# Patient Record
Sex: MALE | Race: WHITE | NOT HISPANIC OR LATINO | URBAN - METROPOLITAN AREA
[De-identification: names, ages, dates, MRNs, and addresses within clinical notes are randomized per-mention and may not be internally consistent; named-entity substitution may affect disease eponyms.]

---

## 2016-04-05 RX ORDER — ASPIRIN/CALCIUM CARB/MAGNESIUM 324 MG
1 TABLET ORAL
Qty: 0 | Refills: 0 | DISCHARGE
Start: 2016-04-05

## 2017-01-09 ENCOUNTER — OUTPATIENT (OUTPATIENT)
Dept: OUTPATIENT SERVICES | Facility: HOSPITAL | Age: 77
LOS: 1 days | Discharge: HOME | End: 2017-01-09
Payer: MEDICARE

## 2017-01-09 VITALS
WEIGHT: 240.08 LBS | SYSTOLIC BLOOD PRESSURE: 169 MMHG | DIASTOLIC BLOOD PRESSURE: 79 MMHG | HEART RATE: 70 BPM | OXYGEN SATURATION: 100 % | TEMPERATURE: 98 F | RESPIRATION RATE: 16 BRPM

## 2017-01-09 DIAGNOSIS — I25.10 ATHEROSCLEROTIC HEART DISEASE OF NATIVE CORONARY ARTERY WITHOUT ANGINA PECTORIS: ICD-10-CM

## 2017-01-09 LAB
ALBUMIN SERPL ELPH-MCNC: 4.6 G/DL — SIGNIFICANT CHANGE UP (ref 3.3–5)
ALP SERPL-CCNC: 112 U/L — SIGNIFICANT CHANGE UP (ref 40–120)
ALT FLD-CCNC: 29 U/L RC — SIGNIFICANT CHANGE UP (ref 10–45)
ANION GAP SERPL CALC-SCNC: 15 MMOL/L — SIGNIFICANT CHANGE UP (ref 5–17)
AST SERPL-CCNC: 24 U/L — SIGNIFICANT CHANGE UP (ref 10–40)
BILIRUB SERPL-MCNC: 0.4 MG/DL — SIGNIFICANT CHANGE UP (ref 0.2–1.2)
BUN SERPL-MCNC: 33 MG/DL — HIGH (ref 7–23)
CALCIUM SERPL-MCNC: 10.7 MG/DL — HIGH (ref 8.4–10.5)
CHLORIDE SERPL-SCNC: 106 MMOL/L — SIGNIFICANT CHANGE UP (ref 96–108)
CO2 SERPL-SCNC: 21 MMOL/L — LOW (ref 22–31)
CREAT SERPL-MCNC: 1.33 MG/DL — HIGH (ref 0.5–1.3)
GLUCOSE SERPL-MCNC: 132 MG/DL — HIGH (ref 70–99)
HCT VFR BLD CALC: 38.3 % — LOW (ref 39–50)
HGB BLD-MCNC: 13.2 G/DL — SIGNIFICANT CHANGE UP (ref 13–17)
MCHC RBC-ENTMCNC: 31.8 PG — SIGNIFICANT CHANGE UP (ref 27–34)
MCHC RBC-ENTMCNC: 34.6 GM/DL — SIGNIFICANT CHANGE UP (ref 32–36)
MCV RBC AUTO: 91.9 FL — SIGNIFICANT CHANGE UP (ref 80–100)
PLATELET # BLD AUTO: 191 K/UL — SIGNIFICANT CHANGE UP (ref 150–400)
POTASSIUM SERPL-MCNC: 5 MMOL/L — SIGNIFICANT CHANGE UP (ref 3.5–5.3)
POTASSIUM SERPL-SCNC: 5 MMOL/L — SIGNIFICANT CHANGE UP (ref 3.5–5.3)
PROT SERPL-MCNC: 7.9 G/DL — SIGNIFICANT CHANGE UP (ref 6–8.3)
RBC # BLD: 4.16 M/UL — LOW (ref 4.2–5.8)
RBC # FLD: 12.6 % — SIGNIFICANT CHANGE UP (ref 10.3–14.5)
SODIUM SERPL-SCNC: 142 MMOL/L — SIGNIFICANT CHANGE UP (ref 135–145)
WBC # BLD: 7 K/UL — SIGNIFICANT CHANGE UP (ref 3.8–10.5)
WBC # FLD AUTO: 7 K/UL — SIGNIFICANT CHANGE UP (ref 3.8–10.5)

## 2017-01-09 PROCEDURE — 93005 ELECTROCARDIOGRAM TRACING: CPT

## 2017-01-09 PROCEDURE — C1894: CPT

## 2017-01-09 PROCEDURE — 93460 R&L HRT ART/VENTRICLE ANGIO: CPT

## 2017-01-09 PROCEDURE — C1769: CPT

## 2017-01-09 PROCEDURE — 93010 ELECTROCARDIOGRAM REPORT: CPT

## 2017-01-09 PROCEDURE — 85027 COMPLETE CBC AUTOMATED: CPT

## 2017-01-09 PROCEDURE — C1887: CPT

## 2017-01-09 PROCEDURE — 80053 COMPREHEN METABOLIC PANEL: CPT

## 2017-01-09 NOTE — H&P CARDIOLOGY - PSH
S/P knee replacement  11/2013  S/P lumbar spine operation  6 thoracic to lumbar surgeries over 6 months period starting 11/2006

## 2017-01-09 NOTE — H&P CARDIOLOGY - HISTORY OF PRESENT ILLNESS
This is a 76yr old male with PMH of Coronary artery disease sp PCI, HTN, HLD, GERD, Gout, Osteoarthritis and back pain, sp multiple spinal surgeries presents to [facility] office with c/o of fatigue for the past 2 months, denies chest pian, dyspnea, dizziness, palpitations, orthopnea, N&V, HA.   ECHO 11/1/2016  mild AS, mild AR, severe left atrial enlargement, mild MR, EF 65%, mild diastolic dysfunction stage I  Here today for cardiac cath with possible intervention.

## 2017-01-09 NOTE — H&P CARDIOLOGY - PMH
Back pain    CAD S/P percutaneous coronary angioplasty    GERD (gastroesophageal reflux disease)    Gout    HLD (hyperlipidemia)    HTN (hypertension)    OA (osteoarthritis)

## 2018-09-11 ENCOUNTER — INPATIENT (INPATIENT)
Facility: HOSPITAL | Age: 78
LOS: 1 days | Discharge: HOME | DRG: 287 | End: 2018-09-13
Attending: INTERNAL MEDICINE | Admitting: INTERNAL MEDICINE
Payer: MEDICARE

## 2018-09-11 VITALS
TEMPERATURE: 98 F | DIASTOLIC BLOOD PRESSURE: 63 MMHG | OXYGEN SATURATION: 99 % | SYSTOLIC BLOOD PRESSURE: 153 MMHG | RESPIRATION RATE: 25 BRPM | HEART RATE: 78 BPM

## 2018-09-11 DIAGNOSIS — R07.9 CHEST PAIN, UNSPECIFIED: ICD-10-CM

## 2018-09-11 LAB
ALBUMIN SERPL ELPH-MCNC: 4.2 G/DL — SIGNIFICANT CHANGE UP (ref 3.3–5)
ALP SERPL-CCNC: 123 U/L — HIGH (ref 40–120)
ALT FLD-CCNC: 19 U/L — SIGNIFICANT CHANGE UP (ref 10–45)
ANION GAP SERPL CALC-SCNC: 15 MMOL/L — SIGNIFICANT CHANGE UP (ref 5–17)
APTT BLD: 28.5 SEC — SIGNIFICANT CHANGE UP (ref 27.5–37.4)
AST SERPL-CCNC: 16 U/L — SIGNIFICANT CHANGE UP (ref 10–40)
BASOPHILS # BLD AUTO: 0 K/UL — SIGNIFICANT CHANGE UP (ref 0–0.2)
BASOPHILS NFR BLD AUTO: 0.6 % — SIGNIFICANT CHANGE UP (ref 0–2)
BILIRUB SERPL-MCNC: 0.2 MG/DL — SIGNIFICANT CHANGE UP (ref 0.2–1.2)
BUN SERPL-MCNC: 46 MG/DL — HIGH (ref 7–23)
CALCIUM SERPL-MCNC: 9.8 MG/DL — SIGNIFICANT CHANGE UP (ref 8.4–10.5)
CHLORIDE SERPL-SCNC: 106 MMOL/L — SIGNIFICANT CHANGE UP (ref 96–108)
CK MB BLD-MCNC: 2.5 % — SIGNIFICANT CHANGE UP (ref 0–3.5)
CK MB CFR SERPL CALC: 3 NG/ML — SIGNIFICANT CHANGE UP (ref 0–6.7)
CK SERPL-CCNC: 120 U/L — SIGNIFICANT CHANGE UP (ref 30–200)
CO2 SERPL-SCNC: 20 MMOL/L — LOW (ref 22–31)
CREAT SERPL-MCNC: 1.43 MG/DL — HIGH (ref 0.5–1.3)
EOSINOPHIL # BLD AUTO: 0.4 K/UL — SIGNIFICANT CHANGE UP (ref 0–0.5)
EOSINOPHIL NFR BLD AUTO: 5.2 % — SIGNIFICANT CHANGE UP (ref 0–6)
GAS PNL BLDV: SIGNIFICANT CHANGE UP
GLUCOSE SERPL-MCNC: 228 MG/DL — HIGH (ref 70–99)
HCT VFR BLD CALC: 41.2 % — SIGNIFICANT CHANGE UP (ref 39–50)
HGB BLD-MCNC: 13.5 G/DL — SIGNIFICANT CHANGE UP (ref 13–17)
INR BLD: 1.06 RATIO — SIGNIFICANT CHANGE UP (ref 0.88–1.16)
LIDOCAIN IGE QN: 32 U/L — SIGNIFICANT CHANGE UP (ref 7–60)
LYMPHOCYTES # BLD AUTO: 2.4 K/UL — SIGNIFICANT CHANGE UP (ref 1–3.3)
LYMPHOCYTES # BLD AUTO: 33.5 % — SIGNIFICANT CHANGE UP (ref 13–44)
MCHC RBC-ENTMCNC: 30 PG — SIGNIFICANT CHANGE UP (ref 27–34)
MCHC RBC-ENTMCNC: 32.8 GM/DL — SIGNIFICANT CHANGE UP (ref 32–36)
MCV RBC AUTO: 91.2 FL — SIGNIFICANT CHANGE UP (ref 80–100)
MONOCYTES # BLD AUTO: 0.7 K/UL — SIGNIFICANT CHANGE UP (ref 0–0.9)
MONOCYTES NFR BLD AUTO: 9.7 % — SIGNIFICANT CHANGE UP (ref 2–14)
NEUTROPHILS # BLD AUTO: 3.6 K/UL — SIGNIFICANT CHANGE UP (ref 1.8–7.4)
NEUTROPHILS NFR BLD AUTO: 51 % — SIGNIFICANT CHANGE UP (ref 43–77)
NT-PROBNP SERPL-SCNC: 178 PG/ML — SIGNIFICANT CHANGE UP (ref 0–300)
PLATELET # BLD AUTO: 224 K/UL — SIGNIFICANT CHANGE UP (ref 150–400)
POTASSIUM SERPL-MCNC: 3.9 MMOL/L — SIGNIFICANT CHANGE UP (ref 3.5–5.3)
POTASSIUM SERPL-SCNC: 3.9 MMOL/L — SIGNIFICANT CHANGE UP (ref 3.5–5.3)
PROT SERPL-MCNC: 7.4 G/DL — SIGNIFICANT CHANGE UP (ref 6–8.3)
PROTHROM AB SERPL-ACNC: 11.5 SEC — SIGNIFICANT CHANGE UP (ref 9.8–12.7)
RBC # BLD: 4.51 M/UL — SIGNIFICANT CHANGE UP (ref 4.2–5.8)
RBC # FLD: 13.8 % — SIGNIFICANT CHANGE UP (ref 10.3–14.5)
SODIUM SERPL-SCNC: 141 MMOL/L — SIGNIFICANT CHANGE UP (ref 135–145)
TROPONIN T, HIGH SENSITIVITY RESULT: 15 NG/L — SIGNIFICANT CHANGE UP (ref 0–51)
TROPONIN T, HIGH SENSITIVITY RESULT: 16 NG/L — SIGNIFICANT CHANGE UP (ref 0–51)
WBC # BLD: 7 K/UL — SIGNIFICANT CHANGE UP (ref 3.8–10.5)
WBC # FLD AUTO: 7 K/UL — SIGNIFICANT CHANGE UP (ref 3.8–10.5)

## 2018-09-11 PROCEDURE — 99285 EMERGENCY DEPT VISIT HI MDM: CPT | Mod: GC,25

## 2018-09-11 PROCEDURE — 93010 ELECTROCARDIOGRAM REPORT: CPT

## 2018-09-11 PROCEDURE — 71046 X-RAY EXAM CHEST 2 VIEWS: CPT | Mod: 26

## 2018-09-11 RX ORDER — CHOLECALCIFEROL (VITAMIN D3) 125 MCG
1000 CAPSULE ORAL DAILY
Qty: 0 | Refills: 0 | Status: DISCONTINUED | OUTPATIENT
Start: 2018-09-11 | End: 2018-09-13

## 2018-09-11 RX ORDER — ASPIRIN/CALCIUM CARB/MAGNESIUM 324 MG
324 TABLET ORAL ONCE
Qty: 0 | Refills: 0 | Status: COMPLETED | OUTPATIENT
Start: 2018-09-11 | End: 2018-09-11

## 2018-09-11 RX ORDER — LOSARTAN POTASSIUM 100 MG/1
100 TABLET, FILM COATED ORAL DAILY
Qty: 0 | Refills: 0 | Status: DISCONTINUED | OUTPATIENT
Start: 2018-09-11 | End: 2018-09-13

## 2018-09-11 RX ORDER — PANTOPRAZOLE SODIUM 20 MG/1
40 TABLET, DELAYED RELEASE ORAL
Qty: 0 | Refills: 0 | Status: DISCONTINUED | OUTPATIENT
Start: 2018-09-11 | End: 2018-09-13

## 2018-09-11 RX ORDER — ASPIRIN/CALCIUM CARB/MAGNESIUM 324 MG
81 TABLET ORAL DAILY
Qty: 0 | Refills: 0 | Status: DISCONTINUED | OUTPATIENT
Start: 2018-09-11 | End: 2018-09-13

## 2018-09-11 RX ORDER — ATORVASTATIN CALCIUM 80 MG/1
40 TABLET, FILM COATED ORAL AT BEDTIME
Qty: 0 | Refills: 0 | Status: DISCONTINUED | OUTPATIENT
Start: 2018-09-11 | End: 2018-09-13

## 2018-09-11 RX ORDER — INFLUENZA VIRUS VACCINE 15; 15; 15; 15 UG/.5ML; UG/.5ML; UG/.5ML; UG/.5ML
0.5 SUSPENSION INTRAMUSCULAR ONCE
Qty: 0 | Refills: 0 | Status: DISCONTINUED | OUTPATIENT
Start: 2018-09-11 | End: 2018-09-13

## 2018-09-11 RX ORDER — SODIUM CHLORIDE 9 MG/ML
3 INJECTION INTRAMUSCULAR; INTRAVENOUS; SUBCUTANEOUS ONCE
Qty: 0 | Refills: 0 | Status: COMPLETED | OUTPATIENT
Start: 2018-09-11 | End: 2018-09-11

## 2018-09-11 RX ORDER — ALLOPURINOL 300 MG
300 TABLET ORAL DAILY
Qty: 0 | Refills: 0 | Status: DISCONTINUED | OUTPATIENT
Start: 2018-09-11 | End: 2018-09-13

## 2018-09-11 RX ORDER — AMLODIPINE BESYLATE 2.5 MG/1
10 TABLET ORAL DAILY
Qty: 0 | Refills: 0 | Status: DISCONTINUED | OUTPATIENT
Start: 2018-09-11 | End: 2018-09-13

## 2018-09-11 RX ORDER — ASPIRIN/CALCIUM CARB/MAGNESIUM 324 MG
162 TABLET ORAL ONCE
Qty: 0 | Refills: 0 | Status: DISCONTINUED | OUTPATIENT
Start: 2018-09-11 | End: 2018-09-11

## 2018-09-11 RX ADMIN — ATORVASTATIN CALCIUM 40 MILLIGRAM(S): 80 TABLET, FILM COATED ORAL at 22:07

## 2018-09-11 RX ADMIN — AMLODIPINE BESYLATE 10 MILLIGRAM(S): 2.5 TABLET ORAL at 22:07

## 2018-09-11 RX ADMIN — Medication 324 MILLIGRAM(S): at 16:25

## 2018-09-11 RX ADMIN — SODIUM CHLORIDE 3 MILLILITER(S): 9 INJECTION INTRAMUSCULAR; INTRAVENOUS; SUBCUTANEOUS at 16:47

## 2018-09-11 RX ADMIN — LOSARTAN POTASSIUM 100 MILLIGRAM(S): 100 TABLET, FILM COATED ORAL at 22:07

## 2018-09-11 NOTE — ED PROVIDER NOTE - MEDICAL DECISION MAKING DETAILS
77M with past medical history coronary artery disease with recent pci showing 10% LAD stenosis, mild diastolic dysfunction, with chronic LE edema on occasional lasix presents with chest pain while walking today.  No other complaints, pain now resolved.  ACS r/o with labs, cxr, ekg, monitor, asa, will discuss with Dr. Mitchell (pt's cardiologist) Dr. Lux (Attending Physician)  77M with past medical history coronary artery disease with recent pci showing 10% LAD stenosis, mild diastolic dysfunction, with chronic LE edema on occasional lasix presents with chest pain while walking today.  No other complaints, pain now resolved.  ACS r/o with labs, cxr, ekg, monitor, asa, will discuss with Dr. Mitchell (pt's cardiologist)

## 2018-09-11 NOTE — ED PROVIDER NOTE - PHYSICAL EXAMINATION
***GEN - well appearing; NAD   ***HEAD - NC/AT  ***EYES/NOSE - PEERL, EOMI, mucous membranes moist, no discharge   ***THROAT: Oral cavity and pharynx normal. No inflammation, swelling, exudate, or lesions.    ***NECK: supple, non-tender no lymphadenopathy  ***PULMONARY - CTA b/l, symmetric breath sounds.   ***CARDIAC- s1s2, RRR, systolic murmur  ***ABDOMEN - +BS, ND, NT, soft, no guarding, no rebound, no organomegaly  ***BACK - no CVA tenderness, Normal  spine, no spinal TTP  ***EXTREMITIES - symmetric pulses, 2+ dp, capillary refill < 2 seconds, no clubbing, no cyanosis, 2+ edema BL LE  ***SKIN - warm, dry, intact, no rash or bruising   ***NEUROLOGIC - a&o x3, CN 3-12 intact, sensation nl, motor 5/5 RUE/LUE/RLE/LLE gait nl,   ***PSYCH - insight and judgment nl, memory nl, affect nl, thought normal

## 2018-09-11 NOTE — H&P ADULT - HISTORY OF PRESENT ILLNESS
77yr old male with PMH of Coronary artery disease sp PCI 1/2017 with 10% LAD stenosis OM1, HTN, HLD, GERD, Gout, Osteoarthritis and back pain, sp multiple spinal surgeries presents with BL squeezing chest pain that started while walking 2hrs ago, lasted 1hr then resolved.  Pain was improved on laying down, worse on leaning forward.  Denies jaw pain, arm pain, shortness of breath, cough, n/v, diaphoresis, recent exertional symptoms, fever, chills, URI symptoms, worsening LE edema.

## 2018-09-11 NOTE — H&P ADULT - ASSESSMENT
77yr old male with PMH of Coronary artery disease sp PCI 1/2017 with 10% LAD stenosis OM1, HTN, HLD, GERD, Gout, Osteoarthritis and back pain, sp multiple spinal surgeries presents with BL squeezing chest pain that started while walking 2hrs ago, lasted 1hr then resolved.  Pain was improved on laying down, worse on leaning forward.  Denies jaw pain, arm pain, shortness of breath, cough, n/v, diaphoresis, recent exertional symptoms, fever, chills, URI symptoms, worsening LE edema.    r/o acs  - trop neg x2  - admit to tele  - c/w asa  - stress test in am    htn  - cw cozaar  - c/w norvasc    - hld  - cw lipitor    gout  - cw allopurinol    dvt px

## 2018-09-11 NOTE — H&P ADULT - NSHPLABSRESULTS_GEN_ALL_CORE
LABS:                        13.5   7.0   )-----------( 224      ( 11 Sep 2018 16:50 )             41.2     09-11    141  |  106  |  46<H>  ----------------------------<  228<H>  3.9   |  20<L>  |  1.43<H>    Ca    9.8      11 Sep 2018 16:50    TPro  7.4  /  Alb  4.2  /  TBili  0.2  /  DBili  x   /  AST  16  /  ALT  19  /  AlkPhos  123<H>  09-11    PT/INR - ( 11 Sep 2018 16:50 )   PT: 11.5 sec;   INR: 1.06 ratio         PTT - ( 11 Sep 2018 16:50 )  PTT:28.5 sec      CPK Mass Assay %: 2.5 % (09-11-18 @ 18:37)      RADIOLOGY & ADDITIONAL TESTS:

## 2018-09-11 NOTE — ED ADULT NURSE NOTE - NSIMPLEMENTINTERV_GEN_ALL_ED
Implemented All Universal Safety Interventions:  [place] to call system. Call bell, personal items and telephone within reach. Instruct patient to call for assistance. Room bathroom lighting operational. Non-slip footwear when patient is off stretcher. Physically safe environment: no spills, clutter or unnecessary equipment. Stretcher in lowest position, wheels locked, appropriate side rails in place.

## 2018-09-11 NOTE — ED PROVIDER NOTE - NS ED ROS FT
Constitutional: no fever  Eyes: As noted in hpi   Ears: no ear pain   Nose: no nasal congestion, Mouth/Throat: no throat pain, Neck: no stiffness  Cardiovascular: As noted in hpi   Chest: As noted in hpi   Gastrointestinal: no abdominal pain, no vomiting and diarrhea  MSK: no joint pain  : no dysuria  Skin: no rash  Neuro: no LOC  All other review of systems negative except as noted in HPI

## 2018-09-11 NOTE — ED ADULT NURSE NOTE - OBJECTIVE STATEMENT
76 y/o male with PMH of CAD S/P stents, GERD, gout, HTN presenting to ED for an episode of midsternal squeezing chest pain at rest 2 hours ago. Pt states "It hurts more leaning forward than it does sitting back. I was walking for 1-2 blocks and felt fine but when I got home and sat down, I felt it. I had no SOB or sweats. It lasted about 10 minutes." Upon exam, pt A&Ox3 gross neuro intact, lungs cta bilaterally, no difficulty speaking in complete sentences, s1s2 heart sounds heard, abdomen soft round nontender nondistended, skin intact, 2+ bilateral pitting edema on lower extremities. EKG done & given to MD, placed on cardiac monitor NSR 70s, pt endorses that chest pain has subsided and is experiencing no pain at this point. Pt denies chest pain, sob, ha, n/v/d, abdominal pain, f/c, urinary symptoms, hematuria. Labs drawn & sent. 78 y/o male with PMH of CAD S/P stents, GERD, gout, HTN presenting to ED for an episode of midsternal squeezing chest pain at rest 2 hours ago. Pt states "It hurts more leaning forward than it does sitting back. I was walking for 1-2 blocks and felt fine but when I got home and sat down, I felt it. I had no SOB or sweats. It lasted about 10 minutes." Upon exam, pt A&Ox3 gross neuro intact, lungs cta bilaterally, no difficulty speaking in complete sentences, s1s2 heart sounds heard, abdomen soft round nontender nondistended, skin intact, 2+ bilateral pitting edema on lower extremities. EKG done & given to MD, placed on cardiac monitor NSR 70s, pt endorses that chest pain has subsided and is experiencing no pain at this point. 4 81mg chewable ASA given bedside as per MD Min.  Pt denies chest pain, sob, ha, n/v/d, abdominal pain, f/c, urinary symptoms, hematuria. Labs drawn & sent. 76 y/o male with PMH of CAD S/P stents, GERD, gout, HTN presenting to ED for an episode of midsternal squeezing chest pain at rest 2 hours. Pt states "It hurts more leaning forward than it does sitting back. I was walking for 1-2 blocks and felt fine but when I got home and sat down, I felt it. I had no SOB or sweats. It lasted about 10 minutes." Upon exam, pt A&Ox3 gross neuro intact, lungs cta bilaterally, no difficulty speaking in complete sentences, s1s2 heart sounds heard, abdomen soft round nontender nondistended, skin intact, 2+ bilateral pitting edema on lower extremities. EKG done & given to MD, placed on cardiac monitor NSR 70s, pt endorses that chest pain has subsided and is experiencing no pain at this point. 4 81mg chewable ASA given bedside as per MD Min.  Pt denies chest pain, sob, ha, n/v/d, abdominal pain, f/c, urinary symptoms, hematuria. Labs drawn & sent.

## 2018-09-11 NOTE — ED PROVIDER NOTE - OBJECTIVE STATEMENT
77yr old male with PMH of Coronary artery disease sp PCI 1/2017 with 10% LAD stenosis OM1, HTN, HLD, GERD, Gout, Osteoarthritis and back pain, sp multiple spinal surgeries presents presents with BL squeezing chest pain that started while walking 2hrs ago, lasted 1hr then resolved.  Pain was improved on laying down, worse on leaning forward.  Denies jaw pain, arm pain, shortness of breath, cough, n/v, diaphoresis, recent exertional symptoms, fever, chills, URI symptoms, worsening LE edema. 77yr old male with PMH of Coronary artery disease sp PCI 1/2017 with 10% LAD stenosis OM1, HTN, HLD, GERD, Gout, Osteoarthritis and back pain, sp multiple spinal surgeries presents with BL squeezing chest pain that started while walking 2hrs ago, lasted 1hr then resolved.  Pain was improved on laying down, worse on leaning forward.  Denies jaw pain, arm pain, shortness of breath, cough, n/v, diaphoresis, recent exertional symptoms, fever, chills, URI symptoms, worsening LE edema.

## 2018-09-11 NOTE — ED ADULT NURSE REASSESSMENT NOTE - NS ED NURSE REASSESS COMMENT FT1
Pt remains stable in ED S/P xray, results show no emergent findings. Pt VSS, NAD, no complaints of pain at this time. Admitted to tele for chest pain. RTM. Awaits bed.

## 2018-09-11 NOTE — ED PROVIDER NOTE - ATTENDING CONTRIBUTION TO CARE
Dr. Lux (Attending Physician)  I performed a history and physical exam of the patient and discussed their management with the resident. I reviewed the resident's note and agree with the documented findings and plan of care. My medical decision making and observations are found above.    I performed a history and physical exam of the patient and discussed their management with the advanced care provider. I reviewed the advanced care provider's note and agree with the documented findings and plan of care. My medical decision making and objective findings are found above.

## 2018-09-12 LAB
ANION GAP SERPL CALC-SCNC: 16 MMOL/L — SIGNIFICANT CHANGE UP (ref 5–17)
BUN SERPL-MCNC: 29 MG/DL — HIGH (ref 7–23)
CALCIUM SERPL-MCNC: 9.5 MG/DL — SIGNIFICANT CHANGE UP (ref 8.4–10.5)
CHLORIDE SERPL-SCNC: 108 MMOL/L — SIGNIFICANT CHANGE UP (ref 96–108)
CHOLEST SERPL-MCNC: 158 MG/DL — SIGNIFICANT CHANGE UP (ref 10–199)
CO2 SERPL-SCNC: 18 MMOL/L — LOW (ref 22–31)
CREAT SERPL-MCNC: 0.99 MG/DL — SIGNIFICANT CHANGE UP (ref 0.5–1.3)
FOLATE SERPL-MCNC: 9.3 NG/ML — SIGNIFICANT CHANGE UP
GLUCOSE SERPL-MCNC: 129 MG/DL — HIGH (ref 70–99)
HCT VFR BLD CALC: 38.2 % — LOW (ref 39–50)
HDLC SERPL-MCNC: 28 MG/DL — LOW
HGB BLD-MCNC: 12.2 G/DL — LOW (ref 13–17)
LIPID PNL WITH DIRECT LDL SERPL: 95 MG/DL — SIGNIFICANT CHANGE UP
MAGNESIUM SERPL-MCNC: 1.9 MG/DL — SIGNIFICANT CHANGE UP (ref 1.6–2.6)
MCHC RBC-ENTMCNC: 28.8 PG — SIGNIFICANT CHANGE UP (ref 27–34)
MCHC RBC-ENTMCNC: 31.9 GM/DL — LOW (ref 32–36)
MCV RBC AUTO: 90.3 FL — SIGNIFICANT CHANGE UP (ref 80–100)
NT-PROBNP SERPL-SCNC: 132 PG/ML — SIGNIFICANT CHANGE UP (ref 0–300)
PHOSPHATE SERPL-MCNC: 3.1 MG/DL — SIGNIFICANT CHANGE UP (ref 2.5–4.5)
PLATELET # BLD AUTO: 214 K/UL — SIGNIFICANT CHANGE UP (ref 150–400)
POTASSIUM SERPL-MCNC: 4.1 MMOL/L — SIGNIFICANT CHANGE UP (ref 3.5–5.3)
POTASSIUM SERPL-SCNC: 4.1 MMOL/L — SIGNIFICANT CHANGE UP (ref 3.5–5.3)
RBC # BLD: 4.23 M/UL — SIGNIFICANT CHANGE UP (ref 4.2–5.8)
RBC # FLD: 14.9 % — HIGH (ref 10.3–14.5)
SODIUM SERPL-SCNC: 141 MMOL/L — SIGNIFICANT CHANGE UP (ref 135–145)
TOTAL CHOLESTEROL/HDL RATIO MEASUREMENT: 5.6 RATIO — SIGNIFICANT CHANGE UP (ref 3.4–9.6)
TRIGL SERPL-MCNC: 174 MG/DL — HIGH (ref 10–149)
TSH SERPL-MCNC: 3.72 UIU/ML — SIGNIFICANT CHANGE UP (ref 0.27–4.2)
VIT B12 SERPL-MCNC: 542 PG/ML — SIGNIFICANT CHANGE UP (ref 232–1245)
WBC # BLD: 7.06 K/UL — SIGNIFICANT CHANGE UP (ref 3.8–10.5)
WBC # FLD AUTO: 7.06 K/UL — SIGNIFICANT CHANGE UP (ref 3.8–10.5)

## 2018-09-12 PROCEDURE — 78452 HT MUSCLE IMAGE SPECT MULT: CPT | Mod: 26

## 2018-09-12 PROCEDURE — 93018 CV STRESS TEST I&R ONLY: CPT

## 2018-09-12 PROCEDURE — 93016 CV STRESS TEST SUPVJ ONLY: CPT

## 2018-09-12 RX ORDER — CLOPIDOGREL BISULFATE 75 MG/1
600 TABLET, FILM COATED ORAL ONCE
Qty: 0 | Refills: 0 | Status: COMPLETED | OUTPATIENT
Start: 2018-09-12 | End: 2018-09-12

## 2018-09-12 RX ORDER — CLOPIDOGREL BISULFATE 75 MG/1
75 TABLET, FILM COATED ORAL DAILY
Qty: 0 | Refills: 0 | Status: DISCONTINUED | OUTPATIENT
Start: 2018-09-13 | End: 2018-09-13

## 2018-09-12 RX ADMIN — Medication 300 MILLIGRAM(S): at 11:46

## 2018-09-12 RX ADMIN — Medication 81 MILLIGRAM(S): at 11:46

## 2018-09-12 RX ADMIN — CLOPIDOGREL BISULFATE 600 MILLIGRAM(S): 75 TABLET, FILM COATED ORAL at 17:53

## 2018-09-12 RX ADMIN — Medication 1000 UNIT(S): at 11:46

## 2018-09-12 RX ADMIN — ATORVASTATIN CALCIUM 40 MILLIGRAM(S): 80 TABLET, FILM COATED ORAL at 21:29

## 2018-09-12 RX ADMIN — AMLODIPINE BESYLATE 10 MILLIGRAM(S): 2.5 TABLET ORAL at 21:29

## 2018-09-12 RX ADMIN — PANTOPRAZOLE SODIUM 40 MILLIGRAM(S): 20 TABLET, DELAYED RELEASE ORAL at 05:23

## 2018-09-12 RX ADMIN — LOSARTAN POTASSIUM 100 MILLIGRAM(S): 100 TABLET, FILM COATED ORAL at 21:30

## 2018-09-12 NOTE — CONSULT NOTE ADULT - CONSULT REASON
chest pain  hx CAD s/p PCI ( mid LAD and OM) , HTN., DM HLD, GERD, GOUT, s/p multiple spinal surgery chest pain  hx  HTN, DCHF, CAD, S/P PCI to OM1, Mid LAD (9/2014, [facility], [facility], [facility]) hx of ischemic CMP, AS, DM HLD, GERD, GOUT, s/p multiple spinal surgery

## 2018-09-12 NOTE — PROGRESS NOTE ADULT - SUBJECTIVE AND OBJECTIVE BOX
Patient is a 77y old  Male who presents with a chief complaint of chest pain (11 Sep 2018 21:08)      SUBJECTIVE / OVERNIGHT EVENTS:  No chest pain. No shortness of breath. No complaints. No events overnight.     MEDICATIONS  (STANDING):  allopurinol 300 milliGRAM(s) Oral daily  amLODIPine   Tablet 10 milliGRAM(s) Oral daily  aspirin enteric coated 81 milliGRAM(s) Oral daily  atorvastatin 40 milliGRAM(s) Oral at bedtime  cholecalciferol 1000 Unit(s) Oral daily  influenza   Vaccine 0.5 milliLiter(s) IntraMuscular once  losartan 100 milliGRAM(s) Oral daily  pantoprazole    Tablet 40 milliGRAM(s) Oral before breakfast    MEDICATIONS  (PRN):      Vital Signs Last 24 Hrs  T(C): 36.5 (12 Sep 2018 04:59), Max: 36.9 (11 Sep 2018 20:07)  T(F): 97.7 (12 Sep 2018 04:59), Max: 98.4 (11 Sep 2018 20:07)  HR: 64 (12 Sep 2018 04:59) (62 - 78)  BP: 145/84 (12 Sep 2018 04:59) (145/84 - 160/81)  BP(mean): --  RR: 18 (12 Sep 2018 04:59) (16 - 25)  SpO2: 97% (12 Sep 2018 04:59) (97% - 99%)  CAPILLARY BLOOD GLUCOSE        I&O's Summary    12 Sep 2018 07:01  -  12 Sep 2018 12:19  --------------------------------------------------------  IN: 0 mL / OUT: 0 mL / NET: 0 mL        PHYSICAL EXAM:  GENERAL: NAD, well-developed  HEAD:  Atraumatic, Normocephalic  EYES: EOMI, PERRLA, conjunctiva and sclera clear  NECK: Supple, No JVD  CHEST/LUNG: Clear to auscultation bilaterally; No wheeze  HEART: Regular rate and rhythm; No murmurs, rubs, or gallops  ABDOMEN: Soft, Nontender, Nondistended; Bowel sounds present  EXTREMITIES:  2+ Peripheral Pulses, No clubbing, cyanosis, or edema  PSYCH: AAOx3  NEUROLOGY: non-focal  SKIN: No rashes or lesions    LABS:                        12.2   7.06  )-----------( 214      ( 12 Sep 2018 07:38 )             38.2     09-12    141  |  108  |  29<H>  ----------------------------<  129<H>  4.1   |  18<L>  |  0.99    Ca    9.5      12 Sep 2018 06:09  Phos  3.1     09-12  Mg     1.9     09-12    TPro  7.4  /  Alb  4.2  /  TBili  0.2  /  DBili  x   /  AST  16  /  ALT  19  /  AlkPhos  123<H>  09-11    PT/INR - ( 11 Sep 2018 16:50 )   PT: 11.5 sec;   INR: 1.06 ratio         PTT - ( 11 Sep 2018 16:50 )  PTT:28.5 sec  CARDIAC MARKERS ( 11 Sep 2018 18:37 )  x     / x     / 120 U/L / x     / 3.0 ng/mL          RADIOLOGY & ADDITIONAL TESTS:    Imaging Personally Reviewed:    Consultant(s) Notes Reviewed:      Care Discussed with Consultants/Other Providers:

## 2018-09-12 NOTE — PROGRESS NOTE ADULT - ASSESSMENT
77yr old male with PMH of Coronary artery disease sp PCI 1/2017 with 10% LAD stenosis OM1, HTN, HLD, GERD, Gout, Osteoarthritis and back pain, sp multiple spinal surgeries presents with BL squeezing chest pain that started while walking 2hrs ago, lasted 1hr then resolved.  Pain was improved on laying down, worse on leaning forward.  Denies jaw pain, arm pain, shortness of breath, cough, n/v, diaphoresis, recent exertional symptoms, fever, chills, URI symptoms, worsening LE edema.    r/o acs  - trop neg x2  - monitor on tele  - c/w asa  - stress test today    htn  - cw cozaar  - c/w norvasc    - hld  - cw lipitor    gout  - cw allopurinol    dvt px

## 2018-09-12 NOTE — CONSULT NOTE ADULT - ASSESSMENT
77yr old male with HTN, DCHF, CAD, S/P PCI to OM1, Mid LAD (9/2014, [facility], [facility], [facility]) hx of ischemic CMP, AS, DM HLD, GERD, GOUT, s/p multiple spinal surgery admitted with chest pain, fatigue     1. Chest pain   with some typical symptoms   HS trop negative, EKG unchanged with known RBBB/bifasicular block, no evidence of ACS   pt with known cardiac disease including CAD with s/p PCI to mid LAD and OM 1 stents and mild diffuse RCA disease ( reviewed on last cath in jan 2017)  stress test revealing mod to severe defect in the inferior and baso-inferolateral walls consistent with ischemia. defect in the apex and apicolateral wall consistent with infarct/ danette-infarct ischemia. EF 47%  likely plan for cath tomorrow   continue with ASA , statin and arb     2. CAD s/p PCI (OM1/LAD)   initially presenting with chest pain   currently cp free, no sob   ST results as mentioned above  continue with ASA , statin and arb   remains off BB in light of fatigue     3. Diastolic CHF, hx Ischemic CMP   last echo on  2/6/2018 revealed normal LV fx, diastolic dysfx.  no evidence of decomp CHF on exam   resume 40 mg PO lasix 3-4x weekly , continue Arb     4. Aortic Stenosis  stable, Moderate on recent echo 2/6/2018   check repeat echo to eval progression of AS    5. HTN   BP stable .,continue with current antihtn meds 77yr old male with HTN, DCHF, CAD, S/P PCI to OM1, Mid LAD (9/2014, [facility], [facility], [facility]) hx of ischemic CMP, AS, DM HLD, GERD, GOUT, s/p multiple spinal surgery admitted with chest pain, fatigue     1. Chest pain   with some typical symptoms   HS trop negative, EKG unchanged with known RBBB/bifasicular block, no evidence of ACS   pt with known cardiac disease including CAD with s/p PCI to mid LAD and OM 1 stents and mild diffuse RCA disease ( reviewed on last cath in jan 2017)  stress test revealing mod to severe defect in the inferior and baso-inferolateral walls consistent with ischemia. defect in the apex and apicolateral wall consistent with infarct/ danette-infarct ischemia. EF 47%  plan for cath tomorrow   continue with ASA , statin and arb     2. CAD s/p PCI (OM1/LAD)   initially presenting with chest pain   currently cp free, no sob   ST results as mentioned above, plan for cath tomorrow   continue with ASA , statin and arb   remains off BB in light of fatigue     3. Diastolic CHF, hx Ischemic CMP   last echo on  2/6/2018 revealed normal LV fx, diastolic dysfx.  no evidence of decomp CHF on exam   resume 40 mg PO lasix 3-4x weekly , continue Arb     4. Aortic Stenosis  stable, Moderate on recent echo 2/6/2018   check repeat echo to eval progression of AS    5. HTN   BP stable .,continue with current antihtn meds 77yr old male with HTN, DCHF, CAD, S/P PCI to OM1, Mid LAD (9/2014, [facility], [facility], [facility]) hx of ischemic CMP, AS, DM HLD, GERD, GOUT, s/p multiple spinal surgery admitted with chest pain, fatigue     1. Chest pain   with some typical symptoms   HS trop negative, EKG unchanged with known RBBB/bifasicular block, no evidence of ACS   pt with known cardiac disease including CAD with s/p PCI to mid LAD and OM 1 stents and mild diffuse RCA disease ( reviewed on last cath in jan 2017)  stress test revealing mod to severe defect in the inferior and baso-inferolateral walls consistent with ischemia. defect in the apex and apicolateral wall consistent with infarct/ danette-infarct ischemia. EF 47%  plan for cath tomorrow , give plavix 600 mg po x1 now, start 75 mg tomorrow   continue with ASA , statin and arb     2. CAD s/p PCI (OM1/LAD)   initially presenting with chest pain   currently cp free, no sob   ST results as mentioned above, plan for cath tomorrow   continue with ASA , statin and arb   remains off BB in light of fatigue     3. Diastolic CHF, hx Ischemic CMP   last echo on  2/6/2018 revealed normal LV fx, diastolic dysfx.  no evidence of decomp CHF on exam   resume 40 mg PO lasix 3-4x weekly , continue Arb     4. Aortic Stenosis  stable, Moderate on recent echo 2/6/2018   check repeat echo to eval progression of AS    5. HTN   BP stable .,continue with current antihtn meds

## 2018-09-12 NOTE — CONSULT NOTE ADULT - SUBJECTIVE AND OBJECTIVE BOX
CARDIOLOGY CONSULT - Dr. Lee     CHIEF COMPLAINT:    HPI:  77yr old male with PMH as mentioned below presents with BL squeezing chest pain. Chest pain started while walking , lasted 1hr then resolved.  Pain was improved on laying down, worse on leaning forward.  Denies jaw pain, arm pain, shortness of breath, cough, n/v, diaphoresis, recent exertional symptoms, fever, chills, URI symptoms, worsening LE edema.  Cath jan 2017 revealed  patent mid LAD and OM 1 stents and mild diffuse RCA disease. EF is preserved at 55%.  Wedge pressure and pulmonary artery pressures are normal.      PAST MEDICAL & SURGICAL HISTORY:  CAD S/P percutaneous coronary angioplasty  HLD (hyperlipidemia)  Back pain  OA (osteoarthritis)  Gout  HTN (hypertension)  GERD (gastroesophageal reflux disease)  S/P lumbar spine operation: 6 thoracic to lumbar surgeries over 6 months period starting 11/2006  S/P knee replacement: 11/2013          PREVIOUS DIAGNOSTIC TESTING:    [x ] Echocardiogram:  < from: Limited Transthoracic Echo (04.05.16 @ 10:31) >  Conclusions:  Limited TTE to evaluate the valves for evidence of  endocarditis.  1. Mitral annular calcification, otherwise normal mitral  valve. Minimal mitral regurgitation.  2. Calcified trileaflet aortic valve with decreased  opening. No aortic valve regurgitation seen.  3. Endocardium not well visualized; grossly normal left  ventricular systolic function.  4. The right ventricle is not well visualized; grossly  normal right ventricular systolic function.  5. Tricuspid valve not well visualized, probably normal.  Minimal tricuspid regurgitation.  Consider repeat JAHAIRA to re-evaluate the valves for evidence  of endocarditis if clinically indicated.  ------------------------------------------------------------------------  Confirmed on  4/5/2016 - 09:25:36 by Denver Miller M.D.  ------------------------------------------------------------------------    < end of copied text >    [x ]  Catheterization:  < from: Cardiac Cath Lab - Adult (01.09.17 @ 13:05) >  VENTRICLES: Global left ventricular function was normal. EF estimated was  55 %.  VALVES: MITRAL VALVE: The mitral valve exhibited mild regurgitation.  CORONARY VESSELS: The coronary circulation is right dominant.  LM:   --  LM: Normal.  LAD:   --  LAD: Angiography showed minor luminal irregularities with no  flow limiting lesions.  --  Mid LAD: There was a 10 % stenosis at the site of a prior stent.  CX:   --  Circumflex: Normal.  --  OM1: There was a 10 % stenosis at the site of a prior stent.  RCA:   --  RCA: Angiography showed mild atherosclerosis with no flow  limiting lesions.  COMPLICATIONS: There were no complications.  DIAGNOSTIC IMPRESSIONS: Exertional dyspnea and decreased exercise tolerance  with known history of coronary disease and multivessel PCI. Cath with  patent mid LAD and OM 1 stents and mild diffuse RCA disease. EF is  preserved. Wedge pressure and pulmonary artery pressures are normal. No  obvious cardiac etiology for his symptoms.  DIAGNOSTIC RECOMMENDATIONS: Continue medical management of coronary disease  with ASA and statin. Optimize blood pressure control.  INTERVENTIONAL IMPRESSIONS: Exertional dyspnea and decreased exercise  tolerance with known history of coronary disease and multivessel PCI. Cath  with patent mid LAD and OM 1 stents and mild diffuse RCA disease. EF is  preserved. Wedge pressure and pulmonary artery pressures are normal. No  obvious cardiac etiology for his symptoms.  INTERVENTIONAL RECOMMENDATIONS: Continue medical management of coronary  disease with ASA and statin. Optimize blood pressure control.  Prepared and signed by  Anand Lee M.D.    < end of copied text >    [ ] Stress Test:  	    MEDICATIONS:  MEDICATIONS  (STANDING):  allopurinol 300 milliGRAM(s) Oral daily  amLODIPine   Tablet 10 milliGRAM(s) Oral daily  aspirin enteric coated 81 milliGRAM(s) Oral daily  atorvastatin 40 milliGRAM(s) Oral at bedtime  cholecalciferol 1000 Unit(s) Oral daily  influenza   Vaccine 0.5 milliLiter(s) IntraMuscular once  losartan 100 milliGRAM(s) Oral daily  pantoprazole    Tablet 40 milliGRAM(s) Oral before breakfast      FAMILY HISTORY:  No pertinent family history in first degree relatives      SOCIAL HISTORY:    [ ] Non-smoker  [ ] Smoker  [ ] Alcohol    Allergies    Indocin (Unknown)  oxacillin (Unknown)  vancomycin (Rash)  Zyvox (Unknown)    Intolerances    	    REVIEW OF SYSTEMS:  CONSTITUTIONAL: No fever, weight loss, or fatigue  EYES: No eye pain, visual disturbances, or discharge  ENMT:  No difficulty hearing, tinnitus, vertigo; No sinus or throat pain  NECK: No pain or stiffness  RESPIRATORY: No cough, wheezing, chills or hemoptysis; No Shortness of Breath  CARDIOVASCULAR: No chest pain, palpitations, passing out, dizziness, or leg swelling  GASTROINTESTINAL: No abdominal or epigastric pain. No nausea, vomiting, or hematemesis; No diarrhea or constipation. No melena or hematochezia.  GENITOURINARY: No dysuria, frequency, hematuria, or incontinence  NEUROLOGICAL: No headaches, memory loss, loss of strength, numbness, or tremors  SKIN: No itching, burning, rashes, or lesions   	    [ ] All others negative	  [ ] Unable to obtain    PHYSICAL EXAM:  T(C): 36.2 (09-12-18 @ 13:34), Max: 36.9 (09-11-18 @ 20:07)  HR: 58 (09-12-18 @ 13:34) (58 - 78)  BP: 143/54 (09-12-18 @ 13:34) (143/54 - 160/81)  RR: 18 (09-12-18 @ 13:34) (16 - 25)  SpO2: 97% (09-12-18 @ 13:34) (97% - 99%)  Wt(kg): --  I&O's Summary    12 Sep 2018 07:01  -  12 Sep 2018 13:42  --------------------------------------------------------  IN: 0 mL / OUT: 0 mL / NET: 0 mL        Appearance: Normal	  Psychiatry: A & O x 3, Mood & affect appropriate  HEENT:   Normal oral mucosa, PERRL, EOMI	  Lymphatic: No lymphadenopathy  Cardiovascular: Normal S1 S2,RRR, No JVD, No murmurs  Respiratory: Lungs clear to auscultation	  Gastrointestinal:  Soft, Non-tender, + BS	  Skin: No rashes, No ecchymoses, No cyanosis	  Neurologic: Non-focal  Extremities: Normal range of motion, No clubbing, cyanosis or edema  Vascular: Peripheral pulses palpable 2+ bilaterally    TELEMETRY: 	    ECG:  	  RADIOLOGY:  OTHER: 	  	  LABS:	 	    CARDIAC MARKERS:                                  12.2   7.06  )-----------( 214      ( 12 Sep 2018 07:38 )             38.2     09-12    141  |  108  |  29<H>  ----------------------------<  129<H>  4.1   |  18<L>  |  0.99    Ca    9.5      12 Sep 2018 06:09  Phos  3.1     09-12  Mg     1.9     09-12    TPro  7.4  /  Alb  4.2  /  TBili  0.2  /  DBili  x   /  AST  16  /  ALT  19  /  AlkPhos  123<H>  09-11    PT/INR - ( 11 Sep 2018 16:50 )   PT: 11.5 sec;   INR: 1.06 ratio         PTT - ( 11 Sep 2018 16:50 )  PTT:28.5 sec  proBNP: Serum Pro-Brain Natriuretic Peptide: 132 pg/mL (09-12 @ 06:09)  Serum Pro-Brain Natriuretic Peptide: 178 pg/mL (09-11 @ 16:50)    Lipid Profile:   HgA1c:   TSH: Thyroid Stimulating Hormone, Serum: 3.72 uIU/mL (09-12 @ 08:56) CARDIOLOGY CONSULT - Dr. Lee     CHIEF COMPLAINT: chest pain     HPI:  77yr old male with PMH as mentioned below presents with BL squeezing chest pain. First episode of chest pain started while walking , lasted 1hr then resolved. Pain was improved on laying down, worse on leaning forward.  Second episode occurred while eating. He denies jaw pain, arm pain, shortness of breath, cough, n/v, diaphoresis, recent exertional symptoms, fever, chills, URI symptoms, worsening LE edema. Reports chronic LE edema, on lasix 40 mg 3-4x weekly. Patient is known to the office. Cardiac HX includes HTN, DCHF, CAD, S/P PCI to OM1, Mid LAD (9/2014, [facility], [facility], [facility]) hx of ischemic CMP, and AS. Cath jan 2017 revealed  patent mid LAD and OM 1 stents and mild diffuse RCA disease. EF is preserved at 55%.  Wedge pressure and pulmonary artery pressures are normal. Echo from 2/6/2018 revealed YULY 1.1 mod AS, EF 60%, sev lae, diastolic dysfx. Patient also endorses fatigue. Currently chest pain free, no sob, palpitations, n/v, fever or chills. ROS otherwise negative       PAST MEDICAL & SURGICAL HISTORY:  CAD S/P percutaneous coronary angioplasty  HLD (hyperlipidemia)  Back pain  OA (osteoarthritis)  Gout  HTN (hypertension)  GERD (gastroesophageal reflux disease)  S/P lumbar spine operation: 6 thoracic to lumbar surgeries over 6 months period starting 11/2006  S/P knee replacement: 11/2013          PREVIOUS DIAGNOSTIC TESTING:    [x ] Echocardiogram:  < from: Limited Transthoracic Echo (04.05.16 @ 10:31) >  Conclusions:  Limited TTE to evaluate the valves for evidence of  endocarditis.  1. Mitral annular calcification, otherwise normal mitral  valve. Minimal mitral regurgitation.  2. Calcified trileaflet aortic valve with decreased  opening. No aortic valve regurgitation seen.  3. Endocardium not well visualized; grossly normal left  ventricular systolic function.  4. The right ventricle is not well visualized; grossly  normal right ventricular systolic function.  5. Tricuspid valve not well visualized, probably normal.  Minimal tricuspid regurgitation.  Consider repeat JAHAIRA to re-evaluate the valves for evidence  of endocarditis if clinically indicated.  ------------------------------------------------------------------------  Confirmed on  4/5/2016 - 09:25:36 by Denver Miller M.D.  ------------------------------------------------------------------------    < end of copied text >    [x ]  Catheterization:  < from: Cardiac Cath Lab - Adult (01.09.17 @ 13:05) >  VENTRICLES: Global left ventricular function was normal. EF estimated was  55 %.  VALVES: MITRAL VALVE: The mitral valve exhibited mild regurgitation.  CORONARY VESSELS: The coronary circulation is right dominant.  LM:   --  LM: Normal.  LAD:   --  LAD: Angiography showed minor luminal irregularities with no  flow limiting lesions.  --  Mid LAD: There was a 10 % stenosis at the site of a prior stent.  CX:   --  Circumflex: Normal.  --  OM1: There was a 10 % stenosis at the site of a prior stent.  RCA:   --  RCA: Angiography showed mild atherosclerosis with no flow  limiting lesions.  COMPLICATIONS: There were no complications.  DIAGNOSTIC IMPRESSIONS: Exertional dyspnea and decreased exercise tolerance  with known history of coronary disease and multivessel PCI. Cath with  patent mid LAD and OM 1 stents and mild diffuse RCA disease. EF is  preserved. Wedge pressure and pulmonary artery pressures are normal. No  obvious cardiac etiology for his symptoms.  DIAGNOSTIC RECOMMENDATIONS: Continue medical management of coronary disease  with ASA and statin. Optimize blood pressure control.  INTERVENTIONAL IMPRESSIONS: Exertional dyspnea and decreased exercise  tolerance with known history of coronary disease and multivessel PCI. Cath  with patent mid LAD and OM 1 stents and mild diffuse RCA disease. EF is  preserved. Wedge pressure and pulmonary artery pressures are normal. No  obvious cardiac etiology for his symptoms.  INTERVENTIONAL RECOMMENDATIONS: Continue medical management of coronary  disease with ASA and statin. Optimize blood pressure control.  Prepared and signed by  Anand Lee M.D.    < end of copied text >    [ ] Stress Test:  	    MEDICATIONS:  MEDICATIONS  (STANDING):  allopurinol 300 milliGRAM(s) Oral daily  amLODIPine   Tablet 10 milliGRAM(s) Oral daily  aspirin enteric coated 81 milliGRAM(s) Oral daily  atorvastatin 40 milliGRAM(s) Oral at bedtime  cholecalciferol 1000 Unit(s) Oral daily  influenza   Vaccine 0.5 milliLiter(s) IntraMuscular once  losartan 100 milliGRAM(s) Oral daily  pantoprazole    Tablet 40 milliGRAM(s) Oral before breakfast      FAMILY HISTORY:  No pertinent family history in first degree relatives      SOCIAL HISTORY:    [x ] Non-smoker  [ ] Smoker  [ ] Alcohol    Allergies    Indocin (Unknown)  oxacillin (Unknown)  vancomycin (Rash)  Zyvox (Unknown)    Intolerances    	    REVIEW OF SYSTEMS:  CONSTITUTIONAL: No fever, weight loss, or fatigue  EYES: No eye pain, visual disturbances, or discharge  ENMT:  No difficulty hearing, tinnitus, vertigo; No sinus or throat pain  NECK: No pain or stiffness  RESPIRATORY: No cough, wheezing, chills or hemoptysis; No Shortness of Breath  CARDIOVASCULAR: No palpitations, passing out, dizziness, or leg swelling + cp    GASTROINTESTINAL: No abdominal or epigastric pain. No nausea, vomiting, or hematemesis; No diarrhea or constipation. No melena or hematochezia.  GENITOURINARY: No dysuria, frequency, hematuria, or incontinence  NEUROLOGICAL: No headaches, memory loss, loss of strength, numbness, or tremors  SKIN: No itching, burning, rashes, or lesions   	    [x ] All others negative	  [ ] Unable to obtain    PHYSICAL EXAM:  T(C): 36.2 (09-12-18 @ 13:34), Max: 36.9 (09-11-18 @ 20:07)  HR: 58 (09-12-18 @ 13:34) (58 - 78)  BP: 143/54 (09-12-18 @ 13:34) (143/54 - 160/81)  RR: 18 (09-12-18 @ 13:34) (16 - 25)  SpO2: 97% (09-12-18 @ 13:34) (97% - 99%)  Wt(kg): --  I&O's Summary    12 Sep 2018 07:01  -  12 Sep 2018 13:42  --------------------------------------------------------  IN: 0 mL / OUT: 0 mL / NET: 0 mL        Appearance: Normal	  Psychiatry: A & O x 3, Mood & affect appropriate  HEENT:   Normal oral mucosa, PERRL, EOMI	  Lymphatic: No lymphadenopathy  Cardiovascular: Normal S1 S2,RRR, + sys murmur   Respiratory: Lungs clear to auscultation	  Gastrointestinal:  Soft, Non-tender, + BS	  Skin: No rashes, No ecchymoses, No cyanosis	  Neurologic: Non-focal  Extremities: Normal range of motion,+ 1-2 le edema  Vascular: Peripheral pulses palpable 2+ bilaterally    TELEMETRY: NSR/BBB/PACS	    ECG:  	NSR hr 81, RBBB, Bifasicular Block , PAC ( unchanged)   RADIOLOGY:  < from: Xray Chest 2 Views PA/Lat (09.11.18 @ 18:05) >  FINDINGS:  No focal pulmonary consolidation.  There are no pleural effusions or pneumothorax.  Mediastinal silhouette is within normal limits.  Spinal fusion hardware is noted.     IMPRESSION:   Clear lungs.              < end of copied text >    OTHER: 	  < from: Nuclear Stress Test-Pharmacologic (09.12.18 @ 11:25) >  NUCLEAR FINDINGS:  The left ventricle was mildly dilated at baseline. There  is a medium-sized, moderate to severe defect in the  inferior and basoinferolateral walls that is predominantly  reversible consistent with ischemia. There is a  small-sized, moderate defect in the apex and apicolateral  wall that is partially reversible consistent with infarct  with danette-infarct ischemia. Patient unable to prone.  ------------------------------------------------------------------------  GATED ANALYSIS:  Post-stress gated wall motion analysis was performed (LVEF  = 47 %;LVEDV = 136 ml.), revealing severe hypokinesis in  inferior wall(s).  ------------------------------------------------------------------------  IMPRESSIONS:Abnormal Study  * Chest Pain: No chest pain with administration of  Regadenoson.  * Symptom: Shortness of breath.  * HR Response: Appropriate.  * BP Response: Appropriate.  * Heart Rhythm: Normal Sinus Rhythm - 64 BPM.  * Conduction defects: RBBB + LAFB, 1 degree AV block.  * Baseline ECG: Nonspecific ST-T wave abnormality.  * ECG Abnormalities: None.  * Arrhythmia: None.  * The left ventricle was mildly dilated at baseline. There  is a medium-sized, moderate to severe defect in the  inferior and basoinferolateral walls that is predominantly  reversible consistent with ischemia. There is a  small-sized, moderate defect in the apex and apicolateral  wall that is partially reversible consistent with infarct  with danette-infarct ischemia. Patient unable to prone.  * Post-stress gated wall motion analysis was performed  (LVEF = 47 %;LVEDV = 136 ml.), revealing severe  hypokinesis in inferior wall(s).    Conclusion:  The left ventricle was mildly dilated at baseline. There  is a medium-sized, moderate to severe defect in the  inferior and basoinferolateral walls that is predominantly  reversible consistent with ischemia. There is a  small-sized, moderate defect in the apex and apicolateral  wall that is partially reversible consistent with infarct  with danette-infarct ischemia. Patient unable to prone.  ------------------------------------------------------------------------  Confirmed on  9/12/2018 - 14:00:06 by Deniz Campos M.D.  ------------------------------------------------------------------------    < end of copied text >    LABS:	 	    CARDIAC MARKERS:                                  12.2   7.06  )-----------( 214      ( 12 Sep 2018 07:38 )             38.2     09-12    141  |  108  |  29<H>  ----------------------------<  129<H>  4.1   |  18<L>  |  0.99    Ca    9.5      12 Sep 2018 06:09  Phos  3.1     09-12  Mg     1.9     09-12    TPro  7.4  /  Alb  4.2  /  TBili  0.2  /  DBili  x   /  AST  16  /  ALT  19  /  AlkPhos  123<H>  09-11    PT/INR - ( 11 Sep 2018 16:50 )   PT: 11.5 sec;   INR: 1.06 ratio         PTT - ( 11 Sep 2018 16:50 )  PTT:28.5 sec  proBNP: Serum Pro-Brain Natriuretic Peptide: 132 pg/mL (09-12 @ 06:09)  Serum Pro-Brain Natriuretic Peptide: 178 pg/mL (09-11 @ 16:50)    Lipid Profile:   HgA1c:   TSH: Thyroid Stimulating Hormone, Serum: 3.72 uIU/mL (09-12 @ 08:56)

## 2018-09-13 ENCOUNTER — TRANSCRIPTION ENCOUNTER (OUTPATIENT)
Age: 78
End: 2018-09-13

## 2018-09-13 VITALS
DIASTOLIC BLOOD PRESSURE: 80 MMHG | HEART RATE: 63 BPM | OXYGEN SATURATION: 98 % | RESPIRATION RATE: 18 BRPM | SYSTOLIC BLOOD PRESSURE: 162 MMHG | TEMPERATURE: 98 F

## 2018-09-13 LAB
ANION GAP SERPL CALC-SCNC: 16 MMOL/L — SIGNIFICANT CHANGE UP (ref 5–17)
BUN SERPL-MCNC: 21 MG/DL — SIGNIFICANT CHANGE UP (ref 7–23)
CALCIUM SERPL-MCNC: 9.7 MG/DL — SIGNIFICANT CHANGE UP (ref 8.4–10.5)
CHLORIDE SERPL-SCNC: 109 MMOL/L — HIGH (ref 96–108)
CO2 SERPL-SCNC: 18 MMOL/L — LOW (ref 22–31)
CREAT SERPL-MCNC: 0.94 MG/DL — SIGNIFICANT CHANGE UP (ref 0.5–1.3)
GLUCOSE SERPL-MCNC: 129 MG/DL — HIGH (ref 70–99)
MAGNESIUM SERPL-MCNC: 2 MG/DL — SIGNIFICANT CHANGE UP (ref 1.6–2.6)
PHOSPHATE SERPL-MCNC: 2.8 MG/DL — SIGNIFICANT CHANGE UP (ref 2.5–4.5)
POTASSIUM SERPL-MCNC: 4.1 MMOL/L — SIGNIFICANT CHANGE UP (ref 3.5–5.3)
POTASSIUM SERPL-SCNC: 4.1 MMOL/L — SIGNIFICANT CHANGE UP (ref 3.5–5.3)
SODIUM SERPL-SCNC: 143 MMOL/L — SIGNIFICANT CHANGE UP (ref 135–145)

## 2018-09-13 RX ORDER — METOPROLOL TARTRATE 50 MG
0.5 TABLET ORAL
Qty: 15 | Refills: 0
Start: 2018-09-13 | End: 2018-10-12

## 2018-09-13 RX ORDER — LIDOCAINE 4 G/100G
1 CREAM TOPICAL ONCE
Qty: 0 | Refills: 0 | Status: COMPLETED | OUTPATIENT
Start: 2018-09-13 | End: 2018-09-13

## 2018-09-13 RX ORDER — RANOLAZINE 500 MG/1
1 TABLET, FILM COATED, EXTENDED RELEASE ORAL
Qty: 60 | Refills: 0
Start: 2018-09-13 | End: 2018-10-12

## 2018-09-13 RX ORDER — ACETAMINOPHEN 500 MG
1000 TABLET ORAL ONCE
Qty: 0 | Refills: 0 | Status: COMPLETED | OUTPATIENT
Start: 2018-09-13 | End: 2018-09-13

## 2018-09-13 RX ADMIN — AMLODIPINE BESYLATE 10 MILLIGRAM(S): 2.5 TABLET ORAL at 21:45

## 2018-09-13 RX ADMIN — PANTOPRAZOLE SODIUM 40 MILLIGRAM(S): 20 TABLET, DELAYED RELEASE ORAL at 05:38

## 2018-09-13 RX ADMIN — Medication 400 MILLIGRAM(S): at 16:54

## 2018-09-13 RX ADMIN — ATORVASTATIN CALCIUM 40 MILLIGRAM(S): 80 TABLET, FILM COATED ORAL at 21:45

## 2018-09-13 RX ADMIN — Medication 81 MILLIGRAM(S): at 11:37

## 2018-09-13 RX ADMIN — CLOPIDOGREL BISULFATE 75 MILLIGRAM(S): 75 TABLET, FILM COATED ORAL at 11:37

## 2018-09-13 RX ADMIN — LOSARTAN POTASSIUM 100 MILLIGRAM(S): 100 TABLET, FILM COATED ORAL at 21:45

## 2018-09-13 RX ADMIN — Medication 300 MILLIGRAM(S): at 11:37

## 2018-09-13 RX ADMIN — LIDOCAINE 1 PATCH: 4 CREAM TOPICAL at 13:11

## 2018-09-13 RX ADMIN — Medication 1000 UNIT(S): at 11:37

## 2018-09-13 NOTE — DISCHARGE NOTE ADULT - PATIENT PORTAL LINK FT
You can access the [facility] Patient Portal, offered by [facility], by registering with the following website: [email or web address]

## 2018-09-13 NOTE — PROGRESS NOTE ADULT - SUBJECTIVE AND OBJECTIVE BOX
CARDIOLOGY FOLLOW UP - Dr. Lee    CC no cp/sob   c/o right knee pain and swelling       PHYSICAL EXAM:  T(C): 36.7 (09-13-18 @ 14:32), Max: 36.9 (09-13-18 @ 04:55)  HR: 62 (09-13-18 @ 14:32) (55 - 77)  BP: 156/72 (09-13-18 @ 14:32) (133/70 - 164/67)  RR: 18 (09-13-18 @ 14:32) (18 - 18)  SpO2: 95% (09-13-18 @ 14:32) (95% - 98%)  Wt(kg): --  I&O's Summary    12 Sep 2018 07:01  -  13 Sep 2018 07:00  --------------------------------------------------------  IN: 120 mL / OUT: 0 mL / NET: 120 mL    13 Sep 2018 07:01  -  13 Sep 2018 16:21  --------------------------------------------------------  IN: 480 mL / OUT: 0 mL / NET: 480 mL        Appearance: Normal	  Cardiovascular: Normal S1 S2,RRR, No JVD, +sys murmur   Respiratory: Lungs clear to auscultation	  Gastrointestinal:  Soft, Non-tender, + BS	  Extremities: Normal range of motion, No clubbing, +1-2 b/l le edema, Right knee swelling         MEDICATIONS  (STANDING):  acetaminophen  IVPB .. 1000 milliGRAM(s) IV Intermittent once  allopurinol 300 milliGRAM(s) Oral daily  amLODIPine   Tablet 10 milliGRAM(s) Oral daily  aspirin enteric coated 81 milliGRAM(s) Oral daily  atorvastatin 40 milliGRAM(s) Oral at bedtime  cholecalciferol 1000 Unit(s) Oral daily  clopidogrel Tablet 75 milliGRAM(s) Oral daily  influenza   Vaccine 0.5 milliLiter(s) IntraMuscular once  losartan 100 milliGRAM(s) Oral daily  pantoprazole    Tablet 40 milliGRAM(s) Oral before breakfast      TELEMETRY: SB-NSR, 	    ECG:  	  RADIOLOGY:   DIAGNOSTIC TESTING:  [ ] Echocardiogram:  [ ]  Catheterization:  [ ] Stress Test:    OTHER: 	    LABS:	 	                                12.2   7.06  )-----------( 214      ( 12 Sep 2018 07:38 )             38.2     09-13    143  |  109<H>  |  21  ----------------------------<  129<H>  4.1   |  18<L>  |  0.94    Ca    9.7      13 Sep 2018 06:02  Phos  2.8     09-13  Mg     2.0     09-13    TPro  7.4  /  Alb  4.2  /  TBili  0.2  /  DBili  x   /  AST  16  /  ALT  19  /  AlkPhos  123<H>  09-11    PT/INR - ( 11 Sep 2018 16:50 )   PT: 11.5 sec;   INR: 1.06 ratio         PTT - ( 11 Sep 2018 16:50 )  PTT:28.5 sec

## 2018-09-13 NOTE — PROGRESS NOTE ADULT - ASSESSMENT
77yr old male with PMH of Coronary artery disease sp PCI 1/2017 with 10% LAD stenosis OM1, HTN, HLD, GERD, Gout, Osteoarthritis and back pain, sp multiple spinal surgeries presents with BL squeezing chest pain that started while walking 2hrs ago, lasted 1hr then resolved.  Pain was improved on laying down, worse on leaning forward.  Denies jaw pain, arm pain, shortness of breath, cough, n/v, diaphoresis, recent exertional symptoms, fever, chills, URI symptoms, worsening LE edema.    r/o acs  - monitor on tele  - c/w asa  - stress test abnormal as above  - cath today    htn  - cw cozaar  - c/w norvasc    - hld  - cw lipitor    gout  - cw allopurinol    dvt px

## 2018-09-13 NOTE — DISCHARGE NOTE ADULT - ADDITIONAL INSTRUCTIONS
Per cardiology, Plavix discontinued. Toprol and ranexa sent to pharmacy. Continue with discharge medications, and please follow up with cardiology and primary care provider within 1 week of discharge. If any symptoms return or persist, please return to Emergency department for evaluation.

## 2018-09-13 NOTE — PROGRESS NOTE ADULT - SUBJECTIVE AND OBJECTIVE BOX
Patient is a 77y old  Male who presents with a chief complaint of chest pain (12 Sep 2018 13:42)      SUBJECTIVE / OVERNIGHT EVENTS:  No chest pain. No shortness of breath. No complaints. No events overnight.     MEDICATIONS  (STANDING):  allopurinol 300 milliGRAM(s) Oral daily  amLODIPine   Tablet 10 milliGRAM(s) Oral daily  aspirin enteric coated 81 milliGRAM(s) Oral daily  atorvastatin 40 milliGRAM(s) Oral at bedtime  cholecalciferol 1000 Unit(s) Oral daily  clopidogrel Tablet 75 milliGRAM(s) Oral daily  influenza   Vaccine 0.5 milliLiter(s) IntraMuscular once  losartan 100 milliGRAM(s) Oral daily  pantoprazole    Tablet 40 milliGRAM(s) Oral before breakfast    MEDICATIONS  (PRN):      Vital Signs Last 24 Hrs  T(C): 36.9 (13 Sep 2018 04:55), Max: 36.9 (13 Sep 2018 04:55)  T(F): 98.4 (13 Sep 2018 04:55), Max: 98.4 (13 Sep 2018 04:55)  HR: 55 (13 Sep 2018 06:18) (55 - 77)  BP: 133/70 (13 Sep 2018 06:18) (133/70 - 164/67)  BP(mean): --  RR: 18 (13 Sep 2018 06:18) (18 - 18)  SpO2: 98% (13 Sep 2018 06:18) (96% - 98%)  CAPILLARY BLOOD GLUCOSE        I&O's Summary    12 Sep 2018 07:01  -  13 Sep 2018 07:00  --------------------------------------------------------  IN: 120 mL / OUT: 0 mL / NET: 120 mL    13 Sep 2018 07:01  -  13 Sep 2018 11:40  --------------------------------------------------------  IN: 120 mL / OUT: 0 mL / NET: 120 mL        PHYSICAL EXAM:  GENERAL: NAD, well-developed  HEAD:  Atraumatic, Normocephalic  EYES: EOMI, PERRLA, conjunctiva and sclera clear  NECK: Supple, No JVD  CHEST/LUNG: Clear to auscultation bilaterally; No wheeze  HEART: Regular rate and rhythm; No murmurs, rubs, or gallops  ABDOMEN: Soft, Nontender, Nondistended; Bowel sounds present  EXTREMITIES:  2+ Peripheral Pulses, No clubbing, cyanosis, or edema  PSYCH: AAOx3  NEUROLOGY: non-focal  SKIN: No rashes or lesions    LABS:                        12.2   7.06  )-----------( 214      ( 12 Sep 2018 07:38 )             38.2     09-13    143  |  109<H>  |  21  ----------------------------<  129<H>  4.1   |  18<L>  |  0.94    Ca    9.7      13 Sep 2018 06:02  Phos  2.8     09-13  Mg     2.0     09-13    TPro  7.4  /  Alb  4.2  /  TBili  0.2  /  DBili  x   /  AST  16  /  ALT  19  /  AlkPhos  123<H>  09-11    PT/INR - ( 11 Sep 2018 16:50 )   PT: 11.5 sec;   INR: 1.06 ratio         PTT - ( 11 Sep 2018 16:50 )  PTT:28.5 sec  CARDIAC MARKERS ( 11 Sep 2018 18:37 )  x     / x     / 120 U/L / x     / 3.0 ng/mL          RADIOLOGY & ADDITIONAL TESTS:    Imaging Personally Reviewed:    < from: Nuclear Stress Test-Pharmacologic (09.12.18 @ 11:25) >  IMPRESSIONS:Abnormal Study  * Chest Pain: No chest pain with administration of  Regadenoson.  * Symptom: Shortness of breath.  * HR Response: Appropriate.  * BP Response: Appropriate.  * Heart Rhythm: Normal Sinus Rhythm - 64 BPM.  * Conduction defects: RBBB + LAFB, 1 degree AV block.  * Baseline ECG: Nonspecific ST-T wave abnormality.  * ECG Abnormalities: None.  * Arrhythmia: None.  * The left ventricle was mildly dilated at baseline. There  is a medium-sized, moderate to severe defect in the  inferior and basoinferolateral walls that is predominantly  reversible consistent with ischemia. There is a  small-sized, moderate defect in the apex and apicolateral  wall that is partially reversible consistent with infarct  with danette-infarct ischemia. Patient unable to prone.  * Post-stress gated wall motion analysis was performed  (LVEF = 47 %;LVEDV = 136 ml.), revealing severe  hypokinesis in inferior wall(s).  Conclusion:  The left ventricle was mildly dilated at baseline. There  is a medium-sized, moderate to severe defect in the  inferior and basoinferolateral walls that is predominantly  reversible consistent with ischemia. There is a  small-sized, moderate defect in the apex and apicolateral  wall that is partially reversible consistent with infarct  with danette-infarct ischemia. Patient unable to prone.    < end of copied text >    Consultant(s) Notes Reviewed:      Care Discussed with Consultants/Other Providers:

## 2018-09-13 NOTE — DISCHARGE NOTE ADULT - HOSPITAL COURSE
Patient admitted with chest pain with  typical symptoms HS trop negative, EKG unchanged with known RBBB/bifasicular block, no evidence of ACS   pt with known cardiac disease including CAD s/p PCI to mid LAD and OM 1 stents and mild diffuse RCA disease ( reviewed on last cath in jan 2017)  stress test revealing mod to severe defect in the inferior and baso-inferolateral walls consistent with ischemia. defect in the apex and apicolateral wall consistent with infarct/ danette-infarct ischemia. EF 47%plan for cath, LAD stent patent, OM1 stent patent. No intervention. Patient cleared for D/C, by Dr. Lee and Dr. Horowitz, patient to be discharged with toprol and ranexa, plavix to be discontinued.

## 2018-09-13 NOTE — DISCHARGE NOTE ADULT - MEDICATION SUMMARY - MEDICATIONS TO TAKE
I will START or STAY ON the medications listed below when I get home from the hospital:    aspirin 81 mg oral delayed release tablet  -- 2 tab(s) by mouth once a day  -- Indication: For CAD S/P percutaneous coronary angioplasty    diclofenac sodium 75 mg oral delayed release tablet  -- 1 tab(s) by mouth 2 times a day  -- Indication: For Chest pain    losartan 100 mg oral tablet  -- 1 tab(s) by mouth once a day  -- Indication: For Hypertension, unspecified type    Ranexa 500 mg oral tablet, extended release  -- 1 tab(s) by mouth 2 times a day   -- Avoid grapefruit and grapefruit juice while taking this medication.  May cause drowsiness or dizziness.  Obtain medical advice before taking any non-prescription drugs as some may affect the action of this medication.  Swallow whole.  Do not crush.  This drug may impair the ability to drive or operate machinery.  Use care until you become familiar with its effects.    -- Indication: For Chest pain    allopurinol 300 mg oral tablet  -- 1 tab(s) by mouth once a day  -- Indication: For gout    atorvastatin 40 mg oral tablet  -- 1 tab(s) by mouth once a day (at bedtime)  -- Indication: For statin    Toprol-XL 25 mg oral tablet, extended release  -- 0.5 tab(s) by mouth once a day   -- It is very important that you take or use this exactly as directed.  Do not skip doses or discontinue unless directed by your doctor.  May cause drowsiness.  Alcohol may intensify this effect.  Use care when operating dangerous machinery.  Some non-prescription drugs may aggravate your condition.  Read all labels carefully.  If a warning appears, check with your doctor before taking.  Swallow whole.  Do not crush.  Take with food or milk.  This drug may impair the ability to drive or operate machinery.  Use care until you become familiar with its effects.    -- Indication: For BP    amLODIPine 10 mg oral tablet  -- 1 tab(s) by mouth once a day  -- Indication: For BP    miSOPROStol 200 mcg oral tablet  -- 1 tab(s) by mouth 2 times a day  -- Indication: For GI protective    Co Q-10 100 mg oral capsule  -- 1 cap(s) by mouth once a day  -- Indication: For supplement     lansoprazole 15 mg oral delayed release capsule  -- 1 cap(s) by mouth once a day  -- Indication: For GERD    cholecalciferol oral tablet  -- 1000 unit(s) by mouth once a day  -- Indication: For supplement

## 2018-09-13 NOTE — DISCHARGE NOTE ADULT - PLAN OF CARE
Continue with medical management Patient admitted for stress/cath from positive stress test. Cath clear.   - Continue statin   - began toprol   - began ranexa   - plavix discontinued   - continue ASA  - began on toprol   - began on ranexa   f/u cardiology in 1 week medical management Continue with current BP medications   - addition of toprol   - F/U with cardiology in 1 weej

## 2018-09-13 NOTE — CHART NOTE - NSCHARTNOTEFT_GEN_A_CORE
Patient cleared for dischage, Per Dr. Horowitz and Dr. Lee. Per Dr. Lee, plavix stopped, toprol 12.5mg /d  and ranexa 500mg BID sent to pharmacy per his instruction Patient and family aware of plan and prescriptions sent. Patient stable VSS. Medication reconciliation completed and verified with Dr. Horowitz. Patient clear for discharge.     Hemal Chandler PA-C   Department of Medicine Patient cleared for dischage, Per Dr. Horowitz and Dr. Lee. Per Dr. Lee, plavix stopped, toprol 12.5mg /d  and ranexa 500mg BID sent to pharmacy per his instruction Patient and family aware of plan and prescriptions sent. Patient stable VSS. Medication reconciliation completed and verified with Dr. Horowitz. Patient clear for discharge. RRA dressing stable no hematoma. Patient with /80, Per Dr. Rodriguez BP appropriate for D/C.     CATRACHITO MarkhamC   Department of Medicine

## 2018-09-13 NOTE — DISCHARGE NOTE ADULT - CARE PROVIDER_API CALL
Anand Lee (MD), Cardiovascular Disease; Internal Medicine; Interventional Cardiology; Nuclear Cardiology  [address]  [place] [address]  Phone: [phone]  Fax: [phone]

## 2018-09-13 NOTE — PROGRESS NOTE ADULT - ASSESSMENT
77yr old male with HTN, DCHF, CAD, S/P PCI to OM1, Mid LAD (9/2014, [facility], [facility], [facility]) hx of ischemic CMP, AS, DM HLD, GERD, GOUT, s/p multiple spinal surgery admitted with chest pain, fatigue     1. Chest pain   with some typical symptoms   HS trop negative, EKG unchanged with known RBBB/bifasicular block, no evidence of ACS   pt with known cardiac disease including CAD s/p PCI to mid LAD and OM 1 stents and mild diffuse RCA disease ( reviewed on last cath in jan 2017)  stress test revealing mod to severe defect in the inferior and baso-inferolateral walls consistent with ischemia. defect in the apex and apicolateral wall consistent with infarct/ danette-infarct ischemia. EF 47%  plan for cath this afternoon   continue with ASA , statin, plavix, arb     2. CAD s/p PCI (OM1/LAD)   initially presenting with chest pain   resolved, no cp/sob  ST results as mentioned above, plan for cath today   continue with ASA , statin, arb, plavix    remains off BB in light of fatigue     3. Diastolic CHF, hx Ischemic CMP   last echo on  2/6/2018 revealed normal LV fx, diastolic dysfx.  no evidence of decomp CHF on exam   resume 40 mg PO lasix 3-4x weekly , continue Arb     4. Aortic Stenosis  stable, Moderate on recent echo 2/6/2018   pending echo to eval progression of AS    5. HTN   BP stable .,continue with current antihtn meds

## 2018-09-13 NOTE — PROVIDER CONTACT NOTE (CHANGE IN STATUS NOTIFICATION) - BACKGROUND
Pt. admitted w/ chest pain, r/o acc (cardiac enzymes x 2 negative). Had abnormal stress, plan is cardiac cath today.

## 2018-10-24 PROCEDURE — 85610 PROTHROMBIN TIME: CPT

## 2018-10-24 PROCEDURE — C1894: CPT

## 2018-10-24 PROCEDURE — 82607 VITAMIN B-12: CPT

## 2018-10-24 PROCEDURE — C1769: CPT

## 2018-10-24 PROCEDURE — 85027 COMPLETE CBC AUTOMATED: CPT

## 2018-10-24 PROCEDURE — 71046 X-RAY EXAM CHEST 2 VIEWS: CPT

## 2018-10-24 PROCEDURE — 99285 EMERGENCY DEPT VISIT HI MDM: CPT | Mod: 25

## 2018-10-24 PROCEDURE — 84295 ASSAY OF SERUM SODIUM: CPT

## 2018-10-24 PROCEDURE — 83880 ASSAY OF NATRIURETIC PEPTIDE: CPT

## 2018-10-24 PROCEDURE — 83735 ASSAY OF MAGNESIUM: CPT

## 2018-10-24 PROCEDURE — 80053 COMPREHEN METABOLIC PANEL: CPT

## 2018-10-24 PROCEDURE — 82550 ASSAY OF CK (CPK): CPT

## 2018-10-24 PROCEDURE — 82746 ASSAY OF FOLIC ACID SERUM: CPT

## 2018-10-24 PROCEDURE — 93005 ELECTROCARDIOGRAM TRACING: CPT

## 2018-10-24 PROCEDURE — 83690 ASSAY OF LIPASE: CPT

## 2018-10-24 PROCEDURE — 93454 CORONARY ARTERY ANGIO S&I: CPT

## 2018-10-24 PROCEDURE — 80061 LIPID PANEL: CPT

## 2018-10-24 PROCEDURE — 83605 ASSAY OF LACTIC ACID: CPT

## 2018-10-24 PROCEDURE — 84132 ASSAY OF SERUM POTASSIUM: CPT

## 2018-10-24 PROCEDURE — A9500: CPT

## 2018-10-24 PROCEDURE — 82553 CREATINE MB FRACTION: CPT

## 2018-10-24 PROCEDURE — 85014 HEMATOCRIT: CPT

## 2018-10-24 PROCEDURE — 80048 BASIC METABOLIC PNL TOTAL CA: CPT

## 2018-10-24 PROCEDURE — 84484 ASSAY OF TROPONIN QUANT: CPT

## 2018-10-24 PROCEDURE — 84100 ASSAY OF PHOSPHORUS: CPT

## 2018-10-24 PROCEDURE — C1887: CPT

## 2018-10-24 PROCEDURE — 85730 THROMBOPLASTIN TIME PARTIAL: CPT

## 2018-10-24 PROCEDURE — 78452 HT MUSCLE IMAGE SPECT MULT: CPT

## 2018-10-24 PROCEDURE — 93017 CV STRESS TEST TRACING ONLY: CPT

## 2018-10-24 PROCEDURE — 82435 ASSAY OF BLOOD CHLORIDE: CPT

## 2018-10-24 PROCEDURE — 82803 BLOOD GASES ANY COMBINATION: CPT

## 2018-10-24 PROCEDURE — 99152 MOD SED SAME PHYS/QHP 5/>YRS: CPT

## 2018-10-24 PROCEDURE — 82947 ASSAY GLUCOSE BLOOD QUANT: CPT

## 2018-10-24 PROCEDURE — 82330 ASSAY OF CALCIUM: CPT

## 2018-10-24 PROCEDURE — 84443 ASSAY THYROID STIM HORMONE: CPT

## 2019-10-19 NOTE — DISCHARGE NOTE ADULT - CARE PLAN
Patient/Caregiver provided printed discharge information.
Principal Discharge DX:	CAD S/P percutaneous coronary angioplasty  Goal:	Continue with medical management  Assessment and plan of treatment:	Patient admitted for stress/cath from positive stress test. Cath clear.   - Continue statin   - began toprol   - began ranexa   - plavix discontinued   - continue ASA  - began on toprol   - began on ranexa   f/u cardiology in 1 week  Secondary Diagnosis:	Hypertension, unspecified type  Goal:	medical management  Assessment and plan of treatment:	Continue with current BP medications   - addition of toprol   - F/U with cardiology in 1 wee

## 2019-12-07 ENCOUNTER — INPATIENT (INPATIENT)
Facility: HOSPITAL | Age: 79
LOS: 35 days | DRG: 870 | End: 2020-01-12
Attending: STUDENT IN AN ORGANIZED HEALTH CARE EDUCATION/TRAINING PROGRAM | Admitting: INTERNAL MEDICINE
Payer: MEDICARE

## 2019-12-07 VITALS
DIASTOLIC BLOOD PRESSURE: 98 MMHG | OXYGEN SATURATION: 98 % | SYSTOLIC BLOOD PRESSURE: 125 MMHG | RESPIRATION RATE: 18 BRPM | WEIGHT: 229.94 LBS | HEIGHT: 75 IN | TEMPERATURE: 98 F | HEART RATE: 69 BPM

## 2019-12-07 LAB
ALBUMIN SERPL ELPH-MCNC: 3.2 G/DL — LOW (ref 3.3–5)
ALP SERPL-CCNC: 156 U/L — HIGH (ref 40–120)
ALT FLD-CCNC: 35 U/L — SIGNIFICANT CHANGE UP (ref 10–45)
ANION GAP SERPL CALC-SCNC: 17 MMOL/L — SIGNIFICANT CHANGE UP (ref 5–17)
APTT BLD: 27.8 SEC — SIGNIFICANT CHANGE UP (ref 27.5–36.3)
AST SERPL-CCNC: 13 U/L — SIGNIFICANT CHANGE UP (ref 10–40)
BILIRUB SERPL-MCNC: 0.4 MG/DL — SIGNIFICANT CHANGE UP (ref 0.2–1.2)
BUN SERPL-MCNC: 35 MG/DL — HIGH (ref 7–23)
CALCIUM SERPL-MCNC: 10.6 MG/DL — HIGH (ref 8.4–10.5)
CHLORIDE SERPL-SCNC: 101 MMOL/L — SIGNIFICANT CHANGE UP (ref 96–108)
CO2 SERPL-SCNC: 22 MMOL/L — SIGNIFICANT CHANGE UP (ref 22–31)
CREAT SERPL-MCNC: 1.87 MG/DL — HIGH (ref 0.5–1.3)
GAS PNL BLDV: SIGNIFICANT CHANGE UP
GLUCOSE SERPL-MCNC: 236 MG/DL — HIGH (ref 70–99)
HCT VFR BLD CALC: 37.4 % — LOW (ref 39–50)
HGB BLD-MCNC: 12.1 G/DL — LOW (ref 13–17)
INR BLD: 1.41 RATIO — HIGH (ref 0.88–1.16)
MCHC RBC-ENTMCNC: 29.4 PG — SIGNIFICANT CHANGE UP (ref 27–34)
MCHC RBC-ENTMCNC: 32.4 GM/DL — SIGNIFICANT CHANGE UP (ref 32–36)
MCV RBC AUTO: 90.8 FL — SIGNIFICANT CHANGE UP (ref 80–100)
PLATELET # BLD AUTO: 245 K/UL — SIGNIFICANT CHANGE UP (ref 150–400)
POTASSIUM SERPL-MCNC: 4.4 MMOL/L — SIGNIFICANT CHANGE UP (ref 3.5–5.3)
POTASSIUM SERPL-SCNC: 4.4 MMOL/L — SIGNIFICANT CHANGE UP (ref 3.5–5.3)
PROT SERPL-MCNC: 7.3 G/DL — SIGNIFICANT CHANGE UP (ref 6–8.3)
PROTHROM AB SERPL-ACNC: 16.3 SEC — HIGH (ref 10–12.9)
RBC # BLD: 4.12 M/UL — LOW (ref 4.2–5.8)
RBC # FLD: 14.1 % — SIGNIFICANT CHANGE UP (ref 10.3–14.5)
SODIUM SERPL-SCNC: 140 MMOL/L — SIGNIFICANT CHANGE UP (ref 135–145)
WBC # BLD: 25.69 K/UL — HIGH (ref 3.8–10.5)
WBC # FLD AUTO: 25.69 K/UL — HIGH (ref 3.8–10.5)

## 2019-12-07 PROCEDURE — 99284 EMERGENCY DEPT VISIT MOD MDM: CPT

## 2019-12-07 PROCEDURE — 72158 MRI LUMBAR SPINE W/O & W/DYE: CPT | Mod: 26

## 2019-12-07 RX ORDER — KETOROLAC TROMETHAMINE 30 MG/ML
30 SYRINGE (ML) INJECTION ONCE
Refills: 0 | Status: DISCONTINUED | OUTPATIENT
Start: 2019-12-07 | End: 2019-12-07

## 2019-12-07 RX ORDER — CEFTRIAXONE 500 MG/1
1000 INJECTION, POWDER, FOR SOLUTION INTRAMUSCULAR; INTRAVENOUS ONCE
Refills: 0 | Status: COMPLETED | OUTPATIENT
Start: 2019-12-07 | End: 2019-12-07

## 2019-12-07 RX ORDER — MORPHINE SULFATE 50 MG/1
4 CAPSULE, EXTENDED RELEASE ORAL ONCE
Refills: 0 | Status: DISCONTINUED | OUTPATIENT
Start: 2019-12-07 | End: 2019-12-07

## 2019-12-07 RX ORDER — DEXAMETHASONE 0.5 MG/5ML
10 ELIXIR ORAL ONCE
Refills: 0 | Status: COMPLETED | OUTPATIENT
Start: 2019-12-07 | End: 2019-12-07

## 2019-12-07 RX ORDER — MORPHINE SULFATE 50 MG/1
6 CAPSULE, EXTENDED RELEASE ORAL ONCE
Refills: 0 | Status: DISCONTINUED | OUTPATIENT
Start: 2019-12-07 | End: 2019-12-07

## 2019-12-07 RX ADMIN — Medication 30 MILLIGRAM(S): at 23:11

## 2019-12-07 RX ADMIN — MORPHINE SULFATE 4 MILLIGRAM(S): 50 CAPSULE, EXTENDED RELEASE ORAL at 22:55

## 2019-12-07 RX ADMIN — MORPHINE SULFATE 6 MILLIGRAM(S): 50 CAPSULE, EXTENDED RELEASE ORAL at 23:11

## 2019-12-07 RX ADMIN — MORPHINE SULFATE 6 MILLIGRAM(S): 50 CAPSULE, EXTENDED RELEASE ORAL at 22:24

## 2019-12-07 RX ADMIN — Medication 30 MILLIGRAM(S): at 22:24

## 2019-12-07 RX ADMIN — Medication 10 MILLIGRAM(S): at 22:24

## 2019-12-07 NOTE — ED ADULT NURSE NOTE - NSIMPLEMENTINTERV_GEN_ALL_ED
Implemented All Fall Risk Interventions:  [place] to call system. Call bell, personal items and telephone within reach. Instruct patient to call for assistance. Room bathroom lighting operational. Non-slip footwear when patient is off stretcher. Physically safe environment: no spills, clutter or unnecessary equipment. Stretcher in lowest position, wheels locked, appropriate side rails in place. Provide visual cue, wrist band, yellow gown, etc. Monitor gait and stability. Monitor for mental status changes and reorient to person, place, and time. Review medications for side effects contributing to fall risk. Reinforce activity limits and safety measures with patient and family.

## 2019-12-07 NOTE — ED PROVIDER NOTE - CLINICAL SUMMARY MEDICAL DECISION MAKING FREE TEXT BOX
80 yo m with chronic low back pain sp multiple laminectomy and fusion with 1 week of l lower back pain , now unable to walk 2/2 to weakness bl lower ext, endorses urinary incontinence x 3 days - pt 1/5 le bl - senation intact no saddle anesthesia - ttp midline l1-3 no rash - family endorases recent febrile illnes however fever free x 2 days - un likey gbs as pt has pain, question transverse myelitis vs infectious etiology, v worsening spine dz and cord compression need spine and neuro consult 0- mergent mri and analgesia

## 2019-12-07 NOTE — ED PROVIDER NOTE - PROGRESS NOTE DETAILS
High suspicion for cord injury vs transverse myelitis - spoke with MRI tech will take pt for emergent thoracic/lumbosacral MRI.  Discussed case with Neurosurgery resident - team is aware and will follow pt.  Discussed case with neurology resident - team aware and will see pt. High suspicion for cord injury vs transverse myelitis vs epidural abscess - spoke with MRI tech will take pt for emergent thoracic/lumbosacral MRI.  Discussed case with Neurosurgery resident - team is aware and will follow pt.  Discussed case with neurology resident - team aware and will see pt. spoke with nsx and neuro resident - both agree w emergent mri w contrast - Attd: Received sign out on patient from overnight team.  Prelim reads of MRIs without emergent findings.  Discussed with neurosurgery, no acute intervention, recommending admission to medicine for workup of fever. Discussed with Dr. Kim who will admit to medicine for further care.  Deniz Colby M.D.

## 2019-12-07 NOTE — ED ADULT NURSE NOTE - OBJECTIVE STATEMENT
79 year old male presents to ED via EMS complaining of mid-lumbar pain. 79 year old male presents to ED via EMS complaining of multiple medical complaints. Main complaint of mid lumbar pain, unable to ambulate since last night due to lower extremity weakness. Endorses fever of 104F at home Monday 12/2 PMH of multiple back surgeries, CAD, and HTN. Upon assessment A&O x4 and non weight bearing. Mid-lumbar  to palpation. Unable to lift lower extremities. 79 year old male presents to ED via EMS complaining of weakness/back pain x1 week. Went to PMD 6 days ago for fever of 104F orally. Pt states diagnosed with viral illness and instructed to take Tylenol and Motrin. New complaint of worsening back pain with BLLE weakness, inability to walk, and two falls in the last three days. Complains of new loss of bowel and bladder function x3 days. Upon assessment A&O x4 and non weight bearing. Mid-lumbar  to palpation. Unable to lift lower extremities. Sensation positive to lower extremities. Able to wiggle toes freely. BLLE edema noted which is baseline as per family at bedside. PERRL. 5/5 upper extremities, 1/5 lower extremities. No facial droop or slurred speech. Family at bedside. Comfort and safety measures maintained.

## 2019-12-07 NOTE — ED PROVIDER NOTE - NEUROLOGICAL LEVEL OF CONSCIOUSNESS
alert/follows commands/Sensory decreased in left lower extremity (preexisting). Sensation grossly intact in left extremity. Poor motor srength 1/5 BL LE, 5/5 BL UE with inability to raise lower extremities. CN II-XII intact. alert/follows commands/Sensory decreased in left lower extremity (preexisting). Sensation grossly intact in left extremity. Poor motor strength 1/5 BL lower, 5/5 BL upper with inability to raise lower extremities. CN II-XII intact.

## 2019-12-07 NOTE — ED PROVIDER NOTE - OBJECTIVE STATEMENT
80yo M with chronic low back pain sp multiple laminectomy and fusion p/w 1 week of worsening back pain along with weakness in bilateral lower extremities and inability to walk since last night. pt has history of chronic LBP with L knee replacement and existing numbness in L foot, however as per pt and family the past week his pain has been severe, mildly relieved by NSAIDs and worse with lying flat. Since last night he has been unable to walk at all, cannot lift his legs at all. He also admits to inability to control his urine x3 days. Pt also admits to fever at home. Denies LOC, CP, SOB, saddle anesthesia, chills. 78yo M with chronic low back pain sp multiple laminectomy and fusion p/w 1 week of worsening back pain along with weakness in bilateral lower extremities and inability to walk since last night. pt has history of chronic LBP with L knee replacement and existing numbness in L foot, however as per pt and family the past week his pain has been severe, mildly relieved by NSAIDs and worse with lying flat. Since last night he has been unable to walk at all, cannot lift his legs at all. He also admits to inability to control his urine x3 days as well as fever at home. Denies LOC, CP, SOB, saddle anesthesia, radiating pain, chills, HA, dizziness, confusion.

## 2019-12-07 NOTE — ED PROVIDER NOTE - MUSCULOSKELETAL MINIMAL EXAM
Midline lumbar spinal tenderness worse with any movement. Can plantar flex both feet with pain, has inability move either lower extremity or raise against gravity./RANGE OF MOTION LIMITED RANGE OF MOTION LIMITED/Midline lumbar spinal tenderness worse with any movement. Can plantar flex both feet with pain, has inability move either lower extremity or raise against gravity. Inability to assess gait 2/2 pain.

## 2019-12-07 NOTE — ED PROVIDER NOTE - CARE PLAN
Principal Discharge DX:	Weakness of both lower extremities  Secondary Diagnosis:	Acute left-sided low back pain without sciatica Principal Discharge DX:	Weakness of both lower extremities  Secondary Diagnosis:	Acute left-sided low back pain without sciatica  Secondary Diagnosis:	Fever in adult

## 2019-12-08 DIAGNOSIS — A41.9 SEPSIS, UNSPECIFIED ORGANISM: ICD-10-CM

## 2019-12-08 DIAGNOSIS — R29.898 OTHER SYMPTOMS AND SIGNS INVOLVING THE MUSCULOSKELETAL SYSTEM: ICD-10-CM

## 2019-12-08 DIAGNOSIS — I25.10 ATHEROSCLEROTIC HEART DISEASE OF NATIVE CORONARY ARTERY WITHOUT ANGINA PECTORIS: ICD-10-CM

## 2019-12-08 DIAGNOSIS — Z29.9 ENCOUNTER FOR PROPHYLACTIC MEASURES, UNSPECIFIED: ICD-10-CM

## 2019-12-08 DIAGNOSIS — M10.9 GOUT, UNSPECIFIED: ICD-10-CM

## 2019-12-08 DIAGNOSIS — I10 ESSENTIAL (PRIMARY) HYPERTENSION: ICD-10-CM

## 2019-12-08 DIAGNOSIS — N17.9 ACUTE KIDNEY FAILURE, UNSPECIFIED: ICD-10-CM

## 2019-12-08 DIAGNOSIS — M54.5 LOW BACK PAIN: ICD-10-CM

## 2019-12-08 LAB
APPEARANCE UR: ABNORMAL
BACTERIA # UR AUTO: ABNORMAL
BASE EXCESS BLDV CALC-SCNC: -0.3 MMOL/L — SIGNIFICANT CHANGE UP (ref -2–2)
BASOPHILS # BLD AUTO: 0 K/UL — SIGNIFICANT CHANGE UP (ref 0–0.2)
BASOPHILS NFR BLD AUTO: 0 % — SIGNIFICANT CHANGE UP (ref 0–2)
BILIRUB UR-MCNC: NEGATIVE — SIGNIFICANT CHANGE UP
BLD GP AB SCN SERPL QL: NEGATIVE — SIGNIFICANT CHANGE UP
CA-I SERPL-SCNC: 1.23 MMOL/L — SIGNIFICANT CHANGE UP (ref 1.12–1.3)
CHLORIDE BLDV-SCNC: 111 MMOL/L — HIGH (ref 96–108)
CO2 BLDV-SCNC: 24 MMOL/L — SIGNIFICANT CHANGE UP (ref 22–30)
COLOR SPEC: YELLOW — SIGNIFICANT CHANGE UP
CRP SERPL-MCNC: 43.44 MG/DL — HIGH (ref 0–0.4)
CULTURE RESULTS: NO GROWTH — SIGNIFICANT CHANGE UP
DIFF PNL FLD: ABNORMAL
EOSINOPHIL # BLD AUTO: 0 K/UL — SIGNIFICANT CHANGE UP (ref 0–0.5)
EOSINOPHIL NFR BLD AUTO: 0 % — SIGNIFICANT CHANGE UP (ref 0–6)
EPI CELLS # UR: 3 /HPF — SIGNIFICANT CHANGE UP
FLU A RESULT: SIGNIFICANT CHANGE UP
FLU A RESULT: SIGNIFICANT CHANGE UP
FLUAV AG NPH QL: SIGNIFICANT CHANGE UP
FLUBV AG NPH QL: SIGNIFICANT CHANGE UP
GAS PNL BLDV: 133 MMOL/L — LOW (ref 135–145)
GAS PNL BLDV: SIGNIFICANT CHANGE UP
GAS PNL BLDV: SIGNIFICANT CHANGE UP
GLUCOSE BLDV-MCNC: 203 MG/DL — HIGH (ref 70–99)
GLUCOSE UR QL: NEGATIVE — SIGNIFICANT CHANGE UP
GRAM STN FLD: SIGNIFICANT CHANGE UP
GRAN CASTS # UR COMP ASSIST: ABNORMAL /LPF
HCO3 BLDV-SCNC: 23 MMOL/L — SIGNIFICANT CHANGE UP (ref 21–29)
HCT VFR BLDA CALC: 36 % — LOW (ref 39–50)
HGB BLD CALC-MCNC: 11.6 G/DL — LOW (ref 13–17)
HYALINE CASTS # UR AUTO: 17 /LPF — HIGH (ref 0–2)
KETONES UR-MCNC: NEGATIVE — SIGNIFICANT CHANGE UP
LACTATE BLDV-MCNC: 1.8 MMOL/L — SIGNIFICANT CHANGE UP (ref 0.7–2)
LEUKOCYTE ESTERASE UR-ACNC: NEGATIVE — SIGNIFICANT CHANGE UP
LYMPHOCYTES # BLD AUTO: 0.9 K/UL — LOW (ref 1–3.3)
LYMPHOCYTES # BLD AUTO: 3.5 % — LOW (ref 13–44)
MANUAL SMEAR VERIFICATION: SIGNIFICANT CHANGE UP
METHOD TYPE: SIGNIFICANT CHANGE UP
MONOCYTES # BLD AUTO: 0.23 K/UL — SIGNIFICANT CHANGE UP (ref 0–0.9)
MONOCYTES NFR BLD AUTO: 0.9 % — LOW (ref 2–14)
MSSA DNA SPEC QL NAA+PROBE: SIGNIFICANT CHANGE UP
NEUTROPHILS # BLD AUTO: 24.56 K/UL — HIGH (ref 1.8–7.4)
NEUTROPHILS NFR BLD AUTO: 90.3 % — HIGH (ref 43–77)
NEUTS BAND # BLD: 5.3 % — SIGNIFICANT CHANGE UP (ref 0–8)
NITRITE UR-MCNC: NEGATIVE — SIGNIFICANT CHANGE UP
OTHER CELLS CSF MANUAL: 14 ML/DL — LOW (ref 18–22)
PCO2 BLDV: 37 MMHG — SIGNIFICANT CHANGE UP (ref 35–50)
PH BLDV: 7.42 — SIGNIFICANT CHANGE UP (ref 7.35–7.45)
PH UR: 5.5 — SIGNIFICANT CHANGE UP (ref 5–8)
PLAT MORPH BLD: NORMAL — SIGNIFICANT CHANGE UP
PLATELET COUNT - ESTIMATE: NORMAL — SIGNIFICANT CHANGE UP
PO2 BLDV: 54 MMHG — HIGH (ref 25–45)
POTASSIUM BLDV-SCNC: 3.9 MMOL/L — SIGNIFICANT CHANGE UP (ref 3.5–5.3)
PROT UR-MCNC: ABNORMAL
RBC BLD AUTO: SIGNIFICANT CHANGE UP
RBC CASTS # UR COMP ASSIST: 3 /HPF — SIGNIFICANT CHANGE UP (ref 0–4)
RH IG SCN BLD-IMP: POSITIVE — SIGNIFICANT CHANGE UP
RSV RESULT: SIGNIFICANT CHANGE UP
RSV RNA RESP QL NAA+PROBE: SIGNIFICANT CHANGE UP
SAO2 % BLDV: 86 % — SIGNIFICANT CHANGE UP (ref 67–88)
SP GR SPEC: 1.03 — HIGH (ref 1.01–1.02)
SPECIMEN SOURCE: SIGNIFICANT CHANGE UP
SPECIMEN SOURCE: SIGNIFICANT CHANGE UP
UROBILINOGEN FLD QL: ABNORMAL
WBC UR QL: 7 /HPF — HIGH (ref 0–5)

## 2019-12-08 PROCEDURE — 71045 X-RAY EXAM CHEST 1 VIEW: CPT | Mod: 26

## 2019-12-08 PROCEDURE — 99222 1ST HOSP IP/OBS MODERATE 55: CPT

## 2019-12-08 PROCEDURE — 99223 1ST HOSP IP/OBS HIGH 75: CPT

## 2019-12-08 PROCEDURE — 72157 MRI CHEST SPINE W/O & W/DYE: CPT | Mod: 26

## 2019-12-08 RX ORDER — MISOPROSTOL 200 UG/1
1 TABLET ORAL
Qty: 0 | Refills: 0 | DISCHARGE

## 2019-12-08 RX ORDER — VANCOMYCIN HCL 1 G
1000 VIAL (EA) INTRAVENOUS EVERY 24 HOURS
Refills: 0 | Status: DISCONTINUED | OUTPATIENT
Start: 2019-12-08 | End: 2019-12-09

## 2019-12-08 RX ORDER — HEPARIN SODIUM 5000 [USP'U]/ML
5000 INJECTION INTRAVENOUS; SUBCUTANEOUS EVERY 8 HOURS
Refills: 0 | Status: DISCONTINUED | OUTPATIENT
Start: 2019-12-08 | End: 2019-12-24

## 2019-12-08 RX ORDER — ATORVASTATIN CALCIUM 80 MG/1
10 TABLET, FILM COATED ORAL AT BEDTIME
Refills: 0 | Status: DISCONTINUED | OUTPATIENT
Start: 2019-12-08 | End: 2020-01-12

## 2019-12-08 RX ORDER — LANSOPRAZOLE 15 MG/1
1 CAPSULE, DELAYED RELEASE ORAL
Qty: 0 | Refills: 0 | DISCHARGE

## 2019-12-08 RX ORDER — SODIUM CHLORIDE 9 MG/ML
1000 INJECTION INTRAMUSCULAR; INTRAVENOUS; SUBCUTANEOUS
Refills: 0 | Status: DISCONTINUED | OUTPATIENT
Start: 2019-12-08 | End: 2019-12-11

## 2019-12-08 RX ORDER — ACETAMINOPHEN 500 MG
1000 TABLET ORAL ONCE
Refills: 0 | Status: COMPLETED | OUTPATIENT
Start: 2019-12-08 | End: 2019-12-08

## 2019-12-08 RX ORDER — ALLOPURINOL 300 MG
300 TABLET ORAL DAILY
Refills: 0 | Status: DISCONTINUED | OUTPATIENT
Start: 2019-12-08 | End: 2019-12-24

## 2019-12-08 RX ORDER — ACETAMINOPHEN 500 MG
650 TABLET ORAL EVERY 6 HOURS
Refills: 0 | Status: DISCONTINUED | OUTPATIENT
Start: 2019-12-08 | End: 2019-12-15

## 2019-12-08 RX ORDER — CEFEPIME 1 G/1
1000 INJECTION, POWDER, FOR SOLUTION INTRAMUSCULAR; INTRAVENOUS EVERY 12 HOURS
Refills: 0 | Status: DISCONTINUED | OUTPATIENT
Start: 2019-12-08 | End: 2019-12-09

## 2019-12-08 RX ORDER — ACETAMINOPHEN 500 MG
650 TABLET ORAL ONCE
Refills: 0 | Status: COMPLETED | OUTPATIENT
Start: 2019-12-08 | End: 2019-12-08

## 2019-12-08 RX ORDER — LOVASTATIN 20 MG
1 TABLET ORAL
Qty: 0 | Refills: 0 | DISCHARGE

## 2019-12-08 RX ORDER — ASPIRIN/CALCIUM CARB/MAGNESIUM 324 MG
81 TABLET ORAL DAILY
Refills: 0 | Status: DISCONTINUED | OUTPATIENT
Start: 2019-12-08 | End: 2019-12-21

## 2019-12-08 RX ADMIN — Medication 81 MILLIGRAM(S): at 17:27

## 2019-12-08 RX ADMIN — CEFEPIME 100 MILLIGRAM(S): 1 INJECTION, POWDER, FOR SOLUTION INTRAMUSCULAR; INTRAVENOUS at 17:23

## 2019-12-08 RX ADMIN — ATORVASTATIN CALCIUM 10 MILLIGRAM(S): 80 TABLET, FILM COATED ORAL at 21:25

## 2019-12-08 RX ADMIN — Medication 650 MILLIGRAM(S): at 09:57

## 2019-12-08 RX ADMIN — Medication 650 MILLIGRAM(S): at 11:29

## 2019-12-08 RX ADMIN — HEPARIN SODIUM 5000 UNIT(S): 5000 INJECTION INTRAVENOUS; SUBCUTANEOUS at 21:25

## 2019-12-08 RX ADMIN — Medication 400 MILLIGRAM(S): at 18:50

## 2019-12-08 RX ADMIN — Medication 125 MILLIGRAM(S): at 11:29

## 2019-12-08 RX ADMIN — CEFTRIAXONE 100 MILLIGRAM(S): 500 INJECTION, POWDER, FOR SOLUTION INTRAMUSCULAR; INTRAVENOUS at 00:27

## 2019-12-08 RX ADMIN — HEPARIN SODIUM 5000 UNIT(S): 5000 INJECTION INTRAVENOUS; SUBCUTANEOUS at 17:23

## 2019-12-08 RX ADMIN — Medication 650 MILLIGRAM(S): at 21:25

## 2019-12-08 RX ADMIN — SODIUM CHLORIDE 100 MILLILITER(S): 9 INJECTION INTRAMUSCULAR; INTRAVENOUS; SUBCUTANEOUS at 11:29

## 2019-12-08 NOTE — CONSULT NOTE ADULT - ASSESSMENT
79M PMH previous fusion at OSH presents with difficulty walking and back pain. MRI does not show a compressive lesion that fits with his symptoms.   - No acute neurosurgical intervention anticipated  - F/u official MRI read   - Pain control  - Medicine eval for leukocytosis/infectious w/u 79M PMH previous fusion at OSH presents with difficulty walking and back pain. MRI does not show a compressive lesion that fits with his symptoms.   - No acute neurosurgical intervention   - F/u official MRI read   - Pain control  - Medicine eval for leukocytosis/infectious w/u

## 2019-12-08 NOTE — CONSULT NOTE ADULT - SUBJECTIVE AND OBJECTIVE BOX
Pager: [phone]  CHIEF COMPLAINT/ REASON FOR CONSULTATION:    back pain, difficulty walking   HPI:  79M PMH previous thoracic fusion, epidural abscess, presents to the ED c/o worsening back pain over the past week and 3 days of urinary incontinence. Per family over the past day he has had greater difficulty walking so that his family had to help him get to the bathroom. In the ED he was given pain medication and now his pain has resolved. Per the ED originally he was unable to move his legs antigravity, however now he can bend his knees off the bed. He had a fever a couple days ago per his son, no other known fevers or symptoms. Denies new paresthesias, no radiculopathy. He describes the pain as a single point in the middle of his back.       PAST MEDICAL HISTORY   CAD S/P percutaneous coronary angioplasty  HLD (hyperlipidemia)  Back pain  OA (osteoarthritis)  Gout  HTN (hypertension)  GERD (gastroesophageal reflux disease)  S/P knee replacement    PAST SURGICAL HISTORY   S/P lumbar spine operation  S/P knee replacement    Indocin (Unknown)  oxacillin (Unknown)  vancomycin (Rash)  Zyvox (Unknown)      MEDICATIONS:  Antibiotics:    Neuro:    Anticoagulation:    Other:      SOCIAL HISTORY:   Lives at home, son at bedside     FAMILY HISTORY:  No pertinent family history in first degree relatives      REVIEW OF SYSTEMS:  Check here if all are normal other than Neurological []  Negative except as above.     PHYSICAL EXAMINATION:   T(C): 36.7 (19 @ 01:37), Max: 36.7 (19 @ 21:41)  HR: 65 (19 @ 01:37) (65 - 69)  BP: 123/73 (19 @ 01:37) (123/73 - 154/71)  RR: 20 (19 @ 01:37) (18 - 20)  SpO2: 94% (19 @ 01:37) (89% - 98%)  Wt(kg): --Height (cm): 190.5 ( @ 21:41)  Weight (kg): 104.3 ( @ :41)    Exam:  Awake, Alert, AOX3  PERRL, EOMI, Face equal, Tongue m/l  5/5 throughout, except HF effort limited 3/5  SILT  No clonus       LABS:                        12.1   25.69 )-----------( 245      ( 07 Dec 2019 22:22 )             37.4         140  |  101  |  35<H>  ----------------------------<  236<H>  4.4   |  22  |  1.87<H>    Ca    10.6<H>      07 Dec 2019 22:22    TPro  7.3  /  Alb  3.2<L>  /  TBili  0.4  /  DBili  x   /  AST  13  /  ALT  35  /  AlkPhos  156<H>  12    PT/INR - ( 07 Dec 2019 22:22 )   PT: 16.3 sec;   INR: 1.41 ratio         PTT - ( 07 Dec 2019 22:22 )  PTT:27.8 sec  Urinalysis Basic - ( 08 Dec 2019 01:05 )    Color: Yellow / Appearance: Slightly Turbid / S.029 / pH: x  Gluc: x / Ketone: Negative  / Bili: Negative / Urobili: 3 mg/dL   Blood: x / Protein: 30 mg/dL / Nitrite: Negative   Leuk Esterase: Negative / RBC: 3 /hpf / WBC 7 /HPF   Sq Epi: x / Non Sq Epi: 3 /hpf / Bacteria: Moderate        RADIOLOGY & ADDITIONAL STUDIES:  MRI: 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 No obvious enhancing collection, epidural abscess, or new cord signal change

## 2019-12-08 NOTE — H&P ADULT - NSICDXPASTSURGICALHX_GEN_ALL_CORE_FT
PAST SURGICAL HISTORY:  S/P knee replacement 11/2013    S/P lumbar spine operation 6 thoracic to lumbar surgeries over 6 months period starting 11/2006

## 2019-12-08 NOTE — H&P ADULT - PROBLEM SELECTOR PLAN 6
Holding Losartan given KRIS. Will hold Norvasc 10mg for now given concern for potential infection  - Monitor BP

## 2019-12-08 NOTE — ED ADULT NURSE REASSESSMENT NOTE - NS ED NURSE REASSESS COMMENT FT1
Vancomycin infusing as per MD order. Infusing at slower rate of 125 mL/hr. Patient has listed allergy to vancomycin with rash reaction. Daughter states she believes patient had rash to Zyvox, not vancomycin. Patient and family aware of medication side effects and for symptoms to look for and to escalate if they occur. Patient resting comfortably in bed. Safety and comfort measures provided. Family at bedside. VSS/NAD.

## 2019-12-08 NOTE — ED ADULT NURSE REASSESSMENT NOTE - NS ED NURSE REASSESS COMMENT FT1
Attempted to give PO meds as ordered. Attempted to give aspirin 81 mg, patient coughed shortly after. Patient and family advised on current NPO status. Called NP Eliel to notify, states to hold further PO meds and will order IV tylenol.

## 2019-12-08 NOTE — H&P ADULT - PROBLEM SELECTOR PLAN 8
Progress Notes by Mercedes White CMA at 01/12/18 12:32 PM     Author:  Mercedes White CMA Service:  (none) Author Type:  Certified Medical Assistant     Filed:  01/12/18 12:35 PM Encounter Date:  1/12/2018 Status:  Signed     :  Mercedes White CMA (Certified Medical Assistant)            Lab[PR1.1M] ordered per[PR1.1T] lab[PR1.1M] result note dated[PR1.1T] 1/12/18[PR1.1M]        Revision History        User Key Date/Time User Provider Type Action    > PR1.1 01/12/18 12:35 PM Mercedes White CMA Certified Medical Assistant Sign    M - Manual, T - Template             HSQ for DVT ppx  Dash diet

## 2019-12-08 NOTE — H&P ADULT - NSHPPHYSICALEXAM_GEN_ALL_CORE
.  VITAL SIGNS:  T(C): 38.3 (12-08-19 @ 07:41), Max: 38.3 (12-08-19 @ 07:41)  T(F): 100.9 (12-08-19 @ 07:41), Max: 100.9 (12-08-19 @ 07:41)  HR: 101 (12-08-19 @ 07:41) (65 - 101)  BP: 145/84 (12-08-19 @ 07:41) (123/73 - 154/71)  BP(mean): --  RR: 20 (12-08-19 @ 07:41) (18 - 20)  SpO2: 95% (12-08-19 @ 07:41) (89% - 98%)  Wt(kg): --    PHYSICAL EXAM:    Constitutional: WDWN resting comfortably in bed; NAD  Head: NC/AT  Eyes: PERRL, EOMI, anicteric sclera  ENT: no nasal discharge; uvula midline, no oropharyngeal erythema or exudates; MMM  Neck: supple; no JVD  Respiratory: CTA B/L; no W/R/R  Cardiac: +S1/S2; RRR; no M/R/G  Gastrointestinal: soft, NT/ND; no rebound or guarding; +BSx4  Extremities: WWP, no clubbing or cyanosis; 1+ peripheral edema. Left knee without erythema or warmth  Musculoskeletal: NROM x4; no joint swelling, tenderness or erythema  Dermatologic: skin warm, dry and intact; no rashes, wounds, Left knee scar  Neurologic: AAOx3; CNII-XII grossly intact  Psychiatric: affect and characteristics of appearance, verbalizations, behaviors are appropriate

## 2019-12-08 NOTE — H&P ADULT - PROBLEM SELECTOR PLAN 4
Last Cr in 9/18 was 0.8. Currently 1.8. With KRIS. Possibly prerenal vs intrinsic renal in etiology. UA with proteinuria and hyaline casts.   - Will start IVF, monitor Cr. If does not improve with IV hydration would check urine studies and renal sono/bladder scan  - Renally dose medications  - Hold Losartan

## 2019-12-08 NOTE — ED ADULT NURSE REASSESSMENT NOTE - NS ED NURSE REASSESS COMMENT FT1
Received report.... Received report from Jennifer ARREDONDO. Patient is sleeping in bed. Patient is lethargic and difficult to arouse. Able to open eyes and say name with repeated stimuli. Patient is on cardiac monitor and end tidal CO2 monitor. Patient is on 3L O2 nasal cannula. Pupils 2mm bilaterally and sluggish. Able to answer some questions, then would begin to close eyes and need to be aroused again. Upper extremities weak, weak . Patient unable to lift lower extremities. Son at bedside Received report from Jennifer ARREDONDO. Patient is sleeping in bed. Patient is lethargic and difficult to arouse. Able to open eyes and say name with repeated stimuli. Patient is on cardiac monitor and end tidal CO2 monitor. Patient is on 3L O2 nasal cannula. Pupils 2mm bilaterally and sluggish. Able to answer some questions, then would begin to close eyes and need to be aroused again. Upper extremities weak, weak . Patient unable to lift lower extremities. Son at bedside states patient has been sleeping throughout night. Son states patient is normally A&Ox3 and ambulates independently. Unable to fully assess orientation r/t pt. lethargy. Patient febrile orally at 100.9. MD Colby aware, will medicate as per order. Son aware of plan of care for admission.

## 2019-12-08 NOTE — H&P ADULT - NSICDXPASTMEDICALHX_GEN_ALL_CORE_FT
PAST MEDICAL HISTORY:  Back pain     CAD S/P percutaneous coronary angioplasty     GERD (gastroesophageal reflux disease)     Gout     HLD (hyperlipidemia)     HTN (hypertension)     OA (osteoarthritis)

## 2019-12-08 NOTE — CONSULT NOTE ADULT - ASSESSMENT
Impression: His history and exam (fever, back pain, new LE weakness) is concerning for infection in the spinal cord.    Plan:  -f/u MRI thoracic and lumbar spine w/ and w/o contrast  -f/u B cx, U Cx  -ID consultation for antibiotic regimen (allergic to vancomycin). will need gram positive antibiotic coverage as well

## 2019-12-08 NOTE — CONSULT NOTE ADULT - ASSESSMENT
77yr old male with HTN, DCHF, CAD, S/P PCI to OM1, Mid LAD (9/2014, [facility], [facility], [facility]) hx of ischemic CMP, AS, DM HLD, GERD, GOUT, s/p multiple spinal surgery admitted with back pain and fever    1. Back Pain  followup MRI  ID eval  neuro/neuro sx followup  pain contol     2. CAD s/p PCI (OM1/LAD)   stable  cont  ASA , statin, arb, plavix    remains off BB in light of fatigue     3. Diastolic CHF, hx Ischemic CMP   no evidence of decomp CHF on exam   cont outpt meds     4. Aortic Stenosis  stable, Moderate on recent echo 2/6/2018   pending echo to eval progression of AS    5. HTN   BP stable .,continue with current antihtn meds

## 2019-12-08 NOTE — H&P ADULT - PROBLEM SELECTOR PLAN 2
No compressive lesions seen on initial prelim MRI read. Neuro and Neurosurg following  - f/u official MRI read

## 2019-12-08 NOTE — ED ADULT NURSE REASSESSMENT NOTE - NS ED NURSE REASSESS COMMENT FT1
Patient tolerating vancomycin well. No rash, no signs of allergic reaction. Patient lethargic, resting in bed. Family at bedside. VSS/NAD.

## 2019-12-08 NOTE — H&P ADULT - HISTORY OF PRESENT ILLNESS
This is a 78 y/o male with h/o of CAD s/p PCI 1/2017 with 10% LAD stenosis OM1, HTN, HLD, GERD, Gout, Osteoarthritis and chronic back pain, s/p multiple laminectomies and spinal fusion, left TKR who presents with worsening back pain for approx a week. Pt reports that for the past week he has had progressive worsening of his lower back pain and weakness in his bilateral lower extremities that progressed to inability to ambulate last night. Pain radiates down his left leg. This was associated with fevers at home. No loss of bowel or bladder contents. Of note, pt was treated for cellulitis a month ago with Augmentin.

## 2019-12-08 NOTE — PATIENT PROFILE ADULT - NSPROMEDSBROUGHTTOHOSP_GEN_A_NUR
no Patient seen and evaluated, ED attending note and orders reviewed, will continue with patient follow up and care -Vega Calvin PA-C No acute findings on workup.  Results reviewed with patient.  She will follow up with her PMD.  Reviewed with patient that her WBC are low and recommended follow up with a hematologist -Vega Calvin PA-C No acute findings on workup.  Results reviewed with patient.  She will follow up with her PMD.  Reviewed with patient that her WBC are low and recommended follow up with a hematologist, she is aware of this and reports it is being followed -Vega Calvin PA-C

## 2019-12-08 NOTE — CONSULT NOTE ADULT - ATTENDING COMMENTS
Patient with hx of thoracic laminectomy in 2007 with instrumentation and left knee replacement who has had progressive left lower back pain for 2 weeks radiating into his left leg. He has had several falls during this time, reportedly from his legs giving out on him. He was brought to ED with fevers, chills and inability to walk in the past 24hrs. he has a history in 2016 of an epidural abscess. He was given morphine in ED for pain management is currently lethargic and unable to provide any history. He was started on broad spectrum ABx by ID given his history of epidural abscess    On exam, lethargic. groans in pain with passive manipulation of his legs or feet. He does not follow commands reliably. He w/d to nox stim in all extremities. DTRs 2+ b/l with + left babinski. There was no clonus ?+ straight leg raise on the left.     MRI T/L spine - pending official read. Possible L4/5 left side herniated disc    WBC 25    Impression: severe LBP with radiation in his left leg. R/O compressive myelopathy vs. sciatica   - await official review of MRIs  - ID f/u  - will re-evaluate patient when he is more alert to assess for motor weakness. Patient with hx of thoracic laminectomy in 2007 with instrumentation and left knee replacement who has had progressive left lower back pain for 2 weeks radiating into his left leg. He has had several falls during this time, reportedly from his legs giving out on him. He was brought to ED with fevers, chills and inability to walk in the past 24hrs. he has a history in 2016 of an epidural abscess. He was given morphine in ED for pain management is currently lethargic and unable to provide any history. He was started on broad spectrum ABx by ID given his history of epidural abscess    On exam, lethargic. groans in pain with passive manipulation of his legs or feet. He does not follow commands reliably. He w/d to nox stim in all extremities. DTRs 2+ b/l with + left babinski. There was no clonus ?+ straight leg raise on the left.     MRI T/L spine - pending official read. Possible L4/5 left side herniated disc    WBC 25  BCx - [facility]      Impression: severe LBP with radiation in his left leg in setting of infection with bacteremia. R/O compressive myelopathy vs. sciatica   - await official review of MRIs  - ID f/u  - will re-evaluate patient when he is more alert to assess for motor weakness.

## 2019-12-08 NOTE — H&P ADULT - NSHPREVIEWOFSYSTEMS_GEN_ALL_CORE
CONSTITUTIONAL: + weakness, fevers  EYES/ENT: No visual changes;  No vertigo or throat pain   NECK: No pain or stiffness  RESPIRATORY: No cough, wheezing, hemoptysis; No shortness of breath  CARDIOVASCULAR: No chest pain or palpitations  GASTROINTESTINAL: No abdominal or epigastric pain. No nausea, vomiting, or hematemesis; No diarrhea or constipation. No melena or hematochezia.  GENITOURINARY: No dysuria, frequency or hematuria  NEUROLOGICAL: + weakness of lower extremities  SKIN: No itching, burning, rashes, or lesions   All other review of systems is negative unless indicated above.

## 2019-12-08 NOTE — H&P ADULT - ASSESSMENT
80 y/o male with h/o of CAD s/p PCI 1/2017 with 10% LAD stenosis OM1, HTN, HLD, GERD, Gout, Osteoarthritis and chronic back pain, s/p multiple laminectomies and spinal fusion, left TKR who presents with worsening back pain and fevers for approx a week

## 2019-12-08 NOTE — CONSULT NOTE ADULT - SUBJECTIVE AND OBJECTIVE BOX
"HPI: 80yo M with chronic low back pain sp multiple laminectomy and fusion p/w 1 week of worsening back pain along with weakness in bilateral lower extremities and inability to walk since last night. pt has history of chronic LBP with L knee replacement and existing numbness in L foot, however as per pt and family the past week his pain has been severe, mildly relieved by NSAIDs and worse with lying flat. Since last night he has been unable to walk at all, cannot lift his legs at all. He also admits to inability to control his urine x3 days as well as fever at home. Denies LOC, CP, SOB, saddle anesthesia, radiating pain, chills, HA, dizziness, confusion."    Above reviewed. Patient with history chronic low back pain, prior history of hardware to back, prior history of staph infections, presenting with episode of worsening low back pain, LE weakness. Patient states has had fever at home, but does not know temperature. No dysuria, no pyuria, no bladder pain. No cough/sob. No abd pain. No N/V/D. Aside from back pain, fever, LE weakness, no other new complaints. ID called for further eval.    PAST MEDICAL & SURGICAL HISTORY:  CAD S/P percutaneous coronary angioplasty  HLD (hyperlipidemia)  Back pain  OA (osteoarthritis)  Gout  HTN (hypertension)  GERD (gastroesophageal reflux disease)  S/P lumbar spine operation: 6 thoracic to lumbar surgeries over 6 months period starting 2006  S/P knee replacement: 2013    Allergies    Indocin (Unknown)  oxacillin (Unknown)  vancomycin (Rash)--Family denies vanco allergy  Zyvox (Unknown)    ANTIMICROBIALS:  Ceftriaxone    OTHER MEDS:  acetaminophen  Suppository .. 650 milliGRAM(s) Rectal Once    SOCIAL HISTORY: No tobacco, no alcohol, no illicit drugs    FAMILY HISTORY: No pertinent family history relating to chief complaint    Drug Dosing Weight  Height (cm): 190.5 (07 Dec 2019 21:41)  Weight (kg): 104.3 (07 Dec 2019 21:41)  BMI (kg/m2): 28.7 (07 Dec 2019 21:41)  BSA (m2): 2.33 (07 Dec 2019 21:41)    PE:    Vital Signs Last 24 Hrs  T(C): 38.3 (08 Dec 2019 07:41), Max: 38.3 (08 Dec 2019 07:41)  T(F): 100.9 (08 Dec 2019 07:41), Max: 100.9 (08 Dec 2019 07:41)  HR: 101 (08 Dec 2019 07:41) (65 - 101)  BP: 145/84 (08 Dec 2019 07:41) (123/73 - 154/71)  RR: 20 (08 Dec 2019 07:41) (18 - 20)  SpO2: 95% (08 Dec 2019 07:41) (89% - 98%)    Gen: AOx3, NAD, non-toxic, fatigued, sleepy but arousable  CV: S1+S2 normal, nontachycardic  Resp: Clear bilat, no resp distress, no crackles/wheezes  Abd: Soft, nontender, +BS  Ext: No LE edema, no wounds  : No suprapubic tenderness  IV/Skin: No thrombophlebitis, no rash  Msk: Significant low back pain  Neuro: LE weakness bilaterally, able to feel sensations    LABS:                        12.1   25.69 )-----------( 245      ( 07 Dec 2019 22:22 )             37.4     12    140  |  101  |  35<H>  ----------------------------<  236<H>  4.4   |  22  |  1.87<H>    Ca    10.6<H>      07 Dec 2019 22:22    TPro  7.3  /  Alb  3.2<L>  /  TBili  0.4  /  DBili  x   /  AST  13  /  ALT  35  /  AlkPhos  156<H>  12-07    Urinalysis Basic - ( 08 Dec 2019 01:05 )    Color: Yellow / Appearance: Slightly Turbid / S.029 / pH: x  Gluc: x / Ketone: Negative  / Bili: Negative / Urobili: 3 mg/dL   Blood: x / Protein: 30 mg/dL / Nitrite: Negative   Leuk Esterase: Negative / RBC: 3 /hpf / WBC 7 /HPF   Sq Epi: x / Non Sq Epi: 3 /hpf / Bacteria: Moderate    MICROBIOLOGY:    No new available    RADIOLOGY:     MR (Prelim L spine and thoracic):    INTERPRETATION:  DORON.   INTERPRETATION:  DORON.

## 2019-12-08 NOTE — H&P ADULT - PROBLEM SELECTOR PLAN 1
Pt presented with progressive worsening lower back pain associated with fevers. Seen by Neurology and Neurosurgery on the ED. MRI of thoracic and Lumbar spine performed overnight. Concern for potential infectious etiology given elevation in WBC and inflammatory markers  - Await final MRI read  - ID consulted for Abx management as multiple allergies listed

## 2019-12-08 NOTE — ED ADULT NURSE REASSESSMENT NOTE - NS ED NURSE REASSESS COMMENT FT1
Wife and daughter at bedside requesting update in results and plan of care. Eliel NP called and will come to see patient and family when possible. Patient resting comfortably with no acute distress noted, assisted with use of urinal.

## 2019-12-08 NOTE — H&P ADULT - PROBLEM SELECTOR PLAN 3
Met sepsis criteria on admission with elevation in WBC with fever and tachycardia.   - ID consulted for empiric abx regimen  - f/u blood and urine cultures  - Trend CBC  - Tylenol PRN fever

## 2019-12-08 NOTE — H&P ADULT - NSHPLABSRESULTS_GEN_ALL_CORE
.  LABS:                         12.1   25.69 )-----------( 245      ( 07 Dec 2019 22:22 )             37.4         140  |  101  |  35<H>  ----------------------------<  236<H>  4.4   |  22  |  1.87<H>    Ca    10.6<H>      07 Dec 2019 22:22    TPro  7.3  /  Alb  3.2<L>  /  TBili  0.4  /  DBili  x   /  AST  13  /  ALT  35  /  AlkPhos  156<H>  12    PT/INR - ( 07 Dec 2019 22:22 )   PT: 16.3 sec;   INR: 1.41 ratio         PTT - ( 07 Dec 2019 22:22 )  PTT:27.8 sec  Urinalysis Basic - ( 08 Dec 2019 01:05 )    Color: Yellow / Appearance: Slightly Turbid / S.029 / pH: x  Gluc: x / Ketone: Negative  / Bili: Negative / Urobili: 3 mg/dL   Blood: x / Protein: 30 mg/dL / Nitrite: Negative   Leuk Esterase: Negative / RBC: 3 /hpf / WBC 7 /HPF   Sq Epi: x / Non Sq Epi: 3 /hpf / Bacteria: Moderate      CARDIAC MARKERS ( 07 Dec 2019 22:22 )  x     / x     / 71 U/L / x     / x                RADIOLOGY, EKG & ADDITIONAL TESTS: CXR: Clear Lungs, MRI spine without urgent findings on prelim

## 2019-12-08 NOTE — CONSULT NOTE ADULT - SUBJECTIVE AND OBJECTIVE BOX
HPI: 80 yo M with hx of LBP s/p multiple laminectomy and fusion p/w 1 week of worsening back pain along with weakness in bilateral lower extremities and inability to walk since last night. He reports that at baseline, he is able to walk with a cane. 1 month prior, he had cellulitis in his feet, and had per family had received antibiotics for 6 weeks. 1 week ago, 0patient developed fevers and chills, along with severe back pain. He reports that back pain radiates down the right leg. He has had a prior L knee replacement, and no pain in the left knee. Family denies any changes in urinary habits (due to walking difficulties he has been unable to make it to the bathroom on time).     REVIEW OF SYSTEMS    A 10-system ROS was performed and is negative except for those items noted above and/or in the HPI.    PAST MEDICAL & SURGICAL HISTORY:  CAD S/P percutaneous coronary angioplasty  HLD (hyperlipidemia)  Back pain  OA (osteoarthritis)  Gout  HTN (hypertension)  GERD (gastroesophageal reflux disease)  S/P lumbar spine operation: 6 thoracic to lumbar surgeries over 6 months period starting 11/2006  S/P knee replacement: 11/2013    FAMILY HISTORY:    SOCIAL HISTORY:   T/E/D:   Occupation:   Lives with:     MEDICATIONS (HOME):  Home Medications:  allopurinol 300 mg oral tablet: 1 tab(s) orally once a day (12 Sep 2018 11:23)  aspirin 81 mg oral delayed release tablet: 2 tab(s) orally once a day (12 Sep 2018 11:23)  atorvastatin 40 mg oral tablet: 1 tab(s) orally once a day (at bedtime) (12 Sep 2018 11:23)  cholecalciferol oral tablet: 1000 unit(s) orally once a day (12 Sep 2018 11:23)  Co Q-10 100 mg oral capsule: 1 cap(s) orally once a day (12 Sep 2018 11:23)  diclofenac sodium 75 mg oral delayed release tablet: 1 tab(s) orally 2 times a day (12 Sep 2018 11:23)  lansoprazole 15 mg oral delayed release capsule: 1 cap(s) orally once a day (12 Sep 2018 11:23)  losartan 100 mg oral tablet: 1 tab(s) orally once a day (12 Sep 2018 11:23)  miSOPROStol 200 mcg oral tablet: 1 tab(s) orally 2 times a day (12 Sep 2018 11:23)    MEDICATIONS  (STANDING):    MEDICATIONS  (PRN):    ALLERGIES/INTOLERANCES:  Allergies  Indocin (Unknown)  oxacillin (Unknown)  vancomycin (Rash)  Zyvox (Unknown)    Intolerances    VITALS & EXAMINATION:  Vital Signs Last 24 Hrs  T(C): 36.3 (07 Dec 2019 22:20), Max: 36.7 (07 Dec 2019 21:41)  T(F): 97.4 (07 Dec 2019 22:20), Max: 98.1 (07 Dec 2019 21:41)  HR: 67 (07 Dec 2019 22:20) (67 - 69)  BP: 154/71 (07 Dec 2019 22:20) (125/98 - 154/71)  BP(mean): --  RR: 20 (07 Dec 2019 22:20) (18 - 20)  SpO2: 95% (07 Dec 2019 22:20) (95% - 98%)    Neurological (>12):  MS: Awake, alert, oriented to person, place, situation, time. Normal affect. Follows all commands.    Language: Speech is clear, fluent     CNs: EOMI no nystagmus. No facial asymmetry b/l.     Motor: No pronator drift.              Deltoid	Biceps	Triceps	   R	5	5	5	5 	  L	5	5	5	5    at least 2/5 in the proximal LE, at least 4/5 DF and PF b/l.    Sensation: Intact to LT    Reflexes:              Biceps(C5)       BR(C6)                    Patellar(L4)    Achilles(S1)    Plantar Resp  R	2	          2	             		1	    2		Up  L	2	          2	             		1	    2		Down       Gait: unable to assess    LABORATORY:  CBC                       12.1   25.69 )-----------( 245      ( 07 Dec 2019 22:22 )             37.4     Chem 12-07    140  |  101  |  35<H>  ----------------------------<  236<H>  4.4   |  22  |  1.87<H>    Ca    10.6<H>      07 Dec 2019 22:22    TPro  7.3  /  Alb  3.2<L>  /  TBili  0.4  /  DBili  x   /  AST  13  /  ALT  35  /  AlkPhos  156<H>  12-07    LFTs LIVER FUNCTIONS - ( 07 Dec 2019 22:22 )  Alb: 3.2 g/dL / Pro: 7.3 g/dL / ALK PHOS: 156 U/L / ALT: 35 U/L / AST: 13 U/L / GGT: x           Coagulopathy PT/INR - ( 07 Dec 2019 22:22 )   PT: 16.3 sec;   INR: 1.41 ratio         PTT - ( 07 Dec 2019 22:22 )  PTT:27.8 sec  Lipid Panel   A1c   Cardiac enzymes CARDIAC MARKERS ( 07 Dec 2019 22:22 )  x     / x     / 71 U/L / x     / x          U/A   CSF  Immunological  Other    STUDIES & IMAGING:  Studies (EKG, EEG, EMG, etc):     Radiology (XR, CT, MR, U/S, TTE/JAHAIRA):

## 2019-12-08 NOTE — CONSULT NOTE ADULT - SUBJECTIVE AND OBJECTIVE BOX
CHIEF COMPLAINT:    HPI:  This is a 78 y/o male, well known to us, [facility] with h/o of CAD s/p PCI 1/2017 with 10% LAD stenosis OM1, s/p cath in 2018 w no significant disease, HTN, HLD, GERD, Gout, Osteoarthritis and chronic back pain, s/p multiple laminectomies and spinal fusion, left TKR who presents with worsening back pain for approx a week. Pt reports that for the past week he has had progressive worsening of his lower back pain and weakness in his bilateral lower extremities that progressed to inability to ambulate last night. Pain radiates down his left leg. This was associated with fevers at home. No loss of bowel or bladder contents. Of note, pt was treated for cellulitis a month ago with Augmentin.       Pt denies any cardiac complaints,       PAST MEDICAL & SURGICAL HISTORY:  CAD S/P percutaneous coronary angioplasty  HLD (hyperlipidemia)  Back pain  OA (osteoarthritis)  Gout  HTN (hypertension)  GERD (gastroesophageal reflux disease)  S/P lumbar spine operation: 6 thoracic to lumbar surgeries over 6 months period starting 11/2006  S/P knee replacement: 11/2013          PREVIOUS DIAGNOSTIC TESTING:    [ ] Echocardiogram:  [ ]  Catheterization:  [ ] Stress Test:  	    MEDICATIONS:  MEDICATIONS  (STANDING):  allopurinol 300 milliGRAM(s) Oral daily  aspirin enteric coated 81 milliGRAM(s) Oral daily  atorvastatin 10 milliGRAM(s) Oral at bedtime  cefepime   IVPB 1000 milliGRAM(s) IV Intermittent every 12 hours  heparin  Injectable 5000 Unit(s) SubCutaneous every 8 hours  sodium chloride 0.9%. 1000 milliLiter(s) (100 mL/Hr) IV Continuous <Continuous>  vancomycin  IVPB 1000 milliGRAM(s) IV Intermittent every 24 hours      FAMILY HISTORY:  No pertinent family history in first degree relatives      SOCIAL HISTORY:    [ ] Non-smoker  [ ] Smoker  [ ] Alcohol    Allergies    Indocin (Unknown)  oxacillin (Unknown)  vancomycin (Rash)  Zyvox (Unknown)    Intolerances    	    REVIEW OF SYSTEMS:  CONSTITUTIONAL: No fever, weight loss, or fatigue  EYES: No eye pain, visual disturbances, or discharge  ENMT:  No difficulty hearing, tinnitus, vertigo; No sinus or throat pain  NECK: No pain or stiffness  RESPIRATORY: No cough, wheezing, chills or hemoptysis; No Shortness of Breath  CARDIOVASCULAR: No chest pain, palpitations, passing out, dizziness, or leg swelling  GASTROINTESTINAL: No abdominal or epigastric pain. No nausea, vomiting, or hematemesis; No diarrhea or constipation. No melena or hematochezia.  GENITOURINARY: No dysuria, frequency, hematuria, or incontinence  NEUROLOGICAL: No headaches, memory loss, loss of strength, numbness, or tremors  SKIN: No itching, burning, rashes, or lesions   	    [ ] All others negative	  [ ] Unable to obtain    PHYSICAL EXAM:  T(C): 37.3 (12-08-19 @ 10:08), Max: 38.3 (12-08-19 @ 07:41)  HR: 102 (12-08-19 @ 10:08) (65 - 102)  BP: 153/88 (12-08-19 @ 10:08) (123/73 - 154/71)  RR: 20 (12-08-19 @ 10:08) (18 - 20)  SpO2: 95% (12-08-19 @ 10:08) (89% - 98%)  Wt(kg): --  I&O's Summary      Appearance: Normal	  Psychiatry: A & O x 3, Mood & affect appropriate  HEENT:   Normal oral mucosa, PERRL, EOMI	  Lymphatic: No lymphadenopathy  Cardiovascular: Normal S1 S2,RRR, No JVD, No murmurs  Respiratory: Lungs clear to auscultation	  Gastrointestinal:  Soft, Non-tender, + BS	  Skin: No rashes, No ecchymoses, No cyanosis	  Neurologic: Non-focal  Extremities: Normal range of motion, No clubbing, cyanosis or edema  Vascular: Peripheral pulses palpable 2+ bilaterally    TELEMETRY: 	    ECG:  	  RADIOLOGY:  OTHER: 	  	  LABS:	 	    CARDIAC MARKERS:                                  12.1   25.69 )-----------( 245      ( 07 Dec 2019 22:22 )             37.4     12-07    140  |  101  |  35<H>  ----------------------------<  236<H>  4.4   |  22  |  1.87<H>    Ca    10.6<H>      07 Dec 2019 22:22    TPro  7.3  /  Alb  3.2<L>  /  TBili  0.4  /  DBili  x   /  AST  13  /  ALT  35  /  AlkPhos  156<H>  12-07    PT/INR - ( 07 Dec 2019 22:22 )   PT: 16.3 sec;   INR: 1.41 ratio         PTT - ( 07 Dec 2019 22:22 )  PTT:27.8 sec  proBNP:   Lipid Profile:   HgA1c:   TSH:     ASSESSMENT/PLAN:

## 2019-12-09 LAB
ALBUMIN SERPL ELPH-MCNC: 2.9 G/DL — LOW (ref 3.3–5)
ALP SERPL-CCNC: 154 U/L — HIGH (ref 40–120)
ALT FLD-CCNC: 29 U/L — SIGNIFICANT CHANGE UP (ref 10–45)
ANION GAP SERPL CALC-SCNC: 17 MMOL/L — SIGNIFICANT CHANGE UP (ref 5–17)
ANION GAP SERPL CALC-SCNC: 17 MMOL/L — SIGNIFICANT CHANGE UP (ref 5–17)
AST SERPL-CCNC: 20 U/L — SIGNIFICANT CHANGE UP (ref 10–40)
BASOPHILS # BLD AUTO: 0.06 K/UL — SIGNIFICANT CHANGE UP (ref 0–0.2)
BASOPHILS # BLD AUTO: 0.14 K/UL — SIGNIFICANT CHANGE UP (ref 0–0.2)
BASOPHILS NFR BLD AUTO: 0.2 % — SIGNIFICANT CHANGE UP (ref 0–2)
BASOPHILS NFR BLD AUTO: 0.4 % — SIGNIFICANT CHANGE UP (ref 0–2)
BILIRUB SERPL-MCNC: 0.4 MG/DL — SIGNIFICANT CHANGE UP (ref 0.2–1.2)
BUN SERPL-MCNC: 33 MG/DL — HIGH (ref 7–23)
BUN SERPL-MCNC: 36 MG/DL — HIGH (ref 7–23)
CALCIUM SERPL-MCNC: 10.4 MG/DL — SIGNIFICANT CHANGE UP (ref 8.4–10.5)
CALCIUM SERPL-MCNC: 10.4 MG/DL — SIGNIFICANT CHANGE UP (ref 8.4–10.5)
CHLORIDE SERPL-SCNC: 100 MMOL/L — SIGNIFICANT CHANGE UP (ref 96–108)
CHLORIDE SERPL-SCNC: 101 MMOL/L — SIGNIFICANT CHANGE UP (ref 96–108)
CO2 SERPL-SCNC: 21 MMOL/L — LOW (ref 22–31)
CO2 SERPL-SCNC: 22 MMOL/L — SIGNIFICANT CHANGE UP (ref 22–31)
CREAT SERPL-MCNC: 1.19 MG/DL — SIGNIFICANT CHANGE UP (ref 0.5–1.3)
CREAT SERPL-MCNC: 1.34 MG/DL — HIGH (ref 0.5–1.3)
EOSINOPHIL # BLD AUTO: 0 K/UL — SIGNIFICANT CHANGE UP (ref 0–0.5)
EOSINOPHIL # BLD AUTO: 0 K/UL — SIGNIFICANT CHANGE UP (ref 0–0.5)
EOSINOPHIL NFR BLD AUTO: 0 % — SIGNIFICANT CHANGE UP (ref 0–6)
EOSINOPHIL NFR BLD AUTO: 0 % — SIGNIFICANT CHANGE UP (ref 0–6)
GAS PNL BLDA: SIGNIFICANT CHANGE UP
GLUCOSE SERPL-MCNC: 227 MG/DL — HIGH (ref 70–99)
GLUCOSE SERPL-MCNC: 252 MG/DL — HIGH (ref 70–99)
HCT VFR BLD CALC: 38.1 % — LOW (ref 39–50)
HCT VFR BLD CALC: 39.8 % — SIGNIFICANT CHANGE UP (ref 39–50)
HGB BLD-MCNC: 12.2 G/DL — LOW (ref 13–17)
HGB BLD-MCNC: 13.1 G/DL — SIGNIFICANT CHANGE UP (ref 13–17)
IMM GRANULOCYTES NFR BLD AUTO: 3 % — HIGH (ref 0–1.5)
IMM GRANULOCYTES NFR BLD AUTO: 3.1 % — HIGH (ref 0–1.5)
LYMPHOCYTES # BLD AUTO: 0.78 K/UL — LOW (ref 1–3.3)
LYMPHOCYTES # BLD AUTO: 0.9 K/UL — LOW (ref 1–3.3)
LYMPHOCYTES # BLD AUTO: 2.3 % — LOW (ref 13–44)
LYMPHOCYTES # BLD AUTO: 2.6 % — LOW (ref 13–44)
MAGNESIUM SERPL-MCNC: 2.1 MG/DL — SIGNIFICANT CHANGE UP (ref 1.6–2.6)
MCHC RBC-ENTMCNC: 29.4 PG — SIGNIFICANT CHANGE UP (ref 27–34)
MCHC RBC-ENTMCNC: 29.6 PG — SIGNIFICANT CHANGE UP (ref 27–34)
MCHC RBC-ENTMCNC: 32 GM/DL — SIGNIFICANT CHANGE UP (ref 32–36)
MCHC RBC-ENTMCNC: 32.9 GM/DL — SIGNIFICANT CHANGE UP (ref 32–36)
MCV RBC AUTO: 89.2 FL — SIGNIFICANT CHANGE UP (ref 80–100)
MCV RBC AUTO: 92.5 FL — SIGNIFICANT CHANGE UP (ref 80–100)
MONOCYTES # BLD AUTO: 1.32 K/UL — HIGH (ref 0–0.9)
MONOCYTES # BLD AUTO: 1.57 K/UL — HIGH (ref 0–0.9)
MONOCYTES NFR BLD AUTO: 3.9 % — SIGNIFICANT CHANGE UP (ref 2–14)
MONOCYTES NFR BLD AUTO: 4.6 % — SIGNIFICANT CHANGE UP (ref 2–14)
NEUTROPHILS # BLD AUTO: 30.42 K/UL — HIGH (ref 1.8–7.4)
NEUTROPHILS # BLD AUTO: 30.75 K/UL — HIGH (ref 1.8–7.4)
NEUTROPHILS NFR BLD AUTO: 89.6 % — HIGH (ref 43–77)
NEUTROPHILS NFR BLD AUTO: 90.3 % — HIGH (ref 43–77)
NRBC # BLD: 0 /100 WBCS — SIGNIFICANT CHANGE UP (ref 0–0)
PLATELET # BLD AUTO: 245 K/UL — SIGNIFICANT CHANGE UP (ref 150–400)
PLATELET # BLD AUTO: 247 K/UL — SIGNIFICANT CHANGE UP (ref 150–400)
POTASSIUM SERPL-MCNC: 4.3 MMOL/L — SIGNIFICANT CHANGE UP (ref 3.5–5.3)
POTASSIUM SERPL-MCNC: 4.9 MMOL/L — SIGNIFICANT CHANGE UP (ref 3.5–5.3)
POTASSIUM SERPL-SCNC: 4.3 MMOL/L — SIGNIFICANT CHANGE UP (ref 3.5–5.3)
POTASSIUM SERPL-SCNC: 4.9 MMOL/L — SIGNIFICANT CHANGE UP (ref 3.5–5.3)
PROT SERPL-MCNC: 7.4 G/DL — SIGNIFICANT CHANGE UP (ref 6–8.3)
RBC # BLD: 4.12 M/UL — LOW (ref 4.2–5.8)
RBC # BLD: 4.46 M/UL — SIGNIFICANT CHANGE UP (ref 4.2–5.8)
RBC # FLD: 14.3 % — SIGNIFICANT CHANGE UP (ref 10.3–14.5)
RBC # FLD: 14.5 % — SIGNIFICANT CHANGE UP (ref 10.3–14.5)
SODIUM SERPL-SCNC: 139 MMOL/L — SIGNIFICANT CHANGE UP (ref 135–145)
SODIUM SERPL-SCNC: 139 MMOL/L — SIGNIFICANT CHANGE UP (ref 135–145)
VANCOMYCIN FLD-MCNC: 4.6 UG/ML — SIGNIFICANT CHANGE UP
WBC # BLD: 33.98 K/UL — HIGH (ref 3.8–10.5)
WBC # BLD: 34.05 K/UL — HIGH (ref 3.8–10.5)
WBC # FLD AUTO: 33.98 K/UL — HIGH (ref 3.8–10.5)
WBC # FLD AUTO: 34.05 K/UL — HIGH (ref 3.8–10.5)

## 2019-12-09 PROCEDURE — 99233 SBSQ HOSP IP/OBS HIGH 50: CPT

## 2019-12-09 PROCEDURE — 99223 1ST HOSP IP/OBS HIGH 75: CPT | Mod: GC

## 2019-12-09 RX ORDER — MISOPROSTOL 200 UG/1
1 TABLET ORAL
Qty: 0 | Refills: 0 | DISCHARGE

## 2019-12-09 RX ORDER — ACETAMINOPHEN 500 MG
1000 TABLET ORAL ONCE
Refills: 0 | Status: COMPLETED | OUTPATIENT
Start: 2019-12-09 | End: 2019-12-09

## 2019-12-09 RX ORDER — DICLOFENAC SODIUM 75 MG/1
1 TABLET, DELAYED RELEASE ORAL
Qty: 0 | Refills: 0 | DISCHARGE

## 2019-12-09 RX ORDER — CEFAZOLIN SODIUM 1 G
2000 VIAL (EA) INJECTION EVERY 8 HOURS
Refills: 0 | Status: DISCONTINUED | OUTPATIENT
Start: 2019-12-09 | End: 2019-12-16

## 2019-12-09 RX ORDER — UBIDECARENONE 100 MG
1 CAPSULE ORAL
Qty: 0 | Refills: 0 | DISCHARGE

## 2019-12-09 RX ORDER — LOVASTATIN 20 MG
1 TABLET ORAL
Qty: 0 | Refills: 0 | DISCHARGE

## 2019-12-09 RX ORDER — ATORVASTATIN CALCIUM 80 MG/1
1 TABLET, FILM COATED ORAL
Qty: 0 | Refills: 0 | DISCHARGE

## 2019-12-09 RX ORDER — METOPROLOL TARTRATE 50 MG
0.5 TABLET ORAL
Qty: 0 | Refills: 0 | DISCHARGE

## 2019-12-09 RX ORDER — CHOLECALCIFEROL (VITAMIN D3) 125 MCG
1 CAPSULE ORAL
Qty: 0 | Refills: 0 | DISCHARGE

## 2019-12-09 RX ORDER — LANSOPRAZOLE 15 MG/1
1 CAPSULE, DELAYED RELEASE ORAL
Qty: 0 | Refills: 0 | DISCHARGE

## 2019-12-09 RX ADMIN — Medication 100 MILLIGRAM(S): at 21:26

## 2019-12-09 RX ADMIN — Medication 650 MILLIGRAM(S): at 05:28

## 2019-12-09 RX ADMIN — CEFEPIME 100 MILLIGRAM(S): 1 INJECTION, POWDER, FOR SOLUTION INTRAMUSCULAR; INTRAVENOUS at 05:22

## 2019-12-09 RX ADMIN — Medication 100 MILLIGRAM(S): at 12:46

## 2019-12-09 RX ADMIN — HEPARIN SODIUM 5000 UNIT(S): 5000 INJECTION INTRAVENOUS; SUBCUTANEOUS at 13:33

## 2019-12-09 RX ADMIN — ATORVASTATIN CALCIUM 10 MILLIGRAM(S): 80 TABLET, FILM COATED ORAL at 21:28

## 2019-12-09 RX ADMIN — Medication 81 MILLIGRAM(S): at 12:42

## 2019-12-09 RX ADMIN — Medication 400 MILLIGRAM(S): at 12:28

## 2019-12-09 RX ADMIN — HEPARIN SODIUM 5000 UNIT(S): 5000 INJECTION INTRAVENOUS; SUBCUTANEOUS at 05:22

## 2019-12-09 RX ADMIN — HEPARIN SODIUM 5000 UNIT(S): 5000 INJECTION INTRAVENOUS; SUBCUTANEOUS at 21:28

## 2019-12-09 RX ADMIN — Medication 300 MILLIGRAM(S): at 12:42

## 2019-12-09 RX ADMIN — Medication 400 MILLIGRAM(S): at 21:50

## 2019-12-09 RX ADMIN — Medication 650 MILLIGRAM(S): at 05:58

## 2019-12-09 RX ADMIN — Medication 1000 MILLIGRAM(S): at 12:45

## 2019-12-09 NOTE — PROGRESS NOTE ADULT - SUBJECTIVE AND OBJECTIVE BOX
CARDIOLOGY FOLLOW UP - Dr. Lee    CC no cp or sob       PHYSICAL EXAM:  T(C): 36.7 (12-09-19 @ 08:49), Max: 37.7 (12-08-19 @ 18:10)  HR: 67 (12-09-19 @ 08:49) (67 - 114)  BP: 157/82 (12-09-19 @ 08:49) (145/78 - 163/94)  RR: 20 (12-09-19 @ 08:49) (19 - 22)  SpO2: 92% (12-09-19 @ 08:49) (92% - 96%)  Wt(kg): --  I&O's Summary    08 Dec 2019 07:01  -  09 Dec 2019 07:00  --------------------------------------------------------  IN: 0 mL / OUT: 300 mL / NET: -300 mL    09 Dec 2019 07:01  -  09 Dec 2019 10:50  --------------------------------------------------------  IN: 0 mL / OUT: 150 mL / NET: -150 mL        Appearance: Normal	  Cardiovascular: Normal S1 S2,RRR +SYS  murmurs  Respiratory: Lungs clear to auscultation	  Gastrointestinal:  Soft, Non-tender, + BS	  Extremities: Normal range of motion, No clubbing, cyanosis or edema        MEDICATIONS  (STANDING):  allopurinol 300 milliGRAM(s) Oral daily  aspirin enteric coated 81 milliGRAM(s) Oral daily  atorvastatin 10 milliGRAM(s) Oral at bedtime  ceFAZolin   IVPB 2000 milliGRAM(s) IV Intermittent every 8 hours  heparin  Injectable 5000 Unit(s) SubCutaneous every 8 hours  sodium chloride 0.9%. 1000 milliLiter(s) (100 mL/Hr) IV Continuous <Continuous>      TELEMETRY: 	    ECG:  	  RADIOLOGY:   DIAGNOSTIC TESTING:  [ ] Echocardiogram:  [ ]  Catheterization:  [ ] Stress Test:    OTHER: 	  < from: MR Thoracic Spine w/wo IV Cont (12.08.19 @ 00:06) >    IMPRESSION:    1. No acute findings.    2. Redemonstration of T9-T12 posterior fusion and laminectomy.    3. 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 No abnormal intrathecal   enhancement.    4. Similar-appearing multilevel thoracic and lumbar spondylosis.    < end of copied text >    LABS:	 	    Culture - Blood (12.08.19 @ 04:15)    Gram Stain:   Growth in aerobic bottle: Gram Positive Cocci in Clusters  Growth in anaerobic bottle: Gram Positive Cocci in Clusters    Specimen Source: .Blood Blood    Culture Results:   Growth in aerobic bottle: Gram Positive Cocci in Clusters  Growth in anaerobic bottle: Gram Positive Cocci in Clusters      Creatine Kinase, Serum: 71 U/L [30 - 200] (12-07 @ 22:22)                          12.2   33.98 )-----------( 245      ( 09 Dec 2019 08:33 )             38.1     12-09    139  |  101  |  36<H>  ----------------------------<  252<H>  4.9   |  21<L>  |  1.34<H>    Ca    10.4      09 Dec 2019 07:17  Mg     2.1     12-09    TPro  7.3  /  Alb  3.2<L>  /  TBili  0.4  /  DBili  x   /  AST  13  /  ALT  35  /  AlkPhos  156<H>  12-07    PT/INR - ( 07 Dec 2019 22:22 )   PT: 16.3 sec;   INR: 1.41 ratio         PTT - ( 07 Dec 2019 22:22 )  PTT:27.8 sec

## 2019-12-09 NOTE — PHYSICAL THERAPY INITIAL EVALUATION ADULT - PERTINENT HX OF CURRENT PROBLEM, REHAB EVAL
lumbar spondylosis. PMHx: CAD s/p PCI, GERD, GOUT, HLD, HTN, OA, PSHx: L TKR 2013, lumbar spine/thoracic surgeries over 6 months starting 11/2006 (+) Acute L low back pain sciatica concern for infection. (+) B/L LE weakness, (+) Sepsis, (+) KRIS, (+) Diastolic CHF, (+) AS. (+) Gram positive growth, antibiotics given. NM: Nonspecific heterogeneous uptake in the thoracolumbar spine which may be  secondary to post surgical change and/or degenerative change.  Mild diffuse activity in bilateral lungs. This may reflect inflammation. TTE (12/11): 1. Calcified trileaflet aortic valve with decreased opening.  Moderate aortic stenosis. 2. Severe concentric left ventricular hypertrophy. 3. Severe  left ventricular systolic dysfunction. Unable to rule out endocarditis. NM: Nonspecific heterogeneous uptake in the thoracolumbar spine which may be  secondary to post surgical change and/or degenerative change.  Mild diffuse activity in bilateral lungs. This may reflect inflammation. TTE (12/11): 1. Moderate aortic stenosis. 2. Severe concentric left ventricular hypertrophy. 3. Severe  left ventricular systolic dysfunction. Unable to rule out endocarditis. NM 12/11: Nonspecific heterogeneous uptake in the thoracolumbar spine which may be  secondary to post surgical change and/or degenerative change.  Mild diffuse activity in bilateral lungs. This may reflect inflammation. TTE (12/11): 1. Moderate aortic stenosis. 2. Severe concentric left ventricular hypertrophy. 3. Severe  left ventricular systolic dysfunction. Unable to rule out endocarditis.

## 2019-12-09 NOTE — CONSULT NOTE ADULT - ATTENDING COMMENTS
I have examined pt and agree with above exam and plan. 80 y/o male with multiple medical problems including spinal fusion of thoracic lumbar spine ,knee replacement. now found to have MSSA bacteremia. Pt reportedly thought to have change of mentals status. On exam with resident patient alert and oriented x 3 . Non focal exam. Pt c/o back pain. MRI without evidence of epidural abscess or discitis. Pt needs TTE and or JAHAIRA to evaluate cardiac valves. Pt also needs imaging of L knee replacement to r/o infection.    Pt is currently not a MICU candidate.

## 2019-12-09 NOTE — PROGRESS NOTE ADULT - SUBJECTIVE AND OBJECTIVE BOX
CC: F/U MSSA bacteremia    Saw/spoke to patient. Confused, ill appearing. Appears short of breath. Daughter was at bedside, and I discussed case with her.    Allergies  Indocin (Unknown)  oxacillin (Unknown)  Zyvox (Unknown)    ANTIMICROBIALS:  ceFAZolin   IVPB 2000 every 8 hours    PE:    Vital Signs Last 24 Hrs  T(C): 36.7 (09 Dec 2019 08:49), Max: 37.7 (08 Dec 2019 18:10)  T(F): 98.1 (09 Dec 2019 08:49), Max: 99.9 (08 Dec 2019 18:10)  HR: 67 (09 Dec 2019 08:49) (67 - 114)  BP: 157/82 (09 Dec 2019 08:49) (145/78 - 163/94)  RR: 20 (09 Dec 2019 08:49) (19 - 20)  SpO2: 92% (09 Dec 2019 08:49) (92% - 96%)    Gen: AOx2-3, ill appearing, anxious  CV: S1+S2 normal, nontachycardic  Resp: Clear bilat, no resp distress, no crackles/wheezes  Abd: Soft, nontender, +BS, obese  Ext: No LE edema, no wounds    LABS:                        12.2   33.98 )-----------( 245      ( 09 Dec 2019 08:33 )             38.1         139  |  101  |  36<H>  ----------------------------<  252<H>  4.9   |  21<L>  |  1.34<H>    Ca    10.4      09 Dec 2019 07:17  Mg     2.1         TPro  7.3  /  Alb  3.2<L>  /  TBili  0.4  /  DBili  x   /  AST  13  /  ALT  35  /  AlkPhos  156<H>  12-07    Urinalysis Basic - ( 08 Dec 2019 01:05 )    Color: Yellow / Appearance: Slightly Turbid / S.029 / pH: x  Gluc: x / Ketone: Negative  / Bili: Negative / Urobili: 3 mg/dL   Blood: x / Protein: 30 mg/dL / Nitrite: Negative   Leuk Esterase: Negative / RBC: 3 /hpf / WBC 7 /HPF   Sq Epi: x / Non Sq Epi: 3 /hpf / Bacteria: Moderate    MICROBIOLOGY:  Vancomycin Level, Random: 4.6 ug/mL (19 @ 07:19)    .Blood Blood  19   Growth in aerobic bottle: Staphylococcus aureus    .Urine Clean Catch (Midstream)  19   No growth    RADIOLOGY:     CXR:    FINDINGS:     Lungs are clear. No pleural effusion or pneumothorax.   Cardiac size cannot be accurately assessed in this projection. No acute   osseous abnormality. Partially visualized posterior spinal fusion   hardware.    IMPRESSION:   No focal consolidation     MR:    IMPRESSION:    1. No acute findings.    2. Redemonstration of T9-T12 posterior fusion and laminectomy.    3. 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 No abnormal intrathecal   enhancement.    4. Similar-appearing multilevel thoracic and lumbar spondylosis.

## 2019-12-09 NOTE — PROGRESS NOTE ADULT - ATTENDING COMMENTS
Patient seen and examined.  Agree with above NP note.  sepsis, bacteremia   source unclear  iv abx, f/u repeat bcx  chest imaging   further w/u per id  hd, cv stable  cont asa for cad/pci   check TTE, r/o obvious endocarditis   will need eventual JAHAIRA

## 2019-12-09 NOTE — PROGRESS NOTE ADULT - PROBLEM SELECTOR PLAN 2
No compressive lesions seen on initial prelim MRI read. Neuro and Neurosurg following  - f/u official MRI read reviewed as above

## 2019-12-09 NOTE — PROGRESS NOTE ADULT - PROBLEM SELECTOR PLAN 3
Met sepsis criteria on admission with elevation in WBC with fever and tachycardia.   mssa bacteremia  abx changed to ancef  - dw ID consult appreciated  - f/u blood and urine cultures  - Trend CBC  - Tylenol PRN fever

## 2019-12-09 NOTE — PHYSICAL THERAPY INITIAL EVALUATION ADULT - PATIENT/FAMILY/SIGNIFICANT OTHER GOALS STATEMENT, PT EVAL
to return home to return home. to return home. (+) leukocytosis. CT head 12/10: Volume loss with microvascular disease, no acute hemorrhage or midline  shift. CT P/A12/10: Parenchymal opacities in the lower lobes , Cardiomegaly, Degenerative changes and findings in the spine which were more fully  evaluated on MRI dated 12/7/2019

## 2019-12-09 NOTE — PROGRESS NOTE ADULT - ASSESSMENT
78 y/o male with h/o of CAD s/p PCI 1/2017 with 10% LAD stenosis OM1, HTN, HLD, GERD, Gout, Osteoarthritis and chronic back pain, s/p multiple laminectomies and spinal fusion, left TKR who presents with worsening back pain and fevers for approx a week  bl;d cx MSSA bacteremia  < from: MR Thoracic Spine w/wo IV Cont (12.08.19 @ 00:06) >  No acute findings.    2. Redemonstration of T9-T12 posterior fusion and laminectomy.    3. 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 No abnormal intrathecal   enhancement.    4. Similar-appearing multilevel thoracic and lumbar spondylosi    < end of copied text >

## 2019-12-09 NOTE — PROGRESS NOTE ADULT - SUBJECTIVE AND OBJECTIVE BOX
Patient is a 79y old  Male who presents with a chief complaint of Back pain (09 Dec 2019 15:44)      INTERVAL HPI/OVERNIGHT EVENTS:pt seen and examined  wife and dgtr at bedside, pt very sleepy, woke up to name , remains confised      Vital Signs Last 24 Hrs  T(C): 37.4 (09 Dec 2019 18:13), Max: 37.4 (09 Dec 2019 18:13)  T(F): 99.4 (09 Dec 2019 18:13), Max: 99.4 (09 Dec 2019 18:13)  HR: 70 (09 Dec 2019 18:13) (67 - 72)  BP: 149/79 (09 Dec 2019 18:13) (145/78 - 168/93)  BP(mean): --  RR: 20 (09 Dec 2019 18:13) (19 - 20)  SpO2: 98% (09 Dec 2019 18:13) (92% - 98%)    acetaminophen   Tablet .. 650 milliGRAM(s) Oral every 6 hours PRN  allopurinol 300 milliGRAM(s) Oral daily  aspirin enteric coated 81 milliGRAM(s) Oral daily  atorvastatin 10 milliGRAM(s) Oral at bedtime  ceFAZolin   IVPB 2000 milliGRAM(s) IV Intermittent every 8 hours  heparin  Injectable 5000 Unit(s) SubCutaneous every 8 hours  sodium chloride 0.9%. 1000 milliLiter(s) IV Continuous <Continuous>      PHYSICAL EXAM:  GENERAL: NAD,   EYES: conjunctiva and sclera clear  ENMT: Moist mucous membranes  NECK: Supple, No JVD, Normal thyroid  NERVOUS SYSTEM:  Alert & Oriented X,   CHEST/LUNG: Clear to auscultation bilaterally; No rales, rhonchi, wheezing, or rubs  HEART: Regular rate and rhythm; No murmurs, rubs, or gallops  ABDOMEN: Soft, Nontender, Nondistended; Bowel sounds present  EXTREMITIES:  2+ Peripheral Pulses, No clubbing, cyanosis, or edema  LYMPH: No lymphadenopathy noted  SKIN: No rashes or lesions    Consultant(s) Notes Reviewed:  [x ] YES  [ ] NO  Care Discussed with Consultants/Other Providers [ x] YES  [ ] NO    LABS:                        13.1   34.05 )-----------( 247      ( 09 Dec 2019 18:10 )             39.8     -    139  |  100  |  33<H>  ----------------------------<  227<H>  4.3   |  22  |  1.19    Ca    10.4      09 Dec 2019 18:10  Mg     2.1         TPro  7.4  /  Alb  2.9<L>  /  TBili  0.4  /  DBili  x   /  AST  20  /  ALT  29  /  AlkPhos  154<H>  12    PT/INR - ( 07 Dec 2019 22:22 )   PT: 16.3 sec;   INR: 1.41 ratio         PTT - ( 07 Dec 2019 22:22 )  PTT:27.8 sec  Urinalysis Basic - ( 08 Dec 2019 01:05 )    Color: Yellow / Appearance: Slightly Turbid / S.029 / pH: x  Gluc: x / Ketone: Negative  / Bili: Negative / Urobili: 3 mg/dL   Blood: x / Protein: 30 mg/dL / Nitrite: Negative   Leuk Esterase: Negative / RBC: 3 /hpf / WBC 7 /HPF   Sq Epi: x / Non Sq Epi: 3 /hpf / Bacteria: Moderate      CAPILLARY BLOOD GLUCOSE          ABG - ( 09 Dec 2019 18:34 )  pH, Arterial: 7.50  pH, Blood: x     /  pCO2: 29    /  pO2: 77    / HCO3: 23    / Base Excess: .9    /  SaO2: 96                Urinalysis Basic - ( 08 Dec 2019 01:05 )    Color: Yellow / Appearance: Slightly Turbid / S.029 / pH: x  Gluc: x / Ketone: Negative  / Bili: Negative / Urobili: 3 mg/dL   Blood: x / Protein: 30 mg/dL / Nitrite: Negative   Leuk Esterase: Negative / RBC: 3 /hpf / WBC 7 /HPF   Sq Epi: x / Non Sq Epi: 3 /hpf / Bacteria: Moderate        Culture - Blood (collected 08 Dec 2019 04:15)  Source: .Blood Blood  Gram Stain (08 Dec 2019 22:02):    Growth in aerobic bottle: Gram Positive Cocci in Clusters    Growth in anaerobic bottle: Gram Positive Cocci in Clusters  Preliminary Report (09 Dec 2019 16:37):    Growth in aerobic and anaerobic bottles: Staphylococcus aureus    See previous culture [id number]    Culture - Blood (collected 08 Dec 2019 04:15)  Source: .Blood Blood  Gram Stain (08 Dec 2019 17:00):    Growth in aerobic bottle: Gram Positive Cocci in Clusters    Growth in anaerobic bottle: Gram Positive Cocci in Clusters  Preliminary Report (09 Dec 2019 15:38):    Growth in aerobic and anaerobic bottles: Staphylococcus aureus    "Due to technical problems, Proteus sp. will Not be reported as part of    the BCID panel until further notice"    ***Blood Panel PCR results on this specimen are available    approximately 3 hours after the Gram stain result.***    Gram stain, PCR, and/or culture results may not always    correspond due to difference in methodologies.    ************************************************************    This PCR assay was performed using [employer].    The following targets are tested for: Enterococcus,    vancomycin resistant enterococci, Listeria monocytogenes,    coagulase negative staphylococci, S. aureus,    methicillin resistant S. aureus, Streptococcus agalactiae    (Group B), S. pneumoniae, S. pyogenes (Group A),    Acinetobacter baumannii, Enterobacter cloacae, E. coli,    Klebsiella oxytoca, K. pneumoniae, Proteus sp.,    Serratia marcescens, Haemophilus influenzae,    Neisseria meningitidis, Pseudomonas aeruginosa, Candida    albicans, C. glabrata, C krusei, C parapsilosis,    C. tropicalis and the KPC resistance gene.  Organism: Blood Culture PCR (08 Dec 2019 14:48)  Organism: Blood Culture PCR (08 Dec 2019 14:48)    Culture - Urine (collected 08 Dec 2019 04:06)  Source: .Urine Clean Catch (Midstream)  Final Report (08 Dec 2019 23:25):    No growth        RADIOLOGY & ADDITIONAL TESTS:    Imaging Personally Reviewed:  [x ] YES  [ ] NO

## 2019-12-09 NOTE — CONSULT NOTE ADULT - ASSESSMENT
79M pmhx CAD s/p stents, HTN, HLD, GERD, Gout, Osteoarthritis and chronic back pain, s/p multiple laminectomies and spinal fusion of the lumbar spine, left TKR, admitted for sepsis, found to have MSSA bacteremia.  MICU consulted for transient AMS, now resolved.     #AMS, now resolved  AAOx3 on exam  Recommend CT head  Pain control    #Sepsis  Recommend TTE and possible JAHAIRA to eval for endocarditis  Agree with CT chest to complete infectious work up   Check tagged WBC scan to eval hardware  Consider imaging left knee as possible source of infection though no obvious signs of septic joint on exam  Continue Abx per ID    Patient hemodynamically stable, with normal mentation on assessment, will not require MICU admission at this time.   Please re-consult if clinical status changes.     Patient seen and case discussed with attending. Recommendations discussed with covering Medicine NP Catalina. 79M pmhx CAD s/p PCI to OM1, Mid LAD, mod AS, HTN, HLD, GERD, Gout, pre-DM, Osteoarthritis and chronic back pain, s/p multiple laminectomies and spinal fusion of the low thoracic spine, left TKR, admitted for sepsis, found to have MSSA bacteremia.  MICU consulted for transient AMS, now resolved.     #AMS, now resolved  AAOx3 on exam  Recommend CT head  Pain control    #Sepsis  Recommend TTE and possible JAHAIRA to eval for endocarditis  Agree with CT chest to complete infectious work up   Check tagged WBC scan to eval hardware  Consider imaging left knee as possible source of infection though no obvious signs of septic joint on exam  Continue Abx per ID    Patient hemodynamically stable, with normal mentation on assessment, will not require MICU admission at this time.   Please re-consult if clinical status changes.     Patient seen and case discussed with attending. Recommendations discussed with covering Medicine NP Catalina.

## 2019-12-09 NOTE — PHYSICAL THERAPY INITIAL EVALUATION ADULT - DIAGNOSIS, PT EVAL
pt has lethargy, pain, AMS, decrease activity tolerance, decrease functional mobility capacity, decrease strength, impaired balance

## 2019-12-09 NOTE — PROGRESS NOTE ADULT - PROBLEM SELECTOR PLAN 1
Pt presented with progressive worsening lower back pain associated with fevers. Seen by Neurology and Neurosurgery on the ED. MRI of thoracic and Lumbar spine -Concern for potential infectious etiology given elevation in WBC and inflammatory markers  - Await final MRI read no acute findings  - ID consulted for Abx management as multiple allergies listed

## 2019-12-09 NOTE — PROGRESS NOTE ADULT - ASSESSMENT
79 yr old male with HTN, DCHF, CAD, S/P PCI to OM1, Mid LAD (9/2014, [facility], [facility], [facility]) hx of ischemic CMP, AS, DM HLD, GERD, GOUT, s/p multiple spinal surgery admitted with back pain and fever    1. Back Pain  MRI noted, neuro/neuro sx followup  ID eval  pain contol     2. CAD s/p PCI (OM1/LAD)   stable  cont  ASA , statin  remains off BB in light of fatigue     3. Diastolic CHF, hx Ischemic CMP   no evidence of decomp CHF on exam   cv stable, resume arb    4. Aortic Stenosis  stable, Moderate on echo 2/6/2018   repeat echo to eval progression of AS    5. HTN   BP stable .,continue with current antihtn meds     6. Fever/ Sepsis   + blood cultures with gram positive Cocci in clusters   iv abx, ID f/u   check echo     dvt ppx 79 yr old male with HTN, DCHF, CAD, S/P PCI to OM1, Mid LAD (9/2014, [facility], [facility], [facility]) hx of ischemic CMP, AS, DM HLD, GERD, GOUT, s/p multiple spinal surgery admitted with back pain and fever    1. Back Pain  MRI noted, neuro/neuro sx followup  ID eval  pain contol     2. CAD s/p PCI (OM1/LAD)   stable  cont  ASA , statin  remains off BB in light of history of bb induced fatigue     3. Diastolic CHF, hx Ischemic CMP   no evidence of decomp CHF on exam   cv stable, resume arb    4. Aortic Stenosis  stable, Moderate on echo 2/6/2018   repeat echo to eval progression of AS    5. HTN   BP stable .,continue with current antihtn meds     6. Fever/ Sepsis   + blood cultures with gram positive Cocci in clusters   iv abx, ID f/u   check echo     dvt ppx

## 2019-12-09 NOTE — CHART NOTE - NSCHARTNOTEFT_GEN_A_CORE
MICU consulted for sepsis. See MICU consult note. Not a candidate for higher level of care at this time. Attending, Dr. Jacobo notified and ordered CT chest without contrast, ABG, CTH, BC x2, WBC scan. ID onboard (refer to note). Will continue to monitor pt closely.    Higinio Quiñonez  Spectra-link [id number]

## 2019-12-09 NOTE — PHYSICAL THERAPY INITIAL EVALUATION ADULT - CRITERIA FOR SKILLED THERAPEUTIC INTERVENTIONS
acute rehab/functional limitations in following categories/impairments found/risk reduction/prevention/anticipated discharge recommendation/rehab potential

## 2019-12-09 NOTE — PROGRESS NOTE ADULT - ASSESSMENT
80 yo M with chronic low back pain sp multiple laminectomy and fusion p/w 1 week of worsening back pain along with weakness in bilateral lower extremities and inability to walk since last night.  Leukocytosis, fever  Low back pain, increasing LE weakness  MR L spine and T spine prelim but no emergent findings  Prior history of MSSA bacteremia (2016)  History oxacillin allergy (? fever), Zyvox (AMS); tolerated vanco on this visit  BCX now with MSSA, high grade, rising WBC, patient ill appearing, guarded  Needs workup to determine sources  Overall, fever, leukocytosis, LBP  - Cefazolin 2g q 8  - DC Vanco/cefepime  - F/U neurosurgery/neuro  - Repeat BCXs q 48 hours until clearance  - Check CT A/P/C  - Check TTE, likely would need JAHAIRA (if feasible)  - Check Tagged WBC scan--evaluating spinal hardware    Fredy Huynh MD  Pager [phone]  After 5pm and on weekends call [phone]

## 2019-12-09 NOTE — PHYSICAL THERAPY INITIAL EVALUATION ADULT - REFERRING PHYSICIAN, REHAB EVAL
Pt semi-supine in bed in NAD, + IV, +O2 2L NC. Family present Pt is 78 y/o M presenting to ED w/worsening back pain x 1 week w/weakening of B/L LE and inability to walk. Pain radiating down L LE, w/fever at home. S/P Augmention for cellulitis one month ago. CT  chest (-), MRI spine: No acute findings. Redemonstration of T9-T12 posterior fusion and laminectomy. 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 No abnormal intrathecal  enhancement. Similar-appearing multilevel thoracic and

## 2019-12-09 NOTE — PHYSICAL THERAPY INITIAL EVALUATION ADULT - ADDITIONAL COMMENTS
Pt lives w/wife in pvt home with 5 steps to enter w/rail. 17 steps inside home w/rails. Wife states pt was completely independent in all functional mobility, ADLs, IADLs, walking independently w/cane, 5 blocks to [employer] just a month ago.

## 2019-12-09 NOTE — PHYSICAL THERAPY INITIAL EVALUATION ADULT - NS ASR WT BEARING DETAIL LUE
full weight-bearing/(+) leukocytosis. CT head 12/10: Volume loss with microvascular disease, no acute hemorrhage or midline  shift. CT P/A12/10: Parenchymal opacities in the lower lobes , Cardiomegaly, Degenerative changes and findings in the spine which were more fully  evaluated on MRI dated 12/7/2019. full weight-bearing/(+) leukocytosis. CT head 12/10: Volume loss with microvascular disease, no acute hemorrhage or midline  shift. CT P/A12/10: Parenchymal opacities in the lower lobes , Cardiomegaly, Degenerative changes and findings in the spine which were more fully  evaluated on MRI dated 12/7/2019. NM: Nonspecific heterogeneous uptake in the thoracolumbar spine which may be

## 2019-12-09 NOTE — CONSULT NOTE ADULT - SUBJECTIVE AND OBJECTIVE BOX
CHIEF COMPLAINT: sepsis, back pain     HPI: 79M pmhx CAD s/p stents, HTN, HLD, GERD, Gout, Osteoarthritis and chronic back pain, s/p multiple laminectomies and spinal fusion of the lumbar spine, left TKR, who presented to the ED with 1 week of fevers and night sweats, worsening low back pain and worsening lower extremity weakness, admitted for sepsis. MICU consulted for altered mental status, reported as mumbling incoherent words. On assessment, patient able to provide history along with collateral information from wife at bedside. Patient notes that he has chronic lower extremity weakness but taught himself to drive and continue walking after his spinal surgeries. He notes chronic left lower extremity weakness but he is able to drive with the right foot. States that up until one week ago, he was still driving and still highly functional. States he is an  and has never had any issues with day to day tasks. He states he spiked a fever to 104F one week ago. He did not have nausea, vomiting, abdominal pain, cough, chest pain, shortness of breath or dysuria. He d    PAST MEDICAL & SURGICAL HISTORY:  CAD S/P percutaneous coronary angioplasty  HLD (hyperlipidemia)  Back pain  OA (osteoarthritis)  Gout  HTN (hypertension)  GERD (gastroesophageal reflux disease)  S/P lumbar spine operation: 6 thoracic to lumbar surgeries over 6 months period starting 2006  S/P knee replacement: 2013      FAMILY HISTORY:  No pertinent family history in first degree relatives      SOCIAL HISTORY:  Smoking: __ packs x ___ years  EtOH Use:  Marital Status:  Occupation:  Recent Travel:  Country of Birth:  Advance Directives:    Allergies    Indocin (Unknown)  oxacillin (Unknown)  Zyvox (Unknown)    Intolerances        HOME MEDICATIONS:    REVIEW OF SYSTEMS:  Constitutional:   Eyes:  ENT:  CV:  Resp:  GI:  :  MSK:  Integumentary:  Neurological:  Psychiatric:  Endocrine:  Hematologic/Lymphatic:  Allergic/Immunologic:  [ ] All other systems negative  [ ] Unable to assess ROS because ________    OBJECTIVE:  ICU Vital Signs Last 24 Hrs  T(C): 36.6 (09 Dec 2019 14:19), Max: 37.7 (08 Dec 2019 18:10)  T(F): 97.9 (09 Dec 2019 14:19), Max: 99.9 (08 Dec 2019 18:10)  HR: 68 (09 Dec 2019 14:19) (67 - 114)  BP: 168/93 (09 Dec 2019 14:19) (145/78 - 168/93)  BP(mean): --  ABP: --  ABP(mean): --  RR: 20 (09 Dec 2019 14:19) (19 - 20)  SpO2: 98% (09 Dec 2019 14:19) (92% - 98%)         @ 07:01  -   @ 07:00  --------------------------------------------------------  IN: 0 mL / OUT: 300 mL / NET: -300 mL     @ 07:01  -   @ 15:47  --------------------------------------------------------  IN: 560 mL / OUT: 350 mL / NET: 210 mL      CAPILLARY BLOOD GLUCOSE      POCT Blood Glucose.: 219 mg/dL (08 Dec 2019 08:05)      PHYSICAL EXAM:  General:   HEENT:   Lymph Nodes:  Neck:   Respiratory:   Cardiovascular:   Abdomen:   Extremities:   Skin:   Neurological:  Psychiatry:    HOSPITAL MEDICATIONS:  MEDICATIONS  (STANDING):  allopurinol 300 milliGRAM(s) Oral daily  aspirin enteric coated 81 milliGRAM(s) Oral daily  atorvastatin 10 milliGRAM(s) Oral at bedtime  ceFAZolin   IVPB 2000 milliGRAM(s) IV Intermittent every 8 hours  heparin  Injectable 5000 Unit(s) SubCutaneous every 8 hours  sodium chloride 0.9%. 1000 milliLiter(s) (100 mL/Hr) IV Continuous <Continuous>    MEDICATIONS  (PRN):  acetaminophen   Tablet .. 650 milliGRAM(s) Oral every 6 hours PRN Temp greater or equal to 38C (100.4F)      LABS:                        12.2   33.98 )-----------( 245      ( 09 Dec 2019 08:33 )             38.1         139  |  101  |  36<H>  ----------------------------<  252<H>  4.9   |  21<L>  |  1.34<H>    Ca    10.4      09 Dec 2019 07:17  Mg     2.1         TPro  7.3  /  Alb  3.2<L>  /  TBili  0.4  /  DBili  x   /  AST  13  /  ALT  35  /  AlkPhos  156<H>  12    PT/INR - ( 07 Dec 2019 22:22 )   PT: 16.3 sec;   INR: 1.41 ratio         PTT - ( 07 Dec 2019 22:22 )  PTT:27.8 sec  Urinalysis Basic - ( 08 Dec 2019 01:05 )    Color: Yellow / Appearance: Slightly Turbid / S.029 / pH: x  Gluc: x / Ketone: Negative  / Bili: Negative / Urobili: 3 mg/dL   Blood: x / Protein: 30 mg/dL / Nitrite: Negative   Leuk Esterase: Negative / RBC: 3 /hpf / WBC 7 /HPF   Sq Epi: x / Non Sq Epi: 3 /hpf / Bacteria: Moderate        Venous Blood Gas:   @ 00:37  7.42/37/54/23/86  VBG Lactate: 1.8      MICROBIOLOGY:     RADIOLOGY:  [ ] Reviewed and interpreted by me    EKG: CHIEF COMPLAINT: sepsis, back pain     HPI: 79M pmhx CAD s/p stents, HTN, HLD, GERD, Gout, Osteoarthritis and chronic back pain, s/p multiple laminectomies and spinal fusion of the low thoracic spine, left TKR, who presented to the ED with 1 week of fevers and night sweats, worsening low back pain, knee pain and worsening lower extremity weakness, admitted for sepsis. MICU consulted for altered mental status, reported as mumbling incoherent words at approx 1300 today. On assessment, patient able to provide history along with collateral information from wife at bedside. Patient notes that he has chronic lower extremity weakness but taught himself to drive and continue walking after his spinal surgeries. He notes chronic left lower extremity weakness but states he is able to drive with the right foot. States that up until one week ago, he was still driving and still highly functional. States he is an  and has never had any issues with day to day tasks. States he spiked a fever to 104F one week ago. He did not have nausea, vomiting, abdominal pain, cough, chest pain, shortness of breath or dysuria. He denies urinary or fecal incontinence. Notes he had a right foot cellulitis that was treated a few weeks ago.     Labs significant for initial leukocytosis to 26 with left shift. ESR 92. CRP 43. Lactate 1.8. Chart review without any episodes of hypotension. Patient has been afebrile since admission but has been receiving tylenol for pain control. s/p van/cefepime x 1 but transitioned to ancef. MRI thoracic/lumbar spine without acute findings.     PAST MEDICAL & SURGICAL HISTORY:  CAD S/P percutaneous coronary angioplasty  HLD (hyperlipidemia)  Back pain  OA (osteoarthritis)  Gout  HTN (hypertension)  GERD (gastroesophageal reflux disease)  S/P lumbar spine operation: 6 thoracic to lumbar surgeries over 6 months period starting 2006  S/P knee replacement: 2013      FAMILY HISTORY:  No pertinent family history in first degree relatives      SOCIAL HISTORY:  Smoking: Denies  EtOH Use: Denies  Marital Status:   Occupation:   Recent Travel: Denies    Allergies    Indocin (Unknown)  oxacillin (Unknown)  Zyvox (Unknown)    Intolerances        HOME MEDICATIONS:    REVIEW OF SYSTEMS:  Constitutional: +fevers/chills/ night sweats  Eyes: No vision changes  ENT: No neck pain, no throat pain  CV: No chest pain  Resp: No shortness of breath  GI: No abdominal pain or nausea/vomiting  : No dysuria  MSK: + back pain and knee pain  Integumentary: No skin changes  Neurological: no headache  [X] All other systems negative  [ ] Unable to assess ROS because ________    OBJECTIVE:  ICU Vital Signs Last 24 Hrs  T(C): 36.6 (09 Dec 2019 14:19), Max: 37.7 (08 Dec 2019 18:10)  T(F): 97.9 (09 Dec 2019 14:19), Max: 99.9 (08 Dec 2019 18:10)  HR: 68 (09 Dec 2019 14:19) (67 - 114)  BP: 168/93 (09 Dec 2019 14:19) (145/78 - 168/93)  BP(mean): --  ABP: --  ABP(mean): --  RR: 20 (09 Dec 2019 14:19) (19 - 20)  SpO2: 98% (09 Dec 2019 14:19) (92% - 98%)         @ 07:  -   @ 07:00  --------------------------------------------------------  IN: 0 mL / OUT: 300 mL / NET: -300 mL     @ 07:01  -   @ 15:47  --------------------------------------------------------  IN: 560 mL / OUT: 350 mL / NET: 210 mL      CAPILLARY BLOOD GLUCOSE      POCT Blood Glucose.: 219 mg/dL (08 Dec 2019 08:05)      PHYSICAL EXAM:  General: AAOx3, endorsing back pain, NAD, on 4L NC sats > 92  HEENT: neck supple, no midline cervical ttp, no jvd, no nuchal rigidity   Respiratory: lungs CTA  Cardiovascular: RRR, no murmurs  Abdomen: abd soft, ntnd  Extremities: warm and well perfused   Skin: no rashes/lesions, unable to assess back  Neurological: AAOx3, 5/5 UE strength, unable to assess hip flexors, able to dorsiflex/plantarflex, could not tolerate exam of LE 2/2 to pain  Psychiatry: normal affect    HOSPITAL MEDICATIONS:  MEDICATIONS  (STANDING):  allopurinol 300 milliGRAM(s) Oral daily  aspirin enteric coated 81 milliGRAM(s) Oral daily  atorvastatin 10 milliGRAM(s) Oral at bedtime  ceFAZolin   IVPB 2000 milliGRAM(s) IV Intermittent every 8 hours  heparin  Injectable 5000 Unit(s) SubCutaneous every 8 hours  sodium chloride 0.9%. 1000 milliLiter(s) (100 mL/Hr) IV Continuous <Continuous>    MEDICATIONS  (PRN):  acetaminophen   Tablet .. 650 milliGRAM(s) Oral every 6 hours PRN Temp greater or equal to 38C (100.4F)      LABS:                        12.2   33.98 )-----------( 245      ( 09 Dec 2019 08:33 )             38.1     12    139  |  101  |  36<H>  ----------------------------<  252<H>  4.9   |  21<L>  |  1.34<H>    Ca    10.4      09 Dec 2019 07:17  Mg     2.1         TPro  7.3  /  Alb  3.2<L>  /  TBili  0.4  /  DBili  x   /  AST  13  /  ALT  35  /  AlkPhos  156<H>  12-    PT/INR - ( 07 Dec 2019 22:22 )   PT: 16.3 sec;   INR: 1.41 ratio         PTT - ( 07 Dec 2019 22:22 )  PTT:27.8 sec  Urinalysis Basic - ( 08 Dec 2019 01:05 )    Color: Yellow / Appearance: Slightly Turbid / S.029 / pH: x  Gluc: x / Ketone: Negative  / Bili: Negative / Urobili: 3 mg/dL   Blood: x / Protein: 30 mg/dL / Nitrite: Negative   Leuk Esterase: Negative / RBC: 3 /hpf / WBC 7 /HPF   Sq Epi: x / Non Sq Epi: 3 /hpf / Bacteria: Moderate        Venous Blood Gas:   @ 00:37  7.42/37/54/23/86  VBG Lactate: 1.8      MICROBIOLOGY:     RADIOLOGY:  [ ] Reviewed and interpreted by me    EKG: CHIEF COMPLAINT: sepsis, back pain     HPI: 79M pmhx CAD s/p stents, HTN, HLD, GERD, Gout, Osteoarthritis and chronic back pain, s/p multiple laminectomies and spinal fusion of the low thoracic spine, left TKR, who presented to the ED with 1 week of fevers and night sweats, worsening low back pain, knee pain and worsening lower extremity weakness, admitted for sepsis. MICU consulted for altered mental status, reported as mumbling incoherent words at approx 1300 today. On assessment, patient able to provide history along with collateral information from wife at bedside. Patient notes that he has chronic lower extremity weakness but taught himself to drive and continue walking after his spinal surgeries. He notes chronic left lower extremity weakness but states he is able to drive with the right foot. States that up until one week ago, he was still driving and still highly functional. States he is an  and has never had any issues with day to day tasks. States he spiked a fever to 104F one week ago. He did not have nausea, vomiting, abdominal pain, cough, chest pain, shortness of breath or dysuria. He denies urinary or fecal incontinence. Notes he had a right foot cellulitis that was treated a few weeks ago.     Labs significant for initial leukocytosis to 26 with left shift. ESR 92. CRP 43. Lactate 1.8. Chart review without any episodes of hypotension. Patient has been afebrile since admission but has been receiving tylenol for pain control. s/p van/cefepime x 1 but transitioned to ancef. MRI thoracic/lumbar spine without acute findings.     PAST MEDICAL & SURGICAL HISTORY:  CAD S/P percutaneous coronary angioplasty  HLD (hyperlipidemia)  Back pain  OA (osteoarthritis)  Gout  HTN (hypertension)  GERD (gastroesophageal reflux disease)  S/P lumbar spine operation: 6 thoracic to lumbar surgeries over 6 months period starting 2006  S/P knee replacement: 2013      FAMILY HISTORY:  No pertinent family history in first degree relatives      SOCIAL HISTORY:  Smoking: Denies  EtOH Use: Denies  Marital Status:   Occupation:   Recent Travel: Denies    Allergies    Indocin (Unknown)  oxacillin (Unknown)  Zyvox (Unknown)    Intolerances        HOME MEDICATIONS:    REVIEW OF SYSTEMS:  Constitutional: +fevers/chills/ night sweats  Eyes: No vision changes  ENT: No neck pain, no throat pain  CV: No chest pain  Resp: No shortness of breath  GI: No abdominal pain or nausea/vomiting  : No dysuria  MSK: + back pain and knee pain  Integumentary: No skin changes  Neurological: no headache  [X] All other systems negative  [ ] Unable to assess ROS because ________    OBJECTIVE:  ICU Vital Signs Last 24 Hrs  T(C): 36.6 (09 Dec 2019 14:19), Max: 37.7 (08 Dec 2019 18:10)  T(F): 97.9 (09 Dec 2019 14:19), Max: 99.9 (08 Dec 2019 18:10)  HR: 68 (09 Dec 2019 14:19) (67 - 114)  BP: 168/93 (09 Dec 2019 14:19) (145/78 - 168/93)  BP(mean): --  ABP: --  ABP(mean): --  RR: 20 (09 Dec 2019 14:19) (19 - 20)  SpO2: 98% (09 Dec 2019 14:19) (92% - 98%)         @ 07:  -   @ 07:00  --------------------------------------------------------  IN: 0 mL / OUT: 300 mL / NET: -300 mL     @ 07:01  -   @ 15:47  --------------------------------------------------------  IN: 560 mL / OUT: 350 mL / NET: 210 mL      CAPILLARY BLOOD GLUCOSE      POCT Blood Glucose.: 219 mg/dL (08 Dec 2019 08:05)      PHYSICAL EXAM:  General: AAOx3, endorsing back pain, NAD, on 4L NC sats > 92  HEENT: neck supple, no midline cervical ttp, no jvd, no nuchal rigidity   Respiratory: lungs CTA  Cardiovascular: RRR, no murmurs  Abdomen: abd soft, ntnd  Extremities: warm and well perfused, 1+ symmetrical pitting edema b/l LE, + left knee scar, no effusion, no crepitus or erythema around left knee but + nonlocalizing ttp.   Skin: no rashes/lesions, unable to assess back  Neurological: AAOx3, 5/5 UE strength, unable to assess hip flexors, able to dorsiflex/plantarflex, could not tolerate exam of LE 2/2 to pain  Psychiatry: normal affect    HOSPITAL MEDICATIONS:  MEDICATIONS  (STANDING):  allopurinol 300 milliGRAM(s) Oral daily  aspirin enteric coated 81 milliGRAM(s) Oral daily  atorvastatin 10 milliGRAM(s) Oral at bedtime  ceFAZolin   IVPB 2000 milliGRAM(s) IV Intermittent every 8 hours  heparin  Injectable 5000 Unit(s) SubCutaneous every 8 hours  sodium chloride 0.9%. 1000 milliLiter(s) (100 mL/Hr) IV Continuous <Continuous>    MEDICATIONS  (PRN):  acetaminophen   Tablet .. 650 milliGRAM(s) Oral every 6 hours PRN Temp greater or equal to 38C (100.4F)      LABS:                        12.2   33.98 )-----------( 245      ( 09 Dec 2019 08:33 )             38.1         139  |  101  |  36<H>  ----------------------------<  252<H>  4.9   |  21<L>  |  1.34<H>    Ca    10.4      09 Dec 2019 07:17  Mg     2.1         TPro  7.3  /  Alb  3.2<L>  /  TBili  0.4  /  DBili  x   /  AST  13  /  ALT  35  /  AlkPhos  156<H>      PT/INR - ( 07 Dec 2019 22:22 )   PT: 16.3 sec;   INR: 1.41 ratio         PTT - ( 07 Dec 2019 22:22 )  PTT:27.8 sec  Urinalysis Basic - ( 08 Dec 2019 01:05 )    Color: Yellow / Appearance: Slightly Turbid / S.029 / pH: x  Gluc: x / Ketone: Negative  / Bili: Negative / Urobili: 3 mg/dL   Blood: x / Protein: 30 mg/dL / Nitrite: Negative   Leuk Esterase: Negative / RBC: 3 /hpf / WBC 7 /HPF   Sq Epi: x / Non Sq Epi: 3 /hpf / Bacteria: Moderate        Venous Blood Gas:   @ 00:37  7.42/37/54/23/86  VBG Lactate: 1.8      MICROBIOLOGY:     RADIOLOGY:  [ ] Reviewed and interpreted by me    EKG: CHIEF COMPLAINT: sepsis, back pain     HPI: 79M pmhx CAD s/p PCI to OM1, Mid LAD, mod AS, HTN, HLD, GERD, Gout, pre-DM, Osteoarthritis and chronic back pain, s/p multiple laminectomies and spinal fusion of the low thoracic spine, left TKR, who presented to the ED with 1 week of fevers and night sweats, worsening low back pain, knee pain and worsening lower extremity weakness, admitted for sepsis. MICU consulted for altered mental status, reported as mumbling incoherent words at approx 1300 today. On assessment, patient able to provide history along with collateral information from wife at bedside. Patient notes that he has chronic lower extremity weakness but taught himself to drive and continue walking after his spinal surgeries. He notes chronic left lower extremity weakness but states he is able to drive with the right foot. States that up until one week ago, he was still driving and still highly functional. States he is an  and has never had any issues with day to day tasks. States he spiked a fever to 104F one week ago. He did not have nausea, vomiting, abdominal pain, cough, chest pain, shortness of breath or dysuria. He denies urinary or fecal incontinence. Notes he had a right foot cellulitis that was treated a few weeks ago.     Patient found to have MSSA bacteremia. Labs significant for initial leukocytosis to 26 with left shift increased to 34. ESR 92. CRP 43. Lactate 1.8. Chart review without any episodes of hypotension. Patient has been afebrile since admission but has been receiving tylenol for pain control. s/p van/cefepime x 1 but transitioned to ancef. MRI thoracic/lumbar spine without acute findings.     PAST MEDICAL & SURGICAL HISTORY:  CAD S/P percutaneous coronary angioplasty  HLD (hyperlipidemia)  Back pain  OA (osteoarthritis)  Gout  HTN (hypertension)  GERD (gastroesophageal reflux disease)  S/P lumbar spine operation: 6 thoracic to lumbar surgeries over 6 months period starting 2006  S/P knee replacement: 2013      FAMILY HISTORY:  No pertinent family history in first degree relatives      SOCIAL HISTORY:  Smoking: Denies  EtOH Use: Denies  Marital Status:   Occupation:   Recent Travel: Denies    Allergies    Indocin (Unknown)  oxacillin (Unknown)  Zyvox (Unknown)    Intolerances        HOME MEDICATIONS:    REVIEW OF SYSTEMS:  Constitutional: +fevers/chills/ night sweats  Eyes: No vision changes  ENT: No neck pain, no throat pain  CV: No chest pain  Resp: No shortness of breath  GI: No abdominal pain or nausea/vomiting  : No dysuria  MSK: + back pain and knee pain  Integumentary: No skin changes  Neurological: no headache  [X] All other systems negative  [ ] Unable to assess ROS because ________    OBJECTIVE:  ICU Vital Signs Last 24 Hrs  T(C): 36.6 (09 Dec 2019 14:19), Max: 37.7 (08 Dec 2019 18:10)  T(F): 97.9 (09 Dec 2019 14:19), Max: 99.9 (08 Dec 2019 18:10)  HR: 68 (09 Dec 2019 14:19) (67 - 114)  BP: 168/93 (09 Dec 2019 14:19) (145/78 - 168/93)  BP(mean): --  ABP: --  ABP(mean): --  RR: 20 (09 Dec 2019 14:19) (19 - 20)  SpO2: 98% (09 Dec 2019 14:19) (92% - 98%)         @ 07:  -   @ 07:00  --------------------------------------------------------  IN: 0 mL / OUT: 300 mL / NET: -300 mL     @ 07:  -   @ 15:47  --------------------------------------------------------  IN: 560 mL / OUT: 350 mL / NET: 210 mL      CAPILLARY BLOOD GLUCOSE      POCT Blood Glucose.: 219 mg/dL (08 Dec 2019 08:05)      PHYSICAL EXAM:  General: AAOx3, endorsing back pain, NAD, on 4L NC sats > 92  HEENT: neck supple, no midline cervical ttp, no jvd, no nuchal rigidity   Respiratory: lungs CTA  Cardiovascular: RRR, no murmurs  Abdomen: abd soft, ntnd  Extremities: warm and well perfused, 1+ symmetrical pitting edema b/l LE, + left knee scar, no effusion, no crepitus or erythema around left knee but + nonlocalizing ttp.   Skin: no rashes/lesions, unable to assess back  Neurological: AAOx3, 5/5 UE strength, unable to assess hip flexors, able to dorsiflex/plantarflex, could not tolerate exam of LE 2/2 to pain  Psychiatry: normal affect    HOSPITAL MEDICATIONS:  MEDICATIONS  (STANDING):  allopurinol 300 milliGRAM(s) Oral daily  aspirin enteric coated 81 milliGRAM(s) Oral daily  atorvastatin 10 milliGRAM(s) Oral at bedtime  ceFAZolin   IVPB 2000 milliGRAM(s) IV Intermittent every 8 hours  heparin  Injectable 5000 Unit(s) SubCutaneous every 8 hours  sodium chloride 0.9%. 1000 milliLiter(s) (100 mL/Hr) IV Continuous <Continuous>    MEDICATIONS  (PRN):  acetaminophen   Tablet .. 650 milliGRAM(s) Oral every 6 hours PRN Temp greater or equal to 38C (100.4F)      LABS:                        12.2   33.98 )-----------( 245      ( 09 Dec 2019 08:33 )             38.1     12    139  |  101  |  36<H>  ----------------------------<  252<H>  4.9   |  21<L>  |  1.34<H>    Ca    10.4      09 Dec 2019 07:17  Mg     2.1         TPro  7.3  /  Alb  3.2<L>  /  TBili  0.4  /  DBili  x   /  AST  13  /  ALT  35  /  AlkPhos  156<H>  12    PT/INR - ( 07 Dec 2019 22:22 )   PT: 16.3 sec;   INR: 1.41 ratio         PTT - ( 07 Dec 2019 22:22 )  PTT:27.8 sec  Urinalysis Basic - ( 08 Dec 2019 01:05 )    Color: Yellow / Appearance: Slightly Turbid / S.029 / pH: x  Gluc: x / Ketone: Negative  / Bili: Negative / Urobili: 3 mg/dL   Blood: x / Protein: 30 mg/dL / Nitrite: Negative   Leuk Esterase: Negative / RBC: 3 /hpf / WBC 7 /HPF   Sq Epi: x / Non Sq Epi: 3 /hpf / Bacteria: Moderate        Venous Blood Gas:   @ 00:37  7.42/37/54/23/86  VBG Lactate: 1.8      MICROBIOLOGY:     RADIOLOGY:  [ ] Reviewed and interpreted by me    EKG: CHIEF COMPLAINT: sepsis, back pain     HPI: 79M pmhx CAD s/p PCI to OM1, Mid LAD, mod AS, HTN, HLD, GERD, Gout, pre-DM, Osteoarthritis and chronic back pain, s/p multiple laminectomies and spinal fusion of the low thoracic spine, left TKR, who presented to the ED with 1 week of fevers and night sweats, worsening low back pain, knee pain and worsening lower extremity weakness, admitted for sepsis. MICU consulted for altered mental status, reported as mumbling incoherent words at approx 1300 today. On assessment, patient able to provide history along with collateral information from wife at bedside. Patient notes that he has chronic lower extremity weakness but taught himself to drive and continue walking after his spinal surgeries. He notes chronic left lower extremity weakness but states he is able to drive with the right foot. States that up until one week ago, he was still driving and still highly functional. States he is an  and has never had any issues with day to day tasks. States he spiked a fever to 104F one week ago. He did not have nausea, vomiting, abdominal pain, cough, chest pain, shortness of breath or dysuria. He denies urinary or fecal incontinence. Notes he had a right foot cellulitis that was treated a few weeks ago.     Patient found to have MSSA bacteremia. Labs significant for initial leukocytosis to 26 with left shift increased to 34. ESR 92. CRP 43. Lactate 1.8. Chart review without any episodes of hypotension. Patient has been afebrile since admission but has been receiving tylenol for pain control. s/p van/cefepime x 1 but transitioned to ancef. MRI thoracic/lumbar spine without acute findings.     PAST MEDICAL & SURGICAL HISTORY:  CAD S/P percutaneous coronary angioplasty  HLD (hyperlipidemia)  Back pain  OA (osteoarthritis)  Gout  HTN (hypertension)  GERD (gastroesophageal reflux disease)  S/P lumbar spine operation: 6 thoracic to lumbar surgeries over 6 months period starting 2006  S/P knee replacement: 2013      FAMILY HISTORY:  No pertinent family history in first degree relatives      SOCIAL HISTORY:  Smoking: Denies  EtOH Use: Denies  Marital Status:   Occupation:   Recent Travel: Denies    Allergies    Indocin (Unknown)  oxacillin (Unknown)  Zyvox (Unknown)    Intolerances        HOME MEDICATIONS:    REVIEW OF SYSTEMS:  Constitutional: +fevers/chills/ night sweats  Eyes: No vision changes  ENT: No neck pain, no throat pain  CV: No chest pain  Resp: No shortness of breath  GI: No abdominal pain or nausea/vomiting  : No dysuria  MSK: + back pain and knee pain  Integumentary: No skin changes  Neurological: no headache  [X] All other systems negative  [ ] Unable to assess ROS because ________    OBJECTIVE:  ICU Vital Signs Last 24 Hrs  T(C): 36.6 (09 Dec 2019 14:19), Max: 37.7 (08 Dec 2019 18:10)  T(F): 97.9 (09 Dec 2019 14:19), Max: 99.9 (08 Dec 2019 18:10)  HR: 68 (09 Dec 2019 14:19) (67 - 114)  BP: 168/93 (09 Dec 2019 14:19) (145/78 - 168/93)  BP(mean): --  ABP: --  ABP(mean): --  RR: 20 (09 Dec 2019 14:19) (19 - 20)  SpO2: 98% (09 Dec 2019 14:19) (92% - 98%)         @ 07:  -   @ 07:00  --------------------------------------------------------  IN: 0 mL / OUT: 300 mL / NET: -300 mL     @ 07:  -   @ 15:47  --------------------------------------------------------  IN: 560 mL / OUT: 350 mL / NET: 210 mL      CAPILLARY BLOOD GLUCOSE      POCT Blood Glucose.: 219 mg/dL (08 Dec 2019 08:05)      PHYSICAL EXAM:  General: AAOx3, endorsing back pain, NAD, on 3L NC sats > 92  HEENT: neck supple, no midline cervical ttp, no jvd, no nuchal rigidity   Respiratory: lungs CTA  Cardiovascular: RRR, no murmurs  Abdomen: abd soft, ntnd  Extremities: warm and well perfused, 1+ symmetrical pitting edema b/l LE, + left knee scar, no effusion, no crepitus or erythema around left knee but + nonlocalizing ttp.   Skin: no rashes/lesions, unable to assess back  Neurological: AAOx3, 5/5 UE strength, unable to assess hip flexors, able to dorsiflex/plantarflex, could not tolerate exam of LE 2/2 to pain  Psychiatry: normal affect    HOSPITAL MEDICATIONS:  MEDICATIONS  (STANDING):  allopurinol 300 milliGRAM(s) Oral daily  aspirin enteric coated 81 milliGRAM(s) Oral daily  atorvastatin 10 milliGRAM(s) Oral at bedtime  ceFAZolin   IVPB 2000 milliGRAM(s) IV Intermittent every 8 hours  heparin  Injectable 5000 Unit(s) SubCutaneous every 8 hours  sodium chloride 0.9%. 1000 milliLiter(s) (100 mL/Hr) IV Continuous <Continuous>    MEDICATIONS  (PRN):  acetaminophen   Tablet .. 650 milliGRAM(s) Oral every 6 hours PRN Temp greater or equal to 38C (100.4F)      LABS:                        12.2   33.98 )-----------( 245      ( 09 Dec 2019 08:33 )             38.1     12    139  |  101  |  36<H>  ----------------------------<  252<H>  4.9   |  21<L>  |  1.34<H>    Ca    10.4      09 Dec 2019 07:17  Mg     2.1         TPro  7.3  /  Alb  3.2<L>  /  TBili  0.4  /  DBili  x   /  AST  13  /  ALT  35  /  AlkPhos  156<H>  12    PT/INR - ( 07 Dec 2019 22:22 )   PT: 16.3 sec;   INR: 1.41 ratio         PTT - ( 07 Dec 2019 22:22 )  PTT:27.8 sec  Urinalysis Basic - ( 08 Dec 2019 01:05 )    Color: Yellow / Appearance: Slightly Turbid / S.029 / pH: x  Gluc: x / Ketone: Negative  / Bili: Negative / Urobili: 3 mg/dL   Blood: x / Protein: 30 mg/dL / Nitrite: Negative   Leuk Esterase: Negative / RBC: 3 /hpf / WBC 7 /HPF   Sq Epi: x / Non Sq Epi: 3 /hpf / Bacteria: Moderate        Venous Blood Gas:   @ 00:37  7.42/37/54/23/86  VBG Lactate: 1.8      MICROBIOLOGY:     RADIOLOGY:  [ ] Reviewed and interpreted by me    EKG:

## 2019-12-09 NOTE — PHYSICAL THERAPY INITIAL EVALUATION ADULT - GENERAL OBSERVATIONS, REHAB EVAL
Pt is 80 y/o M presenting to ED w/worsening back pain x 1 week w/weakening of B/L LE and inability to walk. Pain radiating down L LE, w/fever at home. S/P Augmention for cellulitis one month ago. CT  chest (-), MRI spine: No acute findings. Redemonstration of T9-T12 posterior fusion and laminectomy. 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 No abnormal intrathecal  enhancement. Similar-appearing multilevel thoracic and lumbar spondylosis. PMHx: CAD s/p PCI, GERD, GOUT, HLD, HTN, OA, PSHx: L TKR 2013, lumbar spine/thoracic surgeries over 6 months starting 11/2006 (+) Acute L low back pain sciatica concern for infection. (+) B/L LE weakness, (+) Sepsis, (+) KRIS, (+) Diastolic CHF, (+) AS. (+) Gram positive growth, antibiotics given.

## 2019-12-09 NOTE — PHYSICAL THERAPY INITIAL EVALUATION ADULT - IMPAIRMENTS FOUND, PT EVAL
arousal, attention, and cognition/gait, locomotion, and balance/aerobic capacity/endurance/cognitive impairment/muscle strength

## 2019-12-10 LAB
-  AMPICILLIN/SULBACTAM: SIGNIFICANT CHANGE UP
-  CEFAZOLIN: SIGNIFICANT CHANGE UP
-  CLINDAMYCIN: SIGNIFICANT CHANGE UP
-  ERYTHROMYCIN: SIGNIFICANT CHANGE UP
-  GENTAMICIN: SIGNIFICANT CHANGE UP
-  OXACILLIN: SIGNIFICANT CHANGE UP
-  PENICILLIN: SIGNIFICANT CHANGE UP
-  RIFAMPIN: SIGNIFICANT CHANGE UP
-  TETRACYCLINE: SIGNIFICANT CHANGE UP
-  TRIMETHOPRIM/SULFAMETHOXAZOLE: SIGNIFICANT CHANGE UP
-  VANCOMYCIN: SIGNIFICANT CHANGE UP
ALBUMIN SERPL ELPH-MCNC: 2.5 G/DL — LOW (ref 3.3–5)
ALP SERPL-CCNC: 157 U/L — HIGH (ref 40–120)
ALT FLD-CCNC: 25 U/L — SIGNIFICANT CHANGE UP (ref 10–45)
ANION GAP SERPL CALC-SCNC: 16 MMOL/L — SIGNIFICANT CHANGE UP (ref 5–17)
AST SERPL-CCNC: 24 U/L — SIGNIFICANT CHANGE UP (ref 10–40)
BASOPHILS # BLD AUTO: 0.04 K/UL — SIGNIFICANT CHANGE UP (ref 0–0.2)
BASOPHILS NFR BLD AUTO: 0.1 % — SIGNIFICANT CHANGE UP (ref 0–2)
BILIRUB SERPL-MCNC: 0.4 MG/DL — SIGNIFICANT CHANGE UP (ref 0.2–1.2)
BUN SERPL-MCNC: 33 MG/DL — HIGH (ref 7–23)
CALCIUM SERPL-MCNC: 9.6 MG/DL — SIGNIFICANT CHANGE UP (ref 8.4–10.5)
CHLORIDE SERPL-SCNC: 99 MMOL/L — SIGNIFICANT CHANGE UP (ref 96–108)
CO2 SERPL-SCNC: 22 MMOL/L — SIGNIFICANT CHANGE UP (ref 22–31)
CREAT SERPL-MCNC: 0.96 MG/DL — SIGNIFICANT CHANGE UP (ref 0.5–1.3)
CULTURE RESULTS: SIGNIFICANT CHANGE UP
CULTURE RESULTS: SIGNIFICANT CHANGE UP
EOSINOPHIL # BLD AUTO: 0 K/UL — SIGNIFICANT CHANGE UP (ref 0–0.5)
EOSINOPHIL NFR BLD AUTO: 0 % — SIGNIFICANT CHANGE UP (ref 0–6)
GLUCOSE SERPL-MCNC: 204 MG/DL — HIGH (ref 70–99)
GRAM STN FLD: SIGNIFICANT CHANGE UP
HCT VFR BLD CALC: 38.2 % — LOW (ref 39–50)
HGB BLD-MCNC: 12.4 G/DL — LOW (ref 13–17)
IMM GRANULOCYTES NFR BLD AUTO: 2.2 % — HIGH (ref 0–1.5)
LYMPHOCYTES # BLD AUTO: 0.72 K/UL — LOW (ref 1–3.3)
LYMPHOCYTES # BLD AUTO: 2.4 % — LOW (ref 13–44)
MCHC RBC-ENTMCNC: 29.2 PG — SIGNIFICANT CHANGE UP (ref 27–34)
MCHC RBC-ENTMCNC: 32.5 GM/DL — SIGNIFICANT CHANGE UP (ref 32–36)
MCV RBC AUTO: 90.1 FL — SIGNIFICANT CHANGE UP (ref 80–100)
METHOD TYPE: SIGNIFICANT CHANGE UP
MONOCYTES # BLD AUTO: 1.38 K/UL — HIGH (ref 0–0.9)
MONOCYTES NFR BLD AUTO: 4.7 % — SIGNIFICANT CHANGE UP (ref 2–14)
NEUTROPHILS # BLD AUTO: 26.73 K/UL — HIGH (ref 1.8–7.4)
NEUTROPHILS NFR BLD AUTO: 90.6 % — HIGH (ref 43–77)
ORGANISM # SPEC MICROSCOPIC CNT: SIGNIFICANT CHANGE UP
PLATELET # BLD AUTO: 242 K/UL — SIGNIFICANT CHANGE UP (ref 150–400)
POTASSIUM SERPL-MCNC: 4.6 MMOL/L — SIGNIFICANT CHANGE UP (ref 3.5–5.3)
POTASSIUM SERPL-SCNC: 4.6 MMOL/L — SIGNIFICANT CHANGE UP (ref 3.5–5.3)
PROT SERPL-MCNC: 6.6 G/DL — SIGNIFICANT CHANGE UP (ref 6–8.3)
RBC # BLD: 4.24 M/UL — SIGNIFICANT CHANGE UP (ref 4.2–5.8)
RBC # FLD: 14.3 % — SIGNIFICANT CHANGE UP (ref 10.3–14.5)
SODIUM SERPL-SCNC: 137 MMOL/L — SIGNIFICANT CHANGE UP (ref 135–145)
SPECIMEN SOURCE: SIGNIFICANT CHANGE UP
WBC # BLD: 29.51 K/UL — HIGH (ref 3.8–10.5)
WBC # FLD AUTO: 29.51 K/UL — HIGH (ref 3.8–10.5)

## 2019-12-10 PROCEDURE — 71260 CT THORAX DX C+: CPT | Mod: 26

## 2019-12-10 PROCEDURE — 74177 CT ABD & PELVIS W/CONTRAST: CPT | Mod: 26

## 2019-12-10 PROCEDURE — 99233 SBSQ HOSP IP/OBS HIGH 50: CPT

## 2019-12-10 PROCEDURE — 70450 CT HEAD/BRAIN W/O DYE: CPT | Mod: 26

## 2019-12-10 RX ORDER — MORPHINE SULFATE 50 MG/1
2 CAPSULE, EXTENDED RELEASE ORAL ONCE
Refills: 0 | Status: DISCONTINUED | OUTPATIENT
Start: 2019-12-10 | End: 2019-12-10

## 2019-12-10 RX ORDER — AMLODIPINE BESYLATE 2.5 MG/1
10 TABLET ORAL DAILY
Refills: 0 | Status: DISCONTINUED | OUTPATIENT
Start: 2019-12-10 | End: 2019-12-17

## 2019-12-10 RX ORDER — SENNA PLUS 8.6 MG/1
2 TABLET ORAL AT BEDTIME
Refills: 0 | Status: DISCONTINUED | OUTPATIENT
Start: 2019-12-10 | End: 2020-01-12

## 2019-12-10 RX ORDER — ACETAMINOPHEN 500 MG
500 TABLET ORAL ONCE
Refills: 0 | Status: COMPLETED | OUTPATIENT
Start: 2019-12-10 | End: 2019-12-10

## 2019-12-10 RX ORDER — MORPHINE SULFATE 50 MG/1
2 CAPSULE, EXTENDED RELEASE ORAL EVERY 4 HOURS
Refills: 0 | Status: DISCONTINUED | OUTPATIENT
Start: 2019-12-10 | End: 2019-12-15

## 2019-12-10 RX ADMIN — ATORVASTATIN CALCIUM 10 MILLIGRAM(S): 80 TABLET, FILM COATED ORAL at 22:12

## 2019-12-10 RX ADMIN — MORPHINE SULFATE 2 MILLIGRAM(S): 50 CAPSULE, EXTENDED RELEASE ORAL at 21:00

## 2019-12-10 RX ADMIN — MORPHINE SULFATE 2 MILLIGRAM(S): 50 CAPSULE, EXTENDED RELEASE ORAL at 16:34

## 2019-12-10 RX ADMIN — Medication 300 MILLIGRAM(S): at 12:39

## 2019-12-10 RX ADMIN — Medication 100 MILLIGRAM(S): at 03:52

## 2019-12-10 RX ADMIN — Medication 81 MILLIGRAM(S): at 12:39

## 2019-12-10 RX ADMIN — MORPHINE SULFATE 2 MILLIGRAM(S): 50 CAPSULE, EXTENDED RELEASE ORAL at 10:15

## 2019-12-10 RX ADMIN — HEPARIN SODIUM 5000 UNIT(S): 5000 INJECTION INTRAVENOUS; SUBCUTANEOUS at 22:11

## 2019-12-10 RX ADMIN — MORPHINE SULFATE 2 MILLIGRAM(S): 50 CAPSULE, EXTENDED RELEASE ORAL at 17:04

## 2019-12-10 RX ADMIN — MORPHINE SULFATE 2 MILLIGRAM(S): 50 CAPSULE, EXTENDED RELEASE ORAL at 20:30

## 2019-12-10 RX ADMIN — HEPARIN SODIUM 5000 UNIT(S): 5000 INJECTION INTRAVENOUS; SUBCUTANEOUS at 13:00

## 2019-12-10 RX ADMIN — AMLODIPINE BESYLATE 10 MILLIGRAM(S): 2.5 TABLET ORAL at 22:15

## 2019-12-10 RX ADMIN — Medication 100 MILLIGRAM(S): at 22:11

## 2019-12-10 RX ADMIN — Medication 500 MILLIGRAM(S): at 05:12

## 2019-12-10 RX ADMIN — Medication 1000 MILLIGRAM(S): at 02:35

## 2019-12-10 RX ADMIN — HEPARIN SODIUM 5000 UNIT(S): 5000 INJECTION INTRAVENOUS; SUBCUTANEOUS at 05:07

## 2019-12-10 RX ADMIN — Medication 200 MILLIGRAM(S): at 05:01

## 2019-12-10 RX ADMIN — MORPHINE SULFATE 2 MILLIGRAM(S): 50 CAPSULE, EXTENDED RELEASE ORAL at 10:45

## 2019-12-10 RX ADMIN — Medication 100 MILLIGRAM(S): at 12:39

## 2019-12-10 RX ADMIN — SENNA PLUS 2 TABLET(S): 8.6 TABLET ORAL at 22:13

## 2019-12-10 NOTE — PROGRESS NOTE ADULT - ATTENDING COMMENTS
Agree with above NP note.  sepsis, bacteremia   source unclear  iv abx, f/u repeat bcx  chest imaging   further w/u per id  hd, cv stable  cont asa for cad/pci   check TTE, r/o obvious endocarditis   will need eventual JAHAIRA

## 2019-12-10 NOTE — PROGRESS NOTE ADULT - PROBLEM SELECTOR PLAN 3
Met sepsis criteria on admission with elevation in WBC with fever and tachycardia.   mssa bacteremia persistant  abx changed to ancef  - dw ID consult appreciated  - f/u blood cx till neg  - Trend CBC  - Tylenol PRN fever  ct c/a/p w contrast pending, TTE

## 2019-12-10 NOTE — PROGRESS NOTE ADULT - SUBJECTIVE AND OBJECTIVE BOX
CC: F/U Bacteremia    Saw/spoke to patient. Patient refused CTs yesterday/agitated. Slightly improved today, but weak appearing.    Allergies  Indocin (Unknown)  oxacillin (Unknown)  Zyvox (Unknown)    ANTIMICROBIALS:  ceFAZolin   IVPB 2000 every 8 hours    PE:    Vital Signs Last 24 Hrs  T(C): 36.6 (10 Dec 2019 13:35), Max: 38.4 (09 Dec 2019 21:50)  T(F): 97.8 (10 Dec 2019 13:35), Max: 101.2 (09 Dec 2019 21:50)  HR: 86 (10 Dec 2019 13:35) (70 - 86)  BP: 158/89 (10 Dec 2019 13:35) (137/79 - 168/82)  RR: 20 (10 Dec 2019 13:35) (20 - 20)  SpO2: 95% (10 Dec 2019 13:35) (93% - 98%)    Gen: AOx3, NAD, non-toxic, pleasant  CV: S1+S2 normal, nontachycardic  Resp: Clear bilat, no resp distress, no crackles/wheezes  Abd: Soft, nontender, +BS  Ext: No LE edema, no wounds    LABS:                        12.4   29.51 )-----------( 242      ( 10 Dec 2019 09:11 )             38.2     12-10    137  |  99  |  33<H>  ----------------------------<  204<H>  4.6   |  22  |  0.96    Ca    9.6      10 Dec 2019 07:44  Mg     2.1     12-09    TPro  6.6  /  Alb  2.5<L>  /  TBili  0.4  /  DBili  x   /  AST  24  /  ALT  25  /  AlkPhos  157<H>  12-10    MICROBIOLOGY:    .Blood Blood  12-08-19   Growth in aerobic and anaerobic bottles: Staphylococcus aureus    .Urine Clean Catch (Midstream)  12-08-19   No growth    RADIOLOGY:    12/7 MR:    IMPRESSION:    1. No acute findings.    2. Redemonstration of T9-T12 posterior fusion and laminectomy.    3. 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 No abnormal intrathecal   enhancement.    4. Similar-appearing multilevel thoracic and lumbar spondylosis.

## 2019-12-10 NOTE — PROGRESS NOTE ADULT - ASSESSMENT
80 y/o male with h/o of CAD s/p PCI 1/2017 with 10% LAD stenosis OM1, HTN, HLD, GERD, Gout, Osteoarthritis and chronic back pain, s/p multiple laminectomies and spinal fusion, left TKR who presents with worsening back pain and fevers for approx a week  bl;d cx MSSA bacteremia  < from: MR Thoracic Spine w/wo IV Cont (12.08.19 @ 00:06) >  No acute findings.    2. Redemonstration of T9-T12 posterior fusion and laminectomy.    3. 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 No abnormal intrathecal   enhancement.    4. Similar-appearing multilevel thoracic and lumbar spondylosi    < end of copied text >

## 2019-12-10 NOTE — PROVIDER CONTACT NOTE (CRITICAL VALUE NOTIFICATION) - TEST AND RESULT REPORTED:
Preliminary results for blood culture taken on 12/9. Demonstrated growth in anaerobic bottle- gram positive cocci in cluster

## 2019-12-10 NOTE — PROGRESS NOTE ADULT - ASSESSMENT
78 yo M with chronic low back pain sp multiple laminectomy and fusion p/w 1 week of worsening back pain along with weakness in bilateral lower extremities and inability to walk since last night.  Leukocytosis, fever  Low back pain, increasing LE weakness  MR L spine and T spine prelim but no emergent findings  Prior history of MSSA bacteremia (2016)  History oxacillin allergy (? fever), Zyvox ([facility]); tolerated vanco on this visit  BCX now with MSSA, high grade, rising WBC, patient ill appearing, guarded  Needs workup to determine sources  Still leukocytosis, fevers, ongoing MSSA bacteremia  Overall, fever, leukocytosis, LBP  - Cefazolin 2g q 8  - F/U neurosurgery/neuro  - Repeat BCXs q 48 hours until clearance  - Check CT A/P/C  - Check TTE, likely would need JAHAIRA (if feasible)  - Check Tagged WBC scan--evaluating spinal hardware    Fredy Huynh MD  Pager [phone]  After 5pm and on weekends call [phone]

## 2019-12-10 NOTE — PROGRESS NOTE ADULT - SUBJECTIVE AND OBJECTIVE BOX
Patient is a 79y old  Male who presents with a chief complaint of Back pain (10 Dec 2019 12:23)      INTERVAL HPI/OVERNIGHT EVENTS: noted, feels well improved back pain after morphine iv today      Vital Signs Last 24 Hrs  T(C): 36.4 (10 Dec 2019 17:22), Max: 38.4 (09 Dec 2019 21:50)  T(F): 97.5 (10 Dec 2019 17:22), Max: 101.2 (09 Dec 2019 21:50)  HR: 79 (10 Dec 2019 17:22) (75 - 86)  BP: 160/74 (10 Dec 2019 17:22) (137/79 - 168/82)  BP(mean): --  RR: 20 (10 Dec 2019 17:22) (20 - 20)  SpO2: 94% (10 Dec 2019 17:22) (93% - 98%)    acetaminophen   Tablet .. 650 milliGRAM(s) Oral every 6 hours PRN  allopurinol 300 milliGRAM(s) Oral daily  aspirin enteric coated 81 milliGRAM(s) Oral daily  atorvastatin 10 milliGRAM(s) Oral at bedtime  ceFAZolin   IVPB 2000 milliGRAM(s) IV Intermittent every 8 hours  heparin  Injectable 5000 Unit(s) SubCutaneous every 8 hours  morphine  - Injectable 2 milliGRAM(s) IV Push every 4 hours PRN  senna 2 Tablet(s) Oral at bedtime  sodium chloride 0.9%. 1000 milliLiter(s) IV Continuous <Continuous>      PHYSICAL EXAM:  GENERAL: NAD,   EYES: conjunctiva and sclera clear  ENMT: Moist mucous membranes  NECK: Supple, No JVD, Normal thyroid  NERVOUS SYSTEM:  Alert & Oriented X,   CHEST/LUNG: Clear to auscultation bilaterally; No rales, rhonchi, wheezing, or rubs  HEART: Regular rate and rhythm; No murmurs, rubs, or gallops  ABDOMEN: Soft, Nontender, Nondistended; Bowel sounds present  EXTREMITIES:  2+ Peripheral Pulses, No clubbing, cyanosis, or edema  LYMPH: No lymphadenopathy noted  SKIN: No rashes or lesions    Consultant(s) Notes Reviewed:  [x ] YES  [ ] NO  Care Discussed with Consultants/Other Providers [ x] YES  [ ] NO    LABS:                        12.4   29.51 )-----------( 242      ( 10 Dec 2019 09:11 )             38.2     12-10    137  |  99  |  33<H>  ----------------------------<  204<H>  4.6   |  22  |  0.96    Ca    9.6      10 Dec 2019 07:44  Mg     2.1     12-09    TPro  6.6  /  Alb  2.5<L>  /  TBili  0.4  /  DBili  x   /  AST  24  /  ALT  25  /  AlkPhos  157<H>  12-10        CAPILLARY BLOOD GLUCOSE          ABG - ( 09 Dec 2019 18:34 )  pH, Arterial: 7.50  pH, Blood: x     /  pCO2: 29    /  pO2: 77    / HCO3: 23    / Base Excess: .9    /  SaO2: 96                    Culture - Blood (collected 09 Dec 2019 22:05)  Source: .Blood Blood  Gram Stain (10 Dec 2019 18:22):    Growth in anaerobic bottle: Gram Positive Cocci in Clusters  Preliminary Report (10 Dec 2019 18:23):    Growth in anaerobic bottle: Gram Positive Cocci in Clusters    Culture - Blood (collected 08 Dec 2019 04:15)  Source: .Blood Blood  Gram Stain (08 Dec 2019 22:02):    Growth in aerobic bottle: Gram Positive Cocci in Clusters    Growth in anaerobic bottle: Gram Positive Cocci in Clusters  Final Report (10 Dec 2019 16:15):    Growth in aerobic and anaerobic bottles: Staphylococcus aureus    See previous culture [id number]    Culture - Blood (collected 08 Dec 2019 04:15)  Source: .Blood Blood  Gram Stain (08 Dec 2019 17:00):    Growth in aerobic bottle: Gram Positive Cocci in Clusters    Growth in anaerobic bottle: Gram Positive Cocci in Clusters  Final Report (10 Dec 2019 08:44):    Growth in aerobic and anaerobic bottles: Staphylococcus aureus    "Due to technical problems, Proteus sp. will Not be reported as part of    the BCID panel until further notice"    ***Blood Panel PCR results on this specimen are available    approximately 3 hours after the Gram stain result.***    Gram stain, PCR, and/or culture results may not always    correspond due to difference in methodologies.    ************************************************************    This PCR assay was performed using [employer].    The following targets are tested for: Enterococcus,    vancomycin resistant enterococci, Listeria monocytogenes,    coagulase negative staphylococci, S. aureus,    methicillin resistant S. aureus, Streptococcus agalactiae    (Group B), S. pneumoniae, S. pyogenes (Group A),    Acinetobacter baumannii, Enterobacter cloacae, E. coli,    Klebsiella oxytoca, K. pneumoniae, Proteus sp.,    Serratia marcescens, Haemophilus influenzae,    Neisseria meningitidis, Pseudomonas aeruginosa, Candida    albicans, C. glabrata, C krusei, C parapsilosis,    C. tropicalis and the KPC resistance gene.  Organism: Blood Culture PCR  Staphylococcus aureus (10 Dec 2019 08:44)  Organism: Staphylococcus aureus (10 Dec 2019 08:44)  Organism: Blood Culture PCR (10 Dec 2019 08:44)    Culture - Urine (collected 08 Dec 2019 04:06)  Source: .Urine Clean Catch (Midstream)  Final Report (08 Dec 2019 23:25):    No growth        RADIOLOGY & ADDITIONAL TESTS:    Imaging Personally Reviewed:  [x ] YES  [ ] NO

## 2019-12-10 NOTE — PROGRESS NOTE ADULT - ASSESSMENT
79 yr old male with HTN, DCHF, CAD, S/P PCI to OM1, Mid LAD (9/2014, [facility], [facility], [facility]) hx of ischemic CMP, AS, DM HLD, GERD, GOUT, s/p multiple spinal surgery admitted with back pain and fever    1. Back Pain  MRI noted, neuro/neuro sx followup  ID eval  pain contol     2. CAD s/p PCI (OM1/LAD)   stable  cont  ASA , statin  remains off BB in light of history of bb induced fatigue     3. Diastolic CHF, hx Ischemic CMP   no evidence of decomp CHF on exam   cv stable, resume arb    4. Aortic Stenosis  stable, Moderate on echo 2/6/2018   repeat echo to eval progression of AS    5. HTN   BP stable .,continue with current antihtn meds     6. Fever/ Sepsis   + blood cultures with gram positive Cocci in clusters   micu consult noted   iv abx, ID f/u   check echo   CT chest/ abd   pending tagged WBC scan to eval hardware    dvt ppx

## 2019-12-10 NOTE — PROGRESS NOTE ADULT - SUBJECTIVE AND OBJECTIVE BOX
CARDIOLOGY FOLLOW UP - Dr. Lee    CC lethargic, family at bedside        PHYSICAL EXAM:  T(C): 37 (12-10-19 @ 09:52), Max: 38.4 (12-09-19 @ 21:50)  HR: 78 (12-10-19 @ 09:52) (68 - 85)  BP: 168/82 (12-10-19 @ 09:52) (137/79 - 168/93)  RR: 20 (12-10-19 @ 09:52) (20 - 20)  SpO2: 95% (12-10-19 @ 09:52) (93% - 98%)  Wt(kg): --  I&O's Summary    09 Dec 2019 07:01  -  10 Dec 2019 07:00  --------------------------------------------------------  IN: 1340 mL / OUT: 700 mL / NET: 640 mL        Appearance: NAD 	  Cardiovascular: Normal S1 S2,RRR,+  murmurs  Respiratory: Lungs clear to auscultation	  Gastrointestinal:  Soft, Non-tender, + BS	  Extremities: Normal range of motion, No clubbing, cyanosis or edema        MEDICATIONS  (STANDING):  allopurinol 300 milliGRAM(s) Oral daily  aspirin enteric coated 81 milliGRAM(s) Oral daily  atorvastatin 10 milliGRAM(s) Oral at bedtime  ceFAZolin   IVPB 2000 milliGRAM(s) IV Intermittent every 8 hours  heparin  Injectable 5000 Unit(s) SubCutaneous every 8 hours  senna 2 Tablet(s) Oral at bedtime  sodium chloride 0.9%. 1000 milliLiter(s) (100 mL/Hr) IV Continuous <Continuous>      TELEMETRY: 	    ECG:  	  RADIOLOGY:   DIAGNOSTIC TESTING:  [ ] Echocardiogram:  [ ]  Catheterization:  [ ] Stress Test:    OTHER: 	    LABS:	 	    Creatine Kinase, Serum: 71 U/L [30 - 200] (12-07 @ 22:22)                          12.4   29.51 )-----------( 242      ( 10 Dec 2019 09:11 )             38.2     12-10    137  |  99  |  33<H>  ----------------------------<  204<H>  4.6   |  22  |  0.96    Ca    9.6      10 Dec 2019 07:44  Mg     2.1     12-09    TPro  6.6  /  Alb  2.5<L>  /  TBili  0.4  /  DBili  x   /  AST  24  /  ALT  25  /  AlkPhos  157<H>  12-10

## 2019-12-10 NOTE — CHART NOTE - NSCHARTNOTEFT_GEN_A_CORE
Patient is a 79y old  Male who presents with a chief complaint of Back pain (09 Dec 2019 15:44)  Informed by RN of pt having had a fever last night of 101.2, for which he was given 1 Gram of IV Tylenol  Pt afebrile this AM; he continues to have lower back pain  Pt declined have back examined  Pt seen & examined, he reported no cp, sob, abdominal pain, or n/v  Pt was scheduled for CT head last night, but did not tolerate it due to lower back pain      Vital Signs Last 24 Hrs  T(C): 37.1 (10 Dec 2019 05:43), Max: 38.4 (09 Dec 2019 21:50)  T(F): 98.8 (10 Dec 2019 05:43), Max: 101.2 (09 Dec 2019 21:50)  HR: 82 (10 Dec 2019 05:43) (67 - 85)  BP: 137/79 (10 Dec 2019 05:43) (137/79 - 168/93)  BP(mean): --  RR: 20 (10 Dec 2019 05:43) (20 - 20)  SpO2: 93% (10 Dec 2019 05:43) (92% - 98%)      Labs:                          13.1   34.05 )-----------( 247      ( 09 Dec 2019 18:10 )             39.8     12-09    139  |  100  |  33<H>  ----------------------------<  227<H>  4.3   |  22  |  1.19    Ca    10.4      09 Dec 2019 18:10  Mg     2.1     12-09    TPro  7.4  /  Alb  2.9<L>  /  TBili  0.4  /  DBili  x   /  AST  20  /  ALT  29  /  AlkPhos  154<H>  12-09        ABG - ( 09 Dec 2019 18:34 )  pH, Arterial: 7.50  pH, Blood: x     /  pCO2: 29    /  pO2: 77    / HCO3: 23    / Base Excess: .9    /  SaO2: 96            Radiology:    Physical Exam:  General: WN/WD NAD  Neurology: A&Ox3, nonfocal, MORALES x 4  Head:  Normocephalic, atraumatic  Respiratory: CTA B/L (anteriorly)  CV: RRR, S1S2, no murmur  Abdominal: Soft, Non tender, non distended, + BS  MSK: + deidre. LE edema, + peripheral pulses, FROM all 4 extremity    Assessment & Plan:  HPI:  This is a 80 y/o male with h/o of CAD s/p PCI 1/2017 with 10% LAD stenosis OM1, HTN, HLD, GERD, Gout, Osteoarthritis and chronic back pain, s/p multiple laminectomies and spinal fusion, left TKR who presents with worsening back pain for approx a week. Pt reports that for the past week he has had progressive worsening of his lower back pain and weakness in his bilateral lower extremities that progressed to inability to ambulate last night. Pain radiates down his left leg. This was associated with fevers at home. No loss of bowel or bladder contents. Of note, pt was treated for cellulitis a month ago with Augmentin. (08 Dec 2019 09:30)    Pt seen for fever resolved post IV Tylenol  Fever likely in setting of MSSA bacteremia  Pt remains on IV Keflex    Pt does not appear toxic; he is alert and conversive    PLAN:  >Continue to monitor  >Continue present medication regimen  >IV Tylenol PRN pain/fever  >ID to follow  >Endorsed to day team; follow up per Attending

## 2019-12-11 LAB
ALBUMIN SERPL ELPH-MCNC: 2.4 G/DL — LOW (ref 3.3–5)
ALP SERPL-CCNC: 120 U/L — SIGNIFICANT CHANGE UP (ref 40–120)
ALT FLD-CCNC: 16 U/L — SIGNIFICANT CHANGE UP (ref 10–45)
ANION GAP SERPL CALC-SCNC: 13 MMOL/L — SIGNIFICANT CHANGE UP (ref 5–17)
AST SERPL-CCNC: 13 U/L — SIGNIFICANT CHANGE UP (ref 10–40)
BASOPHILS # BLD AUTO: 0.09 K/UL — SIGNIFICANT CHANGE UP (ref 0–0.2)
BASOPHILS NFR BLD AUTO: 0.3 % — SIGNIFICANT CHANGE UP (ref 0–2)
BILIRUB SERPL-MCNC: 0.6 MG/DL — SIGNIFICANT CHANGE UP (ref 0.2–1.2)
BUN SERPL-MCNC: 32 MG/DL — HIGH (ref 7–23)
CALCIUM SERPL-MCNC: 9.5 MG/DL — SIGNIFICANT CHANGE UP (ref 8.4–10.5)
CHLORIDE SERPL-SCNC: 97 MMOL/L — SIGNIFICANT CHANGE UP (ref 96–108)
CO2 SERPL-SCNC: 22 MMOL/L — SIGNIFICANT CHANGE UP (ref 22–31)
CREAT SERPL-MCNC: 0.96 MG/DL — SIGNIFICANT CHANGE UP (ref 0.5–1.3)
CULTURE RESULTS: SIGNIFICANT CHANGE UP
EOSINOPHIL # BLD AUTO: 0.01 K/UL — SIGNIFICANT CHANGE UP (ref 0–0.5)
EOSINOPHIL NFR BLD AUTO: 0 % — SIGNIFICANT CHANGE UP (ref 0–6)
GLUCOSE SERPL-MCNC: 205 MG/DL — HIGH (ref 70–99)
HCT VFR BLD CALC: 36.8 % — LOW (ref 39–50)
HGB BLD-MCNC: 12.1 G/DL — LOW (ref 13–17)
IMM GRANULOCYTES NFR BLD AUTO: 2 % — HIGH (ref 0–1.5)
LYMPHOCYTES # BLD AUTO: 1.04 K/UL — SIGNIFICANT CHANGE UP (ref 1–3.3)
LYMPHOCYTES # BLD AUTO: 4 % — LOW (ref 13–44)
MANUAL SMEAR VERIFICATION: SIGNIFICANT CHANGE UP
MCHC RBC-ENTMCNC: 29.2 PG — SIGNIFICANT CHANGE UP (ref 27–34)
MCHC RBC-ENTMCNC: 32.9 GM/DL — SIGNIFICANT CHANGE UP (ref 32–36)
MCV RBC AUTO: 88.9 FL — SIGNIFICANT CHANGE UP (ref 80–100)
MONOCYTES # BLD AUTO: 1.62 K/UL — HIGH (ref 0–0.9)
MONOCYTES NFR BLD AUTO: 6.3 % — SIGNIFICANT CHANGE UP (ref 2–14)
NEUTROPHILS # BLD AUTO: 22.59 K/UL — HIGH (ref 1.8–7.4)
NEUTROPHILS NFR BLD AUTO: 87.4 % — HIGH (ref 43–77)
PLAT MORPH BLD: NORMAL — SIGNIFICANT CHANGE UP
PLATELET # BLD AUTO: 257 K/UL — SIGNIFICANT CHANGE UP (ref 150–400)
POTASSIUM SERPL-MCNC: 4.3 MMOL/L — SIGNIFICANT CHANGE UP (ref 3.5–5.3)
POTASSIUM SERPL-SCNC: 4.3 MMOL/L — SIGNIFICANT CHANGE UP (ref 3.5–5.3)
PROT SERPL-MCNC: 6.3 G/DL — SIGNIFICANT CHANGE UP (ref 6–8.3)
RBC # BLD: 4.14 M/UL — LOW (ref 4.2–5.8)
RBC # FLD: 14.5 % — SIGNIFICANT CHANGE UP (ref 10.3–14.5)
RBC BLD AUTO: NORMAL — SIGNIFICANT CHANGE UP
SODIUM SERPL-SCNC: 132 MMOL/L — LOW (ref 135–145)
SPECIMEN SOURCE: SIGNIFICANT CHANGE UP
WBC # BLD: 25.86 K/UL — HIGH (ref 3.8–10.5)
WBC # FLD AUTO: 25.86 K/UL — HIGH (ref 3.8–10.5)

## 2019-12-11 PROCEDURE — 93306 TTE W/DOPPLER COMPLETE: CPT | Mod: 26

## 2019-12-11 PROCEDURE — 78807: CPT | Mod: 26

## 2019-12-11 PROCEDURE — 78806: CPT | Mod: 26

## 2019-12-11 PROCEDURE — 78999A: CUSTOM | Mod: 26

## 2019-12-11 PROCEDURE — 99232 SBSQ HOSP IP/OBS MODERATE 35: CPT

## 2019-12-11 RX ORDER — ACETAMINOPHEN 500 MG
1000 TABLET ORAL ONCE
Refills: 0 | Status: COMPLETED | OUTPATIENT
Start: 2019-12-11 | End: 2019-12-14

## 2019-12-11 RX ORDER — SODIUM CHLORIDE 9 MG/ML
1000 INJECTION INTRAMUSCULAR; INTRAVENOUS; SUBCUTANEOUS
Refills: 0 | Status: DISCONTINUED | OUTPATIENT
Start: 2019-12-11 | End: 2019-12-18

## 2019-12-11 RX ADMIN — MORPHINE SULFATE 2 MILLIGRAM(S): 50 CAPSULE, EXTENDED RELEASE ORAL at 07:41

## 2019-12-11 RX ADMIN — HEPARIN SODIUM 5000 UNIT(S): 5000 INJECTION INTRAVENOUS; SUBCUTANEOUS at 13:14

## 2019-12-11 RX ADMIN — Medication 100 MILLIGRAM(S): at 04:12

## 2019-12-11 RX ADMIN — Medication 100 MILLIGRAM(S): at 21:00

## 2019-12-11 RX ADMIN — Medication 81 MILLIGRAM(S): at 12:37

## 2019-12-11 RX ADMIN — HEPARIN SODIUM 5000 UNIT(S): 5000 INJECTION INTRAVENOUS; SUBCUTANEOUS at 21:00

## 2019-12-11 RX ADMIN — HEPARIN SODIUM 5000 UNIT(S): 5000 INJECTION INTRAVENOUS; SUBCUTANEOUS at 06:12

## 2019-12-11 RX ADMIN — SODIUM CHLORIDE 60 MILLILITER(S): 9 INJECTION INTRAMUSCULAR; INTRAVENOUS; SUBCUTANEOUS at 14:29

## 2019-12-11 RX ADMIN — MORPHINE SULFATE 2 MILLIGRAM(S): 50 CAPSULE, EXTENDED RELEASE ORAL at 21:53

## 2019-12-11 RX ADMIN — AMLODIPINE BESYLATE 10 MILLIGRAM(S): 2.5 TABLET ORAL at 06:12

## 2019-12-11 RX ADMIN — MORPHINE SULFATE 2 MILLIGRAM(S): 50 CAPSULE, EXTENDED RELEASE ORAL at 08:00

## 2019-12-11 RX ADMIN — ATORVASTATIN CALCIUM 10 MILLIGRAM(S): 80 TABLET, FILM COATED ORAL at 21:00

## 2019-12-11 RX ADMIN — Medication 300 MILLIGRAM(S): at 12:37

## 2019-12-11 RX ADMIN — Medication 100 MILLIGRAM(S): at 12:37

## 2019-12-11 RX ADMIN — MORPHINE SULFATE 2 MILLIGRAM(S): 50 CAPSULE, EXTENDED RELEASE ORAL at 21:12

## 2019-12-11 NOTE — PROGRESS NOTE ADULT - PROBLEM SELECTOR PLAN 3
Met sepsis criteria on admission with elevation in WBC with fever and tachycardia.   mssa bacteremia persistant  abx changed to ancef  - dw ID consult appreciated  - f/u blood cx till neg  - Trend CBC  - Tylenol PRN fever  ct c/a/p w contrast pending, TTE Met sepsis criteria on admission with elevation in WBC with fever and tachycardia.   mssa bacteremia persistent  cont ancef per ID  f/u blood cx till neg  Trend CBC  Tylenol PRN fever  nuclear scan nonspecific heterogeneous uptake in the thoracolumbar spine which may be secondary to post surgical change and/or degenerative change. Mild diffuse activity in bilateral lungs. This may reflect inflammation.  CT chest also concern for atelectasis vs pneumonia  will consult pulmonary  TTE moderate aortic stenosis, Severe concentric left ventricular hypertrophy, Severe left ventricular systolic dysfunction. Unable to rule out endocarditis.   will need JAHAIRA, dw family at bedside

## 2019-12-11 NOTE — PROGRESS NOTE ADULT - ASSESSMENT
79 yr old male with HTN, DCHF, CAD, S/P PCI to OM1, Mid LAD (9/2014, [facility], [facility], [facility]) hx of ischemic CMP, AS, DM HLD, GERD, GOUT, s/p multiple spinal surgery admitted with back pain and fever    1. Back Pain  MRI noted, neuro/neuro sx followup  ID eval  pain contol     2. CAD s/p PCI (OM1/LAD)   stable  cont  ASA , statin  remains off BB in light of history of bb induced fatigue     3. Diastolic CHF, hx Ischemic CMP   no evidence of decomp CHF on exam   cv stable, resume arb    4. Aortic Stenosis  stable, Moderate on echo 2/6/2018   repeat echo to eval progression of AS    5. HTN   BP stable .,continue with current antihtn meds     6. Fever/ Sepsis   + blood cultures with gram positive Cocci in clusters   micu consult noted   iv abx, ID f/u   check echo   CT chest w possible PNA  pending tagged WBC scan to eval hardware    dvt ppx

## 2019-12-11 NOTE — PROGRESS NOTE ADULT - ASSESSMENT
78 y/o male with h/o of CAD s/p PCI 1/2017 with 10% LAD stenosis OM1, HTN, HLD, GERD, Gout, Osteoarthritis and chronic back pain, s/p multiple laminectomies and spinal fusion, left TKR who presents with worsening back pain and fevers for approx a week  bl;d cx MSSA bacteremia  < from: MR Thoracic Spine w/wo IV Cont (12.08.19 @ 00:06) >  No acute findings.    2. Redemonstration of T9-T12 posterior fusion and laminectomy.    3. 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 No abnormal intrathecal   enhancement.    4. Similar-appearing multilevel thoracic and lumbar spondylosi    < end of copied text > 78 y/o male with h/o of CAD s/p PCI 1/2017 with 10% LAD stenosis OM1, HTN, HLD, GERD, Gout, Osteoarthritis and chronic back pain, s/p multiple laminectomies and spinal fusion, left TKR who presents with worsening back pain and fevers for approx a week. bld cx MSSA bacteremia

## 2019-12-11 NOTE — PROGRESS NOTE ADULT - PROBLEM SELECTOR PLAN 4
likeky sepsis related encephalopathy  monitor  ct head pending likely sepsis related encephalopathy  monitor  ct head no acute changes likely sepsis related encephalopathy  monitor  ct head no acute changes  IVF while decreased PO intake  speech and swallow eval

## 2019-12-11 NOTE — PROGRESS NOTE ADULT - SUBJECTIVE AND OBJECTIVE BOX
Patient is a 79y old  Male who presents with a chief complaint of Back pain (11 Dec 2019 13:24)      SUBJECTIVE / OVERNIGHT EVENTS:    Patient seen and examined. lying in bed. dtr and wife at bedside state that lying flat prevents pain in his back.      Vital Signs Last 24 Hrs  T(C): 36.9 (11 Dec 2019 12:52), Max: 37.1 (11 Dec 2019 01:43)  T(F): 98.5 (11 Dec 2019 12:52), Max: 98.7 (11 Dec 2019 01:43)  HR: 78 (11 Dec 2019 12:52) (73 - 85)  BP: 137/79 (11 Dec 2019 12:52) (131/71 - 160/74)  BP(mean): --  RR: 18 (11 Dec 2019 12:52) (18 - 20)  SpO2: 92% (11 Dec 2019 12:52) (92% - 95%)  I&O's Summary    10 Dec 2019 07:01  -  11 Dec 2019 07:00  --------------------------------------------------------  IN: 530 mL / OUT: 1350 mL / NET: -820 mL    11 Dec 2019 07:01  -  11 Dec 2019 14:59  --------------------------------------------------------  IN: 65 mL / OUT: 125 mL / NET: -60 mL        PE:  GENERAL: NAD, AAOx3  HEAD:  Atraumatic, Normocephalic  EYES: EOMI, PERRLA, conjunctiva and sclera clear  NECK: Supple, No JVD  CHEST/LUNG: CTABL, No wheeze  HEART: Regular rate and rhythm; + murmur  ABDOMEN: Soft, Nontender, Nondistended; Bowel sounds present  EXTREMITIES:  2+ Peripheral Pulses, No clubbing, cyanosis, or edema  SKIN: No rashes or lesions  NEURO: No focal deficits    LABS:                        12.1   25.86 )-----------( 257      ( 11 Dec 2019 08:18 )             36.8     12-11    132<L>  |  97  |  32<H>  ----------------------------<  205<H>  4.3   |  22  |  0.96    Ca    9.5      11 Dec 2019 06:50    TPro  6.3  /  Alb  2.4<L>  /  TBili  0.6  /  DBili  x   /  AST  13  /  ALT  16  /  AlkPhos  120  12-11      CAPILLARY BLOOD GLUCOSE                RADIOLOGY & ADDITIONAL TESTS:    Imaging Personally Reviewed:  [x] YES  [ ] NO    Consultant(s) Notes Reviewed:  [x] YES  [ ] NO    MEDICATIONS  (STANDING):  allopurinol 300 milliGRAM(s) Oral daily  amLODIPine   Tablet 10 milliGRAM(s) Oral daily  aspirin enteric coated 81 milliGRAM(s) Oral daily  atorvastatin 10 milliGRAM(s) Oral at bedtime  ceFAZolin   IVPB 2000 milliGRAM(s) IV Intermittent every 8 hours  heparin  Injectable 5000 Unit(s) SubCutaneous every 8 hours  senna 2 Tablet(s) Oral at bedtime  sodium chloride 0.9%. 1000 milliLiter(s) (60 mL/Hr) IV Continuous <Continuous>    MEDICATIONS  (PRN):  acetaminophen   Tablet .. 650 milliGRAM(s) Oral every 6 hours PRN Temp greater or equal to 38C (100.4F)  morphine  - Injectable 2 milliGRAM(s) IV Push every 4 hours PRN Moderate Pain (4 - 6)      Care Discussed with Consultants/Other Providers [x] YES  [ ] NO    HEALTH ISSUES - PROBLEM Dx:  Need for prophylactic measure: Need for prophylactic measure  Gout: Gout  Essential hypertension: Essential hypertension  CAD (coronary artery disease): CAD (coronary artery disease)  KRIS (acute kidney injury): KRIS (acute kidney injury)  Sepsis: Sepsis  Weakness of both lower extremities: Weakness of both lower extremities  Acute left-sided low back pain without sciatica: Acute left-sided low back pain without sciatica Patient is a 79y old  Male who presents with a chief complaint of Back pain (11 Dec 2019 13:24)      SUBJECTIVE / OVERNIGHT EVENTS:    Patient seen and examined. lying in bed. dtr and wife at bedside state that lying flat prevents pain in his back. no other complaints.      Vital Signs Last 24 Hrs  T(C): 36.9 (11 Dec 2019 12:52), Max: 37.1 (11 Dec 2019 01:43)  T(F): 98.5 (11 Dec 2019 12:52), Max: 98.7 (11 Dec 2019 01:43)  HR: 78 (11 Dec 2019 12:52) (73 - 85)  BP: 137/79 (11 Dec 2019 12:52) (131/71 - 160/74)  BP(mean): --  RR: 18 (11 Dec 2019 12:52) (18 - 20)  SpO2: 92% (11 Dec 2019 12:52) (92% - 95%)  I&O's Summary    10 Dec 2019 07:01  -  11 Dec 2019 07:00  --------------------------------------------------------  IN: 530 mL / OUT: 1350 mL / NET: -820 mL    11 Dec 2019 07:01  -  11 Dec 2019 14:59  --------------------------------------------------------  IN: 65 mL / OUT: 125 mL / NET: -60 mL        PE:  GENERAL: NAD, AAOx3  HEAD:  Atraumatic, Normocephalic  EYES: EOMI, PERRLA, conjunctiva and sclera clear  NECK: Supple, No JVD  CHEST/LUNG: CTABL, No wheeze  HEART: Regular rate and rhythm; + murmur  ABDOMEN: Soft, Nontender, Nondistended; Bowel sounds present  EXTREMITIES:  2+ Peripheral Pulses, No clubbing, cyanosis, or edema  SKIN: No rashes or lesions  NEURO: No focal deficits    LABS:                        12.1   25.86 )-----------( 257      ( 11 Dec 2019 08:18 )             36.8     12-11    132<L>  |  97  |  32<H>  ----------------------------<  205<H>  4.3   |  22  |  0.96    Ca    9.5      11 Dec 2019 06:50    TPro  6.3  /  Alb  2.4<L>  /  TBili  0.6  /  DBili  x   /  AST  13  /  ALT  16  /  AlkPhos  120  12-11      CAPILLARY BLOOD GLUCOSE                RADIOLOGY & ADDITIONAL TESTS:    Imaging Personally Reviewed:  [x] YES  [ ] NO    Consultant(s) Notes Reviewed:  [x] YES  [ ] NO    MEDICATIONS  (STANDING):  allopurinol 300 milliGRAM(s) Oral daily  amLODIPine   Tablet 10 milliGRAM(s) Oral daily  aspirin enteric coated 81 milliGRAM(s) Oral daily  atorvastatin 10 milliGRAM(s) Oral at bedtime  ceFAZolin   IVPB 2000 milliGRAM(s) IV Intermittent every 8 hours  heparin  Injectable 5000 Unit(s) SubCutaneous every 8 hours  senna 2 Tablet(s) Oral at bedtime  sodium chloride 0.9%. 1000 milliLiter(s) (60 mL/Hr) IV Continuous <Continuous>    MEDICATIONS  (PRN):  acetaminophen   Tablet .. 650 milliGRAM(s) Oral every 6 hours PRN Temp greater or equal to 38C (100.4F)  morphine  - Injectable 2 milliGRAM(s) IV Push every 4 hours PRN Moderate Pain (4 - 6)      Care Discussed with Consultants/Other Providers [x] YES  [ ] NO    HEALTH ISSUES - PROBLEM Dx:  Need for prophylactic measure: Need for prophylactic measure  Gout: Gout  Essential hypertension: Essential hypertension  CAD (coronary artery disease): CAD (coronary artery disease)  KRIS (acute kidney injury): KRIS (acute kidney injury)  Sepsis: Sepsis  Weakness of both lower extremities: Weakness of both lower extremities  Acute left-sided low back pain without sciatica: Acute left-sided low back pain without sciatica Patient is a 79y old  Male who presents with a chief complaint of Back pain (11 Dec 2019 13:24)      SUBJECTIVE / OVERNIGHT EVENTS:    Patient seen and examined. lying in bed. dtr and wife at bedside state that lying flat prevents pain in his back. no other complaints.  family co decreased PO intake      Vital Signs Last 24 Hrs  T(C): 36.9 (11 Dec 2019 12:52), Max: 37.1 (11 Dec 2019 01:43)  T(F): 98.5 (11 Dec 2019 12:52), Max: 98.7 (11 Dec 2019 01:43)  HR: 78 (11 Dec 2019 12:52) (73 - 85)  BP: 137/79 (11 Dec 2019 12:52) (131/71 - 160/74)  BP(mean): --  RR: 18 (11 Dec 2019 12:52) (18 - 20)  SpO2: 92% (11 Dec 2019 12:52) (92% - 95%)  I&O's Summary    10 Dec 2019 07:01  -  11 Dec 2019 07:00  --------------------------------------------------------  IN: 530 mL / OUT: 1350 mL / NET: -820 mL    11 Dec 2019 07:01  -  11 Dec 2019 14:59  --------------------------------------------------------  IN: 65 mL / OUT: 125 mL / NET: -60 mL        PE:  GENERAL: NAD, AAOx3  HEAD:  Atraumatic, Normocephalic  EYES: EOMI, PERRLA, conjunctiva and sclera clear  NECK: Supple, No JVD  CHEST/LUNG: CTABL, No wheeze  HEART: Regular rate and rhythm; + murmur  ABDOMEN: Soft, Nontender, Nondistended; Bowel sounds present  EXTREMITIES:  2+ Peripheral Pulses, No clubbing, cyanosis, or edema  SKIN: No rashes or lesions  NEURO: No focal deficits    LABS:                        12.1   25.86 )-----------( 257      ( 11 Dec 2019 08:18 )             36.8     12-11    132<L>  |  97  |  32<H>  ----------------------------<  205<H>  4.3   |  22  |  0.96    Ca    9.5      11 Dec 2019 06:50    TPro  6.3  /  Alb  2.4<L>  /  TBili  0.6  /  DBili  x   /  AST  13  /  ALT  16  /  AlkPhos  120  12-11      CAPILLARY BLOOD GLUCOSE                RADIOLOGY & ADDITIONAL TESTS:    Imaging Personally Reviewed:  [x] YES  [ ] NO    Consultant(s) Notes Reviewed:  [x] YES  [ ] NO    MEDICATIONS  (STANDING):  allopurinol 300 milliGRAM(s) Oral daily  amLODIPine   Tablet 10 milliGRAM(s) Oral daily  aspirin enteric coated 81 milliGRAM(s) Oral daily  atorvastatin 10 milliGRAM(s) Oral at bedtime  ceFAZolin   IVPB 2000 milliGRAM(s) IV Intermittent every 8 hours  heparin  Injectable 5000 Unit(s) SubCutaneous every 8 hours  senna 2 Tablet(s) Oral at bedtime  sodium chloride 0.9%. 1000 milliLiter(s) (60 mL/Hr) IV Continuous <Continuous>    MEDICATIONS  (PRN):  acetaminophen   Tablet .. 650 milliGRAM(s) Oral every 6 hours PRN Temp greater or equal to 38C (100.4F)  morphine  - Injectable 2 milliGRAM(s) IV Push every 4 hours PRN Moderate Pain (4 - 6)      Care Discussed with Consultants/Other Providers [x] YES  [ ] NO    HEALTH ISSUES - PROBLEM Dx:  Need for prophylactic measure: Need for prophylactic measure  Gout: Gout  Essential hypertension: Essential hypertension  CAD (coronary artery disease): CAD (coronary artery disease)  KRIS (acute kidney injury): KRIS (acute kidney injury)  Sepsis: Sepsis  Weakness of both lower extremities: Weakness of both lower extremities  Acute left-sided low back pain without sciatica: Acute left-sided low back pain without sciatica

## 2019-12-11 NOTE — PROGRESS NOTE ADULT - PROBLEM SELECTOR PLAN 5
Possibly prerenal- Improving slowly  - Renally dose medications  - Hold Losartan Possibly prerenal, improved  Hold Losartan

## 2019-12-11 NOTE — PROGRESS NOTE ADULT - PROBLEM SELECTOR PLAN 1
Pt presented with progressive worsening lower back pain associated with fevers. Seen by Neurology and Neurosurgery on the ED. MRI of thoracic and Lumbar spine -Concern for potential infectious etiology given elevation in WBC and inflammatory markers  - Await final MRI read no acute findings  - ID consulted for Abx management as multiple allergies listed Pt presented with progressive worsening lower back pain associated with fevers.   Concern for potential infectious etiology given elevation in WBC and inflammatory markers  MR Thoracic Spine no acute findings  nuclear scan nonspecific heterogeneous uptake in the thoracolumbar spine which may be secondary to post surgical change and/or degenerative change.     Mild diffuse activity in bilateral lungs. This may reflect inflammation. Pt presented with progressive worsening lower back pain associated with fevers.   Concern for potential infectious etiology given elevation in WBC and inflammatory markers  MR Thoracic Spine no acute findings  nuclear scan nonspecific heterogeneous uptake in the thoracolumbar spine which may be secondary to post surgical change and/or degenerative change.

## 2019-12-11 NOTE — PROGRESS NOTE ADULT - ASSESSMENT
79M with chronic low back pain s/p multiple laminectomy and fusion (last surgery was 13 years ago per wife) and L TKR (2013) admitted with 1 week of worsening back pain along with weakness in bilateral lower extremities and inability to walk since night PTA.  Marked leukocytosis and fever.  Multiple blood cultures with MSSA.  Reports oxacillin allergy but tolerating ancef.  Nuclear imaging suspicious for infection of the back/spine.      Suggest:  - repeat BC  - continue ancef  - will need to pursue JAHAIRA  - please have neurosurgery evaluate  - trend wbc, fever and ESR/CRP

## 2019-12-11 NOTE — PROGRESS NOTE ADULT - SUBJECTIVE AND OBJECTIVE BOX
CC: lethargic, arousable    TELEMETRY:     PHYSICAL EXAM:    T(C): 36.9 (12-11-19 @ 12:52), Max: 37.1 (12-11-19 @ 01:43)  HR: 78 (12-11-19 @ 12:52) (73 - 86)  BP: 137/79 (12-11-19 @ 12:52) (131/71 - 160/74)  RR: 18 (12-11-19 @ 12:52) (18 - 20)  SpO2: 92% (12-11-19 @ 12:52) (92% - 95%)  Wt(kg): --  I&O's Summary    10 Dec 2019 07:01  -  11 Dec 2019 07:00  --------------------------------------------------------  IN: 530 mL / OUT: 1350 mL / NET: -820 mL    11 Dec 2019 07:01  -  11 Dec 2019 13:24  --------------------------------------------------------  IN: 65 mL / OUT: 125 mL / NET: -60 mL        Appearance: Normal	  Cardiovascular: Normal S1 S2,RRR, No JVD, No murmurs  Respiratory: Lungs clear to auscultation	  Gastrointestinal:  Soft, Non-tender, + BS	  Extremities: Normal range of motion, No clubbing, cyanosis or edema  Vascular: Peripheral pulses palpable 2+ bilaterally     LABS:	 	                          12.1   25.86 )-----------( 257      ( 11 Dec 2019 08:18 )             36.8     12-11    132<L>  |  97  |  32<H>  ----------------------------<  205<H>  4.3   |  22  |  0.96    Ca    9.5      11 Dec 2019 06:50    TPro  6.3  /  Alb  2.4<L>  /  TBili  0.6  /  DBili  x   /  AST  13  /  ALT  16  /  AlkPhos  120  12-11          CARDIAC MARKERS:

## 2019-12-11 NOTE — PROGRESS NOTE ADULT - PROBLEM SELECTOR PLAN 7
Holding Losartan given KRIS. Will hold Norvasc 10mg for now given sepsis  - Monitor BP Holding Losartan given KRIS.   can resume Norvasc 10mg if BP remains elevated  Monitor BP

## 2019-12-11 NOTE — PROGRESS NOTE ADULT - SUBJECTIVE AND OBJECTIVE BOX
CC: F/U mssa bacteremia    Interval History/ROS:  remains lethargic, indium abnl.  patient unable to offer complaints today as he is lethargic.  daughter and wife are at the bedside.      PAST MEDICAL & SURGICAL HISTORY:  CAD S/P percutaneous coronary angioplasty  HLD (hyperlipidemia)  Back pain  OA (osteoarthritis)  Gout  HTN (hypertension)  GERD (gastroesophageal reflux disease)  S/P lumbar spine operation: 6 thoracic to lumbar surgeries over 6 months period starting 11/2006  S/P knee replacement: 11/2013    Allergies  Indocin (Unknown)  oxacillin (Unknown) - tolerating ancef  vancomycin (Rash)--Family denies vanco allergy  Zyvox (Unknown)    ANTIMICROBIALS:  cefepime   IVPB (12/8-12/9)  cefTRIAXone   IVPB (12/8 x1)  vancomycin  IVPB (12/8 x1)  ceFAZolin   IVPB 2000 every 8 hours (12/9-)    MEDICATIONS  (STANDING):  allopurinol 300 daily  amLODIPine   Tablet 10 daily  aspirin enteric coated 81 daily  atorvastatin 10 at bedtime  heparin  Injectable 5000 every 8 hours  senna 2 at bedtime    Vital Signs Last 24 Hrs  T(F): 98.5 (12-11-19 @ 12:52), Max: 98.7 (12-11-19 @ 01:43)  HR: 78 (12-11-19 @ 12:52)  BP: 137/79 (12-11-19 @ 12:52)  RR: 18 (12-11-19 @ 12:52)  SpO2: 92% (12-11-19 @ 12:52) (92% - 95%)    PHYSICAL EXAM:  General: lethargic  HEAD/EYES: eyes closed  ENT:  no masses  Cardiovascular:   S1, S2  Respiratory:  clear without wheezing or rales  GI:  soft, non-tender, normal bowel sounds  :  no morley  Musculoskeletal:  L TKA without swelling or erythema  Neurologic:  lethargic and difficult to assess  Skin:  no rash, no erythema  Psychiatric:  lethargic  Vascular:  no phlebitis, PIV okay, no central lines    WBC Count: 25.86 (12-11-19 @ 08:18)  WBC Count: 29.51 (12-10-19 @ 09:11)  WBC Count: 34.05 (12-09-19 @ 18:10)  WBC Count: 33.98 (12-09-19 @ 08:33)  WBC Count: 25.69 (12-07-19 @ 22:22)    C-Reactive Protein, Serum: 43.44 (12-08-19)  Sedimentation Rate, Erythrocyte: 92 (12-07-19)    MICROBIOLOGY:  Culture - Blood (12.09.19 @ 22:05)    Specimen Source: .Blood Blood-Venous    Culture Results:   No growth to date.    Culture - Blood (12.09.19 @ 22:05)    Gram Stain:   Growth in anaerobic bottle: Gram Positive Cocci in Clusters    Specimen Source: .Blood Blood    Culture Results:   Growth in anaerobic bottle: Staphylococcus aureus  See previous culture [id number]    Culture - Blood (12.08.19 @ 04:15)    Gram Stain:   Growth in aerobic bottle: Gram Positive Cocci in Clusters  Growth in anaerobic bottle: Gram Positive Cocci in Clusters    Specimen Source: .Blood Blood    Culture Results:   Growth in aerobic and anaerobic bottles: Staphylococcus aureus  See previous culture [id number]    Culture - Blood (12.08.19 @ 04:15)    Gram Stain:   Growth in aerobic bottle: Gram Positive Cocci in Clusters  Growth in anaerobic bottle: Gram Positive Cocci in Clusters    -  Cefazolin: S <=4    -  Gentamicin: S <=1 Should not be used as monotherapy    -  Clindamycin: S 0.5    -  Erythromycin: S <=0.25    -  Tetra/Doxy: S <=1    -  Trimethoprim/Sulfamethoxazole: S <=0.5/9.5    -  Vancomycin: S 1    -  RIF- Rifampin: S <=1 Should not be used as monotherapy    -  Oxacillin: S <=0.25    -  Penicillin: R 4    -  Staphylococcus aureus: Detec Any isolate of Staphylococcus aureus from a blood culture is NOT considered a contaminant.    -  Ampicillin/Sulbactam: S <=8/4    Specimen Source: .Blood Blood    Organism: Blood Culture PCR  Staphylococcus aureus    RADIOLOGY:  TTE with Doppler (w/Cont) (12.11.19 @ 10:29) >  Conclusions:  1. Calcified trileaflet aortic valve with decreased opening. Peak transaortic valve gradient equals 36 mm Hg, mean transaortic valve gradient equals 25 mm Hg, estimated  aortic valve area equals 1.2 sqcm (by continuity equation), aortic valve velocity time integral equals 69 cm, consistent with moderate aortic stenosis.  2. Severe concentric left ventricular hypertrophy.  3. Severe  left ventricular systolic dysfunction.  Endocardial visualization enhanced with intravenous injection of Ultrasonic Enhancing Agent (Definity). Unable to rule out endocarditis. Consider JAHAIRA if clinically indicated.    NM Multiple Day Procedure (12.10.19 @ 00:11) >  IMPRESSION: Gallium scan demonstrates:  Nonspecific heterogeneous uptake in the thoracolumbar spine which may be secondary to post surgical mark and/or degenerative change.    Mild diffuse activity in bilateral lungs. This may reflect inflammation. Please correlate clinically.    CT Chest w/ IV Cont (12.10.19 @ 21:13) >  IMPRESSION:  Parenchymal opacities in the lower lobes bilaterally, which may be secondary to atelectasis versus pneumonia.  Cardiomegaly.  Degenerative changes and findings in the spine which were more fully evaluated on MRI dated 12/7/2019.    MR Thoracic Spine w/wo IV Cont (12.08.19 @ 00:06) >  IMPRESSION:  1. No acute findings.  2. Redemonstration of T9-T12 posterior fusion and laminectomy.  3. 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 No abnormal intrathecal enhancement.  4. Similar-appearing multilevel thoracic and lumbar spondylosis.

## 2019-12-11 NOTE — PROGRESS NOTE ADULT - PROBLEM SELECTOR PLAN 2
No compressive lesions seen on initial prelim MRI read. Neuro and Neurosurg following  - f/u official MRI read reviewed as above No compressive lesions seen on initial prelim MRI read. Neuro and Neurosurg evaluated  PT

## 2019-12-12 LAB
ANION GAP SERPL CALC-SCNC: 14 MMOL/L — SIGNIFICANT CHANGE UP (ref 5–17)
BUN SERPL-MCNC: 34 MG/DL — HIGH (ref 7–23)
CALCIUM SERPL-MCNC: 9.5 MG/DL — SIGNIFICANT CHANGE UP (ref 8.4–10.5)
CHLORIDE SERPL-SCNC: 96 MMOL/L — SIGNIFICANT CHANGE UP (ref 96–108)
CO2 SERPL-SCNC: 24 MMOL/L — SIGNIFICANT CHANGE UP (ref 22–31)
CREAT SERPL-MCNC: 1.2 MG/DL — SIGNIFICANT CHANGE UP (ref 0.5–1.3)
CULTURE RESULTS: SIGNIFICANT CHANGE UP
GLUCOSE SERPL-MCNC: 215 MG/DL — HIGH (ref 70–99)
GRAM STN FLD: SIGNIFICANT CHANGE UP
HCT VFR BLD CALC: 39 % — SIGNIFICANT CHANGE UP (ref 39–50)
HGB BLD-MCNC: 12.3 G/DL — LOW (ref 13–17)
MCHC RBC-ENTMCNC: 29.1 PG — SIGNIFICANT CHANGE UP (ref 27–34)
MCHC RBC-ENTMCNC: 31.5 GM/DL — LOW (ref 32–36)
MCV RBC AUTO: 92.4 FL — SIGNIFICANT CHANGE UP (ref 80–100)
PLATELET # BLD AUTO: 327 K/UL — SIGNIFICANT CHANGE UP (ref 150–400)
POTASSIUM SERPL-MCNC: 4.3 MMOL/L — SIGNIFICANT CHANGE UP (ref 3.5–5.3)
POTASSIUM SERPL-SCNC: 4.3 MMOL/L — SIGNIFICANT CHANGE UP (ref 3.5–5.3)
RAPID RVP RESULT: SIGNIFICANT CHANGE UP
RBC # BLD: 4.22 M/UL — SIGNIFICANT CHANGE UP (ref 4.2–5.8)
RBC # FLD: 14.6 % — HIGH (ref 10.3–14.5)
SODIUM SERPL-SCNC: 134 MMOL/L — LOW (ref 135–145)
SPECIMEN SOURCE: SIGNIFICANT CHANGE UP
WBC # BLD: 28.41 K/UL — HIGH (ref 3.8–10.5)
WBC # FLD AUTO: 28.41 K/UL — HIGH (ref 3.8–10.5)

## 2019-12-12 PROCEDURE — 99233 SBSQ HOSP IP/OBS HIGH 50: CPT

## 2019-12-12 RX ADMIN — Medication 300 MILLIGRAM(S): at 11:35

## 2019-12-12 RX ADMIN — Medication 100 MILLIGRAM(S): at 22:27

## 2019-12-12 RX ADMIN — SODIUM CHLORIDE 60 MILLILITER(S): 9 INJECTION INTRAMUSCULAR; INTRAVENOUS; SUBCUTANEOUS at 22:30

## 2019-12-12 RX ADMIN — HEPARIN SODIUM 5000 UNIT(S): 5000 INJECTION INTRAVENOUS; SUBCUTANEOUS at 13:15

## 2019-12-12 RX ADMIN — Medication 100 MILLIGRAM(S): at 06:07

## 2019-12-12 RX ADMIN — Medication 650 MILLIGRAM(S): at 13:51

## 2019-12-12 RX ADMIN — Medication 100 MILLIGRAM(S): at 11:35

## 2019-12-12 RX ADMIN — AMLODIPINE BESYLATE 10 MILLIGRAM(S): 2.5 TABLET ORAL at 06:07

## 2019-12-12 RX ADMIN — HEPARIN SODIUM 5000 UNIT(S): 5000 INJECTION INTRAVENOUS; SUBCUTANEOUS at 06:08

## 2019-12-12 RX ADMIN — Medication 81 MILLIGRAM(S): at 11:35

## 2019-12-12 RX ADMIN — ATORVASTATIN CALCIUM 10 MILLIGRAM(S): 80 TABLET, FILM COATED ORAL at 22:28

## 2019-12-12 RX ADMIN — Medication 650 MILLIGRAM(S): at 13:14

## 2019-12-12 RX ADMIN — Medication 650 MILLIGRAM(S): at 06:08

## 2019-12-12 RX ADMIN — Medication 650 MILLIGRAM(S): at 07:05

## 2019-12-12 RX ADMIN — SENNA PLUS 2 TABLET(S): 8.6 TABLET ORAL at 22:28

## 2019-12-12 RX ADMIN — HEPARIN SODIUM 5000 UNIT(S): 5000 INJECTION INTRAVENOUS; SUBCUTANEOUS at 22:28

## 2019-12-12 NOTE — CHART NOTE - NSCHARTNOTEFT_GEN_A_CORE
Contacted regarding equivocal finding on gallium scan, t-spine post-op changes vs infection.    No neurosurgical intervention, rec continued medical management.

## 2019-12-12 NOTE — PROGRESS NOTE ADULT - PROBLEM SELECTOR PLAN 1
MR Thoracic Spine no acute findings  nuclear scan nonspecific heterogeneous uptake in the thoracolumbar spine which may be secondary to post surgical change and/or degenerative change.  no acute intervention per neurosurgery  cont pain control

## 2019-12-12 NOTE — PROGRESS NOTE ADULT - SUBJECTIVE AND OBJECTIVE BOX
CARDIOLOGY FOLLOW UP - Dr. Jesus WILKERSON lethargic but arousable/ following commands   son at bedside       PHYSICAL EXAM:  T(C): 37.2 (12-12-19 @ 10:26), Max: 38 (12-12-19 @ 06:15)  HR: 70 (12-12-19 @ 10:26) (70 - 82)  BP: 126/67 (12-12-19 @ 10:26) (126/67 - 155/80)  RR: 18 (12-12-19 @ 10:26) (18 - 18)  SpO2: 99% (12-12-19 @ 10:26) (94% - 99%)  Wt(kg): --  I&O's Summary    11 Dec 2019 07:01  -  12 Dec 2019 07:00  --------------------------------------------------------  IN: 1125 mL / OUT: 1125 mL / NET: 0 mL    12 Dec 2019 07:01  -  12 Dec 2019 13:06  --------------------------------------------------------  IN: 50 mL / OUT: 0 mL / NET: 50 mL        Appearance: NAD 	  Cardiovascular: Normal S1 S2,RRR, +sys murmur  Respiratory: diminished   Gastrointestinal:  Soft, Non-tender, + BS	  Extremities: Normal range of motion, trace bl LE edema        MEDICATIONS  (STANDING):  acetaminophen  IVPB .. 1000 milliGRAM(s) IV Intermittent once  allopurinol 300 milliGRAM(s) Oral daily  amLODIPine   Tablet 10 milliGRAM(s) Oral daily  aspirin enteric coated 81 milliGRAM(s) Oral daily  atorvastatin 10 milliGRAM(s) Oral at bedtime  ceFAZolin   IVPB 2000 milliGRAM(s) IV Intermittent every 8 hours  heparin  Injectable 5000 Unit(s) SubCutaneous every 8 hours  senna 2 Tablet(s) Oral at bedtime  sodium chloride 0.9%. 1000 milliLiter(s) (60 mL/Hr) IV Continuous <Continuous>      TELEMETRY: 	    ECG:  	  RADIOLOGY:   DIAGNOSTIC TESTING:  [ ] Echocardiogram:  < from: TTE with Doppler (w/Cont) (12.11.19 @ 10:29) >  EF (Visual Estimate): 20-25 %  Doppler Peak Velocity (m/sec): AoV=3.0  ------------------------------------------------------------------------  Observations:  Mitral Valve: Mitral annular calcification, otherwise  normal mitral valve. Minimal mitral regurgitation.  Aortic Valve/Aorta: Calcified trileaflet aortic valve with  decreased opening. Peak transaortic valve gradient equals  36 mm Hg, mean transaortic valve gradient equals 25 mm Hg,  estimated aortic valve area equals 1.2 sqcm (by continuity  equation), aortic valve velocity time integral equals 69  cm, consistent with moderate aortic stenosis. Minimal  aortic regurgitation.  Peak left ventricular outflow tract  gradient equals 3 mm Hg, mean gradient is equal to 2 mm Hg,  LVOT velocity time integral equals 19 cm.  Aortic Root: 3.7 cm.  LVOT diameter: 2.3 cm.  Left Atrium: Mildly dilated left atrium.  LA volume index=  39 cc/m2.  Left Ventricle: Severe  left ventricular systolic  dysfunction.   Endocardial visualization enhanced with  intravenous injection of Ultrasonic Enhancing Agent  (Definity). Severe concentric left ventricular hypertrophy.  Mild diastolic dysfunction (Stage I).  Right Heart: Severe right atrial enlargement. Normal right  ventricular size and function. Normal tricuspid valve.  Minimal tricuspid regurgitation. Normal pulmonic valve.  Pericardium/Pleura: Normal pericardium with trace  pericardial effusion.  Hemodynamic: Estimated right atrial pressure is 8 mm Hg.  Estimated right ventricular systolic pressure equals 27 mm  Hg, assuming right atrial pressure equals 8 mm Hg,  consistent with normal pulmonary pressures.  ------------------------------------------------------------------------  Conclusions:  1. Calcified trileaflet aortic valve with decreased  opening. Peak transaortic valve gradient equals 36 mm Hg,  mean transaortic valve gradient equals 25 mm Hg, estimated  aortic valve area equals 1.2 sqcm (by continuity equation),  aortic valve velocity time integral equals 69 cm,  consistent with moderate aortic stenosis.  2. Severe concentric left ventricular hypertrophy.  3. Severe  left ventricular systolic dysfunction.  Endocardial visualization enhanced with intravenous  injection of Ultrasonic Enhancing Agent (Definity).  Unable to rule out endocarditis. Consider JAHAIRA if clinically  indicated.  ------------------------------------------------------------------------  Confirmed on  12/11/2019 - 12:32:15 by Stewart Esquivel M.D.  ------------------------------------------------------------------------    < end of copied text >      [ ]  Catheterization:  [ ] Stress Test:    OTHER: 	  < from: NM Inflammatory Loc Wholebody, Gallium (12.11.19 @ 09:42) >    EXAM:  NM INFLAMMATORY LOC GALLIUM WB                          EXAM:  NM MULTI DAY PROCEDURE                          EXAM:  NM INFLAMMATORY LOC SPECT CT#                            IMPRESSION: Gallium scan demonstrates:    Nonspecific heterogeneous uptake in the thoracolumbar spine which may be   secondary to post surgical change and/or degenerative change.     Mild diffuse activity in bilateral lungs. This may reflect inflammation.   Please correlate clinically.          LABS:	 	    Creatine Kinase, Serum: 71 U/L [30 - 200] (12-07 @ 22:22)                          12.1   25.86 )-----------( 257      ( 11 Dec 2019 08:18 )             36.8     12-12    134<L>  |  96  |  34<H>  ----------------------------<  215<H>  4.3   |  24  |  1.20    Ca    9.5      12 Dec 2019 09:51    TPro  6.3  /  Alb  2.4<L>  /  TBili  0.6  /  DBili  x   /  AST  13  /  ALT  16  /  AlkPhos  120  12-11

## 2019-12-12 NOTE — CONSULT NOTE ADULT - ASSESSMENT
Ct Chest reviewed: Bibasilar opcities: partially atelecatasis since contrast endhanced, yet still worrsiome for posible pneumonia, and lights up with gallium scan.  Rapid flu negative, but no full panel done  Wife and son deny obious dysphagia to liquids/solids  Patient normally ambulates with cane  Sytolic and Diastolic CHF: appear compensated at this time  MSSA bacteremia - possible pulonary source    REC    Rapid PCR ordered]  Continue cefazol for MSSA bacteremia/possible  pneumonia  No history of dysphagia, but consider swallow eval when more cooperative

## 2019-12-12 NOTE — CONSULT NOTE ADULT - SUBJECTIVE AND OBJECTIVE BOX
Subjective:  Patient is well known to me, most recently, approx 2 months ago, developed and infected ulceration with cellulitis over the 2nd PIPJ Right Foot.  Local treatment was rendered including Debridement and PO Augmentin 875 mg BID    Objective findings:  2nd toe right foot, rigid contracted hammer toe, mild erythema over the PIPJ, There is no break in the skin, no evidence of cellulitis.  There is + 2 pitting edema in the LE.    IMP: Resolved  ulceration and cellulitis 2nd toe right foot, rigid contracted hammer toe    Plan: Monitor for breakdown

## 2019-12-12 NOTE — PROGRESS NOTE ADULT - SUBJECTIVE AND OBJECTIVE BOX
CC: F/U for Bacteremia    Saw/spoke to patient. Has fevers. No new complaints. Unchanged. Appears fatigued.    Allergies  Indocin (Unknown)  oxacillin (Unknown)  Zyvox (Unknown)    ANTIMICROBIALS:  ceFAZolin   IVPB 2000 every 8 hours    PE:    Vital Signs Last 24 Hrs  T(C): 37.9 (12 Dec 2019 13:52), Max: 38.9 (12 Dec 2019 13:18)  T(F): 100.2 (12 Dec 2019 13:52), Max: 102 (12 Dec 2019 13:18)  HR: 76 (12 Dec 2019 13:52) (70 - 82)  BP: 132/73 (12 Dec 2019 13:52) (126/67 - 155/80)  RR: 18 (12 Dec 2019 13:52) (18 - 18)  SpO2: 98% (12 Dec 2019 13:52) (94% - 99%)    Gen: AOx1-2, fatigued, arousable  CV: S1+S2 normal, nontachycardic  Resp: Clear bilat, no resp distress, no crackles/wheezes  Abd: Soft, nontender, +BS  Ext: No LE edema, no wounds    LABS:                        12.3   28.41 )-----------( 327      ( 12 Dec 2019 14:22 )             39.0     12-12    134<L>  |  96  |  34<H>  ----------------------------<  215<H>  4.3   |  24  |  1.20    Ca    9.5      12 Dec 2019 09:51    TPro  6.3  /  Alb  2.4<L>  /  TBili  0.6  /  DBili  x   /  AST  13  /  ALT  16  /  AlkPhos  120  12-11    MICROBIOLOGY:    .Blood Blood  12-09-19   Growth in anaerobic bottle: Staphylococcus aureus  See previous culture [id number]  --    Growth in anaerobic bottle: Gram Positive Cocci in Clusters    .Blood Blood  12-08-19   Growth in aerobic and anaerobic bottles: Staphylococcus aureus    .Urine Clean Catch (Midstream)  12-08-19   No growth    (otherwise reviewed)    RADIOLOGY:    12/11 NM:    IMPRESSION: Gallium scan demonstrates:    Nonspecific heterogeneous uptake in the thoracolumbar spine which may be   secondary to post surgical change and/or degenerative change.     Mild diffuse activity in bilateral lungs. This may reflect inflammation.   Please correlate clinically.

## 2019-12-12 NOTE — CHART NOTE - NSCHARTNOTEFT_GEN_A_CORE
Medicine PA Episodic Note    Called by RN to evaluate pt. w/ fever of 38 C. Pt. seen and examined at bedside. Pt. does not appear to be in acute distress. Pt. is lethargic but easily arousable to verbal and tactile stimulus. ROS limited due to impaired cognition.       Vital Signs Last 24 Hrs  T(C): 38 (12 Dec 2019 06:15), Max: 38 (12 Dec 2019 06:15)  T(F): 100.4 (12 Dec 2019 06:15), Max: 100.4 (12 Dec 2019 06:15)  HR: 82 (12 Dec 2019 06:09) (70 - 82)  BP: 151/79 (12 Dec 2019 06:09) (131/71 - 155/80)  BP(mean): --  RR: 18 (12 Dec 2019 06:09) (18 - 18)  SpO2: 94% (12 Dec 2019 06:09) (92% - 94%)    12-11    132<L>  |  97  |  32<H>  ----------------------------<  205<H>  4.3   |  22  |  0.96    Ca    9.5      11 Dec 2019 06:50    TPro  6.3  /  Alb  2.4<L>  /  TBili  0.6  /  DBili  x   /  AST  13  /  ALT  16  /  AlkPhos  120  12-11                          12.1   25.86 )-----------( 257      ( 11 Dec 2019 08:18 )             36.8         PHYSICAL EXAM:  Gen/Neuro: NAD, lethargic  Respiratory: CTA b/l  Cardiovascular:  S1 S2 RR  Gastrointestinal:  normactive BS  Extremities:  + dorsalis pedis pulses, b/l LE edema    A/P  HPI:  79M w/ h/o of CAD s/p PCI 1/2017 with 10% LAD stenosis OM1, HTN, HLD, GERD, Gout, Osteoarthritis and chronic back pain, s/p multiple laminectomies and spinal fusion, left TKR who p/w worsening back pain and fevers for approx a week. bld cx MSSA bacteremia. Now presenting w/ fever.    Plan:  #Fever likely 2/2 MSSA bacteremia  - Continue IV ABx  - Continue tylenol and cooling measures PRN  - Will repeat BC  - Monitor CBC  - Monitor VS  - ID on board  - Will endorse above to primary team in AM  - Attending to follow        Jimmy Schofield PA-C (Medicine PA)  [facility] [id number]                Jimmy Schofield PA-C (Medicine PA)

## 2019-12-12 NOTE — PROGRESS NOTE ADULT - ASSESSMENT
79 yr old male with HTN, DCHF, CAD, S/P PCI to OM1, Mid LAD (9/2014, [facility], [facility], [facility]) hx of ischemic CMP, AS, DM HLD, GERD, GOUT, s/p multiple spinal surgery admitted with back pain and fever    1. Back Pain  MRI noted, neuro/neuro sx followup  ID eval  pain contol     2. CAD s/p PCI (OM1/LAD)   stable  cont  ASA , statin  remains off BB in light of history of bb induced fatigue     3. Diastolic CHF, hx Ischemic CMP   no evidence of decomp CHF on exam   echo now revealed severe LV sys fx EF 20-25% severe LVH , mild diastolic dysfx (stage 1)  cv stable, resume outpt dose Cozaar at 50 mg daily   remains off BB in light of history of bb induced fatigue     4. Aortic Stenosis  stable, Moderate on repeat echo    5. HTN   BP stable .,continue with current antihtn meds     6. Fever/ Sepsis   + blood cultures with gram positive Cocci in clusters   micu consult noted   iv abx, ID f/u   echo unable to r/o endocarditis   CT chest w possible PNA  tagged WBC scan noted   plan for JAHAIRA    dvt ppx 79 yr old male with HTN, DCHF, CAD, S/P PCI to OM1, Mid LAD (9/2014, [facility], [facility], [facility]) hx of ischemic CMP, AS, DM HLD, GERD, GOUT, s/p multiple spinal surgery admitted with back pain and fever    1. Back Pain  MRI noted, neuro/neuro sx followup  ID eval  pain contol     2. CAD s/p PCI (OM1/LAD)   stable  cont  ASA , statin  remains off BB in light of history of bb induced fatigue     3. Diastolic CHF, hx Ischemic CMP   no evidence of decomp CHF on exam   echo now revealed severe LV sys fx EF 20-25% severe LVH , mild diastolic dysfx (stage 1)  cv stable, will resume outpt dose Cozaar at 25 mg daily if sbp stable   remains off BB in light of history of bb induced fatigue     4. Aortic Stenosis  stable, Moderate on repeat echo    5. HTN   BP stable .,continue with current antihtn meds     6. Fever/ Sepsis   + blood cultures with gram positive Cocci in clusters   micu consult noted   iv abx, ID f/u   echo unable to r/o endocarditis   CT chest w possible PNA  tagged WBC scan noted   plan for JAHAIRA    dvt ppx

## 2019-12-12 NOTE — PROGRESS NOTE ADULT - ATTENDING COMMENTS
Patient seen and examined.  Agree with above NP note.  remains lethargic with elevated leukocytosis, bacteremia   echo with severe lv dysfxn which represents clinical decline from recent echo in past few months   not a candidate for any ischemic eval at present in light of infection, sepsis, encephalopathy  cont med tx  was off bb as outpatient due to bb induced fatigue   can resume low dose losartan @ 25mg daily for cmp if sbp stable  iv abx per id   tte no obviou evidence of endocarditis   id concerned for spine as source of infection  will need LAZARO  keep npo for poss lazaro bhavna pending overall clinical condition

## 2019-12-12 NOTE — PROGRESS NOTE ADULT - ASSESSMENT
78 y/o male with h/o of CAD s/p PCI 1/2017 with 10% LAD stenosis OM1, HTN, HLD, GERD, Gout, Osteoarthritis and chronic back pain, s/p multiple laminectomies and spinal fusion, left TKR who presents with worsening back pain and fevers for approx a week. bld cx MSSA bacteremia

## 2019-12-12 NOTE — PROGRESS NOTE ADULT - PROBLEM SELECTOR PLAN 4
likely sepsis related encephalopathy  monitor  ct head no acute changes  IVF while decreased PO intake  speech and swallow eval once more alert

## 2019-12-12 NOTE — PROGRESS NOTE ADULT - PROBLEM SELECTOR PLAN 3
Met sepsis criteria on admission with elevation in WBC with fever and tachycardia.   MSSA bacteremia persistent, likely 2/2 pneumonia  cont ancef per ID, leukocytosis still elevated  f/u blood cx till neg  Tylenol PRN fever  CT chest also concern for atelectasis vs pneumonia, lungs light up in nuclear scan  appreciate pulm recs  TTE moderate aortic stenosis, Severe concentric left ventricular hypertrophy, Severe left ventricular systolic dysfunction. Unable to rule out endocarditis.   will need JAHAIRA, family agreeable

## 2019-12-12 NOTE — PROGRESS NOTE ADULT - SUBJECTIVE AND OBJECTIVE BOX
Patient is a 79y old  Male who presents with a chief complaint of Back pain (12 Dec 2019 13:06)      SUBJECTIVE / OVERNIGHT EVENTS:    Patient seen and examined. poor historian, confused lying in bed. dtr and wife at bedside. pt co back pain. tmax 102.      Vital Signs Last 24 Hrs  T(C): 37.9 (12 Dec 2019 13:52), Max: 38.9 (12 Dec 2019 13:18)  T(F): 100.2 (12 Dec 2019 13:52), Max: 102 (12 Dec 2019 13:18)  HR: 76 (12 Dec 2019 13:52) (70 - 82)  BP: 132/73 (12 Dec 2019 13:52) (126/67 - 155/80)  BP(mean): --  RR: 18 (12 Dec 2019 13:52) (18 - 18)  SpO2: 98% (12 Dec 2019 13:52) (94% - 99%)  I&O's Summary    11 Dec 2019 07:01  -  12 Dec 2019 07:00  --------------------------------------------------------  IN: 1125 mL / OUT: 1125 mL / NET: 0 mL    12 Dec 2019 07:01  -  12 Dec 2019 15:59  --------------------------------------------------------  IN: 165 mL / OUT: 600 mL / NET: -435 mL        PE:  GENERAL: NAD, AAOx1  HEAD:  Atraumatic, Normocephalic  CHEST/LUNG: CTABL, No wheeze  HEART: Regular rate and rhythm; no murmur  ABDOMEN: Soft, Nontender, Nondistended; Bowel sounds present  EXTREMITIES:  2+ Peripheral Pulses, trace edema  SKIN: No rashes or lesions  NEURO: No focal deficits    LABS:                        12.3   28.41 )-----------( 327      ( 12 Dec 2019 14:22 )             39.0     12-12    134<L>  |  96  |  34<H>  ----------------------------<  215<H>  4.3   |  24  |  1.20    Ca    9.5      12 Dec 2019 09:51    TPro  6.3  /  Alb  2.4<L>  /  TBili  0.6  /  DBili  x   /  AST  13  /  ALT  16  /  AlkPhos  120  12-11      CAPILLARY BLOOD GLUCOSE                RADIOLOGY & ADDITIONAL TESTS:    Imaging Personally Reviewed:  [x] YES  [ ] NO    Consultant(s) Notes Reviewed:  [x] YES  [ ] NO    MEDICATIONS  (STANDING):  acetaminophen  IVPB .. 1000 milliGRAM(s) IV Intermittent once  allopurinol 300 milliGRAM(s) Oral daily  amLODIPine   Tablet 10 milliGRAM(s) Oral daily  aspirin enteric coated 81 milliGRAM(s) Oral daily  atorvastatin 10 milliGRAM(s) Oral at bedtime  ceFAZolin   IVPB 2000 milliGRAM(s) IV Intermittent every 8 hours  heparin  Injectable 5000 Unit(s) SubCutaneous every 8 hours  senna 2 Tablet(s) Oral at bedtime  sodium chloride 0.9%. 1000 milliLiter(s) (60 mL/Hr) IV Continuous <Continuous>    MEDICATIONS  (PRN):  acetaminophen   Tablet .. 650 milliGRAM(s) Oral every 6 hours PRN Temp greater or equal to 38C (100.4F)  morphine  - Injectable 2 milliGRAM(s) IV Push every 4 hours PRN Moderate Pain (4 - 6)      Care Discussed with Consultants/Other Providers [x] YES  [ ] NO    HEALTH ISSUES - PROBLEM Dx:  Need for prophylactic measure: Need for prophylactic measure  Gout: Gout  Essential hypertension: Essential hypertension  CAD (coronary artery disease): CAD (coronary artery disease)  KRIS (acute kidney injury): KRIS (acute kidney injury)  Sepsis: Sepsis  Weakness of both lower extremities: Weakness of both lower extremities  Acute left-sided low back pain without sciatica: Acute left-sided low back pain without sciatica

## 2019-12-12 NOTE — CONSULT NOTE ADULT - SUBJECTIVE AND OBJECTIVE BOX
PULMONARY CONSULT  Aaron Dowd MD  [phone]    Initial HPI on admission:  HPI:  This is a 78 y/o male with h/o of CAD s/p PCI 1/2017 with 10% LAD stenosis OM1, HTN, HLD, GERD, Gout, Osteoarthritis and chronic back pain, s/p multiple laminectomies and spinal fusion, left TKR who presents with worsening back pain for approx a week. Pt reports that for the past week he has had progressive worsening of his lower back pain and weakness in his bilateral lower extremities that progressed to inability to ambulate last night. Pain radiates down his left leg. This was associated with fevers at home. No loss of bowel or bladder contents. Of note, pt was treated for cellulitis a month ago with Augmentin.       PAST MEDICAL & SURGICAL HISTORY:  CAD S/P percutaneous coronary angioplasty  HLD (hyperlipidemia)  Back pain  OA (osteoarthritis)  Gout  HTN (hypertension)  GERD (gastroesophageal reflux disease)  S/P lumbar spine operation: 6 thoracic to lumbar surgeries over 6 months period starting 11/2006  S/P knee replacement: 11/2013    Allergies    Indocin (Unknown)  oxacillin (Unknown)  Zyvox (Unknown)    Intolerances      FAMILY HISTORY:  No pertinent family history in first degree relatives    Social History (marital status, living situation, occupation, tobacco use, alcohol and drug use, and sexual history): Nonsmoker, no ETOH usage	     Tobacco Screening:  · Core Measure Site	No	        Review of Systems: CONSTITUTIONAL: + weakness, fevers  EYES/ENT: No visual changes;  No vertigo or throat pain   NECK: No pain or stiffness  RESPIRATORY: No cough, wheezing, hemoptysis; No shortness of breath  CARDIOVASCULAR: No chest pain or palpitations  GASTROINTESTINAL: No abdominal or epigastric pain. No nausea, vomiting, or hematemesis; No diarrhea or constipation. No melena or hematochezia.  GENITOURINARY: No dysuria, frequency or hematuria  NEUROLOGICAL: + weakness of lower extremities  SKIN: No itching, burning, rashes, or lesions  All other review of systems is negative unless indicated above.	      Allergies and Intolerances:        Allergies:  	vancomycin: Drug, Rash  	Zyvox: Drug, Unknown  	oxacillin: Drug, Unknown  	Indocin: Drug, Unknown        Medications:  MEDICATIONS  (STANDING):  acetaminophen  IVPB .. 1000 milliGRAM(s) IV Intermittent once  allopurinol 300 milliGRAM(s) Oral daily  amLODIPine   Tablet 10 milliGRAM(s) Oral daily  aspirin enteric coated 81 milliGRAM(s) Oral daily  atorvastatin 10 milliGRAM(s) Oral at bedtime  ceFAZolin   IVPB 2000 milliGRAM(s) IV Intermittent every 8 hours  heparin  Injectable 5000 Unit(s) SubCutaneous every 8 hours  senna 2 Tablet(s) Oral at bedtime  sodium chloride 0.9%. 1000 milliLiter(s) (60 mL/Hr) IV Continuous <Continuous>    MEDICATIONS  (PRN):  acetaminophen   Tablet .. 650 milliGRAM(s) Oral every 6 hours PRN Temp greater or equal to 38C (100.4F)  morphine  - Injectable 2 milliGRAM(s) IV Push every 4 hours PRN Moderate Pain (4 - 6)    Vital Signs Last 24 Hrs  T(C): 37.2 (12 Dec 2019 10:26), Max: 38 (12 Dec 2019 06:15)  T(F): 99 (12 Dec 2019 10:26), Max: 100.4 (12 Dec 2019 06:15)  HR: 70 (12 Dec 2019 10:26) (70 - 82)  BP: 126/67 (12 Dec 2019 10:26) (126/67 - 155/80)  BP(mean): --  RR: 18 (12 Dec 2019 10:26) (18 - 18)  SpO2: 99% (12 Dec 2019 10:26) (92% - 99%)    12-11 @ 07:01  -  12-12 @ 07:00  --------------------------------------------------------  IN: 1125 mL / OUT: 1125 mL / NET: 0 mL      LABS:                        12.1   25.86 )-----------( 257      ( 11 Dec 2019 08:18 )             36.8     12-12    134<L>  |  96  |  34<H>  ----------------------------<  215<H>  4.3   |  24  |  1.20    Ca    9.5      12 Dec 2019 09:51    TPro  6.3  /  Alb  2.4<L>  /  TBili  0.6  /  DBili  x   /  AST  13  /  ALT  16  /  AlkPhos  120  12-11    CULTURES:  Culture Results:   Growth in anaerobic bottle: Staphylococcus aureus  See previous culture [id number] (12-09 @ 22:05)  Culture Results:   Growth in anaerobic bottle: Gram Positive Cocci in Clusters (12-09 @ 22:05)  Culture Results:   Growth in aerobic and anaerobic bottles: Staphylococcus aureus  "Due to technical problems, Proteus sp. will Not be reported as part of  the BCID panel until further notice"  ***Blood Panel PCR results on this specimen are available  approximately 3 hours after the Gram stain result.***  Gram stain, PCR, and/or culture results may not always  correspond due to difference in methodologies.  ************************************************************  This PCR assay was performed using [employer].  The following targets are tested for: Enterococcus,  vancomycin resistant enterococci, Listeria monocytogenes,  coagulase negative staphylococci, S. aureus,  methicillin resistant S. aureus, Streptococcus agalactiae  (Group B), S. pneumoniae, S. pyogenes (Group A),  Acinetobacter baumannii, Enterobacter cloacae, E. coli,  Klebsiella oxytoca, K. pneumoniae, Proteus sp.,  Serratia marcescens, Haemophilus influenzae,  Neisseria meningitidis, Pseudomonas aeruginosa, Candida  albicans, C. glabrata, C krusei, C parapsilosis,  C. tropicalis and the KPC resistance gene. (12-08 @ 04:15)  Culture Results:   Growth in aerobic and anaerobic bottles: Staphylococcus aureus  See previous culture [id number] (12-08 @ 04:15)  Culture Results:   No growth (12-08 @ 04:06)    Physical Examination:    General: No acute distress.      HEENT: Pupils equal, reactive to light.  Symmetric.    PULM: Clear to auscultation bilaterally, no significant sputum production    CVS: Regular rate and rhythm, no murmurs, rubs, or gallops    ABD: Soft, nondistended, nontender, normoactive bowel sounds, no masses    EXT: No edema, nontender    SKIN: Warm and well perfused, no rashes noted.    NEURO: Alert, oriented, interactive, nonfocal    RADIOLOGY REVIEWED PERSONALLY  CXR:    CT chest:    PROCEDURE DATE:  12/10/2019        INTERPRETATION:  CLINICAL INFORMATION: Leukocytosis. Blood cultures with   MSSA. Rising white count.    COMPARISON: Prior chest CT dated 3/27/2016.    PROCEDURE:   CT of the Chest, Abdomen and Pelvis was performed with intravenous   contrast.   Intravenous contrast: 90 ml Omnipaque 350. 10 ml discarded.  Oral contrast: Positive contrast was administered.  Sagittal and coronal reformats were performed.    FINDINGS:    CHEST:     LUNGS AND LARGE AIRWAYS: Patent central airways. Patchy bilateral   groundglass opacities. Parenchymal opacities in the lower lobes   bilaterally.  PLEURA: Trace left pleural effusion.  VESSELS: Atherosclerotic calcification of the aorta and coronary arteries.  HEART: Heart size is enlarged. No pericardial effusion. Calcification of   the aortic valve and mitral annulus.  MEDIASTINUM AND MARTHA: No lymphadenopathy.  CHEST WALL AND LOWER NECK: Within normal limits.    ABDOMEN AND PELVIS:    LIVER: A 0.9 cm hypodense lesion in the right lobe is too small to   characterize, but unchanged since 3/27/2016.  BILE DUCTS: Normal caliber.  GALLBLADDER: Dependent high attenuation, which may be secondary to sludge   and/or small stones.  SPLEEN: Within normal limits.  PANCREAS: Within normal limits.  ADRENALS: Nodular thickening of the left adrenal gland.  KIDNEYS/URETERS: No hydronephrosis. Right renal cyst. Subcentimeter   hypodense focus in the lower pole of the left kidney, too small to   characterize.    BLADDER: Trabeculated bladder wall.  REPRODUCTIVE ORGANS: Prostate gland is enlarged.    BOWEL: No bowel obstruction. Appendix is within normal limits. Scattered   colonic diverticulosis.  Small hiatal hernia.  PERITONEUM: No ascites.  VESSELS: Atherosclerotic calcification.  RETROPERITONEUM/LYMPH NODES: No lymphadenopathy.    ABDOMINAL WALL: Subcutaneous edema. Small fat-containing umbilical hernia.  BONES: Severe degenerative changes in the spine. Status post posterior   fusion and laminectomy from T9 to T12.Status post L4-L5 laminectomy.    IMPRESSION:     Parenchymal opacities in the lower lobes bilaterally, which may be   secondary to atelectasis versus pneumonia.     Cardiomegaly.    Degenerative changes and findings in the spine which were more fully   evaluated on MRI dated 12/7/2019.        TTE: PULMONARY CONSULT  Aaron Dowd MD  [phone]    Initial HPI on admission:  HPI:  This is a 78 y/o male with h/o of CAD s/p PCI 1/2017 with 10% LAD stenosis OM1, HTN, HLD, GERD, Gout, Osteoarthritis and chronic back pain, s/p multiple laminectomies and spinal fusion, left TKR who presents with worsening back pain for approx a week. Pt reports that for the past week he has had progressive worsening of his lower back pain and weakness in his bilateral lower extremities that progressed to inability to ambulate last night. Pain radiates down his left leg. This was associated with fevers at home. No loss of bowel or bladder contents. Of note, pt was treated for cellulitis a month ago with Augmentin.     Patient with 10 days acute onset fever, chills, myalgias, weakness  No history of COPD or asthma and non smoker  History of CHF  Remote back surgery with fusion  Recent cellulitis treated with augmentin  Rapid Flu in ER negative; no full PCR done    PAST MEDICAL & SURGICAL HISTORY:  CAD S/P percutaneous coronary angioplasty  HLD (hyperlipidemia)  Back pain  OA (osteoarthritis)  Gout  HTN (hypertension)  GERD (gastroesophageal reflux disease)  S/P lumbar spine operation: 6 thoracic to lumbar surgeries over 6 months period starting 11/2006  S/P knee replacement: 11/2013    Allergies    Indocin (Unknown)  oxacillin (Unknown)  Zyvox (Unknown)    Intolerances      FAMILY HISTORY:  No pertinent family history in first degree relatives    Social History (marital status, living situation, occupation, tobacco use, alcohol and drug use, and sexual history): Nonsmoker, no ETOH usage	     Tobacco Screening:  · Core Measure Site	No	        Review of Systems: CONSTITUTIONAL: + weakness, fevers  EYES/ENT: No visual changes;  No vertigo or throat pain   NECK: No pain or stiffness  RESPIRATORY: No cough, wheezing, hemoptysis; No shortness of breath  CARDIOVASCULAR: No chest pain or palpitations  GASTROINTESTINAL: No abdominal or epigastric pain. No nausea, vomiting, or hematemesis; No diarrhea or constipation. No melena or hematochezia.  GENITOURINARY: No dysuria, frequency or hematuria  NEUROLOGICAL: + weakness of lower extremities  SKIN: No itching, burning, rashes, or lesions  All other review of systems is negative unless indicated above.	      Allergies and Intolerances:        Allergies:  	vancomycin: Drug, Rash  	Zyvox: Drug, Unknown  	oxacillin: Drug, Unknown  	Indocin: Drug, Unknown        Medications:  MEDICATIONS  (STANDING):  acetaminophen  IVPB .. 1000 milliGRAM(s) IV Intermittent once  allopurinol 300 milliGRAM(s) Oral daily  amLODIPine   Tablet 10 milliGRAM(s) Oral daily  aspirin enteric coated 81 milliGRAM(s) Oral daily  atorvastatin 10 milliGRAM(s) Oral at bedtime  ceFAZolin   IVPB 2000 milliGRAM(s) IV Intermittent every 8 hours  heparin  Injectable 5000 Unit(s) SubCutaneous every 8 hours  senna 2 Tablet(s) Oral at bedtime  sodium chloride 0.9%. 1000 milliLiter(s) (60 mL/Hr) IV Continuous <Continuous>    MEDICATIONS  (PRN):  acetaminophen   Tablet .. 650 milliGRAM(s) Oral every 6 hours PRN Temp greater or equal to 38C (100.4F)  morphine  - Injectable 2 milliGRAM(s) IV Push every 4 hours PRN Moderate Pain (4 - 6)    Vital Signs Last 24 Hrs  T(C): 37.2 (12 Dec 2019 10:26), Max: 38 (12 Dec 2019 06:15)  T(F): 99 (12 Dec 2019 10:26), Max: 100.4 (12 Dec 2019 06:15)  HR: 70 (12 Dec 2019 10:26) (70 - 82)  BP: 126/67 (12 Dec 2019 10:26) (126/67 - 155/80)  BP(mean): --  RR: 18 (12 Dec 2019 10:26) (18 - 18)  SpO2: 99% (12 Dec 2019 10:26) (92% - 99%)    12-11 @ 07:01  -  12-12 @ 07:00  --------------------------------------------------------  IN: 1125 mL / OUT: 1125 mL / NET: 0 mL      LABS:                        12.1   25.86 )-----------( 257      ( 11 Dec 2019 08:18 )             36.8     12-12    134<L>  |  96  |  34<H>  ----------------------------<  215<H>  4.3   |  24  |  1.20    Ca    9.5      12 Dec 2019 09:51    TPro  6.3  /  Alb  2.4<L>  /  TBili  0.6  /  DBili  x   /  AST  13  /  ALT  16  /  AlkPhos  120  12-11    CULTURES:  Culture Results:   Growth in anaerobic bottle: Staphylococcus aureus  See previous culture [id number] (12-09 @ 22:05)  Culture Results:   Growth in anaerobic bottle: Gram Positive Cocci in Clusters (12-09 @ 22:05)  Culture Results:   Growth in aerobic and anaerobic bottles: Staphylococcus aureus  "Due to technical problems, Proteus sp. will Not be reported as part of  the BCID panel until further notice"  ***Blood Panel PCR results on this specimen are available  approximately 3 hours after the Gram stain result.***  Gram stain, PCR, and/or culture results may not always  correspond due to difference in methodologies.  ************************************************************  This PCR assay was performed using [employer].  The following targets are tested for: Enterococcus,  vancomycin resistant enterococci, Listeria monocytogenes,  coagulase negative staphylococci, S. aureus,  methicillin resistant S. aureus, Streptococcus agalactiae  (Group B), S. pneumoniae, S. pyogenes (Group A),  Acinetobacter baumannii, Enterobacter cloacae, E. coli,  Klebsiella oxytoca, K. pneumoniae, Proteus sp.,  Serratia marcescens, Haemophilus influenzae,  Neisseria meningitidis, Pseudomonas aeruginosa, Candida  albicans, C. glabrata, C krusei, C parapsilosis,  C. tropicalis and the KPC resistance gene. (12-08 @ 04:15)  Culture Results:   Growth in aerobic and anaerobic bottles: Staphylococcus aureus  See previous culture [id number] (12-08 @ 04:15)  Culture Results:   No growth (12-08 @ 04:06)    Physical Examination:    General: Non toxic, c/o back pain after moving; no tachypnea.      HEENT: Pupils equal, reactive to light.  Symmetric.    PULM: No wheeze or rhonchi; poor insp effort    CVS: Regular rate and rhythm, no murmurs, rubs, or gallops    ABD: Soft, nondistended, nontender, normoactive bowel sounds, no masses    EXT: 1-2+ bilateral R>L LE edmea    SKIN: Warm and well perfused, no rashes noted.    NEURO: Alert, oriented, interactive, nonfocal    RADIOLOGY REVIEWED PERSONALLY  CXR:    CT chest:    PROCEDURE DATE:  12/10/2019        INTERPRETATION:  CLINICAL INFORMATION: Leukocytosis. Blood cultures with   MSSA. Rising white count.    COMPARISON: Prior chest CT dated 3/27/2016.    PROCEDURE:   CT of the Chest, Abdomen and Pelvis was performed with intravenous   contrast.   Intravenous contrast: 90 ml Omnipaque 350. 10 ml discarded.  Oral contrast: Positive contrast was administered.  Sagittal and coronal reformats were performed.    FINDINGS:    CHEST:     LUNGS AND LARGE AIRWAYS: Patent central airways. Patchy bilateral   groundglass opacities. Parenchymal opacities in the lower lobes   bilaterally.  PLEURA: Trace left pleural effusion.  VESSELS: Atherosclerotic calcification of the aorta and coronary arteries.  HEART: Heart size is enlarged. No pericardial effusion. Calcification of   the aortic valve and mitral annulus.  MEDIASTINUM AND MARTHA: No lymphadenopathy.  CHEST WALL AND LOWER NECK: Within normal limits.    ABDOMEN AND PELVIS:    LIVER: A 0.9 cm hypodense lesion in the right lobe is too small to   characterize, but unchanged since 3/27/2016.  BILE DUCTS: Normal caliber.  GALLBLADDER: Dependent high attenuation, which may be secondary to sludge   and/or small stones.  SPLEEN: Within normal limits.  PANCREAS: Within normal limits.  ADRENALS: Nodular thickening of the left adrenal gland.  KIDNEYS/URETERS: No hydronephrosis. Right renal cyst. Subcentimeter   hypodense focus in the lower pole of the left kidney, too small to   characterize.    BLADDER: Trabeculated bladder wall.  REPRODUCTIVE ORGANS: Prostate gland is enlarged.    BOWEL: No bowel obstruction. Appendix is within normal limits. Scattered   colonic diverticulosis.  Small hiatal hernia.  PERITONEUM: No ascites.  VESSELS: Atherosclerotic calcification.  RETROPERITONEUM/LYMPH NODES: No lymphadenopathy.    ABDOMINAL WALL: Subcutaneous edema. Small fat-containing umbilical hernia.  BONES: Severe degenerative changes in the spine. Status post posterior   fusion and laminectomy from T9 to T12.Status post L4-L5 laminectomy.    IMPRESSION:     Parenchymal opacities in the lower lobes bilaterally, which may be   secondary to atelectasis versus pneumonia.     Cardiomegaly.    Degenerative changes and findings in the spine which were more fully   evaluated on MRI dated 12/7/2019.  TTE:    < from: TTE with Doppler (w/Cont) (12.11.19 @ 10:29) >  PROCEDURE: Transthoracic echocardiogram with 2-D, M-Mode  and complete spectral and color flow Doppler. Verbal  consent was obtained for injection of  Ultrasonic Enhancing  Agent following a discussion of risks and benefits.  Following intravenous injection of Ultrasonic Enhancing  Agent , harmonic imaging was performed.  INDICATION: Acute and subacute endocarditis, unspecified  (I33.9)  ------------------------------------------------------------------------  Dimensions:    Normal Values:  LA:     5.5    2.0 - 4.0 cm  Ao:     3.7    2.0 - 3.8 cm  SEPTUM: 1.6    0.6 - 1.2 cm  PWT:    1.4    0.6 - 1.1 cm  LVIDd:  5.2    3.0 - 5.6 cm  LVIDs:         1.8 - 4.0 cm  Derived variables:  LVMI: 148 g/m2  RWT: 0.53  EF (Visual Estimate): 20-25 %  Doppler Peak Velocity (m/sec): AoV=3.0  ------------------------------------------------------------------------  Observations:  Mitral Valve: Mitral annular calcification, otherwise  normal mitral valve. Minimal mitral regurgitation.  Aortic Valve/Aorta: Calcified trileaflet aortic valve with  decreased opening. Peak transaortic valve gradient equals  36 mm Hg, mean transaortic valve gradient equals 25 mm Hg,  estimated aortic valve area equals 1.2 sqcm (by continuity  equation), aortic valve velocity time integral equals 69  cm, consistent with moderate aortic stenosis. Minimal  aortic regurgitation.  Peak left ventricular outflow tract  gradient equals 3 mm Hg, mean gradient is equal to 2 mm Hg,  LVOT velocity time integral equals 19 cm.  Aortic Root: 3.7 cm.  LVOT diameter: 2.3 cm.  Left Atrium: Mildly dilated left atrium.  LA volume index=  39 cc/m2.  Left Ventricle: Severe  left ventricular systolic  dysfunction.   Endocardial visualization enhanced with  intravenous injection of Ultrasonic Enhancing Agent  (Definity). Severe concentric left ventricular hypertrophy.  Mild diastolic dysfunction (Stage I).  Right Heart: Severe right atrial enlargement. Normal right  ventricular size and function. Normal tricuspid valve.  Minimal tricuspid regurgitation. Normal pulmonic valve.  Pericardium/Pleura: Normal pericardium with trace  pericardial effusion.  Hemodynamic: Estimated right atrial pressure is 8 mm Hg.  Estimated right ventricular systolic pressure equals 27 mm  Hg, assuming right atrial pressure equals 8 mm Hg,  consistent with normal pulmonary pressures.  ------------------------------------------------------------------------  Conclusions:  1. Calcified trileaflet aortic valve with decreased  opening. Peak transaortic valve gradient equals 36 mm Hg,  mean transaortic valve gradient equals 25 mm Hg, estimated  aortic valve area equals 1.2 sqcm (by continuity equation),  aortic valve velocity time integral equals 69 cm,  consistent with moderate aortic stenosis.  2. Severe concentric left ventricular hypertrophy.  3. Severe  left ventricular systolic dysfunction.  Endocardial visualization enhanced with intravenous  injection of Ultrasonic Enhancing Agent (Definity).  Unable to rule out endocarditis. Consider JAHARIA if clinically  indicated.    < end of copied text >

## 2019-12-12 NOTE — PROGRESS NOTE ADULT - ASSESSMENT
78 yo M with chronic low back pain sp multiple laminectomy and fusion p/w 1 week of worsening back pain along with weakness in bilateral lower extremities and inability to walk since last night.  Leukocytosis, fever  Low back pain, increasing LE weakness  MR L spine and T spine prelim but no emergent findings  Prior history of MSSA bacteremia (2016)  History oxacillin allergy (? fever), Zyvox ([facility]); tolerated vanco on this visit  BCX now with MSSA, high grade, rising WBC, patient ill appearing, guarded  CT A/P/C/H neg  NM tagged WBC uptake in thoracolumbar spine--degen disease?  TTE equivocal  Still leukocytosis, fevers, ongoing MSSA bacteremia  High suspicion for spinal involvement despite negative MRI--monitor closely  Overall, fever, leukocytosis, LBP  - Cefazolin 2g q 8  - F/U neurosurgery/neuro  - Repeat BCXs q 48 hours until clearance  - Would check JAHAIRA, rule out endocarditis  - May need to repeat MRI spine next week if BCX non-clearing    Fredy Huynh MD  Pager [phone]  After 5pm and on weekends call [phone]

## 2019-12-13 LAB
ANION GAP SERPL CALC-SCNC: 10 MMOL/L — SIGNIFICANT CHANGE UP (ref 5–17)
BUN SERPL-MCNC: 34 MG/DL — HIGH (ref 7–23)
CALCIUM SERPL-MCNC: 9 MG/DL — SIGNIFICANT CHANGE UP (ref 8.4–10.5)
CHLORIDE SERPL-SCNC: 100 MMOL/L — SIGNIFICANT CHANGE UP (ref 96–108)
CO2 SERPL-SCNC: 25 MMOL/L — SIGNIFICANT CHANGE UP (ref 22–31)
CREAT SERPL-MCNC: 1.15 MG/DL — SIGNIFICANT CHANGE UP (ref 0.5–1.3)
CULTURE RESULTS: SIGNIFICANT CHANGE UP
GLUCOSE SERPL-MCNC: 173 MG/DL — HIGH (ref 70–99)
GRAM STN FLD: SIGNIFICANT CHANGE UP
HCT VFR BLD CALC: 37.9 % — LOW (ref 39–50)
HGB BLD-MCNC: 12 G/DL — LOW (ref 13–17)
MCHC RBC-ENTMCNC: 29 PG — SIGNIFICANT CHANGE UP (ref 27–34)
MCHC RBC-ENTMCNC: 31.7 GM/DL — LOW (ref 32–36)
MCV RBC AUTO: 91.5 FL — SIGNIFICANT CHANGE UP (ref 80–100)
NRBC # BLD: 0 /100 WBCS — SIGNIFICANT CHANGE UP (ref 0–0)
PLATELET # BLD AUTO: 369 K/UL — SIGNIFICANT CHANGE UP (ref 150–400)
POTASSIUM SERPL-MCNC: 4.3 MMOL/L — SIGNIFICANT CHANGE UP (ref 3.5–5.3)
POTASSIUM SERPL-SCNC: 4.3 MMOL/L — SIGNIFICANT CHANGE UP (ref 3.5–5.3)
RBC # BLD: 4.14 M/UL — LOW (ref 4.2–5.8)
RBC # FLD: 14.4 % — SIGNIFICANT CHANGE UP (ref 10.3–14.5)
SODIUM SERPL-SCNC: 135 MMOL/L — SIGNIFICANT CHANGE UP (ref 135–145)
WBC # BLD: 23.47 K/UL — HIGH (ref 3.8–10.5)
WBC # FLD AUTO: 23.47 K/UL — HIGH (ref 3.8–10.5)

## 2019-12-13 PROCEDURE — 93312 ECHO TRANSESOPHAGEAL: CPT | Mod: 26

## 2019-12-13 PROCEDURE — 93325 DOPPLER ECHO COLOR FLOW MAPG: CPT | Mod: 26

## 2019-12-13 PROCEDURE — 99232 SBSQ HOSP IP/OBS MODERATE 35: CPT

## 2019-12-13 PROCEDURE — 76376 3D RENDER W/INTRP POSTPROCES: CPT | Mod: 26

## 2019-12-13 PROCEDURE — 93320 DOPPLER ECHO COMPLETE: CPT | Mod: 26

## 2019-12-13 RX ADMIN — Medication 100 MILLIGRAM(S): at 05:28

## 2019-12-13 RX ADMIN — Medication 100 MILLIGRAM(S): at 21:16

## 2019-12-13 RX ADMIN — ATORVASTATIN CALCIUM 10 MILLIGRAM(S): 80 TABLET, FILM COATED ORAL at 21:14

## 2019-12-13 RX ADMIN — HEPARIN SODIUM 5000 UNIT(S): 5000 INJECTION INTRAVENOUS; SUBCUTANEOUS at 05:28

## 2019-12-13 RX ADMIN — HEPARIN SODIUM 5000 UNIT(S): 5000 INJECTION INTRAVENOUS; SUBCUTANEOUS at 21:15

## 2019-12-13 RX ADMIN — AMLODIPINE BESYLATE 10 MILLIGRAM(S): 2.5 TABLET ORAL at 05:28

## 2019-12-13 NOTE — PROGRESS NOTE ADULT - ASSESSMENT
MSSA bacteremia  Bibasilar atelectasis with probable pneumonia: possibly bacteremic  CAD with severe AS and Systolic LV dysfunction  r/o OP dysphagia    REC    Continue MSSA coverage to include pulmonary source  WIll need additional blood cultures X2 to cement clearance  Suggest swallow eval  Cardiology f/u MSSA bacteremia  Bibasilar atelectasis with probable pneumonia: possibly bacteremic  CAD with severe AS and Systolic LV dysfunction  r/o OP dysphagia    REC    Continue MSSA coverage to include pulmonary source  WIll need additional blood cultures X2 to cement clearance  Suggest swallow eval  Alternative MSSA source imaging w/u per ID  Cardiology f/u  Dr Maxwell covering this weekend

## 2019-12-13 NOTE — CHART NOTE - NSCHARTNOTEFT_GEN_A_CORE
JAHAIRA result 12/13/19  Calcified trileaflet aortic valve with decreased  opening. Peak transaortic valve gradient equals 44 mm Hg,  mean transaortic valve gradient equals 26 mm Hg, estimated  aortic valve area equals 0.9 sqcm (by continuity equation),  aortic valve velocity time integral equals 75 cm,  consistent with severe aortic stenosis.    Discussed with Dr. Deshpande and deferred to cardiology  Result discussed with Dr. Lee  Medical management at this time as mean Gradient 26,  Will continue current meds.

## 2019-12-13 NOTE — PROGRESS NOTE ADULT - SUBJECTIVE AND OBJECTIVE BOX
CARDIOLOGY FOLLOW UP - Dr. Lee    CC confused, lethargic but arousable  family at bedside       PHYSICAL EXAM:  T(C): 36.6 (12-13-19 @ 09:15), Max: 38.9 (12-12-19 @ 13:18)  HR: 75 (12-13-19 @ 09:15) (68 - 78)  BP: 124/74 (12-13-19 @ 09:15) (115/63 - 147/84)  RR: 18 (12-13-19 @ 09:15) (18 - 18)  SpO2: 95% (12-13-19 @ 09:15) (93% - 99%)  Wt(kg): --  I&O's Summary    12 Dec 2019 07:01  -  13 Dec 2019 07:00  --------------------------------------------------------  IN: 2165 mL / OUT: 1350 mL / NET: 815 mL        Appearance: Normal	  Cardiovascular: Normal S1 S2,RRR, + murmur   Respiratory: diminished   Gastrointestinal:  Soft, Non-tender, + BS	  Extremities: Normal range of motion, + 1 le edema        MEDICATIONS  (STANDING):  acetaminophen  IVPB .. 1000 milliGRAM(s) IV Intermittent once  allopurinol 300 milliGRAM(s) Oral daily  amLODIPine   Tablet 10 milliGRAM(s) Oral daily  aspirin enteric coated 81 milliGRAM(s) Oral daily  atorvastatin 10 milliGRAM(s) Oral at bedtime  ceFAZolin   IVPB 2000 milliGRAM(s) IV Intermittent every 8 hours  heparin  Injectable 5000 Unit(s) SubCutaneous every 8 hours  senna 2 Tablet(s) Oral at bedtime  sodium chloride 0.9%. 1000 milliLiter(s) (60 mL/Hr) IV Continuous <Continuous>      TELEMETRY: 	    ECG:  	  RADIOLOGY:   DIAGNOSTIC TESTING:  [ ] Echocardiogram:  [ ]  Catheterization:  [ ] Stress Test:    OTHER: 	    LABS:	 	    Creatine Kinase, Serum: 71 U/L [30 - 200] (12-07 @ 22:22)                          12.3   28.41 )-----------( 327      ( 12 Dec 2019 14:22 )             39.0     12-13    135  |  100  |  34<H>  ----------------------------<  173<H>  4.3   |  25  |  1.15    Ca    9.0      13 Dec 2019 09:33

## 2019-12-13 NOTE — PROGRESS NOTE ADULT - ASSESSMENT
79 yr old male with HTN, DCHF, CAD, S/P PCI to OM1, Mid LAD (9/2014, [facility], [facility], [facility]) hx of ischemic CMP, AS, DM HLD, GERD, GOUT, s/p multiple spinal surgery admitted with back pain and fever    1. Back Pain  MRI noted, neuro/neuro sx followup  ID eval  pain contol     2. CAD s/p PCI (OM1/LAD)   stable  cont  ASA , statin  remains off BB in light of history of bb induced fatigue     3. Diastolic CHF, New Ischemic CMP   no evidence of decomp CHF on exam   echo now revealed severe LV sys fx EF 20-25% severe LVH , mild diastolic dysfx (stage 1)  decline in LV fx from prior echo; pt is not a candidate for any ischemic eval at present in light of infection, sepsis, encephalopathy  cont med tx  will resume outpt dose Cozaar at 25 mg daily if sbp stable   remains off BB in light of history of bb induced fatigue     4. Aortic Stenosis  stable, Moderate on repeat echo    5. HTN   BP stable .,continue with current antihtn meds     6. Fever/ Sepsis   + blood cultures with gram positive Cocci in clusters   micu consult noted   iv abx, ID f/u   echo unable to r/o endocarditis   CT chest w possible PNA  tagged WBC scan noted   plan for JAHAIRA    dvt ppx

## 2019-12-13 NOTE — PROGRESS NOTE ADULT - SUBJECTIVE AND OBJECTIVE BOX
Follow-up Pulm Progress Note  Aaron Dowd MD  [phone]    Tmax 102 12/12: afebrile today on Cefazolin  F/U blood cultures pending    Medications:  Vital Signs Last 24 Hrs  T(C): 36.6 (13 Dec 2019 09:15), Max: 38.9 (12 Dec 2019 13:18)  T(F): 97.9 (13 Dec 2019 09:15), Max: 102 (12 Dec 2019 13:18)  HR: 75 (13 Dec 2019 09:15) (68 - 78)  BP: 124/74 (13 Dec 2019 09:15) (115/63 - 147/84)  BP(mean): --  RR: 18 (13 Dec 2019 09:15) (18 - 18)  SpO2: 95% (13 Dec 2019 09:15) (93% - 98%)      12-12 @ 07:01  -  12-13 @ 07:00  --------------------------------------------------------  IN: 2165 mL / OUT: 1350 mL / NET: 815 mL    LABS:                        12.0   23.47 )-----------( 369      ( 13 Dec 2019 10:02 )             37.9     12-13    135  |  100  |  34<H>  ----------------------------<  173<H>  4.3   |  25  |  1.15    Ca    9.0      13 Dec 2019 09:33  CULTURES:  Culture Results:   Growth in anaerobic bottle: Staphylococcus aureus  See previous culture [id number] (12-09 @ 22:05)  Culture Results:   Growth in anaerobic bottle: Staphylococcus aureus  See previous culture [id number] (12-09 @ 22:05)  Culture Results:   Growth in aerobic and anaerobic bottles: Staphylococcus aureus  "Due to technical problems, Proteus sp. will Not be reported as part of  the BCID panel until further notice"  ***Blood Panel PCR results on this specimen are available  approximately 3 hours after the Gram stain result.***  Gram stain, PCR, and/or culture results may not always  correspond due to difference in methodologies.  ************************************************************  This PCR assay was performed using [employer].  The following targets are tested for: Enterococcus,  vancomycin resistant enterococci, Listeria monocytogenes,  coagulase negative staphylococci, S. aureus,  methicillin resistant S. aureus, Streptococcus agalactiae  (Group B), S. pneumoniae, S. pyogenes (Group A),  Acinetobacter baumannii, Enterobacter cloacae, E. coli,  Klebsiella oxytoca, K. pneumoniae, Proteus sp.,  Serratia marcescens, Haemophilus influenzae,  Neisseria meningitidis, Pseudomonas aeruginosa, Candida  albicans, C. glabrata, C krusei, C parapsilosis,  C. tropicalis and the KPC resistance gene. (12-08 @ 04:15)  Culture Results:   Growth in aerobic and anaerobic bottles: Staphylococcus aureus  See previous culture [id number] (12-08 @ 04:15)  Culture Results:   No growth (12-08 @ 04:06)    Most recent blood culture -- 12-09 @ 22:05   -- -- .Blood Blood 12-09 @ 22:05      Physical Examination:  PULM:   CVS: Regular rate and rhythm, no murmurs, rubs, or gallops  ABD: Soft, non-tender  EXT:  No clubbing, cyanosis, or edema    RADIOLOGY REVIEWED  CXR:    CT chest:    TTE:    PROCEDURE: Transthoracic echocardiogram with 2-D, M-Mode  and complete spectral and color flow Doppler. Verbal  consent was obtained for injection of  Ultrasonic Enhancing  Agent following a discussion of risks and benefits.  Following intravenous injection of Ultrasonic Enhancing  Agent , harmonic imaging was performed.  INDICATION: Acute and subacute endocarditis, unspecified  (I33.9)  ------------------------------------------------------------------------  Dimensions:    Normal Values:  LA:     5.5    2.0 - 4.0 cm  Ao:     3.7    2.0 - 3.8 cm  SEPTUM: 1.6    0.6 - 1.2 cm  PWT:    1.4    0.6 - 1.1 cm  LVIDd:  5.2    3.0 - 5.6 cm  LVIDs:         1.8 - 4.0 cm  Derived variables:  LVMI: 148 g/m2  RWT: 0.53  EF (Visual Estimate): 20-25 %  Doppler Peak Velocity (m/sec): AoV=3.0  ------------------------------------------------------------------------  Observations:  Mitral Valve: Mitral annular calcification, otherwise  normal mitral valve. Minimal mitral regurgitation.  Aortic Valve/Aorta: Calcified trileaflet aortic valve with  decreased opening. Peak transaortic valve gradient equals  36 mm Hg, mean transaortic valve gradient equals 25 mm Hg,  estimated aortic valve area equals 1.2 sqcm (by continuity  equation), aortic valve velocity time integral equals 69  cm, consistent with moderate aortic stenosis. Minimal  aortic regurgitation.  Peak left ventricular outflow tract  gradient equals 3 mm Hg, mean gradient is equal to 2 mm Hg,  LVOT velocity time integral equals 19 cm.  Aortic Root: 3.7 cm.  LVOT diameter: 2.3 cm.  Left Atrium: Mildly dilated left atrium.  LA volume index=  39 cc/m2.  Left Ventricle: Severe  left ventricular systolic  dysfunction.   Endocardial visualization enhanced with  intravenous injection of Ultrasonic Enhancing Agent  (Definity). Severe concentric left ventricular hypertrophy.  Mild diastolic dysfunction (Stage I).  Right Heart: Severe right atrial enlargement. Normal right  ventricular size and function. Normal tricuspid valve.  Minimal tricuspid regurgitation. Normal pulmonic valve.  Pericardium/Pleura: Normal pericardium with trace  pericardial effusion.  Hemodynamic: Estimated right atrial pressure is 8 mm Hg.  Estimated right ventricular systolic pressure equals 27 mm  Hg, assuming right atrial pressure equals 8 mm Hg,  consistent with normal pulmonary pressures.  ------------------------------------------------------------------------  Conclusions:  1. Calcified trileaflet aortic valve with decreased  opening. Peak transaortic valve gradient equals 36 mm Hg,  mean transaortic valve gradient equals 25 mm Hg, estimated  aortic valve area equals 1.2 sqcm (by continuity equation),  aortic valve velocity time integral equals 69 cm,  consistent with moderate aortic stenosis.  2. Severe concentric left ventricular hypertrophy.  3. Severe  left ventricular systolic dysfunction.  Endocardial visualization enhanced with intravenous  injection of Ultrasonic Enhancing Agent (Definity).  Unable to rule out endocarditis. Consider JAHAIRA if clinically  indicated. Follow-up Pulm Progress Note  Aaron Dowd MD  [phone]    Tmax 102 12/12: afebrile today on Cefazolin  F/U blood culture 12/12 negative X 1  JAHAIRA: No vegetations; severe AS and focal LV dysfunction  Stable resp status    Medications:  Vital Signs Last 24 Hrs  T(C): 36.6 (13 Dec 2019 09:15), Max: 38.9 (12 Dec 2019 13:18)  T(F): 97.9 (13 Dec 2019 09:15), Max: 102 (12 Dec 2019 13:18)  HR: 75 (13 Dec 2019 09:15) (68 - 78)  BP: 124/74 (13 Dec 2019 09:15) (115/63 - 147/84)  BP(mean): --  RR: 18 (13 Dec 2019 09:15) (18 - 18)  SpO2: 95% (13 Dec 2019 09:15) (93% - 98%)      12-12 @ 07:01  -  12-13 @ 07:00  --------------------------------------------------------  IN: 2165 mL / OUT: 1350 mL / NET: 815 mL    LABS:                        12.0   23.47 )-----------( 369      ( 13 Dec 2019 10:02 )             37.9     12-13    135  |  100  |  34<H>  ----------------------------<  173<H>  4.3   |  25  |  1.15    Ca    9.0      13 Dec 2019 09:33  CULTURES:  Culture Results:   Growth in anaerobic bottle: Staphylococcus aureus  See previous culture [id number] (12-09 @ 22:05)  Culture Results:   Growth in anaerobic bottle: Staphylococcus aureus  See previous culture [id number] (12-09 @ 22:05)  Culture Results:   Growth in aerobic and anaerobic bottles: Staphylococcus aureus  "Due to technical problems, Proteus sp. will Not be reported as part of  the BCID panel until further notice"  ***Blood Panel PCR results on this specimen are available  approximately 3 hours after the Gram stain result.***  Gram stain, PCR, and/or culture results may not always  correspond due to difference in methodologies.  ************************************************************  This PCR assay was performed using [employer].  The following targets are tested for: Enterococcus,  vancomycin resistant enterococci, Listeria monocytogenes,  coagulase negative staphylococci, S. aureus,  methicillin resistant S. aureus, Streptococcus agalactiae  (Group B), S. pneumoniae, S. pyogenes (Group A),  Acinetobacter baumannii, Enterobacter cloacae, E. coli,  Klebsiella oxytoca, K. pneumoniae, Proteus sp.,  Serratia marcescens, Haemophilus influenzae,  Neisseria meningitidis, Pseudomonas aeruginosa, Candida  albicans, C. glabrata, C krusei, C parapsilosis,  C. tropicalis and the KPC resistance gene. (12-08 @ 04:15)  Culture Results:   Growth in aerobic and anaerobic bottles: Staphylococcus aureus  See previous culture [id number] (12-08 @ 04:15)  Culture Results:   No growth (12-08 @ 04:06)    Most recent blood culture -- 12-09 @ 22:05   -- -- .Blood Blood 12-09 @ 22:05      Physical Examination:  PULM:   CVS: Regular rate and rhythm, no murmurs, rubs, or gallops  ABD: Soft, non-tender  EXT:  No clubbing, cyanosis, or edema    RADIOLOGY REVIEWED  CXR:    CT chest:    TTE:    PROCEDURE: Transthoracic echocardiogram with 2-D, M-Mode  and complete spectral and color flow Doppler. Verbal  consent was obtained for injection of  Ultrasonic Enhancing  Agent following a discussion of risks and benefits.  Following intravenous injection of Ultrasonic Enhancing  Agent , harmonic imaging was performed.  INDICATION: Acute and subacute endocarditis, unspecified  (I33.9)  ------------------------------------------------------------------------  Dimensions:    Normal Values:  LA:     5.5    2.0 - 4.0 cm  Ao:     3.7    2.0 - 3.8 cm  SEPTUM: 1.6    0.6 - 1.2 cm  PWT:    1.4    0.6 - 1.1 cm  LVIDd:  5.2    3.0 - 5.6 cm  LVIDs:         1.8 - 4.0 cm  Derived variables:  LVMI: 148 g/m2  RWT: 0.53  EF (Visual Estimate): 20-25 %  Doppler Peak Velocity (m/sec): AoV=3.0  ------------------------------------------------------------------------  Observations:  Mitral Valve: Mitral annular calcification, otherwise  normal mitral valve. Minimal mitral regurgitation.  Aortic Valve/Aorta: Calcified trileaflet aortic valve with  decreased opening. Peak transaortic valve gradient equals  36 mm Hg, mean transaortic valve gradient equals 25 mm Hg,  estimated aortic valve area equals 1.2 sqcm (by continuity  equation), aortic valve velocity time integral equals 69  cm, consistent with moderate aortic stenosis. Minimal  aortic regurgitation.  Peak left ventricular outflow tract  gradient equals 3 mm Hg, mean gradient is equal to 2 mm Hg,  LVOT velocity time integral equals 19 cm.  Aortic Root: 3.7 cm.  LVOT diameter: 2.3 cm.  Left Atrium: Mildly dilated left atrium.  LA volume index=  39 cc/m2.  Left Ventricle: Severe  left ventricular systolic  dysfunction.   Endocardial visualization enhanced with  intravenous injection of Ultrasonic Enhancing Agent  (Definity). Severe concentric left ventricular hypertrophy.  Mild diastolic dysfunction (Stage I).  Right Heart: Severe right atrial enlargement. Normal right  ventricular size and function. Normal tricuspid valve.  Minimal tricuspid regurgitation. Normal pulmonic valve.  Pericardium/Pleura: Normal pericardium with trace  pericardial effusion.  Hemodynamic: Estimated right atrial pressure is 8 mm Hg.  Estimated right ventricular systolic pressure equals 27 mm  Hg, assuming right atrial pressure equals 8 mm Hg,  consistent with normal pulmonary pressures.  ------------------------------------------------------------------------  Conclusions:  1. Calcified trileaflet aortic valve with decreased  opening. Peak transaortic valve gradient equals 36 mm Hg,  mean transaortic valve gradient equals 25 mm Hg, estimated  aortic valve area equals 1.2 sqcm (by continuity equation),  aortic valve velocity time integral equals 69 cm,  consistent with moderate aortic stenosis.  2. Severe concentric left ventricular hypertrophy.  3. Severe  left ventricular systolic dysfunction.  Endocardial visualization enhanced with intravenous  injection of Ultrasonic Enhancing Agent (Definity).  Unable to rule out endocarditis. Consider JAHAIRA if clinically  indicated. Follow-up Pulm Progress Note  Aaron Dowd MD  [phone]    Tmax 102 12/12: afebrile today on Cefazolin  F/U blood culture 12/12 negative X 1  JAHAIRA: No vegetations; severe AS and focal LV dysfunction  Stable resp status    Medications:  Vital Signs Last 24 Hrs  T(C): 36.6 (13 Dec 2019 09:15), Max: 38.9 (12 Dec 2019 13:18)  T(F): 97.9 (13 Dec 2019 09:15), Max: 102 (12 Dec 2019 13:18)  HR: 75 (13 Dec 2019 09:15) (68 - 78)  BP: 124/74 (13 Dec 2019 09:15) (115/63 - 147/84)  BP(mean): --  RR: 18 (13 Dec 2019 09:15) (18 - 18)  SpO2: 95% (13 Dec 2019 09:15) (93% - 98%)      12-12 @ 07:01  -  12-13 @ 07:00  --------------------------------------------------------  IN: 2165 mL / OUT: 1350 mL / NET: 815 mL    LABS:                        12.0   23.47 )-----------( 369      ( 13 Dec 2019 10:02 )             37.9     12-13    135  |  100  |  34<H>  ----------------------------<  173<H>  4.3   |  25  |  1.15    Ca    9.0      13 Dec 2019 09:33  CULTURES:  Culture Results:   Growth in anaerobic bottle: Staphylococcus aureus  See previous culture [id number] (12-09 @ 22:05)  Culture Results:   Growth in anaerobic bottle: Staphylococcus aureus  See previous culture [id number] (12-09 @ 22:05)  Culture Results:   Growth in aerobic and anaerobic bottles: Staphylococcus aureus  "Due to technical problems, Proteus sp. will Not be reported as part of  the BCID panel until further notice"  ***Blood Panel PCR results on this specimen are available  approximately 3 hours after the Gram stain result.***  Gram stain, PCR, and/or culture results may not always  correspond due to difference in methodologies.  ************************************************************  This PCR assay was performed using [employer].  The following targets are tested for: Enterococcus,  vancomycin resistant enterococci, Listeria monocytogenes,  coagulase negative staphylococci, S. aureus,  methicillin resistant S. aureus, Streptococcus agalactiae  (Group B), S. pneumoniae, S. pyogenes (Group A),  Acinetobacter baumannii, Enterobacter cloacae, E. coli,  Klebsiella oxytoca, K. pneumoniae, Proteus sp.,  Serratia marcescens, Haemophilus influenzae,  Neisseria meningitidis, Pseudomonas aeruginosa, Candida  albicans, C. glabrata, C krusei, C parapsilosis,  C. tropicalis and the KPC resistance gene. (12-08 @ 04:15)  Culture Results:   Growth in aerobic and anaerobic bottles: Staphylococcus aureus  See previous culture [id number] (12-08 @ 04:15)  Culture Results:   No growth (12-08 @ 04:06)    Most recent blood culture -- 12-09 @ 22:05   -- -- .Blood Blood 12-09 @ 22:05      Physical Examination:  PULM: No sign wheeze or rhonchi  CVS: Regular rate and rhythm, no murmurs, rubs, or gallops  ABD: Soft, non-tender  EXT:  No clubbing, cyanosis, or edema    RADIOLOGY REVIEWED  CXR:    CT chest:    TTE:    PROCEDURE: Transthoracic echocardiogram with 2-D, M-Mode  and complete spectral and color flow Doppler. Verbal  consent was obtained for injection of  Ultrasonic Enhancing  Agent following a discussion of risks and benefits.  Following intravenous injection of Ultrasonic Enhancing  Agent , harmonic imaging was performed.  INDICATION: Acute and subacute endocarditis, unspecified  (I33.9)  ------------------------------------------------------------------------  Dimensions:    Normal Values:  LA:     5.5    2.0 - 4.0 cm  Ao:     3.7    2.0 - 3.8 cm  SEPTUM: 1.6    0.6 - 1.2 cm  PWT:    1.4    0.6 - 1.1 cm  LVIDd:  5.2    3.0 - 5.6 cm  LVIDs:         1.8 - 4.0 cm  Derived variables:  LVMI: 148 g/m2  RWT: 0.53  EF (Visual Estimate): 20-25 %  Doppler Peak Velocity (m/sec): AoV=3.0  ------------------------------------------------------------------------  Observations:  Mitral Valve: Mitral annular calcification, otherwise  normal mitral valve. Minimal mitral regurgitation.  Aortic Valve/Aorta: Calcified trileaflet aortic valve with  decreased opening. Peak transaortic valve gradient equals  36 mm Hg, mean transaortic valve gradient equals 25 mm Hg,  estimated aortic valve area equals 1.2 sqcm (by continuity  equation), aortic valve velocity time integral equals 69  cm, consistent with moderate aortic stenosis. Minimal  aortic regurgitation.  Peak left ventricular outflow tract  gradient equals 3 mm Hg, mean gradient is equal to 2 mm Hg,  LVOT velocity time integral equals 19 cm.  Aortic Root: 3.7 cm.  LVOT diameter: 2.3 cm.  Left Atrium: Mildly dilated left atrium.  LA volume index=  39 cc/m2.  Left Ventricle: Severe  left ventricular systolic  dysfunction.   Endocardial visualization enhanced with  intravenous injection of Ultrasonic Enhancing Agent  (Definity). Severe concentric left ventricular hypertrophy.  Mild diastolic dysfunction (Stage I).  Right Heart: Severe right atrial enlargement. Normal right  ventricular size and function. Normal tricuspid valve.  Minimal tricuspid regurgitation. Normal pulmonic valve.  Pericardium/Pleura: Normal pericardium with trace  pericardial effusion.  Hemodynamic: Estimated right atrial pressure is 8 mm Hg.  Estimated right ventricular systolic pressure equals 27 mm  Hg, assuming right atrial pressure equals 8 mm Hg,  consistent with normal pulmonary pressures.  ------------------------------------------------------------------------  Conclusions:  1. Calcified trileaflet aortic valve with decreased  opening. Peak transaortic valve gradient equals 36 mm Hg,  mean transaortic valve gradient equals 25 mm Hg, estimated  aortic valve area equals 1.2 sqcm (by continuity equation),  aortic valve velocity time integral equals 69 cm,  consistent with moderate aortic stenosis.  2. Severe concentric left ventricular hypertrophy.  3. Severe  left ventricular systolic dysfunction.  Endocardial visualization enhanced with intravenous  injection of Ultrasonic Enhancing Agent (Definity).  Unable to rule out endocarditis. Consider JAHAIRA if clinically  indicated.

## 2019-12-13 NOTE — PROGRESS NOTE ADULT - PROBLEM SELECTOR PLAN 3
MSSA bacteremia persistent, source 2/2 pneumonia?  cont ancef per ID, leukocytosis still elevated  f/u blood cx till neg  Tylenol PRN fever  CT chest also concern for atelectasis vs pneumonia, lungs light up in nuclear scan  appreciate pulm recs  TTE moderate aortic stenosis, Severe concentric left ventricular hypertrophy, Severe left ventricular systolic dysfunction. Unable to rule out endocarditis.   JAHAIRA today  if JAHAIRA neg and persistent bacteremia will need to repeat imaging of spine

## 2019-12-13 NOTE — PROGRESS NOTE ADULT - SUBJECTIVE AND OBJECTIVE BOX
Patient is a 79y old  Male who presents with a chief complaint of Back pain (13 Dec 2019 12:57)      SUBJECTIVE / OVERNIGHT EVENTS:    off floor for test.      Vital Signs Last 24 Hrs  T(C): 36.6 (13 Dec 2019 09:15), Max: 37.6 (13 Dec 2019 05:35)  T(F): 97.9 (13 Dec 2019 09:15), Max: 99.7 (13 Dec 2019 05:35)  HR: 75 (13 Dec 2019 09:15) (68 - 78)  BP: 124/74 (13 Dec 2019 09:15) (115/63 - 147/84)  BP(mean): --  RR: 18 (13 Dec 2019 09:15) (18 - 18)  SpO2: 95% (13 Dec 2019 09:15) (93% - 96%)  I&O's Summary    12 Dec 2019 07:01  -  13 Dec 2019 07:00  --------------------------------------------------------  IN: 2165 mL / OUT: 1350 mL / NET: 815 mL    LABS:                        12.0   23.47 )-----------( 369      ( 13 Dec 2019 10:02 )             37.9     12-13    135  |  100  |  34<H>  ----------------------------<  173<H>  4.3   |  25  |  1.15    Ca    9.0      13 Dec 2019 09:33        CAPILLARY BLOOD GLUCOSE                RADIOLOGY & ADDITIONAL TESTS:    Imaging Personally Reviewed:  [x] YES  [ ] NO    Consultant(s) Notes Reviewed:  [x] YES  [ ] NO    MEDICATIONS  (STANDING):  acetaminophen  IVPB .. 1000 milliGRAM(s) IV Intermittent once  allopurinol 300 milliGRAM(s) Oral daily  amLODIPine   Tablet 10 milliGRAM(s) Oral daily  aspirin enteric coated 81 milliGRAM(s) Oral daily  atorvastatin 10 milliGRAM(s) Oral at bedtime  ceFAZolin   IVPB 2000 milliGRAM(s) IV Intermittent every 8 hours  heparin  Injectable 5000 Unit(s) SubCutaneous every 8 hours  senna 2 Tablet(s) Oral at bedtime  sodium chloride 0.9%. 1000 milliLiter(s) (60 mL/Hr) IV Continuous <Continuous>    MEDICATIONS  (PRN):  acetaminophen   Tablet .. 650 milliGRAM(s) Oral every 6 hours PRN Temp greater or equal to 38C (100.4F)  morphine  - Injectable 2 milliGRAM(s) IV Push every 4 hours PRN Moderate Pain (4 - 6)      Care Discussed with Consultants/Other Providers [x] YES  [ ] NO    HEALTH ISSUES - PROBLEM Dx:  Need for prophylactic measure: Need for prophylactic measure  Gout: Gout  Essential hypertension: Essential hypertension  CAD (coronary artery disease): CAD (coronary artery disease)  KRIS (acute kidney injury): KRIS (acute kidney injury)  Sepsis: Sepsis  Weakness of both lower extremities: Weakness of both lower extremities  Acute left-sided low back pain without sciatica: Acute left-sided low back pain without sciatica

## 2019-12-13 NOTE — PROGRESS NOTE ADULT - ASSESSMENT
78 yo M with chronic low back pain sp multiple laminectomy and fusion p/w 1 week of worsening back pain along with weakness in bilateral lower extremities and inability to walk since last night.  Leukocytosis, fever  Low back pain, increasing LE weakness  MR L spine and T spine prelim but no emergent findings  Prior history of MSSA bacteremia (2016)  History oxacillin allergy (? fever), Zyvox ([facility]); tolerated vanco on this visit  BCX now with MSSA, high grade, rising WBC, patient ill appearing, guarded  CT A/P/C/H neg  NM tagged WBC uptake in thoracolumbar spine--degen disease?  TTE equivocal  Still leukocytosis, fevers, BCX 12/12 NGTD  High suspicion for spinal involvement despite negative MRI--monitor closely  Overall, fever, leukocytosis, LBP  - Cefazolin 2g q 8  - F/U neurosurgery/neuro  - F/U pending BCXs  - F/U JAHAIRA results  - May need to repeat MRI spine next week if BCX non-clearing    Fredy Huynh MD  Pager [phone]  After 5pm and on weekends call [phone]

## 2019-12-13 NOTE — PROGRESS NOTE ADULT - SUBJECTIVE AND OBJECTIVE BOX
CC: F/U for Bacteremia    Saw/spoke to patient. No fevers, no chills. No new complaints. Unchanged.    Allergies  Indocin (Unknown)  oxacillin (Unknown)  Zyvox (Unknown)    ANTIMICROBIALS:  ceFAZolin   IVPB 2000 every 8 hours    PE:    Vital Signs Last 24 Hrs  T(C): 36.6 (13 Dec 2019 09:15), Max: 37.6 (13 Dec 2019 05:35)  T(F): 97.9 (13 Dec 2019 09:15), Max: 99.7 (13 Dec 2019 05:35)  HR: 75 (13 Dec 2019 09:15) (68 - 78)  BP: 124/74 (13 Dec 2019 09:15) (115/63 - 147/84)  RR: 18 (13 Dec 2019 09:15) (18 - 18)  SpO2: 95% (13 Dec 2019 09:15) (93% - 96%)    Gen: AOx1, sleepy, post JAAHIRA  CV: S1+S2 normal, nontachycardic  Resp: Clear bilat, no resp distress, no crackles/wheezes  Abd: Soft, nontender, +BS  Ext: No LE edema, no wounds    LABS:                        12.0   23.47 )-----------( 369      ( 13 Dec 2019 10:02 )             37.9     12-13    135  |  100  |  34<H>  ----------------------------<  173<H>  4.3   |  25  |  1.15    Ca    9.0      13 Dec 2019 09:33    MICROBIOLOGY:    .Blood Blood-Peripheral  12-12-19   No growth to date    .Blood Blood  12-09-19   Growth in anaerobic bottle: Staphylococcus aureus  See previous culture [id number]  --    Growth in anaerobic bottle: Gram Positive Cocci in Clusters    .Blood Blood  12-08-19   Growth in aerobic and anaerobic bottles: Staphylococcus aureus    .Urine Clean Catch (Midstream)  12-08-19   No growth     Rapid RVP Result: NotDetec (12-12 @ 13:27)    (otherwise reviewed)    RADIOLOGY:    12/11 NM:    IMPRESSION: Gallium scan demonstrates:    Nonspecific heterogeneous uptake in the thoracolumbar spine which may be   secondary to post surgical change and/or degenerative change.     Mild diffuse activity in bilateral lungs. This may reflect inflammation.   Please correlate clinically.

## 2019-12-14 LAB
ANION GAP SERPL CALC-SCNC: 11 MMOL/L — SIGNIFICANT CHANGE UP (ref 5–17)
BASOPHILS # BLD AUTO: 0.03 K/UL — SIGNIFICANT CHANGE UP (ref 0–0.2)
BASOPHILS NFR BLD AUTO: 0.1 % — SIGNIFICANT CHANGE UP (ref 0–2)
BUN SERPL-MCNC: 43 MG/DL — HIGH (ref 7–23)
CALCIUM SERPL-MCNC: 9.4 MG/DL — SIGNIFICANT CHANGE UP (ref 8.4–10.5)
CHLORIDE SERPL-SCNC: 100 MMOL/L — SIGNIFICANT CHANGE UP (ref 96–108)
CO2 SERPL-SCNC: 22 MMOL/L — SIGNIFICANT CHANGE UP (ref 22–31)
CREAT SERPL-MCNC: 1.2 MG/DL — SIGNIFICANT CHANGE UP (ref 0.5–1.3)
CULTURE RESULTS: SIGNIFICANT CHANGE UP
CULTURE RESULTS: SIGNIFICANT CHANGE UP
EOSINOPHIL # BLD AUTO: 0 K/UL — SIGNIFICANT CHANGE UP (ref 0–0.5)
EOSINOPHIL NFR BLD AUTO: 0 % — SIGNIFICANT CHANGE UP (ref 0–6)
GLUCOSE SERPL-MCNC: 166 MG/DL — HIGH (ref 70–99)
GRAM STN FLD: SIGNIFICANT CHANGE UP
HCT VFR BLD CALC: 32.6 % — LOW (ref 39–50)
HGB BLD-MCNC: 10.6 G/DL — LOW (ref 13–17)
IMM GRANULOCYTES NFR BLD AUTO: 1.2 % — SIGNIFICANT CHANGE UP (ref 0–1.5)
LYMPHOCYTES # BLD AUTO: 0.92 K/UL — LOW (ref 1–3.3)
LYMPHOCYTES # BLD AUTO: 4 % — LOW (ref 13–44)
MANUAL SMEAR VERIFICATION: SIGNIFICANT CHANGE UP
MCHC RBC-ENTMCNC: 29.5 PG — SIGNIFICANT CHANGE UP (ref 27–34)
MCHC RBC-ENTMCNC: 32.5 GM/DL — SIGNIFICANT CHANGE UP (ref 32–36)
MCV RBC AUTO: 90.8 FL — SIGNIFICANT CHANGE UP (ref 80–100)
MONOCYTES # BLD AUTO: 1.56 K/UL — HIGH (ref 0–0.9)
MONOCYTES NFR BLD AUTO: 6.9 % — SIGNIFICANT CHANGE UP (ref 2–14)
NEUTROPHILS # BLD AUTO: 19.96 K/UL — HIGH (ref 1.8–7.4)
NEUTROPHILS NFR BLD AUTO: 87.8 % — HIGH (ref 43–77)
PLAT MORPH BLD: NORMAL — SIGNIFICANT CHANGE UP
PLATELET # BLD AUTO: 354 K/UL — SIGNIFICANT CHANGE UP (ref 150–400)
POTASSIUM SERPL-MCNC: 4.4 MMOL/L — SIGNIFICANT CHANGE UP (ref 3.5–5.3)
POTASSIUM SERPL-SCNC: 4.4 MMOL/L — SIGNIFICANT CHANGE UP (ref 3.5–5.3)
RBC # BLD: 3.59 M/UL — LOW (ref 4.2–5.8)
RBC # FLD: 14.3 % — SIGNIFICANT CHANGE UP (ref 10.3–14.5)
RBC BLD AUTO: NORMAL — SIGNIFICANT CHANGE UP
SODIUM SERPL-SCNC: 133 MMOL/L — LOW (ref 135–145)
WBC # BLD: 22.75 K/UL — HIGH (ref 3.8–10.5)
WBC # FLD AUTO: 22.75 K/UL — HIGH (ref 3.8–10.5)

## 2019-12-14 PROCEDURE — 99231 SBSQ HOSP IP/OBS SF/LOW 25: CPT

## 2019-12-14 RX ORDER — FUROSEMIDE 40 MG
20 TABLET ORAL ONCE
Refills: 0 | Status: COMPLETED | OUTPATIENT
Start: 2019-12-14 | End: 2019-12-14

## 2019-12-14 RX ADMIN — Medication 100 MILLIGRAM(S): at 23:01

## 2019-12-14 RX ADMIN — AMLODIPINE BESYLATE 10 MILLIGRAM(S): 2.5 TABLET ORAL at 05:51

## 2019-12-14 RX ADMIN — SENNA PLUS 2 TABLET(S): 8.6 TABLET ORAL at 23:01

## 2019-12-14 RX ADMIN — Medication 300 MILLIGRAM(S): at 14:07

## 2019-12-14 RX ADMIN — ATORVASTATIN CALCIUM 10 MILLIGRAM(S): 80 TABLET, FILM COATED ORAL at 23:01

## 2019-12-14 RX ADMIN — HEPARIN SODIUM 5000 UNIT(S): 5000 INJECTION INTRAVENOUS; SUBCUTANEOUS at 13:58

## 2019-12-14 RX ADMIN — Medication 1000 MILLIGRAM(S): at 07:12

## 2019-12-14 RX ADMIN — SODIUM CHLORIDE 60 MILLILITER(S): 9 INJECTION INTRAMUSCULAR; INTRAVENOUS; SUBCUTANEOUS at 05:52

## 2019-12-14 RX ADMIN — HEPARIN SODIUM 5000 UNIT(S): 5000 INJECTION INTRAVENOUS; SUBCUTANEOUS at 23:01

## 2019-12-14 RX ADMIN — Medication 100 MILLIGRAM(S): at 13:57

## 2019-12-14 RX ADMIN — Medication 650 MILLIGRAM(S): at 23:13

## 2019-12-14 RX ADMIN — Medication 100 MILLIGRAM(S): at 05:52

## 2019-12-14 RX ADMIN — Medication 650 MILLIGRAM(S): at 23:43

## 2019-12-14 RX ADMIN — Medication 81 MILLIGRAM(S): at 13:56

## 2019-12-14 RX ADMIN — Medication 400 MILLIGRAM(S): at 06:42

## 2019-12-14 RX ADMIN — HEPARIN SODIUM 5000 UNIT(S): 5000 INJECTION INTRAVENOUS; SUBCUTANEOUS at 05:51

## 2019-12-14 RX ADMIN — Medication 20 MILLIGRAM(S): at 13:57

## 2019-12-14 NOTE — PROGRESS NOTE ADULT - ASSESSMENT
80 yo M with chronic low back pain sp multiple laminectomy and fusion p/w 1 week of worsening back pain along with weakness in bilateral lower extremities and inability to walk   Leukocytosis, fever  Low back pain, increasing LE weakness  MR L spine and T spine prelim but no emergent findings  Prior history of MSSA bacteremia (2016)  History oxacillin allergy (? fever), Zyvox ([facility]); tolerated vanco on this visit  BCX now with MSSA, high grade, rising WBC, patient ill appearing, guarded  CT A/P/C/H neg  NM tagged WBC uptake in thoracolumbar spine--degen disease?  TTE equivocal  Still leukocytosis, fevers, BCX 12/12 NGTD  High suspicion for spinal involvement despite negative MRI--monitor closely  Overall, fever, leukocytosis, LBP  - Cefazolin 2g q 8  - F/U neurosurgery/neuro  - F/U pending BCXs  - F/U JAHAIRA results  - May need to repeat MRI spine next week if BCX non-clearing    Consider x ray of foot to r/o osteomyelitis      ID service will be covering over the weekend. Please call for acute issues or questions. [phone]

## 2019-12-14 NOTE — PROGRESS NOTE ADULT - PROBLEM SELECTOR PLAN 4
likely sepsis related encephalopathy  monitor  ct head no acute changes  IVF while decreased PO intake  speech and swallow eval once more alert  PT OOB

## 2019-12-14 NOTE — PROGRESS NOTE ADULT - ATTENDING COMMENTS
Advanced care planning discussed with patient. Advanced care planning forms discussed with patient and/or family.  Risks, benefits, and alternatives of medical/cardiac procedures were discussed in detail with all questions answered.  30 minutes were spent addressing advance care planning.

## 2019-12-14 NOTE — PROGRESS NOTE ADULT - SUBJECTIVE AND OBJECTIVE BOX
Patient is a 79y old  Male who presents with a chief complaint of Back pain (14 Dec 2019 09:56)    Being followed by ID for        Interval history:  No other acute events      ROS:  No cough,SOB,CP  No N/V/D  No abd pain  No urinary complaints  No HA  No joint or limb pain  No other complaints    PAST MEDICAL & SURGICAL HISTORY:  CAD S/P percutaneous coronary angioplasty  HLD (hyperlipidemia)  Back pain  OA (osteoarthritis)  Gout  HTN (hypertension)  GERD (gastroesophageal reflux disease)  S/P lumbar spine operation: 6 thoracic to lumbar surgeries over 6 months period starting 11/2006  S/P knee replacement: 11/2013    Allergies    Indocin (Unknown)  oxacillin (Unknown)  Zyvox (Unknown)    Intolerances      Antimicrobials:    ceFAZolin   IVPB 2000 milliGRAM(s) IV Intermittent every 8 hours    MEDICATIONS  (STANDING):  allopurinol 300 milliGRAM(s) Oral daily  amLODIPine   Tablet 10 milliGRAM(s) Oral daily  aspirin enteric coated 81 milliGRAM(s) Oral daily  atorvastatin 10 milliGRAM(s) Oral at bedtime  ceFAZolin   IVPB 2000 milliGRAM(s) IV Intermittent every 8 hours  heparin  Injectable 5000 Unit(s) SubCutaneous every 8 hours  senna 2 Tablet(s) Oral at bedtime  sodium chloride 0.9%. 1000 milliLiter(s) (60 mL/Hr) IV Continuous <Continuous>      Vital Signs Last 24 Hrs  T(C): 37.3 (12-14-19 @ 10:18), Max: 37.5 (12-14-19 @ 05:49)  T(F): 99.1 (12-14-19 @ 10:18), Max: 99.5 (12-14-19 @ 05:49)  HR: 62 (12-14-19 @ 10:18) (62 - 86)  BP: 130/64 (12-14-19 @ 10:18) (119/67 - 134/84)  BP(mean): --  RR: 18 (12-14-19 @ 10:18) (18 - 18)  SpO2: 92% (12-14-19 @ 10:18) (92% - 97%)    Physical Exam:    Constitutional well preserved,comfortable,pleasant    HEENT PERRLA EOMI,No pallor or icterus    No oral exudate or erythema    Neck supple no JVD or LN    Chest Good AE,CTA    CVS RRR S1 S2 WNl No murmur or rub or gallop    Abd soft BS normal No tenderness no masses    Ext No cyanosis clubbing or edema    IV site no erythema tenderness or discharge    Joints no swelling or LOM    CNS AAO X 3 no focal    Lab Data:                          10.6   22.75 )-----------( 354      ( 14 Dec 2019 10:55 )             32.6       12-14    133<L>  |  100  |  43<H>  ----------------------------<  166<H>  4.4   |  22  |  1.20    Ca    9.4      14 Dec 2019 07:19            .Blood Blood-Peripheral  12-12-19   No growth to date.  --  --      .Blood Blood-Peripheral  12-12-19   No growth to date.  --  --      .Blood Blood  12-09-19   Growth in anaerobic bottle: Staphylococcus aureus  See previous culture [id number]  --    Growth in anaerobic bottle: Gram Positive Cocci in Clusters      .Blood Blood  12-08-19   Growth in aerobic and anaerobic bottles: Staphylococcus aureus  "Due to technical problems, Proteus sp. will Not be reported as part of  the BCID panel until further notice"  ***Blood Panel PCR results on this specimen are available  approximately 3 hours after the Gram stain result.***  Gram stain, PCR, and/or culture results may not always  correspond due to difference in methodologies.  ************************************************************  This PCR assay was performed using [employer].  The following targets are tested for: Enterococcus,  vancomycin resistant enterococci, Listeria monocytogenes,  coagulase negative staphylococci, S. aureus,  methicillin resistant S. aureus, Streptococcus agalactiae  (Group B), S. pneumoniae, S. pyogenes (Group A),  Acinetobacter baumannii, Enterobacter cloacae, E. coli,  Klebsiella oxytoca, K. pneumoniae, Proteus sp.,  Serratia marcescens, Haemophilus influenzae,  Neisseria meningitidis, Pseudomonas aeruginosa, Candida  albicans, C. glabrata, C krusei, C parapsilosis,  C. tropicalis and the KPC resistance gene.  --  Blood Culture PCR  Staphylococcus aureus      .Urine Clean Catch (Midstream)  12-08-19   No growth  --  --        Culture - Blood (12.12.19 @ 21:31)    Specimen Source: .Blood Blood-Peripheral    Culture Results:   No growth to date.                WBC Count: 22.75 (12-14-19 @ 10:55)  WBC Count: 23.47 (12-13-19 @ 10:02)  WBC Count: 28.41 (12-12-19 @ 14:22)  WBC Count: 25.86 (12-11-19 @ 08:18)  WBC Count: 29.51 (12-10-19 @ 09:11)  WBC Count: 34.05 (12-09-19 @ 18:10)  WBC Count: 33.98 (12-09-19 @ 08:33)  WBC Count: 25.69 (12-07-19 @ 22:22) Patient is a 79y old  Male who presents with a chief complaint of Back pain (14 Dec 2019 09:56)    Being followed by ID for        Interval history:  pt sleeping currently but according to family is awake  Wife describes recent toe infection  no diarrhea  No other acute events      PAST MEDICAL & SURGICAL HISTORY:  CAD S/P percutaneous coronary angioplasty  HLD (hyperlipidemia)  Back pain  OA (osteoarthritis)  Gout  HTN (hypertension)  GERD (gastroesophageal reflux disease)  S/P lumbar spine operation: 6 thoracic to lumbar surgeries over 6 months period starting 11/2006  S/P knee replacement: 11/2013    Allergies    Indocin (Unknown)  oxacillin (Unknown)  Zyvox (Unknown)    Intolerances      Antimicrobials:    ceFAZolin   IVPB 2000 milliGRAM(s) IV Intermittent every 8 hours    MEDICATIONS  (STANDING):  allopurinol 300 milliGRAM(s) Oral daily  amLODIPine   Tablet 10 milliGRAM(s) Oral daily  aspirin enteric coated 81 milliGRAM(s) Oral daily  atorvastatin 10 milliGRAM(s) Oral at bedtime  ceFAZolin   IVPB 2000 milliGRAM(s) IV Intermittent every 8 hours  heparin  Injectable 5000 Unit(s) SubCutaneous every 8 hours  senna 2 Tablet(s) Oral at bedtime  sodium chloride 0.9%. 1000 milliLiter(s) (60 mL/Hr) IV Continuous <Continuous>      Vital Signs Last 24 Hrs  T(C): 37.3 (12-14-19 @ 10:18), Max: 37.5 (12-14-19 @ 05:49)  T(F): 99.1 (12-14-19 @ 10:18), Max: 99.5 (12-14-19 @ 05:49)  HR: 62 (12-14-19 @ 10:18) (62 - 86)  BP: 130/64 (12-14-19 @ 10:18) (119/67 - 134/84)  BP(mean): --  RR: 18 (12-14-19 @ 10:18) (18 - 18)  SpO2: 92% (12-14-19 @ 10:18) (92% - 97%)    Physical Exam:    Constitutional well preserved,comfortable,pleasant    HEENT PERRLA EOMI,No pallor or icterus    No oral exudate or erythema    Neck supple no JVD or LN    Chest Good AE,CTA    CVS RRR S1 S2     Abd soft BS normal No tenderness     Ext No cyanosis clubbing or edema    IV site no erythema tenderness or discharge    Joints no swelling or LOM        Lab Data:                          10.6   22.75 )-----------( 354      ( 14 Dec 2019 10:55 )             32.6       12-14    133<L>  |  100  |  43<H>  ----------------------------<  166<H>  4.4   |  22  |  1.20    Ca    9.4      14 Dec 2019 07:19            .Blood Blood-Peripheral  12-12-19   No growth to date.  --  --      .Blood Blood-Peripheral  12-12-19   No growth to date.  --  --      .Blood Blood  12-09-19   Growth in anaerobic bottle: Staphylococcus aureus  See previous culture [id number]  --    Growth in anaerobic bottle: Gram Positive Cocci in Clusters      .Blood Blood  12-08-19   Growth in aerobic and anaerobic bottles: Staphylococcus aureus  "Due to technical problems, Proteus sp. will Not be reported as part of  the BCID panel until further notice"  ***Blood Panel PCR results on this specimen are available  approximately 3 hours after the Gram stain result.***  Gram stain, PCR, and/or culture results may not always  correspond due to difference in methodologies.  ************************************************************  This PCR assay was performed using [employer].  The following targets are tested for: Enterococcus,  vancomycin resistant enterococci, Listeria monocytogenes,  coagulase negative staphylococci, S. aureus,  methicillin resistant S. aureus, Streptococcus agalactiae  (Group B), S. pneumoniae, S. pyogenes (Group A),  Acinetobacter baumannii, Enterobacter cloacae, E. coli,  Klebsiella oxytoca, K. pneumoniae, Proteus sp.,  Serratia marcescens, Haemophilus influenzae,  Neisseria meningitidis, Pseudomonas aeruginosa, Candida  albicans, C. glabrata, C krusei, C parapsilosis,  C. tropicalis and the KPC resistance gene.  --  Blood Culture PCR  Staphylococcus aureus  Culture - Blood (12.08.19 @ 04:15)    -  Cefazolin: S <=4    -  Gentamicin: S <=1 Should not be used as monotherapy    -  Clindamycin: S 0.5    -  Erythromycin: S <=0.25    -  Tetra/Doxy: S <=1    -  Trimethoprim/Sulfamethoxazole: S <=0.5/9.5    -  Vancomycin: S 1    -  RIF- Rifampin: S <=1 Should not be used as monotherapy    -  Oxacillin: S <=0.25    -  Penicillin: R 4    -  Staphylococcus aureus: Detec Any isolate of Staphylococcus aureus from a blood culture is NOT considered a contaminant.    Gram Stain:   Growth in aerobic bottle: Gram Positive Cocci in Clusters  Growth in anaerobic bottle: Gram Positive Cocci in Clusters    -  Ampicillin/Sulbactam: S <=8/4    Specimen Source: .Blood Blood    Organism: Blood Culture PCR    Organism: Staphylococcus aureus    Culture Results:   Growth in aerobic and anaerobic bottles: Staphylococcus aureus  "Due to technical problems, Proteus sp. will Not be reported as part of  the BCID panel until further notice"  ***Blood Panel PCR results on this specimen are available  approximately 3 hours after the Gram stain result.***  Gram stain, PCR, and/or culture results may not always  correspond due to difference in methodologies.  ************************************************************  This PCR assay was performed using [employer].  The following targets are tested for: Enterococcus,  vancomycin resistant enterococci, Listeria monocytogenes,  coagulase negative staphylococci, S. aureus,  methicillin resistant S. aureus, Streptococcus agalactiae  (Group B), S. pneumoniae, S. pyogenes (Group A),  Acinetobacter baumannii, Enterobacter cloacae, E. coli,  Klebsiella oxytoca, K. pneumoniae, Proteus sp.,  Serratia marcescens, Haemophilus influenzae,  Neisseria meningitidis, Pseudomonas aeruginosa, Candida  albicans, C. glabrata, C krusei, C parapsilosis,  C. tropicalis and the KPC resistance gene.    Organism Identification: Blood Culture PCR  Staphylococcus aureus    Method Type: PCR    Method Type: JEANA        .Urine Clean Catch (Midstream)  12-08-19   No growth  --  --        Culture - Blood (12.12.19 @ 21:31)    Specimen Source: .Blood Blood-Peripheral    Culture Results:   No growth to date.        WBC Count: 22.75 (12-14-19 @ 10:55)  WBC Count: 23.47 (12-13-19 @ 10:02)  WBC Count: 28.41 (12-12-19 @ 14:22)  WBC Count: 25.86 (12-11-19 @ 08:18)  WBC Count: 29.51 (12-10-19 @ 09:11)  WBC Count: 34.05 (12-09-19 @ 18:10)  WBC Count: 33.98 (12-09-19 @ 08:33)  WBC Count: 25.69 (12-07-19 @ 22:22)

## 2019-12-14 NOTE — PROGRESS NOTE ADULT - SUBJECTIVE AND OBJECTIVE BOX
Patient is a 79y old  Male who presents with a chief complaint of Back pain (13 Dec 2019 14:19)      Any change in ROS: Doing ok : no SOB : no cough     MEDICATIONS  (STANDING):  allopurinol 300 milliGRAM(s) Oral daily  amLODIPine   Tablet 10 milliGRAM(s) Oral daily  aspirin enteric coated 81 milliGRAM(s) Oral daily  atorvastatin 10 milliGRAM(s) Oral at bedtime  ceFAZolin   IVPB 2000 milliGRAM(s) IV Intermittent every 8 hours  heparin  Injectable 5000 Unit(s) SubCutaneous every 8 hours  senna 2 Tablet(s) Oral at bedtime  sodium chloride 0.9%. 1000 milliLiter(s) (60 mL/Hr) IV Continuous <Continuous>    MEDICATIONS  (PRN):  acetaminophen   Tablet .. 650 milliGRAM(s) Oral every 6 hours PRN Temp greater or equal to 38C (100.4F)  morphine  - Injectable 2 milliGRAM(s) IV Push every 4 hours PRN Moderate Pain (4 - 6)    Vital Signs Last 24 Hrs  T(C): 37.5 (14 Dec 2019 05:49), Max: 37.5 (14 Dec 2019 05:49)  T(F): 99.5 (14 Dec 2019 05:49), Max: 99.5 (14 Dec 2019 05:49)  HR: 62 (14 Dec 2019 05:49) (62 - 86)  BP: 119/67 (14 Dec 2019 05:49) (119/67 - 134/84)  BP(mean): --  RR: 18 (14 Dec 2019 05:49) (18 - 18)  SpO2: 94% (14 Dec 2019 05:49) (94% - 97%)    I&O's Summary    13 Dec 2019 07:01  -  14 Dec 2019 07:00  --------------------------------------------------------  IN: 1060 mL / OUT: 500 mL / NET: 560 mL          Physical Exam:   GENERAL: NAD, well-groomed, well-developed  HEENT: CARLEEN/   Atraumatic, Normocephalic  ENMT: No tonsillar erythema, exudates, or enlargement; Moist mucous membranes, Good dentition, No lesions  NECK: Supple, No JVD, Normal thyroid  CHEST/LUNG: no wheezing anteriorly  CVS: Regular rate and rhythm; No murmurs, rubs, or gallops  GI: : Soft, Nontender, Nondistended; Bowel sounds present  NERVOUS SYSTEM:  Alert & Oriented X3  EXTREMITIES:  2+ Peripheral Pulses, No clubbing, cyanosis, or edema  LYMPH: No lymphadenopathy noted  SKIN: No rashes or lesions  ENDOCRINOLOGY: No Thyromegaly  PSYCH: Appropriate    Labs:  23                            12.0   23.47 )-----------( 369      ( 13 Dec 2019 10:02 )             37.9                         12.3   28.41 )-----------( 327      ( 12 Dec 2019 14:22 )             39.0                         12.1   25.86 )-----------( 257      ( 11 Dec 2019 08:18 )             36.8     12-14    133<L>  |  100  |  43<H>  ----------------------------<  166<H>  4.4   |  22  |  1.20  12-13    135  |  100  |  34<H>  ----------------------------<  173<H>  4.3   |  25  |  1.15  12-12    134<L>  |  96  |  34<H>  ----------------------------<  215<H>  4.3   |  24  |  1.20  12-11    132<L>  |  97  |  32<H>  ----------------------------<  205<H>  4.3   |  22  |  0.96    Ca    9.4      14 Dec 2019 07:19  Ca    9.0      13 Dec 2019 09:33    TPro  6.3  /  Alb  2.4<L>  /  TBili  0.6  /  DBili  x   /  AST  13  /  ALT  16  /  AlkPhos  120  12-11    CAPILLARY BLOOD GLUCOSE                      RECENT CULTURES:  12-12 @ 21:31 .Blood Blood-Peripheral       < from: NM SPECT/CT Inflamm Process (12.11.19 @ 10:27) >  SPECT/CT images of the thoracolumbar region were obtained 48 hours   following administration of radiopharmaceutical.     COMPARISON: No prior gallium scan. CT of chest, abdomen and pelvis, and   CT of the head from 12/10/2019, MRI of thoracic spine from 12/8/2019 and   MRI of the lumbar spine from 12/7/2019 were reviewed.    FINDINGS: There is there is heterogeneous radiogallium uptake in the   thoracolumbar spine with no definite abnormality on CT. There is mild   diffuse uptake in the lungs, which is isointense with liver uptake   period. There is physiologic radiotracer activity in the remainder of the   visualized structures.    IMPRESSION: Gallium scan demonstrates:    Nonspecific heterogeneous uptake in the thoracolumbar spine which may be   secondary to post surgical change and/or degenerative change.     Mild diffuse activity in bilateral lungs. This may reflect inflammation.   Please correlate clinically.                        GENO FAJARDO M.D., NUCLEAR MEDICINE ATTENDING  This document has been electronically signed. Dec 11 2019  1:32PM              < end of copied text >           No growth to date.    12-12 @ 14:32 .Blood Blood-Peripheral                No growth to date.    12-09 @ 22:05 .Blood Blood       Growth in anaerobic bottle: Gram Positive Cocci in Clusters           Growth in anaerobic bottle: Staphylococcus aureus  See previous culture [id number]    12-08 @ 04:15 .Blood Blood   PCR    Growth in aerobic bottle: Gram Positive Cocci in Clusters  Growth in anaerobic bottle: Gram Positive Cocci in Clusters    Blood Culture PCR  Staphylococcus aureus  Blood Culture PCR     Growth in aerobic and anaerobic bottles: Staphylococcus aureus  "Due to technical problems, Proteus sp. will Not be reported as part of  the BCID panel until further notice"  ***Blood Panel PCR results on this specimen are available  approximately 3 hours after the Gram stain result.***  Gram stain, PCR, and/or culture results may not always  correspond due to difference in methodologies.  ************************************************************  This PCR assay was performed using [employer].  The following targets are tested for: Enterococcus,  vancomycin resistant enterococci, Listeria monocytogenes,  coagulase negative staphylococci, S. aureus,  methicillin resistant S. aureus, Streptococcus agalactiae  (Group B), S. pneumoniae, S. pyogenes (Group A),  Acinetobacter baumannii, Enterobacter cloacae, E. coli,  Klebsiella oxytoca, K. pneumoniae, Proteus sp.,  Serratia marcescens, Haemophilus influenzae,  Neisseria meningitidis, Pseudomonas aeruginosa, Candida  albicans, C. glabrata, C krusei, C parapsilosis,  C. tropicalis and the KPC resistance gene.    12-08 @ 04:06 .Urine Clean Catch (Midstream)                No growth          RESPIRATORY CULTURES:          Studies  Chest X-RAY  CT SCAN Chest   Venous Dopplers: LE:   CT Abdomen  Others      < from: CT Chest w/ IV Cont (12.10.19 @ 21:13) >  BONES: Severe degenerative changes in the spine. Status post posterior   fusion and laminectomy from T9 to T12.Status post L4-L5 laminectomy.    IMPRESSION:     Parenchymal opacities in the lower lobes bilaterally, which may be   secondary to atelectasis versus pneumonia.     Cardiomegaly.    Degenerative changes and findings in the spine which were more fully   evaluated on MRI dated 12/7/2019.                          LIONEL LAUREN M.D., ATTENDING RADIOLOGIST  This document has been electronically signed. Dec 11 2019  9:10AM              < end of copied text >

## 2019-12-14 NOTE — PROGRESS NOTE ADULT - ASSESSMENT
79 yr old male with HTN, DCHF, CAD, S/P PCI to OM1, Mid LAD (9/2014, [facility], [facility], [facility]) hx of ischemic CMP, AS, DM HLD, GERD, GOUT, s/p multiple spinal surgery admitted with back pain and fever    1. Back Pain  -stable, MRI noted, neuro/neuro sx followup  -ID f/u, pain control     2. CAD s/p PCI (OM1/LAD)   -stable, cont cont ASA ,statin    3. Acute on Chronic systolic & Diastolic CHF, New LV dysfunction   -echo with severe LV sys dysfxn, EF 20-25%, mild diastolic dysfxn  -LAZARO 12/13 with moderate lv dysfunction  -recent echo few months ago with normal LV function  -possible new cardiomyopathy in setting of infection, sepsis  -will continue medical therapy for now as he is not a candidate for ischemic eval   -as outpt was off beta blocker due to BB induced fatigue   -will eventually restart Cozaar at 25 mg daily if sbp stable   -appears volume overloaded with diffuse edema of upper and lower ext in setting of abx and ivf ressuc  -iv lasix 20mg x 1 today     4. Aortic Stenosis  -stable, severe on LAZARO yesterday with valve area of 0.9, tte this admit and as outpt with moderate AS  -gradients low on lazaro yesterday, perhaps due to new lv dysfxn  -YULY likely underestimated  -patient was asymptomatic as outpt from valve disease  -no further workup indicated, will follow as outpatient     5. HTN   -BP stable, continue with current antihtn meds     6. Fever/ Sepsis   + blood cultures with gram positive Cocci in clusters   -iv abx, ID f/u   -LAZARO without endocarditis     dvt ppx     d/w patient, wife, and sone at bedside

## 2019-12-14 NOTE — PROGRESS NOTE ADULT - ASSESSMENT
MSSA bacteremia  Bibasilar atelectasis with probable pneumonia: possibly bacteremic  CAD with severe AS and Systolic LV dysfunction  r/o OP dysphagia    REC    Continue MSSA coverage to include pulmonary source  WIll need additional blood cultures X2 to cement clearance  Suggest swallow eval  Alternative MSSA source imaging w/u per ID  Cardiology f/u  Dr Maxwell covering this weekend    12/14: gallium can noted: cont antibiotics breathing wise he seems to be doing well: cont oxygen to maintain o2 sao2 above 90%    dw family

## 2019-12-14 NOTE — PROGRESS NOTE ADULT - SUBJECTIVE AND OBJECTIVE BOX
CARDIOLOGY FOLLOW UP NOTE - DR. RHODES    Subjective:    less lethargic today   answering questions  no chest pain, sob, palpitations    PHYSICAL EXAM:  T(C): 37.3 (12-14-19 @ 10:18), Max: 37.5 (12-14-19 @ 05:49)  HR: 62 (12-14-19 @ 10:18) (62 - 86)  BP: 130/64 (12-14-19 @ 10:18) (119/67 - 134/84)  RR: 18 (12-14-19 @ 10:18) (18 - 18)  SpO2: 92% (12-14-19 @ 10:18) (92% - 97%)  Wt(kg): --  I&O's Summary    13 Dec 2019 07:01  -  14 Dec 2019 07:00  --------------------------------------------------------  IN: 1060 mL / OUT: 500 mL / NET: 560 mL    14 Dec 2019 07:01  -  14 Dec 2019 12:10  --------------------------------------------------------  IN: 240 mL / OUT: 0 mL / NET: 240 mL      Daily     Daily     Appearance: Normal	  Cardiovascular: Normal S1 S2,RRR, sm  Respiratory: Lungs clear to auscultation	  Gastrointestinal:  Soft, Non-tender, + BS	  Extremities: Normal range of motion, + edema      Home Medications:  allopurinol 300 mg oral tablet: 1 tab(s) orally once a day (09 Dec 2019 11:15)  aspirin 81 mg oral delayed release tablet: 1 tab(s) orally once a day (09 Dec 2019 11:15)  Co Q-10 100 mg oral capsule: 1 cap(s) orally once a day (09 Dec 2019 11:15)  diclofenac sodium 75 mg oral delayed release tablet: 1 tab(s) orally 2 times a day (09 Dec 2019 11:15)  lansoprazole 15 mg oral delayed release capsule: 1 cap(s) orally once a day (09 Dec 2019 11:15)  losartan 100 mg oral tablet: 1 tab(s) orally once a day (09 Dec 2019 11:15)  lovastatin 20 mg oral tablet: 1 tab(s) orally once a day (09 Dec 2019 11:15)  miSOPROStol 200 mcg oral tablet: 1 tab(s) orally 2 times a day (09 Dec 2019 11:15)  Toprol-XL 25 mg oral tablet, extended release: 0.5 tab(s) orally once a day (09 Dec 2019 11:15)  Vitamin D3 1000 intl units oral tablet: 1 tab(s) orally once a day (09 Dec 2019 11:15)      MEDICATIONS  (STANDING):  allopurinol 300 milliGRAM(s) Oral daily  amLODIPine   Tablet 10 milliGRAM(s) Oral daily  aspirin enteric coated 81 milliGRAM(s) Oral daily  atorvastatin 10 milliGRAM(s) Oral at bedtime  ceFAZolin   IVPB 2000 milliGRAM(s) IV Intermittent every 8 hours  heparin  Injectable 5000 Unit(s) SubCutaneous every 8 hours  senna 2 Tablet(s) Oral at bedtime  sodium chloride 0.9%. 1000 milliLiter(s) (60 mL/Hr) IV Continuous <Continuous>      TELEMETRY: 	    ECG:  	  RADIOLOGY:   DIAGNOSTIC TESTING:  [ ] Echocardiogram:  [ ] Catheterization:  [ ] Stress Test:    OTHER: 	    LABS:	 	    CARDIAC MARKERS:                                10.6   22.75 )-----------( 354      ( 14 Dec 2019 10:55 )             32.6     12-14    133<L>  |  100  |  43<H>  ----------------------------<  166<H>  4.4   |  22  |  1.20    Ca    9.4      14 Dec 2019 07:19      proBNP:     Lipid Profile:   HgA1c:     Creatinine, Serum: 1.20 mg/dL (12-14-19 @ 07:19)  Creatinine, Serum: 1.15 mg/dL (12-13-19 @ 09:33)  Creatinine, Serum: 1.20 mg/dL (12-12-19 @ 09:51)

## 2019-12-14 NOTE — PROGRESS NOTE ADULT - SUBJECTIVE AND OBJECTIVE BOX
Patient is a 79y old  Male who presents with a chief complaint of Back pain (14 Dec 2019 12:10)      SUBJECTIVE / OVERNIGHT EVENTS:    Patient seen and examined. wife and son at beside states pt mental status improved but still sleepy. no acute events. afebrile.      Vital Signs Last 24 Hrs  T(C): 37.3 (14 Dec 2019 10:18), Max: 37.5 (14 Dec 2019 05:49)  T(F): 99.1 (14 Dec 2019 10:18), Max: 99.5 (14 Dec 2019 05:49)  HR: 62 (14 Dec 2019 10:18) (62 - 86)  BP: 130/64 (14 Dec 2019 10:18) (119/67 - 134/84)  BP(mean): --  RR: 18 (14 Dec 2019 10:18) (18 - 18)  SpO2: 92% (14 Dec 2019 10:18) (92% - 97%)  I&O's Summary    13 Dec 2019 07:01  -  14 Dec 2019 07:00  --------------------------------------------------------  IN: 1060 mL / OUT: 500 mL / NET: 560 mL    14 Dec 2019 07:01  -  14 Dec 2019 12:25  --------------------------------------------------------  IN: 240 mL / OUT: 0 mL / NET: 240 mL        PE:  GENERAL: NAD, AAOx1  HEAD:  Atraumatic, Normocephalic  EYES: EOMI, PERRLA, conjunctiva and sclera clear  NECK: Supple, No JVD  CHEST/LUNG: Coarse bs  HEART: Regular rate and rhythm; + murmur  ABDOMEN: Soft, Nontender, Nondistended; Bowel sounds present  EXTREMITIES:  2+ Peripheral Pulses, +1 le edema  SKIN: No rashes or lesions  NEURO: No focal deficits    LABS:                        10.6   22.75 )-----------( 354      ( 14 Dec 2019 10:55 )             32.6     12-14    133<L>  |  100  |  43<H>  ----------------------------<  166<H>  4.4   |  22  |  1.20    Ca    9.4      14 Dec 2019 07:19        CAPILLARY BLOOD GLUCOSE                RADIOLOGY & ADDITIONAL TESTS:    Imaging Personally Reviewed:  [x] YES  [ ] NO    Consultant(s) Notes Reviewed:  [x] YES  [ ] NO    MEDICATIONS  (STANDING):  allopurinol 300 milliGRAM(s) Oral daily  amLODIPine   Tablet 10 milliGRAM(s) Oral daily  aspirin enteric coated 81 milliGRAM(s) Oral daily  atorvastatin 10 milliGRAM(s) Oral at bedtime  ceFAZolin   IVPB 2000 milliGRAM(s) IV Intermittent every 8 hours  furosemide   Injectable 20 milliGRAM(s) IV Push once  heparin  Injectable 5000 Unit(s) SubCutaneous every 8 hours  senna 2 Tablet(s) Oral at bedtime  sodium chloride 0.9%. 1000 milliLiter(s) (60 mL/Hr) IV Continuous <Continuous>    MEDICATIONS  (PRN):  acetaminophen   Tablet .. 650 milliGRAM(s) Oral every 6 hours PRN Temp greater or equal to 38C (100.4F)  morphine  - Injectable 2 milliGRAM(s) IV Push every 4 hours PRN Moderate Pain (4 - 6)      Care Discussed with Consultants/Other Providers [x] YES  [ ] NO    HEALTH ISSUES - PROBLEM Dx:  Need for prophylactic measure: Need for prophylactic measure  Gout: Gout  Essential hypertension: Essential hypertension  CAD (coronary artery disease): CAD (coronary artery disease)  KRIS (acute kidney injury): KRIS (acute kidney injury)  Sepsis: Sepsis  Weakness of both lower extremities: Weakness of both lower extremities  Acute left-sided low back pain without sciatica: Acute left-sided low back pain without sciatica

## 2019-12-14 NOTE — PROGRESS NOTE ADULT - PROBLEM SELECTOR PLAN 3
MSSA bacteremia persistent, source 2/2 pneumonia vs cellulitis  cont ancef per ID, leukocytosis still elevated  12/12 blood cx NTD  Tylenol PRN fever  CT chest also concern for atelectasis vs pneumonia, lungs light up in nuclear scan  appreciate pulm recs  TTE moderate aortic stenosis, Severe concentric left ventricular hypertrophy, Severe left ventricular systolic dysfunction. JAHAIRA no vegetations

## 2019-12-15 LAB
ANION GAP SERPL CALC-SCNC: 12 MMOL/L — SIGNIFICANT CHANGE UP (ref 5–17)
BUN SERPL-MCNC: 65 MG/DL — HIGH (ref 7–23)
CALCIUM SERPL-MCNC: 9 MG/DL — SIGNIFICANT CHANGE UP (ref 8.4–10.5)
CHLORIDE SERPL-SCNC: 100 MMOL/L — SIGNIFICANT CHANGE UP (ref 96–108)
CO2 SERPL-SCNC: 19 MMOL/L — LOW (ref 22–31)
CREAT SERPL-MCNC: 1.61 MG/DL — HIGH (ref 0.5–1.3)
CULTURE RESULTS: SIGNIFICANT CHANGE UP
GLUCOSE SERPL-MCNC: 170 MG/DL — HIGH (ref 70–99)
HCT VFR BLD CALC: 32.7 % — LOW (ref 39–50)
HGB BLD-MCNC: 10.7 G/DL — LOW (ref 13–17)
MCHC RBC-ENTMCNC: 29.3 PG — SIGNIFICANT CHANGE UP (ref 27–34)
MCHC RBC-ENTMCNC: 32.7 GM/DL — SIGNIFICANT CHANGE UP (ref 32–36)
MCV RBC AUTO: 89.6 FL — SIGNIFICANT CHANGE UP (ref 80–100)
NRBC # BLD: 0 /100 WBCS — SIGNIFICANT CHANGE UP (ref 0–0)
PLATELET # BLD AUTO: 400 K/UL — SIGNIFICANT CHANGE UP (ref 150–400)
POTASSIUM SERPL-MCNC: 4.2 MMOL/L — SIGNIFICANT CHANGE UP (ref 3.5–5.3)
POTASSIUM SERPL-SCNC: 4.2 MMOL/L — SIGNIFICANT CHANGE UP (ref 3.5–5.3)
RBC # BLD: 3.65 M/UL — LOW (ref 4.2–5.8)
RBC # FLD: 14.5 % — SIGNIFICANT CHANGE UP (ref 10.3–14.5)
SODIUM SERPL-SCNC: 131 MMOL/L — LOW (ref 135–145)
WBC # BLD: 21.86 K/UL — HIGH (ref 3.8–10.5)
WBC # FLD AUTO: 21.86 K/UL — HIGH (ref 3.8–10.5)

## 2019-12-15 RX ORDER — FUROSEMIDE 40 MG
20 TABLET ORAL DAILY
Refills: 0 | Status: DISCONTINUED | OUTPATIENT
Start: 2019-12-16 | End: 2019-12-16

## 2019-12-15 RX ORDER — ACETAMINOPHEN 500 MG
1000 TABLET ORAL ONCE
Refills: 0 | Status: DISCONTINUED | OUTPATIENT
Start: 2019-12-15 | End: 2020-01-06

## 2019-12-15 RX ORDER — TRAMADOL HYDROCHLORIDE 50 MG/1
25 TABLET ORAL
Refills: 0 | Status: DISCONTINUED | OUTPATIENT
Start: 2019-12-15 | End: 2019-12-17

## 2019-12-15 RX ORDER — ACETAMINOPHEN 500 MG
650 TABLET ORAL EVERY 6 HOURS
Refills: 0 | Status: DISCONTINUED | OUTPATIENT
Start: 2019-12-15 | End: 2020-01-06

## 2019-12-15 RX ADMIN — HEPARIN SODIUM 5000 UNIT(S): 5000 INJECTION INTRAVENOUS; SUBCUTANEOUS at 15:03

## 2019-12-15 RX ADMIN — Medication 300 MILLIGRAM(S): at 12:45

## 2019-12-15 RX ADMIN — Medication 100 MILLIGRAM(S): at 05:47

## 2019-12-15 RX ADMIN — MORPHINE SULFATE 2 MILLIGRAM(S): 50 CAPSULE, EXTENDED RELEASE ORAL at 08:20

## 2019-12-15 RX ADMIN — MORPHINE SULFATE 2 MILLIGRAM(S): 50 CAPSULE, EXTENDED RELEASE ORAL at 08:50

## 2019-12-15 RX ADMIN — Medication 81 MILLIGRAM(S): at 12:45

## 2019-12-15 RX ADMIN — HEPARIN SODIUM 5000 UNIT(S): 5000 INJECTION INTRAVENOUS; SUBCUTANEOUS at 05:47

## 2019-12-15 RX ADMIN — Medication 100 MILLIGRAM(S): at 15:04

## 2019-12-15 RX ADMIN — ATORVASTATIN CALCIUM 10 MILLIGRAM(S): 80 TABLET, FILM COATED ORAL at 22:00

## 2019-12-15 RX ADMIN — TRAMADOL HYDROCHLORIDE 25 MILLIGRAM(S): 50 TABLET ORAL at 15:03

## 2019-12-15 RX ADMIN — Medication 100 MILLIGRAM(S): at 22:00

## 2019-12-15 RX ADMIN — AMLODIPINE BESYLATE 10 MILLIGRAM(S): 2.5 TABLET ORAL at 05:47

## 2019-12-15 RX ADMIN — TRAMADOL HYDROCHLORIDE 25 MILLIGRAM(S): 50 TABLET ORAL at 15:33

## 2019-12-15 RX ADMIN — HEPARIN SODIUM 5000 UNIT(S): 5000 INJECTION INTRAVENOUS; SUBCUTANEOUS at 22:00

## 2019-12-15 NOTE — PROGRESS NOTE ADULT - ASSESSMENT
80 y/o male with h/o of CAD s/p PCI 1/2017 with 10% LAD stenosis OM1, HTN, HLD, GERD, Gout, Osteoarthritis and chronic back pain, s/p multiple laminectomies and spinal fusion, left TKR who presents with worsening back pain and fevers for approx a week. bld cx MSSA bacteremia

## 2019-12-15 NOTE — SWALLOW BEDSIDE ASSESSMENT ADULT - ASR SWALLOW ASPIRATION MONITOR
change of breathing pattern/pneumonia/fever/upper respiratory infection/throat clearing/cough/gurgly voice

## 2019-12-15 NOTE — PROGRESS NOTE ADULT - PROBLEM SELECTOR PLAN 1
MR Thoracic Spine no acute findings  nuclear scan nonspecific heterogeneous uptake in the thoracolumbar spine which may be secondary to post surgical change and/or degenerative change.  no acute intervention per neurosurgery  tylenol for mild pain, add tramadol for moderate pain

## 2019-12-15 NOTE — SWALLOW BEDSIDE ASSESSMENT ADULT - PHARYNGEAL PHASE
Within functional limits Cough post oral intake/Multiple swallows/Suspected delayed pharyngeal swallow trigger

## 2019-12-15 NOTE — SWALLOW BEDSIDE ASSESSMENT ADULT - SLP PERTINENT HISTORY OF CURRENT PROBLEM
78 y/o male with h/o of CAD s/p PCI 1/2017 with 10% LAD stenosis OM1, HTN, HLD, GERD, Gout, Osteoarthritis and chronic back pain, s/p multiple laminectomies and spinal fusion, left TKR who presents with worsening back pain for approx a week. Pt reports that for the past week he has had progressive worsening of his lower back pain and weakness in his bilateral lower extremities that progressed to inability to ambulate last night. Pain radiates down his left leg. This was associated with fevers at home. No loss of bowel or bladder contents. Of note, pt was treated for cellulitis a month ago with Augmentin. 80 y/o male with h/o of CAD s/p PCI 1/2017 with 10% LAD stenosis OM1, HTN, HLD, GERD, Gout, Osteoarthritis and chronic back pain, s/p multiple laminectomies and spinal fusion, left TKR who presents with worsening back pain for approx a week. Pt reports that for the past week he has had progressive worsening of his lower back pain and weakness in his bilateral lower extremities that progressed to inability to ambulate. Pain radiates down his left leg. This was associated with fevers at home. No loss of bowel or bladder contents. Of note, pt was treated for cellulitis a month ago with Augmentin.

## 2019-12-15 NOTE — PROGRESS NOTE ADULT - ASSESSMENT
79 yr old male with HTN, DCHF, CAD, S/P PCI to OM1, Mid LAD (9/2014, [facility], [facility], [facility]) hx of ischemic CMP, AS, DM HLD, GERD, GOUT, s/p multiple spinal surgery admitted with back pain and fever    1. Back Pain  -stable, MRI noted, neuro/neuro sx followup  -ID f/u, pain control     2. CAD s/p PCI (OM1/LAD)   -stable, cont cont ASA ,statin    3. Acute on Chronic systolic & Diastolic CHF, New LV dysfunction   -echo with severe LV sys dysfxn, EF 20-25%, mild diastolic dysfxn  -LAZARO 12/13 with moderate lv dysfunction  -recent echo few months ago with normal LV function  -possible new cardiomyopathy in setting of infection, sepsis  -will continue medical therapy for now as he is not a candidate for ischemic eval   -as outpt was off beta blocker due to BB induced fatigue   -will eventually restart Cozaar at 25 mg daily if sbp stable   -s/p lasix iv yesterday, edema improved  -lasix 20 po daily      4. Aortic Stenosis  -stable, severe on LAZARO with valve area of 0.9, tte this admit and as outpt with moderate AS  -gradients low on lazaro yesterday, perhaps due to new lv dysfxn  -YLUY likely underestimated  -patient was asymptomatic as outpt from valve disease  -no further workup indicated, will follow as outpatient     5. HTN   -BP stable, continue with current antihtn meds     6. Fever/ Sepsis   + blood cultures with gram positive Cocci in clusters   -iv abx, ID f/u   -LAZARO without endocarditis   -encephalopathy ?? delirium from infection ??  med f/u  dvt ppx       d/w patient, wife, and sone at bedside

## 2019-12-15 NOTE — PROGRESS NOTE ADULT - PROBLEM SELECTOR PLAN 4
likely sepsis related encephalopathy  monitor  ct head no acute changes  IVF while decreased PO intake  speech and swallow for MBS  PT OOB

## 2019-12-15 NOTE — SWALLOW BEDSIDE ASSESSMENT ADULT - SWALLOW EVAL: RECOMMENDED FEEDING/EATING TECHNIQUES
alternate food with liquid/maintain upright posture during/after eating for 30 mins/small sips/bites/check mouth frequently for oral residue/pocketing/crush medication (when feasible)/no straws/position upright (90 degrees)

## 2019-12-15 NOTE — PROGRESS NOTE ADULT - PROBLEM SELECTOR PLAN 3
MSSA bacteremia persistent, source 2/2 pneumonia vs cellulitis  cont ancef per ID, fu CBC  12/12 blood cx NTD  Tylenol PRN fever  CT chest also concern for atelectasis vs pneumonia, lungs light up in nuclear scan  appreciate pulm recs  TTE moderate aortic stenosis, Severe concentric left ventricular hypertrophy, Severe left ventricular systolic dysfunction. JAHAIRA no vegetations

## 2019-12-15 NOTE — SWALLOW BEDSIDE ASSESSMENT ADULT - ORAL PHASE
Within functional limits Decreased anterior-posterior movement of the bolus/Lingual stasis Decreased anterior-posterior movement of the bolus

## 2019-12-15 NOTE — SWALLOW BEDSIDE ASSESSMENT ADULT - SWALLOW EVAL: DIAGNOSIS
Pt seen for clinical bedside swallow evaluation Pt seen for clinical bedside swallow evaluation s/p family report dec’d intake, CT findings suggesting of bilateral lower lobe pna, and multiple provider contact notes for spiked fever. Pt endorsed poor appetite due to pain/discomfort in the bed and not wanting to use the bedpan. Endorsed coughing with liquids, which daughter at bedside confirmed. Today, patient presented with 1) oropharyngeal dysphagia characterized by slow, yet functional mastication of solids, reduced AP transfer with lingual stasis of solids, s/s suggestive of laryngeal penetration/aspiration with thin liquids. 2) Dysphonia

## 2019-12-15 NOTE — SWALLOW BEDSIDE ASSESSMENT ADULT - COMMENTS
History Cont.: Per H&P: 1) Acute left-sided low back pain without sciatica. MRI of thoracic and Lumbar spine performed overnight; no acute findings. Concern for potential infectious etiology given elevation in WBC and inflammatory markers. 2) Weakness of both lower extremities. 3) Sepsis. Elevation in WBC with fever and tachycardia. 4) KRIS; Plan: Last Cr in 9/18 was 0.8. 1.8 in ED. With KRIS. Possibly prerenal vs intrinsic renal in etiology. UA with proteinuria and hyaline casts.  Per cardio, no evidence of decomp CHF on exam. Being followed by ID. History oxacillin allergy (? fever), Zyvox (AMS); tolerated vanco on this visit. MICU consulted for Sepsis; however, not a candidate.  12/10: event note for fever. Pt given Tylenol, fever resolved. Fever likely in setting of MSSA bacteremia. Chart note for growth in anaerobic bottle- gram positive cocci in cluster.   10/11: Per IM, family c/o decreased PO intake. Pulm consulted for CT chest findings: Patchy bilateral groundglass opacities. Parenchymal opacities in the lower lobes bilaterally. Nuclear scan nonspecific heterogeneous uptake in the thoracolumbar spine which may be secondary to post surgical change and/or degenerative change. TTE moderate aortic stenosis, Severe concentric left ventricular hypertrophy, Severe left ventricular systolic dysfunction. Unable to rule out endocarditis. +JAHAIRA. Pt then with AMS; likely sepsis related encephalopathy.  12/12: Fever likely 2/2 MSSA bacteremia. Seen by pulm: Rapid flu negative, but no full panel done. \  Per cardio, echo with severe lv dysfxn which represents clinical decline from recent echo in past few months. Neuro surg consulted for regarding equivocal finding on gallium scan, t-spine post-op changes vs infection. No neurosurgical intervention, rec continued medical management.  SEE ADDENDUM FOR IMAGING: History Cont.: Per H&P: 1) Acute left-sided low back pain without sciatica. MRI of thoracic and Lumbar spine performed overnight; no acute findings. Concern for potential infectious etiology given elevation in WBC and inflammatory markers. 2) Weakness of both lower extremities. 3) Sepsis. Elevation in WBC with fever and tachycardia. 4) KRIS; Plan: Last Cr in 9/18 was 0.8. 1.8 in ED. With KRIS. Possibly prerenal vs intrinsic renal in etiology. UA with proteinuria and hyaline casts.  Per cardio, no evidence of decomp CHF on exam. Per ID: History oxacillin allergy (? fever), Zyvox (AMS); tolerated vanco on this visit. MICU consulted for Sepsis; however, not a candidate.  12/10: event note for fever. Pt given Tylenol, fever resolved. Fever likely in setting of MSSA bacteremia. Chart note for growth in anaerobic bottle- gram positive cocci in cluster.   10/11: Per IM, family c/o decreased PO intake. Pulm consulted for CT chest findings: Patchy bilateral groundglass opacities. Parenchymal opacities in the lower lobes bilaterally.   Nuclear scan nonspecific heterogeneous uptake in the thoracolumbar spine which may be secondary to post surgical change and/or degenerative change.   TTE moderate aortic stenosis, Severe concentric left ventricular hypertrophy, Severe left ventricular systolic dysfunction. Unable to rule out endocarditis. +JAHAIRA (results in addendum). Pt then with AMS; likely sepsis related encephalopathy.  12/12: Pt spiked another fever likely 2/2 MSSA bacteremia. Per pulm: Rapid flu negative, but no full panel done.   Per cardio, echo with severe lv dysfxn which represents clinical decline from recent echo in past few months.   Neuro surg consulted for regarding equivocal finding on gallium scan, t-spine post-op changes vs infection. No neurosurgical intervention, rec continued medical management.  SEE ADDENDUM FOR IMAGING: No overt, clinical s/s of aspiration/penetration

## 2019-12-15 NOTE — SWALLOW BEDSIDE ASSESSMENT ADULT - SLP GENERAL OBSERVATIONS
Pt encountered awake and alert, upright OOB to chair on room air. Daughter at bedside. Pt A&Ox4. Able to make wants and needs known; however, minimally latent responses. Pt presented with some confusion to his hospital course. +Hoarse vocal quality, per daughter this is new. Daughter reported pt mouth very dry due to decreased intake. Pt able to follow commands for evaluation purposes and occasionally benefited from repetition of directives. Denied odynophagia, GERD, pulmonary complications, or history of dysphagia. Patient endorsed +coughing with thin liquids which started during this hospitalization. SLP provided oral care prior to PO trials.

## 2019-12-15 NOTE — PROGRESS NOTE ADULT - SUBJECTIVE AND OBJECTIVE BOX
CARDIOLOGY FOLLOW UP NOTE - DR. RHODES    Subjective:    no chest pain, sob  sitting in chair     PHYSICAL EXAM:  T(C): 36.8 (12-15-19 @ 09:34), Max: 36.8 (12-15-19 @ 09:34)  HR: 62 (12-15-19 @ 09:34) (61 - 77)  BP: 118/60 (12-15-19 @ 09:34) (118/60 - 134/75)  RR: 18 (12-15-19 @ 09:34) (18 - 18)  SpO2: 93% (12-15-19 @ 09:34) (93% - 96%)  Wt(kg): --  I&O's Summary    14 Dec 2019 07:01  -  15 Dec 2019 07:00  --------------------------------------------------------  IN: 1850 mL / OUT: 450 mL / NET: 1400 mL      Daily     Daily     Appearance: Normal	  Cardiovascular: Normal S1 S2,RRR, sm  Respiratory: Lungs clear to auscultation	  Gastrointestinal:  Soft, Non-tender, + BS	  Extremities: Normal range of motion, b/l edema      Home Medications:  allopurinol 300 mg oral tablet: 1 tab(s) orally once a day (09 Dec 2019 11:15)  aspirin 81 mg oral delayed release tablet: 1 tab(s) orally once a day (09 Dec 2019 11:15)  Co Q-10 100 mg oral capsule: 1 cap(s) orally once a day (09 Dec 2019 11:15)  diclofenac sodium 75 mg oral delayed release tablet: 1 tab(s) orally 2 times a day (09 Dec 2019 11:15)  lansoprazole 15 mg oral delayed release capsule: 1 cap(s) orally once a day (09 Dec 2019 11:15)  losartan 100 mg oral tablet: 1 tab(s) orally once a day (09 Dec 2019 11:15)  lovastatin 20 mg oral tablet: 1 tab(s) orally once a day (09 Dec 2019 11:15)  miSOPROStol 200 mcg oral tablet: 1 tab(s) orally 2 times a day (09 Dec 2019 11:15)  Toprol-XL 25 mg oral tablet, extended release: 0.5 tab(s) orally once a day (09 Dec 2019 11:15)  Vitamin D3 1000 intl units oral tablet: 1 tab(s) orally once a day (09 Dec 2019 11:15)      MEDICATIONS  (STANDING):  acetaminophen  IVPB .. 1000 milliGRAM(s) IV Intermittent once  allopurinol 300 milliGRAM(s) Oral daily  amLODIPine   Tablet 10 milliGRAM(s) Oral daily  aspirin enteric coated 81 milliGRAM(s) Oral daily  atorvastatin 10 milliGRAM(s) Oral at bedtime  ceFAZolin   IVPB 2000 milliGRAM(s) IV Intermittent every 8 hours  heparin  Injectable 5000 Unit(s) SubCutaneous every 8 hours  senna 2 Tablet(s) Oral at bedtime  sodium chloride 0.9%. 1000 milliLiter(s) (60 mL/Hr) IV Continuous <Continuous>      TELEMETRY: 	    ECG:  	  RADIOLOGY:   DIAGNOSTIC TESTING:  [ ] Echocardiogram:  [ ] Catheterization:  [ ] Stress Test:    OTHER: 	    LABS:	 	    CARDIAC MARKERS:                                10.6   22.75 )-----------( 354      ( 14 Dec 2019 10:55 )             32.6     12-14    133<L>  |  100  |  43<H>  ----------------------------<  166<H>  4.4   |  22  |  1.20    Ca    9.4      14 Dec 2019 07:19      proBNP:     Lipid Profile:   HgA1c:     Creatinine, Serum: 1.20 mg/dL (12-14-19 @ 07:19)  Creatinine, Serum: 1.15 mg/dL (12-13-19 @ 09:33)

## 2019-12-15 NOTE — PROGRESS NOTE ADULT - SUBJECTIVE AND OBJECTIVE BOX
Patient is a 79y old  Male who presents with a chief complaint of Back pain (14 Dec 2019 12:25)      SUBJECTIVE / OVERNIGHT EVENTS:    Patient seen and examined. sitting in chair. more alert today. dtr at bedside. denies cp sob. co back pain.      Vital Signs Last 24 Hrs  T(C): 36.8 (15 Dec 2019 09:34), Max: 36.8 (15 Dec 2019 09:34)  T(F): 98.2 (15 Dec 2019 09:34), Max: 98.2 (15 Dec 2019 09:34)  HR: 62 (15 Dec 2019 09:34) (60 - 77)  BP: 118/60 (15 Dec 2019 09:34) (118/60 - 134/75)  BP(mean): --  RR: 18 (15 Dec 2019 09:34) (18 - 18)  SpO2: 93% (15 Dec 2019 09:34) (93% - 96%)  I&O's Summary    14 Dec 2019 07:01  -  15 Dec 2019 07:00  --------------------------------------------------------  IN: 1850 mL / OUT: 450 mL / NET: 1400 mL        PE:  GENERAL: NAD, AAOx2, dry mucous membranes  HEAD:  Atraumatic, Normocephalic  EYES: EOMI, PERRLA, conjunctiva and sclera clear  NECK: Supple, No JVD  CHEST/LUNG: CTABL, No wheeze  HEART: Regular rate and rhythm; no murmur  ABDOMEN: Soft, Nontender, Nondistended; Bowel sounds present  EXTREMITIES:  2+ Peripheral Pulses, trace edema  SKIN: No rashes or lesions  NEURO: No focal deficits    LABS:                        10.6   22.75 )-----------( 354      ( 14 Dec 2019 10:55 )             32.6     12-14    133<L>  |  100  |  43<H>  ----------------------------<  166<H>  4.4   |  22  |  1.20    Ca    9.4      14 Dec 2019 07:19        CAPILLARY BLOOD GLUCOSE                RADIOLOGY & ADDITIONAL TESTS:    Imaging Personally Reviewed:  [x] YES  [ ] NO    Consultant(s) Notes Reviewed:  [x] YES  [ ] NO    MEDICATIONS  (STANDING):  acetaminophen  IVPB .. 1000 milliGRAM(s) IV Intermittent once  allopurinol 300 milliGRAM(s) Oral daily  amLODIPine   Tablet 10 milliGRAM(s) Oral daily  aspirin enteric coated 81 milliGRAM(s) Oral daily  atorvastatin 10 milliGRAM(s) Oral at bedtime  ceFAZolin   IVPB 2000 milliGRAM(s) IV Intermittent every 8 hours  heparin  Injectable 5000 Unit(s) SubCutaneous every 8 hours  senna 2 Tablet(s) Oral at bedtime  sodium chloride 0.9%. 1000 milliLiter(s) (60 mL/Hr) IV Continuous <Continuous>    MEDICATIONS  (PRN):  acetaminophen   Tablet .. 650 milliGRAM(s) Oral every 6 hours PRN Temp greater or equal to 38C (100.4F), Mild Pain (1 - 3)  traMADol 25 milliGRAM(s) Oral two times a day PRN Moderate Pain (4 - 6)      Care Discussed with Consultants/Other Providers [x] YES  [ ] NO    HEALTH ISSUES - PROBLEM Dx:  Need for prophylactic measure: Need for prophylactic measure  Gout: Gout  Essential hypertension: Essential hypertension  CAD (coronary artery disease): CAD (coronary artery disease)  KRIS (acute kidney injury): KRIS (acute kidney injury)  Sepsis: Sepsis  Weakness of both lower extremities: Weakness of both lower extremities  Acute left-sided low back pain without sciatica: Acute left-sided low back pain without sciatica

## 2019-12-16 LAB
ANION GAP SERPL CALC-SCNC: 14 MMOL/L — SIGNIFICANT CHANGE UP (ref 5–17)
ANION GAP SERPL CALC-SCNC: 15 MMOL/L — SIGNIFICANT CHANGE UP (ref 5–17)
BASE EXCESS BLDV CALC-SCNC: -3.3 MMOL/L — LOW (ref -2–2)
BUN SERPL-MCNC: 83 MG/DL — HIGH (ref 7–23)
BUN SERPL-MCNC: 83 MG/DL — HIGH (ref 7–23)
CA-I SERPL-SCNC: 1.26 MMOL/L — SIGNIFICANT CHANGE UP (ref 1.12–1.3)
CALCIUM SERPL-MCNC: 8.9 MG/DL — SIGNIFICANT CHANGE UP (ref 8.4–10.5)
CALCIUM SERPL-MCNC: 9.2 MG/DL — SIGNIFICANT CHANGE UP (ref 8.4–10.5)
CHLORIDE BLDV-SCNC: 103 MMOL/L — SIGNIFICANT CHANGE UP (ref 96–108)
CHLORIDE SERPL-SCNC: 98 MMOL/L — SIGNIFICANT CHANGE UP (ref 96–108)
CHLORIDE SERPL-SCNC: 99 MMOL/L — SIGNIFICANT CHANGE UP (ref 96–108)
CHLORIDE UR-SCNC: <35 MMOL/L — SIGNIFICANT CHANGE UP
CO2 BLDV-SCNC: 22 MMOL/L — SIGNIFICANT CHANGE UP (ref 22–30)
CO2 SERPL-SCNC: 17 MMOL/L — LOW (ref 22–31)
CO2 SERPL-SCNC: 18 MMOL/L — LOW (ref 22–31)
CREAT ?TM UR-MCNC: 155 MG/DL — SIGNIFICANT CHANGE UP
CREAT ?TM UR-MCNC: 155 MG/DL — SIGNIFICANT CHANGE UP
CREAT SERPL-MCNC: 2.22 MG/DL — HIGH (ref 0.5–1.3)
CREAT SERPL-MCNC: 2.44 MG/DL — HIGH (ref 0.5–1.3)
GAS PNL BLDV: 131 MMOL/L — LOW (ref 135–145)
GAS PNL BLDV: SIGNIFICANT CHANGE UP
GAS PNL BLDV: SIGNIFICANT CHANGE UP
GLUCOSE BLDV-MCNC: 197 MG/DL — HIGH (ref 70–99)
GLUCOSE SERPL-MCNC: 161 MG/DL — HIGH (ref 70–99)
GLUCOSE SERPL-MCNC: 203 MG/DL — HIGH (ref 70–99)
HCO3 BLDV-SCNC: 21 MMOL/L — SIGNIFICANT CHANGE UP (ref 21–29)
HCT VFR BLD CALC: 32.8 % — LOW (ref 39–50)
HCT VFR BLDA CALC: 36 % — LOW (ref 39–50)
HGB BLD CALC-MCNC: 11.6 G/DL — LOW (ref 13–17)
HGB BLD-MCNC: 10.6 G/DL — LOW (ref 13–17)
LACTATE BLDV-MCNC: 2.2 MMOL/L — HIGH (ref 0.7–2)
MCHC RBC-ENTMCNC: 29.5 PG — SIGNIFICANT CHANGE UP (ref 27–34)
MCHC RBC-ENTMCNC: 32.3 GM/DL — SIGNIFICANT CHANGE UP (ref 32–36)
MCV RBC AUTO: 91.4 FL — SIGNIFICANT CHANGE UP (ref 80–100)
NRBC # BLD: 0 /100 WBCS — SIGNIFICANT CHANGE UP (ref 0–0)
OSMOLALITY SERPL: 316 MOSMOL/KG — HIGH (ref 280–301)
OTHER CELLS CSF MANUAL: 8 ML/DL — LOW (ref 18–22)
PCO2 BLDV: 38 MMHG — SIGNIFICANT CHANGE UP (ref 35–50)
PH BLDV: 7.37 — SIGNIFICANT CHANGE UP (ref 7.35–7.45)
PLATELET # BLD AUTO: 352 K/UL — SIGNIFICANT CHANGE UP (ref 150–400)
PO2 BLDV: 31 MMHG — SIGNIFICANT CHANGE UP (ref 25–45)
POTASSIUM BLDV-SCNC: 4.1 MMOL/L — SIGNIFICANT CHANGE UP (ref 3.5–5.3)
POTASSIUM SERPL-MCNC: 4.3 MMOL/L — SIGNIFICANT CHANGE UP (ref 3.5–5.3)
POTASSIUM SERPL-MCNC: 4.4 MMOL/L — SIGNIFICANT CHANGE UP (ref 3.5–5.3)
POTASSIUM SERPL-SCNC: 4.3 MMOL/L — SIGNIFICANT CHANGE UP (ref 3.5–5.3)
POTASSIUM SERPL-SCNC: 4.4 MMOL/L — SIGNIFICANT CHANGE UP (ref 3.5–5.3)
PROT ?TM UR-MCNC: 42 MG/DL — HIGH (ref 0–12)
PROT/CREAT UR-RTO: 0.3 RATIO — HIGH (ref 0–0.2)
RBC # BLD: 3.59 M/UL — LOW (ref 4.2–5.8)
RBC # FLD: 14.6 % — HIGH (ref 10.3–14.5)
SAO2 % BLDV: 51 % — LOW (ref 67–88)
SODIUM SERPL-SCNC: 130 MMOL/L — LOW (ref 135–145)
SODIUM SERPL-SCNC: 131 MMOL/L — LOW (ref 135–145)
SODIUM UR-SCNC: <35 MMOL/L — SIGNIFICANT CHANGE UP
URATE SERPL-MCNC: 5.2 MG/DL — SIGNIFICANT CHANGE UP (ref 3.4–8.8)
WBC # BLD: 17.89 K/UL — HIGH (ref 3.8–10.5)
WBC # FLD AUTO: 17.89 K/UL — HIGH (ref 3.8–10.5)

## 2019-12-16 PROCEDURE — 74230 X-RAY XM SWLNG FUNCJ C+: CPT | Mod: 26

## 2019-12-16 PROCEDURE — 99232 SBSQ HOSP IP/OBS MODERATE 35: CPT

## 2019-12-16 RX ORDER — CEFAZOLIN SODIUM 1 G
1000 VIAL (EA) INJECTION EVERY 12 HOURS
Refills: 0 | Status: DISCONTINUED | OUTPATIENT
Start: 2019-12-16 | End: 2019-12-18

## 2019-12-16 RX ADMIN — HEPARIN SODIUM 5000 UNIT(S): 5000 INJECTION INTRAVENOUS; SUBCUTANEOUS at 06:23

## 2019-12-16 RX ADMIN — TRAMADOL HYDROCHLORIDE 25 MILLIGRAM(S): 50 TABLET ORAL at 14:40

## 2019-12-16 RX ADMIN — Medication 300 MILLIGRAM(S): at 14:31

## 2019-12-16 RX ADMIN — Medication 100 MILLIGRAM(S): at 18:01

## 2019-12-16 RX ADMIN — ATORVASTATIN CALCIUM 10 MILLIGRAM(S): 80 TABLET, FILM COATED ORAL at 21:12

## 2019-12-16 RX ADMIN — AMLODIPINE BESYLATE 10 MILLIGRAM(S): 2.5 TABLET ORAL at 06:23

## 2019-12-16 RX ADMIN — Medication 20 MILLIGRAM(S): at 06:23

## 2019-12-16 RX ADMIN — HEPARIN SODIUM 5000 UNIT(S): 5000 INJECTION INTRAVENOUS; SUBCUTANEOUS at 21:12

## 2019-12-16 RX ADMIN — HEPARIN SODIUM 5000 UNIT(S): 5000 INJECTION INTRAVENOUS; SUBCUTANEOUS at 14:31

## 2019-12-16 RX ADMIN — Medication 100 MILLIGRAM(S): at 06:23

## 2019-12-16 RX ADMIN — Medication 650 MILLIGRAM(S): at 06:39

## 2019-12-16 RX ADMIN — SODIUM CHLORIDE 60 MILLILITER(S): 9 INJECTION INTRAMUSCULAR; INTRAVENOUS; SUBCUTANEOUS at 19:54

## 2019-12-16 RX ADMIN — SENNA PLUS 2 TABLET(S): 8.6 TABLET ORAL at 21:12

## 2019-12-16 RX ADMIN — Medication 650 MILLIGRAM(S): at 07:09

## 2019-12-16 RX ADMIN — Medication 81 MILLIGRAM(S): at 14:31

## 2019-12-16 NOTE — PROGRESS NOTE ADULT - SUBJECTIVE AND OBJECTIVE BOX
Patient is a 79y old  Male who presents with a chief complaint of Back pain (16 Dec 2019 11:23)      SUBJECTIVE / OVERNIGHT EVENTS:    Patient seen and examined. denies cp sob. more alert today. having conversation in bed with family.      Vital Signs Last 24 Hrs  T(C): 36.8 (16 Dec 2019 09:12), Max: 37.2 (15 Dec 2019 17:07)  T(F): 98.3 (16 Dec 2019 09:12), Max: 99 (15 Dec 2019 17:07)  HR: 59 (16 Dec 2019 09:12) (59 - 75)  BP: 124/73 (16 Dec 2019 09:12) (108/62 - 132/60)  BP(mean): --  RR: 18 (16 Dec 2019 09:12) (18 - 18)  SpO2: 95% (16 Dec 2019 09:12) (92% - 95%)  I&O's Summary    15 Dec 2019 07:01  -  16 Dec 2019 07:00  --------------------------------------------------------  IN: 1550 mL / OUT: 600 mL / NET: 950 mL        PE:  GENERAL: NAD, AAOx2, dry mucous membranes  HEAD:  Atraumatic, Normocephalic  EYES: EOMI, PERRLA, conjunctiva and sclera clear  NECK: Supple, No JVD  CHEST/LUNG: CTABL, No wheeze  HEART: Regular rate and rhythm; no murmur  ABDOMEN: Soft, Nontender, Nondistended; Bowel sounds present  EXTREMITIES:  2+ Peripheral Pulses, trace edema  SKIN: No rashes or lesions  NEURO: No focal deficits    LABS:                        10.6   17.89 )-----------( 352      ( 16 Dec 2019 09:45 )             32.8     12-16    130<L>  |  98  |  83<H>  ----------------------------<  161<H>  4.3   |  18<L>  |  2.22<H>    Ca    8.9      16 Dec 2019 09:27        CAPILLARY BLOOD GLUCOSE                RADIOLOGY & ADDITIONAL TESTS:    Imaging Personally Reviewed:  [x] YES  [ ] NO    Consultant(s) Notes Reviewed:  [x] YES  [ ] NO    MEDICATIONS  (STANDING):  acetaminophen  IVPB .. 1000 milliGRAM(s) IV Intermittent once  allopurinol 300 milliGRAM(s) Oral daily  amLODIPine   Tablet 10 milliGRAM(s) Oral daily  aspirin enteric coated 81 milliGRAM(s) Oral daily  atorvastatin 10 milliGRAM(s) Oral at bedtime  ceFAZolin   IVPB 1000 milliGRAM(s) IV Intermittent every 12 hours  furosemide    Tablet 20 milliGRAM(s) Oral daily  heparin  Injectable 5000 Unit(s) SubCutaneous every 8 hours  senna 2 Tablet(s) Oral at bedtime  sodium chloride 0.9%. 1000 milliLiter(s) (60 mL/Hr) IV Continuous <Continuous>    MEDICATIONS  (PRN):  acetaminophen   Tablet .. 650 milliGRAM(s) Oral every 6 hours PRN Temp greater or equal to 38C (100.4F), Mild Pain (1 - 3)  traMADol 25 milliGRAM(s) Oral two times a day PRN Moderate Pain (4 - 6)      Care Discussed with Consultants/Other Providers [x] YES  [ ] NO    HEALTH ISSUES - PROBLEM Dx:  Need for prophylactic measure: Need for prophylactic measure  Gout: Gout  Essential hypertension: Essential hypertension  CAD (coronary artery disease): CAD (coronary artery disease)  KRIS (acute kidney injury): KRIS (acute kidney injury)  Sepsis: Sepsis  Weakness of both lower extremities: Weakness of both lower extremities  Acute left-sided low back pain without sciatica: Acute left-sided low back pain without sciatica

## 2019-12-16 NOTE — PROGRESS NOTE ADULT - PROBLEM SELECTOR PLAN 2
see above  PT MR Thoracic Spine no acute findings  nuclear scan nonspecific heterogeneous uptake in the thoracolumbar spine which may be secondary to post surgical change and/or degenerative change.  no acute intervention per neurosurgery  tylenol for mild pain, tramadol for moderate pain

## 2019-12-16 NOTE — PROGRESS NOTE ADULT - PROBLEM SELECTOR PLAN 4
likely sepsis related encephalopathy, doing better now  ct head no acute changes  sp MBS ordered  PT OOB

## 2019-12-16 NOTE — SWALLOW VFSS/MBS ASSESSMENT ADULT - NS SWALLOW VFSS REC ASPIR MON
cough/gurgly voice/fever/pneumonia/change of breathing pattern/throat clearing/upper respiratory infection

## 2019-12-16 NOTE — CHART NOTE - NSCHARTNOTEFT_GEN_A_CORE
KRIS -creatinine 2.2, renal consulted. IVF's. Bladder scan >200 cc, straight cath x 1. Repeat bladder scan, renal sono ordered.  Lactate 2.2, continue IVF's and repeat lactate level.

## 2019-12-16 NOTE — SWALLOW VFSS/MBS ASSESSMENT ADULT - DIAGNOSTIC IMPRESSIONS
Patient presents with oropharyngeal dysphagia characterized by prolonged but functional mastication of soft solids, delayed oral transit time, decreased AP transport with solids resulting in oral residue which clears with independent repeat swallow, discoordinated timing of pharyngeal swallow trigger and laryngeal closure, intermittent trace laryngeal penetration with honey thick liquids via teaspoon, deep laryngeal penetration with honey and nectar thick liquids via cup without complete clearance.     Disorders: min reduced lingual strength/ROM/Rate of motion, min reduced BOT to posterior pharyngeal wall contact, delay in trigger of pharyngeal swallow, reduced hyo-laryngeal excursion, reduced laryngeal closure, reduced thyroid elevation, and reduced supraglottic sensation. Patient presents with oropharyngeal dysphagia characterized by prolonged but functional mastication of soft solids, delayed oral transit time, decreased AP transport with solids resulting in oral residue which clears with independent repeat swallow, discoordinated timing of pharyngeal swallow trigger and laryngeal closure, intermittent trace laryngeal penetration with honey thick liquids via teaspoon, deep laryngeal penetration with honey and nectar thick liquids via cup and straw, respectively without complete clearance. Teaspoon amount of honey thick liquids was beneficial in reducing amount of penetration.     Disorders: min reduced lingual strength/ROM/Rate of motion, min reduced BOT to posterior pharyngeal wall contact, delay in trigger of pharyngeal swallow, reduced hyo-laryngeal excursion, reduced laryngeal closure, reduced thyroid elevation, and reduced supraglottic sensation. Patient presents with oropharyngeal dysphagia characterized by prolonged but functional mastication of soft solids, delayed oral transit time, decreased AP transport with solids resulting in oral residue which clears with independent repeat swallow, discoordinated timing of pharyngeal swallow trigger and laryngeal closure, intermittent trace laryngeal penetration with honey thick liquids via teaspoon, deep laryngeal penetration with honey and nectar thick liquids via cup and straw, respectively without complete clearance. Teaspoon amount of honey thick liquids was beneficial in reducing amount of penetration. Cued cough was inconsistently effective in clearing deep laryngeal penetration.     Disorders: min reduced lingual strength/ROM/Rate of motion, min reduced BOT to posterior pharyngeal wall contact, delay in trigger of pharyngeal swallow, reduced hyo-laryngeal excursion, reduced laryngeal closure, reduced thyroid elevation, and reduced supraglottic sensation. Patient presents with 1) oropharyngeal dysphagia characterized by prolonged but functional mastication of soft solids, delayed oral transit time, decreased AP transport with solids resulting in oral residue which clears with independent repeat swallow, discoordinated timing of pharyngeal swallow trigger and laryngeal closure, intermittent trace laryngeal penetration with honey thick liquids via teaspoon, deep laryngeal penetration with honey and nectar thick liquids via cup and straw, respectively without complete clearance. Teaspoon amount of honey thick liquids was beneficial in reducing amount of penetration. Cued cough was inconsistently effective in clearing deep laryngeal penetration. 2) Esophageal dysphagia with CP bar    Disorders: min reduced lingual strength/ROM/Rate of motion, min reduced BOT to posterior pharyngeal wall contact, delay in trigger of pharyngeal swallow, reduced hyo-laryngeal excursion, reduced laryngeal closure, reduced thyroid elevation, and reduced supraglottic sensation.

## 2019-12-16 NOTE — SWALLOW VFSS/MBS ASSESSMENT ADULT - INADEQUATE HYOLARYNGEAL ELEVATION
Minimally reduced hyolaryngeal excursion minimally reduced hyolaryngeal elevation and excursion Min reduced hyolaryngeal excursion

## 2019-12-16 NOTE — SWALLOW VFSS/MBS ASSESSMENT ADULT - ADDITIONAL INFORMATION
At baseline, stranding of what appears to be secretions for PPW to epiglottis. At baseline, stranding of what appears to be secretions for PPW to epiglottis. Evidence of CP bar at level of C4. At baseline, stranding of what appears to be secretions for PPW to epiglottis. Evidence of CP bar at level of C4. ?able esophageal web. At baseline, stranding of what appears to be secretions for PPW to epiglottis. As testing progressed, no longer visualized. Evidence of CP bar at level of C4. ?able esophageal web.

## 2019-12-16 NOTE — SWALLOW VFSS/MBS ASSESSMENT ADULT - ORAL PREP COMMENTS
Unable to adequately assess oral prep due to reduced visualization of oral cavity. Unable to assess bolus prep due to reduced visualization of oral cavity on fluoro. Unable to accurately assess oral prep due to reduced visualization of oral cavity on fluoro. At onset of fluoro, patient noted with moderate amount of contrast in valleculae. Difficult to determine if residue from previous trial or premature spillage of this trial as patient also with trace-min residue along BOT. Prolonged mastication Unable to assess oral prep due to reduced visualization of oral cavity on fluoro. +some tongue pumping, difficult to adequately assess oral prep due to reduced visualization of oral cavity

## 2019-12-16 NOTE — PROGRESS NOTE ADULT - PROBLEM SELECTOR PLAN 1
MR Thoracic Spine no acute findings  nuclear scan nonspecific heterogeneous uptake in the thoracolumbar spine which may be secondary to post surgical change and/or degenerative change.  no acute intervention per neurosurgery  tylenol for mild pain, tramadol for moderate pain now worsening, check bladder scan ro urinary retention  renal consult  holding losartan  will need renal clx for PICC with CKD  will cont NS

## 2019-12-16 NOTE — PROGRESS NOTE ADULT - PROBLEM SELECTOR PLAN 3
MSSA bacteremia persistent, source 2/2 pneumonia vs cellulitis, cannot ro hardware despite negative imaging  cont ancef per ID for 6 weeks, will need PICC  12/12 12/14 blood cx NTD  Tylenol PRN fever  CT chest also concern for atelectasis vs pneumonia, lungs light up in nuclear scan  appreciate pulm recs  TTE moderate aortic stenosis, Severe concentric left ventricular hypertrophy, Severe left ventricular systolic dysfunction. JAHAIRA no vegetations

## 2019-12-16 NOTE — PROGRESS NOTE ADULT - ASSESSMENT
78 yo M with chronic low back pain sp multiple laminectomy and fusion p/w 1 week of worsening back pain along with weakness in bilateral lower extremities and inability to walk since last night.  Leukocytosis, fever  Low back pain, increasing LE weakness  MR L spine and T spine prelim but no emergent findings  Prior history of MSSA bacteremia (2016)  History oxacillin allergy (? fever), Zyvox ([facility]); tolerated vanco on this visit  BCX now with MSSA, high grade, rising WBC, patient ill appearing, guarded  CT A/P/C/H neg  NM tagged WBC uptake in thoracolumbar spine--degen disease?  TTE equivocal  Still leukocytosis, afeb, mental status improving, BCX 12/12 NGTD  JAHAIRA reassuring  High suspicion for spinal involvement despite negative MRI--monitor closely  Overall, fever, leukocytosis, LBP  - Cefazolin 2g q 8  - F/U neurosurgery/neuro  - F/U pending BCXs  - Anticipate will need 6 week course IV abx    Fredy Huynh MD  Pager [phone]  After 5pm and on weekends call [phone] 80 yo M with chronic low back pain sp multiple laminectomy and fusion p/w 1 week of worsening back pain along with weakness in bilateral lower extremities and inability to walk since last night.  Leukocytosis, fever  Low back pain, increasing LE weakness  MR L spine and T spine prelim but no emergent findings  Prior history of MSSA bacteremia (2016)  History oxacillin allergy (? fever), Zyvox ([facility]); tolerated vanco on this visit  BCX now with MSSA, high grade, rising WBC, patient ill appearing, guarded  CT A/P/C/H neg  NM tagged WBC uptake in thoracolumbar spine--degen disease?  TTE equivocal  Still leukocytosis, afeb, mental status improving, BCX 12/12 NGTD  JAHAIRA reassuring  High suspicion for spinal involvement despite negative MRI--monitor closely  Overall, fever, leukocytosis, LBP  - Cefazolin 1g q 12 (decreased dose, monitor Cr, may need to decrease further)  - Check CBC w/ eosinophils on next check, consider nephro input  - F/U neurosurgery/neuro  - F/U pending BCXs  - Anticipate will need 6 week course IV abx    Fredy Huynh MD  Pager [phone]  After 5pm and on weekends call [phone]

## 2019-12-16 NOTE — SWALLOW VFSS/MBS ASSESSMENT ADULT - ORAL PHASE
Delayed oral transit time/Residue in oral cavity/Uncontrolled bolus / spillover in anant-pharynx/Uncontrolled bolus / spillover in hypopharynx/Reduced anterior - posterior transport/Incomplete tongue to palate contact Delayed oral transit time/Reduced anterior - posterior transport/Incomplete tongue to palate contact/Uncontrolled bolus / spillover in anant-pharynx/Residue in oral cavity Uncontrolled bolus / spillover in anant-pharynx/Delayed oral transit time/Incomplete tongue to palate contact Reduced anterior - posterior transport/Residue in oral cavity Delayed oral transit time

## 2019-12-16 NOTE — SWALLOW VFSS/MBS ASSESSMENT ADULT - SLP GENERAL OBSERVATIONS
Pt received awake and alert, reclined in ALEXANDRA chair on room air. Pt required repositioning with SLP and safe patient handling team. Pt able to follow commands for purposes of evaluation.

## 2019-12-16 NOTE — PROGRESS NOTE ADULT - SUBJECTIVE AND OBJECTIVE BOX
CARDIOLOGY FOLLOW UP - Dr. Lee    CC no acute events  more alert today       PHYSICAL EXAM:  T(C): 36.8 (12-16-19 @ 09:12), Max: 37.2 (12-15-19 @ 17:07)  HR: 59 (12-16-19 @ 09:12) (59 - 75)  BP: 124/73 (12-16-19 @ 09:12) (108/62 - 132/60)  RR: 18 (12-16-19 @ 09:12) (18 - 18)  SpO2: 95% (12-16-19 @ 09:12) (92% - 95%)  Wt(kg): --  I&O's Summary    15 Dec 2019 07:01  -  16 Dec 2019 07:00  --------------------------------------------------------  IN: 1550 mL / OUT: 600 mL / NET: 950 mL        Appearance: Normal	  Cardiovascular: Normal S1 S2,RRR, No JVD, No murmurs  Respiratory: Lungs clear to auscultation	  Gastrointestinal:  Soft, Non-tender, + BS	  Extremities: Normal range of motion, No clubbing, trace edema         MEDICATIONS  (STANDING):  acetaminophen  IVPB .. 1000 milliGRAM(s) IV Intermittent once  allopurinol 300 milliGRAM(s) Oral daily  amLODIPine   Tablet 10 milliGRAM(s) Oral daily  aspirin enteric coated 81 milliGRAM(s) Oral daily  atorvastatin 10 milliGRAM(s) Oral at bedtime  ceFAZolin   IVPB 1000 milliGRAM(s) IV Intermittent every 12 hours  furosemide    Tablet 20 milliGRAM(s) Oral daily  heparin  Injectable 5000 Unit(s) SubCutaneous every 8 hours  senna 2 Tablet(s) Oral at bedtime  sodium chloride 0.9%. 1000 milliLiter(s) (60 mL/Hr) IV Continuous <Continuous>      TELEMETRY: 	    ECG:  	  RADIOLOGY:   DIAGNOSTIC TESTING:  [ ] Echocardiogram:  [ ]  Catheterization:  [ ] Stress Test:    OTHER: 	    LABS:	 	                                10.6   17.89 )-----------( 352      ( 16 Dec 2019 09:45 )             32.8     12-16    130<L>  |  98  |  83<H>  ----------------------------<  161<H>  4.3   |  18<L>  |  2.22<H>    Ca    8.9      16 Dec 2019 09:27 CARDIOLOGY FOLLOW UP - Dr. Lee    CC no acute events  more alert today       PHYSICAL EXAM:  T(C): 36.8 (12-16-19 @ 09:12), Max: 37.2 (12-15-19 @ 17:07)  HR: 59 (12-16-19 @ 09:12) (59 - 75)  BP: 124/73 (12-16-19 @ 09:12) (108/62 - 132/60)  RR: 18 (12-16-19 @ 09:12) (18 - 18)  SpO2: 95% (12-16-19 @ 09:12) (92% - 95%)  Wt(kg): --  I&O's Summary    15 Dec 2019 07:01  -  16 Dec 2019 07:00  --------------------------------------------------------  IN: 1550 mL / OUT: 600 mL / NET: 950 mL        Appearance: Normal	  Cardiovascular: Normal S1 S2,RRR,sm  Respiratory: Lungs clear to auscultation	  Gastrointestinal:  Soft, Non-tender, + BS	  Extremities: Normal range of motion, No clubbing, trace edema         MEDICATIONS  (STANDING):  acetaminophen  IVPB .. 1000 milliGRAM(s) IV Intermittent once  allopurinol 300 milliGRAM(s) Oral daily  amLODIPine   Tablet 10 milliGRAM(s) Oral daily  aspirin enteric coated 81 milliGRAM(s) Oral daily  atorvastatin 10 milliGRAM(s) Oral at bedtime  ceFAZolin   IVPB 1000 milliGRAM(s) IV Intermittent every 12 hours  furosemide    Tablet 20 milliGRAM(s) Oral daily  heparin  Injectable 5000 Unit(s) SubCutaneous every 8 hours  senna 2 Tablet(s) Oral at bedtime  sodium chloride 0.9%. 1000 milliLiter(s) (60 mL/Hr) IV Continuous <Continuous>      TELEMETRY: 	    ECG:  	  RADIOLOGY:   DIAGNOSTIC TESTING:  [ ] Echocardiogram:  [ ]  Catheterization:  [ ] Stress Test:    OTHER: 	    LABS:	 	                                10.6   17.89 )-----------( 352      ( 16 Dec 2019 09:45 )             32.8     12-16    130<L>  |  98  |  83<H>  ----------------------------<  161<H>  4.3   |  18<L>  |  2.22<H>    Ca    8.9      16 Dec 2019 09:27

## 2019-12-16 NOTE — SWALLOW VFSS/MBS ASSESSMENT ADULT - PHARYNGEAL PHASE COMMENTS
Reduced thyroid elevation, reduced BOT retraction Reduced BOT retraction, reduced thyroid elevation. Reduced BOT retraction, reduced thyroid elevation

## 2019-12-16 NOTE — SWALLOW VFSS/MBS ASSESSMENT ADULT - RECOMMENDED CONSISTENCY
Dysphagia III with honey thick fluids.   MD/team Please enter the following as provider to RN orders : 1) Crush meds or provide via alternate source, 2) ALL p.o. via teaspoon. NO CUP. NO STRAW.  3) Provide small single bites and sips at slow rate 4) Encourage successive swallows for oral and pharyngeal clearance 5) Alternate food and liquids to aid in oral and pharyngeal clearance 6) Aspiration precautions. Monitor for s/s aspiration/laryngeal penetration. If noted:  D/C p.o. intake, provide non-oral nutrition/hydration/meds

## 2019-12-16 NOTE — CONSULT NOTE ADULT - ASSESSMENT
78 y/o male with h/o of CAD s/p PCI 1/2017 with 10% LAD stenosis OM1, HTN, HLD, GERD, Gout, Osteoarthritis and chronic back pain, s/p multiple laminectomies and spinal fusion, lower back pain and weakness in his bilateral lower extremities that progressed to inability to ambulate. dx with MSSA bacteremia. on ancef. also received lasix as well. has had hesitancy urinating. on 12/16/ KRIS       1- KRIS   2- MSSA bacteremia   3- dysphagia   4- cad  5- htn     suspect kris in setting of urinary retention possible   to have urinalysis  to have urine eos  renal sono/bladder sono  urine lytes may need ivf hydration despite peripheral edema   cont norvasc  d/w pt son at bedside

## 2019-12-16 NOTE — PROGRESS NOTE ADULT - SUBJECTIVE AND OBJECTIVE BOX
CC: F/U for bacteremia    Saw/spoke to patient. No fevers, no chills. Mental status improving. No new events.    Allergies  Indocin (Unknown)  oxacillin (Unknown)  Zyvox (Unknown)    ANTIMICROBIALS:  ceFAZolin   IVPB 2000 every 8 hours    PE:    Vital Signs Last 24 Hrs  T(C): 36.8 (16 Dec 2019 09:12), Max: 37.2 (15 Dec 2019 17:07)  T(F): 98.3 (16 Dec 2019 09:12), Max: 99 (15 Dec 2019 17:07)  HR: 59 (16 Dec 2019 09:12) (59 - 75)  BP: 124/73 (16 Dec 2019 09:12) (108/62 - 132/60)  RR: 18 (16 Dec 2019 09:12) (18 - 18)  SpO2: 95% (16 Dec 2019 09:12) (92% - 95%)    Gen: AOx3, NAD, non-toxic  CV: S1+S2 normal, nontachycardic  Resp: Clear bilat, no resp distress, no crackles/wheezes  Abd: Soft, nontender, +BS  Ext: No LE edema, no wounds    LABS:                        10.6   17.89 )-----------( 352      ( 16 Dec 2019 09:45 )             32.8     12-16    130<L>  |  98  |  83<H>  ----------------------------<  161<H>  4.3   |  18<L>  |  2.22<H>    Ca    8.9      16 Dec 2019 09:27    MICROBIOLOGY:    .Blood Blood  12-14-19   No growth to date.     .Blood Blood  12-09-19   Growth in aerobic and anaerobic bottles: Staphylococcus aureus  See previous culture [id number]  --    Growth in aerobic and anaerobic bottles: Gram Positive Cocci in Clusters    .Blood Blood  12-08-19   Growth in aerobic and anaerobic bottles: Staphylococcus aureus    .Urine Clean Catch (Midstream)  12-08-19   No growth    Rapid RVP Result: NotDetec (12-12 @ 13:27)    (otherwise reviewed)    RADIOLOGY:    12/11 NM:    IMPRESSION: Gallium scan demonstrates:    Nonspecific heterogeneous uptake in the thoracolumbar spine which may be   secondary to post surgical change and/or degenerative change.     Mild diffuse activity in bilateral lungs. This may reflect inflammation.   Please correlate clinically.

## 2019-12-16 NOTE — PROGRESS NOTE ADULT - ASSESSMENT
MSSA bacteremia  Bibasilar atelectasis with probable pneumonia: possibly bacteremic  CAD with severe AS and Systolic LV dysfunction  r/o OP dysphagia    REC    Continue MSSA coverage to include pulmonary source  WIll need additional blood cultures X2 to cement clearance  Suggest swallow eval  Alternative MSSA source imaging w/u per ID  Cardiology f/u  Dr Maxwell covering this weekend

## 2019-12-16 NOTE — PROGRESS NOTE ADULT - SUBJECTIVE AND OBJECTIVE BOX
Follow-up Pulm Progress Note  Aaron Dowd MD  [phone]    Afebrile on Cefazol  Blood cultures X 4 on 12/12 and 12/14 negative  ID f/u noted: concern for extrapulmonary source despite equivocal imaging  MBS: OP dysphagia noted    Vital Signs Last 24 Hrs  T(C): 36.8 (16 Dec 2019 09:12), Max: 37.2 (15 Dec 2019 17:07)  T(F): 98.3 (16 Dec 2019 09:12), Max: 99 (15 Dec 2019 17:07)  HR: 59 (16 Dec 2019 09:12) (59 - 75)  BP: 124/73 (16 Dec 2019 09:12) (108/62 - 132/60)  BP(mean): --  RR: 18 (16 Dec 2019 09:12) (18 - 18)  SpO2: 95% (16 Dec 2019 09:12) (92% - 95%)                          10.6   17.89 )-----------( 352      ( 16 Dec 2019 09:45 )             32.8       12-16    130<L>  |  98  |  83<H>  ----------------------------<  161<H>  4.3   |  18<L>  |  2.22<H>    Ca    8.9      16 Dec 2019 09:27      CULTURES:  Culture Results:   Growth in anaerobic bottle: Staphylococcus aureus  See previous culture [id number] (12-09 @ 22:05)  Culture Results:   Growth in anaerobic bottle: Staphylococcus aureus  See previous culture [id number] (12-09 @ 22:05)  Culture Results:   Growth in aerobic and anaerobic bottles: Staphylococcus aureus  "Due to technical problems, Proteus sp. will Not be reported as part of  the BCID panel until further notice"  ***Blood Panel PCR results on this specimen are available  approximately 3 hours after the Gram stain result.***  Gram stain, PCR, and/or culture results may not always  correspond due to difference in methodologies.  ************************************************************  This PCR assay was performed using [employer].  The following targets are tested for: Enterococcus,  vancomycin resistant enterococci, Listeria monocytogenes,  coagulase negative staphylococci, S. aureus,  methicillin resistant S. aureus, Streptococcus agalactiae  (Group B), S. pneumoniae, S. pyogenes (Group A),  Acinetobacter baumannii, Enterobacter cloacae, E. coli,  Klebsiella oxytoca, K. pneumoniae, Proteus sp.,  Serratia marcescens, Haemophilus influenzae,  Neisseria meningitidis, Pseudomonas aeruginosa, Candida  albicans, C. glabrata, C krusei, C parapsilosis,  C. tropicalis and the KPC resistance gene. (12-08 @ 04:15)  Culture Results:   Growth in aerobic and anaerobic bottles: Staphylococcus aureus  See previous culture [id number] (12-08 @ 04:15)  Culture Results:   No growth (12-08 @ 04:06)    Most recent blood culture -- 12-09 @ 22:05   -- -- .Blood Blood 12-09 @ 22:05      Physical Examination:  PULM: No sign wheeze or rhonchi  CVS: Regular rate and rhythm, no murmurs, rubs, or gallops  ABD: Soft, non-tender  EXT:  No clubbing, cyanosis, or edema    RADIOLOGY REVIEWED  CXR:    CT chest:    TTE:    PROCEDURE: Transthoracic echocardiogram with 2-D, M-Mode  and complete spectral and color flow Doppler. Verbal  consent was obtained for injection of  Ultrasonic Enhancing  Agent following a discussion of risks and benefits.  Following intravenous injection of Ultrasonic Enhancing  Agent , harmonic imaging was performed.  INDICATION: Acute and subacute endocarditis, unspecified  (I33.9)  ------------------------------------------------------------------------  Dimensions:    Normal Values:  LA:     5.5    2.0 - 4.0 cm  Ao:     3.7    2.0 - 3.8 cm  SEPTUM: 1.6    0.6 - 1.2 cm  PWT:    1.4    0.6 - 1.1 cm  LVIDd:  5.2    3.0 - 5.6 cm  LVIDs:         1.8 - 4.0 cm  Derived variables:  LVMI: 148 g/m2  RWT: 0.53  EF (Visual Estimate): 20-25 %  Doppler Peak Velocity (m/sec): AoV=3.0  ------------------------------------------------------------------------  Observations:  Mitral Valve: Mitral annular calcification, otherwise  normal mitral valve. Minimal mitral regurgitation.  Aortic Valve/Aorta: Calcified trileaflet aortic valve with  decreased opening. Peak transaortic valve gradient equals  36 mm Hg, mean transaortic valve gradient equals 25 mm Hg,  estimated aortic valve area equals 1.2 sqcm (by continuity  equation), aortic valve velocity time integral equals 69  cm, consistent with moderate aortic stenosis. Minimal  aortic regurgitation.  Peak left ventricular outflow tract  gradient equals 3 mm Hg, mean gradient is equal to 2 mm Hg,  LVOT velocity time integral equals 19 cm.  Aortic Root: 3.7 cm.  LVOT diameter: 2.3 cm.  Left Atrium: Mildly dilated left atrium.  LA volume index=  39 cc/m2.  Left Ventricle: Severe  left ventricular systolic  dysfunction.   Endocardial visualization enhanced with  intravenous injection of Ultrasonic Enhancing Agent  (Definity). Severe concentric left ventricular hypertrophy.  Mild diastolic dysfunction (Stage I).  Right Heart: Severe right atrial enlargement. Normal right  ventricular size and function. Normal tricuspid valve.  Minimal tricuspid regurgitation. Normal pulmonic valve.  Pericardium/Pleura: Normal pericardium with trace  pericardial effusion.  Hemodynamic: Estimated right atrial pressure is 8 mm Hg.  Estimated right ventricular systolic pressure equals 27 mm  Hg, assuming right atrial pressure equals 8 mm Hg,  consistent with normal pulmonary pressures.  ------------------------------------------------------------------------  Conclusions:  1. Calcified trileaflet aortic valve with decreased  opening. Peak transaortic valve gradient equals 36 mm Hg,  mean transaortic valve gradient equals 25 mm Hg, estimated  aortic valve area equals 1.2 sqcm (by continuity equation),  aortic valve velocity time integral equals 69 cm,  consistent with moderate aortic stenosis.  2. Severe concentric left ventricular hypertrophy.  3. Severe  left ventricular systolic dysfunction.  Endocardial visualization enhanced with intravenous  injection of Ultrasonic Enhancing Agent (Definity).  Unable to rule out endocarditis. Consider JAHAIRA if clinically  indicated.

## 2019-12-16 NOTE — PROVIDER CONTACT NOTE (OTHER) - ASSESSMENT
Pt presently denies having pelvic discomfort, denies having urgency. Bladder scan performed 258, urinal provided to encourage pt to void. Pt with no episodes of voids or incontinence. Pt refusing to drink thicken liquids, only wants "Regular ice water", regular water not permitted d/t dysphagia diet.

## 2019-12-16 NOTE — PROGRESS NOTE ADULT - ASSESSMENT
79 yr old male with HTN, DCHF, CAD, S/P PCI to OM1, Mid LAD (9/2014, [facility], [facility], [facility]) hx of ischemic CMP, AS, DM HLD, GERD, GOUT, s/p multiple spinal surgery admitted with back pain and fever    1. Back Pain  -stable, MRI noted, neuro/neuro sx followup  -ID f/u, pain control   -plan for PICC prior to dc     2. CAD s/p PCI (OM1/LAD)   -stable, cont cont ASA ,statin    3. Acute on Chronic systolic & Diastolic CHF, New LV dysfunction   -echo with severe LV sys dysfxn, EF 20-25%, mild diastolic dysfxn  -LAZARO 12/13 with moderate lv dysfunction  -recent echo few months ago with normal LV function  -possible new cardiomyopathy in setting of infection, sepsis  -will continue medical therapy for now as he is not a candidate for ischemic eval   -as outpt was off beta blocker due to BB induced fatigue   -will eventually restart Cozaar at 25 mg daily if sbp stable   -euvolemic, cont lasix 20 po daily      4. Aortic Stenosis  -stable, severe on LAZARO with valve area of 0.9, tte this admit and as outpt with moderate AS  -gradients low on lazaro, perhaps due to new lv dysfxn  -YULY likely underestimated  -patient was asymptomatic as outpt from valve disease  -no further workup indicated, will follow as outpatient     5. HTN   -BP stable, continue with current antihtn meds     6. Fever/ Sepsis   + blood cultures with gram positive Cocci in clusters   -iv abx, ID f/u   -LAZARO without endocarditis   -encephalopathy ?? delirium from infection ??  med f/u    dvt ppx 79 yr old male with HTN, DCHF, CAD, S/P PCI to OM1, Mid LAD (9/2014, [facility], [facility], [facility]) hx of ischemic CMP, AS, DM HLD, GERD, GOUT, s/p multiple spinal surgery admitted with back pain and fever    1. Back Pain  -stable, MRI noted, neuro/neuro sx followup  -ID f/u, pain control   -plan for PICC prior to dc     2. CAD s/p PCI (OM1/LAD)   -stable, cont cont ASA ,statin    3. Acute on Chronic systolic & Diastolic CHF, New LV dysfunction   -echo with severe LV sys dysfxn, EF 20-25%, mild diastolic dysfxn  -LAZARO 12/13 with moderate lv dysfunction  -recent echo few months ago with normal LV function  -possible new cardiomyopathy in setting of infection, sepsis  -will continue medical therapy for now as he is not a candidate for ischemic eval   -as outpt was off beta blocker due to BB induced fatigue   -angelo from possible diuresis with dec po intake, hold further lasix  -gentle hydration  -family concerned about inc peripheral edema, no inc dyspnea      4. Aortic Stenosis  -stable, severe on LAZARO with valve area of 0.9, tte this admit and as outpt with moderate AS  -gradients low on lazaro, perhaps due to new lv dysfxn  -YULY likely underestimated  -patient was asymptomatic as outpt from valve disease  -no further workup indicated, will follow as outpatient     5. HTN   -BP stable, continue with current antihtn meds     6. Fever/ Sepsis   + blood cultures with gram positive Cocci in clusters   -iv abx, ID f/u   -LAZARO without endocarditis   -encephalopathy ?? delirium from infection ??  med f/u    dvt ppx

## 2019-12-16 NOTE — SWALLOW VFSS/MBS ASSESSMENT ADULT - ORAL PHASE COMMENTS
Oral transit time for initial swallow about 5s. Pt initiated secondary swallow to aid in clearance. Total oral transit time ~7.5s. Min-moderate premature spillage to valleculae and trace-min spillage over the posterior surface of epiglottis. LORI noted to be 1.7s. Min premature spillage to valleculae prior to swallow trigger. LORI noted to be 1.9s. Min premature spillage to valleculae prior to swallow trigger. Unable to assess oral cavity residue post swallow due to reduced visualization of oral cavity. Reduced AP transport with min-moderate oral residue observed on visualized portion of oral cavity in which patient initiated secondary swallow to clear. LORI noted to be 2.2s for initial swallow. LORI noted to be 1.9s. Unable to assess oral cavity residue post swallow due to reduced visualization of oral cavity.

## 2019-12-16 NOTE — PROGRESS NOTE ADULT - ASSESSMENT
MSSA bacteremia  Bibasilar atelectasis with probable pneumonia: possibly bacteremic  CAD with severe AS and Systolic LV dysfunction  r/o OP dysphagia    REC    Continue abx per ID  Aspiration precautions  Swallow f/u: patient may not comply with thickened liquids

## 2019-12-16 NOTE — SWALLOW VFSS/MBS ASSESSMENT ADULT - LARYNGEAL PENETRATION DURING THE SWALLOW - SILENT
Trace-min intermittent penetration which is cleared with double swallow and cued cough Mild/without complete clearance Mild/Deep laryngeal penetration without complete clearance

## 2019-12-16 NOTE — PROGRESS NOTE ADULT - SUBJECTIVE AND OBJECTIVE BOX
Follow-up Pulm Progress Note  Aaron Dowd MD  [phone]    Afebrile on Cefazol  Blood cultures X 4 on 12/12 and 12/14 negative    Vital Signs Last 24 Hrs  T(C): 36.8 (16 Dec 2019 09:12), Max: 37.2 (15 Dec 2019 17:07)  T(F): 98.3 (16 Dec 2019 09:12), Max: 99 (15 Dec 2019 17:07)  HR: 59 (16 Dec 2019 09:12) (59 - 75)  BP: 124/73 (16 Dec 2019 09:12) (108/62 - 132/60)  BP(mean): --  RR: 18 (16 Dec 2019 09:12) (18 - 18)  SpO2: 95% (16 Dec 2019 09:12) (92% - 95%)                          10.6   17.89 )-----------( 352      ( 16 Dec 2019 09:45 )             32.8       12-16    130<L>  |  98  |  83<H>  ----------------------------<  161<H>  4.3   |  18<L>  |  2.22<H>    Ca    8.9      16 Dec 2019 09:27      CULTURES:  Culture Results:   Growth in anaerobic bottle: Staphylococcus aureus  See previous culture [id number] (12-09 @ 22:05)  Culture Results:   Growth in anaerobic bottle: Staphylococcus aureus  See previous culture [id number] (12-09 @ 22:05)  Culture Results:   Growth in aerobic and anaerobic bottles: Staphylococcus aureus  "Due to technical problems, Proteus sp. will Not be reported as part of  the BCID panel until further notice"  ***Blood Panel PCR results on this specimen are available  approximately 3 hours after the Gram stain result.***  Gram stain, PCR, and/or culture results may not always  correspond due to difference in methodologies.  ************************************************************  This PCR assay was performed using [employer].  The following targets are tested for: Enterococcus,  vancomycin resistant enterococci, Listeria monocytogenes,  coagulase negative staphylococci, S. aureus,  methicillin resistant S. aureus, Streptococcus agalactiae  (Group B), S. pneumoniae, S. pyogenes (Group A),  Acinetobacter baumannii, Enterobacter cloacae, E. coli,  Klebsiella oxytoca, K. pneumoniae, Proteus sp.,  Serratia marcescens, Haemophilus influenzae,  Neisseria meningitidis, Pseudomonas aeruginosa, Candida  albicans, C. glabrata, C krusei, C parapsilosis,  C. tropicalis and the KPC resistance gene. (12-08 @ 04:15)  Culture Results:   Growth in aerobic and anaerobic bottles: Staphylococcus aureus  See previous culture [id number] (12-08 @ 04:15)  Culture Results:   No growth (12-08 @ 04:06)    Most recent blood culture -- 12-09 @ 22:05   -- -- .Blood Blood 12-09 @ 22:05      Physical Examination:  PULM: No sign wheeze or rhonchi  CVS: Regular rate and rhythm, no murmurs, rubs, or gallops  ABD: Soft, non-tender  EXT:  No clubbing, cyanosis, or edema    RADIOLOGY REVIEWED  CXR:    CT chest:    TTE:    PROCEDURE: Transthoracic echocardiogram with 2-D, M-Mode  and complete spectral and color flow Doppler. Verbal  consent was obtained for injection of  Ultrasonic Enhancing  Agent following a discussion of risks and benefits.  Following intravenous injection of Ultrasonic Enhancing  Agent , harmonic imaging was performed.  INDICATION: Acute and subacute endocarditis, unspecified  (I33.9)  ------------------------------------------------------------------------  Dimensions:    Normal Values:  LA:     5.5    2.0 - 4.0 cm  Ao:     3.7    2.0 - 3.8 cm  SEPTUM: 1.6    0.6 - 1.2 cm  PWT:    1.4    0.6 - 1.1 cm  LVIDd:  5.2    3.0 - 5.6 cm  LVIDs:         1.8 - 4.0 cm  Derived variables:  LVMI: 148 g/m2  RWT: 0.53  EF (Visual Estimate): 20-25 %  Doppler Peak Velocity (m/sec): AoV=3.0  ------------------------------------------------------------------------  Observations:  Mitral Valve: Mitral annular calcification, otherwise  normal mitral valve. Minimal mitral regurgitation.  Aortic Valve/Aorta: Calcified trileaflet aortic valve with  decreased opening. Peak transaortic valve gradient equals  36 mm Hg, mean transaortic valve gradient equals 25 mm Hg,  estimated aortic valve area equals 1.2 sqcm (by continuity  equation), aortic valve velocity time integral equals 69  cm, consistent with moderate aortic stenosis. Minimal  aortic regurgitation.  Peak left ventricular outflow tract  gradient equals 3 mm Hg, mean gradient is equal to 2 mm Hg,  LVOT velocity time integral equals 19 cm.  Aortic Root: 3.7 cm.  LVOT diameter: 2.3 cm.  Left Atrium: Mildly dilated left atrium.  LA volume index=  39 cc/m2.  Left Ventricle: Severe  left ventricular systolic  dysfunction.   Endocardial visualization enhanced with  intravenous injection of Ultrasonic Enhancing Agent  (Definity). Severe concentric left ventricular hypertrophy.  Mild diastolic dysfunction (Stage I).  Right Heart: Severe right atrial enlargement. Normal right  ventricular size and function. Normal tricuspid valve.  Minimal tricuspid regurgitation. Normal pulmonic valve.  Pericardium/Pleura: Normal pericardium with trace  pericardial effusion.  Hemodynamic: Estimated right atrial pressure is 8 mm Hg.  Estimated right ventricular systolic pressure equals 27 mm  Hg, assuming right atrial pressure equals 8 mm Hg,  consistent with normal pulmonary pressures.  ------------------------------------------------------------------------  Conclusions:  1. Calcified trileaflet aortic valve with decreased  opening. Peak transaortic valve gradient equals 36 mm Hg,  mean transaortic valve gradient equals 25 mm Hg, estimated  aortic valve area equals 1.2 sqcm (by continuity equation),  aortic valve velocity time integral equals 69 cm,  consistent with moderate aortic stenosis.  2. Severe concentric left ventricular hypertrophy.  3. Severe  left ventricular systolic dysfunction.  Endocardial visualization enhanced with intravenous  injection of Ultrasonic Enhancing Agent (Definity).  Unable to rule out endocarditis. Consider JAHAIRA if clinically  indicated.

## 2019-12-17 LAB
ALBUMIN SERPL ELPH-MCNC: 1.6 G/DL — LOW (ref 3.3–5)
ALP SERPL-CCNC: 84 U/L — SIGNIFICANT CHANGE UP (ref 40–120)
ALT FLD-CCNC: <5 U/L — LOW (ref 10–45)
ANION GAP SERPL CALC-SCNC: 14 MMOL/L — SIGNIFICANT CHANGE UP (ref 5–17)
APPEARANCE UR: ABNORMAL
AST SERPL-CCNC: 21 U/L — SIGNIFICANT CHANGE UP (ref 10–40)
BILIRUB SERPL-MCNC: 0.3 MG/DL — SIGNIFICANT CHANGE UP (ref 0.2–1.2)
BILIRUB UR-MCNC: NEGATIVE — SIGNIFICANT CHANGE UP
BUN SERPL-MCNC: 93 MG/DL — HIGH (ref 7–23)
CALCIUM SERPL-MCNC: 7.8 MG/DL — LOW (ref 8.4–10.5)
CHLORIDE SERPL-SCNC: 104 MMOL/L — SIGNIFICANT CHANGE UP (ref 96–108)
CO2 SERPL-SCNC: 17 MMOL/L — LOW (ref 22–31)
COLOR SPEC: YELLOW — SIGNIFICANT CHANGE UP
CREAT SERPL-MCNC: 3.04 MG/DL — HIGH (ref 0.5–1.3)
CULTURE RESULTS: SIGNIFICANT CHANGE UP
CULTURE RESULTS: SIGNIFICANT CHANGE UP
DIFF PNL FLD: ABNORMAL
EOSINOPHIL NFR URNS MANUAL: NEGATIVE — SIGNIFICANT CHANGE UP
GLUCOSE SERPL-MCNC: 138 MG/DL — HIGH (ref 70–99)
GLUCOSE UR QL: NEGATIVE — SIGNIFICANT CHANGE UP
HCT VFR BLD CALC: 28.3 % — LOW (ref 39–50)
HGB BLD-MCNC: 9.1 G/DL — LOW (ref 13–17)
KETONES UR-MCNC: ABNORMAL
LDH SERPL L TO P-CCNC: 178 U/L — SIGNIFICANT CHANGE UP (ref 50–242)
LEUKOCYTE ESTERASE UR-ACNC: NEGATIVE — SIGNIFICANT CHANGE UP
MCHC RBC-ENTMCNC: 29.2 PG — SIGNIFICANT CHANGE UP (ref 27–34)
MCHC RBC-ENTMCNC: 32.2 GM/DL — SIGNIFICANT CHANGE UP (ref 32–36)
MCV RBC AUTO: 90.7 FL — SIGNIFICANT CHANGE UP (ref 80–100)
NITRITE UR-MCNC: NEGATIVE — SIGNIFICANT CHANGE UP
OSMOLALITY UR: 366 MOSM/KG — SIGNIFICANT CHANGE UP (ref 50–1200)
PH UR: 5.5 — SIGNIFICANT CHANGE UP (ref 5–8)
PLATELET # BLD AUTO: 331 K/UL — SIGNIFICANT CHANGE UP (ref 150–400)
POTASSIUM SERPL-MCNC: 4 MMOL/L — SIGNIFICANT CHANGE UP (ref 3.5–5.3)
POTASSIUM SERPL-SCNC: 4 MMOL/L — SIGNIFICANT CHANGE UP (ref 3.5–5.3)
PROT SERPL-MCNC: 4.9 G/DL — LOW (ref 6–8.3)
PROT UR-MCNC: ABNORMAL
RBC # BLD: 3.12 M/UL — LOW (ref 4.2–5.8)
RBC # FLD: 14.7 % — HIGH (ref 10.3–14.5)
SODIUM SERPL-SCNC: 135 MMOL/L — SIGNIFICANT CHANGE UP (ref 135–145)
SP GR SPEC: 1.02 — SIGNIFICANT CHANGE UP (ref 1.01–1.02)
SPECIMEN SOURCE: SIGNIFICANT CHANGE UP
SPECIMEN SOURCE: SIGNIFICANT CHANGE UP
UROBILINOGEN FLD QL: SIGNIFICANT CHANGE UP
WBC # BLD: 15.4 K/UL — HIGH (ref 3.8–10.5)
WBC # FLD AUTO: 15.4 K/UL — HIGH (ref 3.8–10.5)

## 2019-12-17 PROCEDURE — 99233 SBSQ HOSP IP/OBS HIGH 50: CPT

## 2019-12-17 RX ADMIN — SENNA PLUS 2 TABLET(S): 8.6 TABLET ORAL at 22:21

## 2019-12-17 RX ADMIN — Medication 300 MILLIGRAM(S): at 13:12

## 2019-12-17 RX ADMIN — Medication 100 MILLIGRAM(S): at 05:58

## 2019-12-17 RX ADMIN — HEPARIN SODIUM 5000 UNIT(S): 5000 INJECTION INTRAVENOUS; SUBCUTANEOUS at 22:21

## 2019-12-17 RX ADMIN — ATORVASTATIN CALCIUM 10 MILLIGRAM(S): 80 TABLET, FILM COATED ORAL at 22:21

## 2019-12-17 RX ADMIN — AMLODIPINE BESYLATE 10 MILLIGRAM(S): 2.5 TABLET ORAL at 05:57

## 2019-12-17 RX ADMIN — HEPARIN SODIUM 5000 UNIT(S): 5000 INJECTION INTRAVENOUS; SUBCUTANEOUS at 13:12

## 2019-12-17 RX ADMIN — Medication 650 MILLIGRAM(S): at 13:43

## 2019-12-17 RX ADMIN — Medication 100 MILLIGRAM(S): at 18:02

## 2019-12-17 RX ADMIN — Medication 650 MILLIGRAM(S): at 13:13

## 2019-12-17 RX ADMIN — HEPARIN SODIUM 5000 UNIT(S): 5000 INJECTION INTRAVENOUS; SUBCUTANEOUS at 05:57

## 2019-12-17 RX ADMIN — Medication 81 MILLIGRAM(S): at 13:16

## 2019-12-17 NOTE — PROGRESS NOTE ADULT - SUBJECTIVE AND OBJECTIVE BOX
Follow-up Pulm Progress Note  Aaron Dowd MD  [phone]    Afebrile on Cefazol  JAHAIRA noted: No vegetations; LVEF 44%; severe AS, diasolic dysfunction  ID f/u noted    Vital Signs Last 24 Hrs  T(C): 37.2 (17 Dec 2019 12:48), Max: 37.2 (16 Dec 2019 22:32)  T(F): 99 (17 Dec 2019 12:48), Max: 99 (17 Dec 2019 12:48)  HR: 71 (17 Dec 2019 12:48) (66 - 78)  BP: 135/76 (17 Dec 2019 12:48) (102/64 - 146/66)  BP(mean): --  RR: 18 (17 Dec 2019 12:48) (18 - 18)  SpO2: 93% (17 Dec 2019 12:48) (93% - 94%)                          9.1    15.40 )-----------( 331      ( 17 Dec 2019 10:45 )             28.3     12-17    135  |  104  |  93<H>  ----------------------------<  138<H>  4.0   |  17<L>  |  3.04<H>    Ca    7.8<L>      17 Dec 2019 09:43    TPro  4.9<L>  /  Alb  1.6<L>  /  TBili  0.3  /  DBili  x   /  AST  21  /  ALT  <5<L>  /  AlkPhos  84  12-17        CULTURES:  Culture Results:   Growth in anaerobic bottle: Staphylococcus aureus  See previous culture [id number] (12-09 @ 22:05)  Culture Results:   Growth in anaerobic bottle: Staphylococcus aureus  See previous culture [id number] (12-09 @ 22:05)  Culture Results:   Growth in aerobic and anaerobic bottles: Staphylococcus aureus  "Due to technical problems, Proteus sp. will Not be reported as part of  the BCID panel until further notice"  ***Blood Panel PCR results on this specimen are available  approximately 3 hours after the Gram stain result.***  Gram stain, PCR, and/or culture results may not always  correspond due to difference in methodologies.  ************************************************************  This PCR assay was performed using [employer].  The following targets are tested for: Enterococcus,  vancomycin resistant enterococci, Listeria monocytogenes,  coagulase negative staphylococci, S. aureus,  methicillin resistant S. aureus, Streptococcus agalactiae  (Group B), S. pneumoniae, S. pyogenes (Group A),  Acinetobacter baumannii, Enterobacter cloacae, E. coli,  Klebsiella oxytoca, K. pneumoniae, Proteus sp.,  Serratia marcescens, Haemophilus influenzae,  Neisseria meningitidis, Pseudomonas aeruginosa, Candida  albicans, C. glabrata, C krusei, C parapsilosis,  C. tropicalis and the KPC resistance gene. (12-08 @ 04:15)  Culture Results:   Growth in aerobic and anaerobic bottles: Staphylococcus aureus  See previous culture [id number] (12-08 @ 04:15)  Culture Results:   No growth (12-08 @ 04:06)    Most recent blood culture -- 12-09 @ 22:05   -- -- .Blood Blood 12-09 @ 22:05      Physical Examination:  PULM: No sign wheeze or rhonchi  CVS: Regular rate and rhythm, no murmurs, rubs, or gallops  ABD: Soft, non-tender  EXT:  No clubbing, cyanosis, or edema    RADIOLOGY REVIEWED  CXR:    CT chest:    TTE:    PROCEDURE: Transthoracic echocardiogram with 2-D, M-Mode  and complete spectral and color flow Doppler. Verbal  consent was obtained for injection of  Ultrasonic Enhancing  Agent following a discussion of risks and benefits.  Following intravenous injection of Ultrasonic Enhancing  Agent , harmonic imaging was performed.  INDICATION: Acute and subacute endocarditis, unspecified  (I33.9)  ------------------------------------------------------------------------  Dimensions:    Normal Values:  LA:     5.5    2.0 - 4.0 cm  Ao:     3.7    2.0 - 3.8 cm  SEPTUM: 1.6    0.6 - 1.2 cm  PWT:    1.4    0.6 - 1.1 cm  LVIDd:  5.2    3.0 - 5.6 cm  LVIDs:         1.8 - 4.0 cm  Derived variables:  LVMI: 148 g/m2  RWT: 0.53  EF (Visual Estimate): 20-25 %  Doppler Peak Velocity (m/sec): AoV=3.0  ------------------------------------------------------------------------  Observations:  Mitral Valve: Mitral annular calcification, otherwise  normal mitral valve. Minimal mitral regurgitation.  Aortic Valve/Aorta: Calcified trileaflet aortic valve with  decreased opening. Peak transaortic valve gradient equals  36 mm Hg, mean transaortic valve gradient equals 25 mm Hg,  estimated aortic valve area equals 1.2 sqcm (by continuity  equation), aortic valve velocity time integral equals 69  cm, consistent with moderate aortic stenosis. Minimal  aortic regurgitation.  Peak left ventricular outflow tract  gradient equals 3 mm Hg, mean gradient is equal to 2 mm Hg,  LVOT velocity time integral equals 19 cm.  Aortic Root: 3.7 cm.  LVOT diameter: 2.3 cm.  Left Atrium: Mildly dilated left atrium.  LA volume index=  39 cc/m2.  Left Ventricle: Severe  left ventricular systolic  dysfunction.   Endocardial visualization enhanced with  intravenous injection of Ultrasonic Enhancing Agent  (Definity). Severe concentric left ventricular hypertrophy.  Mild diastolic dysfunction (Stage I).  Right Heart: Severe right atrial enlargement. Normal right  ventricular size and function. Normal tricuspid valve.  Minimal tricuspid regurgitation. Normal pulmonic valve.  Pericardium/Pleura: Normal pericardium with trace  pericardial effusion.  Hemodynamic: Estimated right atrial pressure is 8 mm Hg.  Estimated right ventricular systolic pressure equals 27 mm  Hg, assuming right atrial pressure equals 8 mm Hg,  consistent with normal pulmonary pressures.  ------------------------------------------------------------------------  Conclusions:  1. Calcified trileaflet aortic valve with decreased  opening. Peak transaortic valve gradient equals 36 mm Hg,  mean transaortic valve gradient equals 25 mm Hg, estimated  aortic valve area equals 1.2 sqcm (by continuity equation),  aortic valve velocity time integral equals 69 cm,  consistent with moderate aortic stenosis.  2. Severe concentric left ventricular hypertrophy.  3. Severe  left ventricular systolic dysfunction.  Endocardial visualization enhanced with intravenous  injection of Ultrasonic Enhancing Agent (Definity).  Unable to rule out endocarditis. Consider JAHAIRA if clinically  indicated.

## 2019-12-17 NOTE — PROGRESS NOTE ADULT - ASSESSMENT
78 y/o male with h/o of CAD s/p PCI 1/2017 with 10% LAD stenosis OM1, HTN, HLD, GERD, Gout, Osteoarthritis and chronic back pain, s/p multiple laminectomies and spinal fusion, lower back pain and weakness in his bilateral lower extremities that progressed to inability to ambulate. dx with MSSA bacteremia. on ancef. also received lasix as well. has had hesitancy urinating. on 12/16/ KRIS       1- KRIS   2- MSSA bacteremia   3- dysphagia   4- cad  5- htn     suspect kris in setting of urinary retention possible vs +/-  AIN + /- due to infection  vs intravascular volume depletion despite LE edema [ given serum alb is 1.6]   U eos neg check serum eos  renal sono/bladder sono  urine lytes with low U na therefore cont  ivf hydration despite peripheral edema   bp is lower. hold norvasc and resume if sbp > 150 mmhg   d/w pt son at bedside

## 2019-12-17 NOTE — PROGRESS NOTE ADULT - SUBJECTIVE AND OBJECTIVE BOX
[facility]   [phone]  RENAL FOLLOW UP NOTE  --------------------------------------------------------------------------------  Chief Complaint:    24 hour events/subjective:    seen has hesitancy urinating  also decrease po intake   required milli chaudhry     PAST HISTORY  --------------------------------------------------------------------------------  No significant changes to PMH, PSH, FHx, SHx, unless otherwise noted    ALLERGIES & MEDICATIONS  --------------------------------------------------------------------------------  Allergies    Indocin (Unknown)  oxacillin (Unknown)  Zyvox (Unknown)    Intolerances      Standing Inpatient Medications  acetaminophen  IVPB .. 1000 milliGRAM(s) IV Intermittent once  allopurinol 300 milliGRAM(s) Oral daily  aspirin enteric coated 81 milliGRAM(s) Oral daily  atorvastatin 10 milliGRAM(s) Oral at bedtime  ceFAZolin   IVPB 1000 milliGRAM(s) IV Intermittent every 12 hours  heparin  Injectable 5000 Unit(s) SubCutaneous every 8 hours  senna 2 Tablet(s) Oral at bedtime  sodium chloride 0.9%. 1000 milliLiter(s) IV Continuous <Continuous>    PRN Inpatient Medications  acetaminophen   Tablet .. 650 milliGRAM(s) Oral every 6 hours PRN      REVIEW OF SYSTEMS  --------------------------------------------------------------------------------    Gen: denies fevers/chills,  CVS: denies chest pain/palpitations  Resp: denies SOB/Cough  GI: Denies N/V/Abd pain  : Denies dysuria    All other systems were reviewed and are negative, except as noted.    VITALS/PHYSICAL EXAM  --------------------------------------------------------------------------------  T(C): 36.7 (12-17-19 @ 17:06), Max: 37.2 (12-16-19 @ 22:32)  HR: 68 (12-17-19 @ 17:06) (66 - 75)  BP: 118/67 (12-17-19 @ 17:06) (102/64 - 146/66)  RR: 18 (12-17-19 @ 17:06) (18 - 18)  SpO2: 96% (12-17-19 @ 17:06) (93% - 96%)  Wt(kg): --        12-16-19 @ 07:01  -  12-17-19 @ 07:00  --------------------------------------------------------  IN: 1290 mL / OUT: 450 mL / NET: 840 mL    12-17-19 @ 07:01  -  12-17-19 @ 21:22  --------------------------------------------------------  IN: 1470 mL / OUT: 350 mL / NET: 1120 mL      Physical Exam:  		Gen: elderly M oriented x 2 today   	no jvd  	Pulm: decrease bs  no rales or ronchi or wheezing  	CV: RRR, S1S2; no rub  	Back: No CVA tenderness  	Abd: +BS, soft, nontender/nondistended  	: No suprapubic tenderness  	UE: Warm, no cyanosis  no clubbing,  wrist edema   	LE: Warm, no cyanosis  no clubbing, 2- edema     	        LABS/STUDIES  --------------------------------------------------------------------------------              9.1    15.40 >-----------<  331      [12-17-19 @ 10:45]              28.3     135  |  104  |  93  ----------------------------<  138      [12-17-19 @ 09:43]  4.0   |  17  |  3.04        Ca     7.8     [12-17-19 @ 09:43]    TPro  4.9  /  Alb  1.6  /  TBili  0.3  /  DBili  x   /  AST  21  /  ALT  <5  /  AlkPhos  84  [12-17-19 @ 09:43]        Uric acid 5.2      [12-16-19 @ 17:28]        [12-17-19 @ 09:43]  Serum Osmolality 316      [12-16-19 @ 17:28]    Creatinine Trend:  SCr 3.04 [12-17 @ 09:43]  SCr 2.44 [12-16 @ 17:28]  SCr 2.22 [12-16 @ 09:27]  SCr 1.61 [12-15 @ 16:41]  SCr 1.20 [12-14 @ 07:19]              Urinalysis - [12-16-19 @ 23:28]      Color Yellow / Appearance Slightly Turbid / SG 1.023 / pH 5.5      Gluc Negative / Ketone Small  / Bili Negative / Urobili <2 mg/dL       Blood Small / Protein 30 mg/dL / Leuk Est Negative / Nitrite Negative      RBC 5 / WBC 7 / Hyaline 0 / Gran  / Sq Epi  / Non Sq Epi 2 / Bacteria Negative    Urine Creatinine 155      [12-16-19 @ 18:48]  Urine Protein 42      [12-16-19 @ 18:48]  Urine Sodium <35      [12-16-19 @ 18:48]  Urine Chloride <35      [12-16-19 @ 18:48]  Urine Osmolality 366      [12-16-19 @ 23:28]

## 2019-12-17 NOTE — PROVIDER CONTACT NOTE (OTHER) - ASSESSMENT
Pt denies having pelvic discomfort or urgency to void. Pt with 130cc on bladder scan. Pt has not voided since 2300. Pt will minimal PO intake. Pt on IVF as prescribed.

## 2019-12-17 NOTE — PROGRESS NOTE ADULT - PROBLEM SELECTOR PLAN 1
possible 2/2 urinary retention  bladder , sp straight cath 450 output  renal following  holding losartan  will need renal clx for PICC with CKD  will cont NS  renal US

## 2019-12-17 NOTE — PROGRESS NOTE ADULT - SUBJECTIVE AND OBJECTIVE BOX
CARDIOLOGY FOLLOW UP - Dr. Lee    CC no cp/sob   oob to chair       PHYSICAL EXAM:  T(C): 36.8 (12-17-19 @ 09:07), Max: 37.2 (12-16-19 @ 22:32)  HR: 66 (12-17-19 @ 09:07) (66 - 78)  BP: 146/66 (12-17-19 @ 09:07) (102/64 - 146/66)  RR: 18 (12-17-19 @ 09:07) (18 - 18)  SpO2: 94% (12-17-19 @ 09:07) (94% - 94%)  Wt(kg): --  I&O's Summary    16 Dec 2019 07:01  -  17 Dec 2019 07:00  --------------------------------------------------------  IN: 1290 mL / OUT: 450 mL / NET: 840 mL        Appearance: Normal	  Cardiovascular: Normal S1 S2,RRR, No JVD, No murmurs  Respiratory: Lungs clear to auscultation	  Gastrointestinal:  Soft, Non-tender, + BS	  Extremities: Normal range of motion, No clubbing, +b/l upper/ lower extremity edema         MEDICATIONS  (STANDING):  acetaminophen  IVPB .. 1000 milliGRAM(s) IV Intermittent once  allopurinol 300 milliGRAM(s) Oral daily  amLODIPine   Tablet 10 milliGRAM(s) Oral daily  aspirin enteric coated 81 milliGRAM(s) Oral daily  atorvastatin 10 milliGRAM(s) Oral at bedtime  ceFAZolin   IVPB 1000 milliGRAM(s) IV Intermittent every 12 hours  heparin  Injectable 5000 Unit(s) SubCutaneous every 8 hours  senna 2 Tablet(s) Oral at bedtime  sodium chloride 0.9%. 1000 milliLiter(s) (60 mL/Hr) IV Continuous <Continuous>      TELEMETRY: 	    ECG:  	  RADIOLOGY:   DIAGNOSTIC TESTING:  [ ] Echocardiogram:  [ ]  Catheterization:  [ ] Stress Test:    OTHER: 	    LABS:	 	                                9.1    15.40 )-----------( 331      ( 17 Dec 2019 10:45 )             28.3     12-17    135  |  104  |  93<H>  ----------------------------<  138<H>  4.0   |  17<L>  |  3.04<H>    Ca    7.8<L>      17 Dec 2019 09:43    TPro  4.9<L>  /  Alb  1.6<L>  /  TBili  0.3  /  DBili  x   /  AST  21  /  ALT  <5<L>  /  AlkPhos  84  12-17

## 2019-12-17 NOTE — PROGRESS NOTE ADULT - SUBJECTIVE AND OBJECTIVE BOX
Patient is a 79y old  Male who presents with a chief complaint of Back pain (16 Dec 2019 22:44)      SUBJECTIVE / OVERNIGHT EVENTS:    Patient seen and examined. sitting in chair. no cp sob.      Vital Signs Last 24 Hrs  T(C): 36.8 (17 Dec 2019 09:07), Max: 37.2 (16 Dec 2019 22:32)  T(F): 98.3 (17 Dec 2019 09:07), Max: 98.9 (16 Dec 2019 22:32)  HR: 66 (17 Dec 2019 09:07) (66 - 79)  BP: 146/66 (17 Dec 2019 09:07) (102/64 - 146/66)  BP(mean): --  RR: 18 (17 Dec 2019 09:07) (18 - 18)  SpO2: 94% (17 Dec 2019 09:07) (94% - 94%)  I&O's Summary    16 Dec 2019 07:01  -  17 Dec 2019 07:00  --------------------------------------------------------  IN: 1290 mL / OUT: 450 mL / NET: 840 mL        PE:  GENERAL: NAD, AAOx2  HEAD:  Atraumatic, Normocephalic  EYES: EOMI, PERRLA, conjunctiva and sclera clear  NECK: Supple, No JVD  CHEST/LUNG: CTABL, No wheeze  HEART: Regular rate and rhythm; no murmur  ABDOMEN: Soft, Nontender, Nondistended; Bowel sounds present  EXTREMITIES:  2+ Peripheral Pulses, +1 edema  SKIN: No rashes or lesions  NEURO: No focal deficits    LABS:                        9.1    15.40 )-----------( 331      ( 17 Dec 2019 10:45 )             28.3     12-17    135  |  104  |  93<H>  ----------------------------<  138<H>  4.0   |  17<L>  |  3.04<H>    Ca    7.8<L>      17 Dec 2019 09:43    TPro  4.9<L>  /  Alb  1.6<L>  /  TBili  0.3  /  DBili  x   /  AST  21  /  ALT  <5<L>  /  AlkPhos  84  12-17      CAPILLARY BLOOD GLUCOSE            Urinalysis Basic - ( 16 Dec 2019 23:28 )    Color: Yellow / Appearance: Slightly Turbid / S.023 / pH: x  Gluc: x / Ketone: Small  / Bili: Negative / Urobili: <2 mg/dL   Blood: x / Protein: 30 mg/dL / Nitrite: Negative   Leuk Esterase: Negative / RBC: 5 /HPF / WBC 7 /HPF   Sq Epi: x / Non Sq Epi: 2 /HPF / Bacteria: Negative        RADIOLOGY & ADDITIONAL TESTS:    Imaging Personally Reviewed:  [x] YES  [ ] NO    Consultant(s) Notes Reviewed:  [x] YES  [ ] NO    MEDICATIONS  (STANDING):  acetaminophen  IVPB .. 1000 milliGRAM(s) IV Intermittent once  allopurinol 300 milliGRAM(s) Oral daily  amLODIPine   Tablet 10 milliGRAM(s) Oral daily  aspirin enteric coated 81 milliGRAM(s) Oral daily  atorvastatin 10 milliGRAM(s) Oral at bedtime  ceFAZolin   IVPB 1000 milliGRAM(s) IV Intermittent every 12 hours  heparin  Injectable 5000 Unit(s) SubCutaneous every 8 hours  senna 2 Tablet(s) Oral at bedtime  sodium chloride 0.9%. 1000 milliLiter(s) (60 mL/Hr) IV Continuous <Continuous>    MEDICATIONS  (PRN):  acetaminophen   Tablet .. 650 milliGRAM(s) Oral every 6 hours PRN Temp greater or equal to 38C (100.4F), Mild Pain (1 - 3)      Care Discussed with Consultants/Other Providers [x] YES  [ ] NO    HEALTH ISSUES - PROBLEM Dx:  Need for prophylactic measure: Need for prophylactic measure  Gout: Gout  Essential hypertension: Essential hypertension  CAD (coronary artery disease): CAD (coronary artery disease)  KRIS (acute kidney injury): KRIS (acute kidney injury)  Sepsis: Sepsis  Weakness of both lower extremities: Weakness of both lower extremities  Acute left-sided low back pain without sciatica: Acute left-sided low back pain without sciatica

## 2019-12-17 NOTE — DIETITIAN INITIAL EVALUATION ADULT. - FACTORS AFF FOOD INTAKE
pt seen by SLP, recommended Dysphagia III diet with honey thickened liquids/difficulty swallowing/difficulty chewing other (specify)/difficulty swallowing/Pt with decreased appetite, family reporting pt coughing with liquids. Per SLP note recommended Dysphagia III diet with honey thickened liquids

## 2019-12-17 NOTE — DIETITIAN INITIAL EVALUATION ADULT. - ENERGY NEEDS
79M with PMH of CAD, HTN, HLD, GERD, Gout, Osteoarthritis and chronic back pain who presents with worsening back pain and fevers for approx a week.

## 2019-12-17 NOTE — PROGRESS NOTE ADULT - ATTENDING COMMENTS
plan of care dw son at bedside    Dr. Stevenson will take over care tomorrow 12/18/19.  Please contact with any questions or concerns [phone].

## 2019-12-17 NOTE — PROGRESS NOTE ADULT - ASSESSMENT
79 yr old male with HTN, DCHF, CAD, S/P PCI to OM1, Mid LAD (9/2014, [facility], [facility], [facility]) hx of ischemic CMP, AS, DM HLD, GERD, GOUT, s/p multiple spinal surgery admitted with back pain and fever    1. Back Pain  -stable, MRI noted, neuro/neuro sx followup  -ID f/u, pain control   -plan for PICC prior to dc     2. CAD s/p PCI (OM1/LAD)   -stable, cont cont ASA ,statin    3. Acute on Chronic systolic & Diastolic CHF, New LV dysfunction   -echo with severe LV sys dysfxn, EF 20-25%, mild diastolic dysfxn  -LAZARO 12/13 with moderate lv dysfunction  -recent echo few months ago with normal LV function  -possible new cardiomyopathy in setting of infection, sepsis  -will continue medical therapy for now as he is not a candidate for ischemic eval   -as outpt was off beta blocker due to BB induced fatigue   -angelo from possible diuresis with dec po intake, hold further lasix  -gentle hydration  -family concerned about inc peripheral edema, no inc dyspnea      4. Aortic Stenosis  -stable, severe on LAZARO with valve area of 0.9, tte this admit and as outpt with moderate AS  -gradients low on lazaro, perhaps due to new lv dysfxn  -YULY likely underestimated  -patient was asymptomatic as outpt from valve disease  -no further workup indicated, will follow as outpatient     5. HTN   -BP stable, continue with current antihtn meds     6. Fever/ Sepsis   + blood cultures with gram positive Cocci in clusters   -iv abx, ID f/u   -LAZARO without endocarditis   -encephalopathy ?? delirium from infection ??  med f/u    dvt ppx

## 2019-12-17 NOTE — PROGRESS NOTE ADULT - ASSESSMENT
80 yo M with chronic low back pain sp multiple laminectomy and fusion p/w 1 week of worsening back pain along with weakness in bilateral lower extremities and inability to walk since last night.  Leukocytosis, fever  Low back pain, increasing LE weakness  MR L spine and T spine prelim but no emergent findings  Prior history of MSSA bacteremia (2016)  History oxacillin allergy (? fever), Zyvox ([facility]); tolerated vanco on this visit  BCX now with MSSA, high grade, rising WBC, patient ill appearing, guarded  CT A/P/C/H neg  NM tagged WBC uptake in thoracolumbar spine--degen disease?  TTE equivocal  Still leukocytosis, afeb, mental status improving, BCX 12/12 NGTD  JAHAIRA reassuring  High suspicion for spinal involvement despite negative MRI--monitor closely  Worsening KRIS--Nephro suspecting urinary retention  Overall, fever, leukocytosis, LBP, KRIS  - Cefazolin 1g q 12 (monitor Cr, may need to decrease further in setting KRIS)  - F/U nephro regarding KRIS  - F/U neurosurgery/neuro  - F/U pending BCXs  - Anticipate will need 6 week course IV abx    Fredy Huynh MD  Pager [phone]  After 5pm and on weekends call [phone]

## 2019-12-17 NOTE — DIETITIAN INITIAL EVALUATION ADULT. - OTHER INFO
Pt presented with worsening back pain associated with fevers. Spoke to son at bedside upon visit as pt was asleep and not able to answer questions at this time. Son reports pt had no weight loss PTA and that pt eats a regular, balanced diet at home prepared by his wife. Pt had no difficulty chewing/swallowing PTA and NKFA. Pt drinks ~1 Boost/day at home. Pt has tried Ensure during this admission and previous admissions and son reports it does not agree with him.     Weights: pt current admission weight is 229 pounds, son states usual weight of pt is 240 pounds, previous admission weight from September 2018 was 244 pounds. Noted 15 pound weight loss in >1 year .     Per MBS report, pt seen by SLP due to family reporting pt with decreased intake. SLP recommended pt diet of Dysphagia III with honey thickened liquids. Pt tolerating diet with no chewing/swallowing difficulties. No N/V. Son reports pt having difficulty with BM, pt on Senna. Son reports pt complaining about thickened water and does not enjoy the consistency. Pt has not been eating well since admission, ~< 25% x10 days, discussed option of trying Mighty Shakes to increase protein/calorie intake which son agreed to. Son with no further nutrition related questions at this time, made aware RD will remain available. Pt presented with worsening back pain associated with fevers. Spoke to son at bedside upon visit as pt was asleep and not able to answer questions at this time. Son reports pt had no weight loss PTA and that pt eats a regular, balanced diet at home prepared by his wife. Pt had no difficulty chewing/swallowing PTA, however per SLP note family reported pt coughing with liquids. NKFA. Pt drinks ~1 Boost/day at home. No micronutrient supplementation reported. Pt has tried Ensure during this admission and previous admissions and son reports it does not agree with him.     Weights: pt current admission weight is 229 pounds, son states usual weight of pt is 240 pounds, previous admission weight from September 2018 was 244 pounds. Noted 15 pound weight loss in >1 year.     Pt s/p MBS. SLP recommended pt diet of Dysphagia III with honey thickened liquids. Pt currently on dysphagia III diet with nectar thickened liquids, will discuss with provider. Pt tolerating diet with no chewing/swallowing difficulties. No N/V. Son reports pt having difficulty with BM, pt on Senna. Son reports pt complaining about thickened water and does not enjoy the consistency. Pt has not been eating well since admission, per flowsheet pt consuming < 25% of meals, discussed option of trying Mighty Shakes to increase protein/calorie intake which son agreed to. Son with no further nutrition related questions at this time, made aware RD will remain available. Pt presented with worsening back pain associated with fevers. Spoke to son at bedside upon visit as pt was asleep and not able to answer questions at this time. Son reports pt had no weight loss PTA and that pt eats a regular, balanced diet at home prepared by his wife. Pt had no difficulty chewing/swallowing PTA, however per SLP note family reported pt coughing with liquids. NKFA. Pt drinks ~1 Boost/day at home. No micronutrient supplementation reported. Pt has tried Ensure during this admission and previous admissions and son reports it does not agree with him.     Weights: pt current admission weight is 229 pounds, son states usual weight of pt is 240 pounds, previous admission weight from September 2018 was 244 pounds. Noted 15 pound weight loss in >1 year.     Pt s/p MBS. SLP recommended pt diet of Dysphagia III with honey thickened liquids. Pt currently on dysphagia III diet with nectar thickened liquids, discussed with NP. Pt tolerating diet with no chewing/swallowing difficulties. No N/V. Son reports pt having difficulty with BM, pt on Senna. Son reports pt complaining about thickened water and does not enjoy the consistency. Pt has not been eating well since admission, per flowsheet pt consuming < 25% of meals, discussed option of trying Mighty Shakes to increase protein/calorie intake which son agreed to. Son with no further nutrition related questions at this time, made aware RD will remain available.

## 2019-12-17 NOTE — DIETITIAN INITIAL EVALUATION ADULT. - PHYSICAL APPEARANCE
BMI: 28.7, Ht: 75 inches, Wt: 229 pounds, IBW: 196 pounds, 117% of IBW, Skin: no pressure ulcers noted, Edema: 2+ edema right and left arms/hands per nurses flowsheet BMI: 28.7, Ht: 75 inches, Wt: 229 pounds, IBW: 196 pounds, 117% of IBW, Skin: no pressure ulcers noted per nurses flowsheet, Edema: 2+ edema right and left arms/hands

## 2019-12-17 NOTE — DIETITIAN INITIAL EVALUATION ADULT. - ADD RECOMMEND
1) Monitor po intake. 2) Provide Mighty Shake 3x day to optimize po intake. 3) Monitor need for diet consistency reevaluation. 1) Monitor po intake and diet tolerance. 2) Provide Mighty Shake 3x day to optimize po intake. 1) Monitor po intake and diet tolerance. 2) Provide Mighty Shakes HC 3x day to optimize po intake. 1) Defer diet consistency to SLP/provider. 2) Monitor po intake and diet tolerance. 3) Provide Mighty Shakes HC 3x day to optimize po intake.

## 2019-12-17 NOTE — PROGRESS NOTE ADULT - PROBLEM SELECTOR PLAN 2
MR Thoracic Spine no acute findings  nuclear scan nonspecific heterogeneous uptake in the thoracolumbar spine which may be secondary to post surgical change and/or degenerative change.  no acute intervention per neurosurgery  tylenol for mild pain, hold tramadol for KRIS  IV tylenol PRN

## 2019-12-17 NOTE — PROVIDER CONTACT NOTE (OTHER) - ACTION/TREATMENT ORDERED:
NP Rocio aware. Will place order to straight cath. Will straight cath pt as per order. No further interventions required, will continue to monitor.

## 2019-12-17 NOTE — PROGRESS NOTE ADULT - SUBJECTIVE AND OBJECTIVE BOX
CC: F/U for Bacteremia    Saw/spoke to patient. No fevers, no chills. Still sleepy, episode urinary retention overnight.    Allergies  Indocin (Unknown)  oxacillin (Unknown)  Zyvox (Unknown)    ANTIMICROBIALS:  ceFAZolin   IVPB 1000 every 12 hours    PE:    Vital Signs Last 24 Hrs  T(C): 36.8 (17 Dec 2019 09:07), Max: 37.2 (16 Dec 2019 22:32)  T(F): 98.3 (17 Dec 2019 09:07), Max: 98.9 (16 Dec 2019 22:32)  HR: 66 (17 Dec 2019 09:07) (66 - 79)  BP: 146/66 (17 Dec 2019 09:07) (102/64 - 146/66)  RR: 18 (17 Dec 2019 09:07) (18 - 18)  SpO2: 94% (17 Dec 2019 09:07) (94% - 94%)    Gen: AOx1-2, fatigued  CV: S1+S2 normal, nontachycardic  Resp: Clear bilat, no resp distress, no crackles/wheezes  Abd: Soft, nontender, +BS  Ext: No LE edema, no wounds    LABS:                        9.1    15.40 )-----------( 331      ( 17 Dec 2019 10:45 )             28.3     12    135  |  104  |  93<H>  ----------------------------<  138<H>  4.0   |  17<L>  |  3.04<H>    Ca    7.8<L>      17 Dec 2019 09:43    TPro  4.9<L>  /  Alb  1.6<L>  /  TBili  0.3  /  DBili  x   /  AST  21  /  ALT  <5<L>  /  AlkPhos  84      Urinalysis Basic - ( 16 Dec 2019 23:28 )    Color: Yellow / Appearance: Slightly Turbid / S.023 / pH: x  Gluc: x / Ketone: Small  / Bili: Negative / Urobili: <2 mg/dL   Blood: x / Protein: 30 mg/dL / Nitrite: Negative   Leuk Esterase: Negative / RBC: 5 /HPF / WBC 7 /HPF   Sq Epi: x / Non Sq Epi: 2 /HPF / Bacteria: Negative    MICROBIOLOGY:    .Blood Blood  19   No growth to date.      .Blood Blood-Peripheral  19   No growth to date.      .Blood Blood  19   Growth in aerobic and anaerobic bottles: Staphylococcus aureus  See previous culture [id number]  --    Growth in aerobic and anaerobic bottles: Gram Positive Cocci in Clusters    .Blood Blood  19   Growth in aerobic and anaerobic bottles: Staphylococcus aureus    .Urine Clean Catch (Midstream)  19   No growth      Rapid RVP Result: NotDetec ( @ 13:27)    (otherwise reviewed)    RADIOLOGY:    XR :    FINDINGS/  IMPRESSION:    Laryngeal penetration without aspiration

## 2019-12-17 NOTE — DIETITIAN INITIAL EVALUATION ADULT. - SIGNS/SYMPTOMS
as evidenced by pt on dysphagia III diet and reports of pt eating < 25% EEN x 10 days. as evidenced by pt on dysphagia III diet with nectar thick liquids, poor po intake.

## 2019-12-17 NOTE — PROGRESS NOTE ADULT - PROBLEM SELECTOR PLAN 3
MSSA bacteremia persistent, source 2/2 pneumonia vs cellulitis, cannot ro hardware despite negative imaging  cont ancef per ID for 6 weeks, will need PICC once KRIS resolves  12/12 12/14 blood cx NTD  Tylenol PRN fever  CT chest also concern for atelectasis vs pneumonia, lungs light up in nuclear scan  appreciate pulm recs  TTE moderate aortic stenosis, Severe concentric left ventricular hypertrophy, Severe left ventricular systolic dysfunction. JAHAIRA no vegetations

## 2019-12-18 LAB
-  DAPTOMYCIN: SIGNIFICANT CHANGE UP
ALBUMIN SERPL ELPH-MCNC: 2.1 G/DL — LOW (ref 3.3–5)
ALP SERPL-CCNC: 97 U/L — SIGNIFICANT CHANGE UP (ref 40–120)
ALT FLD-CCNC: <5 U/L — LOW (ref 10–45)
ANION GAP SERPL CALC-SCNC: 14 MMOL/L — SIGNIFICANT CHANGE UP (ref 5–17)
ANION GAP SERPL CALC-SCNC: 17 MMOL/L — SIGNIFICANT CHANGE UP (ref 5–17)
AST SERPL-CCNC: 30 U/L — SIGNIFICANT CHANGE UP (ref 10–40)
BASOPHILS # BLD AUTO: 0.02 K/UL — SIGNIFICANT CHANGE UP (ref 0–0.2)
BASOPHILS NFR BLD AUTO: 0.1 % — SIGNIFICANT CHANGE UP (ref 0–2)
BILIRUB SERPL-MCNC: 0.3 MG/DL — SIGNIFICANT CHANGE UP (ref 0.2–1.2)
BUN SERPL-MCNC: 110 MG/DL — HIGH (ref 7–23)
BUN SERPL-MCNC: 110 MG/DL — HIGH (ref 7–23)
CALCIUM SERPL-MCNC: 8.6 MG/DL — SIGNIFICANT CHANGE UP (ref 8.4–10.5)
CALCIUM SERPL-MCNC: 8.7 MG/DL — SIGNIFICANT CHANGE UP (ref 8.4–10.5)
CHLORIDE SERPL-SCNC: 98 MMOL/L — SIGNIFICANT CHANGE UP (ref 96–108)
CHLORIDE SERPL-SCNC: 98 MMOL/L — SIGNIFICANT CHANGE UP (ref 96–108)
CO2 SERPL-SCNC: 17 MMOL/L — LOW (ref 22–31)
CO2 SERPL-SCNC: 18 MMOL/L — LOW (ref 22–31)
CREAT SERPL-MCNC: 3.97 MG/DL — HIGH (ref 0.5–1.3)
CREAT SERPL-MCNC: 4.12 MG/DL — HIGH (ref 0.5–1.3)
EOSINOPHIL # BLD AUTO: 0.02 K/UL — SIGNIFICANT CHANGE UP (ref 0–0.5)
EOSINOPHIL NFR BLD AUTO: 0.1 % — SIGNIFICANT CHANGE UP (ref 0–6)
GLUCOSE SERPL-MCNC: 129 MG/DL — HIGH (ref 70–99)
GLUCOSE SERPL-MCNC: 130 MG/DL — HIGH (ref 70–99)
HCT VFR BLD CALC: 31.6 % — LOW (ref 39–50)
HGB BLD-MCNC: 10.3 G/DL — LOW (ref 13–17)
IMM GRANULOCYTES NFR BLD AUTO: 0.9 % — SIGNIFICANT CHANGE UP (ref 0–1.5)
LYMPHOCYTES # BLD AUTO: 0.75 K/UL — LOW (ref 1–3.3)
LYMPHOCYTES # BLD AUTO: 4.6 % — LOW (ref 13–44)
MCHC RBC-ENTMCNC: 29.3 PG — SIGNIFICANT CHANGE UP (ref 27–34)
MCHC RBC-ENTMCNC: 32.6 GM/DL — SIGNIFICANT CHANGE UP (ref 32–36)
MCV RBC AUTO: 90 FL — SIGNIFICANT CHANGE UP (ref 80–100)
MONOCYTES # BLD AUTO: 0.76 K/UL — SIGNIFICANT CHANGE UP (ref 0–0.9)
MONOCYTES NFR BLD AUTO: 4.7 % — SIGNIFICANT CHANGE UP (ref 2–14)
NEUTROPHILS # BLD AUTO: 14.61 K/UL — HIGH (ref 1.8–7.4)
NEUTROPHILS NFR BLD AUTO: 89.6 % — HIGH (ref 43–77)
ORGANISM # SPEC MICROSCOPIC CNT: SIGNIFICANT CHANGE UP
ORGANISM # SPEC MICROSCOPIC CNT: SIGNIFICANT CHANGE UP
PLATELET # BLD AUTO: 387 K/UL — SIGNIFICANT CHANGE UP (ref 150–400)
POTASSIUM SERPL-MCNC: 4.8 MMOL/L — SIGNIFICANT CHANGE UP (ref 3.5–5.3)
POTASSIUM SERPL-MCNC: 5.3 MMOL/L — SIGNIFICANT CHANGE UP (ref 3.5–5.3)
POTASSIUM SERPL-SCNC: 4.8 MMOL/L — SIGNIFICANT CHANGE UP (ref 3.5–5.3)
POTASSIUM SERPL-SCNC: 5.3 MMOL/L — SIGNIFICANT CHANGE UP (ref 3.5–5.3)
PROT SERPL-MCNC: 5.8 G/DL — LOW (ref 6–8.3)
RBC # BLD: 3.51 M/UL — LOW (ref 4.2–5.8)
RBC # FLD: 14.8 % — HIGH (ref 10.3–14.5)
SODIUM SERPL-SCNC: 129 MMOL/L — LOW (ref 135–145)
SODIUM SERPL-SCNC: 133 MMOL/L — LOW (ref 135–145)
WBC # BLD: 16.3 K/UL — HIGH (ref 3.8–10.5)
WBC # FLD AUTO: 16.3 K/UL — HIGH (ref 3.8–10.5)

## 2019-12-18 PROCEDURE — 76770 US EXAM ABDO BACK WALL COMP: CPT | Mod: 26

## 2019-12-18 PROCEDURE — 99232 SBSQ HOSP IP/OBS MODERATE 35: CPT

## 2019-12-18 RX ORDER — SODIUM CHLORIDE 9 MG/ML
1000 INJECTION INTRAMUSCULAR; INTRAVENOUS; SUBCUTANEOUS
Refills: 0 | Status: DISCONTINUED | OUTPATIENT
Start: 2019-12-18 | End: 2019-12-19

## 2019-12-18 RX ORDER — DAPTOMYCIN 500 MG/10ML
800 INJECTION, POWDER, LYOPHILIZED, FOR SOLUTION INTRAVENOUS
Refills: 0 | Status: DISCONTINUED | OUTPATIENT
Start: 2019-12-18 | End: 2019-12-27

## 2019-12-18 RX ADMIN — HEPARIN SODIUM 5000 UNIT(S): 5000 INJECTION INTRAVENOUS; SUBCUTANEOUS at 05:43

## 2019-12-18 RX ADMIN — HEPARIN SODIUM 5000 UNIT(S): 5000 INJECTION INTRAVENOUS; SUBCUTANEOUS at 23:00

## 2019-12-18 RX ADMIN — HEPARIN SODIUM 5000 UNIT(S): 5000 INJECTION INTRAVENOUS; SUBCUTANEOUS at 13:37

## 2019-12-18 RX ADMIN — SENNA PLUS 2 TABLET(S): 8.6 TABLET ORAL at 22:59

## 2019-12-18 RX ADMIN — Medication 81 MILLIGRAM(S): at 13:36

## 2019-12-18 RX ADMIN — Medication 100 MILLIGRAM(S): at 05:42

## 2019-12-18 RX ADMIN — Medication 300 MILLIGRAM(S): at 13:37

## 2019-12-18 RX ADMIN — ATORVASTATIN CALCIUM 10 MILLIGRAM(S): 80 TABLET, FILM COATED ORAL at 22:59

## 2019-12-18 RX ADMIN — DAPTOMYCIN 132 MILLIGRAM(S): 500 INJECTION, POWDER, LYOPHILIZED, FOR SOLUTION INTRAVENOUS at 18:34

## 2019-12-18 NOTE — PROGRESS NOTE ADULT - SUBJECTIVE AND OBJECTIVE BOX
CC: F/U for Bacteremia    Saw/spoke to patient. No fevers, no chills. No new complaints. Mental status improving.    Allergies  Indocin (Unknown)  oxacillin (Unknown)  Zyvox (Unknown)    ANTIMICROBIALS:  ceFAZolin   IVPB 1000 every 12 hours    PE:    Vital Signs Last 24 Hrs  T(C): 37.1 (18 Dec 2019 12:45), Max: 37.4 (18 Dec 2019 01:47)  T(F): 98.8 (18 Dec 2019 12:45), Max: 99.4 (18 Dec 2019 01:47)  HR: 80 (18 Dec 2019 12:45) (60 - 80)  BP: 146/79 (18 Dec 2019 12:45) (118/67 - 146/79)  RR: 18 (18 Dec 2019 12:45) (18 - 18)  SpO2: 95% (18 Dec 2019 12:45) (94% - 96%)    Gen: AOx2-3, NAD, non-toxic  CV: S1+S2 normal, nontachycardic  Resp: Clear bilat, no resp distress, no crackles/wheezes  Abd: Soft, nontender, +BS  Ext: No LE edema, no wounds    LABS:                        9.1    15.40 )-----------( 331      ( 17 Dec 2019 10:45 )             28.3     1218    129<L>  |  98  |  110<H>  ----------------------------<  130<H>  5.3   |  17<L>  |  3.97<H>    Ca    8.6      18 Dec 2019 15:05    TPro  5.8<L>  /  Alb  2.1<L>  /  TBili  0.3  /  DBili  x   /  AST  30  /  ALT  <5<L>  /  AlkPhos  97  12-18    Urinalysis Basic - ( 16 Dec 2019 23:28 )    Color: Yellow / Appearance: Slightly Turbid / S.023 / pH: x  Gluc: x / Ketone: Small  / Bili: Negative / Urobili: <2 mg/dL   Blood: x / Protein: 30 mg/dL / Nitrite: Negative   Leuk Esterase: Negative / RBC: 5 /HPF / WBC 7 /HPF   Sq Epi: x / Non Sq Epi: 2 /HPF / Bacteria: Negative    MICROBIOLOGY:    .Blood Blood  12-14-19   No growth to date.    .Blood Blood-Peripheral  19   No growth at 5 days.    .Blood Blood  19   Growth in aerobic and anaerobic bottles: Staphylococcus aureus  See previous culture [id number]  --    Growth in aerobic and anaerobic bottles: Gram Positive Cocci in Clusters    .Blood Blood  19   Growth in aerobic and anaerobic bottles: Staphylococcus aureus    .Urine Clean Catch (Midstream)  19   No growth     Rapid RVP Result: NotDetec ( @ 13:27)    (otherwise reviewed)    RADIOLOGY:     XR:    FINDINGS/  IMPRESSION:    Laryngeal penetration without aspiration.    For further information and recommendations, please refer to the speech   pathologist final report which is available for review in the electronic   medical record.

## 2019-12-18 NOTE — PROGRESS NOTE ADULT - SUBJECTIVE AND OBJECTIVE BOX
Patient is a 79y old  Male who presents with a chief complaint of Back pain (18 Dec 2019 12:43)      INTERVAL HPI/OVERNIGHT EVENTS: c/abt thickened water, feels weak  min urine output, bladder sono-150cc  dgtr at bedside concerned  awaiting renal sono      Vital Signs Last 24 Hrs  T(C): 36.6 (18 Dec 2019 18:29), Max: 37.4 (18 Dec 2019 01:47)  T(F): 97.8 (18 Dec 2019 18:29), Max: 99.4 (18 Dec 2019 01:47)  HR: 83 (18 Dec 2019 18:29) (60 - 83)  BP: 152/67 (18 Dec 2019 18:29) (122/72 - 152/67)  BP(mean): --  RR: 18 (18 Dec 2019 18:29) (18 - 18)  SpO2: 95% (18 Dec 2019 18:29) (94% - 95%)    acetaminophen   Tablet .. 650 milliGRAM(s) Oral every 6 hours PRN  acetaminophen  IVPB .. 1000 milliGRAM(s) IV Intermittent once  allopurinol 300 milliGRAM(s) Oral daily  aspirin enteric coated 81 milliGRAM(s) Oral daily  atorvastatin 10 milliGRAM(s) Oral at bedtime  DAPTOmycin IVPB 800 milliGRAM(s) IV Intermittent every 48 hours  heparin  Injectable 5000 Unit(s) SubCutaneous every 8 hours  senna 2 Tablet(s) Oral at bedtime  sodium chloride 0.9%. 1000 milliLiter(s) IV Continuous <Continuous>      PHYSICAL EXAM:  GENERAL: NAD,   EYES: conjunctiva and sclera clear  ENMT: Moist mucous membranes  NECK: Supple, No JVD, Normal thyroid  NERVOUS SYSTEM:  Alert & Oriented X,   CHEST/LUNG: Clear to auscultation bilaterally; No rales, rhonchi, wheezing, or rubs  HEART: Regular rate and rhythm; No murmurs, rubs, or gallops  ABDOMEN: Soft, Nontender, Nondistended; Bowel sounds present  EXTREMITIES:  2+ edema le and ue  LYMPH: No lymphadenopathy noted  SKIN: No rashes or lesions    Consultant(s) Notes Reviewed:  [x ] YES  [ ] NO  Care Discussed with Consultants/Other Providers [ x] YES  [ ] NO    LABS:                        10.3   16.30 )-----------( 387      ( 18 Dec 2019 18:14 )             31.6     12-18    129<L>  |  98  |  110<H>  ----------------------------<  130<H>  5.3   |  17<L>  |  3.97<H>    Ca    8.6      18 Dec 2019 15:05    TPro  5.8<L>  /  Alb  2.1<L>  /  TBili  0.3  /  DBili  x   /  AST  30  /  ALT  <5<L>  /  AlkPhos  97        Urinalysis Basic - ( 16 Dec 2019 23:28 )    Color: Yellow / Appearance: Slightly Turbid / S.023 / pH: x  Gluc: x / Ketone: Small  / Bili: Negative / Urobili: <2 mg/dL   Blood: x / Protein: 30 mg/dL / Nitrite: Negative   Leuk Esterase: Negative / RBC: 5 /HPF / WBC 7 /HPF   Sq Epi: x / Non Sq Epi: 2 /HPF / Bacteria: Negative      CAPILLARY BLOOD GLUCOSE            Urinalysis Basic - ( 16 Dec 2019 23:28 )    Color: Yellow / Appearance: Slightly Turbid / S.023 / pH: x  Gluc: x / Ketone: Small  / Bili: Negative / Urobili: <2 mg/dL   Blood: x / Protein: 30 mg/dL / Nitrite: Negative   Leuk Esterase: Negative / RBC: 5 /HPF / WBC 7 /HPF   Sq Epi: x / Non Sq Epi: 2 /HPF / Bacteria: Negative          RADIOLOGY & ADDITIONAL TESTS:    Imaging Personally Reviewed:  [x ] YES  [ ] NO

## 2019-12-18 NOTE — PROGRESS NOTE ADULT - ASSESSMENT
MSSA bacteremia  Bibasilar atelectasis with probable pneumonia: possibly bacteremic  CAD with severe AS and Systolic LV dysfunction  OP dysphagia    REC    Continue abx per ID  Aspiration precautions  Swallow f/u: patient may not comply with thickened liquids

## 2019-12-18 NOTE — PROGRESS NOTE ADULT - SUBJECTIVE AND OBJECTIVE BOX
CC: remains lethargic, but responsive   w minimal urine outpt  thirsty     TELEMETRY:     PHYSICAL EXAM:    T(C): 36.9 (12-18-19 @ 10:17), Max: 37.4 (12-18-19 @ 01:47)  HR: 71 (12-18-19 @ 10:17) (60 - 71)  BP: 122/72 (12-18-19 @ 10:17) (118/67 - 135/76)  RR: 18 (12-18-19 @ 10:17) (18 - 18)  SpO2: 94% (12-18-19 @ 10:17) (93% - 96%)  Wt(kg): --  I&O's Summary    17 Dec 2019 07:01  -  18 Dec 2019 07:00  --------------------------------------------------------  IN: 2460 mL / OUT: 350 mL / NET: 2110 mL        Appearance: Normal	  Cardiovascular: Normal S1 S2,RRR, No JVD, No murmurs  Respiratory: Lungs clear to auscultation	  Gastrointestinal:  Soft, Non-tender, + BS	  Extremities: Normal range of motion, No clubbing, cyanosis or edema  Vascular: Peripheral pulses palpable 2+ bilaterally     LABS:	 	                          9.1    15.40 )-----------( 331      ( 17 Dec 2019 10:45 )             28.3     12-17    135  |  104  |  93<H>  ----------------------------<  138<H>  4.0   |  17<L>  |  3.04<H>    Ca    7.8<L>      17 Dec 2019 09:43    TPro  4.9<L>  /  Alb  1.6<L>  /  TBili  0.3  /  DBili  x   /  AST  21  /  ALT  <5<L>  /  AlkPhos  84  12-17          CARDIAC MARKERS:      MEDICATIONS  (STANDING):  acetaminophen  IVPB .. 1000 milliGRAM(s) IV Intermittent once  allopurinol 300 milliGRAM(s) Oral daily  aspirin enteric coated 81 milliGRAM(s) Oral daily  atorvastatin 10 milliGRAM(s) Oral at bedtime  ceFAZolin   IVPB 1000 milliGRAM(s) IV Intermittent every 12 hours  heparin  Injectable 5000 Unit(s) SubCutaneous every 8 hours  senna 2 Tablet(s) Oral at bedtime  sodium chloride 0.9%. 1000 milliLiter(s) (60 mL/Hr) IV Continuous <Continuous>

## 2019-12-18 NOTE — CHART NOTE - NSCHARTNOTEFT_GEN_A_CORE
d/w Pt and daughter at bedside  Pt is alert and awake, w sound mind  Pt and dgtr insisting on changing the diet from thickened to regular water  despite understanding the fact that patient is at high risk of aspiration  reviewed swallow study eval with both of them

## 2019-12-18 NOTE — CHART NOTE - NSCHARTNOTEFT_GEN_A_CORE
Contacted by Nephro attending to discuss case. No alternate cause for KRIS noted, so concern for possible AIN. Given concern, hold cefazolin, change to Daptomycin (avoid Vanco given acute nephrotoxicity). Check baseline CK tomorrow AM. Appreciate discussion.

## 2019-12-18 NOTE — PROGRESS NOTE ADULT - ASSESSMENT
80 y/o male with h/o of CAD s/p PCI 1/2017 with 10% LAD stenosis OM1, HTN, HLD, GERD, Gout, Osteoarthritis and chronic back pain, s/p multiple laminectomies and spinal fusion, lower back pain and weakness in his bilateral lower extremities that progressed to inability to ambulate. dx with MSSA bacteremia. on ancef. also received lasix as well. has had hesitancy urinating. on 12/16/ KRIS       1- KRIS   2- MSSA bacteremia   3- dysphagia   4- cad  5- htn     suspect kris in setting of  +/-  AIN + /- due to infection  vs intravascular volume depletion despite LE edema   U eos neg   renal sono/bladder sono non revealing for obstruction    NS @ 100 hr o/n  holding norvasc  given time frame of kris less likely for delayed contrast nephropathy. it is possible for him to have AIN given the temporal correlation with ancef and his allergies to oxacillin in past  in addition, ? intravascular depletion despite third spacing at present   d/w ID and ID will change abx to dapto   d/w pt family and explained in detail. if cr does not improve will likely need renal replacement therapy   cont straight cath as needed   of note, uo is low documented however, pt did have urinary inc during his renal sono as documented  d/w cards team and primary team as well   bp is lower. hold norvasc and resume if sbp > 150 mmhg   d/w pt son at bedside

## 2019-12-18 NOTE — PROGRESS NOTE ADULT - ASSESSMENT
79 yr old male with HTN, DCHF, CAD, S/P PCI to OM1, Mid LAD (9/2014, [facility], [facility], [facility]) hx of ischemic CMP, AS, DM HLD, GERD, GOUT, s/p multiple spinal surgery admitted with back pain and fever    1. Back Pain  -stable, MRI noted, neuro/neuro sx followup  -ID f/u, pain control   -plan for PICC prior to dc     2. CAD s/p PCI (OM1/LAD)   -stable, cont cont ASA ,statin    3. Acute on Chronic systolic & Diastolic CHF, New LV dysfunction   -echo with severe LV sys dysfxn, EF 20-25%, mild diastolic dysfxn  -LAZARO 12/13 with moderate lv dysfunction  -recent echo few months ago with normal LV function  -possible new cardiomyopathy in setting of infection, sepsis  -will continue medical therapy for now as he is not a candidate for ischemic eval   -as outpt was off beta blocker due to BB induced fatigue   -angelo from possible diuresis with dec po intake, hold further lasix  -recommend increasing hydration  -family concerned about inc peripheral edema, no inc dyspnea- from third spacing    4. Aortic Stenosis  -stable, severe on LAZARO with valve area of 0.9, tte this admit and as outpt with moderate AS  -gradients low on lazrao, perhaps due to new lv dysfxn  -YULY likely underestimated  -patient was asymptomatic as outpt from valve disease  -no further workup indicated, will follow as outpatient     5. HTN   -BP stable, continue with current antihtn meds     6. Fever/ Sepsis   + blood cultures with gram positive Cocci in clusters   -iv abx, ID f/u   -LAZARO without endocarditis   -encephalopathy ?? delirium from infection/angelo/dehydration ??  med f/u    dvt ppx

## 2019-12-18 NOTE — PROGRESS NOTE ADULT - SUBJECTIVE AND OBJECTIVE BOX
[facility]   [phone]  RENAL FOLLOW UP NOTE  --------------------------------------------------------------------------------  Chief Complaint:    24 hour events/subjective:    seen earlier. wife at bedside.   pt remains overall lethargic when seen     PAST HISTORY  --------------------------------------------------------------------------------  No significant changes to PMH, PSH, FHx, SHx, unless otherwise noted    ALLERGIES & MEDICATIONS  --------------------------------------------------------------------------------  Allergies    Indocin (Unknown)  oxacillin (Unknown)  Zyvox (Unknown)    Intolerances      Standing Inpatient Medications  acetaminophen  IVPB .. 1000 milliGRAM(s) IV Intermittent once  allopurinol 300 milliGRAM(s) Oral daily  aspirin enteric coated 81 milliGRAM(s) Oral daily  atorvastatin 10 milliGRAM(s) Oral at bedtime  DAPTOmycin IVPB 800 milliGRAM(s) IV Intermittent every 48 hours  heparin  Injectable 5000 Unit(s) SubCutaneous every 8 hours  senna 2 Tablet(s) Oral at bedtime  sodium chloride 0.9%. 1000 milliLiter(s) IV Continuous <Continuous>    PRN Inpatient Medications  acetaminophen   Tablet .. 650 milliGRAM(s) Oral every 6 hours PRN      REVIEW OF SYSTEMS  --------------------------------------------------------------------------------    remains lethargic     VITALS/PHYSICAL EXAM  --------------------------------------------------------------------------------  T(C): 36.6 (12-18-19 @ 18:29), Max: 37.4 (12-18-19 @ 01:47)  HR: 83 (12-18-19 @ 18:29) (60 - 83)  BP: 152/67 (12-18-19 @ 18:29) (122/72 - 152/67)  RR: 18 (12-18-19 @ 18:29) (18 - 18)  SpO2: 95% (12-18-19 @ 18:29) (94% - 95%)  Wt(kg): --        12-17-19 @ 07:01  -  12-18-19 @ 07:00  --------------------------------------------------------  IN: 2460 mL / OUT: 350 mL / NET: 2110 mL    12-18-19 @ 07:01  -  12-18-19 @ 21:36  --------------------------------------------------------  IN: 1640 mL / OUT: 50 mL / NET: 1590 mL      Physical Exam:  	Gen: elderly M remains lethargic when seen   	no jvd  	Pulm: decrease bs  no rales or ronchi or wheezing  	CV: RRR, S1S2; no rub  	Back: No CVA tenderness  	Abd: +BS, soft, nontender/nondistended  	: No suprapubic tenderness  	UE: Warm, no cyanosis  no clubbing,  2+ edema   	LE: Warm, no cyanosis  no clubbing, 2- edema       LABS/STUDIES  --------------------------------------------------------------------------------              10.3   16.30 >-----------<  387      [12-18-19 @ 18:14]              31.6     129  |  98  |  110  ----------------------------<  130      [12-18-19 @ 15:05]  5.3   |  17  |  3.97        Ca     8.6     [12-18-19 @ 15:05]    TPro  5.8  /  Alb  2.1  /  TBili  0.3  /  DBili  x   /  AST  30  /  ALT  <5  /  AlkPhos  97  [12-18-19 @ 15:05]              [12-17-19 @ 09:43]    Creatinine Trend:  SCr 3.97 [12-18 @ 15:05]  SCr 3.04 [12-17 @ 09:43]  SCr 2.44 [12-16 @ 17:28]  SCr 2.22 [12-16 @ 09:27]  SCr 1.61 [12-15 @ 16:41]              Urinalysis - [12-16-19 @ 23:28]      Color Yellow / Appearance Slightly Turbid / SG 1.023 / pH 5.5      Gluc Negative / Ketone Small  / Bili Negative / Urobili <2 mg/dL       Blood Small / Protein 30 mg/dL / Leuk Est Negative / Nitrite Negative      RBC 5 / WBC 7 / Hyaline 0 / Gran  / Sq Epi  / Non Sq Epi 2 / Bacteria Negative    Urine Creatinine 155      [12-16-19 @ 18:48]  Urine Protein 42      [12-16-19 @ 18:48]  Urine Sodium <35      [12-16-19 @ 18:48]  Urine Chloride <35      [12-16-19 @ 18:48]  Urine Osmolality 366      [12-16-19 @ 23:28]      < from: US Kidney and Bladder (12.18.19 @ 15:59) >    EXAM:  US KIDNEYS AND BLADDER                            PROCEDURE DATE:  12/18/2019            INTERPRETATION:  CLINICAL INFORMATION: Renal failure. Creatinine is 2.22. Evaluate urinary bladder pre and post void volumes.    COMPARISON: None available.    TECHNIQUE: Sonography of the kidneys and bladder.     FINDINGS:    Right kidney:  11.3 cm. No renal mass, hydronephrosis or calculi.    Left kidney:  13.3 cm. No renal mass, hydronephrosis or calculi.    Urinary bladder: The patient urinated while the urinary bladder was being scanned. Marked thickening and nodularity/trabeculation of the urinary bladder wall is appreciated posteriorly before patient urinated and therefore cannot be fully evaluated.     IMPRESSION: Renal parenchymal disease. No hydronephrosis.    Marked thickening and nodularity/trabeculation of the urinary bladder wall posteriorly cannot be adequately evaluated on this examination reason given above.     Recommend dedicated bladder sonography.                        DEVON ODEN M.D., ATTENDING RADIOLOGIST  This document has been electronically signed. Dec 18 2019  4:12PM        < end of copied text >

## 2019-12-18 NOTE — PROGRESS NOTE ADULT - SUBJECTIVE AND OBJECTIVE BOX
Follow-up Pulm Progress Note  Aaron Dowd MD  [phone]    Afebrile on Cefazol  Alert and interactive in bed: no dyspnea    Vital Signs Last 24 Hrs  T(C): 36.9 (18 Dec 2019 10:17), Max: 37.4 (18 Dec 2019 01:47)  T(F): 98.4 (18 Dec 2019 10:17), Max: 99.4 (18 Dec 2019 01:47)  HR: 71 (18 Dec 2019 10:17) (60 - 71)  BP: 122/72 (18 Dec 2019 10:17) (118/67 - 135/76)  BP(mean): --  RR: 18 (18 Dec 2019 10:17) (18 - 18)  SpO2: 94% (18 Dec 2019 10:17) (93% - 96%)                        9.1    15.40 )-----------( 331      ( 17 Dec 2019 10:45 )             28.3     12-17    135  |  104  |  93<H>  ----------------------------<  138<H>  4.0   |  17<L>  |  3.04<H>    Ca    7.8<L>      17 Dec 2019 09:43    TPro  4.9<L>  /  Alb  1.6<L>  /  TBili  0.3  /  DBili  x   /  AST  21  /  ALT  <5<L>  /  AlkPhos  84  12-17    CULTURES:  Culture Results:   Growth in anaerobic bottle: Staphylococcus aureus  See previous culture [id number] (12-09 @ 22:05)  Culture Results:   Growth in anaerobic bottle: Staphylococcus aureus  See previous culture [id number] (12-09 @ 22:05)  Culture Results:   Growth in aerobic and anaerobic bottles: Staphylococcus aureus  "Due to technical problems, Proteus sp. will Not be reported as part of  the BCID panel until further notice"  ***Blood Panel PCR results on this specimen are available  approximately 3 hours after the Gram stain result.***  Gram stain, PCR, and/or culture results may not always  correspond due to difference in methodologies.  ************************************************************  This PCR assay was performed using [employer].  The following targets are tested for: Enterococcus,  vancomycin resistant enterococci, Listeria monocytogenes,  coagulase negative staphylococci, S. aureus,  methicillin resistant S. aureus, Streptococcus agalactiae  (Group B), S. pneumoniae, S. pyogenes (Group A),  Acinetobacter baumannii, Enterobacter cloacae, E. coli,  Klebsiella oxytoca, K. pneumoniae, Proteus sp.,  Serratia marcescens, Haemophilus influenzae,  Neisseria meningitidis, Pseudomonas aeruginosa, Candida  albicans, C. glabrata, C krusei, C parapsilosis,  C. tropicalis and the KPC resistance gene. (12-08 @ 04:15)  Culture Results:   Growth in aerobic and anaerobic bottles: Staphylococcus aureus  See previous culture [id number] (12-08 @ 04:15)  Culture Results:   No growth (12-08 @ 04:06)    Most recent blood culture -- 12-09 @ 22:05   -- -- .Blood Blood 12-09 @ 22:05      Physical Examination:  PULM: No sign wheeze or rhonchi  CVS: Regular rate and rhythm, no murmurs, rubs, or gallops  ABD: Soft, non-tender  EXT:  No clubbing, cyanosis, or edema    RADIOLOGY REVIEWED  CXR:    CT chest:    TTE:    PROCEDURE: Transthoracic echocardiogram with 2-D, M-Mode  and complete spectral and color flow Doppler. Verbal  consent was obtained for injection of  Ultrasonic Enhancing  Agent following a discussion of risks and benefits.  Following intravenous injection of Ultrasonic Enhancing  Agent , harmonic imaging was performed.  INDICATION: Acute and subacute endocarditis, unspecified  (I33.9)  ------------------------------------------------------------------------  Dimensions:    Normal Values:  LA:     5.5    2.0 - 4.0 cm  Ao:     3.7    2.0 - 3.8 cm  SEPTUM: 1.6    0.6 - 1.2 cm  PWT:    1.4    0.6 - 1.1 cm  LVIDd:  5.2    3.0 - 5.6 cm  LVIDs:         1.8 - 4.0 cm  Derived variables:  LVMI: 148 g/m2  RWT: 0.53  EF (Visual Estimate): 20-25 %  Doppler Peak Velocity (m/sec): AoV=3.0  ------------------------------------------------------------------------  Observations:  Mitral Valve: Mitral annular calcification, otherwise  normal mitral valve. Minimal mitral regurgitation.  Aortic Valve/Aorta: Calcified trileaflet aortic valve with  decreased opening. Peak transaortic valve gradient equals  36 mm Hg, mean transaortic valve gradient equals 25 mm Hg,  estimated aortic valve area equals 1.2 sqcm (by continuity  equation), aortic valve velocity time integral equals 69  cm, consistent with moderate aortic stenosis. Minimal  aortic regurgitation.  Peak left ventricular outflow tract  gradient equals 3 mm Hg, mean gradient is equal to 2 mm Hg,  LVOT velocity time integral equals 19 cm.  Aortic Root: 3.7 cm.  LVOT diameter: 2.3 cm.  Left Atrium: Mildly dilated left atrium.  LA volume index=  39 cc/m2.  Left Ventricle: Severe  left ventricular systolic  dysfunction.   Endocardial visualization enhanced with  intravenous injection of Ultrasonic Enhancing Agent  (Definity). Severe concentric left ventricular hypertrophy.  Mild diastolic dysfunction (Stage I).  Right Heart: Severe right atrial enlargement. Normal right  ventricular size and function. Normal tricuspid valve.  Minimal tricuspid regurgitation. Normal pulmonic valve.  Pericardium/Pleura: Normal pericardium with trace  pericardial effusion.  Hemodynamic: Estimated right atrial pressure is 8 mm Hg.  Estimated right ventricular systolic pressure equals 27 mm  Hg, assuming right atrial pressure equals 8 mm Hg,  consistent with normal pulmonary pressures.  ------------------------------------------------------------------------  Conclusions:  1. Calcified trileaflet aortic valve with decreased  opening. Peak transaortic valve gradient equals 36 mm Hg,  mean transaortic valve gradient equals 25 mm Hg, estimated  aortic valve area equals 1.2 sqcm (by continuity equation),  aortic valve velocity time integral equals 69 cm,  consistent with moderate aortic stenosis.  2. Severe concentric left ventricular hypertrophy.  3. Severe  left ventricular systolic dysfunction.  Endocardial visualization enhanced with intravenous  injection of Ultrasonic Enhancing Agent (Definity).  Unable to rule out endocarditis. Consider JAHAIRA if clinically  indicated.

## 2019-12-18 NOTE — PROGRESS NOTE ADULT - ASSESSMENT
80 yo M with chronic low back pain sp multiple laminectomy and fusion p/w 1 week of worsening back pain along with weakness in bilateral lower extremities and inability to walk since last night.  Leukocytosis, fever  Low back pain, increasing LE weakness  MR L spine and T spine prelim but no emergent findings  Prior history of MSSA bacteremia (2016)  History oxacillin allergy (? fever), Zyvox ([facility]); tolerated vanco on this visit  BCX now with MSSA, high grade, rising WBC, patient ill appearing, guarded  CT A/P/C/H neg  NM tagged WBC uptake in thoracolumbar spine--degen disease?  TTE equivocal  Still leukocytosis, afeb, mental status improving, BCX 12/12 NGTD  JAHAIRA reassuring  High suspicion for spinal involvement despite negative MRI--monitor closely  Worsening KRIS--Nephro suspecting urinary retention  Overall, fever, leukocytosis, LBP, KRIS  - Cefazolin 1g q 12 (monitor Cr, may need to decrease further in setting KRIS)  - F/U nephro regarding KRIS  - F/U neurosurgery/neuro  - Anticipate will need 6 week course IV abx    Fredy Huynh MD  Pager [phone]  After 5pm and on weekends call [phone]

## 2019-12-18 NOTE — PROGRESS NOTE ADULT - ATTENDING COMMENTS
plan of care dw dgtr at bedside  will allow regular water intake  as pt and dgtr insists who understands the risk of aspiration  aspiration precautions    [facility]

## 2019-12-18 NOTE — PROGRESS NOTE ADULT - PROBLEM SELECTOR PLAN 1
multifactorial ATN/AIN in the setting of sepsis and antibiotics  ?urinary retention  renal following cs fu  holding losartan  will need renal clx for PICC with CKD  will cont NS , inc 75cc /hr, strict i/os  renal US fu

## 2019-12-19 LAB
ALBUMIN SERPL ELPH-MCNC: 1.8 G/DL — LOW (ref 3.3–5)
ALP SERPL-CCNC: 89 U/L — SIGNIFICANT CHANGE UP (ref 40–120)
ALT FLD-CCNC: <5 U/L — LOW (ref 10–45)
ANION GAP SERPL CALC-SCNC: 19 MMOL/L — HIGH (ref 5–17)
AST SERPL-CCNC: 27 U/L — SIGNIFICANT CHANGE UP (ref 10–40)
BILIRUB SERPL-MCNC: 0.4 MG/DL — SIGNIFICANT CHANGE UP (ref 0.2–1.2)
BUN SERPL-MCNC: 120 MG/DL — HIGH (ref 7–23)
CALCIUM SERPL-MCNC: 9 MG/DL — SIGNIFICANT CHANGE UP (ref 8.4–10.5)
CHLORIDE SERPL-SCNC: 102 MMOL/L — SIGNIFICANT CHANGE UP (ref 96–108)
CO2 SERPL-SCNC: 14 MMOL/L — LOW (ref 22–31)
CREAT SERPL-MCNC: 4.16 MG/DL — HIGH (ref 0.5–1.3)
CULTURE RESULTS: SIGNIFICANT CHANGE UP
CULTURE RESULTS: SIGNIFICANT CHANGE UP
GLUCOSE SERPL-MCNC: 115 MG/DL — HIGH (ref 70–99)
HCT VFR BLD CALC: 32 % — LOW (ref 39–50)
HGB BLD-MCNC: 10.4 G/DL — LOW (ref 13–17)
LACTATE SERPL-SCNC: 0.8 MMOL/L — SIGNIFICANT CHANGE UP (ref 0.7–2)
MCHC RBC-ENTMCNC: 29.1 PG — SIGNIFICANT CHANGE UP (ref 27–34)
MCHC RBC-ENTMCNC: 32.5 GM/DL — SIGNIFICANT CHANGE UP (ref 32–36)
MCV RBC AUTO: 89.4 FL — SIGNIFICANT CHANGE UP (ref 80–100)
NRBC # BLD: 0 /100 WBCS — SIGNIFICANT CHANGE UP (ref 0–0)
PLATELET # BLD AUTO: 388 K/UL — SIGNIFICANT CHANGE UP (ref 150–400)
POTASSIUM SERPL-MCNC: 4.6 MMOL/L — SIGNIFICANT CHANGE UP (ref 3.5–5.3)
POTASSIUM SERPL-SCNC: 4.6 MMOL/L — SIGNIFICANT CHANGE UP (ref 3.5–5.3)
PROT SERPL-MCNC: 5.7 G/DL — LOW (ref 6–8.3)
RBC # BLD: 3.58 M/UL — LOW (ref 4.2–5.8)
RBC # FLD: 14.7 % — HIGH (ref 10.3–14.5)
SODIUM SERPL-SCNC: 135 MMOL/L — SIGNIFICANT CHANGE UP (ref 135–145)
SPECIMEN SOURCE: SIGNIFICANT CHANGE UP
SPECIMEN SOURCE: SIGNIFICANT CHANGE UP
WBC # BLD: 15.1 K/UL — HIGH (ref 3.8–10.5)
WBC # FLD AUTO: 15.1 K/UL — HIGH (ref 3.8–10.5)

## 2019-12-19 PROCEDURE — 76857 US EXAM PELVIC LIMITED: CPT | Mod: 26

## 2019-12-19 PROCEDURE — 99232 SBSQ HOSP IP/OBS MODERATE 35: CPT

## 2019-12-19 RX ORDER — SODIUM BICARBONATE 1 MEQ/ML
0.05 SYRINGE (ML) INTRAVENOUS
Qty: 75 | Refills: 0 | Status: DISCONTINUED | OUTPATIENT
Start: 2019-12-19 | End: 2019-12-21

## 2019-12-19 RX ADMIN — HEPARIN SODIUM 5000 UNIT(S): 5000 INJECTION INTRAVENOUS; SUBCUTANEOUS at 21:06

## 2019-12-19 RX ADMIN — SODIUM CHLORIDE 100 MILLILITER(S): 9 INJECTION INTRAMUSCULAR; INTRAVENOUS; SUBCUTANEOUS at 13:46

## 2019-12-19 RX ADMIN — HEPARIN SODIUM 5000 UNIT(S): 5000 INJECTION INTRAVENOUS; SUBCUTANEOUS at 05:22

## 2019-12-19 RX ADMIN — SENNA PLUS 2 TABLET(S): 8.6 TABLET ORAL at 21:06

## 2019-12-19 RX ADMIN — Medication 70 MEQ/KG/HR: at 18:14

## 2019-12-19 RX ADMIN — Medication 300 MILLIGRAM(S): at 13:45

## 2019-12-19 RX ADMIN — HEPARIN SODIUM 5000 UNIT(S): 5000 INJECTION INTRAVENOUS; SUBCUTANEOUS at 13:46

## 2019-12-19 RX ADMIN — ATORVASTATIN CALCIUM 10 MILLIGRAM(S): 80 TABLET, FILM COATED ORAL at 21:06

## 2019-12-19 RX ADMIN — Medication 81 MILLIGRAM(S): at 13:45

## 2019-12-19 NOTE — PROGRESS NOTE ADULT - SUBJECTIVE AND OBJECTIVE BOX
CC: F/U for Bacteremia    Saw/spoke to patient. No fevers, no chills. No new complaints. Sleepy, otherwise unchanged.    Allergies  Indocin (Unknown)  oxacillin (Unknown)  Zyvox (Unknown)    ANTIMICROBIALS:  DAPTOmycin IVPB 800 every 48 hours    PE:    Vital Signs Last 24 Hrs  T(C): 36.9 (19 Dec 2019 13:59), Max: 36.9 (19 Dec 2019 09:29)  T(F): 98.5 (19 Dec 2019 13:59), Max: 98.5 (19 Dec 2019 13:59)  HR: 77 (19 Dec 2019 13:59) (60 - 84)  BP: 145/74 (19 Dec 2019 13:59) (119/83 - 152/67)  RR: 18 (19 Dec 2019 13:59) (18 - 18)  SpO2: 94% (19 Dec 2019 13:59) (93% - 95%)    Gen: AOx3, NAD, non-toxic, pleasant  CV: S1+S2 normal, nontachycardic  Resp: Clear bilat, no resp distress, no crackles/wheezes  Abd: Soft, nontender, +BS  Ext: No LE edema, no wounds    LABS                        10.4   15.10 )-----------( 388      ( 19 Dec 2019 09:21 )             32.0     12-19    135  |  102  |  120<H>  ----------------------------<  115<H>  4.6   |  14<L>  |  4.16<H>    Ca    9.0      19 Dec 2019 09:21    TPro  5.7<L>  /  Alb  1.8<L>  /  TBili  0.4  /  DBili  x   /  AST  27  /  ALT  <5<L>  /  AlkPhos  89  12-19    MICROBIOLOGY:    .Blood Blood  12-14-19   No growth at 5 days.    .Blood Blood  12-09-19   Growth in aerobic and anaerobic bottles: Staphylococcus aureus  See previous culture [id number]  --    Growth in aerobic and anaerobic bottles: Gram Positive Cocci in Clusters    .Blood Blood  12-08-19   Growth in aerobic and anaerobic bottles: Staphylococcus aureus    .Urine Clean Catch (Midstream)  12-08-19   No growth    RADIOLOGY:    12/19 USG:    IMPRESSION:     Redemonstrated thickened bladder wall with intraluminal irregularity and internal trabeculations. Small internal debris. Please correlate with urinalysis.     Enlarged prostate.

## 2019-12-19 NOTE — PROGRESS NOTE ADULT - SUBJECTIVE AND OBJECTIVE BOX
Patient is a 79y old  Male who presents with a chief complaint of Back pain (19 Dec 2019 17:38)      INTERVAL HPI/OVERNIGHT EVENTS: lethargic and weak  per family improved urine output      Vital Signs Last 24 Hrs  T(C): 36.6 (19 Dec 2019 21:33), Max: 37 (19 Dec 2019 17:39)  T(F): 97.9 (19 Dec 2019 21:33), Max: 98.6 (19 Dec 2019 17:39)  HR: 83 (19 Dec 2019 21:33) (76 - 84)  BP: 131/71 (19 Dec 2019 21:33) (119/83 - 150/76)  BP(mean): --  RR: 18 (19 Dec 2019 21:33) (18 - 18)  SpO2: 93% (19 Dec 2019 21:33) (93% - 94%)    acetaminophen   Tablet .. 650 milliGRAM(s) Oral every 6 hours PRN  acetaminophen  IVPB .. 1000 milliGRAM(s) IV Intermittent once  allopurinol 300 milliGRAM(s) Oral daily  aspirin enteric coated 81 milliGRAM(s) Oral daily  atorvastatin 10 milliGRAM(s) Oral at bedtime  DAPTOmycin IVPB 800 milliGRAM(s) IV Intermittent every 48 hours  heparin  Injectable 5000 Unit(s) SubCutaneous every 8 hours  senna 2 Tablet(s) Oral at bedtime  sodium bicarbonate  Infusion 0.05 mEq/kG/Hr IV Continuous <Continuous>      PHYSICAL EXAM:  GENERAL: NAD,   EYES: conjunctiva and sclera clear  ENMT: Moist mucous membranes  NECK: Supple, No JVD, Normal thyroid  NERVOUS SYSTEM:  Alert & Oriented X,   CHEST/LUNG: Clear to auscultation bilaterally; No rales, rhonchi, wheezing, or rubs  HEART: Regular rate and rhythm; No murmurs, rubs, or gallops  ABDOMEN: Soft, Nontender, Nondistended; Bowel sounds present  EXTREMITIES:  edema  LYMPH: No lymphadenopathy noted  SKIN: No rashes or lesions    Consultant(s) Notes Reviewed:  [x ] YES  [ ] NO  Care Discussed with Consultants/Other Providers [ x] YES  [ ] NO    LABS:                        10.4   15.10 )-----------( 388      ( 19 Dec 2019 09:21 )             32.0     12-19    135  |  102  |  120<H>  ----------------------------<  115<H>  4.6   |  14<L>  |  4.16<H>    Ca    9.0      19 Dec 2019 09:21    TPro  5.7<L>  /  Alb  1.8<L>  /  TBili  0.4  /  DBili  x   /  AST  27  /  ALT  <5<L>  /  AlkPhos  89  12-19        CAPILLARY BLOOD GLUCOSE                  RADIOLOGY & ADDITIONAL TESTS:    Imaging Personally Reviewed:  [x ] YES  [ ] NO

## 2019-12-19 NOTE — PROGRESS NOTE ADULT - SUBJECTIVE AND OBJECTIVE BOX
[facility]   [phone]  RENAL FOLLOW UP NOTE  --------------------------------------------------------------------------------  Chief Complaint:    24 hour events/subjective:    seen earlier.   and now. family at bedside     PAST HISTORY  --------------------------------------------------------------------------------  No significant changes to PMH, PSH, FHx, SHx, unless otherwise noted    ALLERGIES & MEDICATIONS  --------------------------------------------------------------------------------  Allergies    Indocin (Unknown)  oxacillin (Unknown)  Zyvox (Unknown)    Intolerances      Standing Inpatient Medications  acetaminophen  IVPB .. 1000 milliGRAM(s) IV Intermittent once  allopurinol 300 milliGRAM(s) Oral daily  aspirin enteric coated 81 milliGRAM(s) Oral daily  atorvastatin 10 milliGRAM(s) Oral at bedtime  DAPTOmycin IVPB 800 milliGRAM(s) IV Intermittent every 48 hours  heparin  Injectable 5000 Unit(s) SubCutaneous every 8 hours  senna 2 Tablet(s) Oral at bedtime  sodium bicarbonate  Infusion 0.05 mEq/kG/Hr IV Continuous <Continuous>    PRN Inpatient Medications  acetaminophen   Tablet .. 650 milliGRAM(s) Oral every 6 hours PRN      REVIEW OF SYSTEMS  --------------------------------------------------------------------------------    Gen: denies  fevers/chills,  CVS: denies chest pain/palpitations  Resp: denies SOB/Cough  GI: Denies N/V/Abd pain  : Denies dysuria    All other systems were reviewed and are negative, except as noted.    VITALS/PHYSICAL EXAM  --------------------------------------------------------------------------------  T(C): 36.9 (12-19-19 @ 13:59), Max: 36.9 (12-19-19 @ 09:29)  HR: 77 (12-19-19 @ 13:59) (60 - 84)  BP: 145/74 (12-19-19 @ 13:59) (119/83 - 152/67)  RR: 18 (12-19-19 @ 13:59) (18 - 18)  SpO2: 94% (12-19-19 @ 13:59) (93% - 95%)  Wt(kg): --        12-18-19 @ 07:01  -  12-19-19 @ 07:00  --------------------------------------------------------  IN: 2940 mL / OUT: 250 mL / NET: 2690 mL    12-19-19 @ 07:01  -  12-19-19 @ 17:39  --------------------------------------------------------  IN: 850 mL / OUT: 0 mL / NET: 850 mL      Physical Exam:  	    Gen: elderly M remains alert when seen earlier.   	no jvd  	Pulm: decrease bs  no rales or ronchi or wheezing  	CV: RRR, S1S2; no rub  	Abd: +BS, soft, nontender/nondistended  	: No suprapubic tenderness  	UE: Warm, no cyanosis  no clubbing,  2+ edema   	LE: Warm, no cyanosis  no clubbing, 2- edema     LABS/STUDIES  --------------------------------------------------------------------------------              10.4   15.10 >-----------<  388      [12-19-19 @ 09:21]              32.0     135  |  102  |  120  ----------------------------<  115      [12-19-19 @ 09:21]  4.6   |  14  |  4.16        Ca     9.0     [12-19-19 @ 09:21]    TPro  5.7  /  Alb  1.8  /  TBili  0.4  /  DBili  x   /  AST  27  /  ALT  <5  /  AlkPhos  89  [12-19-19 @ 09:21]          Creatinine Trend:  SCr 4.16 [12-19 @ 09:21]  SCr 3.97 [12-18 @ 15:05]  SCr 3.04 [12-17 @ 09:43]  SCr 2.44 [12-16 @ 17:28]  SCr 2.22 [12-16 @ 09:27]              Urinalysis - [12-16-19 @ 23:28]      Color Yellow / Appearance Slightly Turbid / SG 1.023 / pH 5.5      Gluc Negative / Ketone Small  / Bili Negative / Urobili <2 mg/dL       Blood Small / Protein 30 mg/dL / Leuk Est Negative / Nitrite Negative      RBC 5 / WBC 7 / Hyaline 0 / Gran  / Sq Epi  / Non Sq Epi 2 / Bacteria Negative    Urine Creatinine 155      [12-16-19 @ 18:48]  Urine Protein 42      [12-16-19 @ 18:48]  Urine Sodium <35      [12-16-19 @ 18:48]  Urine Chloride <35      [12-16-19 @ 18:48]  Urine Osmolality 366      [12-16-19 @ 23:28]

## 2019-12-19 NOTE — PROGRESS NOTE ADULT - ASSESSMENT
78 yo M with chronic low back pain sp multiple laminectomy and fusion p/w 1 week of worsening back pain along with weakness in bilateral lower extremities and inability to walk since last night.  Leukocytosis, fever  Low back pain, increasing LE weakness  MR L spine and T spine prelim but no emergent findings  Prior history of MSSA bacteremia (2016)  History oxacillin allergy (? fever), Zyvox ([facility]); tolerated vanco on this visit  BCX now with MSSA, high grade, rising WBC, patient ill appearing, guarded  CT A/P/C/H neg  NM tagged WBC uptake in thoracolumbar spine--degen disease?  TTE equivocal  Still leukocytosis, afeb, mental status improving, BCX 12/12 NGTD  JAHAIRA reassuring  High suspicion for spinal involvement despite negative MRI--monitor closely  Worsening KRIS--Nephro suspecting AIN vs intravasc depletion (Cefazolin stopped in the interim)  Overall, fever, leukocytosis, LBP, KRIS, MSSA bacteremia  - Dapto 800mg q 48, monitor weekly CK  - F/U nephro  - F/U neurosurgery/neuro  - Anticipate will need 6 week course IV abx  - Monitor CrCl    Fredy Huynh MD  Pager [phone]  After 5pm and on weekends call [phone]

## 2019-12-19 NOTE — PROGRESS NOTE ADULT - PROBLEM SELECTOR PLAN 2
MR Thoracic Spine no acute findings  nuclear scan nonspecific heterogeneous uptake in the thoracolumbar spine which may be secondary to post surgical change and/or degenerative change.  no acute intervention per neurosurgery  tylenol for mild pain, hold tramadol for RKIS  IV tylenol PRN

## 2019-12-19 NOTE — ADVANCED PRACTICE NURSE CONSULT - REASON FOR CONSULT
Requested by staff to assess skin status: b/l buttocks. PMH is noted;  This is a 78 y/o male with h/o of CAD s/p PCI 1/2017 with 10% LAD stenosis OM1, HTN, HLD, GERD, Gout, Osteoarthritis and chronic back pain, s/p multiple laminectomies and spinal fusion, left TKR who presents with worsening back pain for approx a week. Pt reports that for the past week he has had progressive worsening of his lower back pain and weakness in his bilateral lower extremities that progressed to inability to ambulate last night. Pain radiates down his left leg. This was associated with fevers at home. No loss of bowel or bladder contents. Of note, pt was treated for cellulitis a month ago with Augmentin.   PAST MEDICAL HISTORY:  Back pain     CAD S/P percutaneous coronary angioplasty     GERD (gastroesophageal reflux disease)     Gout     HLD (hyperlipidemia)     HTN (hypertension)     OA (osteoarthritis).     PAST SURGICAL HISTORY:  S/P knee replacement 11/2013    S/P lumbar spine operation 6 thoracic to lumbar surgeries over 6 months period starting 11/2006.

## 2019-12-19 NOTE — PROGRESS NOTE ADULT - ATTENDING COMMENTS
Agree with above NP note.  remains cv stable  creat cont to rise, not explained by volume status/diuretics alone  r/o AIN  poss HD bhavna per renal   sig peripheral edema without pulmonary edema/dyspnea  volume removal with hd  new severe cmp, cont tx medically  will add toprol xl 25 daily bhavna after hd initiated, was off bb as outpt due to fatigue  possible low dose ace/arb in future post hd and If ok by renal   cont asa  lazaro without endocarditis

## 2019-12-19 NOTE — PROGRESS NOTE ADULT - ASSESSMENT
79 yr old male with HTN, DCHF, CAD, S/P PCI to OM1, Mid LAD (9/2014, [facility], [facility], [facility]) hx of ischemic CMP, AS, DM HLD, GERD, GOUT, s/p multiple spinal surgery admitted with back pain and fever    1. Back Pain  -stable, MRI noted, neuro/neuro sx followup  -ID f/u, pain control   -plan for PICC prior to dc     2. CAD s/p PCI (OM1/LAD)   -stable, cont cont ASA ,statin    3. Acute on Chronic systolic & Diastolic CHF, New LV dysfunction   -echo with severe LV sys dysfxn, EF 20-25%, mild diastolic dysfxn  -LAZARO 12/13 with moderate lv dysfunction  -recent echo few months ago with normal LV function  -possible new cardiomyopathy in setting of infection, sepsis  -will continue medical therapy for now as he is not a candidate for ischemic eval   -as outpt was off beta blocker due to BB induced fatigue   -kris from possible diuresis with dec po intake, hold further lasix  -recommend increasing hydration  -family concerned about inc peripheral edema, no inc dyspnea- from third spacing    4. Aortic Stenosis  -stable, severe on LAZARO with valve area of 0.9, tte this admit and as outpt with moderate AS  -gradients low on lazaro, perhaps due to new lv dysfxn  -YULY likely underestimated  -patient was asymptomatic as outpt from valve disease  -no further workup indicated, will follow as outpatient     5. HTN   -BP stable, continue with current antihtn meds     6. Fever/ Sepsis   + blood cultures with gram positive Cocci in clusters   -iv abx, ID f/u   -LAZARO without endocarditis   -encephalopathy ?? delirium from infection/kris/dehydration ??  med f/u    7. KRIS  renal f/u  f/u bladder u/s     dvt ppx 79 yr old male with HTN, DCHF, CAD, S/P PCI to OM1, Mid LAD (9/2014, [facility], [facility], [facility]) hx of ischemic CMP, AS, DM HLD, GERD, GOUT, s/p multiple spinal surgery admitted with back pain and fever    1. Back Pain  -stable, MRI noted, neuro/neuro sx followup  -ID f/u, pain control   -plan for PICC prior to dc     2. CAD s/p PCI (OM1/LAD)   -stable, cont cont ASA ,statin    3. Acute on Chronic systolic & Diastolic CHF, New LV dysfunction   -echo with severe LV sys dysfxn, EF 20-25%, mild diastolic dysfxn  -LAZARO 12/13 with moderate lv dysfunction  -recent echo few months ago with normal LV function  -possible new cardiomyopathy in setting of infection, sepsis  -will continue medical therapy for now as he is not a candidate for ischemic eval   -as outpt was off beta blocker due to BB induced fatigue   -kris from possible AIN and diuresis with dec po intake, hold further lasix  -family concerned about inc peripheral edema, no inc dyspnea- from third spacing    4. Aortic Stenosis  -stable, severe on LAZARO with valve area of 0.9, tte this admit and as outpt with moderate AS  -gradients low on lazaro, perhaps due to new lv dysfxn  -YULY likely underestimated  -patient was asymptomatic as outpt from valve disease  -no further workup indicated, will follow as outpatient     5. HTN   -BP stable, continue with current antihtn meds     6. Fever/ Sepsis   + blood cultures with gram positive Cocci in clusters   -iv abx, ID f/u   -LAZARO without endocarditis   -encephalopathy ?? delirium from infection/kris/dehydration ??  med f/u    7. KRIS  renal f/u  f/u bladder u/s     dvt ppx

## 2019-12-19 NOTE — PROGRESS NOTE ADULT - SUBJECTIVE AND OBJECTIVE BOX
CARDIOLOGY FOLLOW UP - Dr. Lee    CC no cp/sob       PHYSICAL EXAM:  T(C): 36.9 (12-19-19 @ 13:59), Max: 36.9 (12-19-19 @ 09:29)  HR: 77 (12-19-19 @ 13:59) (60 - 84)  BP: 145/74 (12-19-19 @ 13:59) (119/83 - 152/67)  RR: 18 (12-19-19 @ 13:59) (18 - 18)  SpO2: 94% (12-19-19 @ 13:59) (93% - 95%)  Wt(kg): --  I&O's Summary    18 Dec 2019 07:01  -  19 Dec 2019 07:00  --------------------------------------------------------  IN: 2940 mL / OUT: 250 mL / NET: 2690 mL    19 Dec 2019 07:01  -  19 Dec 2019 14:59  --------------------------------------------------------  IN: 850 mL / OUT: 0 mL / NET: 850 mL        Appearance: Normal	  Cardiovascular: Normal S1 S2,RRR, No JVD, +murmur   Respiratory: Lungs clear to auscultation	  Gastrointestinal:  Soft, Non-tender, + BS	  Extremities: Normal range of motion, No clubbing++ edema b/l upper, lower extremity swelling         MEDICATIONS  (STANDING):  acetaminophen  IVPB .. 1000 milliGRAM(s) IV Intermittent once  allopurinol 300 milliGRAM(s) Oral daily  aspirin enteric coated 81 milliGRAM(s) Oral daily  atorvastatin 10 milliGRAM(s) Oral at bedtime  DAPTOmycin IVPB 800 milliGRAM(s) IV Intermittent every 48 hours  heparin  Injectable 5000 Unit(s) SubCutaneous every 8 hours  senna 2 Tablet(s) Oral at bedtime  sodium bicarbonate  Infusion 0.05 mEq/kG/Hr (70 mL/Hr) IV Continuous <Continuous>      TELEMETRY: 	    ECG:  	  RADIOLOGY:   DIAGNOSTIC TESTING:  [ ] Echocardiogram:  [ ]  Catheterization:  [ ] Stress Test:    OTHER: 	    LABS:	 	                                10.4   15.10 )-----------( 388      ( 19 Dec 2019 09:21 )             32.0     12-19    135  |  102  |  120<H>  ----------------------------<  115<H>  4.6   |  14<L>  |  4.16<H>    Ca    9.0      19 Dec 2019 09:21    TPro  5.7<L>  /  Alb  1.8<L>  /  TBili  0.4  /  DBili  x   /  AST  27  /  ALT  <5<L>  /  AlkPhos  89  12-19

## 2019-12-19 NOTE — PROGRESS NOTE ADULT - PROBLEM SELECTOR PLAN 3
MSSA bacteremia persistent, source 2/2 pneumonia vs cellulitis, cannot ro hardware despite negative imaging  cont dapto per ID for 6 weeks, will need PICC once KIRS resolves  12/12 12/14 blood cx NTD  Tylenol PRN fever  CT chest also concern for atelectasis vs pneumonia, lungs light up in nuclear scan  appreciate pulm recs  TTE moderate aortic stenosis, Severe concentric left ventricular hypertrophy, Severe left ventricular systolic dysfunction. JAHAIRA no vegetations

## 2019-12-19 NOTE — PROGRESS NOTE ADULT - ASSESSMENT
78 y/o male with h/o of CAD s/p PCI 1/2017 with 10% LAD stenosis OM1, HTN, HLD, GERD, Gout, Osteoarthritis and chronic back pain, s/p multiple laminectomies and spinal fusion, lower back pain and weakness in his bilateral lower extremities that progressed to inability to ambulate. dx with MSSA bacteremia. on ancef. also received lasix as well. has had hesitancy urinating. on 12/16/ KRIS       1- KRIS   2- MSSA bacteremia   3- dysphagia   4- cad  5- htn   6- acidosis       suspect kris in setting of  +/-  AIN + /- due to infection  vs intravascular volume depletion despite LE edema   U eos neg   renal sono/bladder sono non revealing for obstruction    change NS to bicarb drip   renal function cont to deteriorate overall which is concerning. hd d/w pt family and explained in detail as well as his current working dx for his renal function deterioration. given his renal function if no improvement by am then will need renal replacement therapy.   hd consent obtained from wife witnessed and placed in chart   also check c3/c4  although urinary RBC are only 5 DDX of post infectious GN also possible   d/w primary team

## 2019-12-19 NOTE — PROGRESS NOTE ADULT - ATTENDING COMMENTS
plan of care dw son and wife at bedside  addressed all concerns    [facility]

## 2019-12-19 NOTE — ADVANCED PRACTICE NURSE CONSULT - ASSESSMENT
The pts son was at the bedside and declined to visualize the wound at this time. upon assessment, the pt presents with a butterfly-shaped wound that encompasses the b/l buttocks and gluteal cleft. the area in question measures 6cm x 14 cm x 0cm in total. The epidermis is sloughing from the skin to reveal moist , red tissue. The damage appears to be superficial at this time. Approximately, 40% of the wound is open red, moist, 30% is intact nonblanchable skin with a purple tone and the remaining 30% is non-blanchable erythema which presents as a halo at the edges. There is scant serosanguinous drainage, no odor. the periwound skin is intact with no erythema, induration or fluctuance.  the pt is on a [employer] P 500 support surface and needs assistance with turning and positioning. The pt gets OOB to  the chair with a mercy lift.  He has swallowing difficulty and is on a dysphagia diet- he was seen by nutrition. As per discussion with the family and staff, the pt has a poor appetite  He is incontinent of urine and stool and staff have been applying a condom catheter to divert urine from the skin.  Question the etiology of the this skin alteration that has suddenly appeared within the last 24 hours.. As it is located on the soft tissue of the b/l buttocks and not a bony prominence, would hesitate to classify as pressure injury. Pt has incontinence of bowel and bladder- this is a possible etiology.  Explained this to the son and his wife who were at the bedside- explained that the etiology is in question at this time and I compared this skin alteration to a pressure injury. Pt denies pain in the wound- this is also not consistent with pressure injuries which can be painful.   Education was provided as to the pressure injury interventions in place: the specialty bed, the focus on mobility, nutrition input. also explained the purpose and use of Cavilon which is the tx recommended at this time. will also recommend to continue to monitor for further changes,

## 2019-12-19 NOTE — PROGRESS NOTE ADULT - PROBLEM SELECTOR PLAN 1
multifactorial ATN/AIN in the setting of sepsis and antibiotics  bladder sono reveiwed  dw renal following cs fu  holding losartan  will cont NS , inc 100cc /hr, strict i/os  pt consented for possible HD given worsening Cr and acidosis  bicarb gtt started

## 2019-12-20 LAB
ALBUMIN SERPL ELPH-MCNC: 1.6 G/DL — LOW (ref 3.3–5)
ALP SERPL-CCNC: 69 U/L — SIGNIFICANT CHANGE UP (ref 40–120)
ALT FLD-CCNC: <5 U/L — LOW (ref 10–45)
ANION GAP SERPL CALC-SCNC: 13 MMOL/L — SIGNIFICANT CHANGE UP (ref 5–17)
ANION GAP SERPL CALC-SCNC: 16 MMOL/L — SIGNIFICANT CHANGE UP (ref 5–17)
APTT BLD: 33.6 SEC — SIGNIFICANT CHANGE UP (ref 27.5–36.3)
AST SERPL-CCNC: 22 U/L — SIGNIFICANT CHANGE UP (ref 10–40)
BILIRUB SERPL-MCNC: 0.3 MG/DL — SIGNIFICANT CHANGE UP (ref 0.2–1.2)
BUN SERPL-MCNC: 134 MG/DL — HIGH (ref 7–23)
BUN SERPL-MCNC: 138 MG/DL — HIGH (ref 7–23)
C3 SERPL-MCNC: 25 MG/DL — LOW (ref 81–157)
C4 SERPL-MCNC: 21 MG/DL — SIGNIFICANT CHANGE UP (ref 13–39)
CALCIUM SERPL-MCNC: 7.5 MG/DL — LOW (ref 8.4–10.5)
CALCIUM SERPL-MCNC: 8.7 MG/DL — SIGNIFICANT CHANGE UP (ref 8.4–10.5)
CHLORIDE SERPL-SCNC: 103 MMOL/L — SIGNIFICANT CHANGE UP (ref 96–108)
CHLORIDE SERPL-SCNC: 106 MMOL/L — SIGNIFICANT CHANGE UP (ref 96–108)
CK SERPL-CCNC: 36 U/L — SIGNIFICANT CHANGE UP (ref 30–200)
CO2 SERPL-SCNC: 17 MMOL/L — LOW (ref 22–31)
CO2 SERPL-SCNC: 17 MMOL/L — LOW (ref 22–31)
CREAT SERPL-MCNC: 4.24 MG/DL — HIGH (ref 0.5–1.3)
CREAT SERPL-MCNC: 4.3 MG/DL — HIGH (ref 0.5–1.3)
GLUCOSE SERPL-MCNC: 124 MG/DL — HIGH (ref 70–99)
GLUCOSE SERPL-MCNC: 129 MG/DL — HIGH (ref 70–99)
HCT VFR BLD CALC: 30.7 % — LOW (ref 39–50)
HGB BLD-MCNC: 9.8 G/DL — LOW (ref 13–17)
INR BLD: 1.7 RATIO — HIGH (ref 0.88–1.16)
MAGNESIUM SERPL-MCNC: 2.4 MG/DL — SIGNIFICANT CHANGE UP (ref 1.6–2.6)
MCHC RBC-ENTMCNC: 28.9 PG — SIGNIFICANT CHANGE UP (ref 27–34)
MCHC RBC-ENTMCNC: 31.9 GM/DL — LOW (ref 32–36)
MCV RBC AUTO: 90.6 FL — SIGNIFICANT CHANGE UP (ref 80–100)
PHOSPHATE SERPL-MCNC: 8.9 MG/DL — HIGH (ref 2.5–4.5)
PLATELET # BLD AUTO: 358 K/UL — SIGNIFICANT CHANGE UP (ref 150–400)
POTASSIUM SERPL-MCNC: 4.7 MMOL/L — SIGNIFICANT CHANGE UP (ref 3.5–5.3)
POTASSIUM SERPL-MCNC: 4.7 MMOL/L — SIGNIFICANT CHANGE UP (ref 3.5–5.3)
POTASSIUM SERPL-SCNC: 4.7 MMOL/L — SIGNIFICANT CHANGE UP (ref 3.5–5.3)
POTASSIUM SERPL-SCNC: 4.7 MMOL/L — SIGNIFICANT CHANGE UP (ref 3.5–5.3)
PROT SERPL-MCNC: 5.1 G/DL — LOW (ref 6–8.3)
PROTHROM AB SERPL-ACNC: 19.7 SEC — HIGH (ref 10–12.9)
RBC # BLD: 3.39 M/UL — LOW (ref 4.2–5.8)
RBC # FLD: 14.8 % — HIGH (ref 10.3–14.5)
SODIUM SERPL-SCNC: 136 MMOL/L — SIGNIFICANT CHANGE UP (ref 135–145)
SODIUM SERPL-SCNC: 136 MMOL/L — SIGNIFICANT CHANGE UP (ref 135–145)
WBC # BLD: 10.8 K/UL — HIGH (ref 3.8–10.5)
WBC # FLD AUTO: 10.8 K/UL — HIGH (ref 3.8–10.5)

## 2019-12-20 PROCEDURE — 76937 US GUIDE VASCULAR ACCESS: CPT | Mod: 26

## 2019-12-20 PROCEDURE — 36556 INSERT NON-TUNNEL CV CATH: CPT

## 2019-12-20 PROCEDURE — 77001 FLUOROGUIDE FOR VEIN DEVICE: CPT | Mod: 26

## 2019-12-20 PROCEDURE — 99232 SBSQ HOSP IP/OBS MODERATE 35: CPT

## 2019-12-20 RX ORDER — DESMOPRESSIN ACETATE 0.1 MG/1
28 TABLET ORAL ONCE
Refills: 0 | Status: COMPLETED | OUTPATIENT
Start: 2019-12-20 | End: 2019-12-20

## 2019-12-20 RX ORDER — CHLORHEXIDINE GLUCONATE 213 G/1000ML
1 SOLUTION TOPICAL
Refills: 0 | Status: DISCONTINUED | OUTPATIENT
Start: 2019-12-20 | End: 2020-01-12

## 2019-12-20 RX ORDER — SODIUM CHLORIDE 9 MG/ML
10 INJECTION INTRAMUSCULAR; INTRAVENOUS; SUBCUTANEOUS
Refills: 0 | Status: DISCONTINUED | OUTPATIENT
Start: 2019-12-20 | End: 2020-01-12

## 2019-12-20 RX ADMIN — DESMOPRESSIN ACETATE 228 MICROGRAM(S): 0.1 TABLET ORAL at 15:44

## 2019-12-20 RX ADMIN — Medication 81 MILLIGRAM(S): at 13:10

## 2019-12-20 RX ADMIN — Medication 300 MILLIGRAM(S): at 13:10

## 2019-12-20 RX ADMIN — HEPARIN SODIUM 5000 UNIT(S): 5000 INJECTION INTRAVENOUS; SUBCUTANEOUS at 13:15

## 2019-12-20 RX ADMIN — HEPARIN SODIUM 5000 UNIT(S): 5000 INJECTION INTRAVENOUS; SUBCUTANEOUS at 05:42

## 2019-12-20 RX ADMIN — Medication 70 MEQ/KG/HR: at 15:46

## 2019-12-20 NOTE — PROGRESS NOTE ADULT - SUBJECTIVE AND OBJECTIVE BOX
Interventional Radiology Brief Post-Procedure Note    Procedure: Non tunneled dialysis catheter placement    Operators: William Palafox MD    Anesthesia (type): Local lidocaine    Contrast: None    EBL: Minimal.     Findings/Follow up Plan of Care: Successful nontunneled dialysis catheter placement with tip at the cavoatrial junction.    Specimens Removed: None.     Implants: 15 cm nontunneled dialysis catheter.     Complications: No immediate complications.     Condition/Disposition: To recovery room.     Please call Interventional Radiology x [phone] with any questions, concerns, or issues.

## 2019-12-20 NOTE — PROGRESS NOTE ADULT - ASSESSMENT
78 y/o male with h/o of CAD s/p PCI 1/2017 with 10% LAD stenosis OM1, HTN, HLD, GERD, Gout, Osteoarthritis and chronic back pain, s/p multiple laminectomies and spinal fusion, lower back pain and weakness in his bilateral lower extremities that progressed to inability to ambulate. dx with MSSA bacteremia. on ancef. also received lasix as well. has had hesitancy urinating. on 12/16/ KRIS       1- KRIS   2- MSSA bacteremia   3- dysphagia   4- cad  5- htn   6- acidosis       suspect kris in setting of  +/-  AIN + /- due to infection  vs intravascular volume depletion despite LE edema   U eos neg   renal sono/bladder sono non revealing for obstruction    bicarb drip   renal function deteriorating bun also worsening significantly and fluid status worsening as well.   d/ w pt and family in detail re: renal replacement therapy in detail R/B/A explained as well.   they agree to proceed risks of infection hypotension complications etc d/w them as well   IR called hd cath arrangements made  DDAVP 28 mcg iv on call to IR

## 2019-12-20 NOTE — PROGRESS NOTE ADULT - SUBJECTIVE AND OBJECTIVE BOX
CARDIOLOGY FOLLOW UP - Dr. Lee    CC no cp/sob       PHYSICAL EXAM:  T(C): 36.9 (12-20-19 @ 08:00), Max: 37 (12-19-19 @ 17:39)  HR: 60 (12-20-19 @ 08:00) (60 - 84)  BP: 154/71 (12-20-19 @ 08:00) (131/71 - 154/71)  RR: 18 (12-20-19 @ 08:00) (18 - 18)  SpO2: 94% (12-20-19 @ 08:00) (93% - 94%)  Wt(kg): --  I&O's Summary    19 Dec 2019 07:01  -  20 Dec 2019 07:00  --------------------------------------------------------  IN: 2030 mL / OUT: 200 mL / NET: 1830 mL        Appearance: Normal	  Cardiovascular: Normal S1 S2,RRR, No JVD, +murmur   Respiratory: Lungs clear to auscultation	  Gastrointestinal:  Soft, Non-tender, + BS	  Extremities: Normal range of motion, No clubbing, ++ b/l le,  upper extremity edema         MEDICATIONS  (STANDING):  acetaminophen  IVPB .. 1000 milliGRAM(s) IV Intermittent once  allopurinol 300 milliGRAM(s) Oral daily  aspirin enteric coated 81 milliGRAM(s) Oral daily  atorvastatin 10 milliGRAM(s) Oral at bedtime  DAPTOmycin IVPB 800 milliGRAM(s) IV Intermittent every 48 hours  heparin  Injectable 5000 Unit(s) SubCutaneous every 8 hours  senna 2 Tablet(s) Oral at bedtime  sodium bicarbonate  Infusion 0.05 mEq/kG/Hr (70 mL/Hr) IV Continuous <Continuous>      TELEMETRY: 	    ECG:  	  RADIOLOGY:   DIAGNOSTIC TESTING:  [ ] Echocardiogram:  [ ]  Catheterization:  [ ] Stress Test:    OTHER: 	    LABS:	 	                                9.8    10.80 )-----------( 358      ( 20 Dec 2019 12:17 )             30.7     12-20    136  |  106  |  138<H>  ----------------------------<  129<H>  4.7   |  17<L>  |  4.30<H>    Ca    7.5<L>      20 Dec 2019 08:47  Phos  8.9     12-20  Mg     2.4     12-20    TPro  5.1<L>  /  Alb  1.6<L>  /  TBili  0.3  /  DBili  x   /  AST  22  /  ALT  <5<L>  /  AlkPhos  69  12-20    PT/INR - ( 20 Dec 2019 08:47 )   PT: 19.7 sec;   INR: 1.70 ratio         PTT - ( 20 Dec 2019 08:47 )  PTT:33.6 sec

## 2019-12-20 NOTE — PROGRESS NOTE ADULT - SUBJECTIVE AND OBJECTIVE BOX
Follow-up Pulm Progress Note  Aaron Dowd MD  [phone]    Afebrile on Daptomycin  c/o dry mouth: wants ice chips  Alert and interactive in bed: no dyspnea    Vital Signs Last 24 Hrs  T(C): 36.9 (20 Dec 2019 08:00), Max: 37 (19 Dec 2019 17:39)  T(F): 98.5 (20 Dec 2019 08:00), Max: 98.6 (19 Dec 2019 17:39)  HR: 60 (20 Dec 2019 08:00) (60 - 84)  BP: 154/71 (20 Dec 2019 08:00) (131/71 - 154/71)  BP(mean): --  RR: 18 (20 Dec 2019 08:00) (18 - 18)  SpO2: 94% (20 Dec 2019 08:00) (93% - 94%)                          9.8    10.80 )-----------( 358      ( 20 Dec 2019 12:17 )             30.7       12-20    136  |  106  |  138<H>  ----------------------------<  129<H>  4.7   |  17<L>  |  4.30<H>    Ca    7.5<L>      20 Dec 2019 08:47  Phos  8.9     12-20  Mg     2.4     12-20    TPro  5.1<L>  /  Alb  1.6<L>  /  TBili  0.3  /  DBili  x   /  AST  22  /  ALT  <5<L>  /  AlkPhos  69  12-20      TPro  4.9<L>  /  Alb  1.6<L>  /  TBili  0.3  /  DBili  x   /  AST  21  /  ALT  <5<L>  /  AlkPhos  84  12-17    CULTURES:  Culture Results:   Growth in anaerobic bottle: Staphylococcus aureus  See previous culture [id number] (12-09 @ 22:05)  Culture Results:   Growth in anaerobic bottle: Staphylococcus aureus  See previous culture [id number] (12-09 @ 22:05)  Culture Results:   Growth in aerobic and anaerobic bottles: Staphylococcus aureus  "Due to technical problems, Proteus sp. will Not be reported as part of  the BCID panel until further notice"  ***Blood Panel PCR results on this specimen are available  approximately 3 hours after the Gram stain result.***  Gram stain, PCR, and/or culture results may not always  correspond due to difference in methodologies.  ************************************************************  This PCR assay was performed using [employer].  The following targets are tested for: Enterococcus,  vancomycin resistant enterococci, Listeria monocytogenes,  coagulase negative staphylococci, S. aureus,  methicillin resistant S. aureus, Streptococcus agalactiae  (Group B), S. pneumoniae, S. pyogenes (Group A),  Acinetobacter baumannii, Enterobacter cloacae, E. coli,  Klebsiella oxytoca, K. pneumoniae, Proteus sp.,  Serratia marcescens, Haemophilus influenzae,  Neisseria meningitidis, Pseudomonas aeruginosa, Candida  albicans, C. glabrata, C krusei, C parapsilosis,  C. tropicalis and the KPC resistance gene. (12-08 @ 04:15)  Culture Results:   Growth in aerobic and anaerobic bottles: Staphylococcus aureus  See previous culture [id number] (12-08 @ 04:15)  Culture Results:   No growth (12-08 @ 04:06)    Most recent blood culture -- 12-09 @ 22:05   -- -- .Blood Blood 12-09 @ 22:05      Physical Examination:  PULM: No sign wheeze or rhonchi  CVS: Regular rate and rhythm, no murmurs, rubs, or gallops  ABD: Soft, non-tender  EXT:  No clubbing, cyanosis, or edema    RADIOLOGY REVIEWED  CXR:    CT chest:    TTE:    PROCEDURE: Transthoracic echocardiogram with 2-D, M-Mode  and complete spectral and color flow Doppler. Verbal  consent was obtained for injection of  Ultrasonic Enhancing  Agent following a discussion of risks and benefits.  Following intravenous injection of Ultrasonic Enhancing  Agent , harmonic imaging was performed.  INDICATION: Acute and subacute endocarditis, unspecified  (I33.9)  ------------------------------------------------------------------------  Dimensions:    Normal Values:  LA:     5.5    2.0 - 4.0 cm  Ao:     3.7    2.0 - 3.8 cm  SEPTUM: 1.6    0.6 - 1.2 cm  PWT:    1.4    0.6 - 1.1 cm  LVIDd:  5.2    3.0 - 5.6 cm  LVIDs:         1.8 - 4.0 cm  Derived variables:  LVMI: 148 g/m2  RWT: 0.53  EF (Visual Estimate): 20-25 %  Doppler Peak Velocity (m/sec): AoV=3.0  ------------------------------------------------------------------------  Observations:  Mitral Valve: Mitral annular calcification, otherwise  normal mitral valve. Minimal mitral regurgitation.  Aortic Valve/Aorta: Calcified trileaflet aortic valve with  decreased opening. Peak transaortic valve gradient equals  36 mm Hg, mean transaortic valve gradient equals 25 mm Hg,  estimated aortic valve area equals 1.2 sqcm (by continuity  equation), aortic valve velocity time integral equals 69  cm, consistent with moderate aortic stenosis. Minimal  aortic regurgitation.  Peak left ventricular outflow tract  gradient equals 3 mm Hg, mean gradient is equal to 2 mm Hg,  LVOT velocity time integral equals 19 cm.  Aortic Root: 3.7 cm.  LVOT diameter: 2.3 cm.  Left Atrium: Mildly dilated left atrium.  LA volume index=  39 cc/m2.  Left Ventricle: Severe  left ventricular systolic  dysfunction.   Endocardial visualization enhanced with  intravenous injection of Ultrasonic Enhancing Agent  (Definity). Severe concentric left ventricular hypertrophy.  Mild diastolic dysfunction (Stage I).  Right Heart: Severe right atrial enlargement. Normal right  ventricular size and function. Normal tricuspid valve.  Minimal tricuspid regurgitation. Normal pulmonic valve.  Pericardium/Pleura: Normal pericardium with trace  pericardial effusion.  Hemodynamic: Estimated right atrial pressure is 8 mm Hg.  Estimated right ventricular systolic pressure equals 27 mm  Hg, assuming right atrial pressure equals 8 mm Hg,  consistent with normal pulmonary pressures.  ------------------------------------------------------------------------  Conclusions:  1. Calcified trileaflet aortic valve with decreased  opening. Peak transaortic valve gradient equals 36 mm Hg,  mean transaortic valve gradient equals 25 mm Hg, estimated  aortic valve area equals 1.2 sqcm (by continuity equation),  aortic valve velocity time integral equals 69 cm,  consistent with moderate aortic stenosis.  2. Severe concentric left ventricular hypertrophy.  3. Severe  left ventricular systolic dysfunction.  Endocardial visualization enhanced with intravenous  injection of Ultrasonic Enhancing Agent (Definity).  Unable to rule out endocarditis. Consider JAHAIRA if clinically  indicated.

## 2019-12-20 NOTE — PROGRESS NOTE ADULT - ATTENDING COMMENTS
Agree with above NP note.  cv stable  await new HD  volume removal with hd   will start toprol post hd if sbp stable

## 2019-12-20 NOTE — PROGRESS NOTE ADULT - SUBJECTIVE AND OBJECTIVE BOX
Patient is a 79y old  Male who presents with a chief complaint of Back pain (20 Dec 2019 17:03)      INTERVAL HPI/OVERNIGHT EVENTS: feels weak and sleepy      Vital Signs Last 24 Hrs  T(C): 36.4 (20 Dec 2019 22:18), Max: 36.9 (20 Dec 2019 08:00)  T(F): 97.6 (20 Dec 2019 22:18), Max: 98.5 (20 Dec 2019 08:00)  HR: 82 (20 Dec 2019 22:18) (60 - 86)  BP: 131/75 (20 Dec 2019 22:18) (124/67 - 154/71)  BP(mean): --  RR: 18 (20 Dec 2019 22:18) (18 - 18)  SpO2: 93% (20 Dec 2019 22:18) (93% - 94%)    acetaminophen   Tablet .. 650 milliGRAM(s) Oral every 6 hours PRN  acetaminophen  IVPB .. 1000 milliGRAM(s) IV Intermittent once  allopurinol 300 milliGRAM(s) Oral daily  aspirin enteric coated 81 milliGRAM(s) Oral daily  atorvastatin 10 milliGRAM(s) Oral at bedtime  chlorhexidine 4% Liquid 1 Application(s) Topical <User Schedule>  DAPTOmycin IVPB 800 milliGRAM(s) IV Intermittent every 48 hours  heparin  Injectable 5000 Unit(s) SubCutaneous every 8 hours  senna 2 Tablet(s) Oral at bedtime  sodium bicarbonate  Infusion 0.05 mEq/kG/Hr IV Continuous <Continuous>  sodium chloride 0.9% lock flush 10 milliLiter(s) IV Push every 1 hour PRN      PHYSICAL EXAM:  GENERAL: NAD,   EYES: conjunctiva and sclera clear  ENMT: Moist mucous membranes  NECK: Supple, No JVD, Normal thyroid  NERVOUS SYSTEM:  Alert & Oriented X,   CHEST/LUNG: Clear to auscultation bilaterally; No rales, rhonchi, wheezing, or rubs  HEART: Regular rate and rhythm; No murmurs, rubs, or gallops  ABDOMEN: Soft, Nontender, Nondistended; Bowel sounds present  EXTREMITIES:  2+ Peripheral Pulses, No clubbing, cyanosis, or edema  LYMPH: No lymphadenopathy noted  SKIN: No rashes or lesions    Consultant(s) Notes Reviewed:  [x ] YES  [ ] NO  Care Discussed with Consultants/Other Providers [ x] YES  [ ] NO    LABS:                        9.8    10.80 )-----------( 358      ( 20 Dec 2019 12:17 )             30.7     12-20    136  |  106  |  138<H>  ----------------------------<  129<H>  4.7   |  17<L>  |  4.30<H>    Ca    7.5<L>      20 Dec 2019 08:47  Phos  8.9     12-20  Mg     2.4     12-20    TPro  5.1<L>  /  Alb  1.6<L>  /  TBili  0.3  /  DBili  x   /  AST  22  /  ALT  <5<L>  /  AlkPhos  69  12-20    PT/INR - ( 20 Dec 2019 08:47 )   PT: 19.7 sec;   INR: 1.70 ratio         PTT - ( 20 Dec 2019 08:47 )  PTT:33.6 sec    CAPILLARY BLOOD GLUCOSE                  RADIOLOGY & ADDITIONAL TESTS:    Imaging Personally Reviewed:  [x ] YES  [ ] NO

## 2019-12-20 NOTE — PROGRESS NOTE ADULT - SUBJECTIVE AND OBJECTIVE BOX
CC: F/U Bacteremia    Saw/spoke to patient. No fevers, no chills. No new complaints.    Allergies  Indocin (Unknown)  oxacillin (Unknown)  Zyvox (Unknown)    ANTIMICROBIALS:  DAPTOmycin IVPB 800 every 48 hours    PE:    Vital Signs Last 24 Hrs  T(C): 36.9 (20 Dec 2019 08:00), Max: 37 (19 Dec 2019 17:39)  T(F): 98.5 (20 Dec 2019 08:00), Max: 98.6 (19 Dec 2019 17:39)  HR: 60 (20 Dec 2019 08:00) (60 - 84)  BP: 154/71 (20 Dec 2019 08:00) (131/71 - 154/71)  RR: 18 (20 Dec 2019 08:00) (18 - 18)  SpO2: 94% (20 Dec 2019 08:00) (93% - 94%)    Gen: AOx3, NAD, non-toxic, pleasant  CV: S1+S2 normal, nontachycardic  Resp: Clear bilat, no resp distress, no crackles/wheezes  Abd: Soft, nontender, +BS  Ext: No LE edema, no wounds    LABS:                        9.8    10.80 )-----------( 358      ( 20 Dec 2019 12:17 )             30.7     12-20    136  |  106  |  138<H>  ----------------------------<  129<H>  4.7   |  17<L>  |  4.30<H>    Ca    7.5<L>      20 Dec 2019 08:47  Phos  8.9     12-20  Mg     2.4     12-20    TPro  5.1<L>  /  Alb  1.6<L>  /  TBili  0.3  /  DBili  x   /  AST  22  /  ALT  <5<L>  /  AlkPhos  69  12-20    MICROBIOLOGY:    .Blood Blood  12-14-19   No growth at 5 days.      .Blood Blood-Peripheral  12-12-19   No growth at 5 days.     .Blood Blood  12-09-19   Growth in aerobic and anaerobic bottles: Staphylococcus aureus  See previous culture [id number]  --    Growth in aerobic and anaerobic bottles: Gram Positive Cocci in Clusters    .Blood Blood  12-08-19   Growth in aerobic and anaerobic bottles: Staphylococcus aureus    .Urine Clean Catch (Midstream)  12-08-19   No growth     (otherwise reviewed)    RADIOLOGY:    12/19 US:    IMPRESSION:     Redemonstrated thickened bladder wall with intraluminal irregularity and internal trabeculations. Small internal debris. Please correlate with urinalysis.     Enlarged prostate.

## 2019-12-20 NOTE — PROGRESS NOTE ADULT - ASSESSMENT
79 yr old male with HTN, DCHF, CAD, S/P PCI to OM1, Mid LAD (9/2014, [facility], [facility], [facility]) hx of ischemic CMP, AS, DM HLD, GERD, GOUT, s/p multiple spinal surgery admitted with back pain and fever    1. Back Pain  -stable, MRI noted, neuro/neuro sx followup  -ID f/u, pain control   -plan for PICC prior to dc     2. CAD s/p PCI (OM1/LAD)   -stable, cont cont ASA ,statin    3. Acute on Chronic systolic & Diastolic CHF, New LV dysfunction   -echo with severe LV sys dysfxn, EF 20-25%, mild diastolic dysfxn  -LAZARO 12/13 with moderate lv dysfunction  -recent echo few months ago with normal LV function  -possible new cardiomyopathy in setting of infection, sepsis  -will continue medical therapy for now as he is not a candidate for ischemic eval   -+KRIS, +peripheral edema, no inc dyspnea - from third spacing, hold further diuresis   -will add toprol xl 25 daily after hd initiated, was off bb as outpt due to fatigue  -possible low dose ace/arb in future post hd and If ok by renal     4. Aortic Stenosis  -stable, severe on LAZARO with valve area of 0.9, tte this admit and as outpt with moderate AS  -gradients low on lazaro, perhaps due to new lv dysfxn  -YULY likely underestimated  -patient was asymptomatic as outpt from valve disease  -no further workup indicated, will follow as outpatient     5. HTN   -BP stable, continue with current antihtn meds     6. Fever/ Sepsis   + blood cultures with gram positive Cocci in clusters   -iv abx, ID f/u   -LAZARO without endocarditis   -encephalopathy ?? delirium from infection/kris/dehydration ??  med f/u    7. KRIS  renal f/u  IVF, likely will need HD     dvt ppx

## 2019-12-20 NOTE — PROGRESS NOTE ADULT - ASSESSMENT
MSSA bacteremia  Bibasilar atelectasis with probable pneumonia: possibly bacteremic  CAD with severe AS and Systolic LV dysfunction  OP dysphagia    REC    Continue abx per ID  Aspiration precautions  Ice chips provided to patient!!!

## 2019-12-20 NOTE — PROGRESS NOTE ADULT - PROBLEM SELECTOR PLAN 3
MSSA bacteremia persistent, source 2/2 pneumonia vs cellulitis, cannot ro hardware despite negative imaging  cont dapto per ID for 6 weeks, will need PICC once KRIS resolves  12/12 12/14 blood cx NTD  Tylenol PRN fever  CT chest also concern for atelectasis vs pneumonia, lungs light up in nuclear scan  appreciate pulm recs  TTE moderate aortic stenosis, Severe concentric left ventricular hypertrophy, Severe left ventricular systolic dysfunction. JAHAIRA no vegetations

## 2019-12-20 NOTE — PROGRESS NOTE ADULT - SUBJECTIVE AND OBJECTIVE BOX
[facility]   [phone]  RENAL FOLLOW UP NOTE  --------------------------------------------------------------------------------  Chief Complaint:    24 hour events/subjective:    seen earlier and now as well.   not worsening sob no N/V no abd pain no dysuria.     PAST HISTORY  --------------------------------------------------------------------------------  No significant changes to PMH, PSH, FHx, SHx, unless otherwise noted    ALLERGIES & MEDICATIONS  --------------------------------------------------------------------------------  Allergies    Indocin (Unknown)  oxacillin (Unknown)  Zyvox (Unknown)    Intolerances      Standing Inpatient Medications  acetaminophen  IVPB .. 1000 milliGRAM(s) IV Intermittent once  allopurinol 300 milliGRAM(s) Oral daily  aspirin enteric coated 81 milliGRAM(s) Oral daily  atorvastatin 10 milliGRAM(s) Oral at bedtime  DAPTOmycin IVPB 800 milliGRAM(s) IV Intermittent every 48 hours  desmopressin IVPB 28 MICROGram(s) IV Intermittent once  heparin  Injectable 5000 Unit(s) SubCutaneous every 8 hours  senna 2 Tablet(s) Oral at bedtime  sodium bicarbonate  Infusion 0.05 mEq/kG/Hr IV Continuous <Continuous>    PRN Inpatient Medications  acetaminophen   Tablet .. 650 milliGRAM(s) Oral every 6 hours PRN      REVIEW OF SYSTEMS  --------------------------------------------------------------------------------    Gen: denies fevers/chills,  CVS: denies chest pain/palpitations  Resp: denies worsening SOB/Cough  GI: Denies N/V/Abd pain  : Denies dysuria    All other systems were reviewed and are negative, except as noted.    VITALS/PHYSICAL EXAM  --------------------------------------------------------------------------------  T(C): 36.7 (12-20-19 @ 14:05), Max: 37 (12-19-19 @ 17:39)  HR: 63 (12-20-19 @ 14:05) (60 - 84)  BP: 148/76 (12-20-19 @ 14:05) (131/71 - 154/71)  RR: 18 (12-20-19 @ 14:05) (18 - 18)  SpO2: 94% (12-20-19 @ 14:05) (93% - 94%)  Wt(kg): --        12-19-19 @ 07:01  -  12-20-19 @ 07:00  --------------------------------------------------------  IN: 2030 mL / OUT: 200 mL / NET: 1830 mL    12-20-19 @ 07:01  -  12-20-19 @ 14:40  --------------------------------------------------------  IN: 460 mL / OUT: 450 mL / NET: 10 mL      Physical Exam:  	    Gen: elderly M remains alert when seen earlier.   	no jvd  	Pulm: decrease bs  no rales or ronchi or wheezing  	CV: RRR, S1S2; no rub  	Abd: +BS, soft, nontender/nondistended  	: No suprapubic tenderness  	UE: Warm, no cyanosis  no clubbing,  2+ edema   	LE: Warm, no cyanosis  no clubbing, 2- edema   	  LABS/STUDIES  --------------------------------------------------------------------------------              9.8    10.80 >-----------<  358      [12-20-19 @ 12:17]              30.7     136  |  106  |  138  ----------------------------<  129      [12-20-19 @ 08:47]  4.7   |  17  |  4.30        Ca     7.5     [12-20-19 @ 08:47]      Mg     2.4     [12-20-19 @ 08:47]      Phos  8.9     [12-20-19 @ 08:47]    TPro  5.1  /  Alb  1.6  /  TBili  0.3  /  DBili  x   /  AST  22  /  ALT  <5  /  AlkPhos  69  [12-20-19 @ 08:47]    PT/INR: PT 19.7 , INR 1.70       [12-20-19 @ 08:47]  PTT: 33.6       [12-20-19 @ 08:47]    CK 36      [12-20-19 @ 08:47]    Creatinine Trend:  SCr 4.30 [12-20 @ 08:47]  SCr 4.16 [12-19 @ 09:21]  SCr 3.97 [12-18 @ 15:05]  SCr 3.04 [12-17 @ 09:43]  SCr 2.44 [12-16 @ 17:28]              Urinalysis - [12-16-19 @ 23:28]      Color Yellow / Appearance Slightly Turbid / SG 1.023 / pH 5.5      Gluc Negative / Ketone Small  / Bili Negative / Urobili <2 mg/dL       Blood Small / Protein 30 mg/dL / Leuk Est Negative / Nitrite Negative      RBC 5 / WBC 7 / Hyaline 0 / Gran  / Sq Epi  / Non Sq Epi 2 / Bacteria Negative    Urine Creatinine 155      [12-16-19 @ 18:48]  Urine Protein 42      [12-16-19 @ 18:48]  Urine Sodium <35      [12-16-19 @ 18:48]  Urine Chloride <35      [12-16-19 @ 18:48]  Urine Osmolality 366      [12-16-19 @ 23:28]      C3 Complement 25      [12-20-19 @ 12:18]  C4 Complement 21      [12-20-19 @ 12:18]

## 2019-12-21 DIAGNOSIS — R13.10 DYSPHAGIA, UNSPECIFIED: ICD-10-CM

## 2019-12-21 LAB
ANION GAP SERPL CALC-SCNC: 14 MMOL/L — SIGNIFICANT CHANGE UP (ref 5–17)
APPEARANCE UR: ABNORMAL
BILIRUB UR-MCNC: NEGATIVE — SIGNIFICANT CHANGE UP
BUN SERPL-MCNC: 104 MG/DL — HIGH (ref 7–23)
CALCIUM SERPL-MCNC: 8.2 MG/DL — LOW (ref 8.4–10.5)
CHLORIDE SERPL-SCNC: 102 MMOL/L — SIGNIFICANT CHANGE UP (ref 96–108)
CO2 SERPL-SCNC: 19 MMOL/L — LOW (ref 22–31)
COLOR SPEC: YELLOW — SIGNIFICANT CHANGE UP
CREAT SERPL-MCNC: 3.37 MG/DL — HIGH (ref 0.5–1.3)
DIFF PNL FLD: ABNORMAL
GLUCOSE SERPL-MCNC: 135 MG/DL — HIGH (ref 70–99)
GLUCOSE UR QL: NEGATIVE — SIGNIFICANT CHANGE UP
HBV CORE AB SER-ACNC: SIGNIFICANT CHANGE UP
HBV SURFACE AB SER-ACNC: <3 MIU/ML — LOW
HBV SURFACE AB SER-ACNC: SIGNIFICANT CHANGE UP
HBV SURFACE AG SER-ACNC: SIGNIFICANT CHANGE UP
HCT VFR BLD CALC: 28.1 % — LOW (ref 39–50)
HCV AB S/CO SERPL IA: 0.09 S/CO — SIGNIFICANT CHANGE UP (ref 0–0.99)
HCV AB SERPL-IMP: SIGNIFICANT CHANGE UP
HGB BLD-MCNC: 9.1 G/DL — LOW (ref 13–17)
KETONES UR-MCNC: NEGATIVE — SIGNIFICANT CHANGE UP
LEUKOCYTE ESTERASE UR-ACNC: ABNORMAL
MAGNESIUM SERPL-MCNC: 2.4 MG/DL — SIGNIFICANT CHANGE UP (ref 1.6–2.6)
MCHC RBC-ENTMCNC: 29.5 PG — SIGNIFICANT CHANGE UP (ref 27–34)
MCHC RBC-ENTMCNC: 32.4 GM/DL — SIGNIFICANT CHANGE UP (ref 32–36)
MCV RBC AUTO: 91.2 FL — SIGNIFICANT CHANGE UP (ref 80–100)
NITRITE UR-MCNC: NEGATIVE — SIGNIFICANT CHANGE UP
PH UR: 5.5 — SIGNIFICANT CHANGE UP (ref 5–8)
PHOSPHATE SERPL-MCNC: 7.6 MG/DL — HIGH (ref 2.5–4.5)
PLATELET # BLD AUTO: 306 K/UL — SIGNIFICANT CHANGE UP (ref 150–400)
POTASSIUM SERPL-MCNC: 4.6 MMOL/L — SIGNIFICANT CHANGE UP (ref 3.5–5.3)
POTASSIUM SERPL-SCNC: 4.6 MMOL/L — SIGNIFICANT CHANGE UP (ref 3.5–5.3)
PROT UR-MCNC: ABNORMAL
RBC # BLD: 3.08 M/UL — LOW (ref 4.2–5.8)
RBC # FLD: 14.7 % — HIGH (ref 10.3–14.5)
SODIUM SERPL-SCNC: 135 MMOL/L — SIGNIFICANT CHANGE UP (ref 135–145)
SP GR SPEC: 1.02 — SIGNIFICANT CHANGE UP (ref 1.01–1.02)
UROBILINOGEN FLD QL: SIGNIFICANT CHANGE UP
WBC # BLD: 9.42 K/UL — SIGNIFICANT CHANGE UP (ref 3.8–10.5)
WBC # FLD AUTO: 9.42 K/UL — SIGNIFICANT CHANGE UP (ref 3.8–10.5)

## 2019-12-21 PROCEDURE — ZZZZZ: CPT

## 2019-12-21 RX ORDER — METOPROLOL TARTRATE 50 MG
12.5 TABLET ORAL DAILY
Refills: 0 | Status: DISCONTINUED | OUTPATIENT
Start: 2019-12-21 | End: 2020-01-02

## 2019-12-21 RX ADMIN — ATORVASTATIN CALCIUM 10 MILLIGRAM(S): 80 TABLET, FILM COATED ORAL at 22:48

## 2019-12-21 RX ADMIN — HEPARIN SODIUM 5000 UNIT(S): 5000 INJECTION INTRAVENOUS; SUBCUTANEOUS at 06:10

## 2019-12-21 RX ADMIN — HEPARIN SODIUM 5000 UNIT(S): 5000 INJECTION INTRAVENOUS; SUBCUTANEOUS at 01:20

## 2019-12-21 RX ADMIN — HEPARIN SODIUM 5000 UNIT(S): 5000 INJECTION INTRAVENOUS; SUBCUTANEOUS at 22:48

## 2019-12-21 RX ADMIN — Medication 300 MILLIGRAM(S): at 12:08

## 2019-12-21 RX ADMIN — CHLORHEXIDINE GLUCONATE 1 APPLICATION(S): 213 SOLUTION TOPICAL at 12:04

## 2019-12-21 RX ADMIN — DAPTOMYCIN 132 MILLIGRAM(S): 500 INJECTION, POWDER, LYOPHILIZED, FOR SOLUTION INTRAVENOUS at 01:17

## 2019-12-21 NOTE — PROGRESS NOTE ADULT - PROBLEM SELECTOR PLAN 3
MSSA bacteremia persistent, source 2/2 pneumonia vs cellulitis, cannot ro hardware despite negative imaging  cont dapto per ID for 6 weeks, will need PICC   12/12 12/14 blood cx NTD  Tylenol PRN fever  CT chest also concern for atelectasis vs pneumonia, lungs light up in nuclear scan  appreciate pulm recs  TTE moderate aortic stenosis, Severe concentric left ventricular hypertrophy, Severe left ventricular systolic dysfunction. JAHAIRA no vegetations

## 2019-12-21 NOTE — CONSULT NOTE ADULT - ASSESSMENT
80 YO M with PMH of HTN, pre-DM, HLD, CAD s/p PCI to OM1, Mid LAD, mod AS, GERD, Gout, Osteoarthritis and chronic back pain, s/p multiple laminectomies and spinal fusion of the low thoracic spine, left TKR, who presented to the ED with 1 week of fevers and night sweats, worsening low back pain, knee pain and worsening lower extremity weakness, admitted for sepsis. ENT was consulted for evaluation of Dysphagia. Pt failed MBS on 12/16. MBS showed Laryngeal penetration without aspiration. Pt currently on Dysphagia 3 diet. Speech and Swallow saw pt on 12/16 for oropharyngeal dysphagia. As per S&S, pt presents with prolonged but functional mastication of soft solids, delayed oral transit time. Also found to have decreased  AP transport with solids resulting in oral residue which clears with independent repeat swallow, discoordinated timing of pharyngeal swallow trigger and laryngeal closure, intermittent trace laryngeal penetration with honey thick liquids via teaspoon, deep laryngeal penetration with honey and nectar thick liquids via cup and straw, respectively without complete clearance. 78 YO M with PMH of HTN, pre-DM, HLD, CAD s/p PCI to OM1, Mid LAD, mod AS, GERD, Gout, Osteoarthritis and chronic back pain, s/p multiple laminectomies and spinal fusion of the low thoracic spine, left TKR, who presented to the ED with 1 week of fevers and night sweats, worsening low back pain, knee pain and worsening lower extremity weakness, admitted for sepsis 2/2 MSSA Bacteremia/ PNA/ cellulitis. ENT was consulted for evaluation of Dysphagia. Pt failed MBS on 12/16. MBS showed Laryngeal penetration without aspiration. Pt currently on Dysphagia 3 diet. FOE Pending. 78 YO M with PMH of HTN, pre-DM, HLD, CAD s/p PCI to OM1, Mid LAD, mod AS, GERD, Gout, Osteoarthritis and chronic back pain, s/p multiple laminectomies and spinal fusion of the low thoracic spine, left TKR, who presented to the ED with 1 week of fevers and night sweats, worsening low back pain, knee pain and worsening lower extremity weakness, admitted for sepsis 2/2 MSSA Bacteremia/ PNA/ cellulitis. ENT was consulted for evaluation of Dysphagia. Pt failed MBS on 12/16. MBS showed Laryngeal penetration without aspiration. Pt currently on Dysphagia 3 diet. FOE showed, Crusted mucous in B/L Nasopharynx, post nasal drip, Post cricoid edema, pachydermia consistent with GERD.  Vocal cords mobile with good contact b/l. No pooling of secretions noted.

## 2019-12-21 NOTE — PROGRESS NOTE ADULT - PROBLEM SELECTOR PLAN 1
multifactorial ATN/AIN in the setting of sepsis and antibiotics  bladder sono reveiwed  dw renal following cs fu  holding losartan  s/p HD,  HD per renal

## 2019-12-21 NOTE — CONSULT NOTE ADULT - SUBJECTIVE AND OBJECTIVE BOX
CC: Dysphagia.     HPI: 80 YO M with PMH of HTN, pre-DM, HLD, CAD s/p PCI to OM1, Mid LAD, mod AS, GERD, Gout, Osteoarthritis and chronic back pain, s/p multiple laminectomies and spinal fusion of the low thoracic spine, left TKR, who presented to the ED with 1 week of fevers and night sweats, worsening low back pain, knee pain and worsening lower extremity weakness, admitted for sepsis. ENT was consulted for evaluation of Dysphagia. Pt failed MBS on 12/16. MBS showed Laryngeal penetration without aspiration. Pt currently on Dysphagia 3 diet. Speech and Swallow saw pt on 12/16 for oropharyngeal dysphagia. As per S&S, pt presents with prolonged but functional mastication of soft solids, delayed oral transit time. Also found to have decreased  AP transport with solids resulting in oral residue which clears with independent repeat swallow, discoordinated timing of pharyngeal swallow trigger and laryngeal closure, intermittent trace laryngeal penetration with honey thick liquids via teaspoon, deep laryngeal penetration with honey and nectar thick liquids via cup and straw, respectively without complete clearance.     PAST MEDICAL & SURGICAL HISTORY:  CAD S/P percutaneous coronary angioplasty  HLD (hyperlipidemia)  Back pain  OA (osteoarthritis)  Gout  HTN (hypertension)  GERD (gastroesophageal reflux disease)  S/P lumbar spine operation: 6 thoracic to lumbar surgeries over 6 months period starting 11/2006  S/P knee replacement: 11/2013    Allergies.  Indocin (Unknown)  oxacillin (Unknown)  Zyvox (Unknown)    Intolerances.  MEDICATIONS  (STANDING):  acetaminophen  IVPB .. 1000 milliGRAM(s) IV Intermittent once  allopurinol 300 milliGRAM(s) Oral daily  atorvastatin 10 milliGRAM(s) Oral at bedtime  chlorhexidine 4% Liquid 1 Application(s) Topical <User Schedule>  DAPTOmycin IVPB 800 milliGRAM(s) IV Intermittent every 48 hours  heparin  Injectable 5000 Unit(s) SubCutaneous every 8 hours  metoprolol succinate ER 12.5 milliGRAM(s) Oral daily  senna 2 Tablet(s) Oral at bedtime    MEDICATIONS  (PRN):  acetaminophen   Tablet .. 650 milliGRAM(s) Oral every 6 hours PRN Temp greater or equal to 38C (100.4F), Mild Pain (1 - 3)  sodium chloride 0.9% lock flush 10 milliLiter(s) IV Push every 1 hour PRN Pre/post blood products, medications, blood draw, and to maintain line patency    Social History:  Family history:   ROS:   ENT: all negative except as noted in HPI   CV: denies palpitations.  Pulm: denies SOB, cough, hemoptysis  GI: denies change in apetite, indigestion, n/v  : denies pertinent urinary symptoms, urgency  Neuro: denies numbness/tingling, loss of sensation  Psych: denies anxiety  MS: denies muscle weakness, instability  Heme: denies easy bruising or bleeding  Endo: denies heat/cold intolerance, excessive sweating  Vascular: denies LE edema    Vital Signs Last 24 Hrs  T(C): 36.9 (21 Dec 2019 21:52), Max: 37.1 (21 Dec 2019 04:50)  T(F): 98.4 (21 Dec 2019 21:52), Max: 98.8 (21 Dec 2019 17:33)  HR: 78 (21 Dec 2019 21:52) (59 - 82)  BP: 128/74 (21 Dec 2019 21:52) (120/67 - 136/74)  BP(mean): --  RR: 18 (21 Dec 2019 21:52) (17 - 18)  SpO2: 92% (21 Dec 2019 21:52) (91% - 95%)                          9.1    9.42  )-----------( 306      ( 21 Dec 2019 16:01 )             28.1    12-21    135  |  102  |  104<H>  ----------------------------<  135<H>  4.6   |  19<L>  |  3.37<H>    Ca    8.2<L>      21 Dec 2019 11:33  Phos  7.6     12-21  Mg     2.4     12-21    TPro  5.1<L>  /  Alb  1.6<L>  /  TBili  0.3  /  DBili  x   /  AST  22  /  ALT  <5<L>  /  AlkPhos  69  12-20   PT/INR - ( 20 Dec 2019 08:47 )   PT: 19.7 sec;   INR: 1.70 ratio     PTT - ( 20 Dec 2019 08:47 )  PTT:33.6 sec    PHYSICAL EXAM:  Gen: NAD.  Skin: No rashes, bruises, or lesions.  Head: Normocephalic, Atraumatic.  Face: no edema, erythema, or fluctuance. Parotid glands soft without mass.  Eyes: no scleral injection.  Nose: Nares bilaterally patent, no discharge.  Mouth: No Stridor / Drooling / Trismus.  Mucosa moist, tongue/uvula midline, oropharynx clear.  Neck: Flat, supple, no lymphadenopathy, trachea midline, no masses.  Lymphatic: No lymphadenopathy.  Resp: breathing easily, no stridor.  CV: no peripheral edema/cyanosis.  GI: nondistended   Peripheral vascular: Edema of Upper Extremities.   Neuro: facial nerve intact, no facial droop.    Fiberoptic Indirect laryngoscopy:  (Scope #2 used)        IMAGING/ADDITIONAL STUDIES:   MBS [12/16]: Laryngeal penetration without aspiration.    CT Head [12/10]: Volume loss with microvascular disease, no acute hemorrhage or midline   shift. If symptoms persist consider follow-up head CT or MR if no   contraindications.     CT Chest [12/10]: Parenchymal opacities in the lower lobes bilaterally, which may be   secondary to atelectasis versus pneumonia.   Cardiomegaly.  Degenerative changes and findings in the spine which were more fully   evaluated on MRI dated 12/7/2019.    CT Abd/P [12/10]: CC: Dysphagia.     HPI: 80 YO M with PMH of HTN, pre-DM, HLD, CAD s/p PCI to OM1, Mid LAD, mod AS, GERD, Gout, Osteoarthritis and chronic back pain, s/p multiple laminectomies and spinal fusion of the low thoracic spine, left TKR, who presented to the ED with 1 week of fevers and night sweats, worsening low back pain, knee pain and worsening lower extremity weakness, admitted with Back pain and  sepsis 2/2 MSSA Bacteremia/ PNA/ cellulitis. ENT was consulted for evaluation of Dysphagia. Pt failed MBS on 12/16. MBS showed Laryngeal penetration without aspiration. Pt currently on Dysphagia 3 diet. Speech and Swallow saw pt on 12/16 for oropharyngeal dysphagia. As per S&S, pt presents with prolonged but functional mastication of soft solids, delayed oral transit time. Also found to have decreased  AP transport with solids resulting in oral residue which clears with independent repeat swallow, discoordinated timing of pharyngeal swallow trigger and laryngeal closure, intermittent trace laryngeal penetration with honey thick liquids via teaspoon, deep laryngeal penetration with honey and nectar thick liquids via cup and straw, respectively without complete clearance.     PAST MEDICAL & SURGICAL HISTORY:  CAD S/P percutaneous coronary angioplasty  HLD (hyperlipidemia)  Back pain  OA (osteoarthritis)  Gout  HTN (hypertension)  GERD (gastroesophageal reflux disease)  S/P lumbar spine operation: 6 thoracic to lumbar surgeries over 6 months period starting 11/2006  S/P knee replacement: 11/2013    Allergies.  Indocin (Unknown)  oxacillin (Unknown)  Zyvox (Unknown)    Intolerances.  MEDICATIONS  (STANDING):  acetaminophen  IVPB .. 1000 milliGRAM(s) IV Intermittent once  allopurinol 300 milliGRAM(s) Oral daily  atorvastatin 10 milliGRAM(s) Oral at bedtime  chlorhexidine 4% Liquid 1 Application(s) Topical <User Schedule>  DAPTOmycin IVPB 800 milliGRAM(s) IV Intermittent every 48 hours  heparin  Injectable 5000 Unit(s) SubCutaneous every 8 hours  metoprolol succinate ER 12.5 milliGRAM(s) Oral daily  senna 2 Tablet(s) Oral at bedtime    MEDICATIONS  (PRN):  acetaminophen   Tablet .. 650 milliGRAM(s) Oral every 6 hours PRN Temp greater or equal to 38C (100.4F), Mild Pain (1 - 3)  sodium chloride 0.9% lock flush 10 milliLiter(s) IV Push every 1 hour PRN Pre/post blood products, medications, blood draw, and to maintain line patency    Social History:  Family history:   ROS:   ENT: all negative except as noted in HPI   CV: denies palpitations.  Pulm: denies SOB, cough, hemoptysis  GI: denies change in apetite, indigestion, n/v  : denies pertinent urinary symptoms, urgency  Neuro: denies numbness/tingling, loss of sensation  Psych: denies anxiety  MS: denies muscle weakness, instability  Heme: denies easy bruising or bleeding  Endo: denies heat/cold intolerance, excessive sweating  Vascular: denies LE edema    Vital Signs Last 24 Hrs  T(C): 36.9 (21 Dec 2019 21:52), Max: 37.1 (21 Dec 2019 04:50)  T(F): 98.4 (21 Dec 2019 21:52), Max: 98.8 (21 Dec 2019 17:33)  HR: 78 (21 Dec 2019 21:52) (59 - 82)  BP: 128/74 (21 Dec 2019 21:52) (120/67 - 136/74)  BP(mean): --  RR: 18 (21 Dec 2019 21:52) (17 - 18)  SpO2: 92% (21 Dec 2019 21:52) (91% - 95%)                          9.1    9.42  )-----------( 306      ( 21 Dec 2019 16:01 )             28.1    12-21    135  |  102  |  104<H>  ----------------------------<  135<H>  4.6   |  19<L>  |  3.37<H>    Ca    8.2<L>      21 Dec 2019 11:33  Phos  7.6     12-21  Mg     2.4     12-21    TPro  5.1<L>  /  Alb  1.6<L>  /  TBili  0.3  /  DBili  x   /  AST  22  /  ALT  <5<L>  /  AlkPhos  69  12-20   PT/INR - ( 20 Dec 2019 08:47 )   PT: 19.7 sec;   INR: 1.70 ratio     PTT - ( 20 Dec 2019 08:47 )  PTT:33.6 sec    PHYSICAL EXAM:  Gen: NAD.  Skin: No rashes, bruises, or lesions.  Head: Normocephalic, Atraumatic.  Face: no edema, erythema, or fluctuance. Parotid glands soft without mass.  Eyes: no scleral injection.  Nose: Nares bilaterally patent, no discharge.  Mouth: No Stridor / Drooling / Trismus.  Mucosa moist, tongue/uvula midline, oropharynx clear.  Neck: Flat, supple, no lymphadenopathy, trachea midline, no masses.  Lymphatic: No lymphadenopathy.  Resp: breathing easily, no stridor.  CV: no peripheral edema/cyanosis.  GI: nondistended   Peripheral vascular: Edema of Upper Extremities.   Neuro: facial nerve intact, no facial droop.    Fiberoptic Indirect laryngoscopy:  (Scope #2 used)        IMAGING/ADDITIONAL STUDIES:   MBS [12/16]: Laryngeal penetration without aspiration.    CT Head [12/10]: Volume loss with microvascular disease, no acute hemorrhage or midline   shift. If symptoms persist consider follow-up head CT or MR if no   contraindications.     CT Chest [12/10]: Parenchymal opacities in the lower lobes bilaterally, which may be   secondary to atelectasis versus pneumonia.   Cardiomegaly.  Degenerative changes and findings in the spine which were more fully   evaluated on MRI dated 12/7/2019.    CT Abd/P [12/10]: CC: Dysphagia.     HPI: 78 YO M with PMH of HTN, pre-DM, HLD, CAD s/p PCI to OM1, Mid LAD, mod AS, GERD, Gout, Osteoarthritis and chronic back pain, s/p multiple laminectomies and spinal fusion of the low thoracic spine, left TKR, who presented to the ED with 1 week of fevers and night sweats, worsening low back pain, knee pain and worsening lower extremity weakness, admitted with Back pain and  sepsis 2/2 MSSA Bacteremia/ PNA/ cellulitis. ENT was consulted for evaluation of Dysphagia. Pt failed MBS on 12/16. MBS showed Laryngeal penetration without aspiration. Pt currently on Dysphagia 3 diet. As per pt's daughter, was on regular diet at home prior to hospitalization.  Speech and Swallow saw pt on 12/16 for oropharyngeal dysphagia. As per S&S, pt presents with prolonged but functional mastication of soft solids, delayed oral transit time. Also found to have decreased  AP transport with solids resulting in oral residue which clears with independent repeat swallow, discoordinated timing of pharyngeal swallow trigger and laryngeal closure, intermittent trace laryngeal penetration with honey thick liquids via teaspoon, deep laryngeal penetration with honey and nectar thick liquids via cup and straw, respectively without complete clearance.     PAST MEDICAL & SURGICAL HISTORY:  CAD S/P percutaneous coronary angioplasty  HLD (hyperlipidemia)  Back pain  OA (osteoarthritis)  Gout  HTN (hypertension)  GERD (gastroesophageal reflux disease)  S/P lumbar spine operation: 6 thoracic to lumbar surgeries over 6 months period starting 11/2006  S/P knee replacement: 11/2013    Allergies.  Indocin (Unknown)  oxacillin (Unknown)  Zyvox (Unknown)    Intolerances.  MEDICATIONS  (STANDING):  acetaminophen  IVPB .. 1000 milliGRAM(s) IV Intermittent once  allopurinol 300 milliGRAM(s) Oral daily  atorvastatin 10 milliGRAM(s) Oral at bedtime  chlorhexidine 4% Liquid 1 Application(s) Topical <User Schedule>  DAPTOmycin IVPB 800 milliGRAM(s) IV Intermittent every 48 hours  heparin  Injectable 5000 Unit(s) SubCutaneous every 8 hours  metoprolol succinate ER 12.5 milliGRAM(s) Oral daily  senna 2 Tablet(s) Oral at bedtime    MEDICATIONS  (PRN):  acetaminophen   Tablet .. 650 milliGRAM(s) Oral every 6 hours PRN Temp greater or equal to 38C (100.4F), Mild Pain (1 - 3)  sodium chloride 0.9% lock flush 10 milliLiter(s) IV Push every 1 hour PRN Pre/post blood products, medications, blood draw, and to maintain line patency    Social History:  Family history:   ROS:   ENT: all negative except as noted in HPI   CV: denies palpitations.  Pulm: denies SOB, cough, hemoptysis  GI: denies change in apetite, indigestion, n/v  : denies pertinent urinary symptoms, urgency  Neuro: denies numbness/tingling, loss of sensation  Psych: denies anxiety  MS: denies muscle weakness, instability  Heme: denies easy bruising or bleeding  Endo: denies heat/cold intolerance, excessive sweating  Vascular: denies LE edema    Vital Signs Last 24 Hrs  T(C): 36.9 (21 Dec 2019 21:52), Max: 37.1 (21 Dec 2019 04:50)  T(F): 98.4 (21 Dec 2019 21:52), Max: 98.8 (21 Dec 2019 17:33)  HR: 78 (21 Dec 2019 21:52) (59 - 82)  BP: 128/74 (21 Dec 2019 21:52) (120/67 - 136/74)  BP(mean): --  RR: 18 (21 Dec 2019 21:52) (17 - 18)  SpO2: 92% (21 Dec 2019 21:52) (91% - 95%)                          9.1    9.42  )-----------( 306      ( 21 Dec 2019 16:01 )             28.1    12-21    135  |  102  |  104<H>  ----------------------------<  135<H>  4.6   |  19<L>  |  3.37<H>    Ca    8.2<L>      21 Dec 2019 11:33  Phos  7.6     12-21  Mg     2.4     12-21    TPro  5.1<L>  /  Alb  1.6<L>  /  TBili  0.3  /  DBili  x   /  AST  22  /  ALT  <5<L>  /  AlkPhos  69  12-20   PT/INR - ( 20 Dec 2019 08:47 )   PT: 19.7 sec;   INR: 1.70 ratio     PTT - ( 20 Dec 2019 08:47 )  PTT:33.6 sec    PHYSICAL EXAM:  Gen: NAD.  Skin: No rashes, bruises, or lesions.  Head: Normocephalic, Atraumatic.  Face: no edema, erythema, or fluctuance. Parotid glands soft without mass.  Eyes: no scleral injection.  Nose: Nares bilaterally patent, no discharge.  Mouth: No Stridor / Drooling / Trismus.  Mucosa moist, tongue/uvula midline, oropharynx clear.  Neck: Flat, supple, no lymphadenopathy, trachea midline, no masses.  Lymphatic: No lymphadenopathy.  Resp: breathing easily, no stridor.  CV: no peripheral edema/cyanosis.  GI: nondistended   Peripheral vascular: Edema of Upper Extremities.   Neuro: facial nerve intact, no facial droop.    Fiberoptic Indirect laryngoscopy:  (Scope #2 used)        IMAGING/ADDITIONAL STUDIES:   MBS [12/16]: Laryngeal penetration without aspiration.    CT Head [12/10]: Volume loss with microvascular disease, no acute hemorrhage or midline   shift. If symptoms persist consider follow-up head CT or MR if no   contraindications.     CT Chest [12/10]: Parenchymal opacities in the lower lobes bilaterally, which may be   secondary to atelectasis versus pneumonia.   Cardiomegaly.  Degenerative changes and findings in the spine which were more fully   evaluated on MRI dated 12/7/2019.    CT Abd/P [12/10]: CC: Dysphagia.     HPI: 78 YO M with PMH of HTN, pre-DM, HLD, CAD s/p PCI to OM1, Mid LAD, mod AS, GERD, Gout, Osteoarthritis and chronic back pain, s/p multiple laminectomies and spinal fusion of the low thoracic spine, left TKR, who presented to the ED with 1 week of fevers and night sweats, worsening low back pain, knee pain and worsening lower extremity weakness, admitted with Back pain and  sepsis 2/2 MSSA Bacteremia/ PNA/ cellulitis. ENT was consulted for evaluation of Dysphagia. Pt failed MBS on 12/16. MBS showed Laryngeal penetration without aspiration. Pt currently on Dysphagia 3 diet. As per pt's daughter, was on regular diet at home prior to hospitalization.  Speech and Swallow saw pt on 12/16 for oropharyngeal dysphagia. As per S&S, pt presents with prolonged but functional mastication of soft solids, delayed oral transit time. Also found to have decreased  AP transport with solids resulting in oral residue which clears with independent repeat swallow, discoordinated timing of pharyngeal swallow trigger and laryngeal closure, intermittent trace laryngeal penetration with honey thick liquids via teaspoon, deep laryngeal penetration with honey and nectar thick liquids via cup and straw, respectively without complete clearance.     PAST MEDICAL & SURGICAL HISTORY:  CAD S/P percutaneous coronary angioplasty  HLD (hyperlipidemia)  Back pain  OA (osteoarthritis)  Gout  HTN (hypertension)  GERD (gastroesophageal reflux disease)  S/P lumbar spine operation: 6 thoracic to lumbar surgeries over 6 months period starting 11/2006  S/P knee replacement: 11/2013    Allergies.  Indocin (Unknown)  oxacillin (Unknown)  Zyvox (Unknown)    Intolerances.  MEDICATIONS  (STANDING):  acetaminophen  IVPB .. 1000 milliGRAM(s) IV Intermittent once  allopurinol 300 milliGRAM(s) Oral daily  atorvastatin 10 milliGRAM(s) Oral at bedtime  chlorhexidine 4% Liquid 1 Application(s) Topical <User Schedule>  DAPTOmycin IVPB 800 milliGRAM(s) IV Intermittent every 48 hours  heparin  Injectable 5000 Unit(s) SubCutaneous every 8 hours  metoprolol succinate ER 12.5 milliGRAM(s) Oral daily  senna 2 Tablet(s) Oral at bedtime    MEDICATIONS  (PRN):  acetaminophen   Tablet .. 650 milliGRAM(s) Oral every 6 hours PRN Temp greater or equal to 38C (100.4F), Mild Pain (1 - 3)  sodium chloride 0.9% lock flush 10 milliLiter(s) IV Push every 1 hour PRN Pre/post blood products, medications, blood draw, and to maintain line patency    Social History:  Family history:   ROS:   ENT: all negative except as noted in HPI   CV: denies palpitations.  Pulm: denies SOB, cough, hemoptysis  GI: denies change in apetite, indigestion, n/v  : denies pertinent urinary symptoms, urgency  Neuro: denies numbness/tingling, loss of sensation  Psych: denies anxiety  MS: denies muscle weakness, instability  Heme: denies easy bruising or bleeding  Endo: denies heat/cold intolerance, excessive sweating  Vascular: denies LE edema    Vital Signs Last 24 Hrs  T(C): 36.9 (21 Dec 2019 21:52), Max: 37.1 (21 Dec 2019 04:50)  T(F): 98.4 (21 Dec 2019 21:52), Max: 98.8 (21 Dec 2019 17:33)  HR: 78 (21 Dec 2019 21:52) (59 - 82)  BP: 128/74 (21 Dec 2019 21:52) (120/67 - 136/74)  BP(mean): --  RR: 18 (21 Dec 2019 21:52) (17 - 18)  SpO2: 92% (21 Dec 2019 21:52) (91% - 95%)                          9.1    9.42  )-----------( 306      ( 21 Dec 2019 16:01 )             28.1    12-21    135  |  102  |  104<H>  ----------------------------<  135<H>  4.6   |  19<L>  |  3.37<H>    Ca    8.2<L>      21 Dec 2019 11:33  Phos  7.6     12-21  Mg     2.4     12-21    TPro  5.1<L>  /  Alb  1.6<L>  /  TBili  0.3  /  DBili  x   /  AST  22  /  ALT  <5<L>  /  AlkPhos  69  12-20   PT/INR - ( 20 Dec 2019 08:47 )   PT: 19.7 sec;   INR: 1.70 ratio     PTT - ( 20 Dec 2019 08:47 )  PTT:33.6 sec    PHYSICAL EXAM:  Gen: NAD.  Skin: No rashes, bruises, or lesions.  Head: Normocephalic, Atraumatic.  Face: no edema, erythema, or fluctuance. Parotid glands soft without mass.  Eyes: no scleral injection.  Nose: Nares bilaterally patent, no discharge.  Mouth: No Stridor / Drooling / Trismus.  Mucosa moist, tongue/uvula midline, oropharynx clear.  Neck: Flat, supple, no lymphadenopathy, trachea midline, no masses.  Lymphatic: No lymphadenopathy.  Resp: breathing easily, no stridor.  CV: no peripheral edema/cyanosis.  GI: nondistended   Peripheral vascular: Edema of Upper Extremities.   Neuro: facial nerve intact, no facial droop.    Fiberoptic Indirect laryngoscopy:  (Scope #2 used)  Reason for Laryngoscopy: Dysphagia, Upper Airway Evaluation.   Crusted mucous in B/L Nasopharynx, post nasal drip, Post cricoid edema, pachydermia consistent with GERD.  Vocal cords mobile with good contact b/l. No pooling of secretions noted,.  Oropharynx, and hypopharynx clear, no bleeding. Tongue base, posterior pharyngeal wall, vallecula, epiglottis, and subglottis appear normal. No erythema, edema,, masses or lesions. Airway patent, no foreign body visualized. No glottic/supraglottic edema. True vocal cords, arytenoids, vestibular folds, ventricles, pyriform sinuses, and aryepiglottic folds appear normal bilaterally. Vocal cords mobile with good contact b/l.     IMAGING/ADDITIONAL STUDIES:   MBS [12/16]: Laryngeal penetration without aspiration.    CT Head [12/10]: Volume loss with microvascular disease, no acute hemorrhage or midline   shift. If symptoms persist consider follow-up head CT or MR if no   contraindications.     CT Chest [12/10]: Parenchymal opacities in the lower lobes bilaterally, which may be   secondary to atelectasis versus pneumonia.   Cardiomegaly.  Degenerative changes and findings in the spine which were more fully   evaluated on MRI dated 12/7/2019.    CT Abd/P [12/10]:

## 2019-12-21 NOTE — PROGRESS NOTE ADULT - SUBJECTIVE AND OBJECTIVE BOX
CARDIOLOGY FOLLOW UP - Dr. Lee    CC no cp or sob       PHYSICAL EXAM:  T(C): 37.1 (12-21-19 @ 04:50), Max: 37.1 (12-21-19 @ 04:50)  HR: 75 (12-21-19 @ 04:50) (59 - 86)  BP: 127/65 (12-21-19 @ 04:50) (124/67 - 148/76)  RR: 18 (12-21-19 @ 04:50) (17 - 18)  SpO2: 94% (12-21-19 @ 04:50) (91% - 95%)  Wt(kg): --  I&O's Summary    20 Dec 2019 07:01  -  21 Dec 2019 07:00  --------------------------------------------------------  IN: 2920 mL / OUT: 1850 mL / NET: 1070 mL        Appearance: Normal	  Cardiovascular: Normal S1 S2,RRR, + sys murmur   Respiratory: Lungs clear to auscultation	  Gastrointestinal:  Soft, Non-tender, + BS	  Extremities: gen edema         MEDICATIONS  (STANDING):  acetaminophen  IVPB .. 1000 milliGRAM(s) IV Intermittent once  allopurinol 300 milliGRAM(s) Oral daily  aspirin enteric coated 81 milliGRAM(s) Oral daily  atorvastatin 10 milliGRAM(s) Oral at bedtime  chlorhexidine 4% Liquid 1 Application(s) Topical <User Schedule>  DAPTOmycin IVPB 800 milliGRAM(s) IV Intermittent every 48 hours  heparin  Injectable 5000 Unit(s) SubCutaneous every 8 hours  senna 2 Tablet(s) Oral at bedtime  sodium bicarbonate  Infusion 0.05 mEq/kG/Hr (70 mL/Hr) IV Continuous <Continuous>      TELEMETRY: 	    ECG:  	  RADIOLOGY:   DIAGNOSTIC TESTING:  [ ] Echocardiogram:  [ ]  Catheterization:  [ ] Stress Test:    OTHER: 	    LABS:	 	    Creatine Kinase, Serum: 36 U/L [30 - 200] (12-20 @ 08:47)                          9.8    10.80 )-----------( 358      ( 20 Dec 2019 12:17 )             30.7     12-20    136  |  106  |  138<H>  ----------------------------<  129<H>  4.7   |  17<L>  |  4.30<H>    Ca    7.5<L>      20 Dec 2019 08:47  Phos  8.9     12-20  Mg     2.4     12-20    TPro  5.1<L>  /  Alb  1.6<L>  /  TBili  0.3  /  DBili  x   /  AST  22  /  ALT  <5<L>  /  AlkPhos  69  12-20    PT/INR - ( 20 Dec 2019 08:47 )   PT: 19.7 sec;   INR: 1.70 ratio         PTT - ( 20 Dec 2019 08:47 )  PTT:33.6 sec

## 2019-12-21 NOTE — PROGRESS NOTE ADULT - ASSESSMENT
MSSA bacteremia  Bibasilar atelectasis with probable pneumonia: possibly bacteremic  CAD with severe AS and Systolic LV dysfunction  OP dysphagia    REC    Continue abx per ID  Aspiration precautions

## 2019-12-21 NOTE — PROGRESS NOTE ADULT - ASSESSMENT
79 yr old male with HTN, DCHF, CAD, S/P PCI to OM1, Mid LAD (9/2014, [facility], [facility], [facility]) hx of ischemic CMP, AS, DM HLD, GERD, GOUT, s/p multiple spinal surgery admitted with back pain and fever    1. Back Pain  -stable, MRI noted, neuro/neuro sx followup  -ID f/u, pain control   -plan for PICC prior to dc     2. CAD s/p PCI (OM1/LAD)   -stable, cont cont ASA ,statin    3. Acute on Chronic systolic & Diastolic CHF, New LV dysfunction   -echo with severe LV sys dysfxn, EF 20-25%, mild diastolic dysfxn  -LAZARO 12/13 with moderate lv dysfunction  -recent echo few months ago with normal LV function  -possible new cardiomyopathy in setting of infection, sepsis  -will continue medical therapy for now as he is not a candidate for ischemic eval   -+KRIS, +peripheral edema, no inc dyspnea - from third spacing, hold further diuresis   -will add toprol xl 25 daily after hd initiated, was off bb as outpt due to fatigue  -possible low dose ace/arb in future post hd and If ok by renal     4. Aortic Stenosis  -stable, severe on LAZARO with valve area of 0.9, tte this admit and as outpt with moderate AS  -gradients low on lazaro, perhaps due to new lv dysfxn  -YULY likely underestimated  -patient was asymptomatic as outpt from valve disease  -no further workup indicated, will follow as outpatient     5. HTN   -BP stable, continue with current antihtn meds     6. Fever/ Sepsis   + blood cultures with gram positive Cocci in clusters , Repeat NGTD  -iv abx, ID f/u   -LAZARO without endocarditis   -encephalopathy ?? delirium from infection/kris/dehydration ??  med f/u    7. KRIS  renal f/u  s/p HD cath placement     dvt ppx 79 yr old male with HTN, DCHF, CAD, S/P PCI to OM1, Mid LAD (9/2014, [facility], [facility], [facility]) hx of ischemic CMP, AS, DM HLD, GERD, GOUT, s/p multiple spinal surgery admitted with back pain and fever    1. Back Pain  -stable, MRI noted, neuro/neuro sx followup  -ID f/u, pain control   -plan for PICC prior to dc     2. CAD s/p PCI (OM1/LAD)   -stable, cont cont ASA ,statin    3. Acute on Chronic systolic & Diastolic CHF, New LV dysfunction   -echo with severe LV sys dysfxn, EF 20-25%, mild diastolic dysfxn  -LAZARO 12/13 with moderate lv dysfunction  -recent echo few months ago with normal LV function  -possible new cardiomyopathy in setting of infection, sepsis  -will continue medical therapy for now as he is not a candidate for ischemic eval   -+KRIS, +peripheral edema, no inc dyspnea - from third spacing, hold further diuresis   -start low dose toprol xl 12.5, was off bb as outpt due to fatigue  -possible low dose ace/arb in future post hd and If ok by renal     4. Aortic Stenosis  -stable, severe on LAZARO with valve area of 0.9, tte this admit and as outpt with moderate AS  -gradients low on lazaro, perhaps due to new lv dysfxn  -YULY likely underestimated  -patient was asymptomatic as outpt from valve disease  -no further workup indicated, will follow as outpatient     5. HTN   -BP stable, continue with current antihtn meds     6. Fever/ Sepsis   + blood cultures with gram positive Cocci in clusters , Repeat NGTD  -iv abx, ID f/u   -LAZARO without endocarditis   -encephalopathy ?? delirium from infection/kris/dehydration ??  med f/u    7. KRIS? AIN  renal f/u  s/p HD cath placement   HD per renal   await poss renal bx, asa on hold for now   dvt ppx

## 2019-12-21 NOTE — PROGRESS NOTE ADULT - SUBJECTIVE AND OBJECTIVE BOX
Follow-up Pulm Progress Note  Aaron Dowd MD  [phone]    Remains afebrile on Daptomycin  No respiratory complaints    Vital Signs Last 24 Hrs  T(C): 36.8 (21 Dec 2019 10:21), Max: 37.1 (21 Dec 2019 04:50)  T(F): 98.3 (21 Dec 2019 10:21), Max: 98.7 (21 Dec 2019 04:50)  HR: 76 (21 Dec 2019 10:21) (59 - 86)  BP: 121/68 (21 Dec 2019 10:21) (121/68 - 148/76)  BP(mean): --  RR: 18 (21 Dec 2019 10:21) (17 - 18)  SpO2: 95% (21 Dec 2019 10:21) (91% - 95%)                        9.8    10.80 )-----------( 358      ( 20 Dec 2019 12:17 )             30.7       12-20    136  |  106  |  138<H>  ----------------------------<  129<H>  4.7   |  17<L>  |  4.30<H>    Ca    7.5<L>      20 Dec 2019 08:47  Phos  8.9     12-20  Mg     2.4     12-20    TPro  5.1<L>  /  Alb  1.6<L>  /  TBili  0.3  /  DBili  x   /  AST  22  /  ALT  <5<L>  /  AlkPhos  69  12-20      CULTURES:  Culture Results:   Growth in anaerobic bottle: Staphylococcus aureus  See previous culture [id number] (12-09 @ 22:05)  Culture Results:   Growth in anaerobic bottle: Staphylococcus aureus  See previous culture [id number] (12-09 @ 22:05)  Culture Results:   Growth in aerobic and anaerobic bottles: Staphylococcus aureus  "Due to technical problems, Proteus sp. will Not be reported as part of  the BCID panel until further notice"  ***Blood Panel PCR results on this specimen are available  approximately 3 hours after the Gram stain result.***  Gram stain, PCR, and/or culture results may not always  correspond due to difference in methodologies.  ************************************************************  This PCR assay was performed using [employer].  The following targets are tested for: Enterococcus,  vancomycin resistant enterococci, Listeria monocytogenes,  coagulase negative staphylococci, S. aureus,  methicillin resistant S. aureus, Streptococcus agalactiae  (Group B), S. pneumoniae, S. pyogenes (Group A),  Acinetobacter baumannii, Enterobacter cloacae, E. coli,  Klebsiella oxytoca, K. pneumoniae, Proteus sp.,  Serratia marcescens, Haemophilus influenzae,  Neisseria meningitidis, Pseudomonas aeruginosa, Candida  albicans, C. glabrata, C krusei, C parapsilosis,  C. tropicalis and the KPC resistance gene. (12-08 @ 04:15)  Culture Results:   Growth in aerobic and anaerobic bottles: Staphylococcus aureus  See previous culture [id number] (12-08 @ 04:15)  Culture Results:   No growth (12-08 @ 04:06)    Most recent blood culture -- 12-09 @ 22:05   -- -- .Blood Blood 12-09 @ 22:05      Physical Examination:  PULM: No sign wheeze or rhonchi  CVS: Regular rate and rhythm, no murmurs, rubs, or gallops  ABD: Soft, non-tender  EXT:  No clubbing, cyanosis, or edema    RADIOLOGY REVIEWED  CXR:    CT chest:    TTE:    PROCEDURE: Transthoracic echocardiogram with 2-D, M-Mode  and complete spectral and color flow Doppler. Verbal  consent was obtained for injection of  Ultrasonic Enhancing  Agent following a discussion of risks and benefits.  Following intravenous injection of Ultrasonic Enhancing  Agent , harmonic imaging was performed.  INDICATION: Acute and subacute endocarditis, unspecified  (I33.9)  ------------------------------------------------------------------------  Dimensions:    Normal Values:  LA:     5.5    2.0 - 4.0 cm  Ao:     3.7    2.0 - 3.8 cm  SEPTUM: 1.6    0.6 - 1.2 cm  PWT:    1.4    0.6 - 1.1 cm  LVIDd:  5.2    3.0 - 5.6 cm  LVIDs:         1.8 - 4.0 cm  Derived variables:  LVMI: 148 g/m2  RWT: 0.53  EF (Visual Estimate): 20-25 %  Doppler Peak Velocity (m/sec): AoV=3.0  ------------------------------------------------------------------------  Observations:  Mitral Valve: Mitral annular calcification, otherwise  normal mitral valve. Minimal mitral regurgitation.  Aortic Valve/Aorta: Calcified trileaflet aortic valve with  decreased opening. Peak transaortic valve gradient equals  36 mm Hg, mean transaortic valve gradient equals 25 mm Hg,  estimated aortic valve area equals 1.2 sqcm (by continuity  equation), aortic valve velocity time integral equals 69  cm, consistent with moderate aortic stenosis. Minimal  aortic regurgitation.  Peak left ventricular outflow tract  gradient equals 3 mm Hg, mean gradient is equal to 2 mm Hg,  LVOT velocity time integral equals 19 cm.  Aortic Root: 3.7 cm.  LVOT diameter: 2.3 cm.  Left Atrium: Mildly dilated left atrium.  LA volume index=  39 cc/m2.  Left Ventricle: Severe  left ventricular systolic  dysfunction.   Endocardial visualization enhanced with  intravenous injection of Ultrasonic Enhancing Agent  (Definity). Severe concentric left ventricular hypertrophy.  Mild diastolic dysfunction (Stage I).  Right Heart: Severe right atrial enlargement. Normal right  ventricular size and function. Normal tricuspid valve.  Minimal tricuspid regurgitation. Normal pulmonic valve.  Pericardium/Pleura: Normal pericardium with trace  pericardial effusion.  Hemodynamic: Estimated right atrial pressure is 8 mm Hg.  Estimated right ventricular systolic pressure equals 27 mm  Hg, assuming right atrial pressure equals 8 mm Hg,  consistent with normal pulmonary pressures.  ------------------------------------------------------------------------  Conclusions:  1. Calcified trileaflet aortic valve with decreased  opening. Peak transaortic valve gradient equals 36 mm Hg,  mean transaortic valve gradient equals 25 mm Hg, estimated  aortic valve area equals 1.2 sqcm (by continuity equation),  aortic valve velocity time integral equals 69 cm,  consistent with moderate aortic stenosis.  2. Severe concentric left ventricular hypertrophy.  3. Severe  left ventricular systolic dysfunction.  Endocardial visualization enhanced with intravenous  injection of Ultrasonic Enhancing Agent (Definity).  Unable to rule out endocarditis. Consider JAHAIRA if clinically  indicated.

## 2019-12-21 NOTE — PROGRESS NOTE ADULT - ATTENDING COMMENTS
Agree with above NP note.  cv stable  cont current tx  hd per renal   add low dose bb  hold asa for poss renal bx  pt with hx of cad/pci

## 2019-12-21 NOTE — PROGRESS NOTE ADULT - SUBJECTIVE AND OBJECTIVE BOX
Patient is a 79y old  Male who presents with a chief complaint of Back pain (21 Dec 2019 15:23)      INTERVAL HPI/OVERNIGHT EVENTS: oob to chair, sleepy, aaox3  sp HD yesterday    Vital Signs Last 24 Hrs  T(C): 37.1 (21 Dec 2019 17:33), Max: 37.1 (21 Dec 2019 04:50)  T(F): 98.8 (21 Dec 2019 17:33), Max: 98.8 (21 Dec 2019 17:33)  HR: 80 (21 Dec 2019 17:33) (59 - 82)  BP: 131/73 (21 Dec 2019 17:33) (120/67 - 136/74)  BP(mean): --  RR: 18 (21 Dec 2019 17:33) (17 - 18)  SpO2: 94% (21 Dec 2019 17:33) (91% - 95%)    acetaminophen   Tablet .. 650 milliGRAM(s) Oral every 6 hours PRN  acetaminophen  IVPB .. 1000 milliGRAM(s) IV Intermittent once  allopurinol 300 milliGRAM(s) Oral daily  atorvastatin 10 milliGRAM(s) Oral at bedtime  chlorhexidine 4% Liquid 1 Application(s) Topical <User Schedule>  DAPTOmycin IVPB 800 milliGRAM(s) IV Intermittent every 48 hours  heparin  Injectable 5000 Unit(s) SubCutaneous every 8 hours  metoprolol succinate ER 12.5 milliGRAM(s) Oral daily  senna 2 Tablet(s) Oral at bedtime  sodium chloride 0.9% lock flush 10 milliLiter(s) IV Push every 1 hour PRN      PHYSICAL EXAM:  GENERAL: NAD,   EYES: conjunctiva and sclera clear  ENMT: Moist mucous membranes  NECK: Supple, No JVD, Normal thyroid  NERVOUS SYSTEM:  Alert & Oriented X,   CHEST/LUNG: Clear to auscultation bilaterally; No rales, rhonchi, wheezing, or rubs  HEART: Regular rate and rhythm; No murmurs, rubs, or gallops  ABDOMEN: Soft, Nontender, Nondistended; Bowel sounds present  EXTREMITIES:  2+ Peripheral Pulses, No clubbing, cyanosis, or edema  LYMPH: No lymphadenopathy noted  SKIN: No rashes or lesions    Consultant(s) Notes Reviewed:  [x ] YES  [ ] NO  Care Discussed with Consultants/Other Providers [ x] YES  [ ] NO    LABS:                        9.1    9.42  )-----------( 306      ( 21 Dec 2019 16:01 )             28.1     12-21    135  |  102  |  104<H>  ----------------------------<  135<H>  4.6   |  19<L>  |  3.37<H>    Ca    8.2<L>      21 Dec 2019 11:33  Phos  7.6     12-21  Mg     2.4     12-21    TPro  5.1<L>  /  Alb  1.6<L>  /  TBili  0.3  /  DBili  x   /  AST  22  /  ALT  <5<L>  /  AlkPhos  69  12-20    PT/INR - ( 20 Dec 2019 08:47 )   PT: 19.7 sec;   INR: 1.70 ratio         PTT - ( 20 Dec 2019 08:47 )  PTT:33.6 sec    CAPILLARY BLOOD GLUCOSE                  RADIOLOGY & ADDITIONAL TESTS:    Imaging Personally Reviewed:  [x ] YES  [ ] NO

## 2019-12-21 NOTE — CONSULT NOTE ADULT - PROBLEM SELECTOR RECOMMENDATION 9
- FOE pending.   - c/w Dysphagia 3 Soft Diet.   - Aspiration precautions.   - Elevate HOB.   - GI evaluation.   - Call ENT with questions.     ESTEPHANIE LAWSON   # [id number]  ENT. - FOE pending.   - c/w Dysphagia 3 Soft Diet.   - Aspiration precautions.   - Elevate HOB.   - PPI.   - GI evaluation.   - Call ENT with questions.     ESTEPHANIE LAWSON   # [id number]  ENT. - FOE: Crusted mucous in B/L Nasopharynx, post nasal drip, Post cricoid edema, pachydermia consistent with GERD.  Vocal cords mobile with good contact b/l. No pooling of secretions noted,.  - c/w Dysphagia 3 Soft Diet.   - Ocean Spray to b/l Nares TID.   - Afrin BID.   - Humidified O2.   - Aspiration precautions.   - Elevate HOB.   - PPI.   - GI evaluation.   - Call ENT with questions.     ESTEPHANIE LAWSON   # [id number]  ENT.

## 2019-12-21 NOTE — PROGRESS NOTE ADULT - SUBJECTIVE AND OBJECTIVE BOX
[facility]   [phone]  RENAL FOLLOW UP NOTE  --------------------------------------------------------------------------------  Chief Complaint:    24 hour events/subjective:    seen   states feels a bit better     PAST HISTORY  --------------------------------------------------------------------------------  No significant changes to PMH, PSH, FHx, SHx, unless otherwise noted    ALLERGIES & MEDICATIONS  --------------------------------------------------------------------------------  Allergies    Indocin (Unknown)  oxacillin (Unknown)  Zyvox (Unknown)    Intolerances      Standing Inpatient Medications  acetaminophen  IVPB .. 1000 milliGRAM(s) IV Intermittent once  allopurinol 300 milliGRAM(s) Oral daily  atorvastatin 10 milliGRAM(s) Oral at bedtime  chlorhexidine 4% Liquid 1 Application(s) Topical <User Schedule>  DAPTOmycin IVPB 800 milliGRAM(s) IV Intermittent every 48 hours  heparin  Injectable 5000 Unit(s) SubCutaneous every 8 hours  metoprolol succinate ER 12.5 milliGRAM(s) Oral daily  senna 2 Tablet(s) Oral at bedtime    PRN Inpatient Medications  acetaminophen   Tablet .. 650 milliGRAM(s) Oral every 6 hours PRN  sodium chloride 0.9% lock flush 10 milliLiter(s) IV Push every 1 hour PRN      REVIEW OF SYSTEMS  --------------------------------------------------------------------------------    Gen: denies fevers/chills,  CVS: denies chest pain/palpitations  Resp: denies SOB/Cough  GI: Denies N/V/Abd pain  : Denies dysuria    All other systems were reviewed and are negative, except as noted.    VITALS/PHYSICAL EXAM  --------------------------------------------------------------------------------  T(C): 36.9 (12-21-19 @ 13:20), Max: 37.1 (12-21-19 @ 04:50)  HR: 81 (12-21-19 @ 13:20) (59 - 86)  BP: 136/74 (12-21-19 @ 13:20) (121/68 - 136/74)  RR: 18 (12-21-19 @ 13:20) (17 - 18)  SpO2: 94% (12-21-19 @ 13:20) (91% - 95%)  Wt(kg): --        12-20-19 @ 07:01  -  12-21-19 @ 07:00  --------------------------------------------------------  IN: 2920 mL / OUT: 1850 mL / NET: 1070 mL      Physical Exam:  	    Physical Exam:  Gen: elderly M remains alert when seen earlier.   	no jvd  	Pulm: decrease bs  no rales or ronchi or wheezing  	CV: RRR, S1S2; no rub  	Abd: +BS, soft, nontender/nondistended  	: No suprapubic tenderness  	UE: Warm, no cyanosis  no clubbing,  2+ edema   	LE: Warm, no cyanosis  no clubbing, 2- edema 	  	  LABS/STUDIES  --------------------------------------------------------------------------------              9.8    10.80 >-----------<  358      [12-20-19 @ 12:17]              30.7     135  |  102  |  104  ----------------------------<  135      [12-21-19 @ 11:33]  4.6   |  19  |  3.37        Ca     8.2     [12-21-19 @ 11:33]      Mg     2.4     [12-21-19 @ 11:33]      Phos  7.6     [12-21-19 @ 11:33]    TPro  5.1  /  Alb  1.6  /  TBili  0.3  /  DBili  x   /  AST  22  /  ALT  <5  /  AlkPhos  69  [12-20-19 @ 08:47]    PT/INR: PT 19.7 , INR 1.70       [12-20-19 @ 08:47]  PTT: 33.6       [12-20-19 @ 08:47]    CK 36      [12-20-19 @ 08:47]    Creatinine Trend:  SCr 3.37 [12-21 @ 11:33]  SCr 4.30 [12-20 @ 08:47]  SCr 4.16 [12-19 @ 09:21]  SCr 3.97 [12-18 @ 15:05]  SCr 3.04 [12-17 @ 09:43]              Urinalysis - [12-16-19 @ 23:28]      Color Yellow / Appearance Slightly Turbid / SG 1.023 / pH 5.5      Gluc Negative / Ketone Small  / Bili Negative / Urobili <2 mg/dL       Blood Small / Protein 30 mg/dL / Leuk Est Negative / Nitrite Negative      RBC 5 / WBC 7 / Hyaline 0 / Gran  / Sq Epi  / Non Sq Epi 2 / Bacteria Negative    Urine Creatinine 155      [12-16-19 @ 18:48]  Urine Protein 42      [12-16-19 @ 18:48]  Urine Sodium <35      [12-16-19 @ 18:48]  Urine Chloride <35      [12-16-19 @ 18:48]  Urine Osmolality 366      [12-16-19 @ 23:28]      C3 Complement 25      [12-20-19 @ 12:18]  C4 Complement 21      [12-20-19 @ 12:18]

## 2019-12-21 NOTE — PROGRESS NOTE ADULT - ATTENDING COMMENTS
plan of care dw son, wife at bedside  addressed all concerns  ent and GI cs for swallow concerns    [facility]

## 2019-12-21 NOTE — PROGRESS NOTE ADULT - ASSESSMENT
80 y/o male with h/o of CAD s/p PCI 1/2017 with 10% LAD stenosis OM1, HTN, HLD, GERD, Gout, Osteoarthritis and chronic back pain, s/p multiple laminectomies and spinal fusion, lower back pain and weakness in his bilateral lower extremities that progressed to inability to ambulate. dx with MSSA bacteremia. on ancef. also received lasix as well. has had hesitancy urinating. on 12/16/ KRIS       1- KRIS   2- MSSA bacteremia   3- dysphagia   4- cad  5- htn   6- acidosis       suspect kris in setting of  +/-  AIN + /- due to infection  vs intravascular volume depletion despite LE edema   U eos neg   renal sono/bladder sono non revealing for obstruction    dc bicarb drip   renal function deteriorating bun also worsening significantly and fluid status worsening as well.   s/p hd yesterday another hd 2.5 hr 250 cc bfr 500 dfr 0.5 liter fluid removal   pt with low c3 and nl c4 suggestive of post infectious glomerulonephritis however, no significant proteinuria or hematuria.   to repeat urinalysis   d/w family findings and d/w pt  and family for renal bx. he is also no asa and it is held in anticipation of renal bx

## 2019-12-22 LAB
ANION GAP SERPL CALC-SCNC: 13 MMOL/L — SIGNIFICANT CHANGE UP (ref 5–17)
BUN SERPL-MCNC: 85 MG/DL — HIGH (ref 7–23)
CALCIUM SERPL-MCNC: 8.7 MG/DL — SIGNIFICANT CHANGE UP (ref 8.4–10.5)
CHLORIDE SERPL-SCNC: 99 MMOL/L — SIGNIFICANT CHANGE UP (ref 96–108)
CO2 SERPL-SCNC: 23 MMOL/L — SIGNIFICANT CHANGE UP (ref 22–31)
CREAT ?TM UR-MCNC: 284 MG/DL — SIGNIFICANT CHANGE UP
CREAT SERPL-MCNC: 3.14 MG/DL — HIGH (ref 0.5–1.3)
GLUCOSE SERPL-MCNC: 118 MG/DL — HIGH (ref 70–99)
HCT VFR BLD CALC: 28.2 % — LOW (ref 39–50)
HGB BLD-MCNC: 9.1 G/DL — LOW (ref 13–17)
MCHC RBC-ENTMCNC: 29.3 PG — SIGNIFICANT CHANGE UP (ref 27–34)
MCHC RBC-ENTMCNC: 32.3 GM/DL — SIGNIFICANT CHANGE UP (ref 32–36)
MCV RBC AUTO: 90.7 FL — SIGNIFICANT CHANGE UP (ref 80–100)
PHOSPHATE SERPL-MCNC: 6.9 MG/DL — HIGH (ref 2.5–4.5)
PLATELET # BLD AUTO: 271 K/UL — SIGNIFICANT CHANGE UP (ref 150–400)
POTASSIUM SERPL-MCNC: 4.5 MMOL/L — SIGNIFICANT CHANGE UP (ref 3.5–5.3)
POTASSIUM SERPL-SCNC: 4.5 MMOL/L — SIGNIFICANT CHANGE UP (ref 3.5–5.3)
PROT ?TM UR-MCNC: 70 MG/DL — HIGH (ref 0–12)
PROT/CREAT UR-RTO: 0.2 RATIO — SIGNIFICANT CHANGE UP (ref 0–0.2)
RBC # BLD: 3.11 M/UL — LOW (ref 4.2–5.8)
RBC # FLD: 14.7 % — HIGH (ref 10.3–14.5)
SODIUM SERPL-SCNC: 135 MMOL/L — SIGNIFICANT CHANGE UP (ref 135–145)
WBC # BLD: 9.19 K/UL — SIGNIFICANT CHANGE UP (ref 3.8–10.5)
WBC # FLD AUTO: 9.19 K/UL — SIGNIFICANT CHANGE UP (ref 3.8–10.5)

## 2019-12-22 RX ORDER — SODIUM CHLORIDE 0.65 %
1 AEROSOL, SPRAY (ML) NASAL THREE TIMES A DAY
Refills: 0 | Status: DISCONTINUED | OUTPATIENT
Start: 2019-12-22 | End: 2020-01-08

## 2019-12-22 RX ORDER — OXYMETAZOLINE HYDROCHLORIDE 0.5 MG/ML
2 SPRAY NASAL
Refills: 0 | Status: DISCONTINUED | OUTPATIENT
Start: 2019-12-22 | End: 2020-01-08

## 2019-12-22 RX ORDER — PANTOPRAZOLE SODIUM 20 MG/1
40 TABLET, DELAYED RELEASE ORAL
Refills: 0 | Status: DISCONTINUED | OUTPATIENT
Start: 2019-12-22 | End: 2020-01-06

## 2019-12-22 RX ADMIN — Medication 1 SPRAY(S): at 22:29

## 2019-12-22 RX ADMIN — Medication 1 SPRAY(S): at 08:17

## 2019-12-22 RX ADMIN — HEPARIN SODIUM 5000 UNIT(S): 5000 INJECTION INTRAVENOUS; SUBCUTANEOUS at 06:39

## 2019-12-22 RX ADMIN — Medication 12.5 MILLIGRAM(S): at 06:39

## 2019-12-22 RX ADMIN — PANTOPRAZOLE SODIUM 40 MILLIGRAM(S): 20 TABLET, DELAYED RELEASE ORAL at 13:27

## 2019-12-22 RX ADMIN — DAPTOMYCIN 132 MILLIGRAM(S): 500 INJECTION, POWDER, LYOPHILIZED, FOR SOLUTION INTRAVENOUS at 18:39

## 2019-12-22 RX ADMIN — HEPARIN SODIUM 5000 UNIT(S): 5000 INJECTION INTRAVENOUS; SUBCUTANEOUS at 13:27

## 2019-12-22 RX ADMIN — HEPARIN SODIUM 5000 UNIT(S): 5000 INJECTION INTRAVENOUS; SUBCUTANEOUS at 22:28

## 2019-12-22 RX ADMIN — Medication 300 MILLIGRAM(S): at 11:29

## 2019-12-22 RX ADMIN — Medication 1 SPRAY(S): at 13:27

## 2019-12-22 RX ADMIN — CHLORHEXIDINE GLUCONATE 1 APPLICATION(S): 213 SOLUTION TOPICAL at 08:19

## 2019-12-22 RX ADMIN — OXYMETAZOLINE HYDROCHLORIDE 2 SPRAY(S): 0.5 SPRAY NASAL at 18:40

## 2019-12-22 RX ADMIN — SENNA PLUS 2 TABLET(S): 8.6 TABLET ORAL at 22:29

## 2019-12-22 RX ADMIN — ATORVASTATIN CALCIUM 10 MILLIGRAM(S): 80 TABLET, FILM COATED ORAL at 22:28

## 2019-12-22 NOTE — PROGRESS NOTE ADULT - SUBJECTIVE AND OBJECTIVE BOX
[facility]   [phone]  RENAL FOLLOW UP NOTE  --------------------------------------------------------------------------------  Chief Complaint:    24 hour events/subjective:    seen earlier. states feels better today has an appetite as well     PAST HISTORY  --------------------------------------------------------------------------------  No significant changes to PMH, PSH, FHx, SHx, unless otherwise noted    ALLERGIES & MEDICATIONS  --------------------------------------------------------------------------------  Allergies    Indocin (Unknown)  oxacillin (Unknown)  Zyvox (Unknown)    Intolerances      Standing Inpatient Medications  acetaminophen  IVPB .. 1000 milliGRAM(s) IV Intermittent once  allopurinol 300 milliGRAM(s) Oral daily  atorvastatin 10 milliGRAM(s) Oral at bedtime  chlorhexidine 4% Liquid 1 Application(s) Topical <User Schedule>  DAPTOmycin IVPB 800 milliGRAM(s) IV Intermittent every 48 hours  heparin  Injectable 5000 Unit(s) SubCutaneous every 8 hours  metoprolol succinate ER 12.5 milliGRAM(s) Oral daily  oxymetazoline 0.05% Nasal Spray 2 Spray(s) Both Nostrils two times a day  pantoprazole    Tablet 40 milliGRAM(s) Oral before breakfast  senna 2 Tablet(s) Oral at bedtime  sodium chloride 0.65% Nasal 1 Spray(s) Both Nostrils three times a day    PRN Inpatient Medications  acetaminophen   Tablet .. 650 milliGRAM(s) Oral every 6 hours PRN  sodium chloride 0.9% lock flush 10 milliLiter(s) IV Push every 1 hour PRN      REVIEW OF SYSTEMS  --------------------------------------------------------------------------------    Gen: denies  fevers/chills,  CVS: denies chest pain/palpitations  Resp: denies worsening SOB/Cough  GI: Denies N/V/Abd pain  : Denies dysuria still with decrease urination     All other systems were reviewed and are negative, except as noted.    VITALS/PHYSICAL EXAM  --------------------------------------------------------------------------------  T(C): 36.7 (12-22-19 @ 17:53), Max: 37.2 (12-22-19 @ 05:53)  HR: 79 (12-22-19 @ 17:53) (55 - 79)  BP: 116/65 (12-22-19 @ 17:53) (116/65 - 157/65)  RR: 18 (12-22-19 @ 17:53) (18 - 18)  SpO2: 94% (12-22-19 @ 17:53) (92% - 95%)  Wt(kg): --        12-21-19 @ 07:01  -  12-22-19 @ 07:00  --------------------------------------------------------  IN: 0 mL / OUT: 250 mL / NET: -250 mL    12-22-19 @ 07:01  -  12-22-19 @ 19:24  --------------------------------------------------------  IN: 566 mL / OUT: 175 mL / NET: 391 mL      Physical Exam:  	  Gen: elderly M remains alert when seen earlier.   	no jvd  	Pulm: decrease bs  no rales or ronchi or wheezing  	CV: RRR, S1S2; no rub  	Abd: +BS, soft, nontender/nondistended  	: No suprapubic tenderness  	UE: Warm, no cyanosis  no clubbing,  2+ edema   	LE: Warm, no cyanosis  no clubbing, 2- edema 	  	  	    LABS/STUDIES  --------------------------------------------------------------------------------              9.1    9.19  >-----------<  271      [12-22-19 @ 13:21]              28.2     135  |  99  |  85  ----------------------------<  118      [12-22-19 @ 09:40]  4.5   |  23  |  3.14        Ca     8.7     [12-22-19 @ 09:40]      Mg     2.4     [12-21-19 @ 11:33]      Phos  6.9     [12-22-19 @ 09:40]            Creatinine Trend:  SCr 3.14 [12-22 @ 09:40]  SCr 3.37 [12-21 @ 11:33]  SCr 4.30 [12-20 @ 08:47]  SCr 4.16 [12-19 @ 09:21]  SCr 3.97 [12-18 @ 15:05]              Urinalysis - [12-21-19 @ 19:48]      Color Yellow / Appearance Turbid / SG 1.020 / pH 5.5      Gluc Negative / Ketone Negative  / Bili Negative / Urobili <2 mg/dL       Blood Moderate / Protein 100 mg/dL / Leuk Est Large / Nitrite Negative       /  / Hyaline 0 / Gran  / Sq Epi  / Non Sq Epi 6 / Bacteria Negative    Urine Creatinine 284      [12-22-19 @ 01:28]  Urine Protein 70      [12-22-19 @ 01:28]  Urine Sodium <35      [12-16-19 @ 18:48]  Urine Chloride <35      [12-16-19 @ 18:48]  Urine Osmolality 366      [12-16-19 @ 23:28]      C3 Complement 25      [12-20-19 @ 12:18]  C4 Complement 21      [12-20-19 @ 12:18]

## 2019-12-22 NOTE — PROGRESS NOTE ADULT - SUBJECTIVE AND OBJECTIVE BOX
CARDIOLOGY FOLLOW UP - Dr. Lee    CC no cp or sob   family at bedside         PHYSICAL EXAM:  T(C): 37.2 (12-22-19 @ 09:23), Max: 37.2 (12-22-19 @ 05:53)  HR: 55 (12-22-19 @ 09:23) (55 - 81)  BP: 157/65 (12-22-19 @ 09:23) (120/67 - 157/65)  RR: 18 (12-22-19 @ 09:23) (17 - 18)  SpO2: 92% (12-22-19 @ 09:23) (92% - 95%)  Wt(kg): --  I&O's Summary    21 Dec 2019 07:01  -  22 Dec 2019 07:00  --------------------------------------------------------  IN: 0 mL / OUT: 250 mL / NET: -250 mL    22 Dec 2019 07:01  -  22 Dec 2019 10:10  --------------------------------------------------------  IN: 0 mL / OUT: 0 mL / NET: 0 mL        Appearance: Normal	  Cardiovascular: Normal S1 S2,RRR + murmur  Respiratory: Lungs clear to auscultation	  Gastrointestinal:  Soft, Non-tender, + BS	  Extremities: generalized edema         MEDICATIONS  (STANDING):  acetaminophen  IVPB .. 1000 milliGRAM(s) IV Intermittent once  allopurinol 300 milliGRAM(s) Oral daily  atorvastatin 10 milliGRAM(s) Oral at bedtime  chlorhexidine 4% Liquid 1 Application(s) Topical <User Schedule>  DAPTOmycin IVPB 800 milliGRAM(s) IV Intermittent every 48 hours  heparin  Injectable 5000 Unit(s) SubCutaneous every 8 hours  metoprolol succinate ER 12.5 milliGRAM(s) Oral daily  oxymetazoline 0.05% Nasal Spray 2 Spray(s) Both Nostrils two times a day  senna 2 Tablet(s) Oral at bedtime  sodium chloride 0.65% Nasal 1 Spray(s) Both Nostrils three times a day      TELEMETRY: 	    ECG:  	  RADIOLOGY:   DIAGNOSTIC TESTING:  [ ] Echocardiogram:  [ ]  Catheterization:  [ ] Stress Test:    OTHER: 	    LABS:	 	    Creatine Kinase, Serum: 36 U/L [30 - 200] (12-20 @ 08:47)                          9.1    9.42  )-----------( 306      ( 21 Dec 2019 16:01 )             28.1     12-21    135  |  102  |  104<H>  ----------------------------<  135<H>  4.6   |  19<L>  |  3.37<H>    Ca    8.2<L>      21 Dec 2019 11:33  Phos  7.6     12-21  Mg     2.4     12-21

## 2019-12-22 NOTE — PROGRESS NOTE ADULT - ASSESSMENT
79 yr old male with HTN, DCHF, CAD, S/P PCI to OM1, Mid LAD (9/2014, [facility], [facility], [facility]) hx of ischemic CMP, AS, DM HLD, GERD, GOUT, s/p multiple spinal surgery admitted with back pain and fever    1. Back Pain  -stable, MRI noted, neuro/neuro sx followup  -ID f/u, pain control   -plan for PICC prior to dc     2. CAD s/p PCI (OM1/LAD)   -stable, cont cont ASA ,statin    3. Acute on Chronic systolic & Diastolic CHF, New LV dysfunction   -echo with severe LV sys dysfxn, EF 20-25%, mild diastolic dysfxn  -LAZARO 12/13 with moderate lv dysfunction  -recent echo few months ago with normal LV function  -possible new cardiomyopathy in setting of infection, sepsis  -will continue medical therapy for now as he is not a candidate for ischemic eval   -+KRIS, +peripheral edema, no inc dyspnea - from third spacing, hold further diuresis   -start low dose toprol xl 12.5, was off bb as outpt due to fatigue  -possible low dose ace/arb in future post hd and If ok by renal     4. Aortic Stenosis  -stable, severe on LAZARO with valve area of 0.9, tte this admit and as outpt with moderate AS  -gradients low on laazro, perhaps due to new lv dysfxn  -YULY likely underestimated  -patient was asymptomatic as outpt from valve disease  -no further workup indicated, will follow as outpatient     5. HTN   -BP stable, continue with current antihtn meds     6. Fever/ Sepsis   + blood cultures with gram positive Cocci in clusters , Repeat NGTD  -iv abx, ID f/u   -LAZARO without endocarditis   -encephalopathy ?? delirium from infection/kris/dehydration ??  med f/u    7. 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 Back Pain  -stable, MRI noted, neuro/neuro sx followup  -ID f/u, pain control   -plan for PICC prior to dc     2. CAD s/p PCI (OM1/LAD)   -stable, cont cont ASA ,statin    3. Acute on Chronic systolic & Diastolic CHF, New LV dysfunction   -echo with severe LV sys dysfxn, EF 20-25%, mild diastolic dysfxn  -LAZARO 12/13 with moderate lv dysfunction  -recent echo few months ago with normal LV function  -possible new cardiomyopathy in setting of infection, sepsis  -will continue medical therapy for now as he is not a candidate for ischemic eval   -+KRIS, +peripheral edema, no inc dyspnea - from third spacing, hold further diuresis   -c/w low dose toprol xl 12.5, was off bb as outpt due to fatigue  -possible low dose ace/arb in future post hd and If ok by renal     4. Aortic Stenosis  -stable, severe on LAZARO with valve area of 0.9, tte this admit and as outpt with moderate AS  -gradients low on lazaro, perhaps due to new lv dysfxn  -YULY likely underestimated  -patient was asymptomatic as outpt from valve disease  -no further workup indicated, will follow as outpatient     5. HTN   -BP stable, continue with current antihtn meds     6. Fever/ Sepsis   + blood cultures with gram positive Cocci in clusters , Repeat NGTD  -iv abx, ID f/u   -LAZARO without endocarditis   -encephalopathy ?? delirium from infection/kris/dehydration ??  med f/u    7. KRIS? AIN  renal f/u  s/p HD cath placement   HD per renal   await poss renal bx, asa on hold for now   dvt ppx

## 2019-12-22 NOTE — PROGRESS NOTE ADULT - ASSESSMENT
80 y/o male with h/o of CAD s/p PCI 1/2017 with 10% LAD stenosis OM1, HTN, HLD, GERD, Gout, Osteoarthritis and chronic back pain, s/p multiple laminectomies and spinal fusion, lower back pain and weakness in his bilateral lower extremities that progressed to inability to ambulate. dx with MSSA bacteremia. on ancef. also received lasix as well. has had hesitancy urinating. on 12/16/ KRIS       1- KRIS   2- MSSA bacteremia   3- dysphagia   4- cad  5- htn   6- acidosis       suspect kris in setting of  +/-  AIN + /- due to infection  vs intravascular volume depletion despite LE edema   U eos neg   renal sono/bladder sono non revealing for obstruction     pt with low c3 and nl c4 suggestive of post infectious glomerulonephritis however, no significant proteinuria or hematuria.   trend bicarb  check cr and trend uo if no improvement then hd am   d/w family findings and d/w pt  and family for renal bx. he is also no asa and it is held in anticipation of renal bx

## 2019-12-22 NOTE — PROGRESS NOTE ADULT - ATTENDING COMMENTS
Patient seen and examined.  Agree with above NP note.  remains cv stable  volume status improved with hd  cont bb  off asa for poss renal bx  oob chair/pt  abx per id   d/w family at bedside

## 2019-12-23 LAB
ANION GAP SERPL CALC-SCNC: 14 MMOL/L — SIGNIFICANT CHANGE UP (ref 5–17)
BUN SERPL-MCNC: 93 MG/DL — HIGH (ref 7–23)
CALCIUM SERPL-MCNC: 8.8 MG/DL — SIGNIFICANT CHANGE UP (ref 8.4–10.5)
CHLORIDE SERPL-SCNC: 98 MMOL/L — SIGNIFICANT CHANGE UP (ref 96–108)
CO2 SERPL-SCNC: 23 MMOL/L — SIGNIFICANT CHANGE UP (ref 22–31)
CREAT SERPL-MCNC: 3.52 MG/DL — HIGH (ref 0.5–1.3)
GLUCOSE SERPL-MCNC: 106 MG/DL — HIGH (ref 70–99)
HCT VFR BLD CALC: 28.7 % — LOW (ref 39–50)
HGB BLD-MCNC: 9.3 G/DL — LOW (ref 13–17)
MCHC RBC-ENTMCNC: 29.4 PG — SIGNIFICANT CHANGE UP (ref 27–34)
MCHC RBC-ENTMCNC: 32.4 GM/DL — SIGNIFICANT CHANGE UP (ref 32–36)
MCV RBC AUTO: 90.8 FL — SIGNIFICANT CHANGE UP (ref 80–100)
PLATELET # BLD AUTO: 269 K/UL — SIGNIFICANT CHANGE UP (ref 150–400)
POTASSIUM SERPL-MCNC: 4.6 MMOL/L — SIGNIFICANT CHANGE UP (ref 3.5–5.3)
POTASSIUM SERPL-SCNC: 4.6 MMOL/L — SIGNIFICANT CHANGE UP (ref 3.5–5.3)
RBC # BLD: 3.16 M/UL — LOW (ref 4.2–5.8)
RBC # FLD: 14.6 % — HIGH (ref 10.3–14.5)
SODIUM SERPL-SCNC: 135 MMOL/L — SIGNIFICANT CHANGE UP (ref 135–145)
WBC # BLD: 8.49 K/UL — SIGNIFICANT CHANGE UP (ref 3.8–10.5)
WBC # FLD AUTO: 8.49 K/UL — SIGNIFICANT CHANGE UP (ref 3.8–10.5)

## 2019-12-23 PROCEDURE — 99223 1ST HOSP IP/OBS HIGH 75: CPT

## 2019-12-23 PROCEDURE — 99232 SBSQ HOSP IP/OBS MODERATE 35: CPT

## 2019-12-23 RX ORDER — ACETAMINOPHEN 500 MG
650 TABLET ORAL ONCE
Refills: 0 | Status: DISCONTINUED | OUTPATIENT
Start: 2019-12-23 | End: 2020-01-06

## 2019-12-23 RX ADMIN — Medication 1 SPRAY(S): at 22:29

## 2019-12-23 RX ADMIN — HEPARIN SODIUM 5000 UNIT(S): 5000 INJECTION INTRAVENOUS; SUBCUTANEOUS at 22:29

## 2019-12-23 RX ADMIN — Medication 1 SPRAY(S): at 05:24

## 2019-12-23 RX ADMIN — Medication 1 SPRAY(S): at 12:59

## 2019-12-23 RX ADMIN — OXYMETAZOLINE HYDROCHLORIDE 2 SPRAY(S): 0.5 SPRAY NASAL at 05:25

## 2019-12-23 RX ADMIN — SENNA PLUS 2 TABLET(S): 8.6 TABLET ORAL at 22:29

## 2019-12-23 RX ADMIN — ATORVASTATIN CALCIUM 10 MILLIGRAM(S): 80 TABLET, FILM COATED ORAL at 22:29

## 2019-12-23 RX ADMIN — Medication 300 MILLIGRAM(S): at 12:59

## 2019-12-23 RX ADMIN — PANTOPRAZOLE SODIUM 40 MILLIGRAM(S): 20 TABLET, DELAYED RELEASE ORAL at 05:25

## 2019-12-23 RX ADMIN — HEPARIN SODIUM 5000 UNIT(S): 5000 INJECTION INTRAVENOUS; SUBCUTANEOUS at 13:00

## 2019-12-23 RX ADMIN — OXYMETAZOLINE HYDROCHLORIDE 2 SPRAY(S): 0.5 SPRAY NASAL at 18:20

## 2019-12-23 RX ADMIN — Medication 12.5 MILLIGRAM(S): at 05:25

## 2019-12-23 RX ADMIN — HEPARIN SODIUM 5000 UNIT(S): 5000 INJECTION INTRAVENOUS; SUBCUTANEOUS at 05:25

## 2019-12-23 NOTE — PROGRESS NOTE ADULT - SUBJECTIVE AND OBJECTIVE BOX
CARDIOLOGY FOLLOW UP - Dr. Lee    CC no cp or sob        PHYSICAL EXAM:  T(C): 36.8 (12-23-19 @ 09:45), Max: 37 (12-22-19 @ 21:06)  HR: 77 (12-23-19 @ 09:45) (66 - 79)  BP: 139/68 (12-23-19 @ 09:45) (116/65 - 148/64)  RR: 18 (12-23-19 @ 09:45) (18 - 18)  SpO2: 93% (12-23-19 @ 09:45) (93% - 95%)  Wt(kg): --  I&O's Summary    22 Dec 2019 07:01  -  23 Dec 2019 07:00  --------------------------------------------------------  IN: 806 mL / OUT: 505 mL / NET: 301 mL        Appearance: Normal	  Cardiovascular: Normal S1 S2,RRR, + murmur  Respiratory: Lungs clear to auscultation	  Gastrointestinal:  Soft, Non-tender, + BS	  Extremities: le edema        MEDICATIONS  (STANDING):  acetaminophen  IVPB .. 1000 milliGRAM(s) IV Intermittent once  allopurinol 300 milliGRAM(s) Oral daily  atorvastatin 10 milliGRAM(s) Oral at bedtime  chlorhexidine 4% Liquid 1 Application(s) Topical <User Schedule>  DAPTOmycin IVPB 800 milliGRAM(s) IV Intermittent every 48 hours  heparin  Injectable 5000 Unit(s) SubCutaneous every 8 hours  metoprolol succinate ER 12.5 milliGRAM(s) Oral daily  oxymetazoline 0.05% Nasal Spray 2 Spray(s) Both Nostrils two times a day  pantoprazole    Tablet 40 milliGRAM(s) Oral before breakfast  senna 2 Tablet(s) Oral at bedtime  sodium chloride 0.65% Nasal 1 Spray(s) Both Nostrils three times a day      TELEMETRY: 	    ECG:  	  RADIOLOGY:   DIAGNOSTIC TESTING:  [ ] Echocardiogram:  [ ]  Catheterization:  [ ] Stress Test:    OTHER: 	    LABS:	 	    Creatine Kinase, Serum: 36 U/L [30 - 200] (12-20 @ 08:47)                          9.1    9.19  )-----------( 271      ( 22 Dec 2019 13:21 )             28.2     12-23    135  |  98  |  93<H>  ----------------------------<  106<H>  4.6   |  23  |  3.52<H>    Ca    8.8      23 Dec 2019 08:48  Phos  6.9     12-22  Mg     2.4     12-21

## 2019-12-23 NOTE — PROGRESS NOTE ADULT - ASSESSMENT
78 yo M with chronic low back pain sp multiple laminectomy and fusion p/w 1 week of worsening back pain along with weakness in bilateral lower extremities and inability to walk since last night  Leukocytosis, fever  MR L spine and T spine prelim but no emergent findings  Prior history of MSSA bacteremia (2016)  History oxacillin allergy (? fever), Zyvox ([facility]); tolerated vanco on this visit  BCX with MSSA, high grade, BCX 12/12 NGTD  CT A/P/C/H neg  NM tagged WBC uptake in thoracolumbar spine--degen disease?  TTE equivocal, JAHAIRA reassuring  High suspicion for spinal involvement despite negative MRI--monitor closely  Worsening KRIS--Nephro suspecting AIN vs intravasc depletion; on on HD via temporary HD catheter  Overall,  1) MSSA Bacteremia--Dapto 800mg q 48, monitor CK weekly  2) KRIS--? due to AIN from Cefazolin, continue off cefazolin, trend Cr, follow up Nephro/role biopsy  3) Leukocytosis--Trend WBC  4) Fever, monitor for further signs fever    My colleagues will be covering this patient on 12/24-12/25, I will return 12/26/19, please call [phone] with questions or change in status.     Fredy Huynh MD  Pager [phone]  After 5pm and on weekends call [phone]

## 2019-12-23 NOTE — PROGRESS NOTE ADULT - ASSESSMENT
78 y/o male with h/o of CAD s/p PCI 1/2017 with 10% LAD stenosis OM1, HTN, HLD, GERD, Gout, Osteoarthritis and chronic back pain, s/p multiple laminectomies and spinal fusion, lower back pain and weakness in his bilateral lower extremities that progressed to inability to ambulate. dx with MSSA bacteremia. on ancef. also received lasix as well. has had hesitancy urinating. 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   check cr and trend uo if no improvement then hd am   trend bicarb  d/w family findings and d/w pt  and family for renal bx again today . he is also no asa and it is held in anticipation of renal bx

## 2019-12-23 NOTE — PROGRESS NOTE ADULT - ATTENDING COMMENTS
plan of care valdemar pt , son, wife at bedside  addressed all concerns      [facility]

## 2019-12-23 NOTE — PROGRESS NOTE ADULT - SUBJECTIVE AND OBJECTIVE BOX
[facility]   [phone]  RENAL FOLLOW UP NOTE  --------------------------------------------------------------------------------  Chief Complaint:    24 hour events/subjective:    seen earlier. wife at bedside states appetite improving   no worsening sob     PAST HISTORY  --------------------------------------------------------------------------------  No significant changes to PMH, PSH, FHx, SHx, unless otherwise noted    ALLERGIES & MEDICATIONS  --------------------------------------------------------------------------------  Allergies    Indocin (Unknown)  oxacillin (Unknown)  Zyvox (Unknown)    Intolerances      Standing Inpatient Medications  acetaminophen  IVPB .. 1000 milliGRAM(s) IV Intermittent once  acetaminophen  IVPB .. 650 milliGRAM(s) IV Intermittent once  allopurinol 300 milliGRAM(s) Oral daily  atorvastatin 10 milliGRAM(s) Oral at bedtime  chlorhexidine 4% Liquid 1 Application(s) Topical <User Schedule>  DAPTOmycin IVPB 800 milliGRAM(s) IV Intermittent every 48 hours  heparin  Injectable 5000 Unit(s) SubCutaneous every 8 hours  metoprolol succinate ER 12.5 milliGRAM(s) Oral daily  oxymetazoline 0.05% Nasal Spray 2 Spray(s) Both Nostrils two times a day  pantoprazole    Tablet 40 milliGRAM(s) Oral before breakfast  senna 2 Tablet(s) Oral at bedtime  sodium chloride 0.65% Nasal 1 Spray(s) Both Nostrils three times a day    PRN Inpatient Medications  acetaminophen   Tablet .. 650 milliGRAM(s) Oral every 6 hours PRN  sodium chloride 0.9% lock flush 10 milliLiter(s) IV Push every 1 hour PRN      REVIEW OF SYSTEMS  --------------------------------------------------------------------------------    Gen: denies  fevers/chills,  CVS: denies chest pain/palpitations  Resp: denies worsening  SOB/Cough  GI: Denies N/V/Abd pain  : Denies dysuria    All other systems were reviewed and are negative, except as noted.    VITALS/PHYSICAL EXAM  --------------------------------------------------------------------------------  T(C): 36.6 (12-23-19 @ 17:26), Max: 37 (12-22-19 @ 21:06)  HR: 78 (12-23-19 @ 17:26) (66 - 79)  BP: 130/70 (12-23-19 @ 17:26) (130/70 - 150/80)  RR: 18 (12-23-19 @ 17:26) (18 - 18)  SpO2: 94% (12-23-19 @ 17:26) (92% - 94%)  Wt(kg): --        12-22-19 @ 07:01  -  12-23-19 @ 07:00  --------------------------------------------------------  IN: 806 mL / OUT: 505 mL / NET: 301 mL    12-23-19 @ 07:01  -  12-23-19 @ 20:42  --------------------------------------------------------  IN: 735 mL / OUT: 0 mL / NET: 735 mL      Physical Exam:  	  Gen: elderly M remains alert when seen earlier.   	no jvd  	Pulm: decrease bs  no rales or ronchi or wheezing  	CV: RRR, S1S2; no rub  	Abd: +BS, soft, nontender/nondistended  	: No suprapubic tenderness  	UE: Warm, no cyanosis  no clubbing,  2+ edema   	LE: Warm, no cyanosis  no clubbing, 2- edema 	  		      LABS/STUDIES  --------------------------------------------------------------------------------              9.3    8.49  >-----------<  269      [12-23-19 @ 10:46]              28.7     135  |  98  |  93  ----------------------------<  106      [12-23-19 @ 08:48]  4.6   |  23  |  3.52        Ca     8.8     [12-23-19 @ 08:48]      Phos  6.9     [12-22-19 @ 09:40]            Creatinine Trend:  SCr 3.52 [12-23 @ 08:48]  SCr 3.14 [12-22 @ 09:40]  SCr 3.37 [12-21 @ 11:33]  SCr 4.30 [12-20 @ 08:47]  SCr 4.16 [12-19 @ 09:21]              Urinalysis - [12-21-19 @ 19:48]      Color Yellow / Appearance Turbid / SG 1.020 / pH 5.5      Gluc Negative / Ketone Negative  / Bili Negative / Urobili <2 mg/dL       Blood Moderate / Protein 100 mg/dL / Leuk Est Large / Nitrite Negative       /  / Hyaline 0 / Gran  / Sq Epi  / Non Sq Epi 6 / Bacteria Negative    Urine Creatinine 284      [12-22-19 @ 01:28]  Urine Protein 70      [12-22-19 @ 01:28]  Urine Osmolality 366      [12-16-19 @ 23:28]      C3 Complement 25      [12-20-19 @ 12:18]  C4 Complement 21      [12-20-19 @ 12:18]

## 2019-12-23 NOTE — PROGRESS NOTE ADULT - SUBJECTIVE AND OBJECTIVE BOX
Patient is a 79y old  Male who presents with a chief complaint of Back pain (23 Dec 2019 09:57)      INTERVAL HPI/OVERNIGHT EVENTS: feels well, improved appetite  pain controlled      Vital Signs Last 24 Hrs  T(C): 36.8 (23 Dec 2019 09:45), Max: 37 (22 Dec 2019 21:06)  T(F): 98.2 (23 Dec 2019 09:45), Max: 98.6 (22 Dec 2019 21:06)  HR: 77 (23 Dec 2019 09:45) (66 - 79)  BP: 139/68 (23 Dec 2019 09:45) (116/65 - 148/64)  BP(mean): --  RR: 18 (23 Dec 2019 09:45) (18 - 18)  SpO2: 93% (23 Dec 2019 09:45) (93% - 95%)    acetaminophen   Tablet .. 650 milliGRAM(s) Oral every 6 hours PRN  acetaminophen  IVPB .. 1000 milliGRAM(s) IV Intermittent once  allopurinol 300 milliGRAM(s) Oral daily  atorvastatin 10 milliGRAM(s) Oral at bedtime  chlorhexidine 4% Liquid 1 Application(s) Topical <User Schedule>  DAPTOmycin IVPB 800 milliGRAM(s) IV Intermittent every 48 hours  heparin  Injectable 5000 Unit(s) SubCutaneous every 8 hours  metoprolol succinate ER 12.5 milliGRAM(s) Oral daily  oxymetazoline 0.05% Nasal Spray 2 Spray(s) Both Nostrils two times a day  pantoprazole    Tablet 40 milliGRAM(s) Oral before breakfast  senna 2 Tablet(s) Oral at bedtime  sodium chloride 0.65% Nasal 1 Spray(s) Both Nostrils three times a day  sodium chloride 0.9% lock flush 10 milliLiter(s) IV Push every 1 hour PRN      PHYSICAL EXAM:  GENERAL: NAD,   EYES: conjunctiva and sclera clear  ENMT: Moist mucous membranes  NECK: Supple, No JVD, Normal thyroid  NERVOUS SYSTEM:  Alert & Oriented X,3   CHEST/LUNG: Clear to auscultation bilaterally; No rales, rhonchi, wheezing, or rubs  HEART: Regular rate and rhythm; No murmurs, rubs, or gallops  ABDOMEN: Soft, Nontender, Nondistended; Bowel sounds present  EXTREMITIES:   edema/anasarca  LYMPH: No lymphadenopathy noted  SKIN: No rashes or lesions    Consultant(s) Notes Reviewed:  [x ] YES  [ ] NO  Care Discussed with Consultants/Other Providers [ x] YES  [ ] NO    LABS:                        9.3    8.49  )-----------( 269      ( 23 Dec 2019 10:46 )             28.7     12    135  |  98  |  93<H>  ----------------------------<  106<H>  4.6   |  23  |  3.52<H>    Ca    8.8      23 Dec 2019 08:48  Phos  6.9     12-22        Urinalysis Basic - ( 21 Dec 2019 19:48 )    Color: Yellow / Appearance: Turbid / S.020 / pH: x  Gluc: x / Ketone: Negative  / Bili: Negative / Urobili: <2 mg/dL   Blood: x / Protein: 100 mg/dL / Nitrite: Negative   Leuk Esterase: Large / RBC: 118 /HPF /  /HPF   Sq Epi: x / Non Sq Epi: 6 /HPF / Bacteria: Negative      CAPILLARY BLOOD GLUCOSE            Urinalysis Basic - ( 21 Dec 2019 19:48 )    Color: Yellow / Appearance: Turbid / S.020 / pH: x  Gluc: x / Ketone: Negative  / Bili: Negative / Urobili: <2 mg/dL   Blood: x / Protein: 100 mg/dL / Nitrite: Negative   Leuk Esterase: Large / RBC: 118 /HPF /  /HPF   Sq Epi: x / Non Sq Epi: 6 /HPF / Bacteria: Negative          RADIOLOGY & ADDITIONAL TESTS:    Imaging Personally Reviewed:  [x ] YES  [ ] NO

## 2019-12-23 NOTE — PROGRESS NOTE ADULT - PROBLEM SELECTOR PLAN 1
multifactorial ATN/AIN in the setting of sepsis and antibiotics  bladder sono reviewed  dw renal following cs fu  holding losartan  s/p HD,  HD per renal  plan for renal biopsy if family agrees to r/o post strepGN/AIN dx- unclear if pt would be a candidate for steroids  d/w family and pt-they will wait till am labs before making any kind of a decision

## 2019-12-23 NOTE — CONSULT NOTE ADULT - ASSESSMENT
80 y/o male with h/o of CAD s/p PCI 1/2017 with 10% LAD stenosis OM1, HTN, HLD, GERD, Gout, Osteoarthritis and chronic back pain, s/p multiple laminectomies and spinal fusion, left TKR who presents with worsening back pain and fevers for approx a week, recent outpt Rx 6 weeks Augmentin for cellulitis;  bld cx MSSA bacteremia    Had significant leukocytosis with fever, Dx with MSSA bacteremia - no evidence of endocarditis on TTE, negative CT ?spinal involvement despite negative MRI (and pt with hardware) as per ID as well as KRIS - nephrology following and suspected AIN 2/2 infection vs intravascular volume depletion despite LE edema. Pt also with severe AS and worsened LV dysfunction (EF 20-25%) ?new CM in setting of infection/sepsis as per Cardiology    Dysphagia - negative outpt EGD 5/2019 (esophagitis and gastritis on pathology); no endoscopic evidence of esophageal web      RECS  -continue PPI  -ABx as per ID  -dysphagia diet with aspiration precautions; consider repeat SLP evaluation given improved mental status for possible liberalization of diet/liquids restrictions  -nasal care and secretion management per ENT recs  -Abx as per ID  -Renal evaluation in progress  -Cardiology following, d/w Cardiology team  -monitor BMs while on Abx  -Pulm following    No role for repeat endoscopic evaluation at this time  Discussed with Renal and Medicine attendings    Thank you for the courtesy of this consult.    Jone Cotter PA-C    [facility]  [phone]  After hours and weekend coverage [phone]

## 2019-12-23 NOTE — PROGRESS NOTE ADULT - ASSESSMENT
79 yr old male with HTN, DCHF, CAD, S/P PCI to OM1, Mid LAD (9/2014, [facility], [facility], [facility]) hx of ischemic CMP, AS, DM HLD, GERD, GOUT, s/p multiple spinal surgery admitted with back pain and fever    1. Back Pain  -stable, MRI noted, neuro/neuro sx followup  -ID f/u, pain control   -plan for PICC prior to dc     2. CAD s/p PCI (OM1/LAD)   -stable, cont statin. off asa for possible biopsy     3. Acute on Chronic systolic & Diastolic CHF, New LV dysfunction   -echo with severe LV sys dysfxn, EF 20-25%, mild diastolic dysfxn  -LAZARO 12/13 with moderate lv dysfunction  -recent echo few months ago with normal LV function  -possible new cardiomyopathy in setting of infection, sepsis  -will continue medical therapy for now as he is not a candidate for ischemic eval   -+KRIS, +peripheral edema, no inc dyspnea - from third spacing, hold further diuresis   -c/w low dose toprol xl 12.5, was off bb as outpt due to fatigue  -possible low dose ace/arb in future post hd and If ok by renal     4. Aortic Stenosis  -stable, severe on LAZARO with valve area of 0.9, tte this admit and as outpt with moderate AS  -gradients low on lazaro, perhaps due to new lv dysfxn  -YULY likely underestimated  -patient was asymptomatic as outpt from valve disease  -no further workup indicated, will follow as outpatient     5. HTN   -BP stable, continue with current antihtn meds     6. Fever/ Sepsis   + blood cultures with gram positive Cocci in clusters , Repeat NGTD  -iv abx, ID f/u   -LAZARO without endocarditis   -encephalopathy ?? delirium from infection/kris/dehydration ??  med f/u    7. KRIS? AIN  renal f/u  s/p HD cath placement   HD per renal   await poss renal bx, asa on hold for now   dvt ppx

## 2019-12-23 NOTE — CHART NOTE - NSCHARTNOTEFT_GEN_A_CORE
Nutrition Follow Up Note  Patient seen for: nutrition follow-up    Chart reviewed, events noted. This is a 79M with PMH of CAD, HTN, HLD, GERD, Gout, Osteoarthritis and chronic back pain who presents with worsening back pain and fevers for approx a week. Pt seen by SLP on 12/16 recommended for  Dysphagia III diet with honey thickened liquids. Nephrology following, pt on urgent HD in setting of KRIS and worsening renal function. ENT consulted for Dysphagia.    Source: patient, medical record     Diet : DASH + Renal + Dysphagia 3 with Nectar Thick Liquids + Kosher     Patient seen with family at bedside, pt and wife report pt with improving PO intake, consumed 2 bowels of oatmeal this morning. Family bringing in food from home which pt has been consuming more of instead of meals in-house. No acute GI distress noted by patient. Pt and family of dietary restrictions. Pt disliking thickened liquids, has been sucking on ice chips. Provided list of potassium and phosphorus content in food per preference. Discussed importance of adequate protein intake, Patient/family with no nutrition-related questions at this time. Made aware RD remains available as needed.      PO intake : ~ 75% (pt receiving food from home)     Source for PO intake: wife, pt report      Enteral /Parenteral Nutrition: N/A    Dosing weight:   229 lbs (12/7/19)  Post-HD wt: 232.5 lbs (12/21/19)    Pertinent Medications: MEDICATIONS  (STANDING):  acetaminophen  IVPB .. 1000 milliGRAM(s) IV Intermittent once  allopurinol 300 milliGRAM(s) Oral daily  atorvastatin 10 milliGRAM(s) Oral at bedtime  chlorhexidine 4% Liquid 1 Application(s) Topical <User Schedule>  DAPTOmycin IVPB 800 milliGRAM(s) IV Intermittent every 48 hours  heparin  Injectable 5000 Unit(s) SubCutaneous every 8 hours  metoprolol succinate ER 12.5 milliGRAM(s) Oral daily  oxymetazoline 0.05% Nasal Spray 2 Spray(s) Both Nostrils two times a day  pantoprazole    Tablet 40 milliGRAM(s) Oral before breakfast  senna 2 Tablet(s) Oral at bedtime  sodium chloride 0.65% Nasal 1 Spray(s) Both Nostrils three times a day    MEDICATIONS  (PRN):  acetaminophen   Tablet .. 650 milliGRAM(s) Oral every 6 hours PRN Temp greater or equal to 38C (100.4F), Mild Pain (1 - 3)  sodium chloride 0.9% lock flush 10 milliLiter(s) IV Push every 1 hour PRN Pre/post blood products, medications, blood draw, and to maintain line patency    Pertinent Labs: 12-23 @ 08:48: Na 135, BUN 93<H>, Cr 3.52<H>, <H>, K+ 4.6, Phos --, Mg --, Alk Phos --, ALT/SGPT --, AST/SGOT --, HbA1c -      Skin per nursing documentation: pt noted with unstageable pressure injuries to right and left buttocks (since 12/21).   Edema: +3 generalized     Estimated Needs:   [ ] no change since previous assessment  [x ] recalculated: protein needs increased in setting of HD and pressure injuries, based on IBW of 89Kg   (20-25Kcal/Kg): 1778-2222Kcal   (1-1.2 gm/kg): 89-107gm     Previous Nutrition Diagnosis: inadequate Energy Intake.   Nutrition Diagnosis is: improving, pt now to with good PO intake     New Nutrition Diagnosis: Increased Nutrient needs  Related to: increased demand for nutrients in setting of wound healing, HD   As evidenced by: pt with unstagable pressure injuries to left/right buttocks, pt on urgent HD      Interventions:     Recommend  1) Consider liberalizing diet Kosher + Dysphagia 3 with nectar thick liquid + Renal to allow for more menu choices (remove DASH). Defer diet consistency to SLP/team.  2) Recommend addition of multivitamin to promote wound healing.   3) Provided diet education, reinforce as needs   4) Continue Mighty Shakes TID to supplement PO intake.     Monitoring and Evaluation:     Continue to monitor Nutritional intake, Tolerance to diet prescription, weights, labs, skin integrity    RD remains available upon request and will follow up per protocol  Deisi Llanes RD, CDN, Pager # [phone] Nutrition Follow Up Note  Patient seen for: nutrition follow-up    Chart reviewed, events noted. This is a 79M with PMH of CAD, HTN, HLD, GERD, Gout, Osteoarthritis and chronic back pain who presents with worsening back pain and fevers for approx a week. Pt seen by SLP on 12/16 recommended for  Dysphagia III diet with honey thickened liquids. Nephrology following, pt on urgent HD in setting of KRIS and worsening renal function. ENT consulted for Dysphagia.    Source: patient, medical record     Diet : DASH + Renal + Dysphagia 3 with Nectar Thick Liquids + Kosher     Patient seen with family at bedside, pt and wife report pt with improving PO intake, consumed 2 bowels of oatmeal this morning. Family bringing in food from home which pt has been consuming more of instead of meals in-house. No acute GI distress noted by patient. Pt and family of dietary restrictions. Pt disliking thickened liquids, has been sucking on ice chips. Provided list of potassium and phosphorus content in food per preference. Discussed importance of adequate protein intake, Patient/family with no nutrition-related questions at this time. Made aware RD remains available as needed.      PO intake : ~ 75% (pt receiving food from home)     Source for PO intake: wife, pt report      Enteral /Parenteral Nutrition: N/A    Dosing weight:   229 lbs (12/7/19)  Post-HD wt: 232.5 lbs (12/21/19)    Pertinent Medications: MEDICATIONS  (STANDING):  acetaminophen  IVPB .. 1000 milliGRAM(s) IV Intermittent once  allopurinol 300 milliGRAM(s) Oral daily  atorvastatin 10 milliGRAM(s) Oral at bedtime  chlorhexidine 4% Liquid 1 Application(s) Topical <User Schedule>  DAPTOmycin IVPB 800 milliGRAM(s) IV Intermittent every 48 hours  heparin  Injectable 5000 Unit(s) SubCutaneous every 8 hours  metoprolol succinate ER 12.5 milliGRAM(s) Oral daily  oxymetazoline 0.05% Nasal Spray 2 Spray(s) Both Nostrils two times a day  pantoprazole    Tablet 40 milliGRAM(s) Oral before breakfast  senna 2 Tablet(s) Oral at bedtime  sodium chloride 0.65% Nasal 1 Spray(s) Both Nostrils three times a day    MEDICATIONS  (PRN):  acetaminophen   Tablet .. 650 milliGRAM(s) Oral every 6 hours PRN Temp greater or equal to 38C (100.4F), Mild Pain (1 - 3)  sodium chloride 0.9% lock flush 10 milliLiter(s) IV Push every 1 hour PRN Pre/post blood products, medications, blood draw, and to maintain line patency    Pertinent Labs: 12-23 @ 08:48: Na 135, BUN 93<H>, Cr 3.52<H>, <H>, K+ 4.6, Phos --, Mg --, Alk Phos --, ALT/SGPT --, AST/SGOT --, HbA1c -      Skin per nursing documentation: pt noted with unstageable pressure injuries to right and left buttocks (since 12/21).   Edema: +3 generalized     Estimated Needs:   [ ] no change since previous assessment  [x ] recalculated: protein needs increased in setting of HD and pressure injuries, based on IBW of 89Kg   (20-25Kcal/Kg): 1778-2222Kcal   (1-1.2 gm/kg): 89-107gm     Previous Nutrition Diagnosis: inadequate Energy Intake.   Nutrition Diagnosis is: improving, pt now to with good PO intake     New Nutrition Diagnosis: Increased Nutrient needs  Related to: increased demand for nutrients in setting of wound healing, HD   As evidenced by: pt with unstageable pressure injuries to left/right buttocks, pt on urgent HD      Interventions:     Recommend  1) Consider liberalizing diet Kosher + Dysphagia 3 with nectar thick liquid + Renal to allow for more menu choices (remove DASH). Defer diet consistency to SLP/team.  2) Recommend addition of multivitamin to promote wound healing.   3) Provided diet education, reinforce as needs   4) Continue Mighty Shakes TID to supplement PO intake.   * discussed with NP    Monitoring and Evaluation:     Continue to monitor Nutritional intake, Tolerance to diet prescription, weights, labs, skin integrity    RD remains available upon request and will follow up per protocol  Deisi Llanes RD, CDN, Pager # [phone]

## 2019-12-23 NOTE — CONSULT NOTE ADULT - SUBJECTIVE AND OBJECTIVE BOX
Patient is a 79y old  Male who presents with a chief complaint of Back pain (22 Dec 2019 19:23)      HPI:  This is a 80 y/o male with h/o of CAD s/p PCI 2017 with 10% LAD stenosis OM1, HTN, HLD, GERD, Gout, Osteoarthritis and chronic back pain, s/p multiple laminectomies and spinal fusion, left TKR who presents with worsening back pain for approx a week. Pt reports that for the past week he has had progressive worsening of his lower back pain and weakness in his bilateral lower extremities that progressed to inability to ambulate last night. Pain radiates down his left leg. This was associated with fevers at home. No loss of bowel or bladder contents. Of note, pt was treated for cellulitis a month ago with Augmentin- rx for 6 weeks (08 Dec 2019 09:30)  Had significant leukocytosis with fever, Dx with MSSA bacteremia - no evidence of endocarditis on TTE, negative CT ?spinal involvement despite negative MRI (and pt with hardware) as per ID as well as KRIS - nephrology following and suspected AIN 2/2 infection vs intravascular volume depletion despite LE edema. 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 Pt failed MBS on . MBS showed Laryngeal penetration without aspiration. Pt currently on Dysphagia 3 diet. As per pt's family, was on regular diet at home prior to hospitalization.  Speech and Swallow saw pt on  for oropharyngeal dysphagia. As per S&S, pt presents with prolonged but functional mastication of soft solids, delayed oral transit time. Also found to have decreased  AP transport with solids resulting in oral residue which clears with independent repeat swallow, discoordinated timing of pharyngeal swallow trigger and laryngeal closure, intermittent trace laryngeal penetration with honey thick liquids via teaspoon, deep laryngeal penetration with honey and nectar thick liquids via cup and straw, respectively without complete clearance.  Pt has been tolerating dysphagia 3 diet with nectar thick fluids, but also taking PO ice chips     Pt known to our practice, follows with Dr Zavala, had recent w/u of weight loss and intermittent diarrhea (resolved since treatment of cellulitis as outpt) and underwent EGD and Colonoscopy 2019  EGD mild esophagitis and gastritis;  PATHOLOGY - mid esophagus WNL, EG junction focal intestinal metaplasia c/w reflux, negative for Caldwell's, Gastric biopsies negative for H pylori, Duodenal biopsies negative for celiac  Colon - negative, path negative for microscopic colitis    no diarrhea, abdominal pain, nausea, rectal bleeding or vomiting    Fiberoptic scope by ENT yesterday - thick dried secretions, GERD, vocal cords mobile without acute pathology  Awaiting possible renal biopsy (ASA on hold), s/p urgent HD     PAST MEDICAL & SURGICAL HISTORY:  CAD S/P percutaneous coronary angioplasty  HLD (hyperlipidemia)  Back pain  OA (osteoarthritis)  Gout  HTN (hypertension)  GERD (gastroesophageal reflux disease)  S/P lumbar spine operation: 6 thoracic to lumbar surgeries over 6 months period starting 2006  S/P knee replacement: 2013      Allergies  Indocin (Unknown)  oxacillin (Unknown)  Zyvox (Unknown)      MEDICATIONS  (STANDING):  acetaminophen  IVPB .. 1000 milliGRAM(s) IV Intermittent once  allopurinol 300 milliGRAM(s) Oral daily  atorvastatin 10 milliGRAM(s) Oral at bedtime  chlorhexidine 4% Liquid 1 Application(s) Topical <User Schedule>  DAPTOmycin IVPB 800 milliGRAM(s) IV Intermittent every 48 hours  heparin  Injectable 5000 Unit(s) SubCutaneous every 8 hours  metoprolol succinate ER 12.5 milliGRAM(s) Oral daily  oxymetazoline 0.05% Nasal Spray 2 Spray(s) Both Nostrils two times a day  pantoprazole    Tablet 40 milliGRAM(s) Oral before breakfast  senna 2 Tablet(s) Oral at bedtime  sodium chloride 0.65% Nasal 1 Spray(s) Both Nostrils three times a day    MEDICATIONS  (PRN):  acetaminophen   Tablet .. 650 milliGRAM(s) Oral every 6 hours PRN Temp greater or equal to 38C (100.4F), Mild Pain (1 - 3)  sodium chloride 0.9% lock flush 10 milliLiter(s) IV Push every 1 hour PRN Pre/post blood products, medications, blood draw, and to maintain line patency      Social History:    Marital Status:  ( X  )    (   ) Single    (   )    (  )   Lives with: (  ) alone  (  ) children   (X  ) spouse   (  ) parents  (  ) other  no tobacco use    Family History   IBD (  ) Yes   ( X ) No  GI Malignancy (  )  Yes    ( X ) No      Advanced Directives: (  X   ) None    (      ) DNR    (     ) DNI    (     ) Health Care Proxy:     Review of Systems:    General:  No wt loss, chills, night sweats - see HPI,   CV:  No pain, palpitations, +AS +CM  Resp:  No dyspnea, cough, tachypnea  GI:  see HPI  :  No pain, bleeding, +morley good urine output +KRIS  Muscle:  chronic back pain  Neuro:  No focal weakness, tingling, memory problems  Psych:  No fatigue, insomnia, mood problems, depression  Endocrine:  No polyuria, polydypsia, cold/heat intolerance  Heme:  No petechiae, ecchymosis, easy bruisability  Skin:  No rash, tattoos, scars, +LE edema      Vital Signs Last 24 Hrs  T(C): 36.6 (23 Dec 2019 06:20), Max: 37 (22 Dec 2019 21:06)  T(F): 97.9 (23 Dec 2019 06:20), Max: 98.6 (22 Dec 2019 21:06)  HR: 78 (23 Dec 2019 06:20) (66 - 79)  BP: 138/75 (23 Dec 2019 06:20) (116/65 - 148/64)  BP(mean): --  RR: 18 (23 Dec 2019 06:20) (18 - 18)  SpO2: 93% (23 Dec 2019 06:20) (93% - 95%)    PHYSICAL EXAM:    Constitutional: NAD, elderly WM, family at bedside (spouse and son)  alert and responsive  Neck: No LAD no JVD  Respiratory: decreased BS at bases, no rales or rhonchi  Cardiovascular: S1 and S2, regular +murmur  Gastrointestinal: BS+, soft, NT/ND, no HSM appreciated  Extremities: +LE edema, no lesions/ulcers noted  Vascular: 2+ peripheral pulses  Neurological: A/O x 3, no focal deficits  Psychiatric: Normal mood, normal affect  Skin: anicteric        LABS:                        9.1    9.19  )-----------( 271      ( 22 Dec 2019 13:21 )             28.2     WBC Count: 34.05 K/uL (19 @ 18:10)      12-    135  |  98  |  93<H>  ----------------------------<  106<H>  4.6   |  23  |  3.52<H>    Ca    8.8      23 Dec 2019 08:48  Phos  6.9     12-22  Mg     2.4     12-21        Urinalysis Basic - ( 21 Dec 2019 19:48 )    Color: Yellow / Appearance: Turbid / S.020 / pH: x  Gluc: x / Ketone: Negative  / Bili: Negative / Urobili: <2 mg/dL   Blood: x / Protein: 100 mg/dL / Nitrite: Negative   Leuk Esterase: Large / RBC: 118 /HPF /  /HPF   Sq Epi: x / Non Sq Epi: 6 /HPF / Bacteria: Negative          RADIOLOGY & ADDITIONAL TESTS:

## 2019-12-23 NOTE — PROGRESS NOTE ADULT - ATTENDING COMMENTS
Agree with above NP note.  remains cv stable  no dyspnea, decomp hf  cont bb as ordered  await poss renal biopsy  remains off asa  cont to monitor for chest pain  pt  hd per renal

## 2019-12-23 NOTE — PROGRESS NOTE ADULT - SUBJECTIVE AND OBJECTIVE BOX
CC: F/U for Bacteremia    Saw/spoke to patient. No fevers, no chills. No new complaints. Unchanged. Had episode HD over weekend.    Allergies  Indocin (Unknown)  oxacillin (Unknown)  Zyvox (Unknown)    ANTIMICROBIALS:  DAPTOmycin IVPB 800 every 48 hours    PE:    Vital Signs Last 24 Hrs  T(C): 36.4 (23 Dec 2019 13:48), Max: 37 (22 Dec 2019 21:06)  T(F): 97.5 (23 Dec 2019 13:48), Max: 98.6 (22 Dec 2019 21:06)  HR: 79 (23 Dec 2019 13:48) (66 - 79)  BP: 150/80 (23 Dec 2019 13:48) (116/65 - 150/80)  RR: 18 (23 Dec 2019 13:48) (18 - 18)  SpO2: 92% (23 Dec 2019 13:48) (92% - 94%)    Gen: AOx2-3, NAD, non-toxic  CV: S1+S2 normal, nontachycardic  Resp: Clear bilat, no resp distress, no crackles/wheezes  Abd: Soft, nontender, +BS  Ext: No LE edema, no wounds  Lines: R sided HD catheter    LABS:                        9.3    8.49  )-----------( 269      ( 23 Dec 2019 10:46 )             28.7     12    135  |  98  |  93<H>  ----------------------------<  106<H>  4.6   |  23  |  3.52<H>    Ca    8.8      23 Dec 2019 08:48  Phos  6.9     12    Urinalysis Basic - ( 21 Dec 2019 19:48 )    Color: Yellow / Appearance: Turbid / S.020 / pH: x  Gluc: x / Ketone: Negative  / Bili: Negative / Urobili: <2 mg/dL   Blood: x / Protein: 100 mg/dL / Nitrite: Negative   Leuk Esterase: Large / RBC: 118 /HPF /  /HPF   Sq Epi: x / Non Sq Epi: 6 /HPF / Bacteria: Negative    MICROBIOLOGY:    .Blood Blood  19   No growth at 5 days.    .Blood Blood-Peripheral  19   No growth at 5 days.     .Blood Blood  19   Growth in aerobic and anaerobic bottles: Staphylococcus aureus  See previous culture [id number]  --    Growth in aerobic and anaerobic bottles: Gram Positive Cocci in Clusters    .Blood Blood  19   Growth in aerobic and anaerobic bottles: Staphylococcus aureus    .Urine Clean Catch (Midstream)  19   No growth     (otherwise reviewed)    RADIOLOGY:     US:    FINDINGS:    Redemonstrated thickened bladder wall with the inner nodularity and trabeculations. Mildly echogenic material along inferior wall with absent color Doppler flow likely represents debris.    Prevoid bladder volume-122-cc, postvoid bladder volume-21 cc.    Enlarged prostate-45.7 cc (normal volume less than 25 cc).    IMPRESSION:     Redemonstrated thickened bladder wall with intraluminal irregularity and internal trabeculations. Small internal debris. Please correlate with urinalysis.     Enlarged prostate.

## 2019-12-24 LAB
ANA TITR SER: NEGATIVE — SIGNIFICANT CHANGE UP
ANION GAP SERPL CALC-SCNC: 13 MMOL/L — SIGNIFICANT CHANGE UP (ref 5–17)
AUTO DIFF PNL BLD: NEGATIVE — SIGNIFICANT CHANGE UP
BUN SERPL-MCNC: 95 MG/DL — HIGH (ref 7–23)
C-ANCA SER-ACNC: NEGATIVE — SIGNIFICANT CHANGE UP
CALCIUM SERPL-MCNC: 8.6 MG/DL — SIGNIFICANT CHANGE UP (ref 8.4–10.5)
CHLORIDE SERPL-SCNC: 97 MMOL/L — SIGNIFICANT CHANGE UP (ref 96–108)
CO2 SERPL-SCNC: 20 MMOL/L — LOW (ref 22–31)
CREAT SERPL-MCNC: 3.76 MG/DL — HIGH (ref 0.5–1.3)
GLUCOSE SERPL-MCNC: 169 MG/DL — HIGH (ref 70–99)
HCT VFR BLD CALC: 27.3 % — LOW (ref 39–50)
HGB BLD-MCNC: 8.9 G/DL — LOW (ref 13–17)
INR BLD: 1.26 RATIO — HIGH (ref 0.88–1.16)
MCHC RBC-ENTMCNC: 29.4 PG — SIGNIFICANT CHANGE UP (ref 27–34)
MCHC RBC-ENTMCNC: 32.6 GM/DL — SIGNIFICANT CHANGE UP (ref 32–36)
MCV RBC AUTO: 90.1 FL — SIGNIFICANT CHANGE UP (ref 80–100)
NRBC # BLD: 0 /100 WBCS — SIGNIFICANT CHANGE UP (ref 0–0)
P-ANCA SER-ACNC: NEGATIVE — SIGNIFICANT CHANGE UP
PLATELET # BLD AUTO: 274 K/UL — SIGNIFICANT CHANGE UP (ref 150–400)
POTASSIUM SERPL-MCNC: 4.6 MMOL/L — SIGNIFICANT CHANGE UP (ref 3.5–5.3)
POTASSIUM SERPL-SCNC: 4.6 MMOL/L — SIGNIFICANT CHANGE UP (ref 3.5–5.3)
PROTHROM AB SERPL-ACNC: 14.6 SEC — HIGH (ref 10–12.9)
RBC # BLD: 3.03 M/UL — LOW (ref 4.2–5.8)
RBC # FLD: 14.5 % — SIGNIFICANT CHANGE UP (ref 10.3–14.5)
SODIUM SERPL-SCNC: 130 MMOL/L — LOW (ref 135–145)
WBC # BLD: 8.44 K/UL — SIGNIFICANT CHANGE UP (ref 3.8–10.5)
WBC # FLD AUTO: 8.44 K/UL — SIGNIFICANT CHANGE UP (ref 3.8–10.5)

## 2019-12-24 PROCEDURE — 99233 SBSQ HOSP IP/OBS HIGH 50: CPT

## 2019-12-24 RX ORDER — ASPIRIN/CALCIUM CARB/MAGNESIUM 324 MG
81 TABLET ORAL DAILY
Refills: 0 | Status: DISCONTINUED | OUTPATIENT
Start: 2019-12-24 | End: 2019-12-26

## 2019-12-24 RX ORDER — HEPARIN SODIUM 5000 [USP'U]/ML
5000 INJECTION INTRAVENOUS; SUBCUTANEOUS EVERY 8 HOURS
Refills: 0 | Status: DISCONTINUED | OUTPATIENT
Start: 2019-12-24 | End: 2020-01-11

## 2019-12-24 RX ORDER — POLYETHYLENE GLYCOL 3350 17 G/17G
17 POWDER, FOR SOLUTION ORAL DAILY
Refills: 0 | Status: DISCONTINUED | OUTPATIENT
Start: 2019-12-24 | End: 2020-01-12

## 2019-12-24 RX ADMIN — Medication 1 SPRAY(S): at 22:58

## 2019-12-24 RX ADMIN — OXYMETAZOLINE HYDROCHLORIDE 2 SPRAY(S): 0.5 SPRAY NASAL at 19:01

## 2019-12-24 RX ADMIN — PANTOPRAZOLE SODIUM 40 MILLIGRAM(S): 20 TABLET, DELAYED RELEASE ORAL at 06:08

## 2019-12-24 RX ADMIN — CHLORHEXIDINE GLUCONATE 1 APPLICATION(S): 213 SOLUTION TOPICAL at 11:00

## 2019-12-24 RX ADMIN — DAPTOMYCIN 132 MILLIGRAM(S): 500 INJECTION, POWDER, LYOPHILIZED, FOR SOLUTION INTRAVENOUS at 19:00

## 2019-12-24 RX ADMIN — HEPARIN SODIUM 5000 UNIT(S): 5000 INJECTION INTRAVENOUS; SUBCUTANEOUS at 22:58

## 2019-12-24 RX ADMIN — Medication 300 MILLIGRAM(S): at 14:10

## 2019-12-24 RX ADMIN — HEPARIN SODIUM 5000 UNIT(S): 5000 INJECTION INTRAVENOUS; SUBCUTANEOUS at 06:08

## 2019-12-24 RX ADMIN — Medication 1 SPRAY(S): at 06:07

## 2019-12-24 RX ADMIN — OXYMETAZOLINE HYDROCHLORIDE 2 SPRAY(S): 0.5 SPRAY NASAL at 06:08

## 2019-12-24 RX ADMIN — HEPARIN SODIUM 5000 UNIT(S): 5000 INJECTION INTRAVENOUS; SUBCUTANEOUS at 14:04

## 2019-12-24 RX ADMIN — Medication 12.5 MILLIGRAM(S): at 06:08

## 2019-12-24 RX ADMIN — SENNA PLUS 2 TABLET(S): 8.6 TABLET ORAL at 22:58

## 2019-12-24 RX ADMIN — Medication 81 MILLIGRAM(S): at 22:58

## 2019-12-24 RX ADMIN — Medication 1 SPRAY(S): at 14:03

## 2019-12-24 RX ADMIN — ATORVASTATIN CALCIUM 10 MILLIGRAM(S): 80 TABLET, FILM COATED ORAL at 22:58

## 2019-12-24 NOTE — PROGRESS NOTE ADULT - SUBJECTIVE AND OBJECTIVE BOX
CARDIOLOGY FOLLOW UP - Dr. Lee    CC no cp or sob        PHYSICAL EXAM:  T(C): 37.1 (12-24-19 @ 06:13), Max: 37.2 (12-23-19 @ 22:09)  HR: 64 (12-24-19 @ 06:13) (64 - 94)  BP: 137/68 (12-24-19 @ 06:13) (130/70 - 150/80)  RR: 18 (12-24-19 @ 06:13) (18 - 18)  SpO2: 95% (12-24-19 @ 06:13) (92% - 95%)  Wt(kg): --  I&O's Summary    23 Dec 2019 07:01  -  24 Dec 2019 07:00  --------------------------------------------------------  IN: 885 mL / OUT: 775 mL / NET: 110 mL        Appearance: Normal	  Cardiovascular: Normal S1 S2,RRR, + sys  murmurs  Respiratory: Lungs clear to auscultation	  Gastrointestinal:  Soft, Non-tender, + BS	  Extremities:gen edema         MEDICATIONS  (STANDING):  acetaminophen  IVPB .. 1000 milliGRAM(s) IV Intermittent once  acetaminophen  IVPB .. 650 milliGRAM(s) IV Intermittent once  allopurinol 300 milliGRAM(s) Oral daily  atorvastatin 10 milliGRAM(s) Oral at bedtime  chlorhexidine 4% Liquid 1 Application(s) Topical <User Schedule>  DAPTOmycin IVPB 800 milliGRAM(s) IV Intermittent every 48 hours  heparin  Injectable 5000 Unit(s) SubCutaneous every 8 hours  metoprolol succinate ER 12.5 milliGRAM(s) Oral daily  oxymetazoline 0.05% Nasal Spray 2 Spray(s) Both Nostrils two times a day  pantoprazole    Tablet 40 milliGRAM(s) Oral before breakfast  senna 2 Tablet(s) Oral at bedtime  sodium chloride 0.65% Nasal 1 Spray(s) Both Nostrils three times a day      TELEMETRY: 	    ECG:  	  RADIOLOGY:   DIAGNOSTIC TESTING:  [ ] Echocardiogram:  [ ]  Catheterization:  [ ] Stress Test:    OTHER: 	    LABS:	 	    Creatine Kinase, Serum: 36 U/L [30 - 200] (12-20 @ 08:47)                          9.3    8.49  )-----------( 269      ( 23 Dec 2019 10:46 )             28.7     12-23    135  |  98  |  93<H>  ----------------------------<  106<H>  4.6   |  23  |  3.52<H>    Ca    8.8      23 Dec 2019 08:48

## 2019-12-24 NOTE — PROGRESS NOTE ADULT - ATTENDING COMMENTS
Agree with above NP note.  remains cv stable  no dyspnea, decomp hf  cont bb as ordered  await poss renal biopsy  remains off asa  pt  hd per renal

## 2019-12-24 NOTE — PROGRESS NOTE ADULT - SUBJECTIVE AND OBJECTIVE BOX
Follow-up Pulm Progress Note  Aaron Dowd MD  [phone]    Remains afebrile on Daptomycin  No respiratory complaints  Creatinine increased to 3.7 today      Vital Signs Last 24 Hrs  T(C): 36.7 (24 Dec 2019 12:04), Max: 37.2 (23 Dec 2019 22:09)  T(F): 98.1 (24 Dec 2019 12:04), Max: 98.9 (23 Dec 2019 22:09)  HR: 65 (24 Dec 2019 12:04) (64 - 94)  BP: 145/73 (24 Dec 2019 12:04) (130/70 - 150/80)  BP(mean): --  RR: 18 (24 Dec 2019 12:04) (18 - 18)  SpO2: 94% (24 Dec 2019 12:04) (92% - 95%)                              8.9    8.44  )-----------( 274      ( 24 Dec 2019 10:45 )             27.3       12-24    130<L>  |  97  |  95<H>  ----------------------------<  169<H>  4.6   |  20<L>  |  3.76<H>    Ca    8.6      24 Dec 2019 10:45          CULTURES:  Culture Results:   Growth in anaerobic bottle: Staphylococcus aureus  See previous culture [id number] (12-09 @ 22:05)  Culture Results:   Growth in anaerobic bottle: Staphylococcus aureus  See previous culture [id number] (12-09 @ 22:05)  Culture Results:   Growth in aerobic and anaerobic bottles: Staphylococcus aureus  "Due to technical problems, Proteus sp. will Not be reported as part of  the BCID panel until further notice"  ***Blood Panel PCR results on this specimen are available  approximately 3 hours after the Gram stain result.***  Gram stain, PCR, and/or culture results may not always  correspond due to difference in methodologies.  ************************************************************  This PCR assay was performed using [employer].  The following targets are tested for: Enterococcus,  vancomycin resistant enterococci, Listeria monocytogenes,  coagulase negative staphylococci, S. aureus,  methicillin resistant S. aureus, Streptococcus agalactiae  (Group B), S. pneumoniae, S. pyogenes (Group A),  Acinetobacter baumannii, Enterobacter cloacae, E. coli,  Klebsiella oxytoca, K. pneumoniae, Proteus sp.,  Serratia marcescens, Haemophilus influenzae,  Neisseria meningitidis, Pseudomonas aeruginosa, Candida  albicans, C. glabrata, C krusei, C parapsilosis,  C. tropicalis and the KPC resistance gene. (12-08 @ 04:15)  Culture Results:   Growth in aerobic and anaerobic bottles: Staphylococcus aureus  See previous culture [id number] (12-08 @ 04:15)  Culture Results:   No growth (12-08 @ 04:06)    Most recent blood culture -- 12-09 @ 22:05   -- -- .Blood Blood 12-09 @ 22:05      Physical Examination:  PULM: No sign wheeze or rhonchi  CVS: Regular rate and rhythm, no murmurs, rubs, or gallops  ABD: Soft, non-tender  EXT:  No clubbing, cyanosis, or edema    RADIOLOGY REVIEWED  CXR:    CT chest:    TTE:    PROCEDURE: Transthoracic echocardiogram with 2-D, M-Mode  and complete spectral and color flow Doppler. Verbal  consent was obtained for injection of  Ultrasonic Enhancing  Agent following a discussion of risks and benefits.  Following intravenous injection of Ultrasonic Enhancing  Agent , harmonic imaging was performed.  INDICATION: Acute and subacute endocarditis, unspecified  (I33.9)  ------------------------------------------------------------------------  Dimensions:    Normal Values:  LA:     5.5    2.0 - 4.0 cm  Ao:     3.7    2.0 - 3.8 cm  SEPTUM: 1.6    0.6 - 1.2 cm  PWT:    1.4    0.6 - 1.1 cm  LVIDd:  5.2    3.0 - 5.6 cm  LVIDs:         1.8 - 4.0 cm  Derived variables:  LVMI: 148 g/m2  RWT: 0.53  EF (Visual Estimate): 20-25 %  Doppler Peak Velocity (m/sec): AoV=3.0  ------------------------------------------------------------------------  Observations:  Mitral Valve: Mitral annular calcification, otherwise  normal mitral valve. Minimal mitral regurgitation.  Aortic Valve/Aorta: Calcified trileaflet aortic valve with  decreased opening. Peak transaortic valve gradient equals  36 mm Hg, mean transaortic valve gradient equals 25 mm Hg,  estimated aortic valve area equals 1.2 sqcm (by continuity  equation), aortic valve velocity time integral equals 69  cm, consistent with moderate aortic stenosis. Minimal  aortic regurgitation.  Peak left ventricular outflow tract  gradient equals 3 mm Hg, mean gradient is equal to 2 mm Hg,  LVOT velocity time integral equals 19 cm.  Aortic Root: 3.7 cm.  LVOT diameter: 2.3 cm.  Left Atrium: Mildly dilated left atrium.  LA volume index=  39 cc/m2.  Left Ventricle: Severe  left ventricular systolic  dysfunction.   Endocardial visualization enhanced with  intravenous injection of Ultrasonic Enhancing Agent  (Definity). Severe concentric left ventricular hypertrophy.  Mild diastolic dysfunction (Stage I).  Right Heart: Severe right atrial enlargement. Normal right  ventricular size and function. Normal tricuspid valve.  Minimal tricuspid regurgitation. Normal pulmonic valve.  Pericardium/Pleura: Normal pericardium with trace  pericardial effusion.  Hemodynamic: Estimated right atrial pressure is 8 mm Hg.  Estimated right ventricular systolic pressure equals 27 mm  Hg, assuming right atrial pressure equals 8 mm Hg,  consistent with normal pulmonary pressures.  ------------------------------------------------------------------------  Conclusions:  1. Calcified trileaflet aortic valve with decreased  opening. Peak transaortic valve gradient equals 36 mm Hg,  mean transaortic valve gradient equals 25 mm Hg, estimated  aortic valve area equals 1.2 sqcm (by continuity equation),  aortic valve velocity time integral equals 69 cm,  consistent with moderate aortic stenosis.  2. Severe concentric left ventricular hypertrophy.  3. Severe  left ventricular systolic dysfunction.  Endocardial visualization enhanced with intravenous  injection of Ultrasonic Enhancing Agent (Definity).  Unable to rule out endocarditis. Consider JAHAIRA if clinically  indicated.

## 2019-12-24 NOTE — PROGRESS NOTE ADULT - SUBJECTIVE AND OBJECTIVE BOX
Patient is a 79y old  Male who presents with a chief complaint of Back pain (24 Dec 2019 10:30)      INTERVAL HPI/OVERNIGHT EVENTS: noted, straining to move bowels, feels constipated      Vital Signs Last 24 Hrs  T(C): 37.1 (24 Dec 2019 06:13), Max: 37.2 (23 Dec 2019 22:09)  T(F): 98.8 (24 Dec 2019 06:13), Max: 98.9 (23 Dec 2019 22:09)  HR: 64 (24 Dec 2019 06:13) (64 - 94)  BP: 137/68 (24 Dec 2019 06:13) (130/70 - 150/80)  BP(mean): --  RR: 18 (24 Dec 2019 06:13) (18 - 18)  SpO2: 95% (24 Dec 2019 06:13) (92% - 95%)    acetaminophen   Tablet .. 650 milliGRAM(s) Oral every 6 hours PRN  acetaminophen  IVPB .. 1000 milliGRAM(s) IV Intermittent once  acetaminophen  IVPB .. 650 milliGRAM(s) IV Intermittent once  allopurinol 300 milliGRAM(s) Oral daily  atorvastatin 10 milliGRAM(s) Oral at bedtime  chlorhexidine 4% Liquid 1 Application(s) Topical <User Schedule>  DAPTOmycin IVPB 800 milliGRAM(s) IV Intermittent every 48 hours  heparin  Injectable 5000 Unit(s) SubCutaneous every 8 hours  metoprolol succinate ER 12.5 milliGRAM(s) Oral daily  oxymetazoline 0.05% Nasal Spray 2 Spray(s) Both Nostrils two times a day  pantoprazole    Tablet 40 milliGRAM(s) Oral before breakfast  senna 2 Tablet(s) Oral at bedtime  sodium chloride 0.65% Nasal 1 Spray(s) Both Nostrils three times a day  sodium chloride 0.9% lock flush 10 milliLiter(s) IV Push every 1 hour PRN      PHYSICAL EXAM:  GENERAL: NAD,   EYES: conjunctiva and sclera clear  ENMT: Moist mucous membranes  NECK: Supple, No JVD, Normal thyroid  NERVOUS SYSTEM:  Alert & Oriented X,   CHEST/LUNG: Clear to auscultation bilaterally; No rales, rhonchi, wheezing, or rubs  HEART: Regular rate and rhythm; No murmurs, rubs, or gallops  ABDOMEN: Soft, Nontender, Nondistended; Bowel sounds present  EXTREMITIES:  generalised edema/anasarca  LYMPH: No lymphadenopathy noted  SKIN: No rashes or lesions    Consultant(s) Notes Reviewed:  [x ] YES  [ ] NO  Care Discussed with Consultants/Other Providers [ x] YES  [ ] NO    LABS:                        8.9    8.44  )-----------( 274      ( 24 Dec 2019 10:45 )             27.3     12-24    130<L>  |  97  |  95<H>  ----------------------------<  169<H>  4.6   |  20<L>  |  3.76<H>    Ca    8.6      24 Dec 2019 10:45          CAPILLARY BLOOD GLUCOSE                  RADIOLOGY & ADDITIONAL TESTS:    Imaging Personally Reviewed:  [x ] YES  [ ] NO

## 2019-12-24 NOTE — PROGRESS NOTE ADULT - SUBJECTIVE AND OBJECTIVE BOX
Patient is a 79y old  Male who presented with a chief complaint of Back pain (24 Dec 2019 12:18)      INTERVAL HPI/OVERNIGHT EVENTS:  large BMs today (not diarrhea), no rectal bleeding  no abdominal pain, nausea or vomiting  awaiting HD today  family reports they do not want renal biopsy    MEDICATIONS  (STANDING):  acetaminophen  IVPB .. 1000 milliGRAM(s) IV Intermittent once  acetaminophen  IVPB .. 650 milliGRAM(s) IV Intermittent once  atorvastatin 10 milliGRAM(s) Oral at bedtime  chlorhexidine 4% Liquid 1 Application(s) Topical <User Schedule>  DAPTOmycin IVPB 800 milliGRAM(s) IV Intermittent every 48 hours  heparin  Injectable 5000 Unit(s) SubCutaneous every 8 hours  metoprolol succinate ER 12.5 milliGRAM(s) Oral daily  oxymetazoline 0.05% Nasal Spray 2 Spray(s) Both Nostrils two times a day  pantoprazole    Tablet 40 milliGRAM(s) Oral before breakfast  polyethylene glycol 3350 17 Gram(s) Oral daily  senna 2 Tablet(s) Oral at bedtime  sodium chloride 0.65% Nasal 1 Spray(s) Both Nostrils three times a day    MEDICATIONS  (PRN):  acetaminophen   Tablet .. 650 milliGRAM(s) Oral every 6 hours PRN Temp greater or equal to 38C (100.4F), Mild Pain (1 - 3)  sodium chloride 0.9% lock flush 10 milliLiter(s) IV Push every 1 hour PRN Pre/post blood products, medications, blood draw, and to maintain line patency      Allergies  Indocin (Unknown)  oxacillin (Unknown)  Zyvox (Unknown)      Review of Systems:  General:  No wt loss, chills, night sweats - see HPI,   CV:  No pain, palpitations, +AS +CM  Resp:  No dyspnea, cough, tachypnea  GI:  see HPI  :  No pain, bleeding, +morley good urine output +KRIS  Muscle:  chronic back pain  Neuro:  No focal weakness, tingling, memory problems  Psych:  No fatigue, insomnia, mood problems, depression  Endocrine:  No polyuria, polydypsia, cold/heat intolerance  Heme:  No petechiae, ecchymosis, easy bruisability  Skin:  No rash, tattoos, scars, +LE edema    Vital Signs Last 24 Hrs  T(C): 36.7 (24 Dec 2019 12:04), Max: 37.2 (23 Dec 2019 22:09)  T(F): 98.1 (24 Dec 2019 12:04), Max: 98.9 (23 Dec 2019 22:09)  HR: 65 (24 Dec 2019 12:04) (64 - 94)  BP: 145/73 (24 Dec 2019 12:04) (130/70 - 145/73)  BP(mean): --  RR: 18 (24 Dec 2019 12:04) (18 - 18)  SpO2: 94% (24 Dec 2019 12:04) (94% - 95%)    PHYSICAL EXAM:  Constitutional: NAD, elderly WM, family at bedside (spouse and son)  alert and responsive  Neck: No LAD no JVD  Respiratory: decreased BS at bases, no rales or rhonchi  Cardiovascular: S1 and S2, regular +murmur  Gastrointestinal: BS+, soft, NT/ND, no HSM appreciated  Extremities: +LE edema, no lesions/ulcers noted  Vascular: 2+ peripheral pulses  Neurological: A/O x 3, no focal deficits  Psychiatric: Normal mood, normal affect  Skin: anicteric    LABS:                        8.9    8.44  )-----------( 274      ( 24 Dec 2019 10:45 )             27.3     12-24    130<L>  |  97  |  95<H>  ----------------------------<  169<H>  4.6   |  20<L>  |  3.76<H>    Ca    8.6      24 Dec 2019 10:45      PT/INR - ( 24 Dec 2019 13:16 )   PT: 14.6 sec;   INR: 1.26 ratio         RADIOLOGY & ADDITIONAL TESTS:

## 2019-12-24 NOTE — PROGRESS NOTE ADULT - SUBJECTIVE AND OBJECTIVE BOX
[facility]   [phone]  RENAL FOLLOW UP NOTE  --------------------------------------------------------------------------------  Chief Complaint:    24 hour events/subjective:    seen. son at bedside     PAST HISTORY  --------------------------------------------------------------------------------  No significant changes to PMH, PSH, FHx, SHx, unless otherwise noted    ALLERGIES & MEDICATIONS  --------------------------------------------------------------------------------  Allergies    Indocin (Unknown)  oxacillin (Unknown)  Zyvox (Unknown)    Intolerances      Standing Inpatient Medications  acetaminophen  IVPB .. 1000 milliGRAM(s) IV Intermittent once  acetaminophen  IVPB .. 650 milliGRAM(s) IV Intermittent once  aspirin  chewable 81 milliGRAM(s) Oral daily  atorvastatin 10 milliGRAM(s) Oral at bedtime  chlorhexidine 4% Liquid 1 Application(s) Topical <User Schedule>  DAPTOmycin IVPB 800 milliGRAM(s) IV Intermittent every 48 hours  heparin  Injectable 5000 Unit(s) SubCutaneous every 8 hours  metoprolol succinate ER 12.5 milliGRAM(s) Oral daily  oxymetazoline 0.05% Nasal Spray 2 Spray(s) Both Nostrils two times a day  pantoprazole    Tablet 40 milliGRAM(s) Oral before breakfast  polyethylene glycol 3350 17 Gram(s) Oral daily  senna 2 Tablet(s) Oral at bedtime  sodium chloride 0.65% Nasal 1 Spray(s) Both Nostrils three times a day    PRN Inpatient Medications  acetaminophen   Tablet .. 650 milliGRAM(s) Oral every 6 hours PRN  sodium chloride 0.9% lock flush 10 milliLiter(s) IV Push every 1 hour PRN      REVIEW OF SYSTEMS  --------------------------------------------------------------------------------    Gen: denies fevers/chills,  CVS: denies chest pain/palpitations  Resp: denies SOB/Cough  GI: Denies N/V/Abd pain  : Denies dysuria    All other systems were reviewed and are negative, except as noted.    VITALS/PHYSICAL EXAM  --------------------------------------------------------------------------------  T(C): 36.7 (12-24-19 @ 18:30), Max: 37.2 (12-23-19 @ 22:09)  HR: 60 (12-24-19 @ 18:30) (60 - 94)  BP: 142/72 (12-24-19 @ 18:30) (109/72 - 145/73)  RR: 18 (12-24-19 @ 18:30) (18 - 18)  SpO2: 95% (12-24-19 @ 18:30) (91% - 95%)  Wt(kg): --        12-23-19 @ 07:01  -  12-24-19 @ 07:00  --------------------------------------------------------  IN: 885 mL / OUT: 775 mL / NET: 110 mL    12-24-19 @ 07:01  -  12-24-19 @ 18:57  --------------------------------------------------------  IN: 1550 mL / OUT: 1200 mL / NET: 350 mL      Physical Exam:  	  Gen: elderly M remains alert   	no jvd  	Pulm: decrease bs  no rales or ronchi or wheezing  	CV: RRR, S1S2; no rub  	Abd: +BS, soft, nontender/nondistended  	: No suprapubic tenderness  	UE: Warm, no cyanosis  no clubbing,  2+ edema   	LE: Warm, no cyanosis  no clubbing, 2- edema 	  		      LABS/STUDIES  --------------------------------------------------------------------------------              8.9    8.44  >-----------<  274      [12-24-19 @ 10:45]              27.3     130  |  97  |  95  ----------------------------<  169      [12-24-19 @ 10:45]  4.6   |  20  |  3.76        Ca     8.6     [12-24-19 @ 10:45]      PT/INR: PT 14.6 , INR 1.26       [12-24-19 @ 13:16]      Creatinine Trend:  SCr 3.76 [12-24 @ 10:45]  SCr 3.52 [12-23 @ 08:48]  SCr 3.14 [12-22 @ 09:40]  SCr 3.37 [12-21 @ 11:33]  SCr 4.30 [12-20 @ 08:47]              Urinalysis - [12-21-19 @ 19:48]      Color Yellow / Appearance Turbid / SG 1.020 / pH 5.5      Gluc Negative / Ketone Negative  / Bili Negative / Urobili <2 mg/dL       Blood Moderate / Protein 100 mg/dL / Leuk Est Large / Nitrite Negative       /  / Hyaline 0 / Gran  / Sq Epi  / Non Sq Epi 6 / Bacteria Negative    Urine Creatinine 284      [12-22-19 @ 01:28]  Urine Protein 70      [12-22-19 @ 01:28]      RYLEE: titer Negative, pattern --      [12-21-19 @ 17:35]  C3 Complement 25      [12-20-19 @ 12:18]  C4 Complement 21      [12-20-19 @ 12:18]  ANCA: cANCA Negative, pANCA Negative, atypical ANCA Negative      [12-21-19 @ 17:35]

## 2019-12-24 NOTE — PROGRESS NOTE ADULT - ATTENDING COMMENTS
plan of care dw pt , son, dgtr at bedside  labs reviewed  addressed all concerns      Dr Deshpande will be covering  starting 12/25/19  please call [facility] @ [phone] for any questions

## 2019-12-24 NOTE — PROGRESS NOTE ADULT - ASSESSMENT
78 y/o male with h/o of CAD s/p PCI 1/2017 with 10% LAD stenosis OM1, HTN, HLD, GERD, Gout, Osteoarthritis and chronic back pain, s/p multiple laminectomies and spinal fusion, left TKR who presents with worsening back pain and fevers for approx a week, recent outpt Rx 6 weeks Augmentin for cellulitis;  bld cx MSSA bacteremia    Had significant leukocytosis with fever, Dx with MSSA bacteremia - no evidence of endocarditis on TTE, negative CT ?spinal involvement despite negative MRI (and pt with hardware) as per ID as well as KRIS - nephrology following and suspected AIN 2/2 infection vs intravascular volume depletion despite LE edema. Pt also with severe AS and worsened LV dysfunction (EF 20-25%) ?new CM in setting of infection/sepsis as per Cardiology    Dysphagia - negative outpt EGD 5/2019 (esophagitis and gastritis on pathology); no endoscopic evidence of esophageal web      RECS  -continue PPI  -ABx as per ID  -dysphagia diet with aspiration precautions; consider repeat SLP evaluation given improved mental status for possible liberalization of diet/liquids restrictions  -nasal care and secretion management per ENT recs  -Abx as per ID  -Renal evaluation in progress  -Cardiology following  -monitor BMs while on Abx  -Pulm following    No role for repeat endoscopic evaluation at this time    Please call over 12/25/19 prn with GI concerns   GI service : [phone]      Jone Cotter PA-C    [facility]  [phone]  After hours and weekend coverage [phone]

## 2019-12-24 NOTE — PROGRESS NOTE ADULT - PROBLEM SELECTOR PLAN 1
multifactorial ATN/AIN in the setting of sepsis and antibiotics  dw renal following cs fu  holding losartan  s/p HD,  HD per renal  plan for renal biopsy if family agrees to r/o post strepGN/AIN dx- unclear if pt would be a candidate for steroids  d/w family and pt-they will wait till am labs to make decision re renal biopsy-which is tentatively scheduled for thursday

## 2019-12-24 NOTE — PROGRESS NOTE ADULT - PROBLEM SELECTOR PLAN 2
MR Thoracic Spine no acute findings  nuclear scan nonspecific heterogeneous uptake in the thoracolumbar spine which may be secondary to post surgical change and/or degenerative change.  no acute intervention per neurosurgery  tylenol for mild pain, hold tramadol for KRIS  IV tylenol PRN  Constipation bowel regimen- senna, colace, add miralax- monitor bm

## 2019-12-24 NOTE — PROGRESS NOTE ADULT - ASSESSMENT
79 yr old male with HTN, DCHF, CAD, S/P PCI to OM1, Mid LAD (9/2014, [facility], [facility], [facility]) hx of ischemic CMP, AS, DM HLD, GERD, GOUT, s/p multiple spinal surgery admitted with back pain and fever    1. Back Pain  -stable, MRI noted, neuro/neuro sx followup  -ID f/u, pain control     2. CAD s/p PCI (OM1/LAD)   -stable, cont statin. off asa for possible biopsy     3. Acute on Chronic systolic & Diastolic CHF, New LV dysfunction   -echo with severe LV sys dysfxn, EF 20-25%, mild diastolic dysfxn  -LAZARO 12/13 with moderate lv dysfunction  -recent echo few months ago with normal LV function  -possible new cardiomyopathy in setting of infection, sepsis  -will continue medical therapy for now as he is not a candidate for ischemic eval   -+KRIS, +peripheral edema, no inc dyspnea - from third spacing, hold further diuresis   -c/w low dose toprol xl 12.5, was off bb as outpt due to fatigue  -possible low dose ace/arb in future post hd and If ok by renal     4. Aortic Stenosis  -stable, severe on LAZARO with valve area of 0.9, tte this admit and as outpt with moderate AS  -gradients low on lazaro, perhaps due to new lv dysfxn  -YULY likely underestimated  -patient was asymptomatic as outpt from valve disease  -no further workup indicated, will follow as outpatient     5. HTN   -BP stable, continue with current antihtn meds     6. Fever/ Sepsis   + blood cultures with gram positive Cocci in clusters , Repeat NGTD  -iv abx, ID f/u   -LAZARO without endocarditis   -encephalopathy ?? delirium from infection/kris/dehydration ??  med f/u    7. KRIS? AIN  renal f/u  s/p HD cath placement   HD per renal   await poss renal bx, asa on hold for now   dvt ppx

## 2019-12-24 NOTE — PROGRESS NOTE ADULT - ASSESSMENT
MSSA bacteremia  Bibasilar atelectasis with probable pneumonia: possibly bacteremic  CAD with severe AS and Systolic LV dysfunction  OP dysphagia  KRIS    REC    Continue abx per ID  Renal f/u  Aspiration precautions

## 2019-12-24 NOTE — PHARMACOTHERAPY INTERVENTION NOTE - COMMENTS
Patient is currently being treated for methicillin-susceptible Staph aureus with daptomycin complicated by acute interstitial nephritis (AIN). Cefazolin was discontinued as a possible cause for AIN. Suggest holding allopurinol in the setting of worsening renal function (also a possible cause for AIN).    Carlton Montano, PharmD  Pager number: [phone]

## 2019-12-24 NOTE — PROGRESS NOTE ADULT - ASSESSMENT
80 y/o male with h/o of CAD s/p PCI 1/2017 with 10% LAD stenosis OM1, HTN, HLD, GERD, Gout, Osteoarthritis and chronic back pain, s/p multiple laminectomies and spinal fusion, lower back pain and weakness in his bilateral lower extremities that progressed to inability to ambulate. dx with MSSA bacteremia. on ancef. also received lasix as well. has had hesitancy urinating. 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   uo is improving but renal function cr still rising.   hd f160 2.5 hr 250 cc bfr 0.5 liter   trend bicarb  d/w family findings and d/w pt  and family for renal bx again today as well . he is also no asa and it is held in anticipation of renal bx   family informed NP they will hold off on renal bx will confirm again and restart asa if no renal bx

## 2019-12-25 LAB
ANION GAP SERPL CALC-SCNC: 13 MMOL/L — SIGNIFICANT CHANGE UP (ref 5–17)
BUN SERPL-MCNC: 80 MG/DL — HIGH (ref 7–23)
CALCIUM SERPL-MCNC: 8.6 MG/DL — SIGNIFICANT CHANGE UP (ref 8.4–10.5)
CHLORIDE SERPL-SCNC: 99 MMOL/L — SIGNIFICANT CHANGE UP (ref 96–108)
CO2 SERPL-SCNC: 23 MMOL/L — SIGNIFICANT CHANGE UP (ref 22–31)
CREAT SERPL-MCNC: 3.32 MG/DL — HIGH (ref 0.5–1.3)
GLUCOSE SERPL-MCNC: 136 MG/DL — HIGH (ref 70–99)
HCT VFR BLD CALC: 27.6 % — LOW (ref 39–50)
HGB BLD-MCNC: 9 G/DL — LOW (ref 13–17)
MCHC RBC-ENTMCNC: 29.5 PG — SIGNIFICANT CHANGE UP (ref 27–34)
MCHC RBC-ENTMCNC: 32.6 GM/DL — SIGNIFICANT CHANGE UP (ref 32–36)
MCV RBC AUTO: 90.5 FL — SIGNIFICANT CHANGE UP (ref 80–100)
PLATELET # BLD AUTO: 245 K/UL — SIGNIFICANT CHANGE UP (ref 150–400)
POTASSIUM SERPL-MCNC: 4.4 MMOL/L — SIGNIFICANT CHANGE UP (ref 3.5–5.3)
POTASSIUM SERPL-SCNC: 4.4 MMOL/L — SIGNIFICANT CHANGE UP (ref 3.5–5.3)
RBC # BLD: 3.05 M/UL — LOW (ref 4.2–5.8)
RBC # FLD: 14.5 % — SIGNIFICANT CHANGE UP (ref 10.3–14.5)
SODIUM SERPL-SCNC: 135 MMOL/L — SIGNIFICANT CHANGE UP (ref 135–145)
WBC # BLD: 7.2 K/UL — SIGNIFICANT CHANGE UP (ref 3.8–10.5)
WBC # FLD AUTO: 7.2 K/UL — SIGNIFICANT CHANGE UP (ref 3.8–10.5)

## 2019-12-25 RX ADMIN — Medication 1 SPRAY(S): at 21:21

## 2019-12-25 RX ADMIN — SENNA PLUS 2 TABLET(S): 8.6 TABLET ORAL at 21:21

## 2019-12-25 RX ADMIN — ATORVASTATIN CALCIUM 10 MILLIGRAM(S): 80 TABLET, FILM COATED ORAL at 21:21

## 2019-12-25 RX ADMIN — Medication 81 MILLIGRAM(S): at 12:52

## 2019-12-25 RX ADMIN — Medication 12.5 MILLIGRAM(S): at 06:48

## 2019-12-25 RX ADMIN — Medication 1 SPRAY(S): at 06:49

## 2019-12-25 RX ADMIN — OXYMETAZOLINE HYDROCHLORIDE 2 SPRAY(S): 0.5 SPRAY NASAL at 17:34

## 2019-12-25 RX ADMIN — OXYMETAZOLINE HYDROCHLORIDE 2 SPRAY(S): 0.5 SPRAY NASAL at 06:49

## 2019-12-25 RX ADMIN — PANTOPRAZOLE SODIUM 40 MILLIGRAM(S): 20 TABLET, DELAYED RELEASE ORAL at 06:48

## 2019-12-25 RX ADMIN — CHLORHEXIDINE GLUCONATE 1 APPLICATION(S): 213 SOLUTION TOPICAL at 12:53

## 2019-12-25 RX ADMIN — HEPARIN SODIUM 5000 UNIT(S): 5000 INJECTION INTRAVENOUS; SUBCUTANEOUS at 15:24

## 2019-12-25 RX ADMIN — HEPARIN SODIUM 5000 UNIT(S): 5000 INJECTION INTRAVENOUS; SUBCUTANEOUS at 06:49

## 2019-12-25 RX ADMIN — Medication 1 SPRAY(S): at 15:25

## 2019-12-25 RX ADMIN — HEPARIN SODIUM 5000 UNIT(S): 5000 INJECTION INTRAVENOUS; SUBCUTANEOUS at 21:21

## 2019-12-25 NOTE — PROGRESS NOTE ADULT - PROBLEM SELECTOR PLAN 1
multifactorial ATN/AIN in the setting of sepsis and antibiotics  renal following, patient and family declining renal biopsy  risks of worsening KRIS and HD discussed  holding losartan  HD per renal  family's main concern is to remove HD cath so patient can go to rehab

## 2019-12-25 NOTE — PROGRESS NOTE ADULT - SUBJECTIVE AND OBJECTIVE BOX
Patient is a 79y old  Male who presents with a chief complaint of Back pain (25 Dec 2019 11:42)      SUBJECTIVE / OVERNIGHT EVENTS:    Patient seen and examined. denies cp sob. back pain improved. sitting in chair without pain.      Vital Signs Last 24 Hrs  T(C): 36.8 (25 Dec 2019 09:40), Max: 37.1 (25 Dec 2019 06:36)  T(F): 98.2 (25 Dec 2019 09:40), Max: 98.7 (25 Dec 2019 06:36)  HR: 64 (25 Dec 2019 09:40) (60 - 72)  BP: 142/66 (25 Dec 2019 09:40) (109/72 - 142/72)  BP(mean): --  RR: 18 (25 Dec 2019 09:40) (18 - 18)  SpO2: 94% (25 Dec 2019 09:40) (91% - 97%)  I&O's Summary    24 Dec 2019 07:01  -  25 Dec 2019 07:00  --------------------------------------------------------  IN: 1550 mL / OUT: 1200 mL / NET: 350 mL        PE:  GENERAL: NAD, AAOx3  HEAD:  Atraumatic, Normocephalic  CHEST/LUNG: Coarse bs  HEART: Regular rate and rhythm; no murmur  ABDOMEN: Soft, Nontender, Nondistended; Bowel sounds present  EXTREMITIES:  2+ Peripheral Pulses, No clubbing, cyanosis, or edema  gu morley  SKIN: No rashes or lesions, right neck HD cath CDI  NEURO: No focal deficits    LABS:                        9.0    7.20  )-----------( 245      ( 25 Dec 2019 11:08 )             27.6     12-25    135  |  99  |  80<H>  ----------------------------<  136<H>  4.4   |  23  |  3.32<H>    Ca    8.6      25 Dec 2019 09:35      PT/INR - ( 24 Dec 2019 13:16 )   PT: 14.6 sec;   INR: 1.26 ratio           CAPILLARY BLOOD GLUCOSE                RADIOLOGY & ADDITIONAL TESTS:    Imaging Personally Reviewed:  [x] YES  [ ] NO    Consultant(s) Notes Reviewed:  [x] YES  [ ] NO    MEDICATIONS  (STANDING):  acetaminophen  IVPB .. 1000 milliGRAM(s) IV Intermittent once  acetaminophen  IVPB .. 650 milliGRAM(s) IV Intermittent once  aspirin  chewable 81 milliGRAM(s) Oral daily  atorvastatin 10 milliGRAM(s) Oral at bedtime  chlorhexidine 4% Liquid 1 Application(s) Topical <User Schedule>  DAPTOmycin IVPB 800 milliGRAM(s) IV Intermittent every 48 hours  heparin  Injectable 5000 Unit(s) SubCutaneous every 8 hours  metoprolol succinate ER 12.5 milliGRAM(s) Oral daily  oxymetazoline 0.05% Nasal Spray 2 Spray(s) Both Nostrils two times a day  pantoprazole    Tablet 40 milliGRAM(s) Oral before breakfast  polyethylene glycol 3350 17 Gram(s) Oral daily  senna 2 Tablet(s) Oral at bedtime  sodium chloride 0.65% Nasal 1 Spray(s) Both Nostrils three times a day    MEDICATIONS  (PRN):  acetaminophen   Tablet .. 650 milliGRAM(s) Oral every 6 hours PRN Temp greater or equal to 38C (100.4F), Mild Pain (1 - 3)  sodium chloride 0.9% lock flush 10 milliLiter(s) IV Push every 1 hour PRN Pre/post blood products, medications, blood draw, and to maintain line patency      Care Discussed with Consultants/Other Providers [x] YES  [ ] NO    HEALTH ISSUES - PROBLEM Dx:  Dysphagia: Dysphagia  Need for prophylactic measure: Need for prophylactic measure  Gout: Gout  Essential hypertension: Essential hypertension  CAD (coronary artery disease): CAD (coronary artery disease)  KRIS (acute kidney injury): KRIS (acute kidney injury)  Sepsis: Sepsis  Weakness of both lower extremities: Weakness of both lower extremities  Acute left-sided low back pain without sciatica: Acute left-sided low back pain without sciatica

## 2019-12-25 NOTE — PROGRESS NOTE ADULT - ASSESSMENT
80 y/o male with h/o of CAD s/p PCI 1/2017 with 10% LAD stenosis OM1, HTN, HLD, GERD, Gout, Osteoarthritis and chronic back pain, s/p multiple laminectomies and spinal fusion, lower back pain and weakness in his bilateral lower extremities that progressed to inability to ambulate. dx with MSSA bacteremia. on ancef. also received lasix as well. has had hesitancy urinating. 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   trend uo   trend bicarb  d/w family findings and d/w pt  and family for renal bx again he is also no asa and it is held in anticipation of renal bx   family and pt  they do not want pt to have  renal bx. reconfirmed and informed them re: dx and need for dx as well . they understand.   restart asa given no renal bx planned   d/w them possibility permanent hd as well

## 2019-12-25 NOTE — PROGRESS NOTE ADULT - SUBJECTIVE AND OBJECTIVE BOX
[facility]   [phone]  RENAL FOLLOW UP NOTE  --------------------------------------------------------------------------------  Chief Complaint:    24 hour events/subjective:    seen. son at bedside.   state they do not want pt to have renal bx     PAST HISTORY  --------------------------------------------------------------------------------  No significant changes to PMH, PSH, FHx, SHx, unless otherwise noted    ALLERGIES & MEDICATIONS  --------------------------------------------------------------------------------  Allergies    Indocin (Unknown)  oxacillin (Unknown)  Zyvox (Unknown)    Intolerances      Standing Inpatient Medications  acetaminophen  IVPB .. 1000 milliGRAM(s) IV Intermittent once  acetaminophen  IVPB .. 650 milliGRAM(s) IV Intermittent once  aspirin  chewable 81 milliGRAM(s) Oral daily  atorvastatin 10 milliGRAM(s) Oral at bedtime  chlorhexidine 4% Liquid 1 Application(s) Topical <User Schedule>  DAPTOmycin IVPB 800 milliGRAM(s) IV Intermittent every 48 hours  heparin  Injectable 5000 Unit(s) SubCutaneous every 8 hours  metoprolol succinate ER 12.5 milliGRAM(s) Oral daily  oxymetazoline 0.05% Nasal Spray 2 Spray(s) Both Nostrils two times a day  pantoprazole    Tablet 40 milliGRAM(s) Oral before breakfast  polyethylene glycol 3350 17 Gram(s) Oral daily  senna 2 Tablet(s) Oral at bedtime  sodium chloride 0.65% Nasal 1 Spray(s) Both Nostrils three times a day    PRN Inpatient Medications  acetaminophen   Tablet .. 650 milliGRAM(s) Oral every 6 hours PRN  sodium chloride 0.9% lock flush 10 milliLiter(s) IV Push every 1 hour PRN      REVIEW OF SYSTEMS  --------------------------------------------------------------------------------    Gen: denies  fevers/chills,  CVS: denies chest pain/palpitations  Resp: denies SOB/Cough  GI: Denies N/V/Abd pain  : Denies dysuria    All other systems were reviewed and are negative, except as noted.    VITALS/PHYSICAL EXAM  --------------------------------------------------------------------------------  T(C): 36.8 (12-25-19 @ 09:40), Max: 37.1 (12-25-19 @ 06:36)  HR: 64 (12-25-19 @ 09:40) (60 - 72)  BP: 142/66 (12-25-19 @ 09:40) (109/72 - 145/73)  RR: 18 (12-25-19 @ 09:40) (18 - 18)  SpO2: 94% (12-25-19 @ 09:40) (91% - 97%)  Wt(kg): --        12-24-19 @ 07:01  -  12-25-19 @ 07:00  --------------------------------------------------------  IN: 1550 mL / OUT: 1200 mL / NET: 350 mL      Physical Exam:  	  Gen: elderly M remains alert   	no jvd  	Pulm: decrease bs  no rales or ronchi or wheezing  	CV: RRR, S1S2; no rub  	Abd: +BS, soft, nontender/nondistended  	: No suprapubic tenderness  	UE: Warm, no cyanosis  no clubbing,  2+ edema   	LE: Warm, no cyanosis  no clubbing, 2- edema 	  		  	      LABS/STUDIES  --------------------------------------------------------------------------------              9.0    7.20  >-----------<  245      [12-25-19 @ 11:08]              27.6     135  |  99  |  80  ----------------------------<  136      [12-25-19 @ 09:35]  4.4   |  23  |  3.32        Ca     8.6     [12-25-19 @ 09:35]      PT/INR: PT 14.6 , INR 1.26       [12-24-19 @ 13:16]      Creatinine Trend:  SCr 3.32 [12-25 @ 09:35]  SCr 3.76 [12-24 @ 10:45]  SCr 3.52 [12-23 @ 08:48]  SCr 3.14 [12-22 @ 09:40]  SCr 3.37 [12-21 @ 11:33]              Urinalysis - [12-21-19 @ 19:48]      Color Yellow / Appearance Turbid / SG 1.020 / pH 5.5      Gluc Negative / Ketone Negative  / Bili Negative / Urobili <2 mg/dL       Blood Moderate / Protein 100 mg/dL / Leuk Est Large / Nitrite Negative       /  / Hyaline 0 / Gran  / Sq Epi  / Non Sq Epi 6 / Bacteria Negative    Urine Creatinine 284      [12-22-19 @ 01:28]  Urine Protein 70      [12-22-19 @ 01:28]      RYLEE: titer Negative, pattern --      [12-21-19 @ 17:35]  C3 Complement 25      [12-20-19 @ 12:18]  C4 Complement 21      [12-20-19 @ 12:18]  ANCA: cANCA Negative, pANCA Negative, atypical ANCA Negative      [12-21-19 @ 17:35]

## 2019-12-26 LAB
ANION GAP SERPL CALC-SCNC: 17 MMOL/L — SIGNIFICANT CHANGE UP (ref 5–17)
BUN SERPL-MCNC: 87 MG/DL — HIGH (ref 7–23)
CALCIUM SERPL-MCNC: 8.5 MG/DL — SIGNIFICANT CHANGE UP (ref 8.4–10.5)
CHLORIDE SERPL-SCNC: 95 MMOL/L — LOW (ref 96–108)
CO2 SERPL-SCNC: 22 MMOL/L — SIGNIFICANT CHANGE UP (ref 22–31)
CREAT SERPL-MCNC: 3.58 MG/DL — HIGH (ref 0.5–1.3)
GLUCOSE SERPL-MCNC: 131 MG/DL — HIGH (ref 70–99)
HCT VFR BLD CALC: 27.1 % — LOW (ref 39–50)
HGB BLD-MCNC: 8.7 G/DL — LOW (ref 13–17)
MCHC RBC-ENTMCNC: 29.1 PG — SIGNIFICANT CHANGE UP (ref 27–34)
MCHC RBC-ENTMCNC: 32.1 GM/DL — SIGNIFICANT CHANGE UP (ref 32–36)
MCV RBC AUTO: 90.6 FL — SIGNIFICANT CHANGE UP (ref 80–100)
PLATELET # BLD AUTO: 239 K/UL — SIGNIFICANT CHANGE UP (ref 150–400)
POTASSIUM SERPL-MCNC: 4.6 MMOL/L — SIGNIFICANT CHANGE UP (ref 3.5–5.3)
POTASSIUM SERPL-SCNC: 4.6 MMOL/L — SIGNIFICANT CHANGE UP (ref 3.5–5.3)
RBC # BLD: 2.99 M/UL — LOW (ref 4.2–5.8)
RBC # FLD: 14.5 % — SIGNIFICANT CHANGE UP (ref 10.3–14.5)
SODIUM SERPL-SCNC: 134 MMOL/L — LOW (ref 135–145)
WBC # BLD: 6.88 K/UL — SIGNIFICANT CHANGE UP (ref 3.8–10.5)
WBC # FLD AUTO: 6.88 K/UL — SIGNIFICANT CHANGE UP (ref 3.8–10.5)

## 2019-12-26 PROCEDURE — 99232 SBSQ HOSP IP/OBS MODERATE 35: CPT

## 2019-12-26 RX ORDER — LIDOCAINE 4 G/100G
1 CREAM TOPICAL DAILY
Refills: 0 | Status: DISCONTINUED | OUTPATIENT
Start: 2019-12-26 | End: 2020-01-12

## 2019-12-26 RX ORDER — ACETAMINOPHEN 500 MG
1000 TABLET ORAL ONCE
Refills: 0 | Status: COMPLETED | OUTPATIENT
Start: 2019-12-26 | End: 2019-12-31

## 2019-12-26 RX ORDER — ACETAMINOPHEN 500 MG
1000 TABLET ORAL ONCE
Refills: 0 | Status: COMPLETED | OUTPATIENT
Start: 2019-12-26 | End: 2019-12-26

## 2019-12-26 RX ADMIN — Medication 81 MILLIGRAM(S): at 11:43

## 2019-12-26 RX ADMIN — Medication 1 SPRAY(S): at 13:34

## 2019-12-26 RX ADMIN — HEPARIN SODIUM 5000 UNIT(S): 5000 INJECTION INTRAVENOUS; SUBCUTANEOUS at 06:00

## 2019-12-26 RX ADMIN — CHLORHEXIDINE GLUCONATE 1 APPLICATION(S): 213 SOLUTION TOPICAL at 11:43

## 2019-12-26 RX ADMIN — HEPARIN SODIUM 5000 UNIT(S): 5000 INJECTION INTRAVENOUS; SUBCUTANEOUS at 21:25

## 2019-12-26 RX ADMIN — Medication 12.5 MILLIGRAM(S): at 05:59

## 2019-12-26 RX ADMIN — LIDOCAINE 1 PATCH: 4 CREAM TOPICAL at 23:00

## 2019-12-26 RX ADMIN — ATORVASTATIN CALCIUM 10 MILLIGRAM(S): 80 TABLET, FILM COATED ORAL at 21:25

## 2019-12-26 RX ADMIN — PANTOPRAZOLE SODIUM 40 MILLIGRAM(S): 20 TABLET, DELAYED RELEASE ORAL at 06:00

## 2019-12-26 RX ADMIN — OXYMETAZOLINE HYDROCHLORIDE 2 SPRAY(S): 0.5 SPRAY NASAL at 06:01

## 2019-12-26 RX ADMIN — OXYMETAZOLINE HYDROCHLORIDE 2 SPRAY(S): 0.5 SPRAY NASAL at 17:58

## 2019-12-26 RX ADMIN — SENNA PLUS 2 TABLET(S): 8.6 TABLET ORAL at 21:25

## 2019-12-26 RX ADMIN — Medication 1 SPRAY(S): at 21:26

## 2019-12-26 RX ADMIN — Medication 1 SPRAY(S): at 06:00

## 2019-12-26 RX ADMIN — Medication 400 MILLIGRAM(S): at 11:43

## 2019-12-26 RX ADMIN — Medication 1000 MILLIGRAM(S): at 11:58

## 2019-12-26 RX ADMIN — LIDOCAINE 1 PATCH: 4 CREAM TOPICAL at 18:51

## 2019-12-26 RX ADMIN — LIDOCAINE 1 PATCH: 4 CREAM TOPICAL at 11:43

## 2019-12-26 RX ADMIN — HEPARIN SODIUM 5000 UNIT(S): 5000 INJECTION INTRAVENOUS; SUBCUTANEOUS at 13:34

## 2019-12-26 RX ADMIN — DAPTOMYCIN 132 MILLIGRAM(S): 500 INJECTION, POWDER, LYOPHILIZED, FOR SOLUTION INTRAVENOUS at 17:57

## 2019-12-26 NOTE — PROGRESS NOTE ADULT - ASSESSMENT
79 yr old male with HTN, DCHF, CAD, S/P PCI to OM1, Mid LAD (9/2014, [facility], [facility], [facility]) hx of ischemic CMP, AS, DM HLD, GERD, GOUT, s/p multiple spinal surgery admitted with back pain and fever    1. Back Pain  -stable, MRI noted, neuro/neuro sx followup  -ID f/u, pain control     2. CAD s/p PCI (OM1/LAD)   -stable, cont statin and asa     3. Acute on Chronic systolic & Diastolic CHF, New LV dysfunction   -echo with severe LV sys dysfxn, EF 20-25%, mild diastolic dysfxn  -LAZARO 12/13 with moderate lv dysfunction  -recent echo few months ago with normal LV function  -possible new cardiomyopathy in setting of infection, sepsis  -will continue medical therapy for now as he is not a candidate for ischemic eval   -+KRIS, +peripheral edema, no inc dyspnea - from third spacing, hold further diuresis   -c/w low dose toprol xl 12.5, was off bb as outpt due to fatigue  -possible low dose ace/arb in future post hd and If ok by renal     4. Aortic Stenosis  -stable, severe on LAZARO with valve area of 0.9, tte this admit and as outpt with moderate AS  -gradients low on lazaro, perhaps due to new lv dysfxn  -YULY likely underestimated  -patient was asymptomatic as outpt from valve disease  -no further workup indicated, will follow as outpatient     5. HTN   -BP stable, continue with current antihtn meds     6. Fever/ Sepsis   + blood cultures with gram positive Cocci in clusters , Repeat NGTD  -iv abx, ID f/u   -LAZARO without endocarditis   -encephalopathy ?? delirium from infection/kris/dehydration ??  med f/u    7. KRIS? AIN  renal f/u  s/p HD cath placement   HD per renal   no plans for  renal bx    dvt ppx

## 2019-12-26 NOTE — DISCHARGE NOTE PROVIDER - CARE PROVIDER_API CALL
Andrew Allan)  Internal Medicine; Nephrology  [address] [facility] [address], [address]  [place] [address]  Phone: [phone]  Fax: [phone]  Established Patient  Follow Up Time: 1 week Andrew Allan)  Internal Medicine; Nephrology  [address] [facility], [address]  [place] [address]  Phone: [phone]  Fax: [phone]  Established Patient  Follow Up Time: 1 week    Fredy Huynh)  Infectious Disease; Internal Medicine  [address] [place] [address]  Phone: [phone]  Fax: [phone]  Follow Up Time:

## 2019-12-26 NOTE — PROGRESS NOTE ADULT - PROBLEM SELECTOR PLAN 1
multifactorial ATN/AIN in the setting of sepsis and antibiotics  renal following, patient and family declining renal biopsy  holding losartan  HD per renal

## 2019-12-26 NOTE — DISCHARGE NOTE PROVIDER - NSDCCPCAREPLAN_GEN_ALL_CORE_FT
PRINCIPAL DISCHARGE DIAGNOSIS  Diagnosis: Sepsis  Assessment and Plan of Treatment:       SECONDARY DISCHARGE DIAGNOSES  Diagnosis: MSSA bacteremia  Assessment and Plan of Treatment:     Diagnosis: Acute on chronic heart failure  Assessment and Plan of Treatment:     Diagnosis: KRIS (acute kidney injury)  Assessment and Plan of Treatment:

## 2019-12-26 NOTE — PROGRESS NOTE ADULT - ATTENDING COMMENTS
Agree with above NP note.  remains cv stable  no dyspnea, decomp hf  cont bb as ordered  family declines renal bx  cont asa  HD per renal

## 2019-12-26 NOTE — PROGRESS NOTE ADULT - ASSESSMENT
80 y/o male with h/o of CAD s/p PCI 1/2017 with 10% LAD stenosis OM1, HTN, HLD, GERD, Gout, Osteoarthritis and chronic back pain, s/p multiple laminectomies and spinal fusion, lower back pain and weakness in his bilateral lower extremities that progressed to inability to ambulate. dx with MSSA bacteremia. on ancef. also received lasix as well. has had hesitancy urinating. 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 I also spoke to Dr. Kleiner at pt family request

## 2019-12-26 NOTE — DISCHARGE NOTE PROVIDER - CARE PROVIDERS DIRECT ADDRESSES
,DirectAddress_Unknown ,DirectAddress_Unknown,[email or web address]

## 2019-12-26 NOTE — PROGRESS NOTE ADULT - SUBJECTIVE AND OBJECTIVE BOX
[facility]   [phone]  RENAL FOLLOW UP NOTE  --------------------------------------------------------------------------------  Chief Complaint:    24 hour events/subjective:    seen earlier.   magdi daughter when seen     PAST HISTORY  --------------------------------------------------------------------------------  No significant changes to PMH, PSH, FHx, SHx, unless otherwise noted    ALLERGIES & MEDICATIONS  --------------------------------------------------------------------------------  Allergies    Indocin (Unknown)  oxacillin (Unknown)  Zyvox (Unknown)    Intolerances      Standing Inpatient Medications  acetaminophen  IVPB .. 1000 milliGRAM(s) IV Intermittent once  acetaminophen  IVPB .. 650 milliGRAM(s) IV Intermittent once  aspirin  chewable 81 milliGRAM(s) Oral daily  atorvastatin 10 milliGRAM(s) Oral at bedtime  chlorhexidine 4% Liquid 1 Application(s) Topical <User Schedule>  DAPTOmycin IVPB 800 milliGRAM(s) IV Intermittent every 48 hours  heparin  Injectable 5000 Unit(s) SubCutaneous every 8 hours  lidocaine   Patch 1 Patch Transdermal daily  metoprolol succinate ER 12.5 milliGRAM(s) Oral daily  oxymetazoline 0.05% Nasal Spray 2 Spray(s) Both Nostrils two times a day  pantoprazole    Tablet 40 milliGRAM(s) Oral before breakfast  polyethylene glycol 3350 17 Gram(s) Oral daily  senna 2 Tablet(s) Oral at bedtime  sodium chloride 0.65% Nasal 1 Spray(s) Both Nostrils three times a day    PRN Inpatient Medications  acetaminophen   Tablet .. 650 milliGRAM(s) Oral every 6 hours PRN  acetaminophen  IVPB .. 1000 milliGRAM(s) IV Intermittent once PRN  sodium chloride 0.9% lock flush 10 milliLiter(s) IV Push every 1 hour PRN      REVIEW OF SYSTEMS  --------------------------------------------------------------------------------    Gen: denies fevers/chills, + c/o overall weakness   CVS: denies chest pain/palpitations  Resp: denies SOB/Cough  GI: Denies N/V/Abd pain  : Denies dysuria    All other systems were reviewed and are negative, except as noted.    VITALS/PHYSICAL EXAM  --------------------------------------------------------------------------------  T(C): 36.7 (12-26-19 @ 21:36), Max: 37.2 (12-26-19 @ 00:52)  HR: 62 (12-26-19 @ 21:36) (56 - 71)  BP: 142/78 (12-26-19 @ 21:36) (126/75 - 149/64)  RR: 18 (12-26-19 @ 21:36) (18 - 18)  SpO2: 97% (12-26-19 @ 21:36) (94% - 97%)  Wt(kg): --        12-25-19 @ 07:01  -  12-26-19 @ 07:00  --------------------------------------------------------  IN: 430 mL / OUT: 200 mL / NET: 230 mL    12-26-19 @ 07:01  -  12-26-19 @ 21:48  --------------------------------------------------------  IN: 300 mL / OUT: 210 mL / NET: 90 mL      Physical Exam:  	    Gen: elderly M remains alert   	no jvd  	Pulm: decrease bs  no rales or ronchi or wheezing  	CV: RRR, S1S2; no rub  	Abd: +BS, soft, nontender/nondistended  	: No suprapubic tenderness  	UE: Warm, no cyanosis  no clubbing,  2+ edema   	LE: Warm, no cyanosis  no clubbing, 2- edema 	  			    LABS/STUDIES  --------------------------------------------------------------------------------              8.7    6.88  >-----------<  239      [12-26-19 @ 09:02]              27.1     134  |  95  |  87  ----------------------------<  131      [12-26-19 @ 07:07]  4.6   |  22  |  3.58        Ca     8.5     [12-26-19 @ 07:07]            Creatinine Trend:  SCr 3.58 [12-26 @ 07:07]  SCr 3.32 [12-25 @ 09:35]  SCr 3.76 [12-24 @ 10:45]  SCr 3.52 [12-23 @ 08:48]  SCr 3.14 [12-22 @ 09:40]              Urinalysis - [12-21-19 @ 19:48]      Color Yellow / Appearance Turbid / SG 1.020 / pH 5.5      Gluc Negative / Ketone Negative  / Bili Negative / Urobili <2 mg/dL       Blood Moderate / Protein 100 mg/dL / Leuk Est Large / Nitrite Negative       /  / Hyaline 0 / Gran  / Sq Epi  / Non Sq Epi 6 / Bacteria Negative    Urine Creatinine 284      [12-22-19 @ 01:28]  Urine Protein 70      [12-22-19 @ 01:28]      RYLEE: titer Negative, pattern --      [12-21-19 @ 17:35]  C3 Complement 25      [12-20-19 @ 12:18]  C4 Complement 21      [12-20-19 @ 12:18]  ANCA: cANCA Negative, pANCA Negative, atypical ANCA Negative      [12-21-19 @ 17:35]

## 2019-12-26 NOTE — PROGRESS NOTE ADULT - SUBJECTIVE AND OBJECTIVE BOX
CC: F/U Bacteremia    Saw/spoke to patient. No fevers, no chills. No new complaints. Unchanged.    Allergies  Indocin (Unknown)  oxacillin (Unknown)  Zyvox (Unknown)    ANTIMICROBIALS:  DAPTOmycin IVPB 800 every 48 hours    PE:    Vital Signs Last 24 Hrs  T(C): 37.1 (26 Dec 2019 09:27), Max: 37.2 (25 Dec 2019 17:29)  T(F): 98.7 (26 Dec 2019 09:27), Max: 99 (26 Dec 2019 00:52)  HR: 59 (26 Dec 2019 09:27) (59 - 79)  BP: 130/62 (26 Dec 2019 09:27) (126/75 - 153/88)  RR: 18 (26 Dec 2019 09:27) (18 - 18)  SpO2: 96% (26 Dec 2019 09:27) (94% - 96%)    Gen: AOx3, NAD, non-toxic, pleasant  CV: S1+S2 normal, nontachycardic  Resp: Clear bilat, no resp distress, no crackles/wheezes  Abd: Soft, nontender, +BS  Ext: No LE edema, no wounds    LABS:                        8.7    6.88  )-----------( 239      ( 26 Dec 2019 09:02 )             27.1     12-26    134<L>  |  95<L>  |  87<H>  ----------------------------<  131<H>  4.6   |  22  |  3.58<H>    Ca    8.5      26 Dec 2019 07:07    MICROBIOLOGY:    .Blood Blood  12-14-19   No growth at 5 days.    .Blood Blood  12-09-19   Growth in aerobic and anaerobic bottles: Staphylococcus aureus  See previous culture [id number]  --    Growth in aerobic and anaerobic bottles: Gram Positive Cocci in Clusters    .Blood Blood  12-08-19   Growth in aerobic and anaerobic bottles: Staphylococcus aureus    .Urine Clean Catch (Midstream)  12-08-19   No growth    RADIOLOGY:    12/11 NM:    IMPRESSION: Gallium scan demonstrates:    Nonspecific heterogeneous uptake in the thoracolumbar spine which may be   secondary to post surgical change and/or degenerative change.     Mild diffuse activity in bilateral lungs. This may reflect inflammation.   Please correlate clinically.

## 2019-12-26 NOTE — DISCHARGE NOTE PROVIDER - HOSPITAL COURSE
79M pmhx CAD s/p PCI to OM1, Mid LAD, mod AS, HTN, HLD, GERD, Gout, pre-DM, Osteoarthritis and chronic back pain, s/p multiple laminectomies and spinal fusion of the low thoracic spine, left TKR, who presented to the ED with 1 week of fevers and night sweats, admitted for sepsis 2/2 MSSA Bacteremia highly suspicious for spinal involvement and KRIS. During hospital course, pt with leukocytosis and fevers, was seen by ID, noted without any findings on MR L spine and T spine, but BCX with MSSA, high grade, on 12/9/19, was started on course of Daptomycin. Further imaging: CT A/P/C/H neg, TTE equivocal, JAHAIRA reassuring, therefore ID with high suspicion for spinal involvement. Pt seen by Nephrology for worsening KRIS--AIN v post infectious GN,

## 2019-12-26 NOTE — PROGRESS NOTE ADULT - ASSESSMENT
80 yo M with chronic low back pain sp multiple laminectomy and fusion p/w 1 week of worsening back pain along with weakness in bilateral lower extremities and inability to walk since last night  Leukocytosis, fever  MR L spine and T spine prelim but no emergent findings  Prior history of MSSA bacteremia (2016)  History oxacillin allergy (? fever), Zyvox ([facility]); tolerated vanco on this visit  BCX with MSSA, high grade, BCX 12/12 NGTD  CT A/P/C/H neg  NM tagged WBC uptake in thoracolumbar spine--degen disease?  TTE equivocal, JAHAIRA reassuring  High suspicion for spinal involvement despite negative MRI--monitor closely  Worsening KRIS--AIN v post infectious GN, Renal considering biopsy  Overall,  1) MSSA Bacteremia--Dapto 800mg q 48, monitor CK weekly (if on regular HD, will need to adjust to post HD dosing)  2) KRIS--? role for steroids./biopsy, discussed potential risks/benefits regarding infection with family, and discussed case with renal  3) Leukocytosis--Trend WBC  4) Fever, monitor for further signs fever    Fredy Huynh MD  Pager [phone]  After 5pm and on weekends call [phone]

## 2019-12-26 NOTE — DISCHARGE NOTE PROVIDER - PROVIDER TOKENS
PROVIDER:[TOKEN:[[id number]],FOLLOWUP:[1 week],ESTABLISHEDPATIENT:[T]] PROVIDER:[TOKEN:[[id number]],FOLLOWUP:[1 week],ESTABLISHEDPATIENT:[T]],PROVIDER:[TOKEN:[[id number]]]

## 2019-12-26 NOTE — PROGRESS NOTE ADULT - SUBJECTIVE AND OBJECTIVE BOX
CARDIOLOGY FOLLOW UP - Dr. Lee    CC no cp or sob        PHYSICAL EXAM:  T(C): 37.1 (12-26-19 @ 09:27), Max: 37.4 (12-25-19 @ 13:43)  HR: 59 (12-26-19 @ 09:27) (59 - 79)  BP: 130/62 (12-26-19 @ 09:27) (126/75 - 153/88)  RR: 18 (12-26-19 @ 09:27) (18 - 18)  SpO2: 96% (12-26-19 @ 09:27) (94% - 96%)  Wt(kg): --  I&O's Summary    25 Dec 2019 07:01  -  26 Dec 2019 07:00  --------------------------------------------------------  IN: 430 mL / OUT: 200 mL / NET: 230 mL        Appearance: Normal	  Cardiovascular: Normal S1 S2,RRR, +  murmurs  Respiratory: diminished at base   Gastrointestinal:  Soft, Non-tender, + BS	  Extremities: Normal range of motion, generalized edema        MEDICATIONS  (STANDING):  acetaminophen  IVPB .. 1000 milliGRAM(s) IV Intermittent once  acetaminophen  IVPB .. 650 milliGRAM(s) IV Intermittent once  acetaminophen  IVPB .. 1000 milliGRAM(s) IV Intermittent once  aspirin  chewable 81 milliGRAM(s) Oral daily  atorvastatin 10 milliGRAM(s) Oral at bedtime  chlorhexidine 4% Liquid 1 Application(s) Topical <User Schedule>  DAPTOmycin IVPB 800 milliGRAM(s) IV Intermittent every 48 hours  heparin  Injectable 5000 Unit(s) SubCutaneous every 8 hours  lidocaine   Patch 1 Patch Transdermal daily  metoprolol succinate ER 12.5 milliGRAM(s) Oral daily  oxymetazoline 0.05% Nasal Spray 2 Spray(s) Both Nostrils two times a day  pantoprazole    Tablet 40 milliGRAM(s) Oral before breakfast  polyethylene glycol 3350 17 Gram(s) Oral daily  senna 2 Tablet(s) Oral at bedtime  sodium chloride 0.65% Nasal 1 Spray(s) Both Nostrils three times a day      TELEMETRY: 	    ECG:  	  RADIOLOGY:   DIAGNOSTIC TESTING:  [ ] Echocardiogram:  [ ]  Catheterization:  [ ] Stress Test:    OTHER: 	    LABS:	 	    Creatine Kinase, Serum: 36 U/L [30 - 200] (12-20 @ 08:47)                          8.7    6.88  )-----------( 239      ( 26 Dec 2019 09:02 )             27.1     12-26    134<L>  |  95<L>  |  87<H>  ----------------------------<  131<H>  4.6   |  22  |  3.58<H>    Ca    8.5      26 Dec 2019 07:07      PT/INR - ( 24 Dec 2019 13:16 )   PT: 14.6 sec;   INR: 1.26 ratio CARDIOLOGY FOLLOW UP - Dr. Lee    CC no cp or sob        PHYSICAL EXAM:  T(C): 37.1 (12-26-19 @ 09:27), Max: 37.4 (12-25-19 @ 13:43)  HR: 59 (12-26-19 @ 09:27) (59 - 79)  BP: 130/62 (12-26-19 @ 09:27) (126/75 - 153/88)  RR: 18 (12-26-19 @ 09:27) (18 - 18)  SpO2: 96% (12-26-19 @ 09:27) (94% - 96%)  Wt(kg): --  I&O's Summary    25 Dec 2019 07:01  -  26 Dec 2019 07:00  --------------------------------------------------------  IN: 430 mL / OUT: 200 mL / NET: 230 mL        Appearance: Normal	  Cardiovascular: Normal S1 S2,RRR, +  murmurs  Respiratory: diminished at base   Gastrointestinal:  Soft, Non-tender, + BS	  Extremities: Normal range of motion, generalized edema        MEDICATIONS  (STANDING):  acetaminophen  IVPB .. 1000 milliGRAM(s) IV Intermittent once  acetaminophen  IVPB .. 650 milliGRAM(s) IV Intermittent once  acetaminophen  IVPB .. 1000 milliGRAM(s) IV Intermittent once  aspirin  chewable 81 milliGRAM(s) Oral daily  atorvastatin 10 milliGRAM(s) Oral at bedtime  chlorhexidine 4% Liquid 1 Application(s) Topical <User Schedule>  DAPTOmycin IVPB 800 milliGRAM(s) IV Intermittent every 48 hours  heparin  Injectable 5000 Unit(s) SubCutaneous every 8 hours  lidocaine   Patch 1 Patch Transdermal daily  metoprolol succinate ER 12.5 milliGRAM(s) Oral daily  oxymetazoline 0.05% Nasal Spray 2 Spray(s) Both Nostrils two times a day  pantoprazole    Tablet 40 milliGRAM(s) Oral before breakfast  polyethylene glycol 3350 17 Gram(s) Oral daily  senna 2 Tablet(s) Oral at bedtime  sodium chloride 0.65% Nasal 1 Spray(s) Both Nostrils three times a day      TELEMETRY: 	    ECG:  	  RADIOLOGY:   DIAGNOSTIC TESTING:  [ ] Echocardiogram:  [ ]  Catheterization:  [ ] Stress Test:    OTHER: 	    LABS:	 	    Creatine Kinase, Serum: 36 U/L [30 - 200] (12-20 @ 08:47)                          8.7    6.88  )-----------( 239      ( 26 Dec 2019 09:02 )             27.1     12-26    134<L>  |  95<L>  |  87<H>  ----------------------------<  131<H>  4.6   |  22  |  3.58<H>    Ca    8.5      26 Dec 2019 07:07      PT/INR - ( 24 Dec 2019 13:16 )   PT: 14.6 sec;   INR: 1.26 ratio                 MEDICATIONS  (STANDING):  acetaminophen  IVPB .. 1000 milliGRAM(s) IV Intermittent once  acetaminophen  IVPB .. 650 milliGRAM(s) IV Intermittent once  aspirin  chewable 81 milliGRAM(s) Oral daily  atorvastatin 10 milliGRAM(s) Oral at bedtime  chlorhexidine 4% Liquid 1 Application(s) Topical <User Schedule>  DAPTOmycin IVPB 800 milliGRAM(s) IV Intermittent every 48 hours  heparin  Injectable 5000 Unit(s) SubCutaneous every 8 hours  lidocaine   Patch 1 Patch Transdermal daily  metoprolol succinate ER 12.5 milliGRAM(s) Oral daily  oxymetazoline 0.05% Nasal Spray 2 Spray(s) Both Nostrils two times a day  pantoprazole    Tablet 40 milliGRAM(s) Oral before breakfast  polyethylene glycol 3350 17 Gram(s) Oral daily  senna 2 Tablet(s) Oral at bedtime  sodium chloride 0.65% Nasal 1 Spray(s) Both Nostrils three times a day

## 2019-12-26 NOTE — PROGRESS NOTE ADULT - PROBLEM SELECTOR PLAN 2
MR Thoracic Spine no acute findings  nuclear scan nonspecific heterogeneous uptake in the thoracolumbar spine which may be secondary to post surgical change and/or degenerative change.  no acute intervention per neurosurgery  tylenol for mild pain, hold tramadol for KRIS  IV tylenol PRN  Constipation bowel regimen- senna, colace, miralax

## 2019-12-26 NOTE — PROGRESS NOTE ADULT - SUBJECTIVE AND OBJECTIVE BOX
Patient is a 79y old  Male who presents with a chief complaint of Back pain (26 Dec 2019 09:57)      SUBJECTIVE / OVERNIGHT EVENTS:    Patient seen and examined. denies cp sob. sitting in chair doing leg exercises.       Vital Signs Last 24 Hrs  T(C): 37.1 (26 Dec 2019 09:27), Max: 37.2 (25 Dec 2019 17:29)  T(F): 98.7 (26 Dec 2019 09:27), Max: 99 (26 Dec 2019 00:52)  HR: 59 (26 Dec 2019 09:27) (59 - 79)  BP: 130/62 (26 Dec 2019 09:27) (126/75 - 153/88)  BP(mean): --  RR: 18 (26 Dec 2019 09:27) (18 - 18)  SpO2: 96% (26 Dec 2019 09:27) (94% - 96%)  I&O's Summary    25 Dec 2019 07:01  -  26 Dec 2019 07:00  --------------------------------------------------------  IN: 430 mL / OUT: 200 mL / NET: 230 mL    26 Dec 2019 07:01  -  26 Dec 2019 14:04  --------------------------------------------------------  IN: 200 mL / OUT: 0 mL / NET: 200 mL        PE:  GENERAL: NAD, AAOx3  HEAD:  Atraumatic, Normocephalic  CHEST/LUNG: Coarse bs  HEART: Regular rate and rhythm; no murmur  ABDOMEN: Soft, Nontender, Nondistended; Bowel sounds present  EXTREMITIES:  2+ Peripheral Pulses, +2 le edema  gu morley  SKIN: No rashes or lesions, right neck HD cath CDI  NEURO: No focal deficits    LABS:                        8.7    6.88  )-----------( 239      ( 26 Dec 2019 09:02 )             27.1     12-26    134<L>  |  95<L>  |  87<H>  ----------------------------<  131<H>  4.6   |  22  |  3.58<H>    Ca    8.5      26 Dec 2019 07:07        CAPILLARY BLOOD GLUCOSE                RADIOLOGY & ADDITIONAL TESTS:    Imaging Personally Reviewed:  [x] YES  [ ] NO    Consultant(s) Notes Reviewed:  [x] YES  [ ] NO    MEDICATIONS  (STANDING):  acetaminophen  IVPB .. 1000 milliGRAM(s) IV Intermittent once  acetaminophen  IVPB .. 650 milliGRAM(s) IV Intermittent once  aspirin  chewable 81 milliGRAM(s) Oral daily  atorvastatin 10 milliGRAM(s) Oral at bedtime  chlorhexidine 4% Liquid 1 Application(s) Topical <User Schedule>  DAPTOmycin IVPB 800 milliGRAM(s) IV Intermittent every 48 hours  heparin  Injectable 5000 Unit(s) SubCutaneous every 8 hours  lidocaine   Patch 1 Patch Transdermal daily  metoprolol succinate ER 12.5 milliGRAM(s) Oral daily  oxymetazoline 0.05% Nasal Spray 2 Spray(s) Both Nostrils two times a day  pantoprazole    Tablet 40 milliGRAM(s) Oral before breakfast  polyethylene glycol 3350 17 Gram(s) Oral daily  senna 2 Tablet(s) Oral at bedtime  sodium chloride 0.65% Nasal 1 Spray(s) Both Nostrils three times a day    MEDICATIONS  (PRN):  acetaminophen   Tablet .. 650 milliGRAM(s) Oral every 6 hours PRN Temp greater or equal to 38C (100.4F), Mild Pain (1 - 3)  acetaminophen  IVPB .. 1000 milliGRAM(s) IV Intermittent once PRN Moderate Pain (4 - 6)  sodium chloride 0.9% lock flush 10 milliLiter(s) IV Push every 1 hour PRN Pre/post blood products, medications, blood draw, and to maintain line patency      Care Discussed with Consultants/Other Providers [x] YES  [ ] NO    HEALTH ISSUES - PROBLEM Dx:  Dysphagia: Dysphagia  Need for prophylactic measure: Need for prophylactic measure  Gout: Gout  Essential hypertension: Essential hypertension  CAD (coronary artery disease): CAD (coronary artery disease)  KRIS (acute kidney injury): KRIS (acute kidney injury)  Sepsis: Sepsis  Weakness of both lower extremities: Weakness of both lower extremities  Acute left-sided low back pain without sciatica: Acute left-sided low back pain without sciatica

## 2019-12-26 NOTE — DISCHARGE NOTE PROVIDER - NSDCMRMEDTOKEN_GEN_ALL_CORE_FT
allopurinol 300 mg oral tablet: 1 tab(s) orally once a day  amLODIPine 10 mg oral tablet: 1 tab(s) orally once a day  aspirin 81 mg oral delayed release tablet: 1 tab(s) orally once a day  Co Q-10 100 mg oral capsule: 1 cap(s) orally once a day  diclofenac sodium 75 mg oral delayed release tablet: 1 tab(s) orally 2 times a day  lansoprazole 15 mg oral delayed release capsule: 1 cap(s) orally once a day  losartan 100 mg oral tablet: 1 tab(s) orally once a day  lovastatin 20 mg oral tablet: 1 tab(s) orally once a day  miSOPROStol 200 mcg oral tablet: 1 tab(s) orally 2 times a day  Toprol-XL 25 mg oral tablet, extended release: 0.5 tab(s) orally once a day  Vitamin D3 1000 intl units oral tablet: 1 tab(s) orally once a day

## 2019-12-27 LAB
ANION GAP SERPL CALC-SCNC: 15 MMOL/L — SIGNIFICANT CHANGE UP (ref 5–17)
BUN SERPL-MCNC: 98 MG/DL — HIGH (ref 7–23)
CALCIUM SERPL-MCNC: 8.3 MG/DL — LOW (ref 8.4–10.5)
CHLORIDE SERPL-SCNC: 95 MMOL/L — LOW (ref 96–108)
CO2 SERPL-SCNC: 21 MMOL/L — LOW (ref 22–31)
CREAT SERPL-MCNC: 3.39 MG/DL — HIGH (ref 0.5–1.3)
GLUCOSE SERPL-MCNC: 174 MG/DL — HIGH (ref 70–99)
HCT VFR BLD CALC: 25.7 % — LOW (ref 39–50)
HGB BLD-MCNC: 8.2 G/DL — LOW (ref 13–17)
MCHC RBC-ENTMCNC: 29.3 PG — SIGNIFICANT CHANGE UP (ref 27–34)
MCHC RBC-ENTMCNC: 31.9 GM/DL — LOW (ref 32–36)
MCV RBC AUTO: 91.8 FL — SIGNIFICANT CHANGE UP (ref 80–100)
PLATELET # BLD AUTO: 215 K/UL — SIGNIFICANT CHANGE UP (ref 150–400)
POTASSIUM SERPL-MCNC: 4.3 MMOL/L — SIGNIFICANT CHANGE UP (ref 3.5–5.3)
POTASSIUM SERPL-SCNC: 4.3 MMOL/L — SIGNIFICANT CHANGE UP (ref 3.5–5.3)
RBC # BLD: 2.8 M/UL — LOW (ref 4.2–5.8)
RBC # FLD: 14.5 % — SIGNIFICANT CHANGE UP (ref 10.3–14.5)
SODIUM SERPL-SCNC: 131 MMOL/L — LOW (ref 135–145)
WBC # BLD: 6.42 K/UL — SIGNIFICANT CHANGE UP (ref 3.8–10.5)
WBC # FLD AUTO: 6.42 K/UL — SIGNIFICANT CHANGE UP (ref 3.8–10.5)

## 2019-12-27 PROCEDURE — 99232 SBSQ HOSP IP/OBS MODERATE 35: CPT

## 2019-12-27 RX ORDER — DAPTOMYCIN 500 MG/10ML
800 INJECTION, POWDER, LYOPHILIZED, FOR SOLUTION INTRAVENOUS
Refills: 0 | Status: DISCONTINUED | OUTPATIENT
Start: 2019-12-28 | End: 2019-12-30

## 2019-12-27 RX ADMIN — HEPARIN SODIUM 5000 UNIT(S): 5000 INJECTION INTRAVENOUS; SUBCUTANEOUS at 06:05

## 2019-12-27 RX ADMIN — CHLORHEXIDINE GLUCONATE 1 APPLICATION(S): 213 SOLUTION TOPICAL at 13:35

## 2019-12-27 RX ADMIN — LIDOCAINE 1 PATCH: 4 CREAM TOPICAL at 20:19

## 2019-12-27 RX ADMIN — LIDOCAINE 1 PATCH: 4 CREAM TOPICAL at 13:35

## 2019-12-27 RX ADMIN — SENNA PLUS 2 TABLET(S): 8.6 TABLET ORAL at 21:05

## 2019-12-27 RX ADMIN — PANTOPRAZOLE SODIUM 40 MILLIGRAM(S): 20 TABLET, DELAYED RELEASE ORAL at 06:05

## 2019-12-27 RX ADMIN — Medication 1 SPRAY(S): at 06:05

## 2019-12-27 RX ADMIN — HEPARIN SODIUM 5000 UNIT(S): 5000 INJECTION INTRAVENOUS; SUBCUTANEOUS at 13:35

## 2019-12-27 RX ADMIN — OXYMETAZOLINE HYDROCHLORIDE 2 SPRAY(S): 0.5 SPRAY NASAL at 17:32

## 2019-12-27 RX ADMIN — OXYMETAZOLINE HYDROCHLORIDE 2 SPRAY(S): 0.5 SPRAY NASAL at 06:05

## 2019-12-27 RX ADMIN — Medication 1 SPRAY(S): at 21:06

## 2019-12-27 RX ADMIN — ATORVASTATIN CALCIUM 10 MILLIGRAM(S): 80 TABLET, FILM COATED ORAL at 21:05

## 2019-12-27 RX ADMIN — HEPARIN SODIUM 5000 UNIT(S): 5000 INJECTION INTRAVENOUS; SUBCUTANEOUS at 21:05

## 2019-12-27 RX ADMIN — Medication 12.5 MILLIGRAM(S): at 06:05

## 2019-12-27 NOTE — PROGRESS NOTE ADULT - ASSESSMENT
80 y/o male with h/o of CAD s/p PCI 1/2017 with 10% LAD stenosis OM1, HTN, HLD, GERD, Gout, Osteoarthritis and chronic back pain, s/p multiple laminectomies and spinal fusion, lower back pain and weakness in his bilateral lower extremities that progressed to inability to ambulate. dx with MSSA bacteremia. on ancef. also received lasix as well. has had hesitancy urinating. 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 if creatinine cont to improve in am will forgo renal bx in light of improving renal function and restart asa.   trend uo it seems to be improving   to cont dapto q 48 hr   d/w family,. cards and ID

## 2019-12-27 NOTE — PROGRESS NOTE ADULT - SUBJECTIVE AND OBJECTIVE BOX
[facility]   [phone]  RENAL FOLLOW UP NOTE  --------------------------------------------------------------------------------  Chief Complaint:    24 hour events/subjective:    seen. states no sob     PAST HISTORY  --------------------------------------------------------------------------------  No significant changes to PMH, PSH, FHx, SHx, unless otherwise noted    ALLERGIES & MEDICATIONS  --------------------------------------------------------------------------------  Allergies    Indocin (Unknown)  oxacillin (Unknown)  Zyvox (Unknown)    Intolerances      Standing Inpatient Medications  acetaminophen  IVPB .. 1000 milliGRAM(s) IV Intermittent once  acetaminophen  IVPB .. 650 milliGRAM(s) IV Intermittent once  atorvastatin 10 milliGRAM(s) Oral at bedtime  chlorhexidine 4% Liquid 1 Application(s) Topical <User Schedule>  heparin  Injectable 5000 Unit(s) SubCutaneous every 8 hours  lidocaine   Patch 1 Patch Transdermal daily  metoprolol succinate ER 12.5 milliGRAM(s) Oral daily  oxymetazoline 0.05% Nasal Spray 2 Spray(s) Both Nostrils two times a day  pantoprazole    Tablet 40 milliGRAM(s) Oral before breakfast  polyethylene glycol 3350 17 Gram(s) Oral daily  senna 2 Tablet(s) Oral at bedtime  sodium chloride 0.65% Nasal 1 Spray(s) Both Nostrils three times a day    PRN Inpatient Medications  acetaminophen   Tablet .. 650 milliGRAM(s) Oral every 6 hours PRN  acetaminophen  IVPB .. 1000 milliGRAM(s) IV Intermittent once PRN  sodium chloride 0.9% lock flush 10 milliLiter(s) IV Push every 1 hour PRN      REVIEW OF SYSTEMS  --------------------------------------------------------------------------------    Gen: denies fevers/chills,  CVS: denies chest pain/palpitations  Resp: denies SOB/Cough  GI: Denies N/V/Abd pain  : Denies dysuria    All other systems were reviewed and are negative, except as noted.    VITALS/PHYSICAL EXAM  --------------------------------------------------------------------------------  T(C): 36.7 (12-27-19 @ 13:42), Max: 37.4 (12-27-19 @ 01:13)  HR: 80 (12-27-19 @ 13:42) (62 - 80)  BP: 149/66 (12-27-19 @ 13:42) (126/73 - 149/66)  RR: 18 (12-27-19 @ 13:42) (16 - 18)  SpO2: 97% (12-27-19 @ 13:42) (94% - 97%)  Wt(kg): --        12-26-19 @ 07:01  -  12-27-19 @ 07:00  --------------------------------------------------------  IN: 300 mL / OUT: 210 mL / NET: 90 mL    12-27-19 @ 07:01  -  12-27-19 @ 16:48  --------------------------------------------------------  IN: 240 mL / OUT: 375 mL / NET: -135 mL      Physical Exam:  	      Gen: elderly M remains alert   	no jvd  	Pulm: decrease bs  no rales or ronchi or wheezing  	CV: RRR, S1S2; no rub  	Abd: +BS, soft, nontender/nondistended  	: No suprapubic tenderness  	UE: Warm, no cyanosis  no clubbing,  2+ edema   	LE: Warm, no cyanosis  no clubbing, 2- edema 	  	  LABS/STUDIES  --------------------------------------------------------------------------------              8.2    6.42  >-----------<  215      [12-27-19 @ 12:16]              25.7     131  |  95  |  98  ----------------------------<  174      [12-27-19 @ 09:07]  4.3   |  21  |  3.39        Ca     8.3     [12-27-19 @ 09:07]            Creatinine Trend:  SCr 3.39 [12-27 @ 09:07]  SCr 3.58 [12-26 @ 07:07]  SCr 3.32 [12-25 @ 09:35]  SCr 3.76 [12-24 @ 10:45]  SCr 3.52 [12-23 @ 08:48]              Urinalysis - [12-21-19 @ 19:48]      Color Yellow / Appearance Turbid / SG 1.020 / pH 5.5      Gluc Negative / Ketone Negative  / Bili Negative / Urobili <2 mg/dL       Blood Moderate / Protein 100 mg/dL / Leuk Est Large / Nitrite Negative       /  / Hyaline 0 / Gran  / Sq Epi  / Non Sq Epi 6 / Bacteria Negative    Urine Creatinine 284      [12-22-19 @ 01:28]  Urine Protein 70      [12-22-19 @ 01:28]      RYLEE: titer Negative, pattern --      [12-21-19 @ 17:35]  C3 Complement 25      [12-20-19 @ 12:18]  C4 Complement 21      [12-20-19 @ 12:18]  ANCA: cANCA Negative, pANCA Negative, atypical ANCA Negative      [12-21-19 @ 17:35]

## 2019-12-27 NOTE — CHART NOTE - NSCHARTNOTEFT_GEN_A_CORE
tolerating dysphagia diet, aspiration precautions  +BMs without diarrhea    no acute GI events    Will Sign off care. Please recall prn for GI concerns.  Thank You.    Jone Cotter PA-C    [facility]  [phone]  After hours and weekend coverage [phone]

## 2019-12-27 NOTE — PROGRESS NOTE ADULT - SUBJECTIVE AND OBJECTIVE BOX
CC: F/U Bacteremia    Saw/spoke to patient. No fevers, no chills. No new complaints. Unchanged. Holding on biopsy.    Allergies  Indocin (Unknown)  oxacillin (Unknown)  Zyvox (Unknown)    ANTIMICROBIALS:  DAPTOmycin IVPB 800 every 48 hours    PE:    Vital Signs Last 24 Hrs  T(C): 36.3 (27 Dec 2019 09:14), Max: 37.4 (27 Dec 2019 01:13)  T(F): 97.3 (27 Dec 2019 09:14), Max: 99.4 (27 Dec 2019 01:13)  HR: 79 (27 Dec 2019 09:14) (56 - 79)  BP: 136/73 (27 Dec 2019 09:14) (126/73 - 149/64)  RR: 17 (27 Dec 2019 09:14) (16 - 18)  SpO2: 96% (27 Dec 2019 09:14) (94% - 97%)    Gen: AOx3, NAD, non-toxic  CV: S1+S2 normal, nontachycardic  Resp: Clear bilat, no resp distress, no crackles/wheezes  Abd: Soft, nontender, +BS  Ext: No LE edema, no wounds    LABS:                        8.2    6.42  )-----------( 215      ( 27 Dec 2019 12:16 )             25.7     12-27    131<L>  |  95<L>  |  98<H>  ----------------------------<  174<H>  4.3   |  21<L>  |  3.39<H>    Ca    8.3<L>      27 Dec 2019 09:07    MICROBIOLOGY:    .Blood Blood  12-14-19   No growth at 5 days.     .Blood Blood  12-09-19   Growth in aerobic and anaerobic bottles: Staphylococcus aureus  See previous culture [id number]  --    Growth in aerobic and anaerobic bottles: Gram Positive Cocci in Clusters    .Blood Blood  12-08-19   Growth in aerobic and anaerobic bottles: Staphylococcus aureus    .Urine Clean Catch (Midstream)  12-08-19   No growth  --  --    (otherwise reviewed)    RADIOLOGY:    12/29 US:    FINDINGS:    Redemonstrated thickened bladder wall with the inner nodularity and trabeculations. Mildly echogenic material along inferior wall with absent color Doppler flow likely represents debris.    Prevoid bladder volume-122-cc, postvoid bladder volume-21 cc.    Enlarged prostate-45.7 cc (normal volume less than 25 cc).      IMPRESSION:     Redemonstrated thickened bladder wall with intraluminal irregularity and internal trabeculations. Small internal debris. Please correlate with urinalysis.     Enlarged prostate.

## 2019-12-27 NOTE — PROGRESS NOTE ADULT - SUBJECTIVE AND OBJECTIVE BOX
Patient is a 79y old  Male who presents with a chief complaint of Back pain (26 Dec 2019 21:47)      SUBJECTIVE / OVERNIGHT EVENTS:    Patient seen and examined. denies cp sob. no acute events.      Vital Signs Last 24 Hrs  T(C): 36.7 (27 Dec 2019 13:42), Max: 37.4 (27 Dec 2019 01:13)  T(F): 98.1 (27 Dec 2019 13:42), Max: 99.4 (27 Dec 2019 01:13)  HR: 80 (27 Dec 2019 13:42) (56 - 80)  BP: 149/66 (27 Dec 2019 13:42) (126/73 - 149/66)  BP(mean): --  RR: 18 (27 Dec 2019 13:42) (16 - 18)  SpO2: 97% (27 Dec 2019 13:42) (94% - 97%)  I&O's Summary    26 Dec 2019 07:01  -  27 Dec 2019 07:00  --------------------------------------------------------  IN: 300 mL / OUT: 210 mL / NET: 90 mL    27 Dec 2019 07:01  -  27 Dec 2019 14:24  --------------------------------------------------------  IN: 240 mL / OUT: 375 mL / NET: -135 mL        PE:  GGENERAL: NAD, AAOx3  HEAD:  Atraumatic, Normocephalic  CHEST/LUNG: Coarse bs  HEART: Regular rate and rhythm; no murmur  ABDOMEN: Soft, Nontender, Nondistended; Bowel sounds present  EXTREMITIES:  2+ Peripheral Pulses, +2 le edema  gu morley  SKIN: No rashes or lesions, right neck HD cath CDI  NEURO: No focal deficits      LABS:                        8.2    6.42  )-----------( 215      ( 27 Dec 2019 12:16 )             25.7     12-27    131<L>  |  95<L>  |  98<H>  ----------------------------<  174<H>  4.3   |  21<L>  |  3.39<H>    Ca    8.3<L>      27 Dec 2019 09:07        CAPILLARY BLOOD GLUCOSE                RADIOLOGY & ADDITIONAL TESTS:    Imaging Personally Reviewed:  [x] YES  [ ] NO    Consultant(s) Notes Reviewed:  [x] YES  [ ] NO    MEDICATIONS  (STANDING):  acetaminophen  IVPB .. 1000 milliGRAM(s) IV Intermittent once  acetaminophen  IVPB .. 650 milliGRAM(s) IV Intermittent once  atorvastatin 10 milliGRAM(s) Oral at bedtime  chlorhexidine 4% Liquid 1 Application(s) Topical <User Schedule>  heparin  Injectable 5000 Unit(s) SubCutaneous every 8 hours  lidocaine   Patch 1 Patch Transdermal daily  metoprolol succinate ER 12.5 milliGRAM(s) Oral daily  oxymetazoline 0.05% Nasal Spray 2 Spray(s) Both Nostrils two times a day  pantoprazole    Tablet 40 milliGRAM(s) Oral before breakfast  polyethylene glycol 3350 17 Gram(s) Oral daily  senna 2 Tablet(s) Oral at bedtime  sodium chloride 0.65% Nasal 1 Spray(s) Both Nostrils three times a day    MEDICATIONS  (PRN):  acetaminophen   Tablet .. 650 milliGRAM(s) Oral every 6 hours PRN Temp greater or equal to 38C (100.4F), Mild Pain (1 - 3)  acetaminophen  IVPB .. 1000 milliGRAM(s) IV Intermittent once PRN Moderate Pain (4 - 6)  sodium chloride 0.9% lock flush 10 milliLiter(s) IV Push every 1 hour PRN Pre/post blood products, medications, blood draw, and to maintain line patency      Care Discussed with Consultants/Other Providers [x] YES  [ ] NO    HEALTH ISSUES - PROBLEM Dx:  Dysphagia: Dysphagia  Need for prophylactic measure: Need for prophylactic measure  Gout: Gout  Essential hypertension: Essential hypertension  CAD (coronary artery disease): CAD (coronary artery disease)  KRIS (acute kidney injury): KRIS (acute kidney injury)  Sepsis: Sepsis  Weakness of both lower extremities: Weakness of both lower extremities  Acute left-sided low back pain without sciatica: Acute left-sided low back pain without sciatica

## 2019-12-27 NOTE — PROGRESS NOTE ADULT - SUBJECTIVE AND OBJECTIVE BOX
CC: pt more awake, alert, no complaints    TELEMETRY:     PHYSICAL EXAM:    T(C): 37.1 (12-28-19 @ 05:46), Max: 37.2 (12-28-19 @ 01:41)  HR: 76 (12-28-19 @ 05:46) (72 - 83)  BP: 145/80 (12-28-19 @ 05:46) (123/66 - 149/66)  RR: 18 (12-28-19 @ 05:46) (17 - 18)  SpO2: 94% (12-28-19 @ 05:46) (92% - 97%)  Wt(kg): --  I&O's Summary    27 Dec 2019 07:01  -  28 Dec 2019 07:00  --------------------------------------------------------  IN: 480 mL / OUT: 575 mL / NET: -95 mL        Appearance: Normal	  Cardiovascular: Normal S1 S2,RRR, No JVD, No murmurs  Respiratory: Lungs clear to auscultation	  Gastrointestinal:  Soft, Non-tender, + BS	  Extremities: Normal range of motion, No clubbing, cyanosis or edema  Vascular: Peripheral pulses palpable 2+ bilaterally     LABS:	 	                          8.2    6.42  )-----------( 215      ( 27 Dec 2019 12:16 )             25.7     12-27    131<L>  |  95<L>  |  98<H>  ----------------------------<  174<H>  4.3   |  21<L>  |  3.39<H>    Ca    8.3<L>      27 Dec 2019 09:07            CARDIAC MARKERS:

## 2019-12-27 NOTE — PROGRESS NOTE ADULT - ASSESSMENT
78 yo M with chronic low back pain sp multiple laminectomy and fusion p/w 1 week of worsening back pain along with weakness in bilateral lower extremities and inability to walk since last night  Leukocytosis, fever  MR L spine and T spine prelim but no emergent findings  Prior history of MSSA bacteremia (2016)  History oxacillin allergy (? fever), Zyvox ([facility]); tolerated vanco on this visit  BCX with MSSA, high grade, BCX 12/12 NGTD  CT A/P/C/H neg  NM tagged WBC uptake in thoracolumbar spine--degen disease?  TTE equivocal, JAHAIRA reassuring  High suspicion for spinal involvement despite negative MRI--monitor closely  Worsening KRIS--AIN v post infectious GN, Renal considering biopsy  Overall,  1) MSSA Bacteremia--Dapto 800mg q 48, CK ordered for tomorrow AM (if on regular HD, will need to adjust to post HD dosing)  2) KRIS--? role for steroids./biopsy, discussed potential risks/benefits regarding infection with family; discussed case with renal  3) Leukocytosis--Trend WBC  4) Fever, monitor for further signs fever    Fredy Huynh MD  Pager [phone]  After 5pm and on weekends call [phone]

## 2019-12-27 NOTE — PROGRESS NOTE ADULT - PROBLEM SELECTOR PLAN 1
multifactorial ATN/AIN in the setting of sepsis and antibiotics  renal following, patient and family declining renal biopsy  holding losartan  HD per renal  trend creat

## 2019-12-28 DIAGNOSIS — R78.81 BACTEREMIA: ICD-10-CM

## 2019-12-28 LAB
ALBUMIN SERPL ELPH-MCNC: 1.8 G/DL — LOW (ref 3.3–5)
ALP SERPL-CCNC: 67 U/L — SIGNIFICANT CHANGE UP (ref 40–120)
ALT FLD-CCNC: 32 U/L — SIGNIFICANT CHANGE UP (ref 10–45)
ANION GAP SERPL CALC-SCNC: 14 MMOL/L — SIGNIFICANT CHANGE UP (ref 5–17)
AST SERPL-CCNC: 42 U/L — HIGH (ref 10–40)
BASOPHILS # BLD AUTO: 0.02 K/UL — SIGNIFICANT CHANGE UP (ref 0–0.2)
BASOPHILS NFR BLD AUTO: 0.3 % — SIGNIFICANT CHANGE UP (ref 0–2)
BILIRUB SERPL-MCNC: 0.2 MG/DL — SIGNIFICANT CHANGE UP (ref 0.2–1.2)
BUN SERPL-MCNC: 102 MG/DL — HIGH (ref 7–23)
CALCIUM SERPL-MCNC: 8.2 MG/DL — LOW (ref 8.4–10.5)
CHLORIDE SERPL-SCNC: 96 MMOL/L — SIGNIFICANT CHANGE UP (ref 96–108)
CK SERPL-CCNC: 17 U/L — LOW (ref 30–200)
CO2 SERPL-SCNC: 21 MMOL/L — LOW (ref 22–31)
CREAT SERPL-MCNC: 3.02 MG/DL — HIGH (ref 0.5–1.3)
EOSINOPHIL # BLD AUTO: 0.3 K/UL — SIGNIFICANT CHANGE UP (ref 0–0.5)
EOSINOPHIL NFR BLD AUTO: 4.5 % — SIGNIFICANT CHANGE UP (ref 0–6)
GLUCOSE SERPL-MCNC: 168 MG/DL — HIGH (ref 70–99)
HCT VFR BLD CALC: 26.1 % — LOW (ref 39–50)
HGB BLD-MCNC: 8.3 G/DL — LOW (ref 13–17)
IMM GRANULOCYTES NFR BLD AUTO: 0.3 % — SIGNIFICANT CHANGE UP (ref 0–1.5)
LYMPHOCYTES # BLD AUTO: 0.67 K/UL — LOW (ref 1–3.3)
LYMPHOCYTES # BLD AUTO: 10 % — LOW (ref 13–44)
MCHC RBC-ENTMCNC: 28.8 PG — SIGNIFICANT CHANGE UP (ref 27–34)
MCHC RBC-ENTMCNC: 31.8 GM/DL — LOW (ref 32–36)
MCV RBC AUTO: 90.6 FL — SIGNIFICANT CHANGE UP (ref 80–100)
MONOCYTES # BLD AUTO: 0.41 K/UL — SIGNIFICANT CHANGE UP (ref 0–0.9)
MONOCYTES NFR BLD AUTO: 6.1 % — SIGNIFICANT CHANGE UP (ref 2–14)
NEUTROPHILS # BLD AUTO: 5.3 K/UL — SIGNIFICANT CHANGE UP (ref 1.8–7.4)
NEUTROPHILS NFR BLD AUTO: 78.8 % — HIGH (ref 43–77)
PLATELET # BLD AUTO: 221 K/UL — SIGNIFICANT CHANGE UP (ref 150–400)
POTASSIUM SERPL-MCNC: 4.2 MMOL/L — SIGNIFICANT CHANGE UP (ref 3.5–5.3)
POTASSIUM SERPL-SCNC: 4.2 MMOL/L — SIGNIFICANT CHANGE UP (ref 3.5–5.3)
PROT SERPL-MCNC: 5.6 G/DL — LOW (ref 6–8.3)
RBC # BLD: 2.88 M/UL — LOW (ref 4.2–5.8)
RBC # FLD: 14.4 % — SIGNIFICANT CHANGE UP (ref 10.3–14.5)
SODIUM SERPL-SCNC: 131 MMOL/L — LOW (ref 135–145)
WBC # BLD: 6.72 K/UL — SIGNIFICANT CHANGE UP (ref 3.8–10.5)
WBC # FLD AUTO: 6.72 K/UL — SIGNIFICANT CHANGE UP (ref 3.8–10.5)

## 2019-12-28 RX ADMIN — LIDOCAINE 1 PATCH: 4 CREAM TOPICAL at 03:38

## 2019-12-28 RX ADMIN — ATORVASTATIN CALCIUM 10 MILLIGRAM(S): 80 TABLET, FILM COATED ORAL at 20:16

## 2019-12-28 RX ADMIN — LIDOCAINE 1 PATCH: 4 CREAM TOPICAL at 19:48

## 2019-12-28 RX ADMIN — OXYMETAZOLINE HYDROCHLORIDE 2 SPRAY(S): 0.5 SPRAY NASAL at 06:09

## 2019-12-28 RX ADMIN — HEPARIN SODIUM 5000 UNIT(S): 5000 INJECTION INTRAVENOUS; SUBCUTANEOUS at 20:17

## 2019-12-28 RX ADMIN — HEPARIN SODIUM 5000 UNIT(S): 5000 INJECTION INTRAVENOUS; SUBCUTANEOUS at 12:55

## 2019-12-28 RX ADMIN — HEPARIN SODIUM 5000 UNIT(S): 5000 INJECTION INTRAVENOUS; SUBCUTANEOUS at 06:07

## 2019-12-28 RX ADMIN — PANTOPRAZOLE SODIUM 40 MILLIGRAM(S): 20 TABLET, DELAYED RELEASE ORAL at 06:07

## 2019-12-28 RX ADMIN — Medication 12.5 MILLIGRAM(S): at 06:07

## 2019-12-28 RX ADMIN — Medication 1 SPRAY(S): at 06:07

## 2019-12-28 RX ADMIN — SENNA PLUS 2 TABLET(S): 8.6 TABLET ORAL at 20:16

## 2019-12-28 RX ADMIN — LIDOCAINE 1 PATCH: 4 CREAM TOPICAL at 12:41

## 2019-12-28 RX ADMIN — OXYMETAZOLINE HYDROCHLORIDE 2 SPRAY(S): 0.5 SPRAY NASAL at 18:17

## 2019-12-28 RX ADMIN — Medication 1 SPRAY(S): at 20:16

## 2019-12-28 RX ADMIN — CHLORHEXIDINE GLUCONATE 1 APPLICATION(S): 213 SOLUTION TOPICAL at 12:41

## 2019-12-28 NOTE — PROGRESS NOTE ADULT - SUBJECTIVE AND OBJECTIVE BOX
CC: pt feeling better, no cp/sob, PO intake and urine outpt improving  family at bedside    TELEMETRY:     PHYSICAL EXAM:    T(C): 37.1 (12-28-19 @ 05:46), Max: 37.2 (12-28-19 @ 01:41)  HR: 76 (12-28-19 @ 05:46) (72 - 83)  BP: 145/80 (12-28-19 @ 05:46) (123/66 - 149/66)  RR: 18 (12-28-19 @ 05:46) (17 - 18)  SpO2: 94% (12-28-19 @ 05:46) (92% - 97%)  Wt(kg): --  I&O's Summary    27 Dec 2019 07:01  -  28 Dec 2019 07:00  --------------------------------------------------------  IN: 480 mL / OUT: 575 mL / NET: -95 mL        Appearance: Normal	  Cardiovascular: Normal S1 S2,RRR, No JVD, No murmurs  Respiratory: Lungs clear to auscultation	  Gastrointestinal:  Soft, Non-tender, + BS	  Extremities: Normal range of motion, No clubbing, cyanosis or edema  Vascular: Peripheral pulses palpable 2+ bilaterally     LABS:	 	                          8.2    6.42  )-----------( 215      ( 27 Dec 2019 12:16 )             25.7     12-27    131<L>  |  95<L>  |  98<H>  ----------------------------<  174<H>  4.3   |  21<L>  |  3.39<H>    Ca    8.3<L>      27 Dec 2019 09:07            CARDIAC MARKERS:

## 2019-12-28 NOTE — PROGRESS NOTE ADULT - SUBJECTIVE AND OBJECTIVE BOX
Patient is a 79y old  Male who presents with a chief complaint of Back pain (28 Dec 2019 10:19)      SUBJECTIVE / OVERNIGHT EVENTS:    Patient seen and examined. denies cp sob. ate a good meal last night. afebrile.       Vital Signs Last 24 Hrs  T(C): 37.2 (28 Dec 2019 09:31), Max: 37.2 (28 Dec 2019 01:41)  T(F): 99 (28 Dec 2019 09:31), Max: 99 (28 Dec 2019 09:31)  HR: 67 (28 Dec 2019 09:31) (67 - 83)  BP: 121/65 (28 Dec 2019 09:31) (121/65 - 149/66)  BP(mean): --  RR: 18 (28 Dec 2019 09:31) (18 - 18)  SpO2: 93% (28 Dec 2019 09:31) (92% - 97%)  I&O's Summary    27 Dec 2019 07:01  -  28 Dec 2019 07:00  --------------------------------------------------------  IN: 480 mL / OUT: 575 mL / NET: -95 mL    28 Dec 2019 07:01  -  28 Dec 2019 13:22  --------------------------------------------------------  IN: 0 mL / OUT: 0 mL / NET: 0 mL        PE:  GENERAL: NAD, AAOx3  HEAD:  Atraumatic, Normocephalic  CHEST/LUNG: Coarse bs, rales bases  HEART: Regular rate and rhythm; no murmur  ABDOMEN: Soft, Nontender, Nondistended; Bowel sounds present  EXTREMITIES:  2+ Peripheral Pulses, +2 le edema  gu morley  SKIN: No rashes or lesions, right neck HD cath CDI  NEURO: No focal deficits  LABS:                        8.3    6.72  )-----------( 221      ( 28 Dec 2019 11:56 )             26.1     12-28    131<L>  |  96  |  102<H>  ----------------------------<  168<H>  4.2   |  21<L>  |  3.02<H>    Ca    8.2<L>      28 Dec 2019 08:27    TPro  5.6<L>  /  Alb  1.8<L>  /  TBili  0.2  /  DBili  x   /  AST  42<H>  /  ALT  32  /  AlkPhos  67  12-28      CAPILLARY BLOOD GLUCOSE        CARDIAC MARKERS ( 28 Dec 2019 08:27 )  x     / x     / 17 U/L / x     / x              RADIOLOGY & ADDITIONAL TESTS:    Imaging Personally Reviewed:  [x] YES  [ ] NO    Consultant(s) Notes Reviewed:  [x] YES  [ ] NO    MEDICATIONS  (STANDING):  acetaminophen  IVPB .. 1000 milliGRAM(s) IV Intermittent once  acetaminophen  IVPB .. 650 milliGRAM(s) IV Intermittent once  atorvastatin 10 milliGRAM(s) Oral at bedtime  chlorhexidine 4% Liquid 1 Application(s) Topical <User Schedule>  DAPTOmycin IVPB 800 milliGRAM(s) IV Intermittent every 48 hours  heparin  Injectable 5000 Unit(s) SubCutaneous every 8 hours  lidocaine   Patch 1 Patch Transdermal daily  metoprolol succinate ER 12.5 milliGRAM(s) Oral daily  oxymetazoline 0.05% Nasal Spray 2 Spray(s) Both Nostrils two times a day  pantoprazole    Tablet 40 milliGRAM(s) Oral before breakfast  polyethylene glycol 3350 17 Gram(s) Oral daily  senna 2 Tablet(s) Oral at bedtime  sodium chloride 0.65% Nasal 1 Spray(s) Both Nostrils three times a day    MEDICATIONS  (PRN):  acetaminophen   Tablet .. 650 milliGRAM(s) Oral every 6 hours PRN Temp greater or equal to 38C (100.4F), Mild Pain (1 - 3)  acetaminophen  IVPB .. 1000 milliGRAM(s) IV Intermittent once PRN Moderate Pain (4 - 6)  sodium chloride 0.9% lock flush 10 milliLiter(s) IV Push every 1 hour PRN Pre/post blood products, medications, blood draw, and to maintain line patency      Care Discussed with Consultants/Other Providers [x] YES  [ ] NO    HEALTH ISSUES - PROBLEM Dx:  MSSA bacteremia: MSSA bacteremia  Dysphagia: Dysphagia  Need for prophylactic measure: Need for prophylactic measure  Gout: Gout  Essential hypertension: Essential hypertension  CAD (coronary artery disease): CAD (coronary artery disease)  KRIS (acute kidney injury): KRIS (acute kidney injury)  Sepsis: Sepsis  Weakness of both lower extremities: Weakness of both lower extremities  Acute left-sided low back pain without sciatica: Acute left-sided low back pain without sciatica

## 2019-12-28 NOTE — PROGRESS NOTE ADULT - PROBLEM SELECTOR PLAN 1
multifactorial ATN/AIN in the setting of sepsis and antibiotics  renal following, patient and family declining renal biopsy, holding asa  holding losartan  HD per renal  trend creat, downtrending

## 2019-12-28 NOTE — PROGRESS NOTE ADULT - ASSESSMENT
78 y/o male with h/o of CAD s/p PCI 1/2017 with 10% LAD stenosis OM1, HTN, HLD, GERD, Gout, Osteoarthritis and chronic back pain, s/p multiple laminectomies and spinal fusion, lower back pain and weakness in his bilateral lower extremities that progressed to inability to ambulate. dx with MSSA bacteremia, and developed KRIS while on ancef and lasix requiring renal replacement therapy

## 2019-12-28 NOTE — PROGRESS NOTE ADULT - PROBLEM SELECTOR PLAN 1
resolving AIN +/- PIGN  urine output and appetite improving  dose meds for eGFR ~20  if renal function continues to improve, no need for renal biopsy and likely d/c HD catheter on Monday, restart ASA at that time

## 2019-12-28 NOTE — PROGRESS NOTE ADULT - SUBJECTIVE AND OBJECTIVE BOX
[facility]   [phone]  Dr. Leggett (covering for Dr. Hidalgo)  RENAL FOLLOW UP NOTE  --------------------------------------------------------------------------------  Patient seen and examined.  As per family at bedside, mentating better and with improving PO intake    PAST HISTORY  --------------------------------------------------------------------------------  No significant changes to PMH, PSH, FHx, SHx, unless otherwise noted    ALLERGIES & MEDICATIONS  --------------------------------------------------------------------------------  Allergies    Indocin (Unknown)  oxacillin (Unknown)  Zyvox (Unknown)    Intolerances      Standing Inpatient Medications  acetaminophen  IVPB .. 1000 milliGRAM(s) IV Intermittent once  acetaminophen  IVPB .. 650 milliGRAM(s) IV Intermittent once  atorvastatin 10 milliGRAM(s) Oral at bedtime  chlorhexidine 4% Liquid 1 Application(s) Topical <User Schedule>  DAPTOmycin IVPB 800 milliGRAM(s) IV Intermittent every 48 hours  heparin  Injectable 5000 Unit(s) SubCutaneous every 8 hours  lidocaine   Patch 1 Patch Transdermal daily  metoprolol succinate ER 12.5 milliGRAM(s) Oral daily  oxymetazoline 0.05% Nasal Spray 2 Spray(s) Both Nostrils two times a day  pantoprazole    Tablet 40 milliGRAM(s) Oral before breakfast  polyethylene glycol 3350 17 Gram(s) Oral daily  senna 2 Tablet(s) Oral at bedtime  sodium chloride 0.65% Nasal 1 Spray(s) Both Nostrils three times a day    PRN Inpatient Medications  acetaminophen   Tablet .. 650 milliGRAM(s) Oral every 6 hours PRN  acetaminophen  IVPB .. 1000 milliGRAM(s) IV Intermittent once PRN  sodium chloride 0.9% lock flush 10 milliLiter(s) IV Push every 1 hour PRN      FOCUSED REVIEW OF SYSTEMS  --------------------------------------------------------------------------------  denies CP  dirk SOB  dirk N/V/Abd pain      VITALS/PHYSICAL EXAM  --------------------------------------------------------------------------------  T(C): 37.2 (12-28-19 @ 09:31), Max: 37.2 (12-28-19 @ 01:41)  HR: 67 (12-28-19 @ 09:31) (67 - 83)  BP: 121/65 (12-28-19 @ 09:31) (121/65 - 149/66)  RR: 18 (12-28-19 @ 09:31) (18 - 18)  SpO2: 93% (12-28-19 @ 09:31) (92% - 97%)  Wt(kg): --        12-27-19 @ 07:01  -  12-28-19 @ 07:00  --------------------------------------------------------  IN: 480 mL / OUT: 575 mL / NET: -95 mL    12-28-19 @ 07:01  -  12-28-19 @ 10:20  --------------------------------------------------------  IN: 0 mL / OUT: 0 mL / NET: 0 mL      Physical Exam:  	Gen: NAD, frail -appearing  	Pulm: CTA B/L ant/lat fields  	CV: RRR, S1S2  	Abd: +BS, soft, nontender/nondistended  	: No suprapubic tenderness. + morley          Extremity: b/l trace pedal edema  	Neuro: responsive to verbal stimulus, nods/shakes heads in response to ROS             Access: R neck catheter      LABS/STUDIES  --------------------------------------------------------------------------------              8.2    6.42  >-----------<  215      [12-27-19 @ 12:16]              25.7     131  |  96  |  102  ----------------------------<  168      [12-28-19 @ 08:27]  4.2   |  21  |  3.02        Ca     8.2     [12-28-19 @ 08:27]    TPro  5.6  /  Alb  1.8  /  TBili  0.2  /  DBili  x   /  AST  42  /  ALT  32  /  AlkPhos  67  [12-28-19 @ 08:27]        CK 17      [12-28-19 @ 08:27]    eGFR if Non African American: 19 mL/min/1.73M2 (12-28 @ 08:27)  eGFR if : 22 mL/min/1.73M2 (12-28 @ 08:27)    Creatinine Trend:  SCr 3.02 [12-28 @ 08:27]  SCr 3.39 [12-27 @ 09:07]  SCr 3.58 [12-26 @ 07:07]  SCr 3.32 [12-25 @ 09:35]  SCr 3.76 [12-24 @ 10:45]              Urinalysis - [12-21-19 @ 19:48]      Color Yellow / Appearance Turbid / SG 1.020 / pH 5.5      Gluc Negative / Ketone Negative  / Bili Negative / Urobili <2 mg/dL       Blood Moderate / Protein 100 mg/dL / Leuk Est Large / Nitrite Negative       /  / Hyaline 0 / Gran  / Sq Epi  / Non Sq Epi 6 / Bacteria Negative    Urine Creatinine 284      [12-22-19 @ 01:28]  Urine Protein 70      [12-22-19 @ 01:28]      RYLEE: titer Negative, pattern --      [12-21-19 @ 17:35]  C3 Complement 25      [12-20-19 @ 12:18]  C4 Complement 21      [12-20-19 @ 12:18]  ANCA: cANCA Negative, pANCA Negative, atypical ANCA Negative      [12-21-19 @ 17:35]

## 2019-12-29 LAB
ALBUMIN SERPL ELPH-MCNC: 1.8 G/DL — LOW (ref 3.3–5)
ALP SERPL-CCNC: 67 U/L — SIGNIFICANT CHANGE UP (ref 40–120)
ALT FLD-CCNC: 47 U/L — HIGH (ref 10–45)
ANION GAP SERPL CALC-SCNC: 13 MMOL/L — SIGNIFICANT CHANGE UP (ref 5–17)
AST SERPL-CCNC: 51 U/L — HIGH (ref 10–40)
BILIRUB SERPL-MCNC: 0.3 MG/DL — SIGNIFICANT CHANGE UP (ref 0.2–1.2)
BUN SERPL-MCNC: 102 MG/DL — HIGH (ref 7–23)
CALCIUM SERPL-MCNC: 8.4 MG/DL — SIGNIFICANT CHANGE UP (ref 8.4–10.5)
CHLORIDE SERPL-SCNC: 99 MMOL/L — SIGNIFICANT CHANGE UP (ref 96–108)
CO2 SERPL-SCNC: 23 MMOL/L — SIGNIFICANT CHANGE UP (ref 22–31)
CREAT SERPL-MCNC: 2.58 MG/DL — HIGH (ref 0.5–1.3)
GLUCOSE SERPL-MCNC: 129 MG/DL — HIGH (ref 70–99)
HCT VFR BLD CALC: 26.8 % — LOW (ref 39–50)
HGB BLD-MCNC: 8.6 G/DL — LOW (ref 13–17)
MCHC RBC-ENTMCNC: 29.1 PG — SIGNIFICANT CHANGE UP (ref 27–34)
MCHC RBC-ENTMCNC: 32.1 GM/DL — SIGNIFICANT CHANGE UP (ref 32–36)
MCV RBC AUTO: 90.5 FL — SIGNIFICANT CHANGE UP (ref 80–100)
PLATELET # BLD AUTO: 206 K/UL — SIGNIFICANT CHANGE UP (ref 150–400)
POTASSIUM SERPL-MCNC: 4.4 MMOL/L — SIGNIFICANT CHANGE UP (ref 3.5–5.3)
POTASSIUM SERPL-SCNC: 4.4 MMOL/L — SIGNIFICANT CHANGE UP (ref 3.5–5.3)
PROT SERPL-MCNC: 5.5 G/DL — LOW (ref 6–8.3)
RBC # BLD: 2.96 M/UL — LOW (ref 4.2–5.8)
RBC # FLD: 14.5 % — SIGNIFICANT CHANGE UP (ref 10.3–14.5)
SODIUM SERPL-SCNC: 135 MMOL/L — SIGNIFICANT CHANGE UP (ref 135–145)
WBC # BLD: 5.8 K/UL — SIGNIFICANT CHANGE UP (ref 3.8–10.5)
WBC # FLD AUTO: 5.8 K/UL — SIGNIFICANT CHANGE UP (ref 3.8–10.5)

## 2019-12-29 RX ORDER — ASPIRIN/CALCIUM CARB/MAGNESIUM 324 MG
81 TABLET ORAL DAILY
Refills: 0 | Status: DISCONTINUED | OUTPATIENT
Start: 2019-12-29 | End: 2020-01-08

## 2019-12-29 RX ADMIN — SENNA PLUS 2 TABLET(S): 8.6 TABLET ORAL at 21:28

## 2019-12-29 RX ADMIN — Medication 1 SPRAY(S): at 21:29

## 2019-12-29 RX ADMIN — Medication 1 SPRAY(S): at 06:45

## 2019-12-29 RX ADMIN — HEPARIN SODIUM 5000 UNIT(S): 5000 INJECTION INTRAVENOUS; SUBCUTANEOUS at 13:36

## 2019-12-29 RX ADMIN — LIDOCAINE 1 PATCH: 4 CREAM TOPICAL at 19:00

## 2019-12-29 RX ADMIN — ATORVASTATIN CALCIUM 10 MILLIGRAM(S): 80 TABLET, FILM COATED ORAL at 21:28

## 2019-12-29 RX ADMIN — CHLORHEXIDINE GLUCONATE 1 APPLICATION(S): 213 SOLUTION TOPICAL at 13:36

## 2019-12-29 RX ADMIN — LIDOCAINE 1 PATCH: 4 CREAM TOPICAL at 12:35

## 2019-12-29 RX ADMIN — HEPARIN SODIUM 5000 UNIT(S): 5000 INJECTION INTRAVENOUS; SUBCUTANEOUS at 21:28

## 2019-12-29 RX ADMIN — OXYMETAZOLINE HYDROCHLORIDE 2 SPRAY(S): 0.5 SPRAY NASAL at 06:46

## 2019-12-29 RX ADMIN — PANTOPRAZOLE SODIUM 40 MILLIGRAM(S): 20 TABLET, DELAYED RELEASE ORAL at 06:45

## 2019-12-29 RX ADMIN — Medication 81 MILLIGRAM(S): at 13:38

## 2019-12-29 RX ADMIN — OXYMETAZOLINE HYDROCHLORIDE 2 SPRAY(S): 0.5 SPRAY NASAL at 21:29

## 2019-12-29 RX ADMIN — Medication 12.5 MILLIGRAM(S): at 06:45

## 2019-12-29 RX ADMIN — Medication 1 SPRAY(S): at 13:36

## 2019-12-29 RX ADMIN — LIDOCAINE 1 PATCH: 4 CREAM TOPICAL at 00:33

## 2019-12-29 RX ADMIN — HEPARIN SODIUM 5000 UNIT(S): 5000 INJECTION INTRAVENOUS; SUBCUTANEOUS at 06:45

## 2019-12-29 NOTE — PROGRESS NOTE ADULT - PROBLEM SELECTOR PLAN 3
MSSA bacteremia persistent, source 2/2 pneumonia vs cellulitis, cannot ro hardware despite negative imaging  cont dapto per ID for 6 weeks, will need PICC once KRIS resolves  12/12 12/14 blood cx NTD  Tylenol PRN fever  CT chest also concern for atelectasis vs pneumonia, lungs light up in nuclear scan  appreciate pulm recs  TTE moderate aortic stenosis, Severe left ventricular systolic dysfunction. JAHAIRA no vegetations

## 2019-12-29 NOTE — PROGRESS NOTE ADULT - ASSESSMENT
80 y/o male with h/o of CAD s/p PCI 1/2017 with 10% LAD stenosis OM1, HTN, HLD, GERD, Gout, Osteoarthritis and chronic back pain, s/p multiple laminectomies and spinal fusion, lower back pain and weakness in his bilateral lower extremities that progressed to inability to ambulate. dx with MSSA bacteremia, and developed KRIS while on ancef and lasix requiring renal replacement therapy

## 2019-12-29 NOTE — PROGRESS NOTE ADULT - SUBJECTIVE AND OBJECTIVE BOX
CC: feels better, ate well yesterday + BM, was OOB to chair, son at bedside    TELEMETRY:     PHYSICAL EXAM:    T(C): 36.6 (12-29-19 @ 06:36), Max: 37.3 (12-28-19 @ 20:00)  HR: 82 (12-29-19 @ 06:36) (67 - 87)  BP: 161/79 (12-29-19 @ 06:36) (121/65 - 161/79)  RR: 20 (12-29-19 @ 06:36) (18 - 20)  SpO2: 94% (12-29-19 @ 06:36) (93% - 96%)  Wt(kg): --  I&O's Summary    28 Dec 2019 07:01  -  29 Dec 2019 07:00  --------------------------------------------------------  IN: 260 mL / OUT: 750 mL / NET: -490 mL        Appearance: Normal	  Cardiovascular: Normal S1 S2,RRR, No JVD, No murmurs  Respiratory: Lungs clear to auscultation	  Gastrointestinal:  Soft, Non-tender, + BS	  Extremities: Normal range of motion, No clubbing, cyanosis or edema  Vascular: Peripheral pulses palpable 2+ bilaterally     LABS:	 	                          8.3    6.72  )-----------( 221      ( 28 Dec 2019 11:56 )             26.1     12-28    131<L>  |  96  |  102<H>  ----------------------------<  168<H>  4.2   |  21<L>  |  3.02<H>    Ca    8.2<L>      28 Dec 2019 08:27    TPro  5.6<L>  /  Alb  1.8<L>  /  TBili  0.2  /  DBili  x   /  AST  42<H>  /  ALT  32  /  AlkPhos  67  12-28          CARDIAC MARKERS:

## 2019-12-29 NOTE — PROGRESS NOTE ADULT - PROBLEM SELECTOR PLAN 1
resolving AIN +/- PIGN  urine output and appetite improving  dose meds for eGFR ~20-25  no need for renal biospy.  restart ASA 81mg  likely d/c HD catheter tomorrow

## 2019-12-29 NOTE — PROGRESS NOTE ADULT - ASSESSMENT
79 yr old male with HTN, DCHF, CAD, S/P PCI to OM1, Mid LAD (9/2014, [facility], [facility], [facility]) hx of ischemic CMP, AS, DM HLD, GERD, GOUT, s/p multiple spinal surgery admitted with back pain and fever    1. Back Pain  -stable, MRI noted, neuro/neuro sx followup  -ID f/u, pain control     2. CAD s/p PCI (OM1/LAD)   -stable, cont statin and asa     3. Acute on Chronic systolic & Diastolic CHF, New LV dysfunction   -echo with severe LV sys dysfxn, EF 20-25%, mild diastolic dysfxn  -LAZARO 12/13 with moderate lv dysfunction  -recent echo few months ago with normal LV function  -possible new cardiomyopathy in setting of infection, sepsis  -will continue medical therapy for now as he is not a candidate for ischemic eval   -+KRIS, +peripheral edema, no inc dyspnea - from third spacing, hold further diuresis   -c/w low dose toprol xl 12.5, was off bb as outpt due to fatigue  -possible low dose ace/arb in future post hd and If ok by renal     4. Aortic Stenosis  -stable, severe on LAZARO with valve area of 0.9, tte this admit and as outpt with moderate AS  -gradients low on lazaro, perhaps due to new lv dysfxn  -YULY likely underestimated  -patient was asymptomatic as outpt from valve disease  -no further workup indicated, will follow as outpatient     5. HTN   -BP stable, continue with current antihtn meds     6. Fever/ Sepsis -resolved  + blood cultures with gram positive Cocci in clusters , Repeat NGTD  -iv abx, ID f/u   -LAZARO without endocarditis   -encephalopathy ?? delirium from infection/kris/dehydration ??  med f/u    7. KRIS? AIN  renal function improving  renal f/u  s/p HD cath placement   HD per renal   no plans for  renal bx    dvt ppx

## 2019-12-29 NOTE — PROGRESS NOTE ADULT - SUBJECTIVE AND OBJECTIVE BOX
[facility]   [phone]  Dr. Leggett (covering for Dr. Hidalgo)  RENAL FOLLOW UP NOTE  --------------------------------------------------------------------------------  Patient seen and examined. Much more alert today.  States appetite is improving.    PAST HISTORY  --------------------------------------------------------------------------------  No significant changes to PMH, PSH, FHx, SHx, unless otherwise noted    ALLERGIES & MEDICATIONS  --------------------------------------------------------------------------------  Allergies    Indocin (Unknown)  oxacillin (Unknown)  Zyvox (Unknown)    Intolerances      Standing Inpatient Medications  acetaminophen  IVPB .. 1000 milliGRAM(s) IV Intermittent once  acetaminophen  IVPB .. 650 milliGRAM(s) IV Intermittent once  aspirin  chewable 81 milliGRAM(s) Oral daily  atorvastatin 10 milliGRAM(s) Oral at bedtime  chlorhexidine 4% Liquid 1 Application(s) Topical <User Schedule>  DAPTOmycin IVPB 800 milliGRAM(s) IV Intermittent every 48 hours  heparin  Injectable 5000 Unit(s) SubCutaneous every 8 hours  lidocaine   Patch 1 Patch Transdermal daily  metoprolol succinate ER 12.5 milliGRAM(s) Oral daily  oxymetazoline 0.05% Nasal Spray 2 Spray(s) Both Nostrils two times a day  pantoprazole    Tablet 40 milliGRAM(s) Oral before breakfast  polyethylene glycol 3350 17 Gram(s) Oral daily  senna 2 Tablet(s) Oral at bedtime  sodium chloride 0.65% Nasal 1 Spray(s) Both Nostrils three times a day    PRN Inpatient Medications  acetaminophen   Tablet .. 650 milliGRAM(s) Oral every 6 hours PRN  acetaminophen  IVPB .. 1000 milliGRAM(s) IV Intermittent once PRN  sodium chloride 0.9% lock flush 10 milliLiter(s) IV Push every 1 hour PRN      FOCUSED REVIEW OF SYSTEMS  --------------------------------------------------------------------------------  denies CP  denies SOB  denies N/V/Abd pain          VITALS/PHYSICAL EXAM  --------------------------------------------------------------------------------  T(C): 37.1 (12-29-19 @ 09:24), Max: 37.3 (12-28-19 @ 20:00)  HR: 70 (12-29-19 @ 09:24) (68 - 87)  BP: 134/77 (12-29-19 @ 09:24) (134/77 - 161/79)  RR: 18 (12-29-19 @ 09:24) (18 - 20)  SpO2: 96% (12-29-19 @ 09:24) (94% - 96%)  Wt(kg): --        12-28-19 @ 07:01  -  12-29-19 @ 07:00  --------------------------------------------------------  IN: 260 mL / OUT: 750 mL / NET: -490 mL    12-29-19 @ 07:01  -  12-29-19 @ 11:25  --------------------------------------------------------  IN: 0 mL / OUT: 250 mL / NET: -250 mL      Physical Exam:  	Gen: NAD, frail -appearing  	Pulm: CTA B/L ant/lat fields  	CV: RRR, S1S2  	Abd: +BS, soft, nontender/nondistended  	: No suprapubic tenderness. + morley          Extremity: b/l trace pedal edema  	Neuro: A&O x 3             Access: R neck catheter    LABS/STUDIES  --------------------------------------------------------------------------------              8.3    6.72  >-----------<  221      [12-28-19 @ 11:56]              26.1     135  |  99  |  102  ----------------------------<  129      [12-29-19 @ 09:05]  4.4   |  23  |  2.58        Ca     8.4     [12-29-19 @ 09:05]    TPro  5.5  /  Alb  1.8  /  TBili  0.3  /  DBili  x   /  AST  51  /  ALT  47  /  AlkPhos  67  [12-29-19 @ 09:05]        CK 17      [12-28-19 @ 08:27]    eGFR if Non African American: 22 mL/min/1.73M2 (12-29 @ 09:05)  eGFR if African American: 26 mL/min/1.73M2 (12-29 @ 09:05)    Creatinine Trend:  SCr 2.58 [12-29 @ 09:05]  SCr 3.02 [12-28 @ 08:27]  SCr 3.39 [12-27 @ 09:07]  SCr 3.58 [12-26 @ 07:07]  SCr 3.32 [12-25 @ 09:35]              Urinalysis - [12-21-19 @ 19:48]      Color Yellow / Appearance Turbid / SG 1.020 / pH 5.5      Gluc Negative / Ketone Negative  / Bili Negative / Urobili <2 mg/dL       Blood Moderate / Protein 100 mg/dL / Leuk Est Large / Nitrite Negative       /  / Hyaline 0 / Gran  / Sq Epi  / Non Sq Epi 6 / Bacteria Negative        RYLEE: titer Negative, pattern --      [12-21-19 @ 17:35]  C3 Complement 25      [12-20-19 @ 12:18]  C4 Complement 21      [12-20-19 @ 12:18]  ANCA: cANCA Negative, pANCA Negative, atypical ANCA Negative      [12-21-19 @ 17:35]

## 2019-12-29 NOTE — PROGRESS NOTE ADULT - SUBJECTIVE AND OBJECTIVE BOX
Patient is a 79y old  Male who presents with a chief complaint of Back pain (29 Dec 2019 11:24)      SUBJECTIVE / OVERNIGHT EVENTS:    Patient seen and examined. no acute events.       Vital Signs Last 24 Hrs  T(C): 37.1 (29 Dec 2019 09:24), Max: 37.3 (28 Dec 2019 20:00)  T(F): 98.7 (29 Dec 2019 09:24), Max: 99.1 (28 Dec 2019 20:00)  HR: 70 (29 Dec 2019 09:24) (68 - 87)  BP: 134/77 (29 Dec 2019 09:24) (134/77 - 161/79)  BP(mean): --  RR: 18 (29 Dec 2019 09:24) (18 - 20)  SpO2: 96% (29 Dec 2019 09:24) (94% - 96%)  I&O's Summary    28 Dec 2019 07:01  -  29 Dec 2019 07:00  --------------------------------------------------------  IN: 260 mL / OUT: 750 mL / NET: -490 mL    29 Dec 2019 07:01  -  29 Dec 2019 12:46  --------------------------------------------------------  IN: 0 mL / OUT: 250 mL / NET: -250 mL        PE:  GENERAL: NAD, AAOx3  HEAD:  Atraumatic, Normocephalic  CHEST/LUNG: Coarse bs, rales bases  HEART: Regular rate and rhythm; no murmur  ABDOMEN: Soft, Nontender, Nondistended; Bowel sounds present  EXTREMITIES:  2+ Peripheral Pulses, +2 le edema  gu morley  SKIN: No rashes or lesions, right neck HD cath CDI  NEURO: No focal deficits    LABS:                        8.6    5.80  )-----------( 206      ( 29 Dec 2019 11:18 )             26.8     12-29    135  |  99  |  102<H>  ----------------------------<  129<H>  4.4   |  23  |  2.58<H>    Ca    8.4      29 Dec 2019 09:05    TPro  5.5<L>  /  Alb  1.8<L>  /  TBili  0.3  /  DBili  x   /  AST  51<H>  /  ALT  47<H>  /  AlkPhos  67  12-29      CAPILLARY BLOOD GLUCOSE        CARDIAC MARKERS ( 28 Dec 2019 08:27 )  x     / x     / 17 U/L / x     / x              RADIOLOGY & ADDITIONAL TESTS:    Imaging Personally Reviewed:  [x] YES  [ ] NO    Consultant(s) Notes Reviewed:  [x] YES  [ ] NO    MEDICATIONS  (STANDING):  acetaminophen  IVPB .. 1000 milliGRAM(s) IV Intermittent once  acetaminophen  IVPB .. 650 milliGRAM(s) IV Intermittent once  aspirin  chewable 81 milliGRAM(s) Oral daily  atorvastatin 10 milliGRAM(s) Oral at bedtime  chlorhexidine 4% Liquid 1 Application(s) Topical <User Schedule>  DAPTOmycin IVPB 800 milliGRAM(s) IV Intermittent every 48 hours  heparin  Injectable 5000 Unit(s) SubCutaneous every 8 hours  lidocaine   Patch 1 Patch Transdermal daily  metoprolol succinate ER 12.5 milliGRAM(s) Oral daily  oxymetazoline 0.05% Nasal Spray 2 Spray(s) Both Nostrils two times a day  pantoprazole    Tablet 40 milliGRAM(s) Oral before breakfast  polyethylene glycol 3350 17 Gram(s) Oral daily  senna 2 Tablet(s) Oral at bedtime  sodium chloride 0.65% Nasal 1 Spray(s) Both Nostrils three times a day    MEDICATIONS  (PRN):  acetaminophen   Tablet .. 650 milliGRAM(s) Oral every 6 hours PRN Temp greater or equal to 38C (100.4F), Mild Pain (1 - 3)  acetaminophen  IVPB .. 1000 milliGRAM(s) IV Intermittent once PRN Moderate Pain (4 - 6)  sodium chloride 0.9% lock flush 10 milliLiter(s) IV Push every 1 hour PRN Pre/post blood products, medications, blood draw, and to maintain line patency      Care Discussed with Consultants/Other Providers [x] YES  [ ] NO    HEALTH ISSUES - PROBLEM Dx:  MSSA bacteremia: MSSA bacteremia  Dysphagia: Dysphagia  Need for prophylactic measure: Need for prophylactic measure  Gout: Gout  Essential hypertension: Essential hypertension  CAD (coronary artery disease): CAD (coronary artery disease)  KRIS (acute kidney injury): KRIS (acute kidney injury)  Sepsis: Sepsis  Weakness of both lower extremities: Weakness of both lower extremities  Acute left-sided low back pain without sciatica: Acute left-sided low back pain without sciatica

## 2019-12-30 LAB
ANION GAP SERPL CALC-SCNC: 12 MMOL/L — SIGNIFICANT CHANGE UP (ref 5–17)
BUN SERPL-MCNC: 101 MG/DL — HIGH (ref 7–23)
CALCIUM SERPL-MCNC: 8.7 MG/DL — SIGNIFICANT CHANGE UP (ref 8.4–10.5)
CHLORIDE SERPL-SCNC: 96 MMOL/L — SIGNIFICANT CHANGE UP (ref 96–108)
CO2 SERPL-SCNC: 22 MMOL/L — SIGNIFICANT CHANGE UP (ref 22–31)
CREAT SERPL-MCNC: 2.42 MG/DL — HIGH (ref 0.5–1.3)
GLUCOSE SERPL-MCNC: 116 MG/DL — HIGH (ref 70–99)
HCT VFR BLD CALC: 29.8 % — LOW (ref 39–50)
HGB BLD-MCNC: 9.3 G/DL — LOW (ref 13–17)
MAGNESIUM SERPL-MCNC: 1.8 MG/DL — SIGNIFICANT CHANGE UP (ref 1.6–2.6)
MCHC RBC-ENTMCNC: 28.6 PG — SIGNIFICANT CHANGE UP (ref 27–34)
MCHC RBC-ENTMCNC: 31.2 GM/DL — LOW (ref 32–36)
MCV RBC AUTO: 91.7 FL — SIGNIFICANT CHANGE UP (ref 80–100)
NRBC # BLD: 0 /100 WBCS — SIGNIFICANT CHANGE UP (ref 0–0)
PLATELET # BLD AUTO: 231 K/UL — SIGNIFICANT CHANGE UP (ref 150–400)
POTASSIUM SERPL-MCNC: 4.4 MMOL/L — SIGNIFICANT CHANGE UP (ref 3.5–5.3)
POTASSIUM SERPL-SCNC: 4.4 MMOL/L — SIGNIFICANT CHANGE UP (ref 3.5–5.3)
RBC # BLD: 3.25 M/UL — LOW (ref 4.2–5.8)
RBC # FLD: 14.3 % — SIGNIFICANT CHANGE UP (ref 10.3–14.5)
SODIUM SERPL-SCNC: 130 MMOL/L — LOW (ref 135–145)
WBC # BLD: 6.79 K/UL — SIGNIFICANT CHANGE UP (ref 3.8–10.5)
WBC # FLD AUTO: 6.79 K/UL — SIGNIFICANT CHANGE UP (ref 3.8–10.5)

## 2019-12-30 PROCEDURE — 99232 SBSQ HOSP IP/OBS MODERATE 35: CPT

## 2019-12-30 RX ORDER — DAPTOMYCIN 500 MG/10ML
800 INJECTION, POWDER, LYOPHILIZED, FOR SOLUTION INTRAVENOUS EVERY 24 HOURS
Refills: 0 | Status: DISCONTINUED | OUTPATIENT
Start: 2019-12-30 | End: 2020-01-06

## 2019-12-30 RX ADMIN — Medication 650 MILLIGRAM(S): at 21:39

## 2019-12-30 RX ADMIN — OXYMETAZOLINE HYDROCHLORIDE 2 SPRAY(S): 0.5 SPRAY NASAL at 17:17

## 2019-12-30 RX ADMIN — ATORVASTATIN CALCIUM 10 MILLIGRAM(S): 80 TABLET, FILM COATED ORAL at 21:09

## 2019-12-30 RX ADMIN — Medication 650 MILLIGRAM(S): at 21:09

## 2019-12-30 RX ADMIN — PANTOPRAZOLE SODIUM 40 MILLIGRAM(S): 20 TABLET, DELAYED RELEASE ORAL at 05:16

## 2019-12-30 RX ADMIN — Medication 1 SPRAY(S): at 19:06

## 2019-12-30 RX ADMIN — DAPTOMYCIN 132 MILLIGRAM(S): 500 INJECTION, POWDER, LYOPHILIZED, FOR SOLUTION INTRAVENOUS at 17:17

## 2019-12-30 RX ADMIN — Medication 1 SPRAY(S): at 05:18

## 2019-12-30 RX ADMIN — LIDOCAINE 1 PATCH: 4 CREAM TOPICAL at 00:35

## 2019-12-30 RX ADMIN — LIDOCAINE 1 PATCH: 4 CREAM TOPICAL at 12:53

## 2019-12-30 RX ADMIN — OXYMETAZOLINE HYDROCHLORIDE 2 SPRAY(S): 0.5 SPRAY NASAL at 05:24

## 2019-12-30 RX ADMIN — LIDOCAINE 1 PATCH: 4 CREAM TOPICAL at 19:00

## 2019-12-30 RX ADMIN — Medication 12.5 MILLIGRAM(S): at 05:16

## 2019-12-30 RX ADMIN — Medication 1 SPRAY(S): at 21:11

## 2019-12-30 RX ADMIN — Medication 81 MILLIGRAM(S): at 12:51

## 2019-12-30 RX ADMIN — CHLORHEXIDINE GLUCONATE 1 APPLICATION(S): 213 SOLUTION TOPICAL at 10:41

## 2019-12-30 RX ADMIN — HEPARIN SODIUM 5000 UNIT(S): 5000 INJECTION INTRAVENOUS; SUBCUTANEOUS at 21:09

## 2019-12-30 RX ADMIN — HEPARIN SODIUM 5000 UNIT(S): 5000 INJECTION INTRAVENOUS; SUBCUTANEOUS at 05:23

## 2019-12-30 NOTE — PROGRESS NOTE ADULT - PROBLEM SELECTOR PLAN 2
now improved  MR Thoracic Spine no acute findings  nuclear scan nonspecific heterogeneous uptake in the thoracolumbar spine which may be secondary to post surgical change and/or degenerative change.  no acute intervention per neurosurgery  tylenol for mild pain  IV tylenol PRN  Constipation bowel regimen- senna, colace, miralax

## 2019-12-30 NOTE — PROGRESS NOTE ADULT - ATTENDING COMMENTS
12-30-19    Patient seen and examined, agree with the above assessment and plan by ALEX Estrada.  cont current mgmt

## 2019-12-30 NOTE — PROGRESS NOTE ADULT - SUBJECTIVE AND OBJECTIVE BOX
Patient is a 79y old  Male who presents with a chief complaint of Back pain (29 Dec 2019 12:46)      SUBJECTIVE / OVERNIGHT EVENTS:    Patient seen and examined. denies cp sob. sleeping after PT. dtr at bedside.      Vital Signs Last 24 Hrs  T(C): 37.2 (30 Dec 2019 09:55), Max: 37.7 (30 Dec 2019 01:07)  T(F): 98.9 (30 Dec 2019 09:55), Max: 99.8 (30 Dec 2019 01:07)  HR: 75 (30 Dec 2019 09:55) (64 - 94)  BP: 132/72 (30 Dec 2019 09:55) (126/73 - 150/80)  BP(mean): --  RR: 18 (30 Dec 2019 09:55) (18 - 18)  SpO2: 93% (30 Dec 2019 09:55) (93% - 95%)  I&O's Summary    29 Dec 2019 07:01  -  30 Dec 2019 07:00  --------------------------------------------------------  IN: 360 mL / OUT: 650 mL / NET: -290 mL        PE:  GENERAL: NAD, AAOx3  HEAD:  Atraumatic, Normocephalic  CHEST/LUNG: Coarse bs, rales bases  HEART: Regular rate and rhythm; no murmur  ABDOMEN: Soft, Nontender, Nondistended; Bowel sounds present  EXTREMITIES:  2+ Peripheral Pulses, +2 le edema  gu morley  SKIN: No rashes or lesions, right neck HD cath CDI  NEURO: No focal deficits    LABS:                        9.3    6.79  )-----------( 231      ( 30 Dec 2019 09:57 )             29.8     12-30    130<L>  |  96  |  101<H>  ----------------------------<  116<H>  4.4   |  22  |  2.42<H>    Ca    8.7      30 Dec 2019 09:57  Mg     1.8     12-30    TPro  5.5<L>  /  Alb  1.8<L>  /  TBili  0.3  /  DBili  x   /  AST  51<H>  /  ALT  47<H>  /  AlkPhos  67  12-29      CAPILLARY BLOOD GLUCOSE                RADIOLOGY & ADDITIONAL TESTS:    Imaging Personally Reviewed:  [x] YES  [ ] NO    Consultant(s) Notes Reviewed:  [x] YES  [ ] NO    MEDICATIONS  (STANDING):  acetaminophen  IVPB .. 1000 milliGRAM(s) IV Intermittent once  acetaminophen  IVPB .. 650 milliGRAM(s) IV Intermittent once  aspirin  chewable 81 milliGRAM(s) Oral daily  atorvastatin 10 milliGRAM(s) Oral at bedtime  chlorhexidine 4% Liquid 1 Application(s) Topical <User Schedule>  DAPTOmycin IVPB 800 milliGRAM(s) IV Intermittent every 48 hours  heparin  Injectable 5000 Unit(s) SubCutaneous every 8 hours  lidocaine   Patch 1 Patch Transdermal daily  metoprolol succinate ER 12.5 milliGRAM(s) Oral daily  oxymetazoline 0.05% Nasal Spray 2 Spray(s) Both Nostrils two times a day  pantoprazole    Tablet 40 milliGRAM(s) Oral before breakfast  polyethylene glycol 3350 17 Gram(s) Oral daily  senna 2 Tablet(s) Oral at bedtime  sodium chloride 0.65% Nasal 1 Spray(s) Both Nostrils three times a day    MEDICATIONS  (PRN):  acetaminophen   Tablet .. 650 milliGRAM(s) Oral every 6 hours PRN Temp greater or equal to 38C (100.4F), Mild Pain (1 - 3)  acetaminophen  IVPB .. 1000 milliGRAM(s) IV Intermittent once PRN Moderate Pain (4 - 6)  sodium chloride 0.9% lock flush 10 milliLiter(s) IV Push every 1 hour PRN Pre/post blood products, medications, blood draw, and to maintain line patency      Care Discussed with Consultants/Other Providers [x] YES  [ ] NO    HEALTH ISSUES - PROBLEM Dx:  MSSA bacteremia: MSSA bacteremia  Dysphagia: Dysphagia  Need for prophylactic measure: Need for prophylactic measure  Gout: Gout  Essential hypertension: Essential hypertension  CAD (coronary artery disease): CAD (coronary artery disease)  KRIS (acute kidney injury): KRIS (acute kidney injury)  Sepsis: Sepsis  Weakness of both lower extremities: Weakness of both lower extremities  Acute left-sided low back pain without sciatica: Acute left-sided low back pain without sciatica

## 2019-12-30 NOTE — PROGRESS NOTE ADULT - SUBJECTIVE AND OBJECTIVE BOX
CC: F/U bacteremia    Saw/spoke to patient. No fevers, no chills. No new complaints. Improved.    Allergies  Indocin (Unknown)  oxacillin (Unknown)  Zyvox (Unknown)    ANTIMICROBIALS:  DAPTOmycin IVPB 800 every 48 hours    PE:    Vital Signs Last 24 Hrs  T(C): 36.6 (30 Dec 2019 13:35), Max: 37.7 (30 Dec 2019 01:07)  T(F): 97.8 (30 Dec 2019 13:35), Max: 99.8 (30 Dec 2019 01:07)  HR: 70 (30 Dec 2019 13:35) (64 - 94)  BP: 146/67 (30 Dec 2019 13:35) (126/73 - 150/80)  RR: 18 (30 Dec 2019 13:35) (18 - 18)  SpO2: 96% (30 Dec 2019 13:35) (93% - 96%)    Gen: AOx3, NAD, non-toxic, pleasant  CV: S1+S2 normal, nontachycardic  Resp: Clear bilat, no resp distress, no crackles/wheezes  Abd: Soft, nontender, +BS  Ext: No LE edema, no wounds    LABS:                        9.3    6.79  )-----------( 231      ( 30 Dec 2019 09:57 )             29.8     12-30    130<L>  |  96  |  101<H>  ----------------------------<  116<H>  4.4   |  22  |  2.42<H>    Ca    8.7      30 Dec 2019 09:57  Mg     1.8     12-30    TPro  5.5<L>  /  Alb  1.8<L>  /  TBili  0.3  /  DBili  x   /  AST  51<H>  /  ALT  47<H>  /  AlkPhos  67  12-29    MICROBIOLOGY:    .Blood Blood  12-14-19   No growth at 5 days.     .Blood Blood-Peripheral  12-12-19   No growth at 5 days.     .Blood Blood  12-09-19   Growth in aerobic and anaerobic bottles: Staphylococcus aureus  See previous culture [id number]  --    Growth in aerobic and anaerobic bottles: Gram Positive Cocci in Clusters    .Blood Blood  12-08-19   Growth in aerobic and anaerobic bottles: Staphylococcus aureus    .Urine Clean Catch (Midstream)  12-08-19   No growth    RADIOLOGY:    12/19 US:    IMPRESSION:     Redemonstrated thickened bladder wall with intraluminal irregularity and internal trabeculations. Small internal debris. Please correlate with urinalysis.     Enlarged prostate.

## 2019-12-30 NOTE — CHART NOTE - NSCHARTNOTEFT_GEN_A_CORE
Nutrition Follow Up Note  Patient seen for: nutrition follow-up     Chart reviewed, events noted. This is a 78 y/o male with h/o of CAD s/p PCI 1/2017 with 10% LAD stenosis OM1, HTN, HLD, GERD, Gout, Osteoarthritis and chronic back pain, s/p multiple laminectomies and spinal fusion, left TKR who presents with worsening back pain and fevers for approx a week. bld cx MSSA bacteremia. Pt noted with KRIS, low c3 and nl c4 suggestive of post infectious glomerulonephritis however family declining renal biopsy, was receiving HD. Per Nephrology plan to discontinue HD catheter.      Source: patient, daughter at bedside, medical record     Diet : Kosher + Dysphagia 3 with Nectar thick liquids + Renal    Pt seen lying in bed, feeling tired at recent PT session. Patient reports good PO intake and appetite, completing most meals. Pt receiving foods from home, noted with multiple items at bedside. Per daughter, family interested in SLP re-evaluation. No acute GI distress noted.       PO intake : 25-60% of meals per nursing flow sheets, family reports good PO intake, suspect pt consuming additional food items from home not captured      Source for PO intake: pt/family report, per nursing flow sheets      Enteral /Parenteral Nutrition: N/A    Weights:   Post HD wt: 238.3lbs (12/24)  Post-HD wt: 232.5 lbs (12/21)  Dosing wt: 229 lbs (12/7/19)    Pertinent Medications: MEDICATIONS  (STANDING):  acetaminophen  IVPB .. 1000 milliGRAM(s) IV Intermittent once  acetaminophen  IVPB .. 650 milliGRAM(s) IV Intermittent once  aspirin  chewable 81 milliGRAM(s) Oral daily  atorvastatin 10 milliGRAM(s) Oral at bedtime  chlorhexidine 4% Liquid 1 Application(s) Topical <User Schedule>  DAPTOmycin IVPB 800 milliGRAM(s) IV Intermittent every 48 hours  heparin  Injectable 5000 Unit(s) SubCutaneous every 8 hours  lidocaine   Patch 1 Patch Transdermal daily  metoprolol succinate ER 12.5 milliGRAM(s) Oral daily  oxymetazoline 0.05% Nasal Spray 2 Spray(s) Both Nostrils two times a day  pantoprazole    Tablet 40 milliGRAM(s) Oral before breakfast  polyethylene glycol 3350 17 Gram(s) Oral daily  senna 2 Tablet(s) Oral at bedtime  sodium chloride 0.65% Nasal 1 Spray(s) Both Nostrils three times a day    MEDICATIONS  (PRN):  acetaminophen   Tablet .. 650 milliGRAM(s) Oral every 6 hours PRN Temp greater or equal to 38C (100.4F), Mild Pain (1 - 3)  acetaminophen  IVPB .. 1000 milliGRAM(s) IV Intermittent once PRN Moderate Pain (4 - 6)  sodium chloride 0.9% lock flush 10 milliLiter(s) IV Push every 1 hour PRN Pre/post blood products, medications, blood draw, and to maintain line patency    Pertinent Labs: 12-30 @ 09:57: Na 130<L>, <H>, Cr 2.42<H>, <H>, K+ 4.4, Phos --, Mg 1.8, Alk Phos --, ALT/SGPT --, AST/SGOT --, HbA1c -      Skin per nursing documentation: free of pressure injuries per nursing flow sheets   Edema: +3 generalized, left/right arms, hands, ankles     Estimated Needs:   [x ] no change since previous assessment  [ ] recalculated:     Previous Nutrition Diagnosis: inadequate Energy Intake + Increased Nutrient needs   Nutrition Diagnosis is: improved with good PO intake, pressure injuries discontinued on 12/25, HD catheter to be removed      New Nutrition Diagnosis: N/A       Interventions:   Recommend  1) Continue current diet as tolerated. Defer diet consistency to SLP/team.   2) Provided diet education reinforcement as needed.   3) Continue Mighty Shakes TID to supplement PO intake as needed.   4) RD to remain available and follow-up as medically appropriate.     Monitoring and Evaluation:     Continue to monitor Nutritional intake, Tolerance to diet prescription, weights, labs, skin integrity    RD remains available upon request and will follow up per protocol  Deisi Llanes RD, CDN, Pager # [phone]

## 2019-12-30 NOTE — PROVIDER CONTACT NOTE (OTHER) - RECOMMENDATIONS
PA said that she is not sure why that note was put into the heparin order but she said to give the morning dose of heparin anyways.

## 2019-12-30 NOTE — PROGRESS NOTE ADULT - PROBLEM SELECTOR PLAN 3
MSSA bacteremia persistent, source 2/2 pneumonia vs cellulitis, cannot ro hardware despite negative imaging  cont dapto per ID for 6 weeks, will need PICC once KRIS resolves  12/12 12/14 blood cx NTD  Tylenol PRN fever

## 2019-12-30 NOTE — PROGRESS NOTE ADULT - ATTENDING COMMENTS
dispo:   creat improving  HD cath removal per renal  PICC line placement once cleared by renal  dw dtr at bedside dispo:   creat improving  HD cath removal per renal  TOV once creat stable  PICC line placement once cleared by renal  dw dtr at bedside

## 2019-12-30 NOTE — PROGRESS NOTE ADULT - SUBJECTIVE AND OBJECTIVE BOX
CARDIOLOGY FOLLOW UP - Dr. Lee    CC no cp/sob       PHYSICAL EXAM:  T(C): 36.6 (12-30-19 @ 13:35), Max: 37.7 (12-30-19 @ 01:07)  HR: 70 (12-30-19 @ 13:35) (64 - 94)  BP: 146/67 (12-30-19 @ 13:35) (126/73 - 150/80)  RR: 18 (12-30-19 @ 13:35) (18 - 18)  SpO2: 96% (12-30-19 @ 13:35) (93% - 96%)  Wt(kg): --  I&O's Summary    29 Dec 2019 07:01  -  30 Dec 2019 07:00  --------------------------------------------------------  IN: 360 mL / OUT: 650 mL / NET: -290 mL    30 Dec 2019 07:01  -  30 Dec 2019 14:04  --------------------------------------------------------  IN: 350 mL / OUT: 300 mL / NET: 50 mL        Appearance: Normal	  Cardiovascular: Normal S1 S2,RRR, No JVD, No murmurs  Respiratory: Lungs clear to auscultation	  Gastrointestinal:  Soft, Non-tender, + BS	  Extremities: Normal range of motion, No clubbing, cyanosis or edema        MEDICATIONS  (STANDING):  acetaminophen  IVPB .. 1000 milliGRAM(s) IV Intermittent once  acetaminophen  IVPB .. 650 milliGRAM(s) IV Intermittent once  aspirin  chewable 81 milliGRAM(s) Oral daily  atorvastatin 10 milliGRAM(s) Oral at bedtime  chlorhexidine 4% Liquid 1 Application(s) Topical <User Schedule>  DAPTOmycin IVPB 800 milliGRAM(s) IV Intermittent every 48 hours  heparin  Injectable 5000 Unit(s) SubCutaneous every 8 hours  lidocaine   Patch 1 Patch Transdermal daily  metoprolol succinate ER 12.5 milliGRAM(s) Oral daily  oxymetazoline 0.05% Nasal Spray 2 Spray(s) Both Nostrils two times a day  pantoprazole    Tablet 40 milliGRAM(s) Oral before breakfast  polyethylene glycol 3350 17 Gram(s) Oral daily  senna 2 Tablet(s) Oral at bedtime  sodium chloride 0.65% Nasal 1 Spray(s) Both Nostrils three times a day      TELEMETRY: 	    ECG:  	  RADIOLOGY:   DIAGNOSTIC TESTING:  [ ] Echocardiogram:  [ ]  Catheterization:  [ ] Stress Test:    OTHER: 	    LABS:	 	                                9.3    6.79  )-----------( 231      ( 30 Dec 2019 09:57 )             29.8     12-30    130<L>  |  96  |  101<H>  ----------------------------<  116<H>  4.4   |  22  |  2.42<H>    Ca    8.7      30 Dec 2019 09:57  Mg     1.8     12-30    TPro  5.5<L>  /  Alb  1.8<L>  /  TBili  0.3  /  DBili  x   /  AST  51<H>  /  ALT  47<H>  /  AlkPhos  67  12-29

## 2019-12-30 NOTE — PROGRESS NOTE ADULT - ASSESSMENT
78 y/o male with h/o of CAD s/p PCI 1/2017 with 10% LAD stenosis OM1, HTN, HLD, GERD, Gout, Osteoarthritis and chronic back pain, s/p multiple laminectomies and spinal fusion, lower back pain and weakness in his bilateral lower extremities that progressed to inability to ambulate. dx with MSSA bacteremia. on ancef. also received lasix as well. has had hesitancy urinating. on 12/16/ KRIS       1- KRIS   2- MSSA bacteremia   3- dysphagia   4- cad  5- htn   6- acidosis       suspect kris in setting of  +/-  AIN + /- due to post infectious GN   U eos neg   renal sono/bladder sono non revealing for obstruction     pt with low c3 and nl c4 suggestive of post infectious glomerulonephritis given now also hematuria present  cr improving   restart aspiring   trend uo it seems to be improving   to cont dapto    dc hd cath   and morley TOV   d/w family,.

## 2019-12-30 NOTE — PROGRESS NOTE ADULT - PROBLEM SELECTOR PLAN 1
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

## 2019-12-30 NOTE — PROGRESS NOTE ADULT - SUBJECTIVE AND OBJECTIVE BOX
[facility]   [phone]  RENAL FOLLOW UP NOTE  --------------------------------------------------------------------------------  Chief Complaint:    24 hour events/subjective:    seen earlier. wife and son at bedside.   states feels a bit better.     PAST HISTORY  --------------------------------------------------------------------------------  No significant changes to PMH, PSH, FHx, SHx, unless otherwise noted    ALLERGIES & MEDICATIONS  --------------------------------------------------------------------------------  Allergies    Indocin (Unknown)  oxacillin (Unknown)  Zyvox (Unknown)    Intolerances      Standing Inpatient Medications  acetaminophen  IVPB .. 1000 milliGRAM(s) IV Intermittent once  acetaminophen  IVPB .. 650 milliGRAM(s) IV Intermittent once  aspirin  chewable 81 milliGRAM(s) Oral daily  atorvastatin 10 milliGRAM(s) Oral at bedtime  chlorhexidine 4% Liquid 1 Application(s) Topical <User Schedule>  DAPTOmycin IVPB 800 milliGRAM(s) IV Intermittent every 24 hours  heparin  Injectable 5000 Unit(s) SubCutaneous every 8 hours  lidocaine   Patch 1 Patch Transdermal daily  metoprolol succinate ER 12.5 milliGRAM(s) Oral daily  oxymetazoline 0.05% Nasal Spray 2 Spray(s) Both Nostrils two times a day  pantoprazole    Tablet 40 milliGRAM(s) Oral before breakfast  polyethylene glycol 3350 17 Gram(s) Oral daily  senna 2 Tablet(s) Oral at bedtime  sodium chloride 0.65% Nasal 1 Spray(s) Both Nostrils three times a day    PRN Inpatient Medications  acetaminophen   Tablet .. 650 milliGRAM(s) Oral every 6 hours PRN  acetaminophen  IVPB .. 1000 milliGRAM(s) IV Intermittent once PRN  sodium chloride 0.9% lock flush 10 milliLiter(s) IV Push every 1 hour PRN      REVIEW OF SYSTEMS  --------------------------------------------------------------------------------    Gen: denie fevers/chills,  CVS: denies chest pain/palpitations  Resp: denies SOB/Cough  GI: Denies N/V/Abd pain  : Denies dysuria    All other systems were reviewed and are negative, except as noted.    VITALS/PHYSICAL EXAM  --------------------------------------------------------------------------------  T(C): 37.5 (12-30-19 @ 17:11), Max: 37.7 (12-30-19 @ 01:07)  HR: 79 (12-30-19 @ 17:11) (64 - 94)  BP: 148/69 (12-30-19 @ 17:11) (126/73 - 150/80)  RR: 18 (12-30-19 @ 17:11) (18 - 18)  SpO2: 96% (12-30-19 @ 17:11) (93% - 96%)  Wt(kg): --        12-29-19 @ 07:01  -  12-30-19 @ 07:00  --------------------------------------------------------  IN: 360 mL / OUT: 650 mL / NET: -290 mL    12-30-19 @ 07:01  -  12-30-19 @ 20:42  --------------------------------------------------------  IN: 450 mL / OUT: 300 mL / NET: 150 mL      Physical Exam:  	    Gen: elderly M remains alert   	no jvd  	Pulm: decrease bs  no rales or ronchi or wheezing  	CV: RRR, S1S2; no rub  	Abd: +BS, soft, nontender/nondistended  	: No suprapubic tenderness  	UE: Warm, no cyanosis  no clubbing,  2+ edema   	LE: Warm, no cyanosis  no clubbing, 2- edema 	      LABS/STUDIES  --------------------------------------------------------------------------------              9.3    6.79  >-----------<  231      [12-30-19 @ 09:57]              29.8     130  |  96  |  101  ----------------------------<  116      [12-30-19 @ 09:57]  4.4   |  22  |  2.42        Ca     8.7     [12-30-19 @ 09:57]      Mg     1.8     [12-30-19 @ 09:57]    TPro  5.5  /  Alb  1.8  /  TBili  0.3  /  DBili  x   /  AST  51  /  ALT  47  /  AlkPhos  67  [12-29-19 @ 09:05]          Creatinine Trend:  SCr 2.42 [12-30 @ 09:57]  SCr 2.58 [12-29 @ 09:05]  SCr 3.02 [12-28 @ 08:27]  SCr 3.39 [12-27 @ 09:07]  SCr 3.58 [12-26 @ 07:07]              Urinalysis - [12-21-19 @ 19:48]      Color Yellow / Appearance Turbid / SG 1.020 / pH 5.5      Gluc Negative / Ketone Negative  / Bili Negative / Urobili <2 mg/dL       Blood Moderate / Protein 100 mg/dL / Leuk Est Large / Nitrite Negative       /  / Hyaline 0 / Gran  / Sq Epi  / Non Sq Epi 6 / Bacteria Negative        RYLEE: titer Negative, pattern --      [12-21-19 @ 17:35]  C3 Complement 25      [12-20-19 @ 12:18]  C4 Complement 21      [12-20-19 @ 12:18]  ANCA: cANCA Negative, pANCA Negative, atypical ANCA Negative      [12-21-19 @ 17:35]

## 2019-12-30 NOTE — CHART NOTE - NSCHARTNOTEFT_GEN_A_CORE
MAHESH CANELA    Daughter requesting to have patient's diet changed to regular. Patient's daughter stated that she has been bringing patient regular diet food from home and feeding the patient. Discussed with the daughter dysphagia diet recommendation, she stated she is aware of the recommendation, but stated she has been feeding the patient and he is swallowing fine and is requesting the diet changed to regular.  Discussed the above with Dr Deshpande and is agreeable to changed patient's diet as per daughter request.           SHAYY Boyer AGACNP-c

## 2019-12-30 NOTE — PROVIDER CONTACT NOTE (OTHER) - ACTION/TREATMENT ORDERED:
CHUN Cloud said she will check with day team and make the day RN's aware of any changes about the heparin shot.

## 2019-12-30 NOTE — PROVIDER CONTACT NOTE (OTHER) - ASSESSMENT
PA called to see if that order still stands since RN's have been giving pt heparin q 8 after 12 am on 12/26.

## 2019-12-30 NOTE — PROGRESS NOTE ADULT - ASSESSMENT
80 yo M with chronic low back pain sp multiple laminectomy and fusion p/w 1 week of worsening back pain along with weakness in bilateral lower extremities and inability to walk since last night  Leukocytosis, fever  MR L spine and T spine prelim but no emergent findings  Prior history of MSSA bacteremia (2016)  History oxacillin allergy (? fever), Zyvox ([facility]); tolerated vanco on this visit  BCX with MSSA, high grade, BCX 12/12 NGTD  CT A/P/C/H neg  NM tagged WBC uptake in thoracolumbar spine--degen disease?  TTE equivocal, JAHAIRA reassuring  High suspicion for spinal involvement despite negative MRI--monitor closely  KRIS improving  Overall,  1) MSSA Bacteremia--Dapto 800mg q 24, weekly CKs, monitor Cr  - When DC planning, anticipate will need Dapto via IV for 8 week course from negative BCX (would ensure resolution of acute renal issues before pursuing PICC--okay to place PICC as long as no signs sepsis and BCXs remain negative)  2) KRIS--trend Cr to normal  3) Leukocytosis--Trend WBC  4) Fever, monitor for further signs fever    My colleagues will be covering this patient on 12/31-1/1, I will return 1/2/20, please call [phone] with questions or change in status.     Fredy Huynh MD  Pager [phone]  After 5pm and on weekends call [phone]

## 2019-12-31 LAB
ANION GAP SERPL CALC-SCNC: 13 MMOL/L — SIGNIFICANT CHANGE UP (ref 5–17)
BUN SERPL-MCNC: 105 MG/DL — HIGH (ref 7–23)
CALCIUM SERPL-MCNC: 8.8 MG/DL — SIGNIFICANT CHANGE UP (ref 8.4–10.5)
CHLORIDE SERPL-SCNC: 96 MMOL/L — SIGNIFICANT CHANGE UP (ref 96–108)
CO2 SERPL-SCNC: 21 MMOL/L — LOW (ref 22–31)
CREAT SERPL-MCNC: 2.37 MG/DL — HIGH (ref 0.5–1.3)
GLUCOSE SERPL-MCNC: 159 MG/DL — HIGH (ref 70–99)
HCT VFR BLD CALC: 28.1 % — LOW (ref 39–50)
HGB BLD-MCNC: 8.7 G/DL — LOW (ref 13–17)
MCHC RBC-ENTMCNC: 28.6 PG — SIGNIFICANT CHANGE UP (ref 27–34)
MCHC RBC-ENTMCNC: 31 GM/DL — LOW (ref 32–36)
MCV RBC AUTO: 92.4 FL — SIGNIFICANT CHANGE UP (ref 80–100)
NRBC # BLD: 0 /100 WBCS — SIGNIFICANT CHANGE UP (ref 0–0)
PLATELET # BLD AUTO: 213 K/UL — SIGNIFICANT CHANGE UP (ref 150–400)
POTASSIUM SERPL-MCNC: 4.5 MMOL/L — SIGNIFICANT CHANGE UP (ref 3.5–5.3)
POTASSIUM SERPL-SCNC: 4.5 MMOL/L — SIGNIFICANT CHANGE UP (ref 3.5–5.3)
RBC # BLD: 3.04 M/UL — LOW (ref 4.2–5.8)
RBC # FLD: 14.3 % — SIGNIFICANT CHANGE UP (ref 10.3–14.5)
SODIUM SERPL-SCNC: 130 MMOL/L — LOW (ref 135–145)
WBC # BLD: 6.77 K/UL — SIGNIFICANT CHANGE UP (ref 3.8–10.5)
WBC # FLD AUTO: 6.77 K/UL — SIGNIFICANT CHANGE UP (ref 3.8–10.5)

## 2019-12-31 PROCEDURE — ZZZZZ: CPT

## 2019-12-31 PROCEDURE — 36573 INSJ PICC RS&I 5 YR+: CPT

## 2019-12-31 RX ORDER — SODIUM CHLORIDE 9 MG/ML
10 INJECTION INTRAMUSCULAR; INTRAVENOUS; SUBCUTANEOUS
Refills: 0 | Status: DISCONTINUED | OUTPATIENT
Start: 2019-12-31 | End: 2020-01-12

## 2019-12-31 RX ORDER — CHLORHEXIDINE GLUCONATE 213 G/1000ML
1 SOLUTION TOPICAL
Refills: 0 | Status: DISCONTINUED | OUTPATIENT
Start: 2019-12-31 | End: 2020-01-11

## 2019-12-31 RX ADMIN — Medication 1 SPRAY(S): at 22:09

## 2019-12-31 RX ADMIN — POLYETHYLENE GLYCOL 3350 17 GRAM(S): 17 POWDER, FOR SOLUTION ORAL at 12:09

## 2019-12-31 RX ADMIN — Medication 81 MILLIGRAM(S): at 12:09

## 2019-12-31 RX ADMIN — DAPTOMYCIN 132 MILLIGRAM(S): 500 INJECTION, POWDER, LYOPHILIZED, FOR SOLUTION INTRAVENOUS at 18:58

## 2019-12-31 RX ADMIN — Medication 1000 MILLIGRAM(S): at 16:10

## 2019-12-31 RX ADMIN — PANTOPRAZOLE SODIUM 40 MILLIGRAM(S): 20 TABLET, DELAYED RELEASE ORAL at 05:50

## 2019-12-31 RX ADMIN — Medication 1 SPRAY(S): at 05:51

## 2019-12-31 RX ADMIN — HEPARIN SODIUM 5000 UNIT(S): 5000 INJECTION INTRAVENOUS; SUBCUTANEOUS at 22:09

## 2019-12-31 RX ADMIN — LIDOCAINE 1 PATCH: 4 CREAM TOPICAL at 12:08

## 2019-12-31 RX ADMIN — LIDOCAINE 1 PATCH: 4 CREAM TOPICAL at 20:14

## 2019-12-31 RX ADMIN — Medication 400 MILLIGRAM(S): at 15:45

## 2019-12-31 RX ADMIN — CHLORHEXIDINE GLUCONATE 1 APPLICATION(S): 213 SOLUTION TOPICAL at 11:00

## 2019-12-31 RX ADMIN — LIDOCAINE 1 PATCH: 4 CREAM TOPICAL at 00:00

## 2019-12-31 RX ADMIN — OXYMETAZOLINE HYDROCHLORIDE 2 SPRAY(S): 0.5 SPRAY NASAL at 18:59

## 2019-12-31 RX ADMIN — Medication 12.5 MILLIGRAM(S): at 05:50

## 2019-12-31 RX ADMIN — OXYMETAZOLINE HYDROCHLORIDE 2 SPRAY(S): 0.5 SPRAY NASAL at 05:51

## 2019-12-31 RX ADMIN — SENNA PLUS 2 TABLET(S): 8.6 TABLET ORAL at 22:08

## 2019-12-31 RX ADMIN — HEPARIN SODIUM 5000 UNIT(S): 5000 INJECTION INTRAVENOUS; SUBCUTANEOUS at 05:51

## 2019-12-31 RX ADMIN — ATORVASTATIN CALCIUM 10 MILLIGRAM(S): 80 TABLET, FILM COATED ORAL at 22:08

## 2019-12-31 NOTE — PROGRESS NOTE ADULT - ATTENDING COMMENTS
dispo:  PICC line for 8 total wks IV daptomycin  TOV  dc planning to [facility]    plan dw dtr at bedside    Dr. Stevenson will take over care tomorrow 1/1/20.  Please contact with any questions or concerns [phone].

## 2019-12-31 NOTE — PROGRESS NOTE ADULT - PROBLEM SELECTOR PLAN 1
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

## 2019-12-31 NOTE — PROGRESS NOTE ADULT - SUBJECTIVE AND OBJECTIVE BOX
Patient is a 79y old  Male who presents with a chief complaint of Back pain (31 Dec 2019 10:32)      SUBJECTIVE / OVERNIGHT EVENTS:    Patient seen and examined. sitting in bed. worked with PT and co shaking. denies cp sob.      Vital Signs Last 24 Hrs  T(C): 36.6 (31 Dec 2019 09:17), Max: 37.5 (30 Dec 2019 17:11)  T(F): 97.9 (31 Dec 2019 09:17), Max: 99.5 (30 Dec 2019 17:11)  HR: 75 (31 Dec 2019 09:17) (70 - 91)  BP: 137/66 (31 Dec 2019 09:17) (137/66 - 157/80)  BP(mean): --  RR: 18 (31 Dec 2019 09:17) (18 - 18)  SpO2: 95% (31 Dec 2019 09:17) (94% - 96%)  I&O's Summary    30 Dec 2019 07:01  -  31 Dec 2019 07:00  --------------------------------------------------------  IN: 730 mL / OUT: 725 mL / NET: 5 mL    31 Dec 2019 07:01  -  31 Dec 2019 12:39  --------------------------------------------------------  IN: 100 mL / OUT: 200 mL / NET: -100 mL        PE:  GENERAL: NAD, AAOx3  HEAD:  Atraumatic, Normocephalic  CHEST/LUNG: Coarse bs  HEART: Regular rate and rhythm; no murmur  ABDOMEN: Soft, Nontender, Nondistended; Bowel sounds present  EXTREMITIES:  2+ Peripheral Pulses, +2 le edema  gu morley  SKIN: No rashes or lesions, right neck HD cath CDI  NEURO: No focal deficits    LABS:                        8.7    6.77  )-----------( 213      ( 31 Dec 2019 09:34 )             28.1     12-31    130<L>  |  96  |  105<H>  ----------------------------<  159<H>  4.5   |  21<L>  |  2.37<H>    Ca    8.8      31 Dec 2019 09:22  Mg     1.8     12-30        CAPILLARY BLOOD GLUCOSE                RADIOLOGY & ADDITIONAL TESTS:    Imaging Personally Reviewed:  [x] YES  [ ] NO    Consultant(s) Notes Reviewed:  [x] YES  [ ] NO    MEDICATIONS  (STANDING):  acetaminophen  IVPB .. 1000 milliGRAM(s) IV Intermittent once  acetaminophen  IVPB .. 650 milliGRAM(s) IV Intermittent once  aspirin  chewable 81 milliGRAM(s) Oral daily  atorvastatin 10 milliGRAM(s) Oral at bedtime  chlorhexidine 4% Liquid 1 Application(s) Topical <User Schedule>  DAPTOmycin IVPB 800 milliGRAM(s) IV Intermittent every 24 hours  heparin  Injectable 5000 Unit(s) SubCutaneous every 8 hours  lidocaine   Patch 1 Patch Transdermal daily  metoprolol succinate ER 12.5 milliGRAM(s) Oral daily  oxymetazoline 0.05% Nasal Spray 2 Spray(s) Both Nostrils two times a day  pantoprazole    Tablet 40 milliGRAM(s) Oral before breakfast  polyethylene glycol 3350 17 Gram(s) Oral daily  senna 2 Tablet(s) Oral at bedtime  sodium chloride 0.65% Nasal 1 Spray(s) Both Nostrils three times a day    MEDICATIONS  (PRN):  acetaminophen   Tablet .. 650 milliGRAM(s) Oral every 6 hours PRN Temp greater or equal to 38C (100.4F), Mild Pain (1 - 3)  acetaminophen  IVPB .. 1000 milliGRAM(s) IV Intermittent once PRN Moderate Pain (4 - 6)  sodium chloride 0.9% lock flush 10 milliLiter(s) IV Push every 1 hour PRN Pre/post blood products, medications, blood draw, and to maintain line patency      Care Discussed with Consultants/Other Providers [x] YES  [ ] NO    HEALTH ISSUES - PROBLEM Dx:  MSSA bacteremia: MSSA bacteremia  Dysphagia: Dysphagia  Need for prophylactic measure: Need for prophylactic measure  Gout: Gout  Essential hypertension: Essential hypertension  CAD (coronary artery disease): CAD (coronary artery disease)  KRIS (acute kidney injury): KRIS (acute kidney injury)  Sepsis: Sepsis  Weakness of both lower extremities: Weakness of both lower extremities  Acute left-sided low back pain without sciatica: Acute left-sided low back pain without sciatica

## 2019-12-31 NOTE — ADVANCED PRACTICE NURSE CONSULT - ASSESSMENT
Patient is on a Anaforere P 500 low airloss surface and is being turned and positioned as per the nursing documentation.  On assessment ( RN caring for patient at [facility]) patient has wounds on each mid buttock.  The right and the left buttock present with non viable tissue in the middle of each wound and viable granular tissue working out towards the edges of the wounds.  The area over the coccyx is red and viable tissue.   The periwound skin appears intact and there is no drainage nor odor noted at this time.  The wounds on each buttock appear to be a mirror image of one another and may be experiencing skin to skin contact.  To protect these area will recommend applying Aquacel rope on each buttock and then gauze with a transparent film dressing.  Patient did not appear to have any discomfort during assessment.

## 2019-12-31 NOTE — PROGRESS NOTE ADULT - ASSESSMENT
79 yr old male with HTN, DCHF, CAD, S/P PCI to OM1, Mid LAD (9/2014, [facility], [facility], [facility]) hx of ischemic CMP, AS, DM HLD, GERD, GOUT, s/p multiple spinal surgery admitted with back pain and fever    1. Back Pain  -stable, MRI noted, neuro/neuro sx followup  -ID f/u, pain control     2. CAD s/p PCI (OM1/LAD)   -stable, cont statin and asa     3. Acute on Chronic systolic & Diastolic CHF, New LV dysfunction   -echo with severe LV sys dysfxn, EF 20-25%, mild diastolic dysfxn  -LAZARO 12/13 with moderate lv dysfunction  -recent echo few months ago with normal LV function  -possible new cardiomyopathy in setting of infection, sepsis  -will continue medical therapy for now as he is not a candidate for ischemic eval   -+KRIS, +peripheral edema, no inc dyspnea - from third spacing, hold further diuresis   -c/w low dose toprol xl 12.5, was off bb as outpt due to fatigue  -possible low dose ace/arb in future post hd and If ok by renal     4. Aortic Stenosis  -stable, severe on LAZARO with valve area of 0.9, tte this admit and as outpt with moderate AS  -gradients low on lazaro, perhaps due to new lv dysfxn  -YULY likely underestimated  -patient was asymptomatic as outpt from valve disease  -no further workup indicated, will follow as outpatient     5. HTN   -BP stable, continue with current antihtn meds     6. Fever/ Sepsis -resolved  + blood cultures with gram positive Cocci in clusters , Repeat NGTD  -iv abx, ID f/u   -LAZARO without endocarditis   -encephalopathy ?? delirium from infection/kris/dehydration ??  med f/u    7. KRIS? AIN  renal function improving  renal f/u  s/p HD cath placement   HD per renal   no plans for renal bx    8. Anemia  -consider heme eval  -workup per medicine    dvt ppx

## 2019-12-31 NOTE — ADVANCED PRACTICE NURSE CONSULT - RECOMMEDATIONS
1.  Aquacel dressing to right and left buttock wound and cover with gauze and transparent film dressing daily and prn.  2.  Nutritional support  3.  Continence care to keep skin dry.   4.  Continue with turning and positioning as needed.  Remain available as requested by staff

## 2019-12-31 NOTE — PROGRESS NOTE ADULT - SUBJECTIVE AND OBJECTIVE BOX
CC: no events, concerned about his anemia    TELEMETRY:     PHYSICAL EXAM:    T(C): 36.6 (12-31-19 @ 09:17), Max: 37.5 (12-30-19 @ 17:11)  HR: 75 (12-31-19 @ 09:17) (70 - 91)  BP: 137/66 (12-31-19 @ 09:17) (137/66 - 157/80)  RR: 18 (12-31-19 @ 09:17) (18 - 18)  SpO2: 95% (12-31-19 @ 09:17) (94% - 96%)  Wt(kg): --  I&O's Summary    30 Dec 2019 07:01  -  31 Dec 2019 07:00  --------------------------------------------------------  IN: 730 mL / OUT: 725 mL / NET: 5 mL    31 Dec 2019 07:01  -  31 Dec 2019 10:32  --------------------------------------------------------  IN: 100 mL / OUT: 200 mL / NET: -100 mL        Appearance: Normal	  Cardiovascular: Normal S1 S2,RRR, No JVD, No murmurs  Respiratory: Lungs clear to auscultation	  Gastrointestinal:  Soft, Non-tender, + BS	  Extremities: Normal range of motion, No clubbing, cyanosis or edema  Vascular: Peripheral pulses palpable 2+ bilaterally     LABS:	 	                          8.7    6.77  )-----------( 213      ( 31 Dec 2019 09:34 )             28.1     12-31    130<L>  |  96  |  105<H>  ----------------------------<  159<H>  4.5   |  21<L>  |  2.37<H>    Ca    8.8      31 Dec 2019 09:22  Mg     1.8     12-30            CARDIAC MARKERS:

## 2019-12-31 NOTE — PROGRESS NOTE ADULT - PROBLEM SELECTOR PLAN 3
MSSA bacteremia persistent, source 2/2 pneumonia vs cellulitis, cannot ro hardware despite negative imaging  cont dapto per ID for 8 weeks, IR for PICC, weekly CK, cr  12/12 12/14 blood cx NTD  Tylenol PRN fever

## 2019-12-31 NOTE — PROGRESS NOTE ADULT - ASSESSMENT
80 y/o male with h/o of CAD s/p PCI 1/2017 with 10% LAD stenosis OM1, HTN, HLD, GERD, Gout, Osteoarthritis and chronic back pain, s/p multiple laminectomies and spinal fusion, lower back pain and weakness in his bilateral lower extremities that progressed to inability to ambulate. dx with MSSA bacteremia. on ancef. also received lasix as well. has had hesitancy urinating. on 12/16/ KRIS       1- KRIS   2- MSSA bacteremia   3- dysphagia   4- cad  5- htn   6- acidosis       suspect kris in setting of  +/-  AIN + /- due to post infectious GN   U eos neg   renal sono/bladder sono non revealing for obstruction     pt with low c3 and nl c4 suggestive of post infectious glomerulonephritis given now also hematuria present  cr improving   restart aspirin   trend uo it seems to be improving   to cont dapto    dc hd cath   he needs access for abx therefore, picc line needed despite his creatinine has not returned to baseline. cr is expected to continue to improve as it has been improving on current trajectory   d/w family

## 2019-12-31 NOTE — PROGRESS NOTE ADULT - SUBJECTIVE AND OBJECTIVE BOX
[facility]   [phone]  RENAL FOLLOW UP NOTE  --------------------------------------------------------------------------------  Chief Complaint:    24 hour events/subjective:    states feels better. no worsening sob     PAST HISTORY  --------------------------------------------------------------------------------  No significant changes to PMH, PSH, FHx, SHx, unless otherwise noted    ALLERGIES & MEDICATIONS  --------------------------------------------------------------------------------  Allergies    Indocin (Unknown)  oxacillin (Unknown)  Zyvox (Unknown)    Intolerances      Standing Inpatient Medications  acetaminophen  IVPB .. 1000 milliGRAM(s) IV Intermittent once  acetaminophen  IVPB .. 650 milliGRAM(s) IV Intermittent once  aspirin  chewable 81 milliGRAM(s) Oral daily  atorvastatin 10 milliGRAM(s) Oral at bedtime  chlorhexidine 4% Liquid 1 Application(s) Topical <User Schedule>  DAPTOmycin IVPB 800 milliGRAM(s) IV Intermittent every 24 hours  heparin  Injectable 5000 Unit(s) SubCutaneous every 8 hours  lidocaine   Patch 1 Patch Transdermal daily  metoprolol succinate ER 12.5 milliGRAM(s) Oral daily  oxymetazoline 0.05% Nasal Spray 2 Spray(s) Both Nostrils two times a day  pantoprazole    Tablet 40 milliGRAM(s) Oral before breakfast  polyethylene glycol 3350 17 Gram(s) Oral daily  senna 2 Tablet(s) Oral at bedtime  sodium chloride 0.65% Nasal 1 Spray(s) Both Nostrils three times a day    PRN Inpatient Medications  acetaminophen   Tablet .. 650 milliGRAM(s) Oral every 6 hours PRN  acetaminophen  IVPB .. 1000 milliGRAM(s) IV Intermittent once PRN  sodium chloride 0.9% lock flush 10 milliLiter(s) IV Push every 1 hour PRN      REVIEW OF SYSTEMS  --------------------------------------------------------------------------------    Gen: denies fevers/chills,  CVS: denies chest pain/palpitations  Resp: denies worsening SOB/Cough  GI: Denies N/V/Abd pain  : Denies dysuria    All other systems were reviewed and are negative, except as noted.    VITALS/PHYSICAL EXAM  --------------------------------------------------------------------------------  T(C): 36.9 (12-31-19 @ 12:29), Max: 37.5 (12-30-19 @ 17:11)  HR: 75 (12-31-19 @ 09:17) (70 - 91)  BP: 137/66 (12-31-19 @ 09:17) (137/66 - 157/80)  RR: 18 (12-31-19 @ 09:17) (18 - 18)  SpO2: 95% (12-31-19 @ 09:17) (94% - 96%)  Wt(kg): --        12-30-19 @ 07:01  -  12-31-19 @ 07:00  --------------------------------------------------------  IN: 730 mL / OUT: 725 mL / NET: 5 mL    12-31-19 @ 07:01  -  12-31-19 @ 12:59  --------------------------------------------------------  IN: 100 mL / OUT: 200 mL / NET: -100 mL      Physical Exam:  	    Gen: elderly M remains alert   	no jvd  	Pulm: decrease bs  no rales or ronchi or wheezing  	CV: RRR, S1S2; no rub  	Abd: +BS, soft, nontender/nondistended  	: No suprapubic tenderness  	UE: Warm, no cyanosis  no clubbing,  2+ edema   	LE: Warm, no cyanosis  no clubbing, 2- edema   	    LABS/STUDIES  --------------------------------------------------------------------------------              8.7    6.77  >-----------<  213      [12-31-19 @ 09:34]              28.1     130  |  96  |  105  ----------------------------<  159      [12-31-19 @ 09:22]  4.5   |  21  |  2.37        Ca     8.8     [12-31-19 @ 09:22]      Mg     1.8     [12-30-19 @ 09:57]            Creatinine Trend:  SCr 2.37 [12-31 @ 09:22]  SCr 2.42 [12-30 @ 09:57]  SCr 2.58 [12-29 @ 09:05]  SCr 3.02 [12-28 @ 08:27]  SCr 3.39 [12-27 @ 09:07]              Urinalysis - [12-21-19 @ 19:48]      Color Yellow / Appearance Turbid / SG 1.020 / pH 5.5      Gluc Negative / Ketone Negative  / Bili Negative / Urobili <2 mg/dL       Blood Moderate / Protein 100 mg/dL / Leuk Est Large / Nitrite Negative       /  / Hyaline 0 / Gran  / Sq Epi  / Non Sq Epi 6 / Bacteria Negative        RYLEE: titer Negative, pattern --      [12-21-19 @ 17:35]  C3 Complement 25      [12-20-19 @ 12:18]  C4 Complement 21      [12-20-19 @ 12:18]  ANCA: cANCA Negative, pANCA Negative, atypical ANCA Negative      [12-21-19 @ 17:35]

## 2019-12-31 NOTE — ADVANCED PRACTICE NURSE CONSULT - REASON FOR CONSULT
Requested by nursing staff to assess skin status on sacrum and bilateral buttocks.  Patient was assessed last Tuesday 12/24 and site was assessed as incontinence Associated Dermatitis (IAD).  Staff placed a new consult to assess bilateral buttocks and sacrum.    Patient remains on 2 Joshua and continues to be followed by nephrology, cardiology, medicine and infectious disease.  He is an 79 year old male admitted on 12/8/2019  h/o of CAD s/p PCI 1/2017 with 10% LAD stenosis OM1, HTN, HLD, GERD, Gout, Osteoarthritis and chronic back pain, s/p multiple laminectomies and spinal fusion, left TKR who presents with worsening back pain and fevers for approx a week. bld cx MSSA bacteremia. KRIS -multifactorial ATN/AIN in the setting of sepsis and antibiotics.  source 2/2 pneumonia vs cellulitis, cannot ro hardware despite negative imaging On Daptomycin per ID for 6 weeks, will need PICC once KRIS resolves.  CT chest also concern for atelectasis vs pneumonia, lungs light up in nuclear scan TTE moderate aortic stenosis, Severe left ventricular systolic dysfunction. JAHAIRA no vegetations.   Patient remains on 2 Joshua

## 2019-12-31 NOTE — PROGRESS NOTE ADULT - SUBJECTIVE AND OBJECTIVE BOX
Interventional Radiology    79y Male with PMH as below with MSSA bacteremia requiring long term IV antibiotics.    PICC team attempted Left arm PICC line and failed and now pt is referred to IR for PICC line placement.  per d/w primary team IR requested to remove RIJ temp HD catheter. IR placed RIJ temp HD catheter on 12/20/19.    Nephrology note from 12/31 appreciated stating to dc HD catheter from Dr. AYAKA Hidalgo.      PAST MEDICAL & SURGICAL HISTORY:  CAD S/P percutaneous coronary angioplasty  HLD (hyperlipidemia)  Back pain  OA (osteoarthritis)  Gout  HTN (hypertension)  GERD (gastroesophageal reflux disease)  S/P lumbar spine operation: 6 thoracic to lumbar surgeries over 6 months period starting 11/2006  S/P knee replacement: 11/2013    Allergies  Indocin (Unknown)  oxacillin (Unknown)  Zyvox (Unknown)    Labs:                        8.7    6.77  )-----------( 213      ( 31 Dec 2019 09:34 )             28.1     12-31    130<L>  |  96  |  105<H>  ----------------------------<  159<H>  4.5   |  21<L>  |  2.37<H>    Ca    8.8      31 Dec 2019 09:22  Mg     1.8     12-30    Prothrombin Time and INR, Plasma (12.24.19 @ 13:16)    Prothrombin Time, Plasma: 14.6:    INR: 1.26:   Activated Partial Thromboplastin Time in AM (12.20.19 @ 08:47)    Activated Partial Thromboplastin Time: 33.6:     After risks, benefits, alternatives discussion the pt verbalized understanding and signed consent.  Will plan for PICC line placement and then will remove RIJ temp HD catheter.    Karsten Ann, KHALIF  [facility] [id number]  Ext [phone]

## 2020-01-01 LAB
ANION GAP SERPL CALC-SCNC: 13 MMOL/L — SIGNIFICANT CHANGE UP (ref 5–17)
BUN SERPL-MCNC: 102 MG/DL — HIGH (ref 7–23)
CALCIUM SERPL-MCNC: 8.7 MG/DL — SIGNIFICANT CHANGE UP (ref 8.4–10.5)
CHLORIDE SERPL-SCNC: 96 MMOL/L — SIGNIFICANT CHANGE UP (ref 96–108)
CO2 SERPL-SCNC: 22 MMOL/L — SIGNIFICANT CHANGE UP (ref 22–31)
CREAT SERPL-MCNC: 2.38 MG/DL — HIGH (ref 0.5–1.3)
GLUCOSE SERPL-MCNC: 150 MG/DL — HIGH (ref 70–99)
HCT VFR BLD CALC: 25.9 % — LOW (ref 39–50)
HGB BLD-MCNC: 8.4 G/DL — LOW (ref 13–17)
MAGNESIUM SERPL-MCNC: 1.8 MG/DL — SIGNIFICANT CHANGE UP (ref 1.6–2.6)
MCHC RBC-ENTMCNC: 29.1 PG — SIGNIFICANT CHANGE UP (ref 27–34)
MCHC RBC-ENTMCNC: 32.4 GM/DL — SIGNIFICANT CHANGE UP (ref 32–36)
MCV RBC AUTO: 89.6 FL — SIGNIFICANT CHANGE UP (ref 80–100)
PLATELET # BLD AUTO: 220 K/UL — SIGNIFICANT CHANGE UP (ref 150–400)
POTASSIUM SERPL-MCNC: 4.5 MMOL/L — SIGNIFICANT CHANGE UP (ref 3.5–5.3)
POTASSIUM SERPL-SCNC: 4.5 MMOL/L — SIGNIFICANT CHANGE UP (ref 3.5–5.3)
RBC # BLD: 2.89 M/UL — LOW (ref 4.2–5.8)
RBC # FLD: 14.5 % — SIGNIFICANT CHANGE UP (ref 10.3–14.5)
SODIUM SERPL-SCNC: 131 MMOL/L — LOW (ref 135–145)
WBC # BLD: 8.52 K/UL — SIGNIFICANT CHANGE UP (ref 3.8–10.5)
WBC # FLD AUTO: 8.52 K/UL — SIGNIFICANT CHANGE UP (ref 3.8–10.5)

## 2020-01-01 RX ADMIN — OXYMETAZOLINE HYDROCHLORIDE 2 SPRAY(S): 0.5 SPRAY NASAL at 17:14

## 2020-01-01 RX ADMIN — OXYMETAZOLINE HYDROCHLORIDE 2 SPRAY(S): 0.5 SPRAY NASAL at 05:49

## 2020-01-01 RX ADMIN — HEPARIN SODIUM 5000 UNIT(S): 5000 INJECTION INTRAVENOUS; SUBCUTANEOUS at 21:16

## 2020-01-01 RX ADMIN — Medication 12.5 MILLIGRAM(S): at 05:48

## 2020-01-01 RX ADMIN — Medication 1 SPRAY(S): at 05:49

## 2020-01-01 RX ADMIN — PANTOPRAZOLE SODIUM 40 MILLIGRAM(S): 20 TABLET, DELAYED RELEASE ORAL at 05:49

## 2020-01-01 RX ADMIN — DAPTOMYCIN 132 MILLIGRAM(S): 500 INJECTION, POWDER, LYOPHILIZED, FOR SOLUTION INTRAVENOUS at 17:06

## 2020-01-01 RX ADMIN — LIDOCAINE 1 PATCH: 4 CREAM TOPICAL at 23:50

## 2020-01-01 RX ADMIN — LIDOCAINE 1 PATCH: 4 CREAM TOPICAL at 19:00

## 2020-01-01 RX ADMIN — HEPARIN SODIUM 5000 UNIT(S): 5000 INJECTION INTRAVENOUS; SUBCUTANEOUS at 05:55

## 2020-01-01 RX ADMIN — Medication 81 MILLIGRAM(S): at 11:49

## 2020-01-01 RX ADMIN — LIDOCAINE 1 PATCH: 4 CREAM TOPICAL at 00:24

## 2020-01-01 RX ADMIN — Medication 1 SPRAY(S): at 21:16

## 2020-01-01 RX ADMIN — POLYETHYLENE GLYCOL 3350 17 GRAM(S): 17 POWDER, FOR SOLUTION ORAL at 11:50

## 2020-01-01 RX ADMIN — LIDOCAINE 1 PATCH: 4 CREAM TOPICAL at 11:50

## 2020-01-01 RX ADMIN — ATORVASTATIN CALCIUM 10 MILLIGRAM(S): 80 TABLET, FILM COATED ORAL at 21:16

## 2020-01-01 RX ADMIN — CHLORHEXIDINE GLUCONATE 1 APPLICATION(S): 213 SOLUTION TOPICAL at 11:44

## 2020-01-01 RX ADMIN — Medication 1 SPRAY(S): at 13:29

## 2020-01-01 RX ADMIN — HEPARIN SODIUM 5000 UNIT(S): 5000 INJECTION INTRAVENOUS; SUBCUTANEOUS at 13:29

## 2020-01-01 NOTE — PROGRESS NOTE ADULT - SUBJECTIVE AND OBJECTIVE BOX
CC: son at bedside, pt awake, alert, complains of mild dyspnea    TELEMETRY:     PHYSICAL EXAM:    T(C): 36.6 (01-01-20 @ 05:12), Max: 37.7 (01-01-20 @ 04:56)  HR: 68 (01-01-20 @ 05:12) (68 - 98)  BP: 145/72 (01-01-20 @ 05:12) (125/66 - 155/74)  RR: 18 (01-01-20 @ 05:12) (17 - 18)  SpO2: 98% (01-01-20 @ 05:12) (90% - 98%)  Wt(kg): --  I&O's Summary    31 Dec 2019 07:01  -  01 Jan 2020 07:00  --------------------------------------------------------  IN: 540 mL / OUT: 525 mL / NET: 15 mL        Appearance: Normal	  Cardiovascular: Normal S1 S2,RRR, No JVD, No murmurs  Respiratory: Lungs clear to auscultation	  Gastrointestinal:  Soft, Non-tender, + BS	  Extremities:++ edema  Vascular: Peripheral pulses palpable 2+ bilaterally     LABS:	 	                          8.4    8.52  )-----------( 220      ( 01 Jan 2020 08:24 )             25.9     01-01    131<L>  |  96  |  102<H>  ----------------------------<  150<H>  4.5   |  22  |  2.38<H>    Ca    8.7      01 Jan 2020 06:58  Mg     1.8     01-01            CARDIAC MARKERS:

## 2020-01-01 NOTE — PROGRESS NOTE ADULT - PROBLEM SELECTOR PLAN 1
multifactorial ATN/AIN in the setting of sepsis and antibiotics  low c3 and nl c4 suggestive of post infectious glomerulonephritis  family declining renal biopsy  creat now improving, no need for biopsy at this time  holding losartan  appreciate renal recs  removal of HD cath today  sp picc placement  TOV today  maintain strict ios

## 2020-01-01 NOTE — PROGRESS NOTE ADULT - ATTENDING COMMENTS
dispo:  PICC line for 8 total wks IV daptomycin  TOV  dc planning to [facility]    plan dw son at bedside

## 2020-01-01 NOTE — PROGRESS NOTE ADULT - ASSESSMENT
79 yr old male with HTN, DCHF, CAD, S/P PCI to OM1, Mid LAD (9/2014, [facility], [facility], [facility]) hx of ischemic CMP, AS, DM HLD, GERD, GOUT, s/p multiple spinal surgery admitted with back pain and fever    1. Back Pain  -stable, MRI noted, neuro/neuro sx followup  -ID f/u, pain control     2. CAD s/p PCI (OM1/LAD)   -stable, cont statin and asa     3. Acute on Chronic systolic & Diastolic CHF, New LV dysfunction   -echo with severe LV sys dysfxn, EF 20-25%, mild diastolic dysfxn  -LAZARO 12/13 with moderate lv dysfunction  -recent echo few months ago with normal LV function  -possible new cardiomyopathy in setting of infection, sepsis  -will continue medical therapy for now as he is not a candidate for ischemic eval   -mild dyspnea, will likely need a dose of diuretic 20-40mg IV lasix, will discuss w renal  -c/w low dose toprol xl 12.5, was off bb as outpt due to fatigue  -possible low dose ace/arb in future post hd and If ok by renal     4. Aortic Stenosis  -stable, severe on LAZARO with valve area of 0.9, tte this admit and as outpt with moderate AS  -gradients low on lazaro, perhaps due to new lv dysfxn  -YULY likely underestimated  -patient was asymptomatic as outpt from valve disease  -no further workup indicated, will follow as outpatient     5. HTN   -BP stable, continue with current antihtn meds     6. Fever/ Sepsis -resolved  + blood cultures with gram positive Cocci in clusters , Repeat NGTD  -iv abx, ID f/u   -LAZARO without endocarditis   -encephalopathy ?? delirium from infection/kris/dehydration ??  med f/u    7. 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 Anemia  -consider heme eval  -workup per medicine    dvt ppx

## 2020-01-01 NOTE — PROGRESS NOTE ADULT - SUBJECTIVE AND OBJECTIVE BOX
Patient is a 79y old  Male who presents with a chief complaint of Back pain (01 Jan 2020 10:06)      INTERVAL HPI/OVERNIGHT EVENTS: noted, feels well, no cp/sob      Vital Signs Last 24 Hrs  T(C): 37.2 (01 Jan 2020 15:05), Max: 37.7 (01 Jan 2020 04:56)  T(F): 98.9 (01 Jan 2020 15:05), Max: 99.9 (01 Jan 2020 04:56)  HR: 75 (01 Jan 2020 15:05) (68 - 98)  BP: 123/75 (01 Jan 2020 15:05) (123/75 - 155/74)  BP(mean): --  RR: 18 (01 Jan 2020 15:05) (18 - 18)  SpO2: 93% (01 Jan 2020 15:05) (90% - 98%)    acetaminophen   Tablet .. 650 milliGRAM(s) Oral every 6 hours PRN  acetaminophen  IVPB .. 1000 milliGRAM(s) IV Intermittent once  acetaminophen  IVPB .. 650 milliGRAM(s) IV Intermittent once  aspirin  chewable 81 milliGRAM(s) Oral daily  atorvastatin 10 milliGRAM(s) Oral at bedtime  chlorhexidine 4% Liquid 1 Application(s) Topical <User Schedule>  chlorhexidine 4% Liquid 1 Application(s) Topical <User Schedule>  DAPTOmycin IVPB 800 milliGRAM(s) IV Intermittent every 24 hours  heparin  Injectable 5000 Unit(s) SubCutaneous every 8 hours  lidocaine   Patch 1 Patch Transdermal daily  metoprolol succinate ER 12.5 milliGRAM(s) Oral daily  oxymetazoline 0.05% Nasal Spray 2 Spray(s) Both Nostrils two times a day  pantoprazole    Tablet 40 milliGRAM(s) Oral before breakfast  polyethylene glycol 3350 17 Gram(s) Oral daily  senna 2 Tablet(s) Oral at bedtime  sodium chloride 0.65% Nasal 1 Spray(s) Both Nostrils three times a day  sodium chloride 0.9% lock flush 10 milliLiter(s) IV Push every 1 hour PRN  sodium chloride 0.9% lock flush 10 milliLiter(s) IV Push every 1 hour PRN      PHYSICAL EXAM:  GENERAL: NAD,   EYES: conjunctiva and sclera clear  ENMT: Moist mucous membranes  NECK: Supple, No JVD, Normal thyroid  NERVOUS SYSTEM:  Alert & Oriented X,   CHEST/LUNG: Clear to auscultation bilaterally; No rales, rhonchi, wheezing, or rubs  HEART: Regular rate and rhythm; No murmurs, rubs, or gallops  ABDOMEN: Soft, Nontender, Nondistended; Bowel sounds present  EXTREMITIES:  2+ Peripheral Pulses, No clubbing, cyanosis, or edema  LYMPH: No lymphadenopathy noted  SKIN: No rashes or lesions    Consultant(s) Notes Reviewed:  [x ] YES  [ ] NO  Care Discussed with Consultants/Other Providers [ x] YES  [ ] NO    LABS:                        8.4    8.52  )-----------( 220      ( 01 Jan 2020 08:24 )             25.9     01-01    131<L>  |  96  |  102<H>  ----------------------------<  150<H>  4.5   |  22  |  2.38<H>    Ca    8.7      01 Jan 2020 06:58  Mg     1.8     01-01          CAPILLARY BLOOD GLUCOSE                  RADIOLOGY & ADDITIONAL TESTS:    Imaging Personally Reviewed:  [x ] YES  [ ] NO

## 2020-01-02 LAB
ANION GAP SERPL CALC-SCNC: 15 MMOL/L — SIGNIFICANT CHANGE UP (ref 5–17)
BUN SERPL-MCNC: 105 MG/DL — HIGH (ref 7–23)
CALCIUM SERPL-MCNC: 8.8 MG/DL — SIGNIFICANT CHANGE UP (ref 8.4–10.5)
CHLORIDE SERPL-SCNC: 95 MMOL/L — LOW (ref 96–108)
CO2 SERPL-SCNC: 22 MMOL/L — SIGNIFICANT CHANGE UP (ref 22–31)
CREAT SERPL-MCNC: 2.27 MG/DL — HIGH (ref 0.5–1.3)
GAS PNL BLDA: SIGNIFICANT CHANGE UP
GLUCOSE SERPL-MCNC: 156 MG/DL — HIGH (ref 70–99)
HCT VFR BLD CALC: 26.5 % — LOW (ref 39–50)
HGB BLD-MCNC: 8.5 G/DL — LOW (ref 13–17)
MCHC RBC-ENTMCNC: 28.9 PG — SIGNIFICANT CHANGE UP (ref 27–34)
MCHC RBC-ENTMCNC: 32.1 GM/DL — SIGNIFICANT CHANGE UP (ref 32–36)
MCV RBC AUTO: 90.1 FL — SIGNIFICANT CHANGE UP (ref 80–100)
PLATELET # BLD AUTO: 216 K/UL — SIGNIFICANT CHANGE UP (ref 150–400)
POTASSIUM SERPL-MCNC: 4.6 MMOL/L — SIGNIFICANT CHANGE UP (ref 3.5–5.3)
POTASSIUM SERPL-SCNC: 4.6 MMOL/L — SIGNIFICANT CHANGE UP (ref 3.5–5.3)
RBC # BLD: 2.94 M/UL — LOW (ref 4.2–5.8)
RBC # FLD: 14.5 % — SIGNIFICANT CHANGE UP (ref 10.3–14.5)
SODIUM SERPL-SCNC: 132 MMOL/L — LOW (ref 135–145)
WBC # BLD: 11.11 K/UL — HIGH (ref 3.8–10.5)
WBC # FLD AUTO: 11.11 K/UL — HIGH (ref 3.8–10.5)

## 2020-01-02 PROCEDURE — 99232 SBSQ HOSP IP/OBS MODERATE 35: CPT

## 2020-01-02 PROCEDURE — 71045 X-RAY EXAM CHEST 1 VIEW: CPT | Mod: 26

## 2020-01-02 RX ORDER — IPRATROPIUM/ALBUTEROL SULFATE 18-103MCG
3 AEROSOL WITH ADAPTER (GRAM) INHALATION ONCE
Refills: 0 | Status: COMPLETED | OUTPATIENT
Start: 2020-01-02 | End: 2020-01-02

## 2020-01-02 RX ORDER — FUROSEMIDE 40 MG
80 TABLET ORAL ONCE
Refills: 0 | Status: COMPLETED | OUTPATIENT
Start: 2020-01-02 | End: 2020-01-02

## 2020-01-02 RX ORDER — FUROSEMIDE 40 MG
40 TABLET ORAL ONCE
Refills: 0 | Status: COMPLETED | OUTPATIENT
Start: 2020-01-02 | End: 2020-01-02

## 2020-01-02 RX ORDER — METOPROLOL TARTRATE 50 MG
25 TABLET ORAL DAILY
Refills: 0 | Status: DISCONTINUED | OUTPATIENT
Start: 2020-01-02 | End: 2020-01-03

## 2020-01-02 RX ADMIN — HEPARIN SODIUM 5000 UNIT(S): 5000 INJECTION INTRAVENOUS; SUBCUTANEOUS at 13:48

## 2020-01-02 RX ADMIN — Medication 80 MILLIGRAM(S): at 22:45

## 2020-01-02 RX ADMIN — PANTOPRAZOLE SODIUM 40 MILLIGRAM(S): 20 TABLET, DELAYED RELEASE ORAL at 05:49

## 2020-01-02 RX ADMIN — OXYMETAZOLINE HYDROCHLORIDE 2 SPRAY(S): 0.5 SPRAY NASAL at 05:50

## 2020-01-02 RX ADMIN — ATORVASTATIN CALCIUM 10 MILLIGRAM(S): 80 TABLET, FILM COATED ORAL at 21:31

## 2020-01-02 RX ADMIN — Medication 1 SPRAY(S): at 05:50

## 2020-01-02 RX ADMIN — SENNA PLUS 2 TABLET(S): 8.6 TABLET ORAL at 21:32

## 2020-01-02 RX ADMIN — HEPARIN SODIUM 5000 UNIT(S): 5000 INJECTION INTRAVENOUS; SUBCUTANEOUS at 21:27

## 2020-01-02 RX ADMIN — Medication 1 SPRAY(S): at 21:27

## 2020-01-02 RX ADMIN — Medication 3 MILLILITER(S): at 23:22

## 2020-01-02 RX ADMIN — LIDOCAINE 1 PATCH: 4 CREAM TOPICAL at 13:48

## 2020-01-02 RX ADMIN — LIDOCAINE 1 PATCH: 4 CREAM TOPICAL at 21:18

## 2020-01-02 RX ADMIN — DAPTOMYCIN 132 MILLIGRAM(S): 500 INJECTION, POWDER, LYOPHILIZED, FOR SOLUTION INTRAVENOUS at 21:27

## 2020-01-02 RX ADMIN — Medication 1 SPRAY(S): at 13:48

## 2020-01-02 RX ADMIN — CHLORHEXIDINE GLUCONATE 1 APPLICATION(S): 213 SOLUTION TOPICAL at 09:45

## 2020-01-02 RX ADMIN — HEPARIN SODIUM 5000 UNIT(S): 5000 INJECTION INTRAVENOUS; SUBCUTANEOUS at 05:49

## 2020-01-02 RX ADMIN — Medication 12.5 MILLIGRAM(S): at 05:49

## 2020-01-02 RX ADMIN — Medication 81 MILLIGRAM(S): at 13:47

## 2020-01-02 RX ADMIN — Medication 40 MILLIGRAM(S): at 14:23

## 2020-01-02 RX ADMIN — OXYMETAZOLINE HYDROCHLORIDE 2 SPRAY(S): 0.5 SPRAY NASAL at 18:39

## 2020-01-02 NOTE — PROGRESS NOTE ADULT - PROBLEM SELECTOR PLAN 1
multifactorial ATN/AIN in the setting of sepsis and antibiotics  low c3 and nl c4 suggestive of post infectious glomerulonephritis  cr improving  family declining renal biopsy  creat now improving, no need for biopsy at this time  holding losartan  appreciate renal recs  removal of HD cath today  sp picc placement  TOV today  maintain strict ios

## 2020-01-02 NOTE — PROVIDER CONTACT NOTE (OTHER) - ACTION/TREATMENT ORDERED:
x-ray ordered x-ray ordered, venti mask ordered x-ray ordered, venti mask ordered. Pt educated on use. PT refused venti mask. Pt on 4L humidified NC. PT stating at 94%.

## 2020-01-02 NOTE — PROGRESS NOTE ADULT - ASSESSMENT
78 y/o male with h/o of CAD s/p PCI 1/2017 with 10% LAD stenosis OM1, HTN, HLD, GERD, Gout, Osteoarthritis and chronic back pain, s/p multiple laminectomies and spinal fusion, lower back pain and weakness in his bilateral lower extremities that progressed to inability to ambulate. dx with MSSA bacteremia. on ancef. also received lasix as well. has had hesitancy urinating. on 12/16/ KRIS       1- KRIS   2- MSSA bacteremia   3- dysphagia   4- cad  5- htn   6- chf       suspect kris in setting of  +/-  AIN + /- due to post infectious GN   cr slowly improving   bun is slow to improve   to cont dapto    chf  lasix 80 mg x 1 and then as needed  check cr in am   will likely need procrit   check iron profile   d/w family   d/w cards

## 2020-01-02 NOTE — PROGRESS NOTE ADULT - SUBJECTIVE AND OBJECTIVE BOX
CC: F/U for Bacteremia    Saw/spoke to patient. Patient improved. No fevers, no chills. No new complaints. S/P RUE PICC.    Allergies  Indocin (Unknown)  oxacillin (Unknown)  Zyvox (Unknown)    ANTIMICROBIALS:  DAPTOmycin IVPB 800 every 24 hours    PE:    Vital Signs Last 24 Hrs  T(C): 36.9 (02 Jan 2020 09:57), Max: 37.7 (01 Jan 2020 21:16)  T(F): 98.4 (02 Jan 2020 09:57), Max: 99.8 (01 Jan 2020 21:16)  HR: 86 (02 Jan 2020 09:57) (75 - 97)  BP: 158/84 (02 Jan 2020 09:57) (123/75 - 158/84)  RR: 18 (02 Jan 2020 09:57) (18 - 18)  SpO2: 95% (02 Jan 2020 09:57) (93% - 95%)    Gen: AOx3, NAD, non-toxic  CV: S1+S2 normal, nontachycardic  Resp: Clear bilat, no resp distress, no crackles/wheezes  Abd: Soft, nontender, +BS  Ext: No LE edema, no wounds    LABS:                        8.5    11.11 )-----------( 216      ( 02 Jan 2020 08:46 )             26.5     01-02    132<L>  |  95<L>  |  105<H>  ----------------------------<  156<H>  4.6   |  22  |  2.27<H>    Ca    8.8      02 Jan 2020 07:16  Mg     1.8     01-01    MICROBIOLOGY:    .Blood Blood  12-14-19   No growth at 5 days.     .Blood Blood-Peripheral  12-12-19   No growth at 5 days.     .Blood Blood  12-09-19   Growth in aerobic and anaerobic bottles: Staphylococcus aureus  See previous culture [id number]  --    Growth in aerobic and anaerobic bottles: Gram Positive Cocci in Clusters    .Blood Blood  12-08-19   Growth in aerobic and anaerobic bottles: Staphylococcus aureus    .Urine Clean Catch (Midstream)  12-08-19   No growth     (otherwise reviewed)    RADIOLOGY:    12/19 US:    IMPRESSION:     Redemonstrated thickened bladder wall with intraluminal irregularity and internal trabeculations. Small internal debris. Please correlate with urinalysis.     Enlarged prostate.

## 2020-01-02 NOTE — PROGRESS NOTE ADULT - ASSESSMENT
79 yr old male with HTN, DCHF, CAD, S/P PCI to OM1, Mid LAD (9/2014, [facility], [facility], [facility]) hx of ischemic CMP, AS, DM HLD, GERD, GOUT, s/p multiple spinal surgery admitted with back pain and fever    1. Back Pain  -stable, MRI noted, neuro/neuro sx followup  -ID f/u, pain control     2. CAD s/p PCI (OM1/LAD)   -stable, cont statin and asa     3. Acute on Chronic systolic & Diastolic CHF, New LV dysfunction   -echo with severe LV sys dysfxn, EF 20-25%, mild diastolic dysfxn  -LAZARO 12/13 with moderate lv dysfunction  -recent echo few months ago with normal LV function  -possible new cardiomyopathy in setting of infection, sepsis  -will continue medical therapy for now as he is not a candidate for ischemic eval   -mild dyspnea, anasarca  -will start iv lasix 40 x 1 today, discussed w renal  -reassess to for further dosing   -inc bb to toprol xl 25 daily, was off bb as outpt due to fatigue  -add low dose norvasc 2.5mg daily for further bp control     4. Aortic Stenosis  -stable, severe on LAZARO with valve area of 0.9, tte this admit and as outpt with moderate AS  -gradients low on lazaro, perhaps due to new lv dysfxn  -YULY likely underestimated  -patient was asymptomatic as outpt from valve disease  -no further workup indicated, will follow as outpatient     5. HTN   -BP stable, continue with current antihtn meds, inc bb, add norvasc    6. Fever/ Sepsis -resolved  + blood cultures with gram positive Cocci in clusters , Repeat NGTD  -iv abx, ID f/u   -LAZARO without endocarditis   -encephalopathy resolved    7. 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 Anemia  -workup per medicine    dvt ppx     d/w son at bedside

## 2020-01-02 NOTE — PROGRESS NOTE ADULT - SUBJECTIVE AND OBJECTIVE BOX
Patient is a 79y old  Male who presents with a chief complaint of Back pain (02 Jan 2020 14:01)      INTERVAL HPI/OVERNIGHT EVENTS: noted, feels sob on laying down, better siiting up      Vital Signs Last 24 Hrs  T(C): 36.9 (02 Jan 2020 09:57), Max: 37.7 (01 Jan 2020 21:16)  T(F): 98.4 (02 Jan 2020 09:57), Max: 99.8 (01 Jan 2020 21:16)  HR: 86 (02 Jan 2020 09:57) (75 - 97)  BP: 158/84 (02 Jan 2020 09:57) (123/75 - 158/84)  BP(mean): --  RR: 18 (02 Jan 2020 09:57) (18 - 18)  SpO2: 95% (02 Jan 2020 09:57) (93% - 95%)    acetaminophen   Tablet .. 650 milliGRAM(s) Oral every 6 hours PRN  acetaminophen  IVPB .. 1000 milliGRAM(s) IV Intermittent once  acetaminophen  IVPB .. 650 milliGRAM(s) IV Intermittent once  aspirin  chewable 81 milliGRAM(s) Oral daily  atorvastatin 10 milliGRAM(s) Oral at bedtime  chlorhexidine 4% Liquid 1 Application(s) Topical <User Schedule>  chlorhexidine 4% Liquid 1 Application(s) Topical <User Schedule>  DAPTOmycin IVPB 800 milliGRAM(s) IV Intermittent every 24 hours  heparin  Injectable 5000 Unit(s) SubCutaneous every 8 hours  lidocaine   Patch 1 Patch Transdermal daily  metoprolol succinate ER 12.5 milliGRAM(s) Oral daily  oxymetazoline 0.05% Nasal Spray 2 Spray(s) Both Nostrils two times a day  pantoprazole    Tablet 40 milliGRAM(s) Oral before breakfast  polyethylene glycol 3350 17 Gram(s) Oral daily  senna 2 Tablet(s) Oral at bedtime  sodium chloride 0.65% Nasal 1 Spray(s) Both Nostrils three times a day  sodium chloride 0.9% lock flush 10 milliLiter(s) IV Push every 1 hour PRN  sodium chloride 0.9% lock flush 10 milliLiter(s) IV Push every 1 hour PRN      PHYSICAL EXAM:  GENERAL: NAD,   EYES: conjunctiva and sclera clear  ENMT: Moist mucous membranes  NECK: Supple, No JVD, Normal thyroid  NERVOUS SYSTEM:  Alert & Oriented X,   CHEST/LUNG: b/l crackles 1/2 wayup  HEART: Regular rate and rhythm; No murmurs, rubs, or gallops  ABDOMEN: Soft, Nontender, Nondistended; Bowel sounds present  EXTREMITIES:  2+ edema  LYMPH: No lymphadenopathy noted  SKIN: No rashes or lesions    Consultant(s) Notes Reviewed:  [x ] YES  [ ] NO  Care Discussed with Consultants/Other Providers [ x] YES  [ ] NO    LABS:                        8.5    11.11 )-----------( 216      ( 02 Jan 2020 08:46 )             26.5     01-02    132<L>  |  95<L>  |  105<H>  ----------------------------<  156<H>  4.6   |  22  |  2.27<H>    Ca    8.8      02 Jan 2020 07:16  Mg     1.8     01-01          CAPILLARY BLOOD GLUCOSE                  RADIOLOGY & ADDITIONAL TESTS:    Imaging Personally Reviewed:  [x ] YES  [ ] NO

## 2020-01-02 NOTE — PROGRESS NOTE ADULT - PROBLEM SELECTOR PLAN 4
plan for 1time lasix today, monitor  cards and renal fu noted  TTE  severe aortic stenosis. Concentric left ventricular hypertrophy.  Severe hypokinesis of the apical anterior wall, apex  distal inferiior wall.  Mild diastolic dysfunction (Stage I).

## 2020-01-02 NOTE — PROVIDER CONTACT NOTE (OTHER) - ASSESSMENT
Pt complaining of increasing SOB, , Respirations 36. Pt using accessory muscles. Pt feeling anxious. Pt complaining of increasing SOB, , Respirations 36. Pt using accessory muscles. Pt feeling anxious. PT refused rebreather mask

## 2020-01-02 NOTE — PROGRESS NOTE ADULT - SUBJECTIVE AND OBJECTIVE BOX
[facility]   [phone]  RENAL FOLLOW UP NOTE  --------------------------------------------------------------------------------  Chief Complaint:    24 hour events/subjective:    seen earlier. son at bedside.   has sob as per pt     PAST HISTORY  --------------------------------------------------------------------------------  No significant changes to PMH, PSH, FHx, SHx, unless otherwise noted    ALLERGIES & MEDICATIONS  --------------------------------------------------------------------------------  Allergies    Indocin (Unknown)  oxacillin (Unknown)  Zyvox (Unknown)    Intolerances      Standing Inpatient Medications  acetaminophen  IVPB .. 1000 milliGRAM(s) IV Intermittent once  acetaminophen  IVPB .. 650 milliGRAM(s) IV Intermittent once  aspirin  chewable 81 milliGRAM(s) Oral daily  atorvastatin 10 milliGRAM(s) Oral at bedtime  chlorhexidine 4% Liquid 1 Application(s) Topical <User Schedule>  chlorhexidine 4% Liquid 1 Application(s) Topical <User Schedule>  DAPTOmycin IVPB 800 milliGRAM(s) IV Intermittent every 24 hours  heparin  Injectable 5000 Unit(s) SubCutaneous every 8 hours  lidocaine   Patch 1 Patch Transdermal daily  metoprolol succinate ER 25 milliGRAM(s) Oral daily  oxymetazoline 0.05% Nasal Spray 2 Spray(s) Both Nostrils two times a day  pantoprazole    Tablet 40 milliGRAM(s) Oral before breakfast  polyethylene glycol 3350 17 Gram(s) Oral daily  senna 2 Tablet(s) Oral at bedtime  sodium chloride 0.65% Nasal 1 Spray(s) Both Nostrils three times a day    PRN Inpatient Medications  acetaminophen   Tablet .. 650 milliGRAM(s) Oral every 6 hours PRN  sodium chloride 0.9% lock flush 10 milliLiter(s) IV Push every 1 hour PRN  sodium chloride 0.9% lock flush 10 milliLiter(s) IV Push every 1 hour PRN      REVIEW OF SYSTEMS  --------------------------------------------------------------------------------    Gen: denies  fevers/chills,  CVS: denies chest pain/palpitations  Resp:  +  SOB/Cough -   GI: Denies N/V/Abd pain  : Denies dysuria    All other systems were reviewed and are negative, except as noted.    VITALS/PHYSICAL EXAM  --------------------------------------------------------------------------------  T(C): 37.1 (01-02-20 @ 19:32), Max: 37.7 (01-01-20 @ 21:16)  HR: 102 (01-02-20 @ 19:32) (78 - 102)  BP: 138/89 (01-02-20 @ 19:32) (133/76 - 158/84)  RR: 36 (01-02-20 @ 19:32) (18 - 36)  SpO2: 94% (01-02-20 @ 19:32) (92% - 96%)  Wt(kg): --        01-01-20 @ 07:01  -  01-02-20 @ 07:00  --------------------------------------------------------  IN: 1200 mL / OUT: 700 mL / NET: 500 mL    01-02-20 @ 07:01  -  01-02-20 @ 20:41  --------------------------------------------------------  IN: 240 mL / OUT: 525 mL / NET: -285 mL      Physical Exam:  	    Gen: elderly M remains alert   	no jvd  	Pulm: decrease bs  no rales or ronchi or wheezing  	CV: RRR, S1S2; no rub  	Abd: +BS, soft, nontender/nondistended  	: No suprapubic tenderness  	UE: Warm, no cyanosis  no clubbing,  2+ edema   	LE: Warm, no cyanosis  no clubbing, 2- edema   Gen: elderly M remains alert   	no jvd  	Pulm: decrease bs  no rales or ronchi or wheezing  	CV: RRR, S1S2; no rub  	Abd: +BS, soft, nontender/nondistended  	: No suprapubic tenderness  	UE: Warm, no cyanosis  no clubbing,  2+ edema   	LE: Warm, no cyanosis  no clubbing, 2- edema     	  LABS/STUDIES  --------------------------------------------------------------------------------              8.5    11.11 >-----------<  216      [01-02-20 @ 08:46]              26.5     132  |  95  |  105  ----------------------------<  156      [01-02-20 @ 07:16]  4.6   |  22  |  2.27        Ca     8.8     [01-02-20 @ 07:16]      Mg     1.8     [01-01-20 @ 06:58]            Creatinine Trend:  SCr 2.27 [01-02 @ 07:16]  SCr 2.38 [01-01 @ 06:58]  SCr 2.37 [12-31 @ 09:22]  SCr 2.42 [12-30 @ 09:57]  SCr 2.58 [12-29 @ 09:05]              Urinalysis - [12-21-19 @ 19:48]      Color Yellow / Appearance Turbid / SG 1.020 / pH 5.5      Gluc Negative / Ketone Negative  / Bili Negative / Urobili <2 mg/dL       Blood Moderate / Protein 100 mg/dL / Leuk Est Large / Nitrite Negative       /  / Hyaline 0 / Gran  / Sq Epi  / Non Sq Epi 6 / Bacteria Negative        RYLEE: titer Negative, pattern --      [12-21-19 @ 17:35]  C3 Complement 25      [12-20-19 @ 12:18]  C4 Complement 21      [12-20-19 @ 12:18]  ANCA: cANCA Negative, pANCA Negative, atypical ANCA Negative      [12-21-19 @ 17:35]

## 2020-01-02 NOTE — PROGRESS NOTE ADULT - ASSESSMENT
78 yo M with chronic low back pain sp multiple laminectomy and fusion p/w 1 week of worsening back pain along with weakness in bilateral lower extremities and inability to walk since last night  Leukocytosis, fever  MR L spine and T spine prelim but no emergent findings  Prior history of MSSA bacteremia (2016)  History oxacillin allergy (? fever), Zyvox ([facility]); tolerated vanco on this visit  BCX with MSSA, high grade, BCX 12/12 NGTD  CT A/P/C/H neg  NM tagged WBC uptake in thoracolumbar spine--degen disease?  TTE equivocal, JAHAIRA reassuring  High suspicion for spinal involvement despite negative MRI--monitor closely  KRIS improving  Overall,  1) MSSA Bacteremia--Dapto 800mg q 24, weekly CKs, monitor Cr  - Continue Dapto through 2/5/20  - At rehab, facility should monitor weekly CKs, CBC, BUN, Cr weekly (readjust Dapto based on any Cr changes); monitor for any other signs toxicity, and monitor for any signs infection at PICC site  2) KRIS--trend Cr to normal  3) Leukocytosis--Trend WBC  4) Fever, monitor for further signs fever    After discharge, have patient follow with me in 4 weeks for further ID care ([phone])    Fredy Huynh MD  Pager [phone]  After 5pm and on weekends call [phone]

## 2020-01-02 NOTE — PROGRESS NOTE ADULT - SUBJECTIVE AND OBJECTIVE BOX
CARDIOLOGY FOLLOW UP NOTE - DR. RHODES    Subjective:    no chest pain, ++sob, palpitations    PHYSICAL EXAM:  T(C): 36.9 (01-02-20 @ 09:57), Max: 37.7 (01-01-20 @ 21:16)  HR: 86 (01-02-20 @ 09:57) (75 - 97)  BP: 158/84 (01-02-20 @ 09:57) (123/75 - 158/84)  RR: 18 (01-02-20 @ 09:57) (18 - 18)  SpO2: 95% (01-02-20 @ 09:57) (93% - 95%)  Wt(kg): --  I&O's Summary    01 Jan 2020 07:01  -  02 Jan 2020 07:00  --------------------------------------------------------  IN: 1200 mL / OUT: 700 mL / NET: 500 mL    02 Jan 2020 07:01  -  02 Jan 2020 14:02  --------------------------------------------------------  IN: 120 mL / OUT: 250 mL / NET: -130 mL      Daily     Daily     Appearance: Normal	  Cardiovascular: Normal S1 S2,RRR, No JVD, No murmurs  Respiratory: Lungs clear to auscultation	dec bs bases  Gastrointestinal:  Soft, Non-tender, + BS	  Extremities: Normal range of motion, +++ edema b/l upper/lower ext      Home Medications:  allopurinol 300 mg oral tablet: 1 tab(s) orally once a day (09 Dec 2019 11:15)  aspirin 81 mg oral delayed release tablet: 1 tab(s) orally once a day (09 Dec 2019 11:15)  Co Q-10 100 mg oral capsule: 1 cap(s) orally once a day (09 Dec 2019 11:15)  diclofenac sodium 75 mg oral delayed release tablet: 1 tab(s) orally 2 times a day (09 Dec 2019 11:15)  lansoprazole 15 mg oral delayed release capsule: 1 cap(s) orally once a day (09 Dec 2019 11:15)  losartan 100 mg oral tablet: 1 tab(s) orally once a day (09 Dec 2019 11:15)  lovastatin 20 mg oral tablet: 1 tab(s) orally once a day (09 Dec 2019 11:15)  miSOPROStol 200 mcg oral tablet: 1 tab(s) orally 2 times a day (09 Dec 2019 11:15)  Toprol-XL 25 mg oral tablet, extended release: 0.5 tab(s) orally once a day (09 Dec 2019 11:15)  Vitamin D3 1000 intl units oral tablet: 1 tab(s) orally once a day (09 Dec 2019 11:15)      MEDICATIONS  (STANDING):  acetaminophen  IVPB .. 1000 milliGRAM(s) IV Intermittent once  acetaminophen  IVPB .. 650 milliGRAM(s) IV Intermittent once  aspirin  chewable 81 milliGRAM(s) Oral daily  atorvastatin 10 milliGRAM(s) Oral at bedtime  chlorhexidine 4% Liquid 1 Application(s) Topical <User Schedule>  chlorhexidine 4% Liquid 1 Application(s) Topical <User Schedule>  DAPTOmycin IVPB 800 milliGRAM(s) IV Intermittent every 24 hours  furosemide   Injectable 40 milliGRAM(s) IV Push once  heparin  Injectable 5000 Unit(s) SubCutaneous every 8 hours  lidocaine   Patch 1 Patch Transdermal daily  metoprolol succinate ER 12.5 milliGRAM(s) Oral daily  oxymetazoline 0.05% Nasal Spray 2 Spray(s) Both Nostrils two times a day  pantoprazole    Tablet 40 milliGRAM(s) Oral before breakfast  polyethylene glycol 3350 17 Gram(s) Oral daily  senna 2 Tablet(s) Oral at bedtime  sodium chloride 0.65% Nasal 1 Spray(s) Both Nostrils three times a day      TELEMETRY: 	    ECG:  	  RADIOLOGY:   DIAGNOSTIC TESTING:  [ ] Echocardiogram:  [ ] Catheterization:  [ ] Stress Test:    OTHER: 	    LABS:	 	    CARDIAC MARKERS:                                8.5    11.11 )-----------( 216      ( 02 Jan 2020 08:46 )             26.5     01-02    132<L>  |  95<L>  |  105<H>  ----------------------------<  156<H>  4.6   |  22  |  2.27<H>    Ca    8.8      02 Jan 2020 07:16  Mg     1.8     01-01      proBNP:     Lipid Profile:   HgA1c:     Creatinine, Serum: 2.27 mg/dL (01-02-20 @ 07:16)  Creatinine, Serum: 2.38 mg/dL (01-01-20 @ 06:58)  Creatinine, Serum: 2.37 mg/dL (12-31-19 @ 09:22)

## 2020-01-03 LAB
ALBUMIN SERPL ELPH-MCNC: 2 G/DL — LOW (ref 3.3–5)
ALP SERPL-CCNC: 78 U/L — SIGNIFICANT CHANGE UP (ref 40–120)
ALT FLD-CCNC: 53 U/L — HIGH (ref 10–45)
ANION GAP SERPL CALC-SCNC: 15 MMOL/L — SIGNIFICANT CHANGE UP (ref 5–17)
APTT BLD: 26.8 SEC — LOW (ref 27.5–36.3)
AST SERPL-CCNC: 46 U/L — HIGH (ref 10–40)
BASOPHILS # BLD AUTO: 0 K/UL — SIGNIFICANT CHANGE UP (ref 0–0.2)
BASOPHILS # BLD AUTO: 0 K/UL — SIGNIFICANT CHANGE UP (ref 0–0.2)
BASOPHILS NFR BLD AUTO: 0 % — SIGNIFICANT CHANGE UP (ref 0–2)
BASOPHILS NFR BLD AUTO: 0 % — SIGNIFICANT CHANGE UP (ref 0–2)
BILIRUB SERPL-MCNC: 0.3 MG/DL — SIGNIFICANT CHANGE UP (ref 0.2–1.2)
BLD GP AB SCN SERPL QL: NEGATIVE — SIGNIFICANT CHANGE UP
BUN SERPL-MCNC: 103 MG/DL — HIGH (ref 7–23)
CALCIUM SERPL-MCNC: 8.8 MG/DL — SIGNIFICANT CHANGE UP (ref 8.4–10.5)
CHLORIDE SERPL-SCNC: 96 MMOL/L — SIGNIFICANT CHANGE UP (ref 96–108)
CO2 SERPL-SCNC: 20 MMOL/L — LOW (ref 22–31)
CREAT SERPL-MCNC: 2.39 MG/DL — HIGH (ref 0.5–1.3)
EOSINOPHIL # BLD AUTO: 0.01 K/UL — SIGNIFICANT CHANGE UP (ref 0–0.5)
EOSINOPHIL # BLD AUTO: 0.13 K/UL — SIGNIFICANT CHANGE UP (ref 0–0.5)
EOSINOPHIL NFR BLD AUTO: 0.1 % — SIGNIFICANT CHANGE UP (ref 0–6)
EOSINOPHIL NFR BLD AUTO: 1.1 % — SIGNIFICANT CHANGE UP (ref 0–6)
GLUCOSE SERPL-MCNC: 180 MG/DL — HIGH (ref 70–99)
HCOV PNL SPEC NAA+PROBE: DETECTED
HCT VFR BLD CALC: 25.7 % — LOW (ref 39–50)
HCT VFR BLD CALC: 26.8 % — LOW (ref 39–50)
HGB BLD-MCNC: 8.3 G/DL — LOW (ref 13–17)
HGB BLD-MCNC: 8.6 G/DL — LOW (ref 13–17)
IMM GRANULOCYTES NFR BLD AUTO: 0.5 % — SIGNIFICANT CHANGE UP (ref 0–1.5)
IMM GRANULOCYTES NFR BLD AUTO: 0.6 % — SIGNIFICANT CHANGE UP (ref 0–1.5)
INR BLD: 1.25 RATIO — HIGH (ref 0.88–1.16)
LYMPHOCYTES # BLD AUTO: 0.44 K/UL — LOW (ref 1–3.3)
LYMPHOCYTES # BLD AUTO: 0.52 K/UL — LOW (ref 1–3.3)
LYMPHOCYTES # BLD AUTO: 3.5 % — LOW (ref 13–44)
LYMPHOCYTES # BLD AUTO: 4.5 % — LOW (ref 13–44)
MCHC RBC-ENTMCNC: 29.2 PG — SIGNIFICANT CHANGE UP (ref 27–34)
MCHC RBC-ENTMCNC: 29.3 PG — SIGNIFICANT CHANGE UP (ref 27–34)
MCHC RBC-ENTMCNC: 32.1 GM/DL — SIGNIFICANT CHANGE UP (ref 32–36)
MCHC RBC-ENTMCNC: 32.3 GM/DL — SIGNIFICANT CHANGE UP (ref 32–36)
MCV RBC AUTO: 90.8 FL — SIGNIFICANT CHANGE UP (ref 80–100)
MCV RBC AUTO: 90.8 FL — SIGNIFICANT CHANGE UP (ref 80–100)
MONOCYTES # BLD AUTO: 0.43 K/UL — SIGNIFICANT CHANGE UP (ref 0–0.9)
MONOCYTES # BLD AUTO: 0.44 K/UL — SIGNIFICANT CHANGE UP (ref 0–0.9)
MONOCYTES NFR BLD AUTO: 3.5 % — SIGNIFICANT CHANGE UP (ref 2–14)
MONOCYTES NFR BLD AUTO: 3.7 % — SIGNIFICANT CHANGE UP (ref 2–14)
NEUTROPHILS # BLD AUTO: 10.34 K/UL — HIGH (ref 1.8–7.4)
NEUTROPHILS # BLD AUTO: 11.66 K/UL — HIGH (ref 1.8–7.4)
NEUTROPHILS NFR BLD AUTO: 90.2 % — HIGH (ref 43–77)
NEUTROPHILS NFR BLD AUTO: 92.3 % — HIGH (ref 43–77)
NT-PROBNP SERPL-SCNC: HIGH PG/ML (ref 0–300)
PLATELET # BLD AUTO: 211 K/UL — SIGNIFICANT CHANGE UP (ref 150–400)
PLATELET # BLD AUTO: 231 K/UL — SIGNIFICANT CHANGE UP (ref 150–400)
POTASSIUM SERPL-MCNC: 4.9 MMOL/L — SIGNIFICANT CHANGE UP (ref 3.5–5.3)
POTASSIUM SERPL-SCNC: 4.9 MMOL/L — SIGNIFICANT CHANGE UP (ref 3.5–5.3)
PROT SERPL-MCNC: 6 G/DL — SIGNIFICANT CHANGE UP (ref 6–8.3)
PROTHROM AB SERPL-ACNC: 14.5 SEC — HIGH (ref 10–13.1)
RAPID RVP RESULT: DETECTED
RBC # BLD: 2.83 M/UL — LOW (ref 4.2–5.8)
RBC # BLD: 2.95 M/UL — LOW (ref 4.2–5.8)
RBC # FLD: 14.4 % — SIGNIFICANT CHANGE UP (ref 10.3–14.5)
RBC # FLD: 14.4 % — SIGNIFICANT CHANGE UP (ref 10.3–14.5)
RH IG SCN BLD-IMP: POSITIVE — SIGNIFICANT CHANGE UP
SODIUM SERPL-SCNC: 131 MMOL/L — LOW (ref 135–145)
WBC # BLD: 11.48 K/UL — HIGH (ref 3.8–10.5)
WBC # BLD: 12.63 K/UL — HIGH (ref 3.8–10.5)
WBC # FLD AUTO: 11.48 K/UL — HIGH (ref 3.8–10.5)
WBC # FLD AUTO: 12.63 K/UL — HIGH (ref 3.8–10.5)

## 2020-01-03 PROCEDURE — 99233 SBSQ HOSP IP/OBS HIGH 50: CPT

## 2020-01-03 PROCEDURE — 99233 SBSQ HOSP IP/OBS HIGH 50: CPT | Mod: GC

## 2020-01-03 RX ORDER — IPRATROPIUM/ALBUTEROL SULFATE 18-103MCG
3 AEROSOL WITH ADAPTER (GRAM) INHALATION EVERY 6 HOURS
Refills: 0 | Status: DISCONTINUED | OUTPATIENT
Start: 2020-01-03 | End: 2020-01-12

## 2020-01-03 RX ORDER — BUMETANIDE 0.25 MG/ML
4 INJECTION INTRAMUSCULAR; INTRAVENOUS ONCE
Refills: 0 | Status: DISCONTINUED | OUTPATIENT
Start: 2020-01-03 | End: 2020-01-03

## 2020-01-03 RX ORDER — BUMETANIDE 0.25 MG/ML
2 INJECTION INTRAMUSCULAR; INTRAVENOUS
Qty: 20 | Refills: 0 | Status: DISCONTINUED | OUTPATIENT
Start: 2020-01-03 | End: 2020-01-07

## 2020-01-03 RX ORDER — AZTREONAM 2 G
1000 VIAL (EA) INJECTION ONCE
Refills: 0 | Status: COMPLETED | OUTPATIENT
Start: 2020-01-03 | End: 2020-01-03

## 2020-01-03 RX ORDER — BUMETANIDE 0.25 MG/ML
2 INJECTION INTRAMUSCULAR; INTRAVENOUS ONCE
Refills: 0 | Status: COMPLETED | OUTPATIENT
Start: 2020-01-03 | End: 2020-01-03

## 2020-01-03 RX ORDER — AZTREONAM 2 G
1000 VIAL (EA) INJECTION EVERY 8 HOURS
Refills: 0 | Status: DISCONTINUED | OUTPATIENT
Start: 2020-01-03 | End: 2020-01-04

## 2020-01-03 RX ORDER — AZTREONAM 2 G
VIAL (EA) INJECTION
Refills: 0 | Status: DISCONTINUED | OUTPATIENT
Start: 2020-01-03 | End: 2020-01-04

## 2020-01-03 RX ORDER — DOBUTAMINE HCL 250MG/20ML
2.5 VIAL (ML) INTRAVENOUS
Qty: 500 | Refills: 0 | Status: DISCONTINUED | OUTPATIENT
Start: 2020-01-03 | End: 2020-01-08

## 2020-01-03 RX ADMIN — Medication 50 MILLIGRAM(S): at 06:11

## 2020-01-03 RX ADMIN — Medication 3 MILLILITER(S): at 06:10

## 2020-01-03 RX ADMIN — BUMETANIDE 2 MILLIGRAM(S): 0.25 INJECTION INTRAMUSCULAR; INTRAVENOUS at 13:00

## 2020-01-03 RX ADMIN — Medication 7.82 MICROGRAM(S)/KG/MIN: at 12:26

## 2020-01-03 RX ADMIN — OXYMETAZOLINE HYDROCHLORIDE 2 SPRAY(S): 0.5 SPRAY NASAL at 06:11

## 2020-01-03 RX ADMIN — Medication 50 MILLIGRAM(S): at 12:28

## 2020-01-03 RX ADMIN — BUMETANIDE 10 MG/HR: 0.25 INJECTION INTRAMUSCULAR; INTRAVENOUS at 20:59

## 2020-01-03 RX ADMIN — CHLORHEXIDINE GLUCONATE 1 APPLICATION(S): 213 SOLUTION TOPICAL at 14:30

## 2020-01-03 RX ADMIN — Medication 50 MILLIGRAM(S): at 23:09

## 2020-01-03 RX ADMIN — Medication 25 MILLIGRAM(S): at 06:11

## 2020-01-03 RX ADMIN — HEPARIN SODIUM 5000 UNIT(S): 5000 INJECTION INTRAVENOUS; SUBCUTANEOUS at 12:29

## 2020-01-03 RX ADMIN — Medication 7.82 MICROGRAM(S)/KG/MIN: at 20:59

## 2020-01-03 RX ADMIN — LIDOCAINE 1 PATCH: 4 CREAM TOPICAL at 12:27

## 2020-01-03 RX ADMIN — PANTOPRAZOLE SODIUM 40 MILLIGRAM(S): 20 TABLET, DELAYED RELEASE ORAL at 06:10

## 2020-01-03 RX ADMIN — DAPTOMYCIN 132 MILLIGRAM(S): 500 INJECTION, POWDER, LYOPHILIZED, FOR SOLUTION INTRAVENOUS at 20:15

## 2020-01-03 RX ADMIN — LIDOCAINE 1 PATCH: 4 CREAM TOPICAL at 01:12

## 2020-01-03 RX ADMIN — LIDOCAINE 1 PATCH: 4 CREAM TOPICAL at 23:12

## 2020-01-03 RX ADMIN — HEPARIN SODIUM 5000 UNIT(S): 5000 INJECTION INTRAVENOUS; SUBCUTANEOUS at 06:11

## 2020-01-03 RX ADMIN — Medication 3 MILLILITER(S): at 23:09

## 2020-01-03 RX ADMIN — HEPARIN SODIUM 5000 UNIT(S): 5000 INJECTION INTRAVENOUS; SUBCUTANEOUS at 23:09

## 2020-01-03 RX ADMIN — Medication 3 MILLILITER(S): at 18:37

## 2020-01-03 RX ADMIN — Medication 50 MILLIGRAM(S): at 01:24

## 2020-01-03 RX ADMIN — OXYMETAZOLINE HYDROCHLORIDE 2 SPRAY(S): 0.5 SPRAY NASAL at 18:39

## 2020-01-03 RX ADMIN — Medication 1 SPRAY(S): at 06:11

## 2020-01-03 RX ADMIN — Medication 1 SPRAY(S): at 15:00

## 2020-01-03 RX ADMIN — LIDOCAINE 1 PATCH: 4 CREAM TOPICAL at 19:10

## 2020-01-03 RX ADMIN — Medication 3 MILLILITER(S): at 12:26

## 2020-01-03 NOTE — PROVIDER CONTACT NOTE (OTHER) - ACTION/TREATMENT ORDERED:
IV antibiotics to be hung. Cont to monitor Gonzalez output. MICU aware of situation will cont to monitor. Pt resting comfortably in bed currently on continuous pulse ox machine at bedside.

## 2020-01-03 NOTE — CONSULT NOTE ADULT - SUBJECTIVE AND OBJECTIVE BOX
CHIEF COMPLAINT:  Increased WOB and hypoxic respiratory failure     HPI:  78 yo M w/ pmhx CAD s/p PCI to OM1 and Mid LAD (2017), mod AS, HTN, HLD, GERD, Gout, pre-DM, Osteoarthritis and chronic back pain, s/p multiple laminectomies and spinal fusion of the low thoracic spine, left TKR, who presented to the ED 12/8 with 1 week of fevers and night sweats, admitted for sepsis 2/2 MSSA Bacteremia highly suspicious for spinal involvement and KRIS. 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    MICU consulted on 1/2 for increased wob and hypoxic respiratory failure.  CXR showed pulm edema vs multifocal PNA.  Pt received 80 IV lasix w/ no urine output; bladder scan unreliable 2/2 edema, Gonzalez inserted w/ no output.  RVP was performed and positive for coronavirus.  Pt was given a duoneb tx; placed on BiPAP 14/5, FiO2 50% w/ SpO2 91 - 94% w/ RR 25.      PAST MEDICAL & SURGICAL HISTORY:  CAD S/P percutaneous coronary angioplasty  HLD (hyperlipidemia)  Back pain  OA (osteoarthritis)  Gout  HTN (hypertension)  GERD (gastroesophageal reflux disease)  S/P lumbar spine operation: 6 thoracic to lumbar surgeries over 6 months period starting 11/2006  S/P knee replacement: 11/2013      FAMILY HISTORY:  No pertinent family history in first degree relatives      SOCIAL HISTORY:  Smoking: [ ] Never Smoked [ ] Former Smoker (__ packs x ___ years) [ ] Current Smoker  (__ packs x ___ years)  Substance Use: [ ] Never Used [ ] Used ____  EtOH Use:  Marital Status: [ ] Single [ ]  [ ]  [ ]   Sexual History:   Occupation:  Recent Travel:  Country of Birth:  Advance Directives:    Allergies    Indocin (Unknown)  oxacillin (Unknown)  Zyvox (Unknown)    Intolerances        HOME MEDICATIONS:    REVIEW OF SYSTEMS:  Constitutional: [ ] negative [ ] fevers [ ] chills [ ] weight loss [ ] weight gain  HEENT: [ ] negative [ ] dry eyes [ ] eye irritation [ ] postnasal drip [ ] nasal congestion  CV: [ ] negative  [ ] chest pain [ ] orthopnea [ ] palpitations [ ] murmur  Resp: [ ] negative [ x] cough [ x] shortness of breath [x ] dyspnea [ ] wheezing [ ] sputum [ ] hemoptysis  GI: [ ] negative [ ] nausea [ ] vomiting [ ] diarrhea [ ] constipation [ ] abd pain [ ] dysphagia   : [ ] negative [ ] dysuria [ ] nocturia [ ] hematuria [ ] increased urinary frequency  Musculoskeletal: [ ] negative [ ] back pain [ ] myalgias [ ] arthralgias [ ] fracture  Skin: [ ] negative [ ] rash [ ] itch  Neurological: [ ] negative [ ] headache [ ] dizziness [ ] syncope [ ] weakness [ ] numbness  Psychiatric: [ ] negative [ ] anxiety [ ] depression  Endocrine: [ ] negative [ ] diabetes [ ] thyroid problem  Hematologic/Lymphatic: [ ] negative [ ] anemia [ ] bleeding problem  Allergic/Immunologic: [ ] negative [ ] itchy eyes [ ] nasal discharge [ ] hives [ ] angioedema  [ ] All other systems negative  [ ] Unable to assess ROS because ________    OBJECTIVE:  ICU Vital Signs Last 24 Hrs  T(C): 36.9 (03 Jan 2020 01:06), Max: 37.7 (02 Jan 2020 21:28)  T(F): 98.5 (03 Jan 2020 01:06), Max: 99.8 (02 Jan 2020 21:28)  HR: 115 (03 Jan 2020 00:20) (78 - 115)  BP: 154/84 (02 Jan 2020 23:28) (133/76 - 158/84)  BP(mean): --  ABP: --  ABP(mean): --  RR: 36 (02 Jan 2020 23:28) (18 - 36)  SpO2: 91% (03 Jan 2020 00:20) (87% - 96%)        01-01 @ 07:01  -  01-02 @ 07:00  --------------------------------------------------------  IN: 1200 mL / OUT: 700 mL / NET: 500 mL    01-02 @ 07:01 - 01-03 @ 04:09  --------------------------------------------------------  IN: 240 mL / OUT: 725 mL / NET: -485 mL      CAPILLARY BLOOD GLUCOSE          PHYSICAL EXAM:  General: tachypneic, anxious  HEENT: normal sclera, EOMI   Neck: supple  Respiratory: Rhonchi throughout all lung fields, no wheeze  Cardiovascular: NSR, +S1/S2, no m/r/g  Abdomen: obese, distended, soft, non-tender to palpation  Extremities: edematous upper and lower extremities; 2-3+ b/l  Skin: no rash or open lesions noted   Neurological: AAO x 3   Psychiatry: normal affect     LINES:     HOSPITAL MEDICATIONS:  aspirin  chewable 81 milliGRAM(s) Oral daily  heparin  Injectable 5000 Unit(s) SubCutaneous every 8 hours    aztreonam  IVPB 1000 milliGRAM(s) IV Intermittent every 8 hours  aztreonam  IVPB      DAPTOmycin IVPB 800 milliGRAM(s) IV Intermittent every 24 hours    metoprolol succinate ER 25 milliGRAM(s) Oral daily    atorvastatin 10 milliGRAM(s) Oral at bedtime    albuterol/ipratropium for Nebulization 3 milliLiter(s) Nebulizer every 6 hours    acetaminophen   Tablet .. 650 milliGRAM(s) Oral every 6 hours PRN  acetaminophen  IVPB .. 1000 milliGRAM(s) IV Intermittent once  acetaminophen  IVPB .. 650 milliGRAM(s) IV Intermittent once    pantoprazole    Tablet 40 milliGRAM(s) Oral before breakfast  polyethylene glycol 3350 17 Gram(s) Oral daily  senna 2 Tablet(s) Oral at bedtime        sodium chloride 0.9% lock flush 10 milliLiter(s) IV Push every 1 hour PRN  sodium chloride 0.9% lock flush 10 milliLiter(s) IV Push every 1 hour PRN      chlorhexidine 4% Liquid 1 Application(s) Topical <User Schedule>  chlorhexidine 4% Liquid 1 Application(s) Topical <User Schedule>  lidocaine   Patch 1 Patch Transdermal daily  oxymetazoline 0.05% Nasal Spray 2 Spray(s) Both Nostrils two times a day  sodium chloride 0.65% Nasal 1 Spray(s) Both Nostrils three times a day        LABS:                        8.5    11.11 )-----------( 216      ( 02 Jan 2020 08:46 )             26.5     Hgb Trend: 8.5<--, 8.4<--, 8.7<--, 9.3<--, 8.6<--  01-02    132<L>  |  95<L>  |  105<H>  ----------------------------<  156<H>  4.6   |  22  |  2.27<H>    Ca    8.8      02 Jan 2020 07:16  Mg     1.8     01-01      Creatinine Trend: 2.27<--, 2.38<--, 2.37<--, 2.42<--, 2.58<--, 3.02<--      Arterial Blood Gas:  01-02 @ 23:23  7.43/33/67/22/93/-1.9  ABG lactate: --        MICROBIOLOGY:     RADIOLOGY:  [x ] Reviewed and interpreted by me  CXR - pulm edema vs multifocal PNA   EKG: not obtained CHIEF COMPLAINT:  Increased WOB and hypoxic respiratory failure     HPI:  78 yo M w/ pmhx CAD s/p PCI to OM1 and Mid LAD (2017), mod AS, HTN, HLD, GERD, Gout, pre-DM, Osteoarthritis and chronic back pain, s/p multiple laminectomies and spinal fusion of the low thoracic spine, left TKR, who presented to the ED 12/8 with 1 week of fevers and night sweats, admitted for sepsis 2/2 MSSA Bacteremia highly suspicious for spinal involvement and KRIS. 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    MICU consulted on 1/2 for increased wob and hypoxic respiratory failure.  CXR showed pulm edema vs multifocal PNA.  Pt received 80 IV lasix w/ no urine output; bladder scan unreliable 2/2 edema, Gonzalez inserted w/ no output.  RVP was performed and positive for coronavirus.  Pt was given a duoneb tx; placed on BiPAP 14/5, FiO2 50% w/ SpO2 91 - 94% w/ RR 25.      PAST MEDICAL & SURGICAL HISTORY:  CAD S/P percutaneous coronary angioplasty  HLD (hyperlipidemia)  Back pain  OA (osteoarthritis)  Gout  HTN (hypertension)  GERD (gastroesophageal reflux disease)  S/P lumbar spine operation: 6 thoracic to lumbar surgeries over 6 months period starting 11/2006  S/P knee replacement: 11/2013      FAMILY HISTORY:  No pertinent family history in first degree relatives      SOCIAL HISTORY:  Smoking: [ ] Never Smoked [ ] Former Smoker (__ packs x ___ years) [ ] Current Smoker  (__ packs x ___ years)  Substance Use: [ ] Never Used [ ] Used ____  EtOH Use:  Marital Status: [ ] Single [ ]  [ ]  [ ]   Sexual History:   Occupation:  Recent Travel:  Country of Birth:  Advance Directives:    Allergies    Indocin (Unknown)  oxacillin (Unknown)  Zyvox (Unknown)    Intolerances        HOME MEDICATIONS:    REVIEW OF SYSTEMS:  Constitutional: [ ] negative [ ] fevers [ ] chills [ ] weight loss [ ] weight gain  HEENT: [ ] negative [ ] dry eyes [ ] eye irritation [ ] postnasal drip [ ] nasal congestion  CV: [ ] negative  [ ] chest pain [ ] orthopnea [ ] palpitations [ ] murmur  Resp: [ ] negative [ x] cough [ x] shortness of breath [x ] dyspnea [ ] wheezing [ ] sputum [ ] hemoptysis  GI: [ ] negative [ ] nausea [ ] vomiting [ ] diarrhea [ ] constipation [ ] abd pain [ ] dysphagia   : [ ] negative [ ] dysuria [ ] nocturia [ ] hematuria [ ] increased urinary frequency  Musculoskeletal: [ ] negative [ ] back pain [ ] myalgias [ ] arthralgias [ ] fracture  Skin: [ ] negative [ ] rash [ ] itch  Neurological: [ ] negative [ ] headache [ ] dizziness [ ] syncope [ ] weakness [ ] numbness  Psychiatric: [ ] negative [ ] anxiety [ ] depression  Endocrine: [ ] negative [ ] diabetes [ ] thyroid problem  Hematologic/Lymphatic: [ ] negative [ ] anemia [ ] bleeding problem  Allergic/Immunologic: [ ] negative [ ] itchy eyes [ ] nasal discharge [ ] hives [ ] angioedema  [x ] All other systems negative  [ ] Unable to assess ROS because ________    OBJECTIVE:  ICU Vital Signs Last 24 Hrs  T(C): 36.9 (03 Jan 2020 01:06), Max: 37.7 (02 Jan 2020 21:28)  T(F): 98.5 (03 Jan 2020 01:06), Max: 99.8 (02 Jan 2020 21:28)  HR: 115 (03 Jan 2020 00:20) (78 - 115)  BP: 154/84 (02 Jan 2020 23:28) (133/76 - 158/84)  RR: 36 (02 Jan 2020 23:28) (18 - 36)  SpO2: 91% (03 Jan 2020 00:20) (87% - 96%)        01-01 @ 07:01  -  01-02 @ 07:00  --------------------------------------------------------  IN: 1200 mL / OUT: 700 mL / NET: 500 mL    01-02 @ 07:01 - 01-03 @ 04:09  --------------------------------------------------------  IN: 240 mL / OUT: 725 mL / NET: -485 mL      PHYSICAL EXAM:  General: tachypneic, anxious  HEENT: normal sclera, EOMI   Neck: supple  Respiratory: Rhonchi throughout all lung fields, no wheeze  Cardiovascular: NSR, +S1/S2, no m/r/g  Abdomen: obese, distended, soft, non-tender to palpation  Extremities: edematous upper and lower extremities; 2-3+ b/l  Skin: no rash or open lesions noted   Neurological: AAO x 3   Psychiatry: normal affect     LINES:     HOSPITAL MEDICATIONS:  aspirin  chewable 81 milliGRAM(s) Oral daily  heparin  Injectable 5000 Unit(s) SubCutaneous every 8 hours    aztreonam  IVPB 1000 milliGRAM(s) IV Intermittent every 8 hours  aztreonam  IVPB      DAPTOmycin IVPB 800 milliGRAM(s) IV Intermittent every 24 hours    metoprolol succinate ER 25 milliGRAM(s) Oral daily    atorvastatin 10 milliGRAM(s) Oral at bedtime    albuterol/ipratropium for Nebulization 3 milliLiter(s) Nebulizer every 6 hours    acetaminophen   Tablet .. 650 milliGRAM(s) Oral every 6 hours PRN  acetaminophen  IVPB .. 1000 milliGRAM(s) IV Intermittent once  acetaminophen  IVPB .. 650 milliGRAM(s) IV Intermittent once    pantoprazole    Tablet 40 milliGRAM(s) Oral before breakfast  polyethylene glycol 3350 17 Gram(s) Oral daily  senna 2 Tablet(s) Oral at bedtime        sodium chloride 0.9% lock flush 10 milliLiter(s) IV Push every 1 hour PRN  sodium chloride 0.9% lock flush 10 milliLiter(s) IV Push every 1 hour PRN      chlorhexidine 4% Liquid 1 Application(s) Topical <User Schedule>  chlorhexidine 4% Liquid 1 Application(s) Topical <User Schedule>  lidocaine   Patch 1 Patch Transdermal daily  oxymetazoline 0.05% Nasal Spray 2 Spray(s) Both Nostrils two times a day  sodium chloride 0.65% Nasal 1 Spray(s) Both Nostrils three times a day        LABS:                        8.5    11.11 )-----------( 216      ( 02 Jan 2020 08:46 )             26.5     Hgb Trend: 8.5<--, 8.4<--, 8.7<--, 9.3<--, 8.6<--  01-02    132<L>  |  95<L>  |  105<H>  ----------------------------<  156<H>  4.6   |  22  |  2.27<H>    Ca    8.8      02 Jan 2020 07:16  Mg     1.8     01-01      Creatinine Trend: 2.27<--, 2.38<--, 2.37<--, 2.42<--, 2.58<--, 3.02<--      Arterial Blood Gas:  01-02 @ 23:23  7.43/33/67/22/93/-1.9  ABG lactate: --        MICROBIOLOGY:     RADIOLOGY:  [x ] Reviewed and interpreted by me  CXR - pulm edema vs multifocal PNA   EKG: not obtained

## 2020-01-03 NOTE — CONSULT NOTE ADULT - ATTENDING COMMENTS
Patient seen and examined.  Agree with resident note as above.  Patient with hx as noted including MSSA bacteremia during this admission who tonight was noted to have increased work of breahting, acute hypoxia, and L>>R infiltrate on CXR.  WBC has increased from 6 to 11, low grade temp earlier today.  Also with worsening edema since his admission.  Likely has components of acute pulmonary edema and possibly also viral vs bacterial PNA.  Recs as above - bipap support, diuresis if you are able, follow cx and empiric abx.  Discussed with pt and osn at bedside.  They request FULL CODE.

## 2020-01-03 NOTE — PROVIDER CONTACT NOTE (OTHER) - ASSESSMENT
MICU consult called by NP oma Pts stats 87% on 5lit humidified O2. Pt had refused venti mask and bipap earlier this shift. ABG done by respiratory. CXR done earlier this shift. Neub treatments given as per orders

## 2020-01-03 NOTE — CHART NOTE - NSCHARTNOTEFT_GEN_A_CORE
SOB    Notified that patient complained of SOB at start of shift and was noted to have a HR of 102 and RR in the 30's the patient and his wife states that he has been sob during the day and he feels it is around the same. Patient c/o left sided back pain 10/10 when moved to listen to lungs otherwise 1/10 when not moving also complained of a non productive cough since admission Patient refused venturi and BiPAP, encouraged to use BiPAP as he is desaturating.  Denies chest pain, HA, palpitations, lightheaded    Vital Signs Last 24 Hrs  T(C): 36.6 (02 Jan 2020 23:28), Max: 37.7 (02 Jan 2020 21:28)  T(F): 97.8 (02 Jan 2020 23:28), Max: 99.8 (02 Jan 2020 21:28)  HR: 109 (02 Jan 2020 23:28) (78 - 109)  BP: 154/84 (02 Jan 2020 23:28) (133/76 - 158/84)  BP(mean): --  RR: 36 (02 Jan 2020 23:28) (18 - 36)  SpO2: 87% (02 Jan 2020 23:28) (87% - 96%)    General awake alert and oriented times 3  neuro non-focal deficits  cards s1 s2 RRR generalized edema   Respiratory diminished breath sound with poor effort tachypnea (when reassessed later wheezes auscultated)   GI bsx4 non distended      Blood Gas Profile - Arterial (01.02.20 @ 23:23)    pH, Arterial: 7.43    pCO2, Arterial: 33 mmHg    pO2, Arterial: 67 mmHg    HCO3, Arterial: 22 mmoL/L    Base Excess, Arterial: -1.9 mmol/L    Oxygen Saturation, Arterial: 93 %    Total CO2, Arterial: 22 mmoL/L    Blood Gas Source Arterial: Arterial    MEDICATIONS  (STANDING):  acetaminophen  IVPB .. 1000 milliGRAM(s) IV Intermittent once  acetaminophen  IVPB .. 650 milliGRAM(s) IV Intermittent once  aspirin  chewable 81 milliGRAM(s) Oral daily  atorvastatin 10 milliGRAM(s) Oral at bedtime  chlorhexidine 4% Liquid 1 Application(s) Topical <User Schedule>  chlorhexidine 4% Liquid 1 Application(s) Topical <User Schedule>  DAPTOmycin IVPB 800 milliGRAM(s) IV Intermittent every 24 hours  heparin  Injectable 5000 Unit(s) SubCutaneous every 8 hours  lidocaine   Patch 1 Patch Transdermal daily  metoprolol succinate ER 25 milliGRAM(s) Oral daily  oxymetazoline 0.05% Nasal Spray 2 Spray(s) Both Nostrils two times a day  pantoprazole    Tablet 40 milliGRAM(s) Oral before breakfast  polyethylene glycol 3350 17 Gram(s) Oral daily  senna 2 Tablet(s) Oral at bedtime  sodium chloride 0.65% Nasal 1 Spray(s) Both Nostrils three times a day    MEDICATIONS  (PRN):  acetaminophen   Tablet .. 650 milliGRAM(s) Oral every 6 hours PRN Temp greater or equal to 38C (100.4F), Mild Pain (1 - 3)  sodium chloride 0.9% lock flush 10 milliLiter(s) IV Push every 1 hour PRN Pre/post blood products, medications, blood draw, and to maintain line patency  sodium chloride 0.9% lock flush 10 milliLiter(s) IV Push every 1 hour PRN Pre/post blood products, medications, blood draw, and to maintain line patency      < from: Xray Chest 1 View- PORTABLE-Urgent (01.02.20 @ 20:52) >    INTERPRETATION:  :    Diffuse bilateral patchy opacities compatible with pulmonary edema or multifocal PNA.     ******PRELIMINARY REPORT******    < end of copied text >    Discussed above with Dr. Florentino from ID regarding the prelim CXR report and that patient currently on daptomycin, no change in ABX at this time  Discussed above with Dr. Lopez covering medical attending regarding the above, recommended give an additional Lasix 80mg and she will discuss with attending regarding the ABX and events overnight    Wife was initially at the bedside, and the son replaced her at the bedside, they are aware of the plan of care.  Plan  Venturi mask sob  chest x-ray (see prelim above)  Lasix 80mg x1 dose  Duoneb x1 for wheezing  BiPAP increased work of breathing  Consider starting additional antimicrobial   CBC with differential in the am   MICU consulted for hypoxia 87% on 4 liters    Will endorse to primary day team, attending to follow    Deanna Schwartz NP  spectralink [phone]

## 2020-01-03 NOTE — PROGRESS NOTE ADULT - ASSESSMENT
79 yr old male with HTN, DCHF, CAD, S/P PCI to OM1, Mid LAD (9/2014, [facility], [facility], [facility]) hx of ischemic CMP, AS, DM HLD, GERD, GOUT, s/p multiple spinal surgery admitted with back pain and fever, now with acute hypoxic resp failure 2/2 to pulm edema w/ poss underlying PNA, +coronovirus.     1. Hypoxic respiratory failure  2/2 likely pulm edema w/ possible underlying PNA and coronavirus  duonebs, bipap, abx   diuresis per renal     2 Back Pain  -stable, MRI noted, neuro/neuro sx followup  -ID f/u, pain control     3. CAD s/p PCI (OM1/LAD)   -stable, cont statin and asa     3. Acute on Chronic systolic & Diastolic CHF, New LV dysfunction   -echo with severe LV sys dysfxn, EF 20-25%, mild diastolic dysfxn  -LAZARO 12/13 with moderate lv dysfunction  -recent echo few months ago with normal LV function  -possible new cardiomyopathy in setting of infection, sepsis  -will continue medical therapy for now as he is not a candidate for ischemic eval   -+ dyspnea, anasarca, now on bipap, cxr w/ pulm edema   -diuresis as able, started on bumex gtt, dobutamine gtt   -renal f/u monitor creat     4. Aortic Stenosis  -stable, severe on LAZARO with valve area of 0.9, tte this admit and as outpt with moderate AS  -gradients low on lazaro, perhaps due to new lv dysfxn  -YULY likely underestimated  -patient was asymptomatic as outpt from valve disease  -no further workup indicated, will follow as outpatient     5. HTN   -BP stable, continue with current antihtn meds, inc bb, add norvasc    6. Fever/ Sepsis -resolved  + blood cultures with gram positive Cocci in clusters , Repeat NGTD  -iv abx, ID f/u   -LAZARO without endocarditis   -encephalopathy resolved    7. 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 Anemia  -workup per medicine    dvt ppx

## 2020-01-03 NOTE — PROGRESS NOTE ADULT - ATTENDING COMMENTS
Patient seen and examined, agree with the above assessment and plan by ALEX Estrada.  events noted  pt developing progressive hypoxic resp failure in setting of fluid overload and poor urine outpt concern for low flow state  iontropic IV diuresis initiated  pt currently on BIPAP

## 2020-01-03 NOTE — PROGRESS NOTE ADULT - ASSESSMENT
80 yo M with chronic low back pain sp multiple laminectomy and fusion p/w 1 week of worsening back pain along with weakness in bilateral lower extremities and inability to walk since last night  Leukocytosis, fever  BCX with MSSA, high grade, BCX 12/12 NGTD  NM tagged WBC uptake in thoracolumbar spine--degen disease?  TTE equivocal, JAHAIRA reassuring  High suspicion for spinal involvement despite negative MRI--monitor closely  New episode of shortness of breath and difficulty breathing (elevated BNP, edema on CXR)  I reviewed CXR personally, appears consistent with L > R pulmonary edema  RVP positive CoV  Overall,  1) MSSA Bacteremia--Dapto 800mg q 24, weekly CKs, monitor Cr  - Continue Dapto through 2/5/20  - At rehab, facility should monitor weekly CKs, CBC, BUN, Cr weekly (readjust Dapto based on any Cr changes); monitor for any other signs toxicity, and monitor for any signs infection at PICC site  2) KRIS--trend Cr to normal  3) Leukocytosis  - Check CBC w/ diff--rule out eosinophilia (dapto can cause eosinophilic pna)  4) New SOB  - Suspect pulmonary edema > infection  - Continue Aztreonam for now  - Check CT Chest w/o contrast  - If PNA will need to adjust abx to better target bacterial pna    Fredy Huynh MD  Pager [phone]  After 5pm and on weekends call [phone]

## 2020-01-03 NOTE — PROGRESS NOTE ADULT - PROBLEM SELECTOR PLAN 4
cont NRB, bumex gtt, dobutamine  , monitor- low threhold for micu  cards and renal fu noted  TTE  severe aortic stenosis. Concentric left ventricular hypertrophy.  Severe hypokinesis of the apical anterior wall, apex  distal inferiior wall.  Mild diastolic dysfunction (Stage I).

## 2020-01-03 NOTE — PROGRESS NOTE ADULT - SUBJECTIVE AND OBJECTIVE BOX
CARDIOLOGY FOLLOW UP - Dr. Lee    CC  events noted   pt w/ increased wob and hypoxic respiratory failure.  CXR showed pulm edema vs multifocal PNA.  Pt received 80 IV lasix w/ no urine output; bladder scan unreliable 2/2 edema, Gonzalez inserted w/ no output.  RVP was performed and positive for coronavirus.  Pt was given a duoneb tx; placed on BiPAP, TRANSFERRED TO [facility] for tele monitoring and  gtt.   PHYSICAL EXAM:  T(C): 36.6 (20 @ 09:45), Max: 37.7 (20 @ 21:28)  HR: 76 (20 @ 09:59) (76 - 115)  BP: 138/80 (20 @ 09:45) (132/79 - 154/84)  RR: 28 (20 @ 09:45) (18 - 36)  SpO2: 96% (20 @ 09:59) (87% - 98%)  Wt(kg): --  I&O's Summary    2020 07:01  -  2020 07:00  --------------------------------------------------------  IN: 340 mL / OUT: 775 mL / NET: -435 mL        Appearance: Normal	  Cardiovascular: Normal S1 S2,RRR, No JVD, No murmurs  Respiratory: crackles   Gastrointestinal:  Soft, Non-tender, + BS	  Extremities: Normal range of motion, No clubbing,  +++ b/l le , upper extremity edema         MEDICATIONS  (STANDING):  acetaminophen  IVPB .. 1000 milliGRAM(s) IV Intermittent once  acetaminophen  IVPB .. 650 milliGRAM(s) IV Intermittent once  albuterol/ipratropium for Nebulization 3 milliLiter(s) Nebulizer every 6 hours  aspirin  chewable 81 milliGRAM(s) Oral daily  atorvastatin 10 milliGRAM(s) Oral at bedtime  aztreonam  IVPB 1000 milliGRAM(s) IV Intermittent every 8 hours  aztreonam  IVPB      buMETAnide Infusion 2 mG/Hr (10 mL/Hr) IV Continuous <Continuous>  buMETAnide IVPB 4 milliGRAM(s) IV Intermittent once  chlorhexidine 4% Liquid 1 Application(s) Topical <User Schedule>  chlorhexidine 4% Liquid 1 Application(s) Topical <User Schedule>  DAPTOmycin IVPB 800 milliGRAM(s) IV Intermittent every 24 hours  DOBUTamine Infusion 2.5 MICROgram(s)/kG/Min (7.822 mL/Hr) IV Continuous <Continuous>  heparin  Injectable 5000 Unit(s) SubCutaneous every 8 hours  lidocaine   Patch 1 Patch Transdermal daily  oxymetazoline 0.05% Nasal Spray 2 Spray(s) Both Nostrils two times a day  pantoprazole    Tablet 40 milliGRAM(s) Oral before breakfast  polyethylene glycol 3350 17 Gram(s) Oral daily  senna 2 Tablet(s) Oral at bedtime  sodium chloride 0.65% Nasal 1 Spray(s) Both Nostrils three times a day      TELEMETRY: NSR, PACs 	    ECG:  	  RADIOLOGY:   DIAGNOSTIC TESTING:  [ ] Echocardiogram:  [ ]  Catheterization:  [ ] Stress Test:    OTHER: 	    LABS:	 	                                8.3    12.63 )-----------( 211      ( 2020 08:40 )             25.7     01-03    131<L>  |  96  |  103<H>  ----------------------------<  180<H>  4.9   |  20<L>  |  2.39<H>    Ca    8.8      2020 03:42    TPro  6.0  /  Alb  2.0<L>  /  TBili  0.3  /  DBili  x   /  AST  46<H>  /  ALT  53<H>  /  AlkPhos  78  01-03    PT/INR - ( 2020 08:30 )   PT: 14.5 sec;   INR: 1.25 ratio         PTT - ( 2020 08:30 )  PTT:26.8 sec

## 2020-01-03 NOTE — PROGRESS NOTE ADULT - SUBJECTIVE AND OBJECTIVE BOX
Patient is a 79y old  Male who presents with a chief complaint of Back pain (03 Jan 2020 17:49)      INTERVAL HPI/OVERNIGHT EVENTS: noted, pt hypoxic placed on NRB overnight  this am still sob, nrb, oriented to person and place this am  wife at bedside      Vital Signs Last 24 Hrs  T(C): 37 (03 Jan 2020 21:06), Max: 37 (03 Jan 2020 13:00)  T(F): 98.6 (03 Jan 2020 21:06), Max: 98.6 (03 Jan 2020 13:00)  HR: 98 (03 Jan 2020 21:06) (76 - 115)  BP: 154/87 (03 Jan 2020 21:06) (132/79 - 154/87)  BP(mean): --  RR: 26 (03 Jan 2020 13:00) (26 - 36)  SpO2: 94% (03 Jan 2020 21:06) (87% - 98%)    acetaminophen   Tablet .. 650 milliGRAM(s) Oral every 6 hours PRN  acetaminophen  IVPB .. 1000 milliGRAM(s) IV Intermittent once  acetaminophen  IVPB .. 650 milliGRAM(s) IV Intermittent once  albuterol/ipratropium for Nebulization 3 milliLiter(s) Nebulizer every 6 hours  aspirin  chewable 81 milliGRAM(s) Oral daily  atorvastatin 10 milliGRAM(s) Oral at bedtime  aztreonam  IVPB 1000 milliGRAM(s) IV Intermittent every 8 hours  aztreonam  IVPB      buMETAnide Infusion 2 mG/Hr IV Continuous <Continuous>  chlorhexidine 4% Liquid 1 Application(s) Topical <User Schedule>  chlorhexidine 4% Liquid 1 Application(s) Topical <User Schedule>  DAPTOmycin IVPB 800 milliGRAM(s) IV Intermittent every 24 hours  DOBUTamine Infusion 2.5 MICROgram(s)/kG/Min IV Continuous <Continuous>  heparin  Injectable 5000 Unit(s) SubCutaneous every 8 hours  lidocaine   Patch 1 Patch Transdermal daily  oxymetazoline 0.05% Nasal Spray 2 Spray(s) Both Nostrils two times a day  pantoprazole    Tablet 40 milliGRAM(s) Oral before breakfast  polyethylene glycol 3350 17 Gram(s) Oral daily  senna 2 Tablet(s) Oral at bedtime  sodium chloride 0.65% Nasal 1 Spray(s) Both Nostrils three times a day  sodium chloride 0.9% lock flush 10 milliLiter(s) IV Push every 1 hour PRN  sodium chloride 0.9% lock flush 10 milliLiter(s) IV Push every 1 hour PRN      PHYSICAL EXAM:  GENERAL: NAD,   EYES: conjunctiva and sclera clear  ENMT: Moist mucous membranes  NECK: Supple, No JVD, Normal thyroid  NERVOUS SYSTEM:  Alert & Oriented X,   CHEST/LUNG: coarse bs  HEART: Regular rate and rhythm; No murmurs, rubs, or gallops  ABDOMEN: Soft, Nontender, Nondistended; Bowel sounds present  EXTREMITIES:  2+ Peripheral Pulses, No clubbing, cyanosis, or edema  LYMPH: No lymphadenopathy noted  SKIN: No rashes or lesions    Consultant(s) Notes Reviewed:  [x ] YES  [ ] NO  Care Discussed with Consultants/Other Providers [ x] YES  [ ] NO    LABS:                        8.6    11.48 )-----------( 231      ( 03 Jan 2020 19:08 )             26.8     01-03    131<L>  |  96  |  103<H>  ----------------------------<  180<H>  4.9   |  20<L>  |  2.39<H>    Ca    8.8      03 Jan 2020 03:42    TPro  6.0  /  Alb  2.0<L>  /  TBili  0.3  /  DBili  x   /  AST  46<H>  /  ALT  53<H>  /  AlkPhos  78  01-03    PT/INR - ( 03 Jan 2020 08:30 )   PT: 14.5 sec;   INR: 1.25 ratio         PTT - ( 03 Jan 2020 08:30 )  PTT:26.8 sec    CAPILLARY BLOOD GLUCOSE          ABG - ( 02 Jan 2020 23:23 )  pH, Arterial: 7.43  pH, Blood: x     /  pCO2: 33    /  pO2: 67    / HCO3: 22    / Base Excess: -1.9  /  SaO2: 93                      RADIOLOGY & ADDITIONAL TESTS:    Imaging Personally Reviewed:  [x ] YES  [ ] NO

## 2020-01-03 NOTE — PROGRESS NOTE ADULT - SUBJECTIVE AND OBJECTIVE BOX
CC: SOB    Saw/spoke to patient. Patient had episode of shortness of breath and difficulty breathing. No chest pain. Now on bipap. Concern for fluid overload.    Allergies  Indocin (Unknown)  oxacillin (Unknown)  Zyvox (Unknown)    ANTIMICROBIALS:  aztreonam  IVPB 1000 every 8 hours  aztreonam  IVPB    DAPTOmycin IVPB 800 every 24 hours    PE:    Vital Signs Last 24 Hrs  T(C): 36.6 (03 Jan 2020 09:45), Max: 37.7 (02 Jan 2020 21:28)  T(F): 97.8 (03 Jan 2020 09:45), Max: 99.8 (02 Jan 2020 21:28)  HR: 76 (03 Jan 2020 09:59) (76 - 115)  BP: 138/80 (03 Jan 2020 09:45) (132/79 - 154/84)  RR: 28 (03 Jan 2020 09:45) (18 - 36)  SpO2: 96% (03 Jan 2020 09:59) (87% - 98%)    Gen: AOx3, fatigued  CV: S1+S2 normal, nontachycardic  Resp: Clear bilat, no resp distress, no crackles/wheezes, bipap  Abd: Soft, nontender, +BS  Ext: No LE edema, no wounds    LABS:                        8.3    12.63 )-----------( 211      ( 03 Jan 2020 08:40 )             25.7     01-03    131<L>  |  96  |  103<H>  ----------------------------<  180<H>  4.9   |  20<L>  |  2.39<H>    Ca    8.8      03 Jan 2020 03:42    TPro  6.0  /  Alb  2.0<L>  /  TBili  0.3  /  DBili  x   /  AST  46<H>  /  ALT  53<H>  /  AlkPhos  78  01-03    MICROBIOLOGY:    .Blood Blood  12-14-19   No growth at 5 days.     .Blood Blood-Peripheral  12-12-19   No growth at 5 days.     .Blood Blood-Peripheral  12-12-19   No growth at 5 days.    .Blood Blood  12-09-19   Growth in aerobic and anaerobic bottles: Staphylococcus aureus  See previous culture [id number]  --    Growth in aerobic and anaerobic bottles: Gram Positive Cocci in Clusters    .Blood Blood  12-08-19   Growth in aerobic and anaerobic bottles: Staphylococcus aureus    .Urine Clean Catch (Midstream)  12-08-19   No growth    Rapid RVP Result: Detected (01-03 @ 01:44) CoV    RADIOLOGY:    1/2 CXR:    FINDINGS:    Lines and Tubes: A right PICC is in the SVC.    Lungs: New bilateral opacities greater on the left than on the right.    Pleura: No pneumothorax.    Heart and Mediastinum: Cardiomegaly.    Skeletal: Thoracolumbar spinal fusion.    IMPRESSION:    1.  New bilateral opacities greater on the left than on the right clinical setting of edema.

## 2020-01-03 NOTE — PROGRESS NOTE ADULT - ATTENDING COMMENTS
pt critically ill  low threshold for mICU eval  hold ct chest untill pt stable and results would not change mgmt    [facility]

## 2020-01-03 NOTE — PROVIDER CONTACT NOTE (OTHER) - RECOMMENDATIONS
Gonzalez. Consult done. Rectal temp ordered. Pt finally agreed to bipap. with good O2 levels reached. Pts HR 90s Pt stated he felt better.

## 2020-01-03 NOTE — PROGRESS NOTE ADULT - ASSESSMENT
80 y/o male with h/o of CAD s/p PCI 1/2017 with 10% LAD stenosis OM1, HTN, HLD, GERD, Gout, Osteoarthritis and chronic back pain, s/p multiple laminectomies and spinal fusion, lower back pain and weakness in his bilateral lower extremities that progressed to inability to ambulate. dx with MSSA bacteremia. on ancef. also received lasix as well. has had hesitancy urinating. on 12/16/ KRIS       1- KRIS   2- MSSA bacteremia   3- dysphagia   4- cad  5- htn   6- chf       suspect kris in setting of  +/-  AIN + /- due to post infectious GN   uo decreasing and pt is with resp failure due to RVP vir  l infection and chf.   cont bipap  given cardiomyopathy to add dobuatmine and bumex drip   strict I/O  to cont dapto    check cr in am   d/w family   d/w cards   transfer to tele

## 2020-01-03 NOTE — CONSULT NOTE ADULT - ASSESSMENT
80 y/o male with h/o of CAD s/p PCI 1/2017 with 10% LAD stenosis OM1, HTN, HLD, GERD, Gout, Osteoarthritis and chronic back pain, s/p multiple laminectomies and spinal fusion, left TKR who presents with worsening back pain and fevers for approx a week found to have MSSA bacteremia w/ unknown source s/p MRI spine, pan CT, TTE/JAHAIRA negative for infection w/ KRIS requiring temporary HD s/p shiley now w/ hypoxic respiratory failure 2/2 newly acquired coronavirus w/ CXR showing pulm edema vs multifocal PNA     # Hypoxic respiratory failure  - 2/2 likely pulm edema w/ possible underlying PNA and coronavirus  - standing duonebs q4-q6  - add aztreonam for gram negative coverage  - obtain sputum culture  - c/w BiPAP 14/5, FiO2 50%  - symptomatic treatment w/ guaifenesin and tessalon perles    - Nephro to evaluate patient in AM; no indication for emergent dialysis currently - not acidotic, not uremic, not  hyperkalemic       Vanessa Leslie MD  Internal Medicine, PGY-2

## 2020-01-04 LAB
ANION GAP SERPL CALC-SCNC: 18 MMOL/L — HIGH (ref 5–17)
BUN SERPL-MCNC: 108 MG/DL — HIGH (ref 7–23)
CALCIUM SERPL-MCNC: 9 MG/DL — SIGNIFICANT CHANGE UP (ref 8.4–10.5)
CHLORIDE SERPL-SCNC: 98 MMOL/L — SIGNIFICANT CHANGE UP (ref 96–108)
CO2 SERPL-SCNC: 20 MMOL/L — LOW (ref 22–31)
CREAT SERPL-MCNC: 2.56 MG/DL — HIGH (ref 0.5–1.3)
GLUCOSE SERPL-MCNC: 155 MG/DL — HIGH (ref 70–99)
HCT VFR BLD CALC: 25.9 % — LOW (ref 39–50)
HGB BLD-MCNC: 8.3 G/DL — LOW (ref 13–17)
MAGNESIUM SERPL-MCNC: 1.9 MG/DL — SIGNIFICANT CHANGE UP (ref 1.6–2.6)
MCHC RBC-ENTMCNC: 29 PG — SIGNIFICANT CHANGE UP (ref 27–34)
MCHC RBC-ENTMCNC: 32 GM/DL — SIGNIFICANT CHANGE UP (ref 32–36)
MCV RBC AUTO: 90.6 FL — SIGNIFICANT CHANGE UP (ref 80–100)
PHOSPHATE SERPL-MCNC: 5.8 MG/DL — HIGH (ref 2.5–4.5)
PLATELET # BLD AUTO: 217 K/UL — SIGNIFICANT CHANGE UP (ref 150–400)
POTASSIUM SERPL-MCNC: 5 MMOL/L — SIGNIFICANT CHANGE UP (ref 3.5–5.3)
POTASSIUM SERPL-SCNC: 5 MMOL/L — SIGNIFICANT CHANGE UP (ref 3.5–5.3)
RBC # BLD: 2.86 M/UL — LOW (ref 4.2–5.8)
RBC # FLD: 14.6 % — HIGH (ref 10.3–14.5)
SODIUM SERPL-SCNC: 136 MMOL/L — SIGNIFICANT CHANGE UP (ref 135–145)
WBC # BLD: 11.14 K/UL — HIGH (ref 3.8–10.5)
WBC # FLD AUTO: 11.14 K/UL — HIGH (ref 3.8–10.5)

## 2020-01-04 PROCEDURE — 99232 SBSQ HOSP IP/OBS MODERATE 35: CPT

## 2020-01-04 RX ADMIN — Medication 3 MILLILITER(S): at 05:11

## 2020-01-04 RX ADMIN — Medication 1 SPRAY(S): at 23:01

## 2020-01-04 RX ADMIN — DAPTOMYCIN 132 MILLIGRAM(S): 500 INJECTION, POWDER, LYOPHILIZED, FOR SOLUTION INTRAVENOUS at 18:09

## 2020-01-04 RX ADMIN — Medication 1 SPRAY(S): at 14:07

## 2020-01-04 RX ADMIN — ATORVASTATIN CALCIUM 10 MILLIGRAM(S): 80 TABLET, FILM COATED ORAL at 23:01

## 2020-01-04 RX ADMIN — Medication 3 MILLILITER(S): at 23:02

## 2020-01-04 RX ADMIN — HEPARIN SODIUM 5000 UNIT(S): 5000 INJECTION INTRAVENOUS; SUBCUTANEOUS at 14:08

## 2020-01-04 RX ADMIN — CHLORHEXIDINE GLUCONATE 1 APPLICATION(S): 213 SOLUTION TOPICAL at 05:12

## 2020-01-04 RX ADMIN — Medication 3 MILLILITER(S): at 18:07

## 2020-01-04 RX ADMIN — LIDOCAINE 1 PATCH: 4 CREAM TOPICAL at 23:00

## 2020-01-04 RX ADMIN — HEPARIN SODIUM 5000 UNIT(S): 5000 INJECTION INTRAVENOUS; SUBCUTANEOUS at 05:12

## 2020-01-04 RX ADMIN — OXYMETAZOLINE HYDROCHLORIDE 2 SPRAY(S): 0.5 SPRAY NASAL at 18:07

## 2020-01-04 RX ADMIN — Medication 50 MILLIGRAM(S): at 05:12

## 2020-01-04 RX ADMIN — Medication 3 MILLILITER(S): at 13:16

## 2020-01-04 RX ADMIN — HEPARIN SODIUM 5000 UNIT(S): 5000 INJECTION INTRAVENOUS; SUBCUTANEOUS at 23:01

## 2020-01-04 RX ADMIN — Medication 650 MILLIGRAM(S): at 23:01

## 2020-01-04 NOTE — PROGRESS NOTE ADULT - SUBJECTIVE AND OBJECTIVE BOX
[facility]   [phone]  RENAL FOLLOW UP NOTE  --------------------------------------------------------------------------------  Chief Complaint:    24 hour events/subjective:    seen earlier   on bipap     PAST HISTORY  --------------------------------------------------------------------------------  No significant changes to PMH, PSH, FHx, SHx, unless otherwise noted    ALLERGIES & MEDICATIONS  --------------------------------------------------------------------------------  Allergies    Indocin (Unknown)  oxacillin (Unknown)  Zyvox (Unknown)    Intolerances      Standing Inpatient Medications  acetaminophen  IVPB .. 1000 milliGRAM(s) IV Intermittent once  acetaminophen  IVPB .. 650 milliGRAM(s) IV Intermittent once  albuterol/ipratropium for Nebulization 3 milliLiter(s) Nebulizer every 6 hours  aspirin  chewable 81 milliGRAM(s) Oral daily  atorvastatin 10 milliGRAM(s) Oral at bedtime  buMETAnide Infusion 2 mG/Hr IV Continuous <Continuous>  chlorhexidine 4% Liquid 1 Application(s) Topical <User Schedule>  chlorhexidine 4% Liquid 1 Application(s) Topical <User Schedule>  DAPTOmycin IVPB 800 milliGRAM(s) IV Intermittent every 24 hours  DOBUTamine Infusion 2.5 MICROgram(s)/kG/Min IV Continuous <Continuous>  heparin  Injectable 5000 Unit(s) SubCutaneous every 8 hours  lidocaine   Patch 1 Patch Transdermal daily  oxymetazoline 0.05% Nasal Spray 2 Spray(s) Both Nostrils two times a day  pantoprazole    Tablet 40 milliGRAM(s) Oral before breakfast  polyethylene glycol 3350 17 Gram(s) Oral daily  senna 2 Tablet(s) Oral at bedtime  sodium chloride 0.65% Nasal 1 Spray(s) Both Nostrils three times a day    PRN Inpatient Medications  acetaminophen   Tablet .. 650 milliGRAM(s) Oral every 6 hours PRN  sodium chloride 0.9% lock flush 10 milliLiter(s) IV Push every 1 hour PRN  sodium chloride 0.9% lock flush 10 milliLiter(s) IV Push every 1 hour PRN      REVIEW OF SYSTEMS  --------------------------------------------------------------------------------        VITALS/PHYSICAL EXAM  --------------------------------------------------------------------------------  T(C): 36.8 (01-04-20 @ 11:42), Max: 37 (01-03-20 @ 21:06)  HR: 105 (01-04-20 @ 18:05) (84 - 113)  BP: 160/89 (01-04-20 @ 11:42) (125/74 - 160/89)  RR: 21 (01-04-20 @ 18:05) (19 - 22)  SpO2: 94% (01-04-20 @ 18:05) (94% - 97%)  Wt(kg): --        01-03-20 @ 07:01  -  01-04-20 @ 07:00  --------------------------------------------------------  IN: 449.6 mL / OUT: 1205 mL / NET: -755.4 mL    01-04-20 @ 07:01  -  01-04-20 @ 18:59  --------------------------------------------------------  IN: 480 mL / OUT: 800 mL / NET: -320 mL      Physical Exam:  Gen: on bipsps elderly M remains alert   	no jvd  	Pulm: decrease bs  + coarse bs bilateral no  wheezing  	CV: RRR, S1S2; no rub  	Abd: +BS, soft, nontender/nondistended  	: No suprapubic tenderness  	UE: Warm, no cyanosis  no clubbing,  3+ edema   	LE: Warm, no cyanosis  no clubbing, 3- edema 	  	    LABS/STUDIES  --------------------------------------------------------------------------------              8.3    11.14 >-----------<  217      [01-04-20 @ 09:52]              25.9     136  |  98  |  108  ----------------------------<  155      [01-04-20 @ 05:56]  5.0   |  20  |  2.56        Ca     9.0     [01-04-20 @ 05:56]      Mg     1.9     [01-04-20 @ 05:56]      Phos  5.8     [01-04-20 @ 05:56]    TPro  6.0  /  Alb  2.0  /  TBili  0.3  /  DBili  x   /  AST  46  /  ALT  53  /  AlkPhos  78  [01-03-20 @ 03:42]    PT/INR: PT 14.5 , INR 1.25       [01-03-20 @ 08:30]  PTT: 26.8       [01-03-20 @ 08:30]      Creatinine Trend:  SCr 2.56 [01-04 @ 05:56]  SCr 2.39 [01-03 @ 03:42]  SCr 2.27 [01-02 @ 07:16]  SCr 2.38 [01-01 @ 06:58]  SCr 2.37 [12-31 @ 09:22]              Urinalysis - [12-21-19 @ 19:48]      Color Yellow / Appearance Turbid / SG 1.020 / pH 5.5      Gluc Negative / Ketone Negative  / Bili Negative / Urobili <2 mg/dL       Blood Moderate / Protein 100 mg/dL / Leuk Est Large / Nitrite Negative       /  / Hyaline 0 / Gran  / Sq Epi  / Non Sq Epi 6 / Bacteria Negative        RYLEE: titer Negative, pattern --      [12-21-19 @ 17:35]  C3 Complement 25      [12-20-19 @ 12:18]  C4 Complement 21      [12-20-19 @ 12:18]  ANCA: cANCA Negative, pANCA Negative, atypical ANCA Negative      [12-21-19 @ 17:35]

## 2020-01-04 NOTE — PROGRESS NOTE ADULT - ASSESSMENT
79 yr old male with HTN, DCHF, CAD, S/P PCI to OM1, Mid LAD (9/2014, [facility], [facility], [facility]) hx of ischemic CMP, AS, DM HLD, GERD, GOUT, s/p multiple spinal surgery admitted with back pain and fever, now with acute hypoxic resp failure 2/2 to pulm edema w/ poss underlying PNA, +coronovirus.     1. Hypoxic respiratory failure-  2/2 likely pulm edema w/ possible underlying PNA and coronavirus  duonebs, bipap, abx   diuresis per renal     2 Back Pain  -stable, MRI noted, neuro/neuro sx followup  -ID f/u, pain control     3. CAD s/p PCI (OM1/LAD)   -stable, cont statin and asa     3. Acute on Chronic systolic & Diastolic CHF, New LV dysfunction   -echo with severe LV sys dysfxn, EF 20-25%, mild diastolic dysfxn  -LAZARO 12/13 with moderate lv dysfunction  -recent echo few months ago with normal LV function  -possible new cardiomyopathy in setting of infection, sepsis  -will continue medical therapy for now as he is not a candidate for ischemic eval   -+ dyspnea, anasarca, now on bipap, cxr w/ pulm edema   -diuresis as able, started on bumex gtt, dobutamine gtt   -renal f/u monitor creat     4. Aortic Stenosis  -stable, severe on LAZARO with valve area of 0.9, tte this admit and as outpt with moderate AS  -gradients low on lazaro, perhaps due to new lv dysfxn  -YULY likely underestimated  -patient was asymptomatic as outpt from valve disease  -no further workup indicated, will follow as outpatient     5. HTN   -BP stable, continue with current antihtn meds, inc bb, add norvasc    6. Fever/ Sepsis -resolved  + blood cultures with gram positive Cocci in clusters , Repeat NGTD  -iv abx, ID f/u   -LAZARO without endocarditis   -encephalopathy resolved    7. 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 Anemia  -workup per medicine    dvt ppx

## 2020-01-04 NOTE — PROGRESS NOTE ADULT - SUBJECTIVE AND OBJECTIVE BOX
CC: more awake today, on BIPAP, family at bedside    TELEMETRY: nsr    PHYSICAL EXAM:    T(C): 36.6 (01-04-20 @ 04:05), Max: 37 (01-03-20 @ 13:00)  HR: 113 (01-04-20 @ 05:20) (76 - 113)  BP: 125/74 (01-04-20 @ 04:05) (125/74 - 154/87)  RR: 19 (01-04-20 @ 04:05) (19 - 28)  SpO2: 95% (01-04-20 @ 05:20) (94% - 97%)  Wt(kg): --  I&O's Summary    03 Jan 2020 07:01  -  04 Jan 2020 07:00  --------------------------------------------------------  IN: 449.6 mL / OUT: 1205 mL / NET: -755.4 mL        Appearance: Normal	  Cardiovascular: Normal S1 S2,RRR, No JVD, No murmurs  Respiratory: Lungs clear to auscultation	  Gastrointestinal:  Soft, Non-tender, + BS	  Extremities: ++ edema  Vascular: Peripheral pulses palpable 2+ bilaterally     LABS:	 	                          8.6    11.48 )-----------( 231      ( 03 Jan 2020 19:08 )             26.8     01-03    131<L>  |  96  |  103<H>  ----------------------------<  180<H>  4.9   |  20<L>  |  2.39<H>    Ca    8.8      03 Jan 2020 03:42    TPro  6.0  /  Alb  2.0<L>  /  TBili  0.3  /  DBili  x   /  AST  46<H>  /  ALT  53<H>  /  AlkPhos  78  01-03      PT/INR - ( 03 Jan 2020 08:30 )   PT: 14.5 sec;   INR: 1.25 ratio         PTT - ( 03 Jan 2020 08:30 )  PTT:26.8 sec    CARDIAC MARKERS:

## 2020-01-04 NOTE — PROGRESS NOTE ADULT - SUBJECTIVE AND OBJECTIVE BOX
CC: F/U for SOB    Saw/spoke to patient. No fevers, no chills. No new complaints. SOB somewhat improved.    Allergies  Indocin (Unknown)  oxacillin (Unknown)  Zyvox (Unknown)    ANTIMICROBIALS:  aztreonam  IVPB 1000 every 8 hours  aztreonam  IVPB    DAPTOmycin IVPB 800 every 24 hours    PE:    Vital Signs Last 24 Hrs  T(C): 36.8 (04 Jan 2020 11:42), Max: 37 (03 Jan 2020 21:06)  T(F): 98.3 (04 Jan 2020 11:42), Max: 98.6 (03 Jan 2020 21:06)  HR: 106 (04 Jan 2020 11:42) (84 - 113)  BP: 160/89 (04 Jan 2020 11:42) (125/74 - 160/89)  RR: 20 (04 Jan 2020 11:42) (19 - 22)  SpO2: 95% (04 Jan 2020 11:42) (94% - 97%)    Gen: AOx3, NAD, non-toxic  CV: S1+S2 normal, nontachycardic  Resp: Clear bilat, no resp distress, no crackles/wheezes  Abd: Soft, nontender, +BS  Ext: No LE edema, no wounds    LABS:                        8.3    11.14 )-----------( 217      ( 04 Jan 2020 09:52 )             25.9     01-04    136  |  98  |  108<H>  ----------------------------<  155<H>  5.0   |  20<L>  |  2.56<H>    Ca    9.0      04 Jan 2020 05:56  Phos  5.8     01-04  Mg     1.9     01-04    TPro  6.0  /  Alb  2.0<L>  /  TBili  0.3  /  DBili  x   /  AST  46<H>  /  ALT  53<H>  /  AlkPhos  78  01-03    MICROBIOLOGY:    .Blood Blood  12-14-19   No growth at 5 days.     .Blood Blood  12-09-19   Growth in aerobic and anaerobic bottles: Staphylococcus aureus  See previous culture [id number]  --    Growth in aerobic and anaerobic bottles: Gram Positive Cocci in Clusters    Rapid RVP Result: Detected (01-03 @ 01:44) CoV    RADIOLOGY:    1/2 CXR:    IMPRESSION:    1.  New bilateral opacities greater on the left than on the right clinical setting of edema.

## 2020-01-04 NOTE — PROGRESS NOTE ADULT - SUBJECTIVE AND OBJECTIVE BOX
Patient is a 79y old  Male who presents with a chief complaint of Back pain (04 Jan 2020 07:58)      INTERVAL HPI/OVERNIGHT EVENTS: lethargic, nrb on      Vital Signs Last 24 Hrs  T(C): 36.8 (04 Jan 2020 11:42), Max: 37 (03 Jan 2020 21:06)  T(F): 98.3 (04 Jan 2020 11:42), Max: 98.6 (03 Jan 2020 21:06)  HR: 106 (04 Jan 2020 11:42) (84 - 113)  BP: 160/89 (04 Jan 2020 11:42) (125/74 - 160/89)  BP(mean): --  RR: 20 (04 Jan 2020 11:42) (19 - 22)  SpO2: 95% (04 Jan 2020 11:42) (94% - 97%)    acetaminophen   Tablet .. 650 milliGRAM(s) Oral every 6 hours PRN  acetaminophen  IVPB .. 1000 milliGRAM(s) IV Intermittent once  acetaminophen  IVPB .. 650 milliGRAM(s) IV Intermittent once  albuterol/ipratropium for Nebulization 3 milliLiter(s) Nebulizer every 6 hours  aspirin  chewable 81 milliGRAM(s) Oral daily  atorvastatin 10 milliGRAM(s) Oral at bedtime  buMETAnide Infusion 2 mG/Hr IV Continuous <Continuous>  chlorhexidine 4% Liquid 1 Application(s) Topical <User Schedule>  chlorhexidine 4% Liquid 1 Application(s) Topical <User Schedule>  DAPTOmycin IVPB 800 milliGRAM(s) IV Intermittent every 24 hours  DOBUTamine Infusion 2.5 MICROgram(s)/kG/Min IV Continuous <Continuous>  heparin  Injectable 5000 Unit(s) SubCutaneous every 8 hours  lidocaine   Patch 1 Patch Transdermal daily  oxymetazoline 0.05% Nasal Spray 2 Spray(s) Both Nostrils two times a day  pantoprazole    Tablet 40 milliGRAM(s) Oral before breakfast  polyethylene glycol 3350 17 Gram(s) Oral daily  senna 2 Tablet(s) Oral at bedtime  sodium chloride 0.65% Nasal 1 Spray(s) Both Nostrils three times a day  sodium chloride 0.9% lock flush 10 milliLiter(s) IV Push every 1 hour PRN  sodium chloride 0.9% lock flush 10 milliLiter(s) IV Push every 1 hour PRN      PHYSICAL EXAM:  GENERAL: NRB, lethargic  EYES: conjunctiva and sclera clear  ENMT: Moist mucous membranes  NECK: Supple, No JVD, Normal thyroid  NERVOUS SYSTEM:  Alert & Oriented X,   CHEST/LUNG: Clear to auscultation bilaterally; No rales, rhonchi, wheezing, or rubs  HEART: Regular rate and rhythm; No murmurs, rubs, or gallops  ABDOMEN: Soft, Nontender, Nondistended; Bowel sounds present  EXTREMITIES:  2+ Peripheral Pulses, No clubbing, cyanosis, or edema  LYMPH: No lymphadenopathy noted  SKIN: No rashes or lesions    Consultant(s) Notes Reviewed:  [x ] YES  [ ] NO  Care Discussed with Consultants/Other Providers [ x] YES  [ ] NO    LABS:                        8.3    11.14 )-----------( 217      ( 04 Jan 2020 09:52 )             25.9     01-04    136  |  98  |  108<H>  ----------------------------<  155<H>  5.0   |  20<L>  |  2.56<H>    Ca    9.0      04 Jan 2020 05:56  Phos  5.8     01-04  Mg     1.9     01-04    TPro  6.0  /  Alb  2.0<L>  /  TBili  0.3  /  DBili  x   /  AST  46<H>  /  ALT  53<H>  /  AlkPhos  78  01-03    PT/INR - ( 03 Jan 2020 08:30 )   PT: 14.5 sec;   INR: 1.25 ratio         PTT - ( 03 Jan 2020 08:30 )  PTT:26.8 sec    CAPILLARY BLOOD GLUCOSE          ABG - ( 02 Jan 2020 23:23 )  pH, Arterial: 7.43  pH, Blood: x     /  pCO2: 33    /  pO2: 67    / HCO3: 22    / Base Excess: -1.9  /  SaO2: 93                      RADIOLOGY & ADDITIONAL TESTS:    Imaging Personally Reviewed:  [x ] YES  [ ] NO

## 2020-01-04 NOTE — PROGRESS NOTE ADULT - ATTENDING COMMENTS
pt critically ill  low threshold for mICU eval  hold ct chest untill pt stable   d/w wife and son at bedside- concerned about po intake, advised not to feed when pt is half sedated and explaind risks of aspiration    [facility]  [phone]

## 2020-01-04 NOTE — PROGRESS NOTE ADULT - ASSESSMENT
80 y/o male with h/o of CAD s/p PCI 1/2017 with 10% LAD stenosis OM1, HTN, HLD, GERD, Gout, Osteoarthritis and chronic back pain, s/p multiple laminectomies and spinal fusion, lower back pain and weakness in his bilateral lower extremities that progressed to inability to ambulate. dx with MSSA bacteremia. on ancef. also received lasix as well. has had hesitancy urinating. on 12/16/ KRIS       1- KRIS   2- MSSA bacteremia   3- dysphagia   4- cad  5- htn   6- chf       suspect kris in setting of  +/-  AIN + /- due to post infectious GN   viral infection and chf.   cont bipap  given cardiomyopathy and dobuatmine and bumex drip 2 mg hr to cont   strict I/O  to cont dapto    check lytes and cr   d/w family

## 2020-01-04 NOTE — PROGRESS NOTE ADULT - ASSESSMENT
80 yo M with chronic low back pain sp multiple laminectomy and fusion p/w 1 week of worsening back pain along with weakness in bilateral lower extremities and inability to walk since last night  Leukocytosis, fever  BCX with MSSA, high grade, BCX 12/12 NGTD  NM tagged WBC uptake in thoracolumbar spine--degen disease?  TTE equivocal, JAHAIRA reassuring  High suspicion for spinal involvement despite negative MRI--monitor closely  New episode of shortness of breath and difficulty breathing (elevated BNP, edema on CXR)--suspect due to fluid overload  RVP positive CoV  Overall,  1) MSSA Bacteremia--Dapto 800mg q 24, weekly CKs, monitor Cr  - Continue Dapto through 2/5/20  - At rehab, facility should monitor weekly CKs, CBC, BUN, Cr weekly (readjust Dapto based on any Cr changes); monitor for any other signs toxicity, and monitor for any signs infection at PICC site  - Ordered CK for tomorrow AM  2) KRIS--trend Cr to normal  3) Leukocytosis  - Trend WBC  - No eosinophilia noted  4) New SOB  - Suspect pulmonary edema > infection  - DC Aztreonam  - Check CT Chest w/o contrast  - If PNA will need to adjust abx to better target bacterial pna (? meropenem, although some possible cross reactivity with B-lactam for AIN)    Fredy Huynh MD  Pager [phone]  After 5pm and on weekends call [phone]

## 2020-01-05 LAB
ANION GAP SERPL CALC-SCNC: 15 MMOL/L — SIGNIFICANT CHANGE UP (ref 5–17)
BUN SERPL-MCNC: 125 MG/DL — HIGH (ref 7–23)
CALCIUM SERPL-MCNC: 8.9 MG/DL — SIGNIFICANT CHANGE UP (ref 8.4–10.5)
CHLORIDE SERPL-SCNC: 95 MMOL/L — LOW (ref 96–108)
CK SERPL-CCNC: 45 U/L — SIGNIFICANT CHANGE UP (ref 30–200)
CO2 SERPL-SCNC: 21 MMOL/L — LOW (ref 22–31)
CREAT SERPL-MCNC: 2.61 MG/DL — HIGH (ref 0.5–1.3)
GLUCOSE SERPL-MCNC: 190 MG/DL — HIGH (ref 70–99)
HCT VFR BLD CALC: 25.6 % — LOW (ref 39–50)
HGB BLD-MCNC: 8.1 G/DL — LOW (ref 13–17)
MAGNESIUM SERPL-MCNC: 1.9 MG/DL — SIGNIFICANT CHANGE UP (ref 1.6–2.6)
MCHC RBC-ENTMCNC: 28.6 PG — SIGNIFICANT CHANGE UP (ref 27–34)
MCHC RBC-ENTMCNC: 31.6 GM/DL — LOW (ref 32–36)
MCV RBC AUTO: 90.5 FL — SIGNIFICANT CHANGE UP (ref 80–100)
PHOSPHATE SERPL-MCNC: 6.1 MG/DL — HIGH (ref 2.5–4.5)
PLATELET # BLD AUTO: 220 K/UL — SIGNIFICANT CHANGE UP (ref 150–400)
POTASSIUM SERPL-MCNC: 4.4 MMOL/L — SIGNIFICANT CHANGE UP (ref 3.5–5.3)
POTASSIUM SERPL-SCNC: 4.4 MMOL/L — SIGNIFICANT CHANGE UP (ref 3.5–5.3)
RBC # BLD: 2.83 M/UL — LOW (ref 4.2–5.8)
RBC # FLD: 14.6 % — HIGH (ref 10.3–14.5)
SODIUM SERPL-SCNC: 131 MMOL/L — LOW (ref 135–145)
WBC # BLD: 11.66 K/UL — HIGH (ref 3.8–10.5)
WBC # FLD AUTO: 11.66 K/UL — HIGH (ref 3.8–10.5)

## 2020-01-05 PROCEDURE — 71250 CT THORAX DX C-: CPT | Mod: 26

## 2020-01-05 RX ADMIN — Medication 1 SPRAY(S): at 13:20

## 2020-01-05 RX ADMIN — Medication 10 MILLIGRAM(S): at 13:47

## 2020-01-05 RX ADMIN — OXYMETAZOLINE HYDROCHLORIDE 2 SPRAY(S): 0.5 SPRAY NASAL at 06:04

## 2020-01-05 RX ADMIN — Medication 1 SPRAY(S): at 06:48

## 2020-01-05 RX ADMIN — Medication 3 MILLILITER(S): at 18:57

## 2020-01-05 RX ADMIN — LIDOCAINE 1 PATCH: 4 CREAM TOPICAL at 12:08

## 2020-01-05 RX ADMIN — Medication 1 SPRAY(S): at 22:09

## 2020-01-05 RX ADMIN — LIDOCAINE 1 PATCH: 4 CREAM TOPICAL at 12:09

## 2020-01-05 RX ADMIN — Medication 3 MILLILITER(S): at 12:09

## 2020-01-05 RX ADMIN — LIDOCAINE 1 PATCH: 4 CREAM TOPICAL at 13:00

## 2020-01-05 RX ADMIN — LIDOCAINE 1 PATCH: 4 CREAM TOPICAL at 19:00

## 2020-01-05 RX ADMIN — HEPARIN SODIUM 5000 UNIT(S): 5000 INJECTION INTRAVENOUS; SUBCUTANEOUS at 14:30

## 2020-01-05 RX ADMIN — SODIUM CHLORIDE 10 MILLILITER(S): 9 INJECTION INTRAMUSCULAR; INTRAVENOUS; SUBCUTANEOUS at 13:20

## 2020-01-05 RX ADMIN — OXYMETAZOLINE HYDROCHLORIDE 2 SPRAY(S): 0.5 SPRAY NASAL at 18:55

## 2020-01-05 RX ADMIN — DAPTOMYCIN 132 MILLIGRAM(S): 500 INJECTION, POWDER, LYOPHILIZED, FOR SOLUTION INTRAVENOUS at 18:39

## 2020-01-05 RX ADMIN — Medication 3 MILLILITER(S): at 06:04

## 2020-01-05 RX ADMIN — Medication 81 MILLIGRAM(S): at 12:10

## 2020-01-05 RX ADMIN — CHLORHEXIDINE GLUCONATE 1 APPLICATION(S): 213 SOLUTION TOPICAL at 05:06

## 2020-01-05 RX ADMIN — Medication 650 MILLIGRAM(S): at 00:36

## 2020-01-05 RX ADMIN — HEPARIN SODIUM 5000 UNIT(S): 5000 INJECTION INTRAVENOUS; SUBCUTANEOUS at 06:04

## 2020-01-05 RX ADMIN — HEPARIN SODIUM 5000 UNIT(S): 5000 INJECTION INTRAVENOUS; SUBCUTANEOUS at 22:09

## 2020-01-05 RX ADMIN — PANTOPRAZOLE SODIUM 40 MILLIGRAM(S): 20 TABLET, DELAYED RELEASE ORAL at 06:05

## 2020-01-05 NOTE — PROVIDER CONTACT NOTE (MEDICATION) - ASSESSMENT
Pt. is a&ox4, vss, no fevers, urine is clear yellow, no odor. Gonzalez was leaking in the morning. I assessed it and repositioned the leg attachment and there has been no leak since.

## 2020-01-05 NOTE — PROGRESS NOTE ADULT - ATTENDING COMMENTS
pt critically ill  low threshold for mICU eval  ct chest today reviewed- pulm edema less likely pna  d/w dgtr at bedside- concerned about po intake, advised not to feed when pt is half sedated and explaind risks of aspiration    [facility]

## 2020-01-05 NOTE — PROGRESS NOTE ADULT - SUBJECTIVE AND OBJECTIVE BOX
CC: taken off BIPAP earlier, dyspneic    TELEMETRY:     PHYSICAL EXAM:    T(C): 36.5 (01-05-20 @ 04:35), Max: 37 (01-04-20 @ 20:59)  HR: 109 (01-05-20 @ 05:51) (100 - 117)  BP: 130/81 (01-05-20 @ 04:35) (130/81 - 160/89)  RR: 20 (01-05-20 @ 04:35) (20 - 22)  SpO2: 94% (01-05-20 @ 05:51) (90% - 97%)  Wt(kg): --  I&O's Summary    04 Jan 2020 07:01  -  05 Jan 2020 07:00  --------------------------------------------------------  IN: 480 mL / OUT: 1700 mL / NET: -1220 mL        Appearance: appears sob in mild distress  Cardiovascular: Normal S1 S2,RRR, No JVD, No murmurs  Respiratory: Lungs clear to auscultation	  Gastrointestinal:  Soft, Non-tender, + BS	  Extremities: Normal range of motion, No clubbing, cyanosis or edema  Vascular: Peripheral pulses palpable 2+ bilaterally     LABS:	 	                          8.3    11.14 )-----------( 217      ( 04 Jan 2020 09:52 )             25.9     01-05    131<L>  |  95<L>  |  125<H>  ----------------------------<  190<H>  4.4   |  21<L>  |  2.61<H>    Ca    8.9      05 Jan 2020 05:05  Phos  6.1     01-05  Mg     1.9     01-05        PT/INR - ( 03 Jan 2020 08:30 )   PT: 14.5 sec;   INR: 1.25 ratio         PTT - ( 03 Jan 2020 08:30 )  PTT:26.8 sec    CARDIAC MARKERS:

## 2020-01-05 NOTE — PROGRESS NOTE ADULT - SUBJECTIVE AND OBJECTIVE BOX
[facility]   [phone]  RENAL FOLLOW UP NOTE  --------------------------------------------------------------------------------  Chief Complaint:    24 hour events/subjective:    seen earlier. still with sob off bipap     PAST HISTORY  --------------------------------------------------------------------------------  No significant changes to PMH, PSH, FHx, SHx, unless otherwise noted    ALLERGIES & MEDICATIONS  --------------------------------------------------------------------------------  Allergies    Indocin (Unknown)  oxacillin (Unknown)  Zyvox (Unknown)    Intolerances      Standing Inpatient Medications  acetaminophen  IVPB .. 1000 milliGRAM(s) IV Intermittent once  acetaminophen  IVPB .. 650 milliGRAM(s) IV Intermittent once  albuterol/ipratropium for Nebulization 3 milliLiter(s) Nebulizer every 6 hours  aspirin  chewable 81 milliGRAM(s) Oral daily  atorvastatin 10 milliGRAM(s) Oral at bedtime  buMETAnide Infusion 2 mG/Hr IV Continuous <Continuous>  chlorhexidine 4% Liquid 1 Application(s) Topical <User Schedule>  chlorhexidine 4% Liquid 1 Application(s) Topical <User Schedule>  DAPTOmycin IVPB 800 milliGRAM(s) IV Intermittent every 24 hours  DOBUTamine Infusion 2.5 MICROgram(s)/kG/Min IV Continuous <Continuous>  heparin  Injectable 5000 Unit(s) SubCutaneous every 8 hours  lidocaine   Patch 1 Patch Transdermal daily  oxymetazoline 0.05% Nasal Spray 2 Spray(s) Both Nostrils two times a day  pantoprazole    Tablet 40 milliGRAM(s) Oral before breakfast  polyethylene glycol 3350 17 Gram(s) Oral daily  senna 2 Tablet(s) Oral at bedtime  sodium chloride 0.65% Nasal 1 Spray(s) Both Nostrils three times a day    PRN Inpatient Medications  acetaminophen   Tablet .. 650 milliGRAM(s) Oral every 6 hours PRN  bisacodyl Suppository 10 milliGRAM(s) Rectal daily PRN  sodium chloride 0.9% lock flush 10 milliLiter(s) IV Push every 1 hour PRN  sodium chloride 0.9% lock flush 10 milliLiter(s) IV Push every 1 hour PRN      REVIEW OF SYSTEMS  --------------------------------------------------------------------------------    Gen: denies  fevers/chills,  CVS: denies chest pain/palpitations  Resp: + SOB/Cough +   GI: Denies N/V/Abd pain  : Denies dysuria    All other systems were reviewed and are negative, except as noted.    VITALS/PHYSICAL EXAM  --------------------------------------------------------------------------------  T(C): 36.3 (01-05-20 @ 13:45), Max: 37 (01-04-20 @ 20:59)  HR: 100 (01-05-20 @ 17:30) (100 - 117)  BP: 132/79 (01-05-20 @ 12:04) (130/81 - 137/83)  RR: 20 (01-05-20 @ 17:30) (18 - 22)  SpO2: 98% (01-05-20 @ 17:30) (90% - 98%)  Wt(kg): --        01-04-20 @ 07:01  -  01-05-20 @ 07:00  --------------------------------------------------------  IN: 480 mL / OUT: 1700 mL / NET: -1220 mL    01-05-20 @ 07:01  -  01-05-20 @ 19:29  --------------------------------------------------------  IN: 0 mL / OUT: 400 mL / NET: -400 mL      Physical Exam:  	    Gen: on O2 elderly M remains alert   	no jvd  	Pulm: decrease bs  + coarse bs bilateral no  wheezing  	CV: RRR, S1S2; no rub  	Abd: +BS, soft, nontender/nondistended  	: No suprapubic tenderness  	UE: Warm, no cyanosis  no clubbing,  3+ edema   	LE: Warm, no cyanosis  no clubbing, 3- edema 	  	    LABS/STUDIES  --------------------------------------------------------------------------------              8.1    11.66 >-----------<  220      [01-05-20 @ 08:55]              25.6     131  |  95  |  125  ----------------------------<  190      [01-05-20 @ 05:05]  4.4   |  21  |  2.61        Ca     8.9     [01-05-20 @ 05:05]      Mg     1.9     [01-05-20 @ 05:05]      Phos  6.1     [01-05-20 @ 05:05]          CK 45      [01-05-20 @ 05:05]    Creatinine Trend:  SCr 2.61 [01-05 @ 05:05]  SCr 2.56 [01-04 @ 05:56]  SCr 2.39 [01-03 @ 03:42]  SCr 2.27 [01-02 @ 07:16]  SCr 2.38 [01-01 @ 06:58]          	    Urinalysis - [12-21-19 @ 19:48]      Color Yellow / Appearance Turbid / SG 1.020 / pH 5.5      Gluc Negative / Ketone Negative  / Bili Negative / Urobili <2 mg/dL       Blood Moderate / Protein 100 mg/dL / Leuk Est Large / Nitrite Negative       /  / Hyaline 0 / Gran  / Sq Epi  / Non Sq Epi 6 / Bacteria Negative        RYLEE: titer Negative, pattern --      [12-21-19 @ 17:35]  C3 Complement 25      [12-20-19 @ 12:18]  C4 Complement 21      [12-20-19 @ 12:18]  ANCA: cANCA Negative, pANCA Negative, atypical ANCA Negative      [12-21-19 @ 17:35]

## 2020-01-05 NOTE — PROGRESS NOTE ADULT - SUBJECTIVE AND OBJECTIVE BOX
Patient is a 79y old  Male who presents with a chief complaint of Back pain (05 Jan 2020 19:29)      INTERVAL HPI/OVERNIGHT EVENTS: noted, sob lying with o2 nasal canula  o2 sat 92 % off NRB      Vital Signs Last 24 Hrs  T(C): 36.7 (05 Jan 2020 20:53), Max: 36.7 (05 Jan 2020 20:53)  T(F): 98 (05 Jan 2020 20:53), Max: 98 (05 Jan 2020 20:53)  HR: 109 (05 Jan 2020 20:53) (100 - 117)  BP: 134/82 (05 Jan 2020 20:53) (130/81 - 134/82)  BP(mean): --  RR: 20 (05 Jan 2020 20:53) (18 - 22)  SpO2: 97% (05 Jan 2020 20:53) (92% - 98%)    acetaminophen   Tablet .. 650 milliGRAM(s) Oral every 6 hours PRN  acetaminophen  IVPB .. 1000 milliGRAM(s) IV Intermittent once  acetaminophen  IVPB .. 650 milliGRAM(s) IV Intermittent once  albuterol/ipratropium for Nebulization 3 milliLiter(s) Nebulizer every 6 hours  aspirin  chewable 81 milliGRAM(s) Oral daily  atorvastatin 10 milliGRAM(s) Oral at bedtime  bisacodyl Suppository 10 milliGRAM(s) Rectal daily PRN  buMETAnide Infusion 2 mG/Hr IV Continuous <Continuous>  chlorhexidine 4% Liquid 1 Application(s) Topical <User Schedule>  chlorhexidine 4% Liquid 1 Application(s) Topical <User Schedule>  DAPTOmycin IVPB 800 milliGRAM(s) IV Intermittent every 24 hours  DOBUTamine Infusion 2.5 MICROgram(s)/kG/Min IV Continuous <Continuous>  heparin  Injectable 5000 Unit(s) SubCutaneous every 8 hours  lidocaine   Patch 1 Patch Transdermal daily  oxymetazoline 0.05% Nasal Spray 2 Spray(s) Both Nostrils two times a day  pantoprazole    Tablet 40 milliGRAM(s) Oral before breakfast  polyethylene glycol 3350 17 Gram(s) Oral daily  senna 2 Tablet(s) Oral at bedtime  sodium chloride 0.65% Nasal 1 Spray(s) Both Nostrils three times a day  sodium chloride 0.9% lock flush 10 milliLiter(s) IV Push every 1 hour PRN  sodium chloride 0.9% lock flush 10 milliLiter(s) IV Push every 1 hour PRN      PHYSICAL EXAM:  GENERAL: NAD,   EYES: conjunctiva and sclera clear  ENMT: Moist mucous membranes  NECK: Supple, No JVD, Normal thyroid  NERVOUS SYSTEM:  Alert & Oriented X,   CHEST/LUNG: rales  HEART: Regular rate and rhythm; No murmurs, rubs, or gallops  ABDOMEN: Soft, Nontender, Nondistended; Bowel sounds present  EXTREMITIES:  edema  LYMPH: No lymphadenopathy noted  SKIN: No rashes or lesions    Consultant(s) Notes Reviewed:  [x ] YES  [ ] NO  Care Discussed with Consultants/Other Providers [ x] YES  [ ] NO    LABS:                        8.1    11.66 )-----------( 220      ( 05 Jan 2020 08:55 )             25.6     01-05    131<L>  |  95<L>  |  125<H>  ----------------------------<  190<H>  4.4   |  21<L>  |  2.61<H>    Ca    8.9      05 Jan 2020 05:05  Phos  6.1     01-05  Mg     1.9     01-05          CAPILLARY BLOOD GLUCOSE                  RADIOLOGY & ADDITIONAL TESTS:< from: CT Chest No Cont (01.05.20 @ 15:02) >  Pulmonary edema with moderate bilateral pleural effusions. Infection is considered less likely.      < end of copied text >      Imaging Personally Reviewed:  [x ] YES  [ ] NO

## 2020-01-05 NOTE — PROGRESS NOTE ADULT - ASSESSMENT
80 y/o male with h/o of CAD s/p PCI 1/2017 with 10% LAD stenosis OM1, HTN, HLD, GERD, Gout, Osteoarthritis and chronic back pain, s/p multiple laminectomies and spinal fusion, lower back pain and weakness in his bilateral lower extremities that progressed to inability to ambulate. dx with MSSA bacteremia. on ancef. also received lasix as well. has had hesitancy urinating. 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   cont bipap  given cardiomyopathy and dobutamine  and bumex drip 2 mg hr to cont  given tachycardia holding off on increasing dose   strict I/O  to cont dapto    if outpt does not improve then will likely need UF and/or HD again   d/w family   d/w card

## 2020-01-05 NOTE — PROGRESS NOTE ADULT - PROBLEM SELECTOR PLAN 3
MSSA bacteremia persistent, source 2/2  cellulitis, cannot ro hardware despite negative imaging  cont dapto per ID for 8 weeks, IR for PICC, weekly CK, cr  12/12 12/14 blood cx NTD  Tylenol PRN fever

## 2020-01-06 LAB
ALBUMIN SERPL ELPH-MCNC: 2.2 G/DL — LOW (ref 3.3–5)
ALBUMIN SERPL ELPH-MCNC: 2.2 G/DL — LOW (ref 3.3–5)
ALP SERPL-CCNC: 78 U/L — SIGNIFICANT CHANGE UP (ref 40–120)
ALP SERPL-CCNC: 80 U/L — SIGNIFICANT CHANGE UP (ref 40–120)
ALT FLD-CCNC: 22 U/L — SIGNIFICANT CHANGE UP (ref 10–45)
ALT FLD-CCNC: 22 U/L — SIGNIFICANT CHANGE UP (ref 10–45)
ANION GAP SERPL CALC-SCNC: 14 MMOL/L — SIGNIFICANT CHANGE UP (ref 5–17)
ANION GAP SERPL CALC-SCNC: 16 MMOL/L — SIGNIFICANT CHANGE UP (ref 5–17)
ANISOCYTOSIS BLD QL: SLIGHT — SIGNIFICANT CHANGE UP
APTT BLD: 25.8 SEC — LOW (ref 27.5–36.3)
AST SERPL-CCNC: 22 U/L — SIGNIFICANT CHANGE UP (ref 10–40)
AST SERPL-CCNC: 24 U/L — SIGNIFICANT CHANGE UP (ref 10–40)
BASE EXCESS BLDA CALC-SCNC: -3.3 MMOL/L — LOW (ref -2–2)
BILIRUB SERPL-MCNC: 0.3 MG/DL — SIGNIFICANT CHANGE UP (ref 0.2–1.2)
BILIRUB SERPL-MCNC: 0.3 MG/DL — SIGNIFICANT CHANGE UP (ref 0.2–1.2)
BUN SERPL-MCNC: 129 MG/DL — HIGH (ref 7–23)
BUN SERPL-MCNC: 134 MG/DL — HIGH (ref 7–23)
BURR CELLS BLD QL SMEAR: PRESENT — SIGNIFICANT CHANGE UP
CALCIUM SERPL-MCNC: 8.9 MG/DL — SIGNIFICANT CHANGE UP (ref 8.4–10.5)
CALCIUM SERPL-MCNC: 8.9 MG/DL — SIGNIFICANT CHANGE UP (ref 8.4–10.5)
CHLORIDE SERPL-SCNC: 96 MMOL/L — SIGNIFICANT CHANGE UP (ref 96–108)
CHLORIDE SERPL-SCNC: 98 MMOL/L — SIGNIFICANT CHANGE UP (ref 96–108)
CO2 BLDA-SCNC: 22 MMOL/L — SIGNIFICANT CHANGE UP (ref 22–30)
CO2 SERPL-SCNC: 20 MMOL/L — LOW (ref 22–31)
CO2 SERPL-SCNC: 21 MMOL/L — LOW (ref 22–31)
CREAT SERPL-MCNC: 2.74 MG/DL — HIGH (ref 0.5–1.3)
CREAT SERPL-MCNC: 2.84 MG/DL — HIGH (ref 0.5–1.3)
GAS PNL BLDA: SIGNIFICANT CHANGE UP
GLUCOSE SERPL-MCNC: 140 MG/DL — HIGH (ref 70–99)
GLUCOSE SERPL-MCNC: 150 MG/DL — HIGH (ref 70–99)
HCO3 BLDA-SCNC: 21 MMOL/L — SIGNIFICANT CHANGE UP (ref 21–29)
HCT VFR BLD CALC: 26.8 % — LOW (ref 39–50)
HCT VFR BLD CALC: 27.8 % — LOW (ref 39–50)
HGB BLD-MCNC: 8.5 G/DL — LOW (ref 13–17)
HGB BLD-MCNC: 8.6 G/DL — LOW (ref 13–17)
HOROWITZ INDEX BLDA+IHG-RTO: 50 — SIGNIFICANT CHANGE UP
INR BLD: 1.36 RATIO — HIGH (ref 0.88–1.16)
MACROCYTES BLD QL: SLIGHT — SIGNIFICANT CHANGE UP
MAGNESIUM SERPL-MCNC: 1.9 MG/DL — SIGNIFICANT CHANGE UP (ref 1.6–2.6)
MANUAL SMEAR VERIFICATION: SIGNIFICANT CHANGE UP
MCHC RBC-ENTMCNC: 28.4 PG — SIGNIFICANT CHANGE UP (ref 27–34)
MCHC RBC-ENTMCNC: 28.6 PG — SIGNIFICANT CHANGE UP (ref 27–34)
MCHC RBC-ENTMCNC: 30.9 GM/DL — LOW (ref 32–36)
MCHC RBC-ENTMCNC: 31.7 GM/DL — LOW (ref 32–36)
MCV RBC AUTO: 90.2 FL — SIGNIFICANT CHANGE UP (ref 80–100)
MCV RBC AUTO: 91.7 FL — SIGNIFICANT CHANGE UP (ref 80–100)
PCO2 BLDA: 35 MMHG — SIGNIFICANT CHANGE UP (ref 32–46)
PH BLDA: 7.39 — SIGNIFICANT CHANGE UP (ref 7.35–7.45)
PHOSPHATE SERPL-MCNC: 6.4 MG/DL — HIGH (ref 2.5–4.5)
PLAT MORPH BLD: NORMAL — SIGNIFICANT CHANGE UP
PLATELET # BLD AUTO: 236 K/UL — SIGNIFICANT CHANGE UP (ref 150–400)
PLATELET # BLD AUTO: 245 K/UL — SIGNIFICANT CHANGE UP (ref 150–400)
PO2 BLDA: 62 MMHG — LOW (ref 74–108)
POIKILOCYTOSIS BLD QL AUTO: SLIGHT — SIGNIFICANT CHANGE UP
POLYCHROMASIA BLD QL SMEAR: SLIGHT — SIGNIFICANT CHANGE UP
POTASSIUM SERPL-MCNC: 4.1 MMOL/L — SIGNIFICANT CHANGE UP (ref 3.5–5.3)
POTASSIUM SERPL-MCNC: 4.4 MMOL/L — SIGNIFICANT CHANGE UP (ref 3.5–5.3)
POTASSIUM SERPL-SCNC: 4.1 MMOL/L — SIGNIFICANT CHANGE UP (ref 3.5–5.3)
POTASSIUM SERPL-SCNC: 4.4 MMOL/L — SIGNIFICANT CHANGE UP (ref 3.5–5.3)
PROT SERPL-MCNC: 6.1 G/DL — SIGNIFICANT CHANGE UP (ref 6–8.3)
PROT SERPL-MCNC: 6.3 G/DL — SIGNIFICANT CHANGE UP (ref 6–8.3)
PROTHROM AB SERPL-ACNC: 15.8 SEC — HIGH (ref 10–12.9)
RBC # BLD: 2.97 M/UL — LOW (ref 4.2–5.8)
RBC # BLD: 3.03 M/UL — LOW (ref 4.2–5.8)
RBC # FLD: 14.7 % — HIGH (ref 10.3–14.5)
RBC # FLD: 14.7 % — HIGH (ref 10.3–14.5)
RBC BLD AUTO: ABNORMAL
SAO2 % BLDA: 91 % — LOW (ref 92–96)
SCHISTOCYTES BLD QL AUTO: SLIGHT — SIGNIFICANT CHANGE UP
SODIUM SERPL-SCNC: 131 MMOL/L — LOW (ref 135–145)
SODIUM SERPL-SCNC: 134 MMOL/L — LOW (ref 135–145)
WBC # BLD: 11.27 K/UL — HIGH (ref 3.8–10.5)
WBC # BLD: 12.09 K/UL — HIGH (ref 3.8–10.5)
WBC # FLD AUTO: 11.27 K/UL — HIGH (ref 3.8–10.5)
WBC # FLD AUTO: 12.09 K/UL — HIGH (ref 3.8–10.5)

## 2020-01-06 PROCEDURE — 99232 SBSQ HOSP IP/OBS MODERATE 35: CPT

## 2020-01-06 PROCEDURE — 36556 INSERT NON-TUNNEL CV CATH: CPT

## 2020-01-06 PROCEDURE — 36800 INSERTION OF CANNULA: CPT

## 2020-01-06 PROCEDURE — 93010 ELECTROCARDIOGRAM REPORT: CPT

## 2020-01-06 PROCEDURE — 36620 INSERTION CATHETER ARTERY: CPT | Mod: LT

## 2020-01-06 PROCEDURE — 99233 SBSQ HOSP IP/OBS HIGH 50: CPT | Mod: GC

## 2020-01-06 PROCEDURE — 76937 US GUIDE VASCULAR ACCESS: CPT | Mod: 26

## 2020-01-06 RX ORDER — BUMETANIDE 0.25 MG/ML
4 INJECTION INTRAMUSCULAR; INTRAVENOUS ONCE
Refills: 0 | Status: COMPLETED | OUTPATIENT
Start: 2020-01-06 | End: 2020-01-06

## 2020-01-06 RX ORDER — DAPTOMYCIN 500 MG/10ML
800 INJECTION, POWDER, LYOPHILIZED, FOR SOLUTION INTRAVENOUS
Refills: 0 | Status: DISCONTINUED | OUTPATIENT
Start: 2020-01-07 | End: 2020-01-08

## 2020-01-06 RX ORDER — DAPTOMYCIN 500 MG/10ML
800 INJECTION, POWDER, LYOPHILIZED, FOR SOLUTION INTRAVENOUS
Refills: 0 | Status: DISCONTINUED | OUTPATIENT
Start: 2020-01-06 | End: 2020-01-06

## 2020-01-06 RX ORDER — BUMETANIDE 0.25 MG/ML
4 INJECTION INTRAMUSCULAR; INTRAVENOUS ONCE
Refills: 0 | Status: DISCONTINUED | OUTPATIENT
Start: 2020-01-06 | End: 2020-01-06

## 2020-01-06 RX ORDER — PANTOPRAZOLE SODIUM 20 MG/1
40 TABLET, DELAYED RELEASE ORAL DAILY
Refills: 0 | Status: DISCONTINUED | OUTPATIENT
Start: 2020-01-06 | End: 2020-01-12

## 2020-01-06 RX ADMIN — Medication 7.82 MICROGRAM(S)/KG/MIN: at 21:41

## 2020-01-06 RX ADMIN — CHLORHEXIDINE GLUCONATE 1 APPLICATION(S): 213 SOLUTION TOPICAL at 05:31

## 2020-01-06 RX ADMIN — Medication 7.82 MICROGRAM(S)/KG/MIN: at 11:33

## 2020-01-06 RX ADMIN — BUMETANIDE 10 MG/HR: 0.25 INJECTION INTRAMUSCULAR; INTRAVENOUS at 11:33

## 2020-01-06 RX ADMIN — Medication 3 MILLILITER(S): at 00:30

## 2020-01-06 RX ADMIN — LIDOCAINE 1 PATCH: 4 CREAM TOPICAL at 00:20

## 2020-01-06 RX ADMIN — PANTOPRAZOLE SODIUM 40 MILLIGRAM(S): 20 TABLET, DELAYED RELEASE ORAL at 16:06

## 2020-01-06 RX ADMIN — Medication 3 MILLILITER(S): at 05:32

## 2020-01-06 RX ADMIN — Medication 3 MILLILITER(S): at 11:32

## 2020-01-06 RX ADMIN — HEPARIN SODIUM 5000 UNIT(S): 5000 INJECTION INTRAVENOUS; SUBCUTANEOUS at 05:31

## 2020-01-06 RX ADMIN — Medication 3 MILLILITER(S): at 17:15

## 2020-01-06 RX ADMIN — BUMETANIDE 132 MILLIGRAM(S): 0.25 INJECTION INTRAMUSCULAR; INTRAVENOUS at 13:29

## 2020-01-06 RX ADMIN — BUMETANIDE 10 MG/HR: 0.25 INJECTION INTRAMUSCULAR; INTRAVENOUS at 21:41

## 2020-01-06 RX ADMIN — HEPARIN SODIUM 5000 UNIT(S): 5000 INJECTION INTRAVENOUS; SUBCUTANEOUS at 21:40

## 2020-01-06 RX ADMIN — CHLORHEXIDINE GLUCONATE 1 APPLICATION(S): 213 SOLUTION TOPICAL at 05:30

## 2020-01-06 NOTE — PROGRESS NOTE ADULT - ASSESSMENT
80 yo M with chronic low back pain sp multiple laminectomy and fusion p/w 1 week of worsening back pain along with weakness in bilateral lower extremities and inability to walk since last night  Leukocytosis, fever  BCX with MSSA, high grade, BCX 12/12 NGTD  NM tagged WBC uptake in thoracolumbar spine--degen disease?  TTE equivocal, JAHAIRA reassuring  High suspicion for spinal involvement despite negative MRI--monitor closely  New episode of shortness of breath and difficulty breathing (elevated BNP, edema on CXR)--suspect due to fluid overload  RVP positive CoV  Overall,  1) MSSA Bacteremia--Dapto 800mg q 24, weekly CKs, monitor Cr  - Continue Dapto through 2/5/20  - At rehab, facility should monitor weekly CKs, CBC, BUN, Cr weekly (readjust Dapto based on any Cr changes); monitor for any other signs toxicity, and monitor for any signs infection at PICC site  - Ordered CK for tomorrow AM  2) KRIS--trend Cr to normal  3) Leukocytosis  - Trend WBC  4) New SOB  - CT without evidence PNA  - F/U cardiology--any benefit to repeat TTE (Could this be missed endocarditis with delayed damage to valve?)?  - Any considerations for HD per Renal    Fredy Huynh MD  Pager [phone]  After 5pm and on weekends call [phone]

## 2020-01-06 NOTE — PROGRESS NOTE ADULT - SUBJECTIVE AND OBJECTIVE BOX
CARDIOLOGY FOLLOW UP NOTE - DR. RHODES    Subjective:    inc dyspnea today requiring BIPAP  son at bedside    no chest pain    PHYSICAL EXAM:  T(C): 36.8 (01-06-20 @ 11:30), Max: 36.8 (01-06-20 @ 04:50)  HR: 115 (01-06-20 @ 11:30) (100 - 122)  BP: 122/74 (01-06-20 @ 11:30) (119/74 - 134/82)  RR: 20 (01-06-20 @ 11:30) (20 - 20)  SpO2: 90% (01-06-20 @ 11:30) (90% - 98%)  Wt(kg): --  I&O's Summary    05 Jan 2020 07:01  -  06 Jan 2020 07:00  --------------------------------------------------------  IN: 214 mL / OUT: 1100 mL / NET: -886 mL      Daily     Daily     Appearance: + resp distress  Cardiovascular: Normal S1 S2, tachy, +sm   Respiratory: Lungs clear to auscultation	  Gastrointestinal:  Soft, Non-tender, + BS	  Extremities: Normal range of motion, b/l +++ edema       Home Medications:  allopurinol 300 mg oral tablet: 1 tab(s) orally once a day (09 Dec 2019 11:15)  aspirin 81 mg oral delayed release tablet: 1 tab(s) orally once a day (09 Dec 2019 11:15)  Co Q-10 100 mg oral capsule: 1 cap(s) orally once a day (09 Dec 2019 11:15)  diclofenac sodium 75 mg oral delayed release tablet: 1 tab(s) orally 2 times a day (09 Dec 2019 11:15)  lansoprazole 15 mg oral delayed release capsule: 1 cap(s) orally once a day (09 Dec 2019 11:15)  losartan 100 mg oral tablet: 1 tab(s) orally once a day (09 Dec 2019 11:15)  lovastatin 20 mg oral tablet: 1 tab(s) orally once a day (09 Dec 2019 11:15)  miSOPROStol 200 mcg oral tablet: 1 tab(s) orally 2 times a day (09 Dec 2019 11:15)  Toprol-XL 25 mg oral tablet, extended release: 0.5 tab(s) orally once a day (09 Dec 2019 11:15)  Vitamin D3 1000 intl units oral tablet: 1 tab(s) orally once a day (09 Dec 2019 11:15)      MEDICATIONS  (STANDING):  acetaminophen  IVPB .. 1000 milliGRAM(s) IV Intermittent once  acetaminophen  IVPB .. 650 milliGRAM(s) IV Intermittent once  albuterol/ipratropium for Nebulization 3 milliLiter(s) Nebulizer every 6 hours  aspirin  chewable 81 milliGRAM(s) Oral daily  atorvastatin 10 milliGRAM(s) Oral at bedtime  buMETAnide Infusion 2 mG/Hr (10 mL/Hr) IV Continuous <Continuous>  buMETAnide IVPB 4 milliGRAM(s) IV Intermittent once  chlorhexidine 4% Liquid 1 Application(s) Topical <User Schedule>  chlorhexidine 4% Liquid 1 Application(s) Topical <User Schedule>  DAPTOmycin IVPB 800 milliGRAM(s) IV Intermittent every 24 hours  DOBUTamine Infusion 2.5 MICROgram(s)/kG/Min (7.822 mL/Hr) IV Continuous <Continuous>  heparin  Injectable 5000 Unit(s) SubCutaneous every 8 hours  lidocaine   Patch 1 Patch Transdermal daily  metolazone 5 milliGRAM(s) Oral once  oxymetazoline 0.05% Nasal Spray 2 Spray(s) Both Nostrils two times a day  pantoprazole    Tablet 40 milliGRAM(s) Oral before breakfast  polyethylene glycol 3350 17 Gram(s) Oral daily  senna 2 Tablet(s) Oral at bedtime  sodium chloride 0.65% Nasal 1 Spray(s) Both Nostrils three times a day      TELEMETRY: 	    ECG:  	  RADIOLOGY:   DIAGNOSTIC TESTING:  [ ] Echocardiogram:  [ ] Catheterization:  [ ] Stress Test:    OTHER: 	    LABS:	 	    CARDIAC MARKERS:                                8.5    11.27 )-----------( 245      ( 06 Jan 2020 07:46 )             26.8     01-06    131<L>  |  96  |  129<H>  ----------------------------<  140<H>  4.1   |  21<L>  |  2.74<H>    Ca    8.9      06 Jan 2020 06:03  Phos  6.4     01-06  Mg     1.9     01-06    TPro  6.1  /  Alb  2.2<L>  /  TBili  0.3  /  DBili  x   /  AST  22  /  ALT  22  /  AlkPhos  78  01-06    proBNP:     Lipid Profile:   HgA1c:     Creatinine, Serum: 2.74 mg/dL (01-06-20 @ 06:03)  Creatinine, Serum: 2.61 mg/dL (01-05-20 @ 05:05)  Creatinine, Serum: 2.56 mg/dL (01-04-20 @ 05:56)

## 2020-01-06 NOTE — PROGRESS NOTE ADULT - ASSESSMENT
78 y/o male with h/o of CAD s/p PCI 1/2017 with 10% LAD stenosis OM1, HTN, HLD, GERD, Gout, Osteoarthritis and chronic back pain, s/p multiple laminectomies and spinal fusion, lower back pain and weakness in his bilateral lower extremities that progressed to inability to ambulate. dx with MSSA bacteremia. on ancef. also received lasix as well. has had hesitancy urinating. 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   cont bipap  given cardiomyopathy and dobutamine  and bumex drip 2 mg hr t started however, despite + UO his chf/sob not improving  will need renal replacement therapy and UF fluid removal and possible hd for worsening renal function as well   he has also required dapto as well  strict I/O  to cont dapto    d/w cards.   CCU/MICU called   d/w family  paged vascular for access for hd

## 2020-01-06 NOTE — PROGRESS NOTE ADULT - ATTENDING COMMENTS
pt critically ill  ct chest today reviewed- pulm edema less likely pna  cont dobutamine, bumex gtt  pt transferred to CCU      [facility]

## 2020-01-06 NOTE — PROGRESS NOTE ADULT - PROBLEM SELECTOR PLAN 4
Acute respi failure  cont bipap, bumex gtt, dobutamine  , monitor- low threhold for micu  cards and renal fu noted  TTE  severe aortic stenosis. Concentric left ventricular hypertrophy.  Severe hypokinesis of the apical anterior wall, apex  distal inferiior wall.  Mild diastolic dysfunction (Stage I).

## 2020-01-06 NOTE — PROGRESS NOTE ADULT - SUBJECTIVE AND OBJECTIVE BOX
Patient is a 79y old  Male who presents with a chief complaint of Back pain (06 Jan 2020 13:57)      INTERVAL HPI/OVERNIGHT EVENTS: sob on bipap, lethargic      Vital Signs Last 24 Hrs  T(C): 36.9 (06 Jan 2020 14:05), Max: 36.9 (06 Jan 2020 14:05)  T(F): 98.5 (06 Jan 2020 14:05), Max: 98.5 (06 Jan 2020 14:05)  HR: 116 (06 Jan 2020 14:05) (100 - 122)  BP: 112/71 (06 Jan 2020 14:05) (112/71 - 134/82)  BP(mean): 84 (06 Jan 2020 14:05) (84 - 84)  RR: 22 (06 Jan 2020 14:05) (20 - 22)  SpO2: 98% (06 Jan 2020 14:05) (90% - 98%)    albuterol/ipratropium for Nebulization 3 milliLiter(s) Nebulizer every 6 hours  aspirin  chewable 81 milliGRAM(s) Oral daily  atorvastatin 10 milliGRAM(s) Oral at bedtime  bisacodyl Suppository 10 milliGRAM(s) Rectal daily PRN  buMETAnide Infusion 2 mG/Hr IV Continuous <Continuous>  chlorhexidine 4% Liquid 1 Application(s) Topical <User Schedule>  chlorhexidine 4% Liquid 1 Application(s) Topical <User Schedule>  DAPTOmycin IVPB 800 milliGRAM(s) IV Intermittent every 48 hours  DOBUTamine Infusion 2.5 MICROgram(s)/kG/Min IV Continuous <Continuous>  heparin  Injectable 5000 Unit(s) SubCutaneous every 8 hours  lidocaine   Patch 1 Patch Transdermal daily  oxymetazoline 0.05% Nasal Spray 2 Spray(s) Both Nostrils two times a day  pantoprazole  Injectable 40 milliGRAM(s) IV Push daily  polyethylene glycol 3350 17 Gram(s) Oral daily  senna 2 Tablet(s) Oral at bedtime  sodium chloride 0.65% Nasal 1 Spray(s) Both Nostrils three times a day  sodium chloride 0.9% lock flush 10 milliLiter(s) IV Push every 1 hour PRN  sodium chloride 0.9% lock flush 10 milliLiter(s) IV Push every 1 hour PRN      PHYSICAL EXAM:  GENERAL: in respi distress  EYES: conjunctiva and sclera clear  ENMT: Moist mucous membranes  NECK: Supple, No JVD, Normal thyroid  NERVOUS SYSTEM:  Alert & Oriented X,   CHEST/LUNG: Clear to auscultation bilaterally; No rales, rhonchi, wheezing, or rubs  HEART: Regular rate and rhythm; No murmurs, rubs, or gallops  ABDOMEN: Soft, Nontender, Nondistended; Bowel sounds present  EXTREMITIES:  2+ edema  LYMPH: No lymphadenopathy noted  SKIN: No rashes or lesions    Consultant(s) Notes Reviewed:  [x ] YES  [ ] NO  Care Discussed with Consultants/Other Providers [ x] YES  [ ] NO    LABS:                        8.6    12.09 )-----------( 236      ( 06 Jan 2020 13:37 )             27.8     01-06    134<L>  |  98  |  134<H>  ----------------------------<  150<H>  4.4   |  20<L>  |  2.84<H>    Ca    8.9      06 Jan 2020 13:37  Phos  6.4     01-06  Mg     1.9     01-06    TPro  6.3  /  Alb  2.2<L>  /  TBili  0.3  /  DBili  x   /  AST  24  /  ALT  22  /  AlkPhos  80  01-06    PT/INR - ( 06 Jan 2020 13:37 )   PT: 15.8 sec;   INR: 1.36 ratio         PTT - ( 06 Jan 2020 13:37 )  PTT:25.8 sec    CAPILLARY BLOOD GLUCOSE          ABG - ( 06 Jan 2020 13:18 )  pH, Arterial: 7.39  pH, Blood: x     /  pCO2: 35    /  pO2: 62    / HCO3: 21    / Base Excess: -3.3  /  SaO2: 91                      RADIOLOGY & ADDITIONAL TESTS:    Imaging Personally Reviewed:  [x ] YES  [ ] NO

## 2020-01-06 NOTE — PROGRESS NOTE ADULT - SUBJECTIVE AND OBJECTIVE BOX
[facility]   [phone]  RENAL FOLLOW UP NOTE  --------------------------------------------------------------------------------  Chief Complaint:    24 hour events/subjective:    pt seen. remains tachypneic  on bipap     PAST HISTORY  --------------------------------------------------------------------------------  No significant changes to PMH, PSH, FHx, SHx, unless otherwise noted    ALLERGIES & MEDICATIONS  --------------------------------------------------------------------------------  Allergies    Indocin (Unknown)  oxacillin (Unknown)  Zyvox (Unknown)    Intolerances      Standing Inpatient Medications  acetaminophen  IVPB .. 1000 milliGRAM(s) IV Intermittent once  acetaminophen  IVPB .. 650 milliGRAM(s) IV Intermittent once  albuterol/ipratropium for Nebulization 3 milliLiter(s) Nebulizer every 6 hours  aspirin  chewable 81 milliGRAM(s) Oral daily  atorvastatin 10 milliGRAM(s) Oral at bedtime  buMETAnide Infusion 2 mG/Hr IV Continuous <Continuous>  buMETAnide IVPB 4 milliGRAM(s) IV Intermittent once  chlorhexidine 4% Liquid 1 Application(s) Topical <User Schedule>  chlorhexidine 4% Liquid 1 Application(s) Topical <User Schedule>  DAPTOmycin IVPB 800 milliGRAM(s) IV Intermittent every 24 hours  DOBUTamine Infusion 2.5 MICROgram(s)/kG/Min IV Continuous <Continuous>  heparin  Injectable 5000 Unit(s) SubCutaneous every 8 hours  lidocaine   Patch 1 Patch Transdermal daily  metolazone 5 milliGRAM(s) Oral once  oxymetazoline 0.05% Nasal Spray 2 Spray(s) Both Nostrils two times a day  pantoprazole    Tablet 40 milliGRAM(s) Oral before breakfast  polyethylene glycol 3350 17 Gram(s) Oral daily  senna 2 Tablet(s) Oral at bedtime  sodium chloride 0.65% Nasal 1 Spray(s) Both Nostrils three times a day    PRN Inpatient Medications  acetaminophen   Tablet .. 650 milliGRAM(s) Oral every 6 hours PRN  bisacodyl Suppository 10 milliGRAM(s) Rectal daily PRN  sodium chloride 0.9% lock flush 10 milliLiter(s) IV Push every 1 hour PRN  sodium chloride 0.9% lock flush 10 milliLiter(s) IV Push every 1 hour PRN      REVIEW OF SYSTEMS  --------------------------------------------------------------------------------    limited ros no cp or palp + c/o sob   no abd pain   VITALS/PHYSICAL EXAM  --------------------------------------------------------------------------------  T(C): 36.8 (01-06-20 @ 11:30), Max: 36.8 (01-06-20 @ 04:50)  HR: 115 (01-06-20 @ 11:30) (100 - 122)  BP: 122/74 (01-06-20 @ 11:30) (119/74 - 134/82)  RR: 20 (01-06-20 @ 11:30) (20 - 20)  SpO2: 90% (01-06-20 @ 11:30) (90% - 98%)  Wt(kg): --        01-05-20 @ 07:01  -  01-06-20 @ 07:00  --------------------------------------------------------  IN: 214 mL / OUT: 1100 mL / NET: -886 mL      Physical Exam:  	      Gen: on O2 elderly M remains responsive but more lethargic overall today   	no jvd  	Pulm: decrease bs  + coarse bs bilateral no  wheezing  	CV: RRR, S1S2; no rub  	Abd: +BS, soft, nontender/nondistended  	: No suprapubic tenderness  	UE: Warm, no cyanosis  no clubbing,  3+ edema   	LE: Warm, no cyanosis  no clubbing, 3- edema some decrease in edema overall   	      LABS/STUDIES  --------------------------------------------------------------------------------              8.5    11.27 >-----------<  245      [01-06-20 @ 07:46]              26.8     131  |  96  |  129  ----------------------------<  140      [01-06-20 @ 06:03]  4.1   |  21  |  2.74        Ca     8.9     [01-06-20 @ 06:03]      Mg     1.9     [01-06-20 @ 06:03]      Phos  6.4     [01-06-20 @ 06:03]    TPro  6.1  /  Alb  2.2  /  TBili  0.3  /  DBili  x   /  AST  22  /  ALT  22  /  AlkPhos  78  [01-06-20 @ 06:03]        CK 45      [01-05-20 @ 05:05]    Creatinine Trend:  SCr 2.74 [01-06 @ 06:03]  SCr 2.61 [01-05 @ 05:05]  SCr 2.56 [01-04 @ 05:56]  SCr 2.39 [01-03 @ 03:42]  SCr 2.27 [01-02 @ 07:16]              Urinalysis - [12-21-19 @ 19:48]      Color Yellow / Appearance Turbid / SG 1.020 / pH 5.5      Gluc Negative / Ketone Negative  / Bili Negative / Urobili <2 mg/dL       Blood Moderate / Protein 100 mg/dL / Leuk Est Large / Nitrite Negative       /  / Hyaline 0 / Gran  / Sq Epi  / Non Sq Epi 6 / Bacteria Negative        RYLEE: titer Negative, pattern --      [12-21-19 @ 17:35]  C3 Complement 25      [12-20-19 @ 12:18]  C4 Complement 21      [12-20-19 @ 12:18]  ANCA: cANCA Negative, pANCA Negative, atypical ANCA Negative      [12-21-19 @ 17:35]      < from: CT Chest No Cont (01.05.20 @ 15:02) >    EXAM:  CT CHEST                            PROCEDURE DATE:  01/05/2020            INTERPRETATION:  CLINICAL INFORMATION: Shortness of breath.    COMPARISON: CT chest 12/10/2019.    PROCEDURE:   CT of the Chest was performed without intravenous contrast.  Sagittal and coronal reformats were performed.      FINDINGS:    LUNGS AND AIRWAYS: Patent central airways.  Large bilateral groundglass opacities with small internal areas of consolidation. Interlobular septal thickening.    PLEURA: Moderate bilateral pleural effusions.    MEDIASTINUM AND MARTHA: No lymphadenopathy.    VESSELS: Coronary atherosclerosis.    HEART: Enlargement of the left atrium and left ventricle.. No pericardial effusion.    CHEST WALL AND LOWER NECK: Within normal limits.    VISUALIZED UPPER ABDOMEN: Cholelithiasis. Subcentimeter right hepatic hypodensities which are too small to characterize.    BONES: T9-T12 posterior spinal fusion. Multilevel degenerative changes.    IMPRESSION:     Pulmonary edema with moderate bilateral pleural effusions. Infection is considered less likely.                    KB FRANCISCO M.D., RADIOLOGY RESIDENT  This document has been electronically signed.  SYLVIE HEATH M.D., ATTENDING RADIOLOGIST  This document has been electronically signed. Jan 5 2020  3:33PM        < end of copied text >

## 2020-01-06 NOTE — PROVIDER CONTACT NOTE (OTHER) - BACKGROUND
pt admitted with sepsis
12/8 weakness & lower back pain
12/8 weakness and lower back pain
79 yr old male with PMH of HTN , admitted for back pain and weakness
89 yr old male with PMH of HTN, admitted for back pain and weakness.
Admitted for back pain weakness pt has been c/o SOB throughout night. See other provider contact notes. Pt had HD this admission with a shiely cath which has then been removed.
Admitted with back pain
Pt admitted for back pain weakness
pt admitted with chf exarcebation
pt admitted with chf, back pain

## 2020-01-06 NOTE — PROVIDER CONTACT NOTE (OTHER) - DATE AND TIME:
[facility]  [address]  [place] [address]  Phone: [phone]                  Bernard Nickerson   2017 4:00 PM   Office Visit    Description:  Male : 2004   Provider:  Karolina Chavez MD   Department:  [facility]           Reason for Visit     Fever           Diagnoses this Visit        Comments    Sore throat    -  Primary     Fever, unspecified fever cause                To Do List           Goals (5 Years of Data)     None      Follow-Up and Disposition     Return if symptoms worsen or fail to improve.      [facility]     [facility] On Call Nurse Care Line -  Assistance  Unless otherwise directed by your provider, please contact [facility], our nurse care line that is available for  assistance.     Registered nurses in the [facility] provide: appointment scheduling, clinical advisement, health education, and other advisory services.  Call: [phone] (toll free)               Medications           Message regarding Medications     Verify the changes and/or additions to your medication regime listed below are the same as discussed with your clinician today.  If any of these changes or additions are incorrect, please notify your healthcare provider.             Verify that the below list of medications is an accurate representation of the medications you are currently taking.  If none reported, the list may be blank. If incorrect, please contact your healthcare provider. Carry this list with you in case of emergency.           Current Medications     acetaminophen (TYLENOL) 160 mg/5 mL Liqd Take by mouth.    clindamycin (CLEOCIN T) 1 % Swab     ibuprofen (ADVIL,MOTRIN) 100 mg/5 mL suspension Take by mouth every 6 (six) hours as needed for Temperature greater than.    PREVIDENT 5000 BOOSTER PLUS 1.1 % Pste     PREVIDENT 5000 ENAMEL PROTECT 1.1-5 % Pste     dextroamphetamine-amphetamine (ADDERALL XR) 10 MG 24 hr capsule      "       Clinical Reference Information           Your Vitals Were     Temp Height Weight BMI       98.5 °F (36.9 °C) (Oral) 5' 5.35" (1.66 m) 53.6 kg (118 lb 2.7 oz) 19.45 kg/m2       Allergies as of 4/25/2017     No Known Allergies      Immunizations Administered on Date of Encounter - 4/25/2017     None      Orders Placed During Today's Visit      Normal Orders This Visit    Influenza antigen Nasopharyngeal Swab     Strep A culture, throat     Throat Screen, Rapid       Instructions    -Give Tylenol every 4 hours or Motrin every 6 hours as needed for pain/fever.  -Encourage fluids.  -Have your child gargle with warm salt water as needed for throat pain.  -Encourage your child to wash his/her hands frequently and avoid sharing food/drinks with others.  -Mix equal parts of liquid Benadryl and liquid Maalox.  Give  5ml every 6 hours as needed for throat and/or mouth pain.  -You will be contacted with the results of your child's throat culture.  -Contact Clinic if with any concerns.         Language Assistance Services     ATTENTION: Language assistance services are available, free of charge. Please call [phone].      ATENCIÓN: Si habla sammy, tiene a solis disposición servicios gratuitos de asistencia lingüística. Llame al [phone].     GILDA Ý: N?u b?n nói Ti?ng Vi?t, có các d?ch v? h? tr? ngôn ng? mi?n phí dành cho b?n. G?i s? [phone].         [facility] complies with applicable Federal civil rights laws and does not discriminate on the basis of race, color, national origin, age, disability, or sex.        " 06-Jan-2020 06:45

## 2020-01-06 NOTE — PROGRESS NOTE ADULT - SUBJECTIVE AND OBJECTIVE BOX
====================  CCU MIDNIGHT ROUNDS  ====================    MAHESH CANELA  [id number]  Patient is a 79y old  Male who presents with a chief complaint of Back pain (06 Jan 2020 15:25)      ====================  SUMMARY:  ====================        ====================  NEW EVENTS:  ====================        ====================  VITALS (Last 12 hrs):  ====================    T(C): 36.4 (01-06-20 @ 21:40), Max: 36.9 (01-06-20 @ 14:05)  T(F): 97.5 (01-06-20 @ 21:40), Max: 98.5 (01-06-20 @ 14:05)  HR: 110 (01-06-20 @ 22:00) (104 - 117)  BP: 114/68 (01-06-20 @ 22:00) (96/66 - 122/74)  BP(mean): 86 (01-06-20 @ 22:00) (72 - 102)  ABP: --  ABP(mean): --  RR: 24 (01-06-20 @ 18:00) (20 - 26)  SpO2: 94% (01-06-20 @ 22:00) (90% - 100%)  Wt(kg): --  CVP(mm Hg): --  CVP(cm H2O): --  CO: --  CI: --  PA: --  PA(mean): --  PCWP: --  SVR: --  PVR: --    I&O's Summary    05 Jan 2020 07:01  -  06 Jan 2020 07:00  --------------------------------------------------------  IN: 214 mL / OUT: 1100 mL / NET: -886 mL    06 Jan 2020 07:01  -  06 Jan 2020 22:45  --------------------------------------------------------  IN: 192.4 mL / OUT: 1797 mL / NET: -1604.6 mL            ====================  NEW LABS:  ====================                          8.6    12.09 )-----------( 236      ( 06 Jan 2020 13:37 )             27.8     01-06    134<L>  |  98  |  134<H>  ----------------------------<  150<H>  4.4   |  20<L>  |  2.84<H>    Ca    8.9      06 Jan 2020 13:37  Phos  6.4     01-06  Mg     1.9     01-06    TPro  6.3  /  Alb  2.2<L>  /  TBili  0.3  /  DBili  x   /  AST  24  /  ALT  22  /  AlkPhos  80  01-06    PT/INR - ( 06 Jan 2020 13:37 )   PT: 15.8 sec;   INR: 1.36 ratio         PTT - ( 06 Jan 2020 13:37 )  PTT:25.8 sec      ABG - ( 06 Jan 2020 15:38 )  pH, Arterial: 7.41  pH, Blood: x     /  pCO2: 35    /  pO2: 78    / HCO3: 21    / Base Excess: -2.4  /  SaO2: 95                    ====================  PLAN:  ====================  -       Doris Aviles DNP  CCU/Cardiology  [id number]  Beeper #[id number]

## 2020-01-06 NOTE — PROGRESS NOTE ADULT - SUBJECTIVE AND OBJECTIVE BOX
ACCEPT TO CCU     CCU Accept Note    HILTON MAHESH  ====================  HPI & Hospital Course:   79 y M  with PMH significant for CAD s/p PCI, GERD, gout, OA, s/p multiple laminectomies and spinal fusions presenting to hospital with worsening back pain/ weakness that progressed to difficulty with ambulation. Hospital course c/b acute metabolic encephalopathy in the setting of MSSA bacteremia. Ceftriaxone started 12/7, transitioned to vanc+cefepime, currently transferred to the CCU for fluid overload, respiratory distress requiring urgent HD.     Recap of Hospital course:   12/8- Patient admitted for back pain, initially found to have negative MRI , blood cultures positive for MSSA   Patient started on Cefazolin at this time.   12/12: Neurosurgery consulted with no acute surgical intervention, pulm consulted for pna evaluation  with recommendation to continue with current abx  12/13: JAHAIRA  demonstrating severe AS , Mild diastolic dysfunction, no evidence of vegetations. EF 44%  12/18: Switched from ancef to daptomyocin   12/20: shiley placed and 1st HD session   12/21 and 12/24: HD sessions   12/26: HD per renal, fam refused renal biopsy Recommendation for dapto for 8 weeks total course - Till feb 5, 2020 12/31 : shiley removed, PICC placed on R    1/2: Pulmonary edema and respiratory distress, MICU consulted -> placed on bipap. Pro BNP elevated . ABx changed Azactam gram negative coverage   1/3: bumex gtt started and transfered to tele floor   1/4: aztreonam DC  1/6: continuing with dapto myocin , continuing bipap. Transfer to CCU for CHF and  worsening SOB , requiring urgent HD     Patient History:    Past Medical, Past Surgical, and Family History:  PAST MEDICAL HISTORY:  Back pain     CAD S/P percutaneous coronary angioplasty     GERD (gastroesophageal reflux disease)     Gout     HLD (hyperlipidemia)     HTN (hypertension)     OA (osteoarthritis).     PAST SURGICAL HISTORY:  S/P knee replacement 11/2013    S/P lumbar spine operation 6 thoracic to lumbar surgeries over 6 months period starting 11/2006.     No pertinent family history in first degree relatives.     No Pertinent Family History in first degree relatives of: Lower back pain,  Home Medications:  allopurinol 300 mg oral tablet: 1 tab(s) orally once a day (09 Dec 2019 11:15)  aspirin 81 mg oral delayed release tablet: 1 tab(s) orally once a day (09 Dec 2019 11:15)  Co Q-10 100 mg oral capsule: 1 cap(s) orally once a day (09 Dec 2019 11:15)  diclofenac sodium 75 mg oral delayed release tablet: 1 tab(s) orally 2 times a day (09 Dec 2019 11:15)  lansoprazole 15 mg oral delayed release capsule: 1 cap(s) orally once a day (09 Dec 2019 11:15)  losartan 100 mg oral tablet: 1 tab(s) orally once a day (09 Dec 2019 11:15)  lovastatin 20 mg oral tablet: 1 tab(s) orally once a day (09 Dec 2019 11:15)  miSOPROStol 200 mcg oral tablet: 1 tab(s) orally 2 times a day (09 Dec 2019 11:15)  Toprol-XL 25 mg oral tablet, extended release: 0.5 tab(s) orally once a day (09 Dec 2019 11:15)  Vitamin D3 1000 intl units oral tablet: 1 tab(s) orally once a day (09 Dec 2019 11:15)      Current Medications:   MEDICATIONS  (STANDING):  acetaminophen  IVPB .. 1000 milliGRAM(s) IV Intermittent once  acetaminophen  IVPB .. 650 milliGRAM(s) IV Intermittent once  albuterol/ipratropium for Nebulization 3 milliLiter(s) Nebulizer every 6 hours  aspirin  chewable 81 milliGRAM(s) Oral daily  atorvastatin 10 milliGRAM(s) Oral at bedtime  buMETAnide Infusion 2 mG/Hr (10 mL/Hr) IV Continuous <Continuous>  chlorhexidine 4% Liquid 1 Application(s) Topical <User Schedule>  chlorhexidine 4% Liquid 1 Application(s) Topical <User Schedule>  DAPTOmycin IVPB 800 milliGRAM(s) IV Intermittent every 24 hours  DOBUTamine Infusion 2.5 MICROgram(s)/kG/Min (7.822 mL/Hr) IV Continuous <Continuous>  heparin  Injectable 5000 Unit(s) SubCutaneous every 8 hours  lidocaine   Patch 1 Patch Transdermal daily  oxymetazoline 0.05% Nasal Spray 2 Spray(s) Both Nostrils two times a day  pantoprazole    Tablet 40 milliGRAM(s) Oral before breakfast  polyethylene glycol 3350 17 Gram(s) Oral daily  senna 2 Tablet(s) Oral at bedtime  sodium chloride 0.65% Nasal 1 Spray(s) Both Nostrils three times a day    MEDICATIONS  (PRN):  acetaminophen   Tablet .. 650 milliGRAM(s) Oral every 6 hours PRN Temp greater or equal to 38C (100.4F), Mild Pain (1 - 3)  bisacodyl Suppository 10 milliGRAM(s) Rectal daily PRN Constipation  sodium chloride 0.9% lock flush 10 milliLiter(s) IV Push every 1 hour PRN Pre/post blood products, medications, blood draw, and to maintain line patency  sodium chloride 0.9% lock flush 10 milliLiter(s) IV Push every 1 hour PRN Pre/post blood products, medications, blood draw, and to maintain line patency    Allergies and Intolerances:        Allergies:  	vancomycin: Drug, Rash  	Zyvox: Drug, Unknown  	oxacillin: Drug, Unknown  	Indocin: Drug, Unknown      Family History:     Social History:  Non smoker, no alcohol use     ====================  Vital Signs Last 24 Hrs  T(C): 36.8 (06 Jan 2020 11:30), Max: 36.8 (06 Jan 2020 04:50)  T(F): 98.2 (06 Jan 2020 11:30), Max: 98.2 (06 Jan 2020 04:50)  HR: 115 (06 Jan 2020 11:30) (100 - 122)  BP: 122/74 (06 Jan 2020 11:30) (119/74 - 134/82)  BP(mean): --  RR: 20 (06 Jan 2020 11:30) (20 - 20)  SpO2: 90% (06 Jan 2020 11:30) (90% - 98%)    Adult Advanced Hemodynamics Last 24 Hrs  CVP(mm Hg): --  CVP(cm H2O): --  CO: --  CI: --  PA: --  PA(mean): --  PCWP: --  SVR: --  SVRI: --  PVR: --  PVRI: --    Physical Exam:   General: NAD  HEENT: PERRL, EOMI, normal sclera and conjunctiva, no oral lesions  Neck: Supple, no JVD  Lungs: CTA bilaterally  Heart: RRR, normal S1S2, no murmurs/rubs/gallops  Abdomen: Soft, ND/NT  Extremities: 2+ peripheral pulses, no cyanosis/clubbing/edema, full ROM  Skin: Warm, well-perfused  Neuro: A&O x3, no focal deficits      ====================  Labs & Imaging:   CBC Full  -  ( 06 Jan 2020 13:37 )  WBC Count : 12.09 K/uL  RBC Count : 3.03 M/uL  Hemoglobin : 8.6 g/dL  Hematocrit : 27.8 %  Platelet Count - Automated : 236 K/uL  Mean Cell Volume : 91.7 fl  Mean Cell Hemoglobin : 28.4 pg  Mean Cell Hemoglobin Concentration : 30.9 gm/dL  Auto Neutrophil # : x  Auto Lymphocyte # : x  Auto Monocyte # : x  Auto Eosinophil # : x  Auto Basophil # : x  Auto Neutrophil % : x  Auto Lymphocyte % : x  Auto Monocyte % : x  Auto Eosinophil % : x  Auto Basophil % : x    01-06    131<L>  |  96  |  129<H>  ----------------------------<  140<H>  4.1   |  21<L>  |  2.74<H>    Ca    8.9      06 Jan 2020 06:03  Phos  6.4     01-06  Mg     1.9     01-06    TPro  6.1  /  Alb  2.2<L>  /  TBili  0.3  /  DBili  x   /  AST  22  /  ALT  22  /  AlkPhos  78  01-06    PT/INR - ( 06 Jan 2020 13:37 )   PT: 15.8 sec;   INR: 1.36 ratio         PTT - ( 06 Jan 2020 13:37 )  PTT:25.8 sec  ABG - ( 06 Jan 2020 13:18 )  pH, Arterial: 7.39  pH, Blood: x     /  pCO2: 35    /  pO2: 62    / HCO3: 21    / Base Excess: -3.3  /  SaO2: 91        CARDIAC MARKERS ( 05 Jan 2020 05:05 )  x     / x     / 45 U/L / x     / x        ====================  Assessment & Plan: 79 y M  with PMH significant for CAD s/p PCI, GERD, gout, OA, s/p multiple laminectomies and spinal fusions, hospital course c/b MSSA bacteremia, ESRD requiring urgent HD on this admission, S/P Shiley placement and removal now  transferred to CCU for urgent HD in the setting Respiratory distress and pulmonary edema     #Neuro  -     #Resp  1. 1/3 - RVP positive for coronovirus  - Supportive care    2.  Pulmonary Edema   - Tachypnea requiring positive pressure support in setting of  fluid overload   - Most recent blood gas 1/6: PH 7.39| CO2 35| Po2 62 | Bicarb 21   - Bipap settings  14/5  Fio2 50%  - Non Con CT 1/5: Pulmonary edema with moderate bilateral pleural effusions. Infection is considered less likely.  - Will evaluate Resp status following fluid removal, tentative volume removal 1.5 L     #CV    1. Acute decompensating HF   - c/w  Dobutamine gtt 2.5 mcg  and Bumex gtt 2cc/hour , started on 1/3 /2019   - JAHAIRA 12/13: EF 44%, with severe AS, no vegetations reported at this time   - Repeat TTE 1/6/2020- pending formal read     2. CAD   - c/w ASA and statin     #GI  - NPO in the setting of BIPAP requirement   -  GI prophylaxis with PPI    #ID  1.  MSSA bacteremia   -  ID following, appreciate recommendations   -  Currently on Daptomycin til 2/5/20 q48 hours to be dosed following HD     #Renal    1.  Acute Kidney injury   - Worsening renal function in the setting of intravascular depletion vs post obstructive uropathy from urinary retention vs ATN from poor   -baseline .96, currently creatinine  2.74     2. ESRD requiring Urgent HD  - Shiley to be placed by CCU, worsening creatinine   - Nephrology following for HD, requiring   - Will continue to renally dose all meds, avoid nephrotoxic agents, monitor I& o's     #Heme  - Hgb stable  - Will continue to monitor with cbc QD     #Endo  - No active issues     #DVT PPx  - Heparin sub q ACCEPT TO CCU     CCU Accept Note    HILTON MAHESH  ====================  HPI & Hospital Course:   79 y M  with PMH significant for CAD s/p PCI, GERD, gout, OA, s/p multiple laminectomies and spinal fusions presenting to hospital with worsening back pain/ weakness that progressed to difficulty with ambulation. Hospital course c/b acute metabolic encephalopathy in the setting of MSSA bacteremia. Ceftriaxone started 12/7, transitioned to vanc+cefepime, currently transferred to the CCU for fluid overload, respiratory distress requiring urgent HD.     Recap of Hospital course:   12/8- Patient admitted for back pain, initially found to have negative MRI , blood cultures positive for MSSA   Patient started on Cefazolin at this time.   12/12: Neurosurgery consulted with no acute surgical intervention, pulm consulted for pna evaluation  with recommendation to continue with current abx  12/13: JAHAIRA  demonstrating severe AS , Mild diastolic dysfunction, no evidence of vegetations. EF 44%  12/18: Switched from ancef to daptomyocin   12/20: shiley placed and 1st HD session   12/21 and 12/24: HD sessions   12/26: HD per renal, fam refused renal biopsy Recommendation for dapto for 8 weeks total course - Till feb 5, 2020 12/31 : shiley removed, PICC placed on R    1/2: Pulmonary edema and respiratory distress, MICU consulted -> placed on bipap. Pro BNP elevated . ABx changed Azactam gram negative coverage   1/3: bumex gtt started and transfered to tele floor   1/4: aztreonam DC  1/6: continuing with dapto myocin , continuing bipap. Transfer to CCU for CHF and  worsening SOB , requiring urgent HD     Patient History:    Past Medical, Past Surgical, and Family History:  PAST MEDICAL HISTORY:  Back pain     CAD S/P percutaneous coronary angioplasty     GERD (gastroesophageal reflux disease)     Gout     HLD (hyperlipidemia)     HTN (hypertension)     OA (osteoarthritis).     PAST SURGICAL HISTORY:  S/P knee replacement 11/2013    S/P lumbar spine operation 6 thoracic to lumbar surgeries over 6 months period starting 11/2006.     No pertinent family history in first degree relatives.     No Pertinent Family History in first degree relatives of: Lower back pain,  Home Medications:  allopurinol 300 mg oral tablet: 1 tab(s) orally once a day (09 Dec 2019 11:15)  aspirin 81 mg oral delayed release tablet: 1 tab(s) orally once a day (09 Dec 2019 11:15)  Co Q-10 100 mg oral capsule: 1 cap(s) orally once a day (09 Dec 2019 11:15)  diclofenac sodium 75 mg oral delayed release tablet: 1 tab(s) orally 2 times a day (09 Dec 2019 11:15)  lansoprazole 15 mg oral delayed release capsule: 1 cap(s) orally once a day (09 Dec 2019 11:15)  losartan 100 mg oral tablet: 1 tab(s) orally once a day (09 Dec 2019 11:15)  lovastatin 20 mg oral tablet: 1 tab(s) orally once a day (09 Dec 2019 11:15)  miSOPROStol 200 mcg oral tablet: 1 tab(s) orally 2 times a day (09 Dec 2019 11:15)  Toprol-XL 25 mg oral tablet, extended release: 0.5 tab(s) orally once a day (09 Dec 2019 11:15)  Vitamin D3 1000 intl units oral tablet: 1 tab(s) orally once a day (09 Dec 2019 11:15)      Current Medications:   MEDICATIONS  (STANDING):  acetaminophen  IVPB .. 1000 milliGRAM(s) IV Intermittent once  acetaminophen  IVPB .. 650 milliGRAM(s) IV Intermittent once  albuterol/ipratropium for Nebulization 3 milliLiter(s) Nebulizer every 6 hours  aspirin  chewable 81 milliGRAM(s) Oral daily  atorvastatin 10 milliGRAM(s) Oral at bedtime  buMETAnide Infusion 2 mG/Hr (10 mL/Hr) IV Continuous <Continuous>  chlorhexidine 4% Liquid 1 Application(s) Topical <User Schedule>  chlorhexidine 4% Liquid 1 Application(s) Topical <User Schedule>  DAPTOmycin IVPB 800 milliGRAM(s) IV Intermittent every 24 hours  DOBUTamine Infusion 2.5 MICROgram(s)/kG/Min (7.822 mL/Hr) IV Continuous <Continuous>  heparin  Injectable 5000 Unit(s) SubCutaneous every 8 hours  lidocaine   Patch 1 Patch Transdermal daily  oxymetazoline 0.05% Nasal Spray 2 Spray(s) Both Nostrils two times a day  pantoprazole    Tablet 40 milliGRAM(s) Oral before breakfast  polyethylene glycol 3350 17 Gram(s) Oral daily  senna 2 Tablet(s) Oral at bedtime  sodium chloride 0.65% Nasal 1 Spray(s) Both Nostrils three times a day    MEDICATIONS  (PRN):  acetaminophen   Tablet .. 650 milliGRAM(s) Oral every 6 hours PRN Temp greater or equal to 38C (100.4F), Mild Pain (1 - 3)  bisacodyl Suppository 10 milliGRAM(s) Rectal daily PRN Constipation  sodium chloride 0.9% lock flush 10 milliLiter(s) IV Push every 1 hour PRN Pre/post blood products, medications, blood draw, and to maintain line patency  sodium chloride 0.9% lock flush 10 milliLiter(s) IV Push every 1 hour PRN Pre/post blood products, medications, blood draw, and to maintain line patency    Allergies and Intolerances:        Allergies:  	vancomycin: Drug, Rash  	Zyvox: Drug, Unknown  	oxacillin: Drug, Unknown  	Indocin: Drug, Unknown      Family History:     Social History:  Non smoker, no alcohol use     ====================  Vital Signs Last 24 Hrs  T(C): 36.8 (06 Jan 2020 11:30), Max: 36.8 (06 Jan 2020 04:50)  T(F): 98.2 (06 Jan 2020 11:30), Max: 98.2 (06 Jan 2020 04:50)  HR: 115 (06 Jan 2020 11:30) (100 - 122)  BP: 122/74 (06 Jan 2020 11:30) (119/74 - 134/82)  BP(mean): --  RR: 20 (06 Jan 2020 11:30) (20 - 20)  SpO2: 90% (06 Jan 2020 11:30) (90% - 98%)    Adult Advanced Hemodynamics Last 24 Hrs  CVP(mm Hg): --  CVP(cm H2O): --  CO: --  CI: --  PA: --  PA(mean): --  PCWP: --  SVR: --  SVRI: --  PVR: --  PVRI: --    Physical Exam:   General: NAD  HEENT: PERRL, EOMI, normal sclera and conjunctiva, no oral lesions  Lungs: diffuse crackles present laterally and posteriorly   Heart: RRR, normal S1S2, no murmurs/rubs/gallops  Abdomen: Tender mid epigastric region, + bowel sounds   Extremities: 2+  peripheral edema b/l LE and UE   Skin: Warm, well-perfused  Neuro: A&O x3, no focal deficits      ====================  Labs & Imaging:   CBC Full  -  ( 06 Jan 2020 13:37 )  WBC Count : 12.09 K/uL  RBC Count : 3.03 M/uL  Hemoglobin : 8.6 g/dL  Hematocrit : 27.8 %  Platelet Count - Automated : 236 K/uL  Mean Cell Volume : 91.7 fl  Mean Cell Hemoglobin : 28.4 pg  Mean Cell Hemoglobin Concentration : 30.9 gm/dL  Auto Neutrophil # : x  Auto Lymphocyte # : x  Auto Monocyte # : x  Auto Eosinophil # : x  Auto Basophil # : x  Auto Neutrophil % : x  Auto Lymphocyte % : x  Auto Monocyte % : x  Auto Eosinophil % : x  Auto Basophil % : x    01-06    131<L>  |  96  |  129<H>  ----------------------------<  140<H>  4.1   |  21<L>  |  2.74<H>    Ca    8.9      06 Jan 2020 06:03  Phos  6.4     01-06  Mg     1.9     01-06    TPro  6.1  /  Alb  2.2<L>  /  TBili  0.3  /  DBili  x   /  AST  22  /  ALT  22  /  AlkPhos  78  01-06    PT/INR - ( 06 Jan 2020 13:37 )   PT: 15.8 sec;   INR: 1.36 ratio         PTT - ( 06 Jan 2020 13:37 )  PTT:25.8 sec  ABG - ( 06 Jan 2020 13:18 )  pH, Arterial: 7.39  pH, Blood: x     /  pCO2: 35    /  pO2: 62    / HCO3: 21    / Base Excess: -3.3  /  SaO2: 91        CARDIAC MARKERS ( 05 Jan 2020 05:05 )  x     / x     / 45 U/L / x     / x        ====================  Assessment & Plan: 79 y M  with PMH significant for CAD s/p PCI, GERD, gout, OA, s/p multiple laminectomies and spinal fusions, hospital course c/b MSSA bacteremia, ESRD requiring urgent HD on this admission, S/P Shiley placement and removal now  transferred to CCU for urgent HD in the setting Respiratory distress and pulmonary edema     #Neuro  - Mentating well, following commands  - Not on sedation currently  - Currently with worsening uremia, asterix       #Resp  1. 1/3 - RVP positive for coronovirus  - Supportive care    2.  Pulmonary Edema   - Tachypnea requiring positive pressure support in setting of  fluid overload   - Most recent blood gas 1/6: PH 7.39| CO2 35| Po2 62 | Bicarb 21   - Bipap settings  14/5  Fio2 50%  - Non Con CT 1/5: Pulmonary edema with moderate bilateral pleural effusions. Infection is considered less likely.  - Will evaluate Resp status following fluid removal, tentative volume removal 1.5 L     #CV    1. Acute decompensating HF   - c/w  Dobutamine gtt 2.5 mcg  and Bumex gtt 2cc/hour , started on 1/3 /2019   - JAHAIRA 12/13: EF 44%, with severe AS, no vegetations reported at this time   - Repeat TTE 1/6/2020- pending formal read     2. CAD   - c/w ASA and statin     #GI  - NPO in the setting of BIPAP requirement   -  GI prophylaxis with PPI    #ID  1.  MSSA bacteremia   -  ID following, appreciate recommendations   -  Currently on Daptomycin til 2/5/20 q48 hours to be dosed following HD     #Renal    1.  Acute Kidney injury   - Worsening renal function in the setting of intravascular depletion vs post obstructive uropathy from urinary retention vs ATN from poor   -baseline .96, currently creatinine  2.74     2. ESRD requiring Urgent HD  - Shiley to be placed by CCU, worsening creatinine   - Nephrology following for HD, requiring   - Will continue to renally dose all meds, avoid nephrotoxic agents, monitor I& o's daily     #Heme  - Hgb stable  - Will continue to monitor with cbc QD     #Endo  - No active issues     #DVT PPx  - Heparin sub q

## 2020-01-06 NOTE — PROGRESS NOTE ADULT - ASSESSMENT
79 yr old male with HTN, DCHF, CAD, S/P PCI to OM1, Mid LAD (9/2014, [facility], [facility], [facility]) hx of ischemic CMP, AS, DM HLD, GERD, GOUT, s/p multiple spinal surgery admitted with back pain and fever, now with acute hypoxic resp failure 2/2 to pulm edema w/ poss underlying PNA, +coronovirus.     1. Hypoxic respiratory failure, acute/chronic systolic heart failure  -secondary to pulm edema w/ underlying coronavirus +/- PNA  -appears worse today with increased dyspnea, O2 requirement   -give additional IVP bumex stat with po metolazone   -cont bipap  -stat ABG  -will need more aggressive volume removal   -cont iv inotropes, diuretics, will consider change to primacor   -d/w renal will likely need acute RRT, and access  -echo with severe LV sys dysfxn, EF 20-25%, mild diastolic dysfxn  -continue medical therapy for now as he is not a candidate for ischemic eval in setting of resp failure, KRIS/CKD  -STAT micu eval for tenuous respiratory status, BIPAP, and need for emergent HD and access    2. CAD s/p PCI (OM1/LAD)   -cont statin and asa     3. Aortic Stenosis  -stable, severe on LAZARO with valve area of 0.9, tte this admit and as outpt with moderate AS  -gradients low on lazaro, perhaps due to new lv dysfxn  -YULY likely underestimated  -cont to monitor      4. Bacteremia, resolved   -LAZARO without endocarditis   -med f/u     5. KRIS, ? AIN  -creat stable, renal f/u for HD today       d/w son at bedside  d/w floor Patient seen and examined.  Agree with above NP note.  d/w renal

## 2020-01-06 NOTE — PROGRESS NOTE ADULT - SUBJECTIVE AND OBJECTIVE BOX
CC: F/U for Bacteremia    Saw/spoke to patient. No fevers, no chills. No new complaints. Unchanged.    Allergies  Indocin (Unknown)  oxacillin (Unknown)  Zyvox (Unknown)    ANTIMICROBIALS:  DAPTOmycin IVPB 800 every 24 hours    PE:    Vital Signs Last 24 Hrs  T(C): 36.8 (06 Jan 2020 11:30), Max: 36.8 (06 Jan 2020 04:50)  T(F): 98.2 (06 Jan 2020 11:30), Max: 98.2 (06 Jan 2020 04:50)  HR: 115 (06 Jan 2020 11:30) (100 - 122)  BP: 122/74 (06 Jan 2020 11:30) (119/74 - 134/82)  RR: 20 (06 Jan 2020 11:30) (20 - 20)  SpO2: 90% (06 Jan 2020 11:30) (90% - 98%)    Gen: AOx3, NAD, non-toxic, pleasant  CV: S1+S2 normal, tachycardic  Resp: Clear bilat, no resp distress, no crackles/wheezes  Abd: Soft, nontender, +BS  Ext: No LE edema, no wounds    LABS:                        8.5    11.27 )-----------( 245      ( 06 Jan 2020 07:46 )             26.8     01-06    131<L>  |  96  |  129<H>  ----------------------------<  140<H>  4.1   |  21<L>  |  2.74<H>    Ca    8.9      06 Jan 2020 06:03  Phos  6.4     01-06  Mg     1.9     01-06    TPro  6.1  /  Alb  2.2<L>  /  TBili  0.3  /  DBili  x   /  AST  22  /  ALT  22  /  AlkPhos  78  01-06    MICROBIOLOGY:    .Blood Blood  12-14-19   No growth at 5 days.    .Blood Blood-Peripheral  12-12-19   No growth at 5 days.    .Blood Blood  12-09-19   Growth in aerobic and anaerobic bottles: Staphylococcus aureus  See previous culture [id number]  --    Growth in aerobic and anaerobic bottles: Gram Positive Cocci in Clusters    .Blood Blood  12-08-19   Growth in aerobic and anaerobic bottles: Staphylococcus aureus    .Urine Clean Catch (Midstream)  12-08-19   No growth    Rapid RVP Result: Detected (01-03 @ 01:44) CoV    (otherwise reviewed)    RADIOLOGY:    1/5 CT:    IMPRESSION:     Pulmonary edema with moderate bilateral pleural effusions. Infection is considered less likely.

## 2020-01-07 LAB
ALBUMIN SERPL ELPH-MCNC: 1.8 G/DL — LOW (ref 3.3–5)
ALBUMIN SERPL ELPH-MCNC: 2 G/DL — LOW (ref 3.3–5)
ALBUMIN SERPL ELPH-MCNC: 2 G/DL — LOW (ref 3.3–5)
ALBUMIN SERPL ELPH-MCNC: 2.3 G/DL — LOW (ref 3.3–5)
ALP SERPL-CCNC: 81 U/L — SIGNIFICANT CHANGE UP (ref 40–120)
ALP SERPL-CCNC: 82 U/L — SIGNIFICANT CHANGE UP (ref 40–120)
ALP SERPL-CCNC: 88 U/L — SIGNIFICANT CHANGE UP (ref 40–120)
ALP SERPL-CCNC: 88 U/L — SIGNIFICANT CHANGE UP (ref 40–120)
ALT FLD-CCNC: 19 U/L — SIGNIFICANT CHANGE UP (ref 10–45)
ALT FLD-CCNC: 21 U/L — SIGNIFICANT CHANGE UP (ref 10–45)
ALT FLD-CCNC: 21 U/L — SIGNIFICANT CHANGE UP (ref 10–45)
ALT FLD-CCNC: 23 U/L — SIGNIFICANT CHANGE UP (ref 10–45)
ANION GAP SERPL CALC-SCNC: 17 MMOL/L — SIGNIFICANT CHANGE UP (ref 5–17)
ANION GAP SERPL CALC-SCNC: 18 MMOL/L — HIGH (ref 5–17)
ANION GAP SERPL CALC-SCNC: 19 MMOL/L — HIGH (ref 5–17)
ANION GAP SERPL CALC-SCNC: 22 MMOL/L — HIGH (ref 5–17)
AST SERPL-CCNC: 25 U/L — SIGNIFICANT CHANGE UP (ref 10–40)
AST SERPL-CCNC: 28 U/L — SIGNIFICANT CHANGE UP (ref 10–40)
AST SERPL-CCNC: 28 U/L — SIGNIFICANT CHANGE UP (ref 10–40)
AST SERPL-CCNC: 30 U/L — SIGNIFICANT CHANGE UP (ref 10–40)
BASE EXCESS BLDMV CALC-SCNC: -3.6 MMOL/L — LOW (ref -3–3)
BASE EXCESS BLDMV CALC-SCNC: -5.9 MMOL/L — LOW (ref -3–3)
BASE EXCESS BLDV CALC-SCNC: -0.8 MMOL/L — SIGNIFICANT CHANGE UP (ref -2–2)
BASE EXCESS BLDV CALC-SCNC: -1.1 MMOL/L — SIGNIFICANT CHANGE UP (ref -2–2)
BASOPHILS # BLD AUTO: 0.01 K/UL — SIGNIFICANT CHANGE UP (ref 0–0.2)
BASOPHILS NFR BLD AUTO: 0.1 % — SIGNIFICANT CHANGE UP (ref 0–2)
BILIRUB SERPL-MCNC: 0.3 MG/DL — SIGNIFICANT CHANGE UP (ref 0.2–1.2)
BILIRUB SERPL-MCNC: 0.3 MG/DL — SIGNIFICANT CHANGE UP (ref 0.2–1.2)
BILIRUB SERPL-MCNC: 0.4 MG/DL — SIGNIFICANT CHANGE UP (ref 0.2–1.2)
BILIRUB SERPL-MCNC: 0.4 MG/DL — SIGNIFICANT CHANGE UP (ref 0.2–1.2)
BUN SERPL-MCNC: 105 MG/DL — HIGH (ref 7–23)
BUN SERPL-MCNC: 108 MG/DL — HIGH (ref 7–23)
BUN SERPL-MCNC: 88 MG/DL — HIGH (ref 7–23)
BUN SERPL-MCNC: 99 MG/DL — HIGH (ref 7–23)
CALCIUM SERPL-MCNC: 8.6 MG/DL — SIGNIFICANT CHANGE UP (ref 8.4–10.5)
CALCIUM SERPL-MCNC: 8.8 MG/DL — SIGNIFICANT CHANGE UP (ref 8.4–10.5)
CALCIUM SERPL-MCNC: 8.9 MG/DL — SIGNIFICANT CHANGE UP (ref 8.4–10.5)
CALCIUM SERPL-MCNC: 9.1 MG/DL — SIGNIFICANT CHANGE UP (ref 8.4–10.5)
CHLORIDE SERPL-SCNC: 100 MMOL/L — SIGNIFICANT CHANGE UP (ref 96–108)
CHLORIDE SERPL-SCNC: 102 MMOL/L — SIGNIFICANT CHANGE UP (ref 96–108)
CHLORIDE SERPL-SCNC: 99 MMOL/L — SIGNIFICANT CHANGE UP (ref 96–108)
CHLORIDE SERPL-SCNC: 99 MMOL/L — SIGNIFICANT CHANGE UP (ref 96–108)
CO2 BLDMV-SCNC: 27 MMOL/L — SIGNIFICANT CHANGE UP (ref 21–29)
CO2 BLDMV-SCNC: 27 MMOL/L — SIGNIFICANT CHANGE UP (ref 21–29)
CO2 BLDV-SCNC: 24 MMOL/L — SIGNIFICANT CHANGE UP (ref 22–30)
CO2 BLDV-SCNC: 24 MMOL/L — SIGNIFICANT CHANGE UP (ref 22–30)
CO2 SERPL-SCNC: 18 MMOL/L — LOW (ref 22–31)
CO2 SERPL-SCNC: 19 MMOL/L — LOW (ref 22–31)
CO2 SERPL-SCNC: 19 MMOL/L — LOW (ref 22–31)
CO2 SERPL-SCNC: 20 MMOL/L — LOW (ref 22–31)
CREAT SERPL-MCNC: 2.37 MG/DL — HIGH (ref 0.5–1.3)
CREAT SERPL-MCNC: 2.38 MG/DL — HIGH (ref 0.5–1.3)
CREAT SERPL-MCNC: 2.5 MG/DL — HIGH (ref 0.5–1.3)
CREAT SERPL-MCNC: 2.5 MG/DL — HIGH (ref 0.5–1.3)
EOSINOPHIL # BLD AUTO: 0.04 K/UL — SIGNIFICANT CHANGE UP (ref 0–0.5)
EOSINOPHIL # BLD AUTO: 0.04 K/UL — SIGNIFICANT CHANGE UP (ref 0–0.5)
EOSINOPHIL # BLD AUTO: 0.07 K/UL — SIGNIFICANT CHANGE UP (ref 0–0.5)
EOSINOPHIL NFR BLD AUTO: 0.3 % — SIGNIFICANT CHANGE UP (ref 0–6)
EOSINOPHIL NFR BLD AUTO: 0.3 % — SIGNIFICANT CHANGE UP (ref 0–6)
EOSINOPHIL NFR BLD AUTO: 0.5 % — SIGNIFICANT CHANGE UP (ref 0–6)
GAS PNL BLDA: SIGNIFICANT CHANGE UP
GAS PNL BLDMV: SIGNIFICANT CHANGE UP
GAS PNL BLDMV: SIGNIFICANT CHANGE UP
GAS PNL BLDV: SIGNIFICANT CHANGE UP
GLUCOSE BLDC GLUCOMTR-MCNC: 125 MG/DL — HIGH (ref 70–99)
GLUCOSE BLDC GLUCOMTR-MCNC: 142 MG/DL — HIGH (ref 70–99)
GLUCOSE SERPL-MCNC: 137 MG/DL — HIGH (ref 70–99)
GLUCOSE SERPL-MCNC: 137 MG/DL — HIGH (ref 70–99)
GLUCOSE SERPL-MCNC: 141 MG/DL — HIGH (ref 70–99)
GLUCOSE SERPL-MCNC: 154 MG/DL — HIGH (ref 70–99)
HCO3 BLDMV-SCNC: 24 MMOL/L — SIGNIFICANT CHANGE UP (ref 20–28)
HCO3 BLDMV-SCNC: 25 MMOL/L — SIGNIFICANT CHANGE UP (ref 20–28)
HCO3 BLDV-SCNC: 22 MMOL/L — SIGNIFICANT CHANGE UP (ref 21–29)
HCO3 BLDV-SCNC: 23 MMOL/L — SIGNIFICANT CHANGE UP (ref 21–29)
HCT VFR BLD CALC: 26.2 % — LOW (ref 39–50)
HCT VFR BLD CALC: 28.9 % — LOW (ref 39–50)
HCT VFR BLD CALC: 29.7 % — LOW (ref 39–50)
HGB BLD-MCNC: 8.5 G/DL — LOW (ref 13–17)
HGB BLD-MCNC: 8.9 G/DL — LOW (ref 13–17)
HGB BLD-MCNC: 9 G/DL — LOW (ref 13–17)
HOROWITZ INDEX BLDMV+IHG-RTO: 50 — SIGNIFICANT CHANGE UP
HOROWITZ INDEX BLDMV+IHG-RTO: 50 — SIGNIFICANT CHANGE UP
HOROWITZ INDEX BLDV+IHG-RTO: 50 — SIGNIFICANT CHANGE UP
HOROWITZ INDEX BLDV+IHG-RTO: 50 — SIGNIFICANT CHANGE UP
IMM GRANULOCYTES NFR BLD AUTO: 0.2 % — SIGNIFICANT CHANGE UP (ref 0–1.5)
IMM GRANULOCYTES NFR BLD AUTO: 0.6 % — SIGNIFICANT CHANGE UP (ref 0–1.5)
IMM GRANULOCYTES NFR BLD AUTO: 0.7 % — SIGNIFICANT CHANGE UP (ref 0–1.5)
LACTATE SERPL-SCNC: 1.5 MMOL/L — SIGNIFICANT CHANGE UP (ref 0.7–2)
LACTATE SERPL-SCNC: 1.5 MMOL/L — SIGNIFICANT CHANGE UP (ref 0.7–2)
LYMPHOCYTES # BLD AUTO: 0.5 K/UL — LOW (ref 1–3.3)
LYMPHOCYTES # BLD AUTO: 0.55 K/UL — LOW (ref 1–3.3)
LYMPHOCYTES # BLD AUTO: 0.55 K/UL — LOW (ref 1–3.3)
LYMPHOCYTES # BLD AUTO: 3.7 % — LOW (ref 13–44)
LYMPHOCYTES # BLD AUTO: 4.1 % — LOW (ref 13–44)
LYMPHOCYTES # BLD AUTO: 4.5 % — LOW (ref 13–44)
MAGNESIUM SERPL-MCNC: 1.9 MG/DL — SIGNIFICANT CHANGE UP (ref 1.6–2.6)
MAGNESIUM SERPL-MCNC: 1.9 MG/DL — SIGNIFICANT CHANGE UP (ref 1.6–2.6)
MAGNESIUM SERPL-MCNC: 2 MG/DL — SIGNIFICANT CHANGE UP (ref 1.6–2.6)
MAGNESIUM SERPL-MCNC: 2.2 MG/DL — SIGNIFICANT CHANGE UP (ref 1.6–2.6)
MCHC RBC-ENTMCNC: 28.2 PG — SIGNIFICANT CHANGE UP (ref 27–34)
MCHC RBC-ENTMCNC: 28.4 PG — SIGNIFICANT CHANGE UP (ref 27–34)
MCHC RBC-ENTMCNC: 29.3 PG — SIGNIFICANT CHANGE UP (ref 27–34)
MCHC RBC-ENTMCNC: 30 GM/DL — LOW (ref 32–36)
MCHC RBC-ENTMCNC: 31.1 GM/DL — LOW (ref 32–36)
MCHC RBC-ENTMCNC: 32.4 GM/DL — SIGNIFICANT CHANGE UP (ref 32–36)
MCV RBC AUTO: 90.3 FL — SIGNIFICANT CHANGE UP (ref 80–100)
MCV RBC AUTO: 90.6 FL — SIGNIFICANT CHANGE UP (ref 80–100)
MCV RBC AUTO: 94.9 FL — SIGNIFICANT CHANGE UP (ref 80–100)
MONOCYTES # BLD AUTO: 0.19 K/UL — SIGNIFICANT CHANGE UP (ref 0–0.9)
MONOCYTES # BLD AUTO: 0.24 K/UL — SIGNIFICANT CHANGE UP (ref 0–0.9)
MONOCYTES # BLD AUTO: 0.32 K/UL — SIGNIFICANT CHANGE UP (ref 0–0.9)
MONOCYTES NFR BLD AUTO: 1.6 % — LOW (ref 2–14)
MONOCYTES NFR BLD AUTO: 2 % — SIGNIFICANT CHANGE UP (ref 2–14)
MONOCYTES NFR BLD AUTO: 2.1 % — SIGNIFICANT CHANGE UP (ref 2–14)
NEUTROPHILS # BLD AUTO: 11.29 K/UL — HIGH (ref 1.8–7.4)
NEUTROPHILS # BLD AUTO: 11.38 K/UL — HIGH (ref 1.8–7.4)
NEUTROPHILS # BLD AUTO: 13.95 K/UL — HIGH (ref 1.8–7.4)
NEUTROPHILS NFR BLD AUTO: 92.5 % — HIGH (ref 43–77)
NEUTROPHILS NFR BLD AUTO: 92.9 % — HIGH (ref 43–77)
NEUTROPHILS NFR BLD AUTO: 93.7 % — HIGH (ref 43–77)
NRBC # BLD: 0 /100 WBCS — SIGNIFICANT CHANGE UP (ref 0–0)
O2 CT VFR BLD CALC: 49 MMHG — SIGNIFICANT CHANGE UP (ref 30–65)
O2 CT VFR BLD CALC: 63 MMHG — SIGNIFICANT CHANGE UP (ref 30–65)
PCO2 BLDMV: 67 MMHG — HIGH (ref 30–65)
PCO2 BLDMV: 82 MMHG — HIGH (ref 30–65)
PCO2 BLDV: 35 MMHG — SIGNIFICANT CHANGE UP (ref 35–50)
PCO2 BLDV: 37 MMHG — SIGNIFICANT CHANGE UP (ref 35–50)
PH BLDMV: 7.1 — CRITICAL LOW (ref 7.32–7.45)
PH BLDMV: 7.19 — CRITICAL LOW (ref 7.32–7.45)
PH BLDV: 7.42 — SIGNIFICANT CHANGE UP (ref 7.35–7.45)
PH BLDV: 7.42 — SIGNIFICANT CHANGE UP (ref 7.35–7.45)
PHOSPHATE SERPL-MCNC: 5.4 MG/DL — HIGH (ref 2.5–4.5)
PHOSPHATE SERPL-MCNC: 5.5 MG/DL — HIGH (ref 2.5–4.5)
PHOSPHATE SERPL-MCNC: 5.9 MG/DL — HIGH (ref 2.5–4.5)
PHOSPHATE SERPL-MCNC: 6.5 MG/DL — HIGH (ref 2.5–4.5)
PLATELET # BLD AUTO: 227 K/UL — SIGNIFICANT CHANGE UP (ref 150–400)
PLATELET # BLD AUTO: 232 K/UL — SIGNIFICANT CHANGE UP (ref 150–400)
PLATELET # BLD AUTO: 262 K/UL — SIGNIFICANT CHANGE UP (ref 150–400)
PO2 BLDV: 42 MMHG — SIGNIFICANT CHANGE UP (ref 25–45)
PO2 BLDV: 45 MMHG — SIGNIFICANT CHANGE UP (ref 25–45)
POTASSIUM SERPL-MCNC: 4.1 MMOL/L — SIGNIFICANT CHANGE UP (ref 3.5–5.3)
POTASSIUM SERPL-MCNC: 4.2 MMOL/L — SIGNIFICANT CHANGE UP (ref 3.5–5.3)
POTASSIUM SERPL-MCNC: 4.3 MMOL/L — SIGNIFICANT CHANGE UP (ref 3.5–5.3)
POTASSIUM SERPL-MCNC: 4.4 MMOL/L — SIGNIFICANT CHANGE UP (ref 3.5–5.3)
POTASSIUM SERPL-SCNC: 4.1 MMOL/L — SIGNIFICANT CHANGE UP (ref 3.5–5.3)
POTASSIUM SERPL-SCNC: 4.2 MMOL/L — SIGNIFICANT CHANGE UP (ref 3.5–5.3)
POTASSIUM SERPL-SCNC: 4.3 MMOL/L — SIGNIFICANT CHANGE UP (ref 3.5–5.3)
POTASSIUM SERPL-SCNC: 4.4 MMOL/L — SIGNIFICANT CHANGE UP (ref 3.5–5.3)
PROT SERPL-MCNC: 6 G/DL — SIGNIFICANT CHANGE UP (ref 6–8.3)
PROT SERPL-MCNC: 6.2 G/DL — SIGNIFICANT CHANGE UP (ref 6–8.3)
PROT SERPL-MCNC: 6.4 G/DL — SIGNIFICANT CHANGE UP (ref 6–8.3)
PROT SERPL-MCNC: 6.6 G/DL — SIGNIFICANT CHANGE UP (ref 6–8.3)
RBC # BLD: 2.9 M/UL — LOW (ref 4.2–5.8)
RBC # BLD: 3.13 M/UL — LOW (ref 4.2–5.8)
RBC # BLD: 3.19 M/UL — LOW (ref 4.2–5.8)
RBC # FLD: 14.7 % — HIGH (ref 10.3–14.5)
RBC # FLD: 14.7 % — HIGH (ref 10.3–14.5)
RBC # FLD: 14.8 % — HIGH (ref 10.3–14.5)
SAO2 % BLDMV: 70 % — SIGNIFICANT CHANGE UP (ref 60–90)
SAO2 % BLDMV: 77 % — SIGNIFICANT CHANGE UP (ref 60–90)
SAO2 % BLDV: 75 % — SIGNIFICANT CHANGE UP (ref 67–88)
SAO2 % BLDV: 78 % — SIGNIFICANT CHANGE UP (ref 67–88)
SODIUM SERPL-SCNC: 136 MMOL/L — SIGNIFICANT CHANGE UP (ref 135–145)
SODIUM SERPL-SCNC: 138 MMOL/L — SIGNIFICANT CHANGE UP (ref 135–145)
SODIUM SERPL-SCNC: 139 MMOL/L — SIGNIFICANT CHANGE UP (ref 135–145)
SODIUM SERPL-SCNC: 139 MMOL/L — SIGNIFICANT CHANGE UP (ref 135–145)
WBC # BLD: 12.15 K/UL — HIGH (ref 3.8–10.5)
WBC # BLD: 12.2 K/UL — HIGH (ref 3.8–10.5)
WBC # BLD: 15 K/UL — HIGH (ref 3.8–10.5)
WBC # FLD AUTO: 12.15 K/UL — HIGH (ref 3.8–10.5)
WBC # FLD AUTO: 12.2 K/UL — HIGH (ref 3.8–10.5)
WBC # FLD AUTO: 15 K/UL — HIGH (ref 3.8–10.5)

## 2020-01-07 PROCEDURE — 99221 1ST HOSP IP/OBS SF/LOW 40: CPT

## 2020-01-07 PROCEDURE — 93010 ELECTROCARDIOGRAM REPORT: CPT

## 2020-01-07 PROCEDURE — 99232 SBSQ HOSP IP/OBS MODERATE 35: CPT

## 2020-01-07 PROCEDURE — 71045 X-RAY EXAM CHEST 1 VIEW: CPT | Mod: 26

## 2020-01-07 RX ORDER — INSULIN LISPRO 100/ML
VIAL (ML) SUBCUTANEOUS
Refills: 0 | Status: DISCONTINUED | OUTPATIENT
Start: 2020-01-07 | End: 2020-01-08

## 2020-01-07 RX ORDER — MIDAZOLAM HYDROCHLORIDE 1 MG/ML
0.02 INJECTION, SOLUTION INTRAMUSCULAR; INTRAVENOUS
Qty: 100 | Refills: 0 | Status: DISCONTINUED | OUTPATIENT
Start: 2020-01-07 | End: 2020-01-08

## 2020-01-07 RX ORDER — CHLORHEXIDINE GLUCONATE 213 G/1000ML
15 SOLUTION TOPICAL EVERY 12 HOURS
Refills: 0 | Status: DISCONTINUED | OUTPATIENT
Start: 2020-01-07 | End: 2020-01-12

## 2020-01-07 RX ORDER — DEXTROSE 50 % IN WATER 50 %
15 SYRINGE (ML) INTRAVENOUS ONCE
Refills: 0 | Status: DISCONTINUED | OUTPATIENT
Start: 2020-01-07 | End: 2020-01-12

## 2020-01-07 RX ORDER — NOREPINEPHRINE BITARTRATE/D5W 8 MG/250ML
0.05 PLASTIC BAG, INJECTION (ML) INTRAVENOUS
Qty: 8 | Refills: 0 | Status: DISCONTINUED | OUTPATIENT
Start: 2020-01-07 | End: 2020-01-11

## 2020-01-07 RX ORDER — FENTANYL CITRATE 50 UG/ML
100 INJECTION INTRAVENOUS ONCE
Refills: 0 | Status: DISCONTINUED | OUTPATIENT
Start: 2020-01-07 | End: 2020-01-07

## 2020-01-07 RX ORDER — FENTANYL CITRATE 50 UG/ML
0.5 INJECTION INTRAVENOUS
Qty: 5000 | Refills: 0 | Status: DISCONTINUED | OUTPATIENT
Start: 2020-01-07 | End: 2020-01-08

## 2020-01-07 RX ORDER — DEXTROSE 50 % IN WATER 50 %
25 SYRINGE (ML) INTRAVENOUS ONCE
Refills: 0 | Status: DISCONTINUED | OUTPATIENT
Start: 2020-01-07 | End: 2020-01-12

## 2020-01-07 RX ORDER — MORPHINE SULFATE 50 MG/1
2 CAPSULE, EXTENDED RELEASE ORAL ONCE
Refills: 0 | Status: DISCONTINUED | OUTPATIENT
Start: 2020-01-07 | End: 2020-01-07

## 2020-01-07 RX ORDER — PROPOFOL 10 MG/ML
10 INJECTION, EMULSION INTRAVENOUS
Qty: 500 | Refills: 0 | Status: DISCONTINUED | OUTPATIENT
Start: 2020-01-07 | End: 2020-01-07

## 2020-01-07 RX ORDER — GLUCAGON INJECTION, SOLUTION 0.5 MG/.1ML
1 INJECTION, SOLUTION SUBCUTANEOUS ONCE
Refills: 0 | Status: DISCONTINUED | OUTPATIENT
Start: 2020-01-07 | End: 2020-01-12

## 2020-01-07 RX ORDER — SODIUM CHLORIDE 9 MG/ML
1000 INJECTION, SOLUTION INTRAVENOUS
Refills: 0 | Status: DISCONTINUED | OUTPATIENT
Start: 2020-01-07 | End: 2020-01-12

## 2020-01-07 RX ORDER — DEXTROSE 50 % IN WATER 50 %
12.5 SYRINGE (ML) INTRAVENOUS ONCE
Refills: 0 | Status: DISCONTINUED | OUTPATIENT
Start: 2020-01-07 | End: 2020-01-12

## 2020-01-07 RX ADMIN — Medication 3 MILLILITER(S): at 00:17

## 2020-01-07 RX ADMIN — Medication 7.82 MICROGRAM(S)/KG/MIN: at 02:44

## 2020-01-07 RX ADMIN — Medication 3 MILLILITER(S): at 05:44

## 2020-01-07 RX ADMIN — Medication 7.82 MICROGRAM(S)/KG/MIN: at 14:53

## 2020-01-07 RX ADMIN — LIDOCAINE 1 PATCH: 4 CREAM TOPICAL at 12:02

## 2020-01-07 RX ADMIN — MORPHINE SULFATE 4 MILLIGRAM(S): 50 CAPSULE, EXTENDED RELEASE ORAL at 23:25

## 2020-01-07 RX ADMIN — Medication 7.82 MICROGRAM(S)/KG/MIN: at 18:01

## 2020-01-07 RX ADMIN — Medication 3 MILLILITER(S): at 12:16

## 2020-01-07 RX ADMIN — BUMETANIDE 10 MG/HR: 0.25 INJECTION INTRAMUSCULAR; INTRAVENOUS at 02:44

## 2020-01-07 RX ADMIN — FENTANYL CITRATE 100 MICROGRAM(S): 50 INJECTION INTRAVENOUS at 17:56

## 2020-01-07 RX ADMIN — HEPARIN SODIUM 5000 UNIT(S): 5000 INJECTION INTRAVENOUS; SUBCUTANEOUS at 06:13

## 2020-01-07 RX ADMIN — Medication 3 MILLILITER(S): at 19:31

## 2020-01-07 RX ADMIN — Medication 9.78 MICROGRAM(S)/KG/MIN: at 19:00

## 2020-01-07 RX ADMIN — MIDAZOLAM HYDROCHLORIDE 2.09 MG/KG/HR: 1 INJECTION, SOLUTION INTRAMUSCULAR; INTRAVENOUS at 23:49

## 2020-01-07 RX ADMIN — FENTANYL CITRATE 100 MICROGRAM(S): 50 INJECTION INTRAVENOUS at 18:30

## 2020-01-07 RX ADMIN — CHLORHEXIDINE GLUCONATE 1 APPLICATION(S): 213 SOLUTION TOPICAL at 06:13

## 2020-01-07 RX ADMIN — PANTOPRAZOLE SODIUM 40 MILLIGRAM(S): 20 TABLET, DELAYED RELEASE ORAL at 12:01

## 2020-01-07 RX ADMIN — HEPARIN SODIUM 5000 UNIT(S): 5000 INJECTION INTRAVENOUS; SUBCUTANEOUS at 14:48

## 2020-01-07 RX ADMIN — FENTANYL CITRATE 2.61 MICROGRAM(S)/KG/HR: 50 INJECTION INTRAVENOUS at 23:50

## 2020-01-07 RX ADMIN — LIDOCAINE 1 PATCH: 4 CREAM TOPICAL at 20:21

## 2020-01-07 RX ADMIN — LIDOCAINE 1 PATCH: 4 CREAM TOPICAL at 23:05

## 2020-01-07 RX ADMIN — PROPOFOL 6.26 MICROGRAM(S)/KG/MIN: 10 INJECTION, EMULSION INTRAVENOUS at 18:01

## 2020-01-07 RX ADMIN — HEPARIN SODIUM 5000 UNIT(S): 5000 INJECTION INTRAVENOUS; SUBCUTANEOUS at 22:45

## 2020-01-07 NOTE — PROGRESS NOTE ADULT - PROBLEM SELECTOR PROBLEM 7
Need for prophylactic measure
Essential hypertension
Gout
Need for prophylactic measure

## 2020-01-07 NOTE — PROGRESS NOTE ADULT - ASSESSMENT
78 y/o male with h/o of CAD s/p PCI 1/2017 with 10% LAD stenosis OM1, HTN, HLD, GERD, Gout, Osteoarthritis and chronic back pain, s/p multiple laminectomies and spinal fusion, lower back pain and weakness in his bilateral lower extremities that progressed to inability to ambulate. dx with MSSA bacteremia. on ancef. also received lasix as well. has had hesitancy urinating. 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   cont bipap  given cardiomyopathy and dobutamine to cont   dc bumex drip given no significant uo at present   his chf/sob not improving  started CVVHDF and seen on cvvhdf  bfr 130 cc/min dfr 1000 fluid removal rate 100 cc/hr   to receive acute UF for 2.5 hr 2 liter then convert back to cvvhdf as bp tolerates   strict I/O  to cont dapto    d/w cards.   d/w ccu team bedside   d/w family

## 2020-01-07 NOTE — ADVANCED PRACTICE NURSE CONSULT - ASSESSMENT
Patient is on a  low airloss surface and is being turned and positioned as per the nursing documentation as able.  He was being taken of CVVHD to being placed on dialysis at the bedside.  He was moaning at times.  He now has a central and A line placed, on BIPAP, right femeral shiley for HD, and on cardiac monitoring.  He has an indwelling morley catheter and is currently NPO.  On assessment the eschar on the right and left buttock appears to be lifting.  The periwound tissue is a pale pinkish-white in color.   The buttocks appear to be mirror images of one another and the wounds measure approximately 5.0 cm X 4.0 cm X 0 cm.  There is no odor noted and a small amount of serosanguinous drainage.  To continue with the lifting of the eschar will recommend applying WounDres hydrogel to areas of eschar and then cover with an Allevyn multisite dressing.  The silicone adhesive of the Allevyn will be gentle on his skin and allow for easy removal of the dressing if compromised.  RNs in room during wound assessment and aware of findings and recommendations.    Will be followed by nutrition even though currently NPO.

## 2020-01-07 NOTE — PROGRESS NOTE ADULT - SUBJECTIVE AND OBJECTIVE BOX
PATIENT:  MAHESH CANELA  [id number]    CHIEF COMPLAINT:  Patient is a 79y old  Male who presents with a chief complaint of Back pain (2020 07:32)      INTERVAL HISTORY/OVERNIGHT EVENTS:      REVIEW OF SYSTEMS:    Constitutional:     [ ] negative [ ] fevers [ ] chills [ ] weight loss [ ] weight gain  HEENT:                  [ ] negative [ ] dry eyes [ ] eye irritation [ ] postnasal drip [ ] nasal congestion  CV:                         [ ] negative  [ ] chest pain [ ] orthopnea [ ] palpitations [ ] murmur  Resp:                     [ ] negative [ ] cough [ ] shortness of breath [ ] dyspnea [ ] wheezing [ ] sputum [ ] hemoptysis  GI:                          [ ] negative [ ] nausea [ ] vomiting [ ] diarrhea [ ] constipation [ ] abd pain [ ] dysphagia   :                        [ ] negative [ ] dysuria [ ] nocturia [ ] hematuria [ ] increased urinary frequency  Musculoskeletal: [ ] negative [ ] back pain [ ] myalgias [ ] arthralgias [ ] fracture  Skin:                       [ ] negative [ ] rash [ ] itch  Neurological:        [ ] negative [ ] headache [ ] dizziness [ ] syncope [ ] weakness [ ] numbness  Psychiatric:           [ ] negative [ ] anxiety [ ] depression  Endocrine:            [ ] negative [ ] diabetes [ ] thyroid problem  Heme/Lymph:      [ ] negative [ ] anemia [ ] bleeding problem  Allergic/Immune: [ ] negative [ ] itchy eyes [ ] nasal discharge [ ] hives [ ] angioedema    [ ] All other systems negative  [ ] Unable to assess ROS because ________.    MEDICATIONS:  MEDICATIONS  (STANDING):  albuterol/ipratropium for Nebulization 3 milliLiter(s) Nebulizer every 6 hours  aspirin  chewable 81 milliGRAM(s) Oral daily  atorvastatin 10 milliGRAM(s) Oral at bedtime  buMETAnide Infusion 2 mG/Hr (10 mL/Hr) IV Continuous <Continuous>  chlorhexidine 4% Liquid 1 Application(s) Topical <User Schedule>  chlorhexidine 4% Liquid 1 Application(s) Topical <User Schedule>  CRRT Treatment    <Continuous>  DAPTOmycin IVPB 800 milliGRAM(s) IV Intermittent every 48 hours  DOBUTamine Infusion 2.5 MICROgram(s)/kG/Min (7.822 mL/Hr) IV Continuous <Continuous>  heparin  Injectable 5000 Unit(s) SubCutaneous every 8 hours  lidocaine   Patch 1 Patch Transdermal daily  oxymetazoline 0.05% Nasal Spray 2 Spray(s) Both Nostrils two times a day  pantoprazole  Injectable 40 milliGRAM(s) IV Push daily  polyethylene glycol 3350 17 Gram(s) Oral daily  PrismaSATE Dialysate BGK 4 / 2.5 5000 milliLiter(s) (1000 mL/Hr) CRRT <Continuous>  PrismaSOL Filtration BGK 0 / 2.5 5000 milliLiter(s) (600 mL/Hr) CRRT <Continuous>  PrismaSOL Filtration BGK 4 / 2.5 5000 milliLiter(s) (400 mL/Hr) CRRT <Continuous>  senna 2 Tablet(s) Oral at bedtime  sodium chloride 0.65% Nasal 1 Spray(s) Both Nostrils three times a day    MEDICATIONS  (PRN):  bisacodyl Suppository 10 milliGRAM(s) Rectal daily PRN Constipation  sodium chloride 0.9% lock flush 10 milliLiter(s) IV Push every 1 hour PRN Pre/post blood products, medications, blood draw, and to maintain line patency  sodium chloride 0.9% lock flush 10 milliLiter(s) IV Push every 1 hour PRN Pre/post blood products, medications, blood draw, and to maintain line patency      ALLERGIES:  Allergies    Indocin (Unknown)  oxacillin (Unknown)  Zyvox (Unknown)    Intolerances        OBJECTIVE:  ICU Vital Signs Last 24 Hrs  T(C): 36.8 (2020 06:33), Max: 36.9 (2020 14:05)  T(F): 98.2 (2020 06:33), Max: 98.5 (2020 14:05)  HR: 110 (2020 07:05) (104 - 117)  BP: 113/64 (2020 23:00) (96/66 - 122/74)  BP(mean): 76 (2020 23:00) (72 - 102)  ABP: 104/50 (2020 06:33) (98/46 - 120/54)  ABP(mean): 70 (2020 06:33) (66 - 78)  RR: 35 (2020 06:33) (20 - 65)  SpO2: 92% (2020 07:05) (90% - 100%)      Adult Advanced Hemodynamics Last 24 Hrs  CVP(mm Hg): --  CVP(cm H2O): --  CO: --  CI: --  PA: --  PA(mean): --  PCWP: --  SVR: --  SVRI: --  PVR: --  PVRI: --  CAPILLARY BLOOD GLUCOSE        CAPILLARY BLOOD GLUCOSE        I&O's Summary    2020 07:01  -  2020 07:00  --------------------------------------------------------  IN: 334.8 mL / OUT: 1885 mL / NET: -1550.2 mL      Daily     Daily Weight in k (2020 16:40)    PHYSICAL EXAMINATION:  General: WN/WD NAD  HEENT: PERRLA, EOMI, moist mucous membranes  Neurology: A&Ox3, nonfocal, MORALES x 4  Respiratory: CTA B/L, normal respiratory effort, no wheezes, crackles, rales  CV: RRR, S1S2, no murmurs, rubs or gallops  Abdominal: Soft, NT, ND +BS, Last BM  Extremities: No edema, + peripheral pulses  Incisions:   Tubes:    LABS:  ABG - ( 2020 04:55 )  pH, Arterial: 7.45  pH, Blood: x     /  pCO2: 32    /  pO2: 98    / HCO3: 22    / Base Excess: -1.5  /  SaO2: 96                                      8.5    12.15 )-----------( 232      ( 2020 05:00 )             26.2     01-07    138  |  100  |  108<H>  ----------------------------<  137<H>  4.4   |  19<L>  |  2.50<H>    Ca    8.8      2020 05:00  Phos  5.9     01-07  Mg     1.9     01-07    TPro  6.2  /  Alb  1.8<L>  /  TBili  0.3  /  DBili  x   /  AST  28  /  ALT  19  /  AlkPhos  81  01-07    LIVER FUNCTIONS - ( 2020 05:00 )  Alb: 1.8 g/dL / Pro: 6.2 g/dL / ALK PHOS: 81 U/L / ALT: 19 U/L / AST: 28 U/L / GGT: x           PT/INR - ( 2020 13:37 )   PT: 15.8 sec;   INR: 1.36 ratio         PTT - ( 2020 13:37 )  PTT:25.8 sec            TELEMETRY:     EKG:     IMAGING: PATIENT:  MAHESH CANELA  [id number]    CHIEF COMPLAINT:  Patient is a 79y old  Male who presents with a chief complaint of Back pain (2020 07:32)      INTERVAL HISTORY/OVERNIGHT EVENTS: Patient has 1.5L of fluid removal via HD overnight. He had worsening tachypnea and diffuse crackles, respiratory distress requiring UF and CCVHD for further fluid removal. Discussed with patient and family at bedside that we will require changing the venous catheter to an IJ ( neck location) given non optimal sterility of the femoral region and concern for re-introduction of bacteria into blood stream .       REVIEW OF SYSTEMS:  Unable to fully assess as patient with BIPAP currently at bedside. He currently reports slight pain in his abdomen.     MEDICATIONS:  MEDICATIONS  (STANDING):  albuterol/ipratropium for Nebulization 3 milliLiter(s) Nebulizer every 6 hours  aspirin  chewable 81 milliGRAM(s) Oral daily  atorvastatin 10 milliGRAM(s) Oral at bedtime  buMETAnide Infusion 2 mG/Hr (10 mL/Hr) IV Continuous <Continuous>  chlorhexidine 4% Liquid 1 Application(s) Topical <User Schedule>  chlorhexidine 4% Liquid 1 Application(s) Topical <User Schedule>  CRRT Treatment    <Continuous>  DAPTOmycin IVPB 800 milliGRAM(s) IV Intermittent every 48 hours  DOBUTamine Infusion 2.5 MICROgram(s)/kG/Min (7.822 mL/Hr) IV Continuous <Continuous>  heparin  Injectable 5000 Unit(s) SubCutaneous every 8 hours  lidocaine   Patch 1 Patch Transdermal daily  oxymetazoline 0.05% Nasal Spray 2 Spray(s) Both Nostrils two times a day  pantoprazole  Injectable 40 milliGRAM(s) IV Push daily  polyethylene glycol 3350 17 Gram(s) Oral daily  PrismaSATE Dialysate BGK 4 / 2.5 5000 milliLiter(s) (1000 mL/Hr) CRRT <Continuous>  PrismaSOL Filtration BGK 0 / 2.5 5000 milliLiter(s) (600 mL/Hr) CRRT <Continuous>  PrismaSOL Filtration BGK 4 / 2.5 5000 milliLiter(s) (400 mL/Hr) CRRT <Continuous>  senna 2 Tablet(s) Oral at bedtime  sodium chloride 0.65% Nasal 1 Spray(s) Both Nostrils three times a day    MEDICATIONS  (PRN):  bisacodyl Suppository 10 milliGRAM(s) Rectal daily PRN Constipation  sodium chloride 0.9% lock flush 10 milliLiter(s) IV Push every 1 hour PRN Pre/post blood products, medications, blood draw, and to maintain line patency  sodium chloride 0.9% lock flush 10 milliLiter(s) IV Push every 1 hour PRN Pre/post blood products, medications, blood draw, and to maintain line patency      ALLERGIES:  Allergies    Indocin (Unknown)  oxacillin (Unknown)  Zyvox (Unknown)    Intolerances        OBJECTIVE:  ICU Vital Signs Last 24 Hrs  T(C): 36.8 (2020 06:33), Max: 36.9 (2020 14:05)  T(F): 98.2 (2020 06:33), Max: 98.5 (2020 14:05)  HR: 110 (2020 07:05) (104 - 117)  BP: 113/64 (2020 23:00) (96/66 - 122/74)  BP(mean): 76 (2020 23:00) (72 - 102)  ABP: 104/50 (2020 06:33) (98/46 - 120/54)  ABP(mean): 70 (2020 06:33) (66 - 78)  RR: 35 (2020 06:33) (20 - 65)  SpO2: 92% (2020 07:05) (90% - 100%)      Adult Advanced Hemodynamics Last 24 Hrs  CVP(mm Hg): --  CVP(cm H2O): --  CO: --  CI: --  PA: --  PA(mean): --  PCWP: --  SVR: --  SVRI: --  PVR: --  PVRI: --  CAPILLARY BLOOD GLUCOSE        CAPILLARY BLOOD GLUCOSE        I&O's Summary    2020 07:01  -  2020 07:00  --------------------------------------------------------  IN: 334.8 mL / OUT: 1885 mL / NET: -1550.2 mL      Daily     Daily Weight in k (2020 16:40)    PHYSICAL EXAMINATION:  General: WN/WD NAD  HEENT: PERRLA, EOMI, moist mucous membranes  Neurology: A&Ox3, nonfocal, MORALES x 4  Respiratory: crackles appreciated diffusely laterally and posteriorly, tachypnea   CV: RRR, S1S2, no murmurs, rubs or gallops  Abdominal: Soft, tender abdomen in mid-epigastric region, no guarding or rebound tenderness + bowel sounds   Extremities: anasarca, 2+ pitting edema B/L UPE and LE   Incisions:   Tubes:    LABS:  ABG - ( 2020 04:55 )  pH, Arterial: 7.45  pH, Blood: x     /  pCO2: 32    /  pO2: 98    / HCO3: 22    / Base Excess: -1.5  /  SaO2: 96                                      8.5    12.15 )-----------( 232      ( 2020 05:00 )             26.2     01-07    138  |  100  |  108<H>  ----------------------------<  137<H>  4.4   |  19<L>  |  2.50<H>    Ca    8.8      2020 05:00  Phos  5.9     01-07  Mg     1.9     01-07    TPro  6.2  /  Alb  1.8<L>  /  TBili  0.3  /  DBili  x   /  AST  28  /  ALT  19  /  AlkPhos  81  01-07    LIVER FUNCTIONS - ( 2020 05:00 )  Alb: 1.8 g/dL / Pro: 6.2 g/dL / ALK PHOS: 81 U/L / ALT: 19 U/L / AST: 28 U/L / GGT: x           PT/INR - ( 2020 13:37 )   PT: 15.8 sec;   INR: 1.36 ratio         PTT - ( 2020 13:37 )  PTT:25.8 sec            TELEMETRY:     EKG:     IMAGING:

## 2020-01-07 NOTE — PROGRESS NOTE ADULT - ASSESSMENT
79 y M  with PMH significant for CAD s/p PCI, GERD, gout, OA, s/p multiple laminectomies and spinal fusions, hospital course c/b MSSA bacteremia, ESRD requiring urgent HD on this admission, S/P Shiley placement and removal now transferred to CCU for urgent HD in the setting of severe hypervolemia and hypoxic respiratory failure    #Neuro  - Mentating well, following commands    #Resp  1. 1/3 - RVP positive for coronovirus  - Supportive care    2.  Pulmonary Edema   - S/P 1.5 L fluid removal last night, this AM with worsened tachypnea and hypervolemia requiring acute UF for 2.5 hr 2 liter then convert back to cvvhdf as bp tolerates   - Remains to be on  positive pressure ventilation   - Non Con CT 1/5: Pulmonary edema with moderate bilateral pleural effusions. Infection is considered less likely.    #CV    1. Acute decompensating HF   - c/w  Dobutamine gtt 2.5 mcg  and Bumex gtt 2cc/hour , started on 1/3 /2019   - JAHAIRA 12/13: EF 44%, with severe AS, no vegetations reported at this time   - Repeat TTE 1/6/2020,  TTE EF 20-25%  - Will require both LHC and RHC for further evaluation, given depressed EF     2. CAD   - c/w ASA and statin     #GI  - NPO in the setting of BIPAP requirement   -  GI prophylaxis with PPI    #ID  1.  MSSA bacteremia   -  ID following, appreciate recommendations   -  Currently on Daptomycin til 2/5/20 q48 hours to be dosed following HD     #Renal    1.  Acute Kidney injury   - Worsening renal function in the setting of intravascular depletion vs post obstructive uropathy from urinary retention vs ATN from poor   -baseline .96, currently creatinine  2.74     2. ESRD requiring Urgent HD  - Shiley placed in R femoral region, will require moving central line to IJ given hx of MSSA bacteremia. Will talk to family in regards to possibility of intubation, given orthopnea for procedure vs BIPAP to tolerate placing IJ   - Nephrology following for HD,  UF to transition to CVVHD at rate of 100cc/ hour if patient tolerates   - Will continue to renally dose all meds, avoid nephrotoxic agents, monitor I& o's daily     #Heme  - Hgb stable  - Will continue to monitor with cbc QD     #Endo  - No active issues     #DVT PPx  - Heparin sub q

## 2020-01-07 NOTE — PROGRESS NOTE ADULT - SUBJECTIVE AND OBJECTIVE BOX
CC: F/U for Bacteremia    Saw/spoke to patient. No fevers, no chills. Transferred to CCU. On bipap, fatigued. R femoral shinahum for HD.    Allergies  Indocin (Unknown)  oxacillin (Unknown)  Zyvox (Unknown)    ANTIMICROBIALS:  DAPTOmycin IVPB 800 every 48 hours    PE:    Vital Signs Last 24 Hrs  T(C): 36.4 (07 Jan 2020 09:43), Max: 37 (07 Jan 2020 08:00)  T(F): 97.6 (07 Jan 2020 09:43), Max: 98.6 (07 Jan 2020 08:00)  HR: 104 (07 Jan 2020 10:00) (102 - 117)  BP: 113/64 (06 Jan 2020 23:00) (96/66 - 122/74)  BP(mean): 76 (06 Jan 2020 23:00) (72 - 102)  RR: 26 (07 Jan 2020 10:00) (20 - 65)  SpO2: 92% (07 Jan 2020 10:00) (90% - 100%)    Gen: AOx2, NAD, fatigued  CV: S1+S2 normal, tachycardic  Resp: Clear bilat, no resp distress, no crackles/wheezes, bipap  Abd: Soft, nontender, +BS  Ext: No LE edema, no wounds  Lines: R femoral maggie    LABS:                        8.5    12.15 )-----------( 232      ( 07 Jan 2020 05:00 )             26.2     01-07    138  |  100  |  108<H>  ----------------------------<  137<H>  4.4   |  19<L>  |  2.50<H>    Ca    8.8      07 Jan 2020 05:00  Phos  5.9     01-07  Mg     1.9     01-07    TPro  6.2  /  Alb  1.8<L>  /  TBili  0.3  /  DBili  x   /  AST  28  /  ALT  19  /  AlkPhos  81  01-07    MICROBIOLOGY:    .Blood Blood  12-14-19   No growth at 5 days.    .Blood Blood  12-09-19   Growth in aerobic and anaerobic bottles: Staphylococcus aureus  See previous culture [id number]  --    Growth in aerobic and anaerobic bottles: Gram Positive Cocci in Clusters    Rapid RVP Result: Detected (01-03 @ 01:44) CoV    (otherwise reviewed)    RADIOLOGY:    1/7 CXR:    FINDINGS:  Right PICC line terminates in the SVC.  Unchanged bilateral pulmonary opacities.  Small bilateral pleural effusions.  The cardiomediastinal silhouette is not well evaluated on this projection.  Posterior thoracic spinal fusion.    IMPRESSION:   Unchanged pulmonary edema with small bilateral pleural effusions.  Right PICC line terminates in the SVC.

## 2020-01-07 NOTE — PROGRESS NOTE ADULT - PROBLEM SELECTOR PROBLEM 4
CAD (coronary artery disease)
R/O Acute on chronic systolic congestive heart failure
R/O Altered mental status, unspecified altered mental status type
CAD (coronary artery disease)

## 2020-01-07 NOTE — PROGRESS NOTE ADULT - ATTENDING COMMENTS
Patient is seen and examined with fellow, NP and the CCU house-staff. I agree with the history, physical and the assessment and plan.  worsening respiratory status with no ability to wean off of BiPAP  will intubate  transition to CVVHD  once more euvolemic, will opt for left heart cath

## 2020-01-07 NOTE — PROGRESS NOTE ADULT - SUBJECTIVE AND OBJECTIVE BOX
====================  CCU MIDNIGHT ROUNDS  ====================    MAHESH CANELA  [id number]  Patient is a 79y old  Male who presents with a chief complaint of respiratory distress (2020 17:51)      ====================  SUMMARY: 79 y M  with PMH significant for CAD s/p PCI, GERD, gout, OA, s/p multiple laminectomies and spinal fusions presenting to hospital with worsening back pain/ weakness that progressed to difficulty with ambulation. Hospital course c/b acute metabolic encephalopathy in the setting of MSSA bacteremia. Ceftriaxone started , transitioned to vanc+cefepime, currently transferred to the CCU for fluid overload, respiratory distress requiring urgent HD s/p intubation    ====================    ====================  NEW EVENTS: Intubated and sedated with propfol, CVVH d/c, on  2.5 and levo .02  ====================    ====================  VITALS (Last 12 hrs):  ====================    T(C): 35.6 (20 @ 21:00), Max: 36.7 (20 @ 12:15)  T(F): 96.1 (20 @ 21:00), Max: 98.1 (20 @ 13:00)  HR: 126 (20 @ 23:00) (97 - 128)  BP: --  BP(mean): --  ABP: 88/46 (20 @ 23:00) (88/46 - 136/66)  ABP(mean): 62 (20 @ 23:00) (54 - 92)  RR: 17 (20 @ 23:00) (17 - 42)  SpO2: 99% (20 @ 23:00) (92% - 100%)  Wt(kg): --  CVP(mm Hg): --  CVP(cm H2O): --  CO: --  CI: --  PA: --  PA(mean): --  PCWP: --  SVR: --  PVR: --    I&O's Summary    2020 07:01  -  2020 07:00  --------------------------------------------------------  IN: 334.8 mL / OUT: 1885 mL / NET: -1550.2 mL    2020 07:01  -  2020 23:23  --------------------------------------------------------  IN: 256.7 mL / OUT: 2423 mL / NET: -2166.3 mL      Mode: AC/ CMV (Assist Control/ Continuous Mandatory Ventilation)  RR (machine): 20  TV (machine): 500  FiO2: 50  PEEP: 5  ITime: 1  MAP: 9  PIP: 24      ====================  NEW LABS:  ====================                          8.9    15.00 )-----------( 262      ( 2020 21:14 )             29.7     01-07    139  |  102  |  88<H>  ----------------------------<  154<H>  4.3   |  19<L>  |  2.38<H>    Ca    8.9      2020 21:14  Phos  6.5       Mg     2.2         TPro  6.4  /  Alb  2.0<L>  /  TBili  0.3  /  DBili  x   /  AST  25  /  ALT  21  /  AlkPhos  88  -07    PT/INR - ( 2020 13:37 )   PT: 15.8 sec;   INR: 1.36 ratio         PTT - ( 2020 13:37 )  PTT:25.8 sec      ABG - ( 2020 22:23 )  pH, Arterial: 7.20  pH, Blood: x     /  pCO2: 59    /  pO2: 135   / HCO3: 22    / Base Excess: -5.2  /  SaO2: 98        Blood Gas Source, Mixed: Mixed Blood Gas (20 @ 20:54)      ====================  PLAN:  ====================  - continue to monitor gases as patient is acidotic  - wean as tolerated ====================  CCU MIDNIGHT ROUNDS  ====================    MAHESH CANELA  [id number]  Patient is a 79y old  Male who presents with a chief complaint of respiratory distress (2020 17:51)      ====================  SUMMARY: 79 y M  with PMH significant for CAD s/p PCI, GERD, gout, OA, s/p multiple laminectomies and spinal fusions presenting to hospital with worsening back pain/ weakness that progressed to difficulty with ambulation. Hospital course c/b acute metabolic encephalopathy in the setting of MSSA bacteremia. Ceftriaxone started , transitioned to vanc+cefepime, currently transferred to the CCU for fluid overload, respiratory distress requiring urgent HD s/p intubation    ====================    ====================  NEW EVENTS: Intubated and sedated with propfol, propofol dc- versed and fentanyl started                    CVVH d/c,   d/c, levo .02, started Vaso  ====================    ====================  VITALS (Last 12 hrs):  ====================    T(C): 35.6 (20 @ 21:00), Max: 36.7 (20 @ 12:15)  T(F): 96.1 (20 @ 21:00), Max: 98.1 (20 @ 13:00)  HR: 126 (20 @ 23:00) (97 - 128)  BP: --  BP(mean): --  ABP: 88/46 (20 @ 23:00) (88/46 - 136/66)  ABP(mean): 62 (20 @ 23:00) (54 - 92)  RR: 17 (01-07-20 @ 23:00) (17 - 42)  SpO2: 99% (20 @ 23:00) (92% - 100%)  Wt(kg): --  CVP(mm Hg): --  CVP(cm H2O): --  CO: --  CI: --  PA: --  PA(mean): --  PCWP: --  SVR: --  PVR: --    I&O's Summary    2020 07:01  -  2020 07:00  --------------------------------------------------------  IN: 334.8 mL / OUT: 1885 mL / NET: -1550.2 mL    2020 07:01  -  2020 23:23  --------------------------------------------------------  IN: 256.7 mL / OUT: 2423 mL / NET: -2166.3 mL      Mode: AC/ CMV (Assist Control/ Continuous Mandatory Ventilation)  RR (machine): 20  TV (machine): 500  FiO2: 50  PEEP: 5  ITime: 1  MAP: 9  PIP: 24      ====================  NEW LABS:  ====================                          8.9    15.00 )-----------( 262      ( 2020 21:14 )             29.7     01-07    139  |  102  |  88<H>  ----------------------------<  154<H>  4.3   |  19<L>  |  2.38<H>    Ca    8.9      2020 21:14  Phos  6.5     01-07  Mg     2.2     01-07    TPro  6.4  /  Alb  2.0<L>  /  TBili  0.3  /  DBili  x   /  AST  25  /  ALT  21  /  AlkPhos  88  01-07    PT/INR - ( 2020 13:37 )   PT: 15.8 sec;   INR: 1.36 ratio         PTT - ( 2020 13:37 )  PTT:25.8 sec      ABG - ( 2020 22:23 )  pH, Arterial: 7.20  pH, Blood: x     /  pCO2: 59    /  pO2: 135   / HCO3: 22    / Base Excess: -5.2  /  SaO2: 98        Blood Gas Source, Mixed: Mixed Blood Gas (20 @ 20:54)      ====================  PLAN:  ====================  - continue to monitor gases as patient is acidotic  - wean as tolerated ====================  CCU MIDNIGHT ROUNDS  ====================    MAHESH CANELA  [id number]  Patient is a 79y old  Male who presents with a chief complaint of respiratory distress (2020 17:51)      ====================  SUMMARY: 79 y M  with PMH significant for CAD s/p PCI, GERD, gout, OA, s/p multiple laminectomies and spinal fusions presenting to hospital with worsening back pain/ weakness that progressed to difficulty with ambulation. Hospital course c/b acute metabolic encephalopathy in the setting of MSSA bacteremia. Ceftriaxone started , transitioned to vanc+cefepime, currently transferred to the CCU for fluid overload, respiratory distress requiring urgent HD s/p intubation    ====================    ====================  NEW EVENTS: Intubated and sedated with propfol, propofol dc- versed and fentanyl started                    CVVH d/c due to Wedge pressure 11 and Right atrium pressure 5                      d/c, levo .02, started Vaso  ====================    ====================  VITALS (Last 12 hrs):  ====================    T(C): 35.6 (20 @ 21:00), Max: 36.7 (20 @ 12:15)  T(F): 96.1 (20 @ 21:00), Max: 98.1 (20 @ 13:00)  HR: 126 (20 @ 23:00) (97 - 128)  BP: --  BP(mean): --  ABP: 88/46 (20 @ 23:00) (88/46 - 136/66)  ABP(mean): 62 (20 @ 23:00) (54 - 92)  RR: 17 (20 @ 23:00) (17 - 42)  SpO2: 99% (20 @ 23:00) (92% - 100%)  Wt(kg): --  CVP(mm Hg): --  CVP(cm H2O): --  CO: --  CI: --  PA: --  PA(mean): --  PCWP: --  SVR: --  PVR: --    I&O's Summary    2020 07:01  -  2020 07:00  --------------------------------------------------------  IN: 334.8 mL / OUT: 1885 mL / NET: -1550.2 mL    2020 07:01  -  2020 23:23  --------------------------------------------------------  IN: 256.7 mL / OUT: 2423 mL / NET: -2166.3 mL      Mode: AC/ CMV (Assist Control/ Continuous Mandatory Ventilation)  RR (machine): 20  TV (machine): 500  FiO2: 50  PEEP: 5  ITime: 1  MAP: 9  PIP: 24      ====================  NEW LABS:  ====================                          8.9    15.00 )-----------( 262      ( 2020 21:14 )             29.7     01-07    139  |  102  |  88<H>  ----------------------------<  154<H>  4.3   |  19<L>  |  2.38<H>    Ca    8.9      2020 21:14  Phos  6.5     01-07  Mg     2.2     -07    TPro  6.4  /  Alb  2.0<L>  /  TBili  0.3  /  DBili  x   /  AST  25  /  ALT  21  /  AlkPhos  88      PT/INR - ( 2020 13:37 )   PT: 15.8 sec;   INR: 1.36 ratio         PTT - ( 2020 13:37 )  PTT:25.8 sec      ABG - ( 2020 22:23 )  pH, Arterial: 7.20  pH, Blood: x     /  pCO2: 59    /  pO2: 135   / HCO3: 22    / Base Excess: -5.2  /  SaO2: 98        Blood Gas Source, Mixed: Mixed Blood Gas (20 @ 20:54)      ====================  PLAN:  ====================  - continue to monitor gases as patient is acidotic  - wean as tolerated

## 2020-01-07 NOTE — PROGRESS NOTE ADULT - SUBJECTIVE AND OBJECTIVE BOX
Patient is a 79y old  Male who presents with a chief complaint of respiratory distress (07 Jan 2020 17:51)      INTERVAL HPI/OVERNIGHT EVENTS: noted transferred to ccu as pt remains hypoxic respi failure on bipap    restarting HD  pt lethargic      Vital Signs Last 24 Hrs  T(C): 35.6 (07 Jan 2020 21:00), Max: 37 (07 Jan 2020 08:00)  T(F): 96.1 (07 Jan 2020 21:00), Max: 98.6 (07 Jan 2020 08:00)  HR: 128 (07 Jan 2020 22:10) (97 - 128)  BP: 113/64 (06 Jan 2020 23:00) (113/64 - 113/64)  BP(mean): 76 (06 Jan 2020 23:00) (76 - 76)  RR: 20 (07 Jan 2020 22:10) (17 - 65)  SpO2: 95% (07 Jan 2020 22:10) (90% - 100%)    albuterol/ipratropium for Nebulization 3 milliLiter(s) Nebulizer every 6 hours  aspirin  chewable 81 milliGRAM(s) Oral daily  atorvastatin 10 milliGRAM(s) Oral at bedtime  bisacodyl Suppository 10 milliGRAM(s) Rectal daily PRN  chlorhexidine 0.12% Liquid 15 milliLiter(s) Oral Mucosa every 12 hours  chlorhexidine 4% Liquid 1 Application(s) Topical <User Schedule>  chlorhexidine 4% Liquid 1 Application(s) Topical <User Schedule>  DAPTOmycin IVPB 800 milliGRAM(s) IV Intermittent every 48 hours  dextrose 40% Gel 15 Gram(s) Oral once PRN  dextrose 5%. 1000 milliLiter(s) IV Continuous <Continuous>  dextrose 50% Injectable 12.5 Gram(s) IV Push once  dextrose 50% Injectable 25 Gram(s) IV Push once  dextrose 50% Injectable 25 Gram(s) IV Push once  DOBUTamine Infusion 2.5 MICROgram(s)/kG/Min IV Continuous <Continuous>  glucagon  Injectable 1 milliGRAM(s) IntraMuscular once PRN  heparin  Injectable 5000 Unit(s) SubCutaneous every 8 hours  insulin lispro (HumaLOG) corrective regimen sliding scale   SubCutaneous three times a day before meals  lidocaine   Patch 1 Patch Transdermal daily  norepinephrine Infusion 0.05 MICROgram(s)/kG/Min IV Continuous <Continuous>  oxymetazoline 0.05% Nasal Spray 2 Spray(s) Both Nostrils two times a day  pantoprazole  Injectable 40 milliGRAM(s) IV Push daily  polyethylene glycol 3350 17 Gram(s) Oral daily  propofol Infusion 10 MICROgram(s)/kG/Min IV Continuous <Continuous>  senna 2 Tablet(s) Oral at bedtime  sodium chloride 0.65% Nasal 1 Spray(s) Both Nostrils three times a day  sodium chloride 0.9% lock flush 10 milliLiter(s) IV Push every 1 hour PRN  sodium chloride 0.9% lock flush 10 milliLiter(s) IV Push every 1 hour PRN      PHYSICAL EXAM:  GENERAL: in respi distress , lethargic  EYES: conjunctiva and sclera clear  ENMT: Moist mucous membranes  NECK: Supple, No JVD, Normal thyroid  NERVOUS SYSTEM:  Alert & Oriented X,   CHEST/LUNG: coarse bs, basal rales  HEART: Regular rate and rhythm; No murmurs, rubs, or gallops  ABDOMEN: Soft, Nontender, Nondistended; Bowel sounds present  EXTREMITIES:  2+  edema  LYMPH: No lymphadenopathy noted  SKIN: No rashes or lesions    Consultant(s) Notes Reviewed:  [x ] YES  [ ] NO  Care Discussed with Consultants/Other Providers [ x] YES  [ ] NO    LABS:                        8.9    15.00 )-----------( 262      ( 07 Jan 2020 21:14 )             29.7     01-07    139  |  102  |  88<H>  ----------------------------<  154<H>  4.3   |  19<L>  |  2.38<H>    Ca    8.9      07 Jan 2020 21:14  Phos  6.5     01-07  Mg     2.2     01-07    TPro  6.4  /  Alb  2.0<L>  /  TBili  0.3  /  DBili  x   /  AST  25  /  ALT  21  /  AlkPhos  88  01-07    PT/INR - ( 06 Jan 2020 13:37 )   PT: 15.8 sec;   INR: 1.36 ratio         PTT - ( 06 Jan 2020 13:37 )  PTT:25.8 sec    CAPILLARY BLOOD GLUCOSE      POCT Blood Glucose.: 142 mg/dL (07 Jan 2020 16:05)  POCT Blood Glucose.: 125 mg/dL (07 Jan 2020 12:07)      ABG - ( 07 Jan 2020 22:23 )  pH, Arterial: 7.20  pH, Blood: x     /  pCO2: 59    /  pO2: 135   / HCO3: 22    / Base Excess: -5.2  /  SaO2: 98                      RADIOLOGY & ADDITIONAL TESTS:    Imaging Personally Reviewed:  [x ] YES  [ ] NO

## 2020-01-07 NOTE — PROGRESS NOTE ADULT - SUBJECTIVE AND OBJECTIVE BOX
[facility]   [phone]  DIALYSIS NOTE  Chief Complaint: ESRD/Ongoing hemodialysis requirement. seen on hd    24 hour events/subjective:      CTSp. pt with recurrent sob on bipap again.         ALLERGIES & MEDICATIONS  --------------------------------------------------------------------------------  Allergies    Indocin (Unknown)  oxacillin (Unknown)  Zyvox (Unknown)    Intolerances      Standing Inpatient Medications  albuterol/ipratropium for Nebulization 3 milliLiter(s) Nebulizer every 6 hours  aspirin  chewable 81 milliGRAM(s) Oral daily  atorvastatin 10 milliGRAM(s) Oral at bedtime  buMETAnide Infusion 2 mG/Hr IV Continuous <Continuous>  chlorhexidine 4% Liquid 1 Application(s) Topical <User Schedule>  chlorhexidine 4% Liquid 1 Application(s) Topical <User Schedule>  CRRT Treatment    <Continuous>  DAPTOmycin IVPB 800 milliGRAM(s) IV Intermittent every 48 hours  DOBUTamine Infusion 2.5 MICROgram(s)/kG/Min IV Continuous <Continuous>  heparin  Injectable 5000 Unit(s) SubCutaneous every 8 hours  lidocaine   Patch 1 Patch Transdermal daily  oxymetazoline 0.05% Nasal Spray 2 Spray(s) Both Nostrils two times a day  pantoprazole  Injectable 40 milliGRAM(s) IV Push daily  polyethylene glycol 3350 17 Gram(s) Oral daily  PrismaSATE Dialysate BGK 4 / 2.5 5000 milliLiter(s) CRRT <Continuous>  PrismaSOL Filtration BGK 0 / 2.5 5000 milliLiter(s) CRRT <Continuous>  PrismaSOL Filtration BGK 4 / 2.5 5000 milliLiter(s) CRRT <Continuous>  senna 2 Tablet(s) Oral at bedtime  sodium chloride 0.65% Nasal 1 Spray(s) Both Nostrils three times a day    PRN Inpatient Medications  bisacodyl Suppository 10 milliGRAM(s) Rectal daily PRN  sodium chloride 0.9% lock flush 10 milliLiter(s) IV Push every 1 hour PRN  sodium chloride 0.9% lock flush 10 milliLiter(s) IV Push every 1 hour PRN      REVIEW OF SYSTEMS  --------------------------------------------------------------------------------  on bipap         VITALS/PHYSICAL EXAM  --------------------------------------------------------------------------------  T(C): 36.8 (01-07-20 @ 06:33), Max: 36.9 (01-06-20 @ 14:05)  HR: 110 (01-07-20 @ 07:05) (104 - 117)  BP: 113/64 (01-06-20 @ 23:00) (96/66 - 122/74)  RR: 35 (01-07-20 @ 06:33) (20 - 65)  SpO2: 92% (01-07-20 @ 07:05) (90% - 100%)  Wt(kg): --        01-06-20 @ 07:01  -  01-07-20 @ 07:00  --------------------------------------------------------  IN: 317 mL / OUT: 1885 mL / NET: -1568 mL      Physical Exam:  	  Gen: on bipap somewhat responsive   	no jvd  	Pulm: decrease bs  + coarse bs bilateral no  wheezing  	CV: RRR, S1S2; no rub  	Abd: +BS, soft, nontender/nondistended  	: No suprapubic tenderness  	UE: Warm, no cyanosis  no clubbing,  3+ edema   	LE: Warm, no cyanosis  no clubbing, 3- edema some decrease in edema overall   	  	    	    LABS/STUDIES  --------------------------------------------------------------------------------              8.5    12.15 >-----------<  232      [01-07-20 @ 05:00]              26.2     138  |  100  |  108  ----------------------------<  137      [01-07-20 @ 05:00]  4.4   |  19  |  2.50        Ca     8.8     [01-07-20 @ 05:00]      Mg     1.9     [01-07-20 @ 05:00]      Phos  5.9     [01-07-20 @ 05:00]    TPro  6.2  /  Alb  1.8  /  TBili  0.3  /  DBili  x   /  AST  28  /  ALT  19  /  AlkPhos  81  [01-07-20 @ 05:00]    PT/INR: PT 15.8 , INR 1.36       [01-06-20 @ 13:37]  PTT: 25.8       [01-06-20 @ 13:37]

## 2020-01-07 NOTE — PROGRESS NOTE ADULT - PROBLEM SELECTOR PLAN 4
Acute respi failure  cont bipap, bumex gtt, dobutamine  , monitor- lccu  cards and renal fu noted  TTE  severe aortic stenosis. Concentric left ventricular hypertrophy.  Severe hypokinesis of the apical anterior wall, apex  distal inferiior wall.  Mild diastolic dysfunction (Stage I).

## 2020-01-07 NOTE — PROGRESS NOTE ADULT - ASSESSMENT
80 yo M with chronic low back pain sp multiple laminectomy and fusion p/w 1 week of worsening back pain along with weakness in bilateral lower extremities and inability to walk since last night  Leukocytosis, fever  BCX with MSSA, high grade, BCX 12/12 NGTD  NM tagged WBC uptake in thoracolumbar spine--degen disease?  TTE equivocal, JAHAIRA reassuring  High suspicion for spinal involvement despite negative MRI--monitor closely  New episode of shortness of breath and difficulty breathing (elevated BNP, edema on CXR)--suspect due to fluid overload  RVP positive CoV  Overall,  1) MSSA Bacteremia--Dapto 800mg q 48 (give post HD on HD days), weekly CKs, monitor Cr  - Continue Dapto through 2/5/20--CK 1/5 45  2) KRIS--trend Cr to normal  3) Leukocytosis  - Trend WBC  4) New SOB  - CT without evidence PNA  - F/U cardiology--any benefit to repeat TTE (Could this be missed endocarditis with delayed damage to valve?)?  - Any considerations for HD per Renal    Fredy Huynh MD  Pager [phone]  After 5pm and on weekends call [phone]

## 2020-01-07 NOTE — PROGRESS NOTE ADULT - PROBLEM SELECTOR PROBLEM 6
Gout
CAD (coronary artery disease)
Essential hypertension
Gout

## 2020-01-07 NOTE — PROGRESS NOTE ADULT - PROBLEM SELECTOR PROBLEM 3
Sepsis

## 2020-01-07 NOTE — PROGRESS NOTE ADULT - PROBLEM SELECTOR PLAN 1
multifactorial ATN/AIN in the setting of sepsis and antibiotics  low c3 and nl c4 suggestive of post infectious glomerulonephritis  cr improving  family declining renal biopsy  creat now improving, no need for biopsy at this time  holding losartan  worsening renal function in the setting of chf, bumex gtt-renal plan for HD  dw  dr nieves

## 2020-01-07 NOTE — PROGRESS NOTE ADULT - SUBJECTIVE AND OBJECTIVE BOX
CC: events noted, in CCU, on CVVHD/UF and BIPAP, daughter at bedside, denies chest pain/dyspnea, daughter at bedside    TELEMETRY: sinus tach    PHYSICAL EXAM:    T(C): 36.4 (01-07-20 @ 09:43), Max: 37 (01-07-20 @ 08:00)  HR: 104 (01-07-20 @ 10:00) (102 - 117)  BP: 113/64 (01-06-20 @ 23:00) (96/66 - 122/74)  RR: 26 (01-07-20 @ 10:00) (20 - 65)  SpO2: 92% (01-07-20 @ 10:00) (90% - 100%)  Wt(kg): --  I&O's Summary    06 Jan 2020 07:01  -  07 Jan 2020 07:00  --------------------------------------------------------  IN: 334.8 mL / OUT: 1885 mL / NET: -1550.2 mL    07 Jan 2020 07:01  -  07 Jan 2020 10:45  --------------------------------------------------------  IN: 15.6 mL / OUT: 20 mL / NET: -4.4 mL        Appearance: Normal	  Cardiovascular: Normal S1 S2,RRR, No JVD, No murmurs  Respiratory: dec vs at bases  Gastrointestinal:  Soft, Non-tender, + BS	  Extremities: ++ edema  Vascular: Peripheral pulses palpable 2+ bilaterally     LABS:	 	                          8.5    12.15 )-----------( 232      ( 07 Jan 2020 05:00 )             26.2     01-07    138  |  100  |  108<H>  ----------------------------<  137<H>  4.4   |  19<L>  |  2.50<H>    Ca    8.8      07 Jan 2020 05:00  Phos  5.9     01-07  Mg     1.9     01-07    TPro  6.2  /  Alb  1.8<L>  /  TBili  0.3  /  DBili  x   /  AST  28  /  ALT  19  /  AlkPhos  81  01-07      PT/INR - ( 06 Jan 2020 13:37 )   PT: 15.8 sec;   INR: 1.36 ratio         PTT - ( 06 Jan 2020 13:37 )  PTT:25.8 sec    CARDIAC MARKERS:      MEDICATIONS  (STANDING):  albuterol/ipratropium for Nebulization 3 milliLiter(s) Nebulizer every 6 hours  aspirin  chewable 81 milliGRAM(s) Oral daily  atorvastatin 10 milliGRAM(s) Oral at bedtime  chlorhexidine 4% Liquid 1 Application(s) Topical <User Schedule>  chlorhexidine 4% Liquid 1 Application(s) Topical <User Schedule>  CRRT Treatment    <Continuous>  DAPTOmycin IVPB 800 milliGRAM(s) IV Intermittent every 48 hours  DOBUTamine Infusion 2.5 MICROgram(s)/kG/Min (7.822 mL/Hr) IV Continuous <Continuous>  heparin  Injectable 5000 Unit(s) SubCutaneous every 8 hours  lidocaine   Patch 1 Patch Transdermal daily  oxymetazoline 0.05% Nasal Spray 2 Spray(s) Both Nostrils two times a day  pantoprazole  Injectable 40 milliGRAM(s) IV Push daily  polyethylene glycol 3350 17 Gram(s) Oral daily  PrismaSATE Dialysate BGK 4 / 2.5 5000 milliLiter(s) (1000 mL/Hr) CRRT <Continuous>  PrismaSOL Filtration BGK 0 / 2.5 5000 milliLiter(s) (600 mL/Hr) CRRT <Continuous>  PrismaSOL Filtration BGK 4 / 2.5 5000 milliLiter(s) (400 mL/Hr) CRRT <Continuous>  senna 2 Tablet(s) Oral at bedtime  sodium chloride 0.65% Nasal 1 Spray(s) Both Nostrils three times a day

## 2020-01-07 NOTE — ADVANCED PRACTICE NURSE CONSULT - RECOMMEDATIONS
1.  WounDres hydrogel to eschar on right and left buttock.  Cover with an Allevyn gentle multisite foam dressing.  Change daily and prn.  2.  Continue with turning and positioning  3.  Continue with nutritional support  Remain available as requested by staff

## 2020-01-07 NOTE — ADVANCED PRACTICE NURSE CONSULT - REASON FOR CONSULT
Requested by nursing staff to reassess wounds on bilateral buttocks.  Patient is a 79 y M  with PMH significant for CAD s/p PCI, GERD, gout, OA, s/p multiple laminectomies and spinal fusions, hospital course c/b MSSA bacteremia, ESRD requiring urgent HD on this admission, S/P Shiley placement and removal now transferred to CCU for urgent HD in the setting of severe hypervolemia and hypoxic respiratory failure  Patient had been on BIPAP on 4 Joshua for BIPAP and telemetry and then was transferred yesterday to 1/6 to CCU.  Seen in the CCU this morning.

## 2020-01-07 NOTE — PROGRESS NOTE ADULT - SUBJECTIVE AND OBJECTIVE BOX
Pt intubated with 50mg propofol 100mg Succ and 300mcg  phenylephrine , etco2 confirmed and BL breath sounds.

## 2020-01-07 NOTE — PRE-ANESTHESIA EVALUATION ADULT - NSANTHRISKNONERD_GEN_ALL_CORE
No risk alerts present
My signature below certifies that the above stated patient is homebound and upon completion of the Face-To-Face encounter, has the need for intermittent skilled nursing, physical therapy and/or speech or occupational therapy services in their home for their current diagnosis as outlined in their initial plan of care. These services will continue to be monitored by myself or another physician.

## 2020-01-07 NOTE — PROGRESS NOTE ADULT - SUBJECTIVE AND OBJECTIVE BOX
====================  CCU MIDNIGHT ROUNDS  ====================    MAHESH CANELA  [id number]  Patient is a 79y old  Male who presents with a chief complaint of Back pain (06 Jan 2020 22:43)      ====================  SUMMARY:  ====================        ====================  NEW EVENTS:  ====================        ====================  VITALS (Last 12 hrs):  ====================    T(C): 36.4 (01-06-20 @ 21:40), Max: 36.9 (01-06-20 @ 14:05)  T(F): 97.5 (01-06-20 @ 21:40), Max: 98.5 (01-06-20 @ 14:05)  HR: 110 (01-06-20 @ 22:00) (104 - 117)  BP: 114/68 (01-06-20 @ 22:00) (96/66 - 120/72)  BP(mean): 86 (01-06-20 @ 22:00) (72 - 102)  ABP: --  ABP(mean): --  RR: 24 (01-06-20 @ 18:00) (22 - 26)  SpO2: 94% (01-06-20 @ 22:00) (91% - 100%)  Wt(kg): --  CVP(mm Hg): --  CVP(cm H2O): --  CO: --  CI: --  PA: --  PA(mean): --  PCWP: --  SVR: --  PVR: --    I&O's Summary    05 Jan 2020 07:01  -  06 Jan 2020 07:00  --------------------------------------------------------  IN: 214 mL / OUT: 1100 mL / NET: -886 mL    06 Jan 2020 07:01  -  07 Jan 2020 00:07  --------------------------------------------------------  IN: 192.4 mL / OUT: 1797 mL / NET: -1604.6 mL            ====================  NEW LABS:  ====================                          8.6    12.09 )-----------( 236      ( 06 Jan 2020 13:37 )             27.8     01-06    134<L>  |  98  |  134<H>  ----------------------------<  150<H>  4.4   |  20<L>  |  2.84<H>    Ca    8.9      06 Jan 2020 13:37  Phos  6.4     01-06  Mg     1.9     01-06    TPro  6.3  /  Alb  2.2<L>  /  TBili  0.3  /  DBili  x   /  AST  24  /  ALT  22  /  AlkPhos  80  01-06    PT/INR - ( 06 Jan 2020 13:37 )   PT: 15.8 sec;   INR: 1.36 ratio         PTT - ( 06 Jan 2020 13:37 )  PTT:25.8 sec      ABG - ( 06 Jan 2020 15:38 )  pH, Arterial: 7.41  pH, Blood: x     /  pCO2: 35    /  pO2: 78    / HCO3: 21    / Base Excess: -2.4  /  SaO2: 95                    ====================  PLAN:  ====================  -       Doris Aviles DNP  CCU/Cardiology  [id number]  Beeper #[id number] ====================  CCU MIDNIGHT ROUNDS  ====================    MAHESH CANELA  [id number]  Patient is a 79y old  Male who presents with a chief complaint of Back pain (06 Jan 2020 22:43)      ====================  SUMMARY: 79 y M  with PMH significant for CAD s/p PCI, GERD, gout, OA, s/p multiple laminectomies and spinal fusions presenting to hospital with worsening back pain/ weakness that progressed to difficulty with ambulation. Hospital course c/b acute metabolic encephalopathy in the setting of MSSA bacteremia. Ceftriaxone started 12/7, transitioned to vanc+cefepime, currently transferred to the CCU for fluid overload, respiratory distress requiring urgent HD  ====================    ====================  NEW EVENTS: L Femoral Central line and L arm radial line placed  ====================      ====================  VITALS (Last 12 hrs):  ====================    T(C): 36.4 (01-06-20 @ 21:40), Max: 36.9 (01-06-20 @ 14:05)  T(F): 97.5 (01-06-20 @ 21:40), Max: 98.5 (01-06-20 @ 14:05)  HR: 110 (01-06-20 @ 22:00) (104 - 117)  BP: 114/68 (01-06-20 @ 22:00) (96/66 - 120/72)  BP(mean): 86 (01-06-20 @ 22:00) (72 - 102)  ABP: --  ABP(mean): --  RR: 24 (01-06-20 @ 18:00) (22 - 26)  SpO2: 94% (01-06-20 @ 22:00) (91% - 100%)  Wt(kg): --  CVP(mm Hg): --  CVP(cm H2O): --  CO: --  CI: --  PA: --  PA(mean): --  PCWP: --  SVR: --  PVR: --    I&O's Summary    05 Jan 2020 07:01  -  06 Jan 2020 07:00  --------------------------------------------------------  IN: 214 mL / OUT: 1100 mL / NET: -886 mL    06 Jan 2020 07:01  -  07 Jan 2020 00:07  --------------------------------------------------------  IN: 192.4 mL / OUT: 1797 mL / NET: -1604.6 mL        ====================  NEW LABS:  ====================                          8.6    12.09 )-----------( 236      ( 06 Jan 2020 13:37 )             27.8     01-06    134<L>  |  98  |  134<H>  ----------------------------<  150<H>  4.4   |  20<L>  |  2.84<H>    Ca    8.9      06 Jan 2020 13:37  Phos  6.4     01-06  Mg     1.9     01-06    TPro  6.3  /  Alb  2.2<L>  /  TBili  0.3  /  DBili  x   /  AST  24  /  ALT  22  /  AlkPhos  80  01-06    PT/INR - ( 06 Jan 2020 13:37 )   PT: 15.8 sec;   INR: 1.36 ratio         PTT - ( 06 Jan 2020 13:37 )  PTT:25.8 sec      ABG - ( 06 Jan 2020 15:38 )  pH, Arterial: 7.41  pH, Blood: x     /  pCO2: 35    /  pO2: 78    / HCO3: 21    / Base Excess: -2.4  /  SaO2: 95          ====================  PLAN:  ====================  - continue to monitor UOP  - Wean off biPaP as tolerated  -C/w HD as scheduled

## 2020-01-07 NOTE — PROGRESS NOTE ADULT - ATTENDING COMMENTS
Dr Deshpande will be covering  starting 1/8/20  [facility] [phone] for any questions

## 2020-01-07 NOTE — PROGRESS NOTE ADULT - ASSESSMENT
79 yr old male with HTN, DCHF, CAD, S/P PCI to OM1, Mid LAD (9/2014, [facility], [facility], [facility]) hx of ischemic CMP, AS, DM HLD, GERD, GOUT, s/p multiple spinal surgery admitted with back pain and fever, now with acute hypoxic resp failure 2/2 to pulm edema w/ poss underlying PNA, +coronovirus.     1. Hypoxic respiratory failure, acute/chronic systolic heart failure  -secondary to pulm edema w/ underlying coronavirus +/- PNA  -now on UF/CVVHD for aggressive volume removal w iontrope assistance   -cont bipap  -echo with severe LV sys dysfxn, EF 20-25%, mild diastolic dysfxn  -continue medical therapy for now as he is not a candidate for ischemic eval in setting of resp failure, KRIS/CKD, will revisit once volmue status/renal function improves  - if needed to establish more secure HD access via IJ/subclavian, elective intubation is reasonable given his tenuous resp/cv status  -discussed this with the pt's daughter and she agrees.    2. CAD s/p PCI (OM1/LAD)   -cont statin and asa     3. Aortic Stenosis  -stable, severe on LAZARO with valve area of 0.9, tte this admit and as outpt with moderate AS  -gradients low on lazaro, perhaps due to new lv dysfxn  -YULY likely underestimated  -cont to monitor      4. Bacteremia, resolved   -LAZARO without endocarditis   -med f/u     5. KRIS, ? AIN  -creat stable, renal f/u for HD today       d/w daughter at bedside  d/w CCU attending

## 2020-01-08 LAB
ALBUMIN SERPL ELPH-MCNC: 2 G/DL — LOW (ref 3.3–5)
ALBUMIN SERPL ELPH-MCNC: 2 G/DL — LOW (ref 3.3–5)
ALBUMIN SERPL ELPH-MCNC: 2.1 G/DL — LOW (ref 3.3–5)
ALP SERPL-CCNC: 76 U/L — SIGNIFICANT CHANGE UP (ref 40–120)
ALP SERPL-CCNC: 79 U/L — SIGNIFICANT CHANGE UP (ref 40–120)
ALP SERPL-CCNC: 84 U/L — SIGNIFICANT CHANGE UP (ref 40–120)
ALT FLD-CCNC: 14 U/L — SIGNIFICANT CHANGE UP (ref 10–45)
ALT FLD-CCNC: 17 U/L — SIGNIFICANT CHANGE UP (ref 10–45)
ALT FLD-CCNC: 19 U/L — SIGNIFICANT CHANGE UP (ref 10–45)
ANION GAP SERPL CALC-SCNC: 16 MMOL/L — SIGNIFICANT CHANGE UP (ref 5–17)
ANION GAP SERPL CALC-SCNC: 17 MMOL/L — SIGNIFICANT CHANGE UP (ref 5–17)
ANION GAP SERPL CALC-SCNC: 17 MMOL/L — SIGNIFICANT CHANGE UP (ref 5–17)
APPEARANCE UR: ABNORMAL
AST SERPL-CCNC: 22 U/L — SIGNIFICANT CHANGE UP (ref 10–40)
AST SERPL-CCNC: 22 U/L — SIGNIFICANT CHANGE UP (ref 10–40)
AST SERPL-CCNC: 27 U/L — SIGNIFICANT CHANGE UP (ref 10–40)
BACTERIA # UR AUTO: NEGATIVE — SIGNIFICANT CHANGE UP
BASE EXCESS BLDA CALC-SCNC: -4.1 MMOL/L — LOW (ref -2–2)
BASE EXCESS BLDA CALC-SCNC: -4.9 MMOL/L — LOW (ref -2–2)
BASE EXCESS BLDMV CALC-SCNC: -1.7 MMOL/L — SIGNIFICANT CHANGE UP (ref -3–3)
BASE EXCESS BLDMV CALC-SCNC: -4 MMOL/L — LOW (ref -3–3)
BASOPHILS # BLD AUTO: 0.01 K/UL — SIGNIFICANT CHANGE UP (ref 0–0.2)
BASOPHILS # BLD AUTO: 0.01 K/UL — SIGNIFICANT CHANGE UP (ref 0–0.2)
BASOPHILS NFR BLD AUTO: 0.1 % — SIGNIFICANT CHANGE UP (ref 0–2)
BASOPHILS NFR BLD AUTO: 0.1 % — SIGNIFICANT CHANGE UP (ref 0–2)
BILIRUB SERPL-MCNC: 0.3 MG/DL — SIGNIFICANT CHANGE UP (ref 0.2–1.2)
BILIRUB UR-MCNC: NEGATIVE — SIGNIFICANT CHANGE UP
BLD GP AB SCN SERPL QL: NEGATIVE — SIGNIFICANT CHANGE UP
BUN SERPL-MCNC: 110 MG/DL — HIGH (ref 7–23)
BUN SERPL-MCNC: 110 MG/DL — HIGH (ref 7–23)
BUN SERPL-MCNC: 93 MG/DL — HIGH (ref 7–23)
CALCIUM SERPL-MCNC: 8.8 MG/DL — SIGNIFICANT CHANGE UP (ref 8.4–10.5)
CALCIUM SERPL-MCNC: 8.9 MG/DL — SIGNIFICANT CHANGE UP (ref 8.4–10.5)
CALCIUM SERPL-MCNC: 8.9 MG/DL — SIGNIFICANT CHANGE UP (ref 8.4–10.5)
CHLORIDE SERPL-SCNC: 100 MMOL/L — SIGNIFICANT CHANGE UP (ref 96–108)
CHLORIDE SERPL-SCNC: 101 MMOL/L — SIGNIFICANT CHANGE UP (ref 96–108)
CHLORIDE SERPL-SCNC: 102 MMOL/L — SIGNIFICANT CHANGE UP (ref 96–108)
CO2 BLDA-SCNC: 21 MMOL/L — LOW (ref 22–30)
CO2 BLDA-SCNC: 22 MMOL/L — SIGNIFICANT CHANGE UP (ref 22–30)
CO2 BLDMV-SCNC: 23 MMOL/L — SIGNIFICANT CHANGE UP (ref 21–29)
CO2 BLDMV-SCNC: 26 MMOL/L — SIGNIFICANT CHANGE UP (ref 21–29)
CO2 SERPL-SCNC: 18 MMOL/L — LOW (ref 22–31)
CO2 SERPL-SCNC: 19 MMOL/L — LOW (ref 22–31)
CO2 SERPL-SCNC: 20 MMOL/L — LOW (ref 22–31)
COLOR SPEC: YELLOW — SIGNIFICANT CHANGE UP
COMMENT - URINE: SIGNIFICANT CHANGE UP
CREAT SERPL-MCNC: 2.7 MG/DL — HIGH (ref 0.5–1.3)
CREAT SERPL-MCNC: 2.98 MG/DL — HIGH (ref 0.5–1.3)
CREAT SERPL-MCNC: 3.03 MG/DL — HIGH (ref 0.5–1.3)
DIFF PNL FLD: ABNORMAL
EOSINOPHIL # BLD AUTO: 0.01 K/UL — SIGNIFICANT CHANGE UP (ref 0–0.5)
EOSINOPHIL # BLD AUTO: 0.28 K/UL — SIGNIFICANT CHANGE UP (ref 0–0.5)
EOSINOPHIL NFR BLD AUTO: 0.1 % — SIGNIFICANT CHANGE UP (ref 0–6)
EOSINOPHIL NFR BLD AUTO: 2.4 % — SIGNIFICANT CHANGE UP (ref 0–6)
EPI CELLS # UR: 1 /HPF — SIGNIFICANT CHANGE UP
GAS PNL BLDA: SIGNIFICANT CHANGE UP
GAS PNL BLDMV: SIGNIFICANT CHANGE UP
GAS PNL BLDMV: SIGNIFICANT CHANGE UP
GLUCOSE BLDC GLUCOMTR-MCNC: 149 MG/DL — HIGH (ref 70–99)
GLUCOSE BLDC GLUCOMTR-MCNC: 157 MG/DL — HIGH (ref 70–99)
GLUCOSE BLDC GLUCOMTR-MCNC: 160 MG/DL — HIGH (ref 70–99)
GLUCOSE BLDC GLUCOMTR-MCNC: 167 MG/DL — HIGH (ref 70–99)
GLUCOSE BLDC GLUCOMTR-MCNC: 171 MG/DL — HIGH (ref 70–99)
GLUCOSE SERPL-MCNC: 162 MG/DL — HIGH (ref 70–99)
GLUCOSE SERPL-MCNC: 176 MG/DL — HIGH (ref 70–99)
GLUCOSE SERPL-MCNC: 182 MG/DL — HIGH (ref 70–99)
GLUCOSE UR QL: NEGATIVE — SIGNIFICANT CHANGE UP
GRAM STN FLD: SIGNIFICANT CHANGE UP
GRAM STN FLD: SIGNIFICANT CHANGE UP
HBA1C BLD-MCNC: 7.3 % — HIGH (ref 4–5.6)
HCO3 BLDA-SCNC: 20 MMOL/L — LOW (ref 21–29)
HCO3 BLDA-SCNC: 20 MMOL/L — LOW (ref 21–29)
HCO3 BLDMV-SCNC: 22 MMOL/L — SIGNIFICANT CHANGE UP (ref 20–28)
HCO3 BLDMV-SCNC: 24 MMOL/L — SIGNIFICANT CHANGE UP (ref 20–28)
HCOV PNL SPEC NAA+PROBE: DETECTED
HCT VFR BLD CALC: 24.9 % — LOW (ref 39–50)
HCT VFR BLD CALC: 27.4 % — LOW (ref 39–50)
HGB BLD-MCNC: 7.8 G/DL — LOW (ref 13–17)
HGB BLD-MCNC: 8.4 G/DL — LOW (ref 13–17)
HOROWITZ INDEX BLDA+IHG-RTO: 35 — SIGNIFICANT CHANGE UP
HOROWITZ INDEX BLDA+IHG-RTO: 35 — SIGNIFICANT CHANGE UP
HOROWITZ INDEX BLDMV+IHG-RTO: 35 — SIGNIFICANT CHANGE UP
HOROWITZ INDEX BLDMV+IHG-RTO: 40 — SIGNIFICANT CHANGE UP
HYALINE CASTS # UR AUTO: 13 /LPF — HIGH (ref 0–2)
IMM GRANULOCYTES NFR BLD AUTO: 0.5 % — SIGNIFICANT CHANGE UP (ref 0–1.5)
IMM GRANULOCYTES NFR BLD AUTO: 0.5 % — SIGNIFICANT CHANGE UP (ref 0–1.5)
KETONES UR-MCNC: NEGATIVE — SIGNIFICANT CHANGE UP
LACTATE SERPL-SCNC: 1.4 MMOL/L — SIGNIFICANT CHANGE UP (ref 0.7–2)
LEUKOCYTE ESTERASE UR-ACNC: ABNORMAL
LYMPHOCYTES # BLD AUTO: 0.63 K/UL — LOW (ref 1–3.3)
LYMPHOCYTES # BLD AUTO: 0.65 K/UL — LOW (ref 1–3.3)
LYMPHOCYTES # BLD AUTO: 4.4 % — LOW (ref 13–44)
LYMPHOCYTES # BLD AUTO: 5.5 % — LOW (ref 13–44)
MAGNESIUM SERPL-MCNC: 2.1 MG/DL — SIGNIFICANT CHANGE UP (ref 1.6–2.6)
MAGNESIUM SERPL-MCNC: 2.2 MG/DL — SIGNIFICANT CHANGE UP (ref 1.6–2.6)
MAGNESIUM SERPL-MCNC: 2.2 MG/DL — SIGNIFICANT CHANGE UP (ref 1.6–2.6)
MCHC RBC-ENTMCNC: 28.9 PG — SIGNIFICANT CHANGE UP (ref 27–34)
MCHC RBC-ENTMCNC: 29.3 PG — SIGNIFICANT CHANGE UP (ref 27–34)
MCHC RBC-ENTMCNC: 30.7 GM/DL — LOW (ref 32–36)
MCHC RBC-ENTMCNC: 31.3 GM/DL — LOW (ref 32–36)
MCV RBC AUTO: 93.6 FL — SIGNIFICANT CHANGE UP (ref 80–100)
MCV RBC AUTO: 94.2 FL — SIGNIFICANT CHANGE UP (ref 80–100)
MONOCYTES # BLD AUTO: 0.18 K/UL — SIGNIFICANT CHANGE UP (ref 0–0.9)
MONOCYTES # BLD AUTO: 0.31 K/UL — SIGNIFICANT CHANGE UP (ref 0–0.9)
MONOCYTES NFR BLD AUTO: 1.5 % — LOW (ref 2–14)
MONOCYTES NFR BLD AUTO: 2.1 % — SIGNIFICANT CHANGE UP (ref 2–14)
NEUTROPHILS # BLD AUTO: 10.67 K/UL — HIGH (ref 1.8–7.4)
NEUTROPHILS # BLD AUTO: 13.45 K/UL — HIGH (ref 1.8–7.4)
NEUTROPHILS NFR BLD AUTO: 90 % — HIGH (ref 43–77)
NEUTROPHILS NFR BLD AUTO: 92.8 % — HIGH (ref 43–77)
NITRITE UR-MCNC: NEGATIVE — SIGNIFICANT CHANGE UP
NRBC # BLD: 0 /100 WBCS — SIGNIFICANT CHANGE UP (ref 0–0)
NRBC # BLD: 0 /100 WBCS — SIGNIFICANT CHANGE UP (ref 0–0)
O2 CT VFR BLD CALC: 37 MMHG — SIGNIFICANT CHANGE UP (ref 30–65)
O2 CT VFR BLD CALC: 39 MMHG — SIGNIFICANT CHANGE UP (ref 30–65)
PCO2 BLDA: 37 MMHG — SIGNIFICANT CHANGE UP (ref 32–46)
PCO2 BLDA: 38 MMHG — SIGNIFICANT CHANGE UP (ref 32–46)
PCO2 BLDMV: 45 MMHG — SIGNIFICANT CHANGE UP (ref 30–65)
PCO2 BLDMV: 52 MMHG — SIGNIFICANT CHANGE UP (ref 30–65)
PH BLDA: 7.34 — LOW (ref 7.35–7.45)
PH BLDA: 7.36 — SIGNIFICANT CHANGE UP (ref 7.35–7.45)
PH BLDMV: 7.28 — LOW (ref 7.32–7.45)
PH BLDMV: 7.3 — LOW (ref 7.32–7.45)
PH UR: 5.5 — SIGNIFICANT CHANGE UP (ref 5–8)
PHOSPHATE SERPL-MCNC: 7.4 MG/DL — HIGH (ref 2.5–4.5)
PHOSPHATE SERPL-MCNC: 7.5 MG/DL — HIGH (ref 2.5–4.5)
PHOSPHATE SERPL-MCNC: 7.5 MG/DL — HIGH (ref 2.5–4.5)
PLATELET # BLD AUTO: 219 K/UL — SIGNIFICANT CHANGE UP (ref 150–400)
PLATELET # BLD AUTO: 259 K/UL — SIGNIFICANT CHANGE UP (ref 150–400)
PO2 BLDA: 103 MMHG — SIGNIFICANT CHANGE UP (ref 74–108)
PO2 BLDA: 132 MMHG — HIGH (ref 74–108)
POTASSIUM SERPL-MCNC: 5 MMOL/L — SIGNIFICANT CHANGE UP (ref 3.5–5.3)
POTASSIUM SERPL-MCNC: 5.1 MMOL/L — SIGNIFICANT CHANGE UP (ref 3.5–5.3)
POTASSIUM SERPL-MCNC: 5.1 MMOL/L — SIGNIFICANT CHANGE UP (ref 3.5–5.3)
POTASSIUM SERPL-SCNC: 5 MMOL/L — SIGNIFICANT CHANGE UP (ref 3.5–5.3)
POTASSIUM SERPL-SCNC: 5.1 MMOL/L — SIGNIFICANT CHANGE UP (ref 3.5–5.3)
POTASSIUM SERPL-SCNC: 5.1 MMOL/L — SIGNIFICANT CHANGE UP (ref 3.5–5.3)
PROT SERPL-MCNC: 5.7 G/DL — LOW (ref 6–8.3)
PROT SERPL-MCNC: 5.8 G/DL — LOW (ref 6–8.3)
PROT SERPL-MCNC: 6.1 G/DL — SIGNIFICANT CHANGE UP (ref 6–8.3)
PROT UR-MCNC: ABNORMAL
RAPID RVP RESULT: DETECTED
RBC # BLD: 2.66 M/UL — LOW (ref 4.2–5.8)
RBC # BLD: 2.91 M/UL — LOW (ref 4.2–5.8)
RBC # FLD: 14.7 % — HIGH (ref 10.3–14.5)
RBC # FLD: 14.9 % — HIGH (ref 10.3–14.5)
RBC CASTS # UR COMP ASSIST: 67 /HPF — HIGH (ref 0–4)
RH IG SCN BLD-IMP: POSITIVE — SIGNIFICANT CHANGE UP
SAO2 % BLDA: 96 % — SIGNIFICANT CHANGE UP (ref 92–96)
SAO2 % BLDA: 99 % — HIGH (ref 92–96)
SAO2 % BLDMV: 61 % — SIGNIFICANT CHANGE UP (ref 60–90)
SAO2 % BLDMV: 72 % — SIGNIFICANT CHANGE UP (ref 60–90)
SODIUM SERPL-SCNC: 135 MMOL/L — SIGNIFICANT CHANGE UP (ref 135–145)
SODIUM SERPL-SCNC: 137 MMOL/L — SIGNIFICANT CHANGE UP (ref 135–145)
SODIUM SERPL-SCNC: 138 MMOL/L — SIGNIFICANT CHANGE UP (ref 135–145)
SP GR SPEC: 1.02 — SIGNIFICANT CHANGE UP (ref 1.01–1.02)
SPECIMEN SOURCE: SIGNIFICANT CHANGE UP
SPECIMEN SOURCE: SIGNIFICANT CHANGE UP
UROBILINOGEN FLD QL: NEGATIVE — SIGNIFICANT CHANGE UP
WBC # BLD: 11.85 K/UL — HIGH (ref 3.8–10.5)
WBC # BLD: 14.48 K/UL — HIGH (ref 3.8–10.5)
WBC # FLD AUTO: 11.85 K/UL — HIGH (ref 3.8–10.5)
WBC # FLD AUTO: 14.48 K/UL — HIGH (ref 3.8–10.5)
WBC UR QL: 34 /HPF — HIGH (ref 0–5)

## 2020-01-08 PROCEDURE — 36800 INSERTION OF CANNULA: CPT

## 2020-01-08 PROCEDURE — 31575 DIAGNOSTIC LARYNGOSCOPY: CPT

## 2020-01-08 PROCEDURE — 99232 SBSQ HOSP IP/OBS MODERATE 35: CPT

## 2020-01-08 PROCEDURE — 99221 1ST HOSP IP/OBS SF/LOW 40: CPT | Mod: 25

## 2020-01-08 PROCEDURE — 43753 TX GASTRO INTUB W/ASP: CPT

## 2020-01-08 PROCEDURE — 99233 SBSQ HOSP IP/OBS HIGH 50: CPT

## 2020-01-08 PROCEDURE — 99291 CRITICAL CARE FIRST HOUR: CPT

## 2020-01-08 PROCEDURE — 71045 X-RAY EXAM CHEST 1 VIEW: CPT | Mod: 26,76

## 2020-01-08 PROCEDURE — 93306 TTE W/DOPPLER COMPLETE: CPT | Mod: 26

## 2020-01-08 PROCEDURE — 71045 X-RAY EXAM CHEST 1 VIEW: CPT | Mod: 26,77

## 2020-01-08 RX ORDER — INSULIN LISPRO 100/ML
VIAL (ML) SUBCUTANEOUS EVERY 6 HOURS
Refills: 0 | Status: DISCONTINUED | OUTPATIENT
Start: 2020-01-08 | End: 2020-01-11

## 2020-01-08 RX ORDER — AZTREONAM 2 G
1000 VIAL (EA) INJECTION ONCE
Refills: 0 | Status: COMPLETED | OUTPATIENT
Start: 2020-01-08 | End: 2020-01-08

## 2020-01-08 RX ORDER — ACETAMINOPHEN 500 MG
1000 TABLET ORAL ONCE
Refills: 0 | Status: COMPLETED | OUTPATIENT
Start: 2020-01-08 | End: 2020-01-08

## 2020-01-08 RX ORDER — VASOPRESSIN 20 [USP'U]/ML
0.02 INJECTION INTRAVENOUS
Qty: 50 | Refills: 0 | Status: DISCONTINUED | OUTPATIENT
Start: 2020-01-08 | End: 2020-01-12

## 2020-01-08 RX ORDER — ASPIRIN/CALCIUM CARB/MAGNESIUM 324 MG
81 TABLET ORAL DAILY
Refills: 0 | Status: DISCONTINUED | OUTPATIENT
Start: 2020-01-08 | End: 2020-01-12

## 2020-01-08 RX ORDER — DAPTOMYCIN 500 MG/10ML
800 INJECTION, POWDER, LYOPHILIZED, FOR SOLUTION INTRAVENOUS EVERY 24 HOURS
Refills: 0 | Status: DISCONTINUED | OUTPATIENT
Start: 2020-01-08 | End: 2020-01-09

## 2020-01-08 RX ADMIN — CHLORHEXIDINE GLUCONATE 15 MILLILITER(S): 213 SOLUTION TOPICAL at 05:39

## 2020-01-08 RX ADMIN — Medication 1: at 18:02

## 2020-01-08 RX ADMIN — MIDAZOLAM HYDROCHLORIDE 2.09 MG/KG/HR: 1 INJECTION, SOLUTION INTRAMUSCULAR; INTRAVENOUS at 08:00

## 2020-01-08 RX ADMIN — Medication 1000 MILLIGRAM(S): at 19:20

## 2020-01-08 RX ADMIN — VASOPRESSIN 4.8 UNIT(S)/MIN: 20 INJECTION INTRAVENOUS at 07:59

## 2020-01-08 RX ADMIN — Medication 650 MILLIGRAM(S): at 21:55

## 2020-01-08 RX ADMIN — CHLORHEXIDINE GLUCONATE 1 APPLICATION(S): 213 SOLUTION TOPICAL at 05:40

## 2020-01-08 RX ADMIN — Medication 3 MILLILITER(S): at 11:19

## 2020-01-08 RX ADMIN — CHLORHEXIDINE GLUCONATE 15 MILLILITER(S): 213 SOLUTION TOPICAL at 18:01

## 2020-01-08 RX ADMIN — DAPTOMYCIN 132 MILLIGRAM(S): 500 INJECTION, POWDER, LYOPHILIZED, FOR SOLUTION INTRAVENOUS at 13:19

## 2020-01-08 RX ADMIN — Medication 9.78 MICROGRAM(S)/KG/MIN: at 01:52

## 2020-01-08 RX ADMIN — HEPARIN SODIUM 5000 UNIT(S): 5000 INJECTION INTRAVENOUS; SUBCUTANEOUS at 21:23

## 2020-01-08 RX ADMIN — Medication 9.78 MICROGRAM(S)/KG/MIN: at 08:00

## 2020-01-08 RX ADMIN — HEPARIN SODIUM 5000 UNIT(S): 5000 INJECTION INTRAVENOUS; SUBCUTANEOUS at 13:30

## 2020-01-08 RX ADMIN — Medication 50 MILLIGRAM(S): at 05:39

## 2020-01-08 RX ADMIN — Medication 1: at 14:11

## 2020-01-08 RX ADMIN — Medication 3 MILLILITER(S): at 17:20

## 2020-01-08 RX ADMIN — Medication 400 MILLIGRAM(S): at 04:51

## 2020-01-08 RX ADMIN — VASOPRESSIN 2.4 UNIT(S)/MIN: 20 INJECTION INTRAVENOUS at 01:14

## 2020-01-08 RX ADMIN — FENTANYL CITRATE 2.61 MICROGRAM(S)/KG/HR: 50 INJECTION INTRAVENOUS at 08:00

## 2020-01-08 RX ADMIN — VASOPRESSIN 4.8 UNIT(S)/MIN: 20 INJECTION INTRAVENOUS at 05:39

## 2020-01-08 RX ADMIN — PANTOPRAZOLE SODIUM 40 MILLIGRAM(S): 20 TABLET, DELAYED RELEASE ORAL at 13:17

## 2020-01-08 RX ADMIN — Medication 81 MILLIGRAM(S): at 16:45

## 2020-01-08 RX ADMIN — HEPARIN SODIUM 5000 UNIT(S): 5000 INJECTION INTRAVENOUS; SUBCUTANEOUS at 05:39

## 2020-01-08 NOTE — PROGRESS NOTE ADULT - ASSESSMENT
80 yo M with chronic low back pain sp multiple laminectomy and fusion p/w 1 week of worsening back pain along with weakness in bilateral lower extremities and inability to walk since last night  Leukocytosis, fever  BCX with MSSA, high grade, BCX 12/12 NGTD  NM tagged WBC uptake in thoracolumbar spine--degen disease?  TTE equivocal, JAHAIRA reassuring  High suspicion for spinal involvement despite negative MRI--monitor closely  New episode of shortness of breath and difficulty breathing (elevated BNP, edema on CXR)--suspect due to fluid overload  RVP positive CoV  Now fever, leukocytosis, pulmonary edema, intubated 1/7  Overall,  1) MSSA Bacteremia--Dapto 800mg q 24 (give post HD on HD days), weekly CKs, monitor Cr  - Continue Dapto through 2/5/20--CK 1/5 45  2) KRIS--trend Cr to normal  3) Leukocytosis/New Fever  - New infection vs reactive to stressor?  - Trend WBC, monitor for further fevers   - Low threshold to start Meropenem if any further fevers or signs PNA  - F/U pending sputum culture/repeat BCXs  - F/U RVP  4) New SOB  - CT without evidence PNA  - F/U cardiology--any benefit to repeat TTE (Could this be missed endocarditis with delayed damage to valve?)?  - Any considerations for HD per Renal    Discussed with CCU team    Fredy Huynh MD  Pager [phone]  After 5pm and on weekends call [phone]

## 2020-01-08 NOTE — CONSULT NOTE ADULT - ASSESSMENT
79M PMH CAD s/p PCI to OM1 and Mid LAD (2017), mod AS, HTN, HLD, GERD, Gout, pre-DM, Osteoarthritis and chronic back pain, s/p multiple laminectomies and spinal fusion of the low thoracic spine, left TKR, presenting w back pain and fevers, found to have MSSA bacteremia 12/8-9 which has since cleared, source remains unclear, hospital course c/b renal failure requiring HD, acute on chronic heart failure exacerbation, respiratory failure currently intubated 2/2 ?corona virus. MICU consulted for shock.    Mixed cardiogenic and distributive shock  Multifactorial as pt intravascularly depleted in setting of malnourishment, diuresis a/w low flow AS   Would hold aggressive diuresis at this point   Wean vasopressors as tolerated   Start feeds if possible; recc Nutrition consult for malnutrition    Acute hypoxic Respiratory Failure likely 2/2 ADHF  Pt ventilating well w low FIO2 requirements  GGG Opacities seen on most recent CT does not appear to be infectious in appearance, likely redistribution of fluid in the setting of hypoalbuminemia   Would aggressively wean sedation and attempt extubation   Would use Precedex for agitation in place of Fentanyl & Versed given renal failure      ID   Infectious workup and abx as per ID     Pt does not require MICU admission at this time, please reconsult as necessary.    Jorge Erwin [id number]  Department of Internal Medicine  Pager [phone]

## 2020-01-08 NOTE — PROCEDURE NOTE - NSSITEPREP_SKIN_A_CORE
chlorhexidine
chlorhexidine/Adherence to aseptic technique: hand hygiene prior to donning barriers (gown, gloves), don cap and mask, sterile drape over patient

## 2020-01-08 NOTE — CONSULT NOTE ADULT - ASSESSMENT
78yo male with dysphagia, requiring NGT placement. Pending placement by ENT. 78yo male with respiratory failure requiring intubation, now requiring NGT placement. Dixon sump placed under indirect visualization with laryngoscopy. Confirmed placement through esophageal inlet. Awaiting CXR for confirmation in stomache prior to starting feeds.

## 2020-01-08 NOTE — PROGRESS NOTE ADULT - ASSESSMENT
79 yr old male with HTN, DCHF, CAD, S/P PCI to OM1, Mid LAD (9/2014, [facility], [facility], [facility]) hx of ischemic CMP, AS, DM HLD, GERD, GOUT, s/p multiple spinal surgery admitted with back pain and fever, now with acute hypoxic resp failure 2/2 to pulm edema w/ poss underlying PNA, +coronovirus.     1. Hypoxic respiratory failure, acute/chronic systolic heart failure  -right heart cath revealing normal filling pressures, values more consistent w distributive /septic shock  -CXR findings c/w ARDS??  -given fever and history of bacteremia consider a repeat TTE vs repeat LAZARO to rule new IE/abcess   -cont UF/CVVHD for aggressive volume removal w iontrope assistance   -cont vent mgmt  -MICU called for eval  -echo with severe LV sys dysfxn, EF 20-25%, mild diastolic dysfxn  -continue medical therapy for now as he is not a candidate for ischemic eval in setting of resp failure, KRIS/CKD, will revisit once volmue status/renal function improves    2. CAD s/p PCI (OM1/LAD)   -cont statin and asa     3. Aortic Stenosis  -stable, severe on LAZARO with valve area of 0.9, tte this admit and as outpt with moderate AS  -gradients low on lazaro, perhaps due to new lv dysfxn  -YULY likely underestimated  -cont to monitor      4. Bacteremia, resolved   -LAZARO without endocarditis   -med f/u   -repeat cultures  -abx per ID    5. KRIS, ? AIN  -creat stable, renal f/u for HD today         d/w CCU fellow

## 2020-01-08 NOTE — PROGRESS NOTE ADULT - SUBJECTIVE AND OBJECTIVE BOX
[facility]   [phone]  RENAL FOLLOW UP NOTE  --------------------------------------------------------------------------------  Chief Complaint:    24 hour events/subjective:    seen earlier. d/w ccu team thru out the day   pt last pm intubated on pressors     PAST HISTORY  --------------------------------------------------------------------------------  No significant changes to PMH, PSH, FHx, SHx, unless otherwise noted    ALLERGIES & MEDICATIONS  --------------------------------------------------------------------------------  Allergies    Indocin (Unknown)  oxacillin (Unknown)  Zyvox (Unknown)    Intolerances      Standing Inpatient Medications  albuterol/ipratropium for Nebulization 3 milliLiter(s) Nebulizer every 6 hours  aspirin  chewable 81 milliGRAM(s) Oral daily  atorvastatin 10 milliGRAM(s) Oral at bedtime  chlorhexidine 0.12% Liquid 15 milliLiter(s) Oral Mucosa every 12 hours  chlorhexidine 4% Liquid 1 Application(s) Topical <User Schedule>  chlorhexidine 4% Liquid 1 Application(s) Topical <User Schedule>  CRRT Treatment    <Continuous>  DAPTOmycin IVPB 800 milliGRAM(s) IV Intermittent every 24 hours  dextrose 5%. 1000 milliLiter(s) IV Continuous <Continuous>  dextrose 50% Injectable 12.5 Gram(s) IV Push once  dextrose 50% Injectable 25 Gram(s) IV Push once  dextrose 50% Injectable 25 Gram(s) IV Push once  heparin  Injectable 5000 Unit(s) SubCutaneous every 8 hours  insulin lispro (HumaLOG) corrective regimen sliding scale   SubCutaneous every 6 hours  lidocaine   Patch 1 Patch Transdermal daily  norepinephrine Infusion 0.05 MICROgram(s)/kG/Min IV Continuous <Continuous>  pantoprazole  Injectable 40 milliGRAM(s) IV Push daily  polyethylene glycol 3350 17 Gram(s) Oral daily  PrismaSATE Dialysate BGK 4 / 2.5 5000 milliLiter(s) CRRT <Continuous>  PrismaSOL Filtration BGK 0 / 2.5 5000 milliLiter(s) CRRT <Continuous>  PrismaSOL Filtration BGK 4 / 2.5 5000 milliLiter(s) CRRT <Continuous>  senna 2 Tablet(s) Oral at bedtime  vasopressin Infusion 0.08 Unit(s)/Min IV Continuous <Continuous>    PRN Inpatient Medications  bisacodyl Suppository 10 milliGRAM(s) Rectal daily PRN  dextrose 40% Gel 15 Gram(s) Oral once PRN  glucagon  Injectable 1 milliGRAM(s) IntraMuscular once PRN  sodium chloride 0.9% lock flush 10 milliLiter(s) IV Push every 1 hour PRN  sodium chloride 0.9% lock flush 10 milliLiter(s) IV Push every 1 hour PRN      REVIEW OF SYSTEMS  --------------------------------------------------------------------------------  intubated     VITALS/PHYSICAL EXAM  --------------------------------------------------------------------------------  T(C): 36.3 (01-08-20 @ 18:00), Max: 38.3 (01-08-20 @ 04:30)  HR: 80 (01-08-20 @ 20:19) (80 - 138)  BP: 111/708 (01-08-20 @ 08:00) (111/708 - 111/708)  RR: 25 (01-08-20 @ 20:00) (11 - 39)  SpO2: 98% (01-08-20 @ 20:19) (93% - 100%)  Wt(kg): --        01-07-20 @ 07:01  -  01-08-20 @ 07:00  --------------------------------------------------------  IN: 766 mL / OUT: 2428 mL / NET: -1662 mL    01-08-20 @ 07:01  -  01-08-20 @ 20:46  --------------------------------------------------------  IN: 378.5 mL / OUT: 65 mL / NET: 313.5 mL      Physical Exam:  	  Gen: inutbated   	+ IJ cath   	Pulm: decrease bs  + coarse bs bilateral no  wheezing  	CV: RRR, S1S2; no rub  	Abd: +BS, soft, nontender/nondistended  	: No suprapubic tenderness  	UE: Warm, no cyanosis  no clubbing,  3+ edema   	LE: Warm, no cyanosis  no clubbing, 3- edema decrease in edema overall   	  	        LABS/STUDIES  --------------------------------------------------------------------------------              8.4    14.48 >-----------<  259      [01-08-20 @ 04:18]              27.4     135  |  100  |  110  ----------------------------<  176      [01-08-20 @ 14:34]  5.1   |  19  |  2.98        Ca     8.9     [01-08-20 @ 14:34]      Mg     2.2     [01-08-20 @ 14:34]      Phos  7.5     [01-08-20 @ 14:34]    TPro  5.8  /  Alb  2.1  /  TBili  0.3  /  DBili  x   /  AST  27  /  ALT  17  /  AlkPhos  79  [01-08-20 @ 14:34]          Creatinine Trend:  SCr 2.98 [01-08 @ 14:34]  SCr 2.70 [01-08 @ 04:18]  SCr 2.38 [01-07 @ 21:14]  SCr 2.50 [01-07 @ 12:45]  SCr 2.50 [01-07 @ 05:00]              Urinalysis - [01-08-20 @ 09:12]      Color Yellow / Appearance Slightly Turbid / SG 1.021 / pH 5.5      Gluc Negative / Ketone Negative  / Bili Negative / Urobili Negative       Blood Moderate / Protein 30 mg/dL / Leuk Est Large / Nitrite Negative      RBC 67 / WBC 34 / Hyaline 13 / Gran  / Sq Epi  / Non Sq Epi 1 / Bacteria Negative      HbA1c 7.3      [01-08-20 @ 08:56]    RYLEE: titer Negative, pattern --      [12-21-19 @ 17:35]  C3 Complement 25      [12-20-19 @ 12:18]  C4 Complement 21      [12-20-19 @ 12:18]  ANCA: cANCA Negative, pANCA Negative, atypical ANCA Negative      [12-21-19 @ 17:35]

## 2020-01-08 NOTE — PROGRESS NOTE ADULT - SUBJECTIVE AND OBJECTIVE BOX
CC: events noted, now intubated for airway protection, team place HD access via [facility], s/p [place] w nl filling pressures suggesting distrubitive shock, Pt also febrile.    TELEMETRY:     PHYSICAL EXAM:    T(C): 36.6 (01-08-20 @ 12:00), Max: 38.3 (01-08-20 @ 04:30)  HR: 94 (01-08-20 @ 13:00) (86 - 138)  BP: 111/708 (01-08-20 @ 08:00) (111/708 - 111/708)  RR: 28 (01-08-20 @ 13:00) (0 - 42)  SpO2: 96% (01-08-20 @ 13:00) (92% - 100%)  Wt(kg): --  I&O's Summary    07 Jan 2020 07:01  -  08 Jan 2020 07:00  --------------------------------------------------------  IN: 766 mL / OUT: 2428 mL / NET: -1662 mL    08 Jan 2020 07:01  -  08 Jan 2020 13:09  --------------------------------------------------------  IN: 166.3 mL / OUT: 15 mL / NET: 151.3 mL        Appearance: intubated  Cardiovascular: Normal S1 S2,RRR, No JVD, No murmurs  Respiratory: Lungs clear to auscultation	  Gastrointestinal:  Soft, Non-tender, + BS	  Extremities: Normal range of motion, No clubbing, cyanosis or edema  Vascular: Peripheral pulses palpable 2+ bilaterally     LABS:	 	                          8.4    14.48 )-----------( 259      ( 08 Jan 2020 04:18 )             27.4     01-08    138  |  101  |  93<H>  ----------------------------<  162<H>  5.1   |  20<L>  |  2.70<H>    Ca    8.9      08 Jan 2020 04:18  Phos  7.5     01-08  Mg     2.1     01-08    TPro  6.1  /  Alb  2.0<L>  /  TBili  0.3  /  DBili  x   /  AST  22  /  ALT  19  /  AlkPhos  84  01-08      PT/INR - ( 06 Jan 2020 13:37 )   PT: 15.8 sec;   INR: 1.36 ratio         PTT - ( 06 Jan 2020 13:37 )  PTT:25.8 sec    CARDIAC MARKERS:        MEDICATIONS  (STANDING):  albuterol/ipratropium for Nebulization 3 milliLiter(s) Nebulizer every 6 hours  aspirin  chewable 81 milliGRAM(s) Oral daily  atorvastatin 10 milliGRAM(s) Oral at bedtime  chlorhexidine 0.12% Liquid 15 milliLiter(s) Oral Mucosa every 12 hours  chlorhexidine 4% Liquid 1 Application(s) Topical <User Schedule>  chlorhexidine 4% Liquid 1 Application(s) Topical <User Schedule>  CRRT Treatment    <Continuous>  DAPTOmycin IVPB 800 milliGRAM(s) IV Intermittent every 24 hours  dextrose 5%. 1000 milliLiter(s) (50 mL/Hr) IV Continuous <Continuous>  dextrose 50% Injectable 12.5 Gram(s) IV Push once  dextrose 50% Injectable 25 Gram(s) IV Push once  dextrose 50% Injectable 25 Gram(s) IV Push once  fentaNYL   Infusion. 0.5 MICROgram(s)/kG/Hr (2.607 mL/Hr) IV Continuous <Continuous>  heparin  Injectable 5000 Unit(s) SubCutaneous every 8 hours  insulin lispro (HumaLOG) corrective regimen sliding scale   SubCutaneous every 6 hours  lidocaine   Patch 1 Patch Transdermal daily  midazolam Infusion 0.02 mG/kG/Hr (2.086 mL/Hr) IV Continuous <Continuous>  norepinephrine Infusion 0.05 MICROgram(s)/kG/Min (9.778 mL/Hr) IV Continuous <Continuous>  oxymetazoline 0.05% Nasal Spray 2 Spray(s) Both Nostrils two times a day  pantoprazole  Injectable 40 milliGRAM(s) IV Push daily  polyethylene glycol 3350 17 Gram(s) Oral daily  PrismaSATE Dialysate BGK 4 / 2.5 5000 milliLiter(s) (1200 mL/Hr) CRRT <Continuous>  PrismaSOL Filtration BGK 0 / 2.5 5000 milliLiter(s) (600 mL/Hr) CRRT <Continuous>  PrismaSOL Filtration BGK 4 / 2.5 5000 milliLiter(s) (400 mL/Hr) CRRT <Continuous>  senna 2 Tablet(s) Oral at bedtime  sodium chloride 0.65% Nasal 1 Spray(s) Both Nostrils three times a day  vasopressin Infusion 0.08 Unit(s)/Min (4.8 mL/Hr) IV Continuous <Continuous>

## 2020-01-08 NOTE — CONSULT NOTE ADULT - ATTENDING COMMENTS
I have examined pt and agree with above exam and plan above. 78 y/o male intubated last night for  increase work of breathing and acute hypercapnic respiratory failure. Pt with h/o MSSA bacteremia on Daptomycin, mod- severe AS ,Chronic chf now sedated on versed , Fentanyl and requiring vasopressin and norepinephrine for BP support. Pt also with poor nutritional  status with albumin of 1.8. Pt also with coronavirus positive.   On exam pt is unresponsive to verbal or deep sternal rub stimuli. Pt with moderate anasarca.    Pt does not appear to have additional  infections at this point. Pt likely mildly intravascular depleted or euvolemic. Pt with ground glass infiltrates on CT Chest likely from low albumin.   Recommend D/C all sedation. When pt starts to wake up BP will likely rise and then taper off pressors. Precedex prn.  Ventilatory requirements are minimal .FIO2 35% . Plateau pressures ~22cm h20.  Agree with CVVHD without fluid removal.  Continue treatement for MSSA.     No additional procedures or treatment available in MICU at this time. Please reconsult for any questions.

## 2020-01-08 NOTE — PROVIDER CONTACT NOTE (CRITICAL VALUE NOTIFICATION) - SITUATION
Blood cx 12/9/19 Growth in Anaerobic bottle staph aureus & Aerobic gram + cocci in clusters
ABG ph 8.2  Mixed 7.19
Preliminary results for blood culture taken on 12/9. Demonstrated growth in anaerobic bottle- gram positive cocci in cluster
Pt s/p intubation on levophed and propofol for sedation
RVP results coronavirus
positive growth abx given
preliminary BC collected 12/9 growth in anaerobic bottle gram + cocci in clusters

## 2020-01-08 NOTE — PROGRESS NOTE ADULT - ASSESSMENT
80 y/o male with h/o of CAD s/p PCI 1/2017 with 10% LAD stenosis OM1, HTN, HLD, GERD, Gout, Osteoarthritis and chronic back pain, s/p multiple laminectomies and spinal fusion, lower back pain and weakness in his bilateral lower extremities that progressed to inability to ambulate. dx with MSSA bacteremia. on ancef. also received lasix as well. has had hesitancy urinating. 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   cvvhdf attempted to cont with bfr 150 cc/min dfr 1200 no fluid removal   poor bfr  attempting hd 2. hr 250 bfr dfr 500 0.5 liter 2 k   given cardiomyopathy and dobutamine to cont

## 2020-01-08 NOTE — PROGRESS NOTE ADULT - SUBJECTIVE AND OBJECTIVE BOX
PATIENT:  MAHESH CANELA  [id number]    CHIEF COMPLAINT:  Patient is a 79y old  Male who presents with a chief complaint of Respiratory failure (07 Jan 2020 23:23)      INTERVAL HISTORY/OVERNIGHT EVENTS:      REVIEW OF SYSTEMS:    Constitutional:     [ ] negative [ ] fevers [ ] chills [ ] weight loss [ ] weight gain  HEENT:                  [ ] negative [ ] dry eyes [ ] eye irritation [ ] postnasal drip [ ] nasal congestion  CV:                         [ ] negative  [ ] chest pain [ ] orthopnea [ ] palpitations [ ] murmur  Resp:                     [ ] negative [ ] cough [ ] shortness of breath [ ] dyspnea [ ] wheezing [ ] sputum [ ] hemoptysis  GI:                          [ ] negative [ ] nausea [ ] vomiting [ ] diarrhea [ ] constipation [ ] abd pain [ ] dysphagia   :                        [ ] negative [ ] dysuria [ ] nocturia [ ] hematuria [ ] increased urinary frequency  Musculoskeletal: [ ] negative [ ] back pain [ ] myalgias [ ] arthralgias [ ] fracture  Skin:                       [ ] negative [ ] rash [ ] itch  Neurological:        [ ] negative [ ] headache [ ] dizziness [ ] syncope [ ] weakness [ ] numbness  Psychiatric:           [ ] negative [ ] anxiety [ ] depression  Endocrine:            [ ] negative [ ] diabetes [ ] thyroid problem  Heme/Lymph:      [ ] negative [ ] anemia [ ] bleeding problem  Allergic/Immune: [ ] negative [ ] itchy eyes [ ] nasal discharge [ ] hives [ ] angioedema    [ ] All other systems negative  [ ] Unable to assess ROS because ________.    MEDICATIONS:  MEDICATIONS  (STANDING):  albuterol/ipratropium for Nebulization 3 milliLiter(s) Nebulizer every 6 hours  aspirin  chewable 81 milliGRAM(s) Oral daily  atorvastatin 10 milliGRAM(s) Oral at bedtime  chlorhexidine 0.12% Liquid 15 milliLiter(s) Oral Mucosa every 12 hours  chlorhexidine 4% Liquid 1 Application(s) Topical <User Schedule>  chlorhexidine 4% Liquid 1 Application(s) Topical <User Schedule>  DAPTOmycin IVPB 800 milliGRAM(s) IV Intermittent every 48 hours  dextrose 5%. 1000 milliLiter(s) (50 mL/Hr) IV Continuous <Continuous>  dextrose 50% Injectable 12.5 Gram(s) IV Push once  dextrose 50% Injectable 25 Gram(s) IV Push once  dextrose 50% Injectable 25 Gram(s) IV Push once  DOBUTamine Infusion 2.5 MICROgram(s)/kG/Min (7.822 mL/Hr) IV Continuous <Continuous>  fentaNYL   Infusion. 0.5 MICROgram(s)/kG/Hr (2.607 mL/Hr) IV Continuous <Continuous>  heparin  Injectable 5000 Unit(s) SubCutaneous every 8 hours  insulin lispro (HumaLOG) corrective regimen sliding scale   SubCutaneous three times a day before meals  lidocaine   Patch 1 Patch Transdermal daily  midazolam Infusion 0.02 mG/kG/Hr (2.086 mL/Hr) IV Continuous <Continuous>  norepinephrine Infusion 0.05 MICROgram(s)/kG/Min (9.778 mL/Hr) IV Continuous <Continuous>  oxymetazoline 0.05% Nasal Spray 2 Spray(s) Both Nostrils two times a day  pantoprazole  Injectable 40 milliGRAM(s) IV Push daily  polyethylene glycol 3350 17 Gram(s) Oral daily  senna 2 Tablet(s) Oral at bedtime  sodium chloride 0.65% Nasal 1 Spray(s) Both Nostrils three times a day  vasopressin Infusion 0.08 Unit(s)/Min (4.8 mL/Hr) IV Continuous <Continuous>    MEDICATIONS  (PRN):  bisacodyl Suppository 10 milliGRAM(s) Rectal daily PRN Constipation  dextrose 40% Gel 15 Gram(s) Oral once PRN Blood Glucose LESS THAN 70 milliGRAM(s)/deciliter  glucagon  Injectable 1 milliGRAM(s) IntraMuscular once PRN Glucose LESS THAN 70 milligrams/deciliter  sodium chloride 0.9% lock flush 10 milliLiter(s) IV Push every 1 hour PRN Pre/post blood products, medications, blood draw, and to maintain line patency  sodium chloride 0.9% lock flush 10 milliLiter(s) IV Push every 1 hour PRN Pre/post blood products, medications, blood draw, and to maintain line patency      ALLERGIES:  Allergies    Indocin (Unknown)  oxacillin (Unknown)  Zyvox (Unknown)    Intolerances        OBJECTIVE:  ICU Vital Signs Last 24 Hrs  T(C): 37.4 (08 Jan 2020 05:00), Max: 38.3 (08 Jan 2020 04:30)  T(F): 99.3 (08 Jan 2020 05:00), Max: 101 (08 Jan 2020 04:30)  HR: 100 (08 Jan 2020 06:30) (97 - 138)  BP: --  BP(mean): --  ABP: 90/50 (08 Jan 2020 06:30) (86/46 - 136/66)  ABP(mean): 64 (08 Jan 2020 06:30) (54 - 92)  RR: 28 (08 Jan 2020 06:30) (17 - 42)  SpO2: 97% (08 Jan 2020 06:30) (91% - 100%)    Mode: AC/ CMV (Assist Control/ Continuous Mandatory Ventilation)  RR (machine): 28  TV (machine): 520  FiO2: 35  PEEP: 8  ITime: 1  MAP: 16  PIP: 33    Adult Advanced Hemodynamics Last 24 Hrs  CVP(mm Hg): 5 (08 Jan 2020 06:30) (5 - 29)  CVP(cm H2O): --  CO: 10.3 (08 Jan 2020 06:00) (10.3 - 13.1)  CI: 4.4 (08 Jan 2020 06:00) (4.4 - 5.6)  PA: 35/19 (08 Jan 2020 06:30) (35/19 - 53/27)  PA(mean): 26 (08 Jan 2020 06:30) (26 - 39)  PCWP: --  SVR: 488 (08 Jan 2020 06:00) (335 - 488)  SVRI: 1144 (08 Jan 2020 06:00) (784 - 1144)  PVR: --  PVRI: --  CAPILLARY BLOOD GLUCOSE      POCT Blood Glucose.: 149 mg/dL (08 Jan 2020 07:38)  POCT Blood Glucose.: 142 mg/dL (07 Jan 2020 16:05)  POCT Blood Glucose.: 125 mg/dL (07 Jan 2020 12:07)    CAPILLARY BLOOD GLUCOSE      POCT Blood Glucose.: 149 mg/dL (08 Jan 2020 07:38)    I&O's Summary    07 Jan 2020 07:01  -  08 Jan 2020 07:00  --------------------------------------------------------  IN: 766 mL / OUT: 2428 mL / NET: -1662 mL      Daily     Daily     PHYSICAL EXAMINATION:  General: WN/WD NAD  HEENT: PERRLA, EOMI, moist mucous membranes  Neurology: A&Ox3, nonfocal, MORALES x 4  Respiratory: CTA B/L, normal respiratory effort, no wheezes, crackles, rales  CV: RRR, S1S2, no murmurs, rubs or gallops  Abdominal: Soft, NT, ND +BS, Last BM  Extremities: No edema, + peripheral pulses  Incisions:   Tubes:    LABS:  ABG - ( 08 Jan 2020 06:12 )  pH, Arterial: 7.36  pH, Blood: x     /  pCO2: 39    /  pO2: 150   / HCO3: 21    / Base Excess: -3.3  /  SaO2: 99                                      8.4    14.48 )-----------( 259      ( 08 Jan 2020 04:18 )             27.4     01-08    138  |  101  |  93<H>  ----------------------------<  162<H>  5.1   |  20<L>  |  2.70<H>    Ca    8.9      08 Jan 2020 04:18  Phos  7.5     01-08  Mg     2.1     01-08    TPro  6.1  /  Alb  2.0<L>  /  TBili  0.3  /  DBili  x   /  AST  22  /  ALT  19  /  AlkPhos  84  01-08    LIVER FUNCTIONS - ( 08 Jan 2020 04:18 )  Alb: 2.0 g/dL / Pro: 6.1 g/dL / ALK PHOS: 84 U/L / ALT: 19 U/L / AST: 22 U/L / GGT: x           PT/INR - ( 06 Jan 2020 13:37 )   PT: 15.8 sec;   INR: 1.36 ratio         PTT - ( 06 Jan 2020 13:37 )  PTT:25.8 sec            TELEMETRY:     EKG:     IMAGING: PATIENT:  MAHESH CANELA  [id number]    CHIEF COMPLAINT:  Patient is a 79y old  Male who presents with a chief complaint of Respiratory failure (07 Jan 2020 23:23)      INTERVAL HISTORY/OVERNIGHT EVENTS: Overnight patient was intubated, initially with       REVIEW OF SYSTEMS:      MEDICATIONS:  MEDICATIONS  (STANDING):  albuterol/ipratropium for Nebulization 3 milliLiter(s) Nebulizer every 6 hours  aspirin  chewable 81 milliGRAM(s) Oral daily  atorvastatin 10 milliGRAM(s) Oral at bedtime  chlorhexidine 0.12% Liquid 15 milliLiter(s) Oral Mucosa every 12 hours  chlorhexidine 4% Liquid 1 Application(s) Topical <User Schedule>  chlorhexidine 4% Liquid 1 Application(s) Topical <User Schedule>  DAPTOmycin IVPB 800 milliGRAM(s) IV Intermittent every 48 hours  dextrose 5%. 1000 milliLiter(s) (50 mL/Hr) IV Continuous <Continuous>  dextrose 50% Injectable 12.5 Gram(s) IV Push once  dextrose 50% Injectable 25 Gram(s) IV Push once  dextrose 50% Injectable 25 Gram(s) IV Push once  DOBUTamine Infusion 2.5 MICROgram(s)/kG/Min (7.822 mL/Hr) IV Continuous <Continuous>  fentaNYL   Infusion. 0.5 MICROgram(s)/kG/Hr (2.607 mL/Hr) IV Continuous <Continuous>  heparin  Injectable 5000 Unit(s) SubCutaneous every 8 hours  insulin lispro (HumaLOG) corrective regimen sliding scale   SubCutaneous three times a day before meals  lidocaine   Patch 1 Patch Transdermal daily  midazolam Infusion 0.02 mG/kG/Hr (2.086 mL/Hr) IV Continuous <Continuous>  norepinephrine Infusion 0.05 MICROgram(s)/kG/Min (9.778 mL/Hr) IV Continuous <Continuous>  oxymetazoline 0.05% Nasal Spray 2 Spray(s) Both Nostrils two times a day  pantoprazole  Injectable 40 milliGRAM(s) IV Push daily  polyethylene glycol 3350 17 Gram(s) Oral daily  senna 2 Tablet(s) Oral at bedtime  sodium chloride 0.65% Nasal 1 Spray(s) Both Nostrils three times a day  vasopressin Infusion 0.08 Unit(s)/Min (4.8 mL/Hr) IV Continuous <Continuous>    MEDICATIONS  (PRN):  bisacodyl Suppository 10 milliGRAM(s) Rectal daily PRN Constipation  dextrose 40% Gel 15 Gram(s) Oral once PRN Blood Glucose LESS THAN 70 milliGRAM(s)/deciliter  glucagon  Injectable 1 milliGRAM(s) IntraMuscular once PRN Glucose LESS THAN 70 milligrams/deciliter  sodium chloride 0.9% lock flush 10 milliLiter(s) IV Push every 1 hour PRN Pre/post blood products, medications, blood draw, and to maintain line patency  sodium chloride 0.9% lock flush 10 milliLiter(s) IV Push every 1 hour PRN Pre/post blood products, medications, blood draw, and to maintain line patency      ALLERGIES:  Allergies    Indocin (Unknown)  oxacillin (Unknown)  Zyvox (Unknown)    Intolerances        OBJECTIVE:  ICU Vital Signs Last 24 Hrs  T(C): 37.4 (08 Jan 2020 05:00), Max: 38.3 (08 Jan 2020 04:30)  T(F): 99.3 (08 Jan 2020 05:00), Max: 101 (08 Jan 2020 04:30)  HR: 100 (08 Jan 2020 06:30) (97 - 138)  BP: --  BP(mean): --  ABP: 90/50 (08 Jan 2020 06:30) (86/46 - 136/66)  ABP(mean): 64 (08 Jan 2020 06:30) (54 - 92)  RR: 28 (08 Jan 2020 06:30) (17 - 42)  SpO2: 97% (08 Jan 2020 06:30) (91% - 100%)    Mode: AC/ CMV (Assist Control/ Continuous Mandatory Ventilation)  RR (machine): 28  TV (machine): 520  FiO2: 35  PEEP: 8  ITime: 1  MAP: 16  PIP: 33    Adult Advanced Hemodynamics Last 24 Hrs  CVP(mm Hg): 5 (08 Jan 2020 06:30) (5 - 29)  CVP(cm H2O): --  CO: 10.3 (08 Jan 2020 06:00) (10.3 - 13.1)  CI: 4.4 (08 Jan 2020 06:00) (4.4 - 5.6)  PA: 35/19 (08 Jan 2020 06:30) (35/19 - 53/27)  PA(mean): 26 (08 Jan 2020 06:30) (26 - 39)  PCWP: --  SVR: 488 (08 Jan 2020 06:00) (335 - 488)  SVRI: 1144 (08 Jan 2020 06:00) (357 - 9356)  PVR: --  PVRI: --  CAPILLARY BLOOD GLUCOSE      POCT Blood Glucose.: 149 mg/dL (08 Jan 2020 07:38)  POCT Blood Glucose.: 142 mg/dL (07 Jan 2020 16:05)  POCT Blood Glucose.: 125 mg/dL (07 Jan 2020 12:07)    CAPILLARY BLOOD GLUCOSE      POCT Blood Glucose.: 149 mg/dL (08 Jan 2020 07:38)    I&O's Summary    07 Jan 2020 07:01  -  08 Jan 2020 07:00  --------------------------------------------------------  IN: 766 mL / OUT: 2428 mL / NET: -1662 mL      Daily     Daily     PHYSICAL EXAMINATION:  General: WN/WD NAD  HEENT: PERRLA, EOMI, moist mucous membranes  Neurology: A&Ox3, nonfocal, MORALES x 4  Respiratory: CTA B/L, normal respiratory effort, no wheezes, crackles, rales  CV: RRR, S1S2, no murmurs, rubs or gallops  Abdominal: Soft, NT, ND +BS, Last BM  Extremities: No edema, + peripheral pulses  Incisions:   Tubes:    LABS:  ABG - ( 08 Jan 2020 06:12 )  pH, Arterial: 7.36  pH, Blood: x     /  pCO2: 39    /  pO2: 150   / HCO3: 21    / Base Excess: -3.3  /  SaO2: 99                                      8.4    14.48 )-----------( 259      ( 08 Jan 2020 04:18 )             27.4     01-08    138  |  101  |  93<H>  ----------------------------<  162<H>  5.1   |  20<L>  |  2.70<H>    Ca    8.9      08 Jan 2020 04:18  Phos  7.5     01-08  Mg     2.1     01-08    TPro  6.1  /  Alb  2.0<L>  /  TBili  0.3  /  DBili  x   /  AST  22  /  ALT  19  /  AlkPhos  84  01-08    LIVER FUNCTIONS - ( 08 Jan 2020 04:18 )  Alb: 2.0 g/dL / Pro: 6.1 g/dL / ALK PHOS: 84 U/L / ALT: 19 U/L / AST: 22 U/L / GGT: x           PT/INR - ( 06 Jan 2020 13:37 )   PT: 15.8 sec;   INR: 1.36 ratio         PTT - ( 06 Jan 2020 13:37 )  PTT:25.8 sec            TELEMETRY:     EKG:     IMAGING: PATIENT:  MAHESH CANELA  [id number]    CHIEF COMPLAINT:  Patient is a 79y old  Male who presents with a chief complaint of Respiratory failure (07 Jan 2020 23:23)      INTERVAL HISTORY/OVERNIGHT EVENTS: Overnight patient was intubated, ventilator adjusted overnight. Patient spiked a fever to 101F. One time dose of aztreonam given. He was recultured overnight. Combicath culture and RVP to be sent this morning. Patient heavility sedated, continuing to wean as tolerated.       REVIEW OF SYSTEMS: Unable to assess given patient is sedated on fentanyl and versed gtt.       MEDICATIONS:  MEDICATIONS  (STANDING):  albuterol/ipratropium for Nebulization 3 milliLiter(s) Nebulizer every 6 hours  aspirin  chewable 81 milliGRAM(s) Oral daily  atorvastatin 10 milliGRAM(s) Oral at bedtime  chlorhexidine 0.12% Liquid 15 milliLiter(s) Oral Mucosa every 12 hours  chlorhexidine 4% Liquid 1 Application(s) Topical <User Schedule>  chlorhexidine 4% Liquid 1 Application(s) Topical <User Schedule>  DAPTOmycin IVPB 800 milliGRAM(s) IV Intermittent every 48 hours  dextrose 5%. 1000 milliLiter(s) (50 mL/Hr) IV Continuous <Continuous>  dextrose 50% Injectable 12.5 Gram(s) IV Push once  dextrose 50% Injectable 25 Gram(s) IV Push once  dextrose 50% Injectable 25 Gram(s) IV Push once  DOBUTamine Infusion 2.5 MICROgram(s)/kG/Min (7.822 mL/Hr) IV Continuous <Continuous>  fentaNYL   Infusion. 0.5 MICROgram(s)/kG/Hr (2.607 mL/Hr) IV Continuous <Continuous>  heparin  Injectable 5000 Unit(s) SubCutaneous every 8 hours  insulin lispro (HumaLOG) corrective regimen sliding scale   SubCutaneous three times a day before meals  lidocaine   Patch 1 Patch Transdermal daily  midazolam Infusion 0.02 mG/kG/Hr (2.086 mL/Hr) IV Continuous <Continuous>  norepinephrine Infusion 0.05 MICROgram(s)/kG/Min (9.778 mL/Hr) IV Continuous <Continuous>  oxymetazoline 0.05% Nasal Spray 2 Spray(s) Both Nostrils two times a day  pantoprazole  Injectable 40 milliGRAM(s) IV Push daily  polyethylene glycol 3350 17 Gram(s) Oral daily  senna 2 Tablet(s) Oral at bedtime  sodium chloride 0.65% Nasal 1 Spray(s) Both Nostrils three times a day  vasopressin Infusion 0.08 Unit(s)/Min (4.8 mL/Hr) IV Continuous <Continuous>    MEDICATIONS  (PRN):  bisacodyl Suppository 10 milliGRAM(s) Rectal daily PRN Constipation  dextrose 40% Gel 15 Gram(s) Oral once PRN Blood Glucose LESS THAN 70 milliGRAM(s)/deciliter  glucagon  Injectable 1 milliGRAM(s) IntraMuscular once PRN Glucose LESS THAN 70 milligrams/deciliter  sodium chloride 0.9% lock flush 10 milliLiter(s) IV Push every 1 hour PRN Pre/post blood products, medications, blood draw, and to maintain line patency  sodium chloride 0.9% lock flush 10 milliLiter(s) IV Push every 1 hour PRN Pre/post blood products, medications, blood draw, and to maintain line patency      ALLERGIES:  Allergies    Indocin (Unknown)  oxacillin (Unknown)  Zyvox (Unknown)    Intolerances        OBJECTIVE:  ICU Vital Signs Last 24 Hrs  T(C): 37.4 (08 Jan 2020 05:00), Max: 38.3 (08 Jan 2020 04:30)  T(F): 99.3 (08 Jan 2020 05:00), Max: 101 (08 Jan 2020 04:30)  HR: 100 (08 Jan 2020 06:30) (97 - 138)  BP: --  BP(mean): --  ABP: 90/50 (08 Jan 2020 06:30) (86/46 - 136/66)  ABP(mean): 64 (08 Jan 2020 06:30) (54 - 92)  RR: 28 (08 Jan 2020 06:30) (17 - 42)  SpO2: 97% (08 Jan 2020 06:30) (91% - 100%)    Mode: AC/ CMV (Assist Control/ Continuous Mandatory Ventilation)  RR (machine): 28  TV (machine): 520  FiO2: 35  PEEP: 8  ITime: 1  MAP: 16  PIP: 33    Adult Advanced Hemodynamics Last 24 Hrs  CVP(mm Hg): 5 (08 Jan 2020 06:30) (5 - 29)  CVP(cm H2O): --  CO: 10.3 (08 Jan 2020 06:00) (10.3 - 13.1)  CI: 4.4 (08 Jan 2020 06:00) (4.4 - 5.6)  PA: 35/19 (08 Jan 2020 06:30) (35/19 - 53/27)  PA(mean): 26 (08 Jan 2020 06:30) (26 - 39)  PCWP: --  SVR: 488 (08 Jan 2020 06:00) (335 - 488)  SVRI: 1144 (08 Jan 2020 06:00) (784 - 1144)  PVR: --  PVRI: --  CAPILLARY BLOOD GLUCOSE      POCT Blood Glucose.: 149 mg/dL (08 Jan 2020 07:38)  POCT Blood Glucose.: 142 mg/dL (07 Jan 2020 16:05)  POCT Blood Glucose.: 125 mg/dL (07 Jan 2020 12:07)    CAPILLARY BLOOD GLUCOSE      POCT Blood Glucose.: 149 mg/dL (08 Jan 2020 07:38)    I&O's Summary    07 Jan 2020 07:01  -  08 Jan 2020 07:00  --------------------------------------------------------  IN: 766 mL / OUT: 2428 mL / NET: -1662 mL      Daily     Daily     PHYSICAL EXAMINATION:  General: WN/WD NAD  HEENT: PERRLA, EOMI, moist mucous membranes  Neurology: sedated   Respiratory: rhoncorous breath sounds and slight crackles appreciated anteriorly, and laterally   CV: RRR, S1S2, no murmurs, rubs or gallops  Abdominal: Soft,  absent bowel sounds present   Extremities: anasarca, 2+ peripheral edema b/L UPE and LE   Incisions:   Tubes:    LABS:  ABG - ( 08 Jan 2020 06:12 )  pH, Arterial: 7.36  pH, Blood: x     /  pCO2: 39    /  pO2: 150   / HCO3: 21    / Base Excess: -3.3  /  SaO2: 99                            8.4    14.48 )-----------( 259      ( 08 Jan 2020 04:18 )             27.4     01-08    138  |  101  |  93<H>  ----------------------------<  162<H>  5.1   |  20<L>  |  2.70<H>    Ca    8.9      08 Jan 2020 04:18  Phos  7.5     01-08  Mg     2.1     01-08    TPro  6.1  /  Alb  2.0<L>  /  TBili  0.3  /  DBili  x   /  AST  22  /  ALT  19  /  AlkPhos  84  01-08    LIVER FUNCTIONS - ( 08 Jan 2020 04:18 )  Alb: 2.0 g/dL / Pro: 6.1 g/dL / ALK PHOS: 84 U/L / ALT: 19 U/L / AST: 22 U/L / GGT: x           PT/INR - ( 06 Jan 2020 13:37 )   PT: 15.8 sec;   INR: 1.36 ratio         PTT - ( 06 Jan 2020 13:37 )  PTT:25.8 sec  TELEMETRY:     EKG:     IMAGING:

## 2020-01-08 NOTE — PROCEDURE NOTE - NSINDICATIONS_GEN_A_CORE
arterial puncture to obtain ABG's/critical patient/monitoring purposes
critical illness/hemodynamic monitoring
dialysis/CRRT
dialysis/CRRT
antibiotic therapy

## 2020-01-08 NOTE — PROCEDURE NOTE - ADDITIONAL PROCEDURE DETAILS
S/P RIGHT ARM PICC LINE VIA BASILIC VEIN ON RIGHT ARM, 4F SL, 34 CM LENGTH.  PT TOLERATED THE PROCEDURE WELL.  RIGHT ARM PICC LINE TIP WAS CONFIRMED IN SVC UNDER FLUOROSCOPY.  RIGHT IJ TEMPORARY HD CATHETER WAS REMOVED AND HEMOSTASIS WAS ACHIEVED WITH MANUAL COMPRESSION.  HEMOSTASIS SECURE AND VSS. OK TO USE RIGHT ARM PICC LINE IMMEDIATELY.
RIJ PA catheter removed and exchanged cordis over guidewire to place ANNIE serrano.

## 2020-01-08 NOTE — CONSULT NOTE ADULT - PROBLEM SELECTOR RECOMMENDATION 9
- pending NGT placement today - f/u CXR for confirmation of placement prior to starting feeds  - reconsult PRN  - call with questions x [phone]

## 2020-01-08 NOTE — PROGRESS NOTE ADULT - SUBJECTIVE AND OBJECTIVE BOX
====================  CCU MIDNIGHT ROUNDS  ====================    MAHESH CANELA  [id number]  Patient is a 79y old  Male who presents with a chief complaint of Back pain (08 Jan 2020 20:46)      ====================  SUMMARY:  ====================        ====================  NEW EVENTS:  ====================        ====================  VITALS (Last 12 hrs):  ====================    T(C): 36.3 (01-08-20 @ 18:00), Max: 36.8 (01-08-20 @ 11:45)  T(F): 97.3 (01-08-20 @ 18:00), Max: 98.2 (01-08-20 @ 11:45)  HR: 90 (01-08-20 @ 22:45) (80 - 132)  BP: --  BP(mean): --  ABP: 110/52 (01-08-20 @ 22:45) (76/40 - 138/66)  ABP(mean): 72 (01-08-20 @ 22:45) (52 - 92)  RR: 26 (01-08-20 @ 22:45) (8 - 28)  SpO2: 96% (01-08-20 @ 22:45) (93% - 100%)  Wt(kg): --  CVP(mm Hg): 5 (01-08-20 @ 20:00) (4 - 17)  CVP(cm H2O): --  CO: 7.2 (01-08-20 @ 14:30) (7.2 - 7.2)  CI: 3.1 (01-08-20 @ 14:30) (3.1 - 3.1)  PA: 40/19 (01-08-20 @ 20:00) (33/18 - 66/32)  PA(mean): 27 (01-08-20 @ 20:00) (25 - 47)  PCWP: --  SVR: 909 (01-08-20 @ 14:30) (909 - 909)  PVR: --    I&O's Summary    07 Jan 2020 07:01  -  08 Jan 2020 07:00  --------------------------------------------------------  IN: 766 mL / OUT: 2428 mL / NET: -1662 mL    08 Jan 2020 07:01  -  08 Jan 2020 23:10  --------------------------------------------------------  IN: 418.3 mL / OUT: 90 mL / NET: 328.3 mL        Mode: AC/ CMV (Assist Control/ Continuous Mandatory Ventilation)  RR (machine): 28  TV (machine): 520  FiO2: 35  PEEP: 8  ITime: 1  MAP: 14  PIP: 38      ====================  NEW LABS:  ====================                          7.8    11.85 )-----------( 219      ( 08 Jan 2020 21:42 )             24.9     01-08    137  |  102  |  110<H>  ----------------------------<  182<H>  5.0   |  18<L>  |  3.03<H>    Ca    8.8      08 Jan 2020 21:42  Phos  7.4     01-08  Mg     2.2     01-08    TPro  5.7<L>  /  Alb  2.0<L>  /  TBili  0.3  /  DBili  x   /  AST  22  /  ALT  14  /  AlkPhos  76  01-08          ABG - ( 08 Jan 2020 21:29 )  pH, Arterial: 7.36  pH, Blood: x     /  pCO2: 37    /  pO2: 103   / HCO3: 20    / Base Excess: -4.1  /  SaO2: 96                Blood Gas Source, Mixed: Mixed Blood Gas (01-08-20 @ 14:23)  Blood Gas Source, Mixed: Mixed Blood Gas (01-08-20 @ 04:15)  Blood Gas Source, Mixed: Mixed Blood Gas (01-07-20 @ 23:34)      ====================  PLAN:  ====================  - ====================  CCU MIDNIGHT ROUNDS  ====================    MAHESH CANELA  [id number]  Patient is a 79y old  Male who presents with a chief complaint of Back pain (08 Jan 2020 20:46)      ====================  SUMMARY: 79 y M  with PMH significant for CAD s/p PCI, GERD, gout, OA, s/p multiple laminectomies and spinal fusions presenting to hospital with worsening back pain/ weakness that progressed to difficulty with ambulation. Hospital course c/b acute metabolic encephalopathy in the setting of MSSA bacteremia. Ceftriaxone started 12/7, transitioned to vanc+cefepime, currently transferred to the CCU for fluid overload, respiratory distress requiring urgent HD s/p intubation  ====================    ====================  NEW EVENTS:  R swan replaced with R IJ, HD with new ANNIE serrano, remove L IJ shinahum        d/c versed and fentanyl and use precedex PRN per MICU recs  ====================    ====================  VITALS (Last 12 hrs):  ====================    T(C): 36.3 (01-08-20 @ 18:00), Max: 36.8 (01-08-20 @ 11:45)  T(F): 97.3 (01-08-20 @ 18:00), Max: 98.2 (01-08-20 @ 11:45)  HR: 90 (01-08-20 @ 22:45) (80 - 132)  BP: --  BP(mean): --  ABP: 110/52 (01-08-20 @ 22:45) (76/40 - 138/66)  ABP(mean): 72 (01-08-20 @ 22:45) (52 - 92)  RR: 26 (01-08-20 @ 22:45) (8 - 28)  SpO2: 96% (01-08-20 @ 22:45) (93% - 100%)  Wt(kg): --  CVP(mm Hg): 5 (01-08-20 @ 20:00) (4 - 17)  CVP(cm H2O): --  CO: 7.2 (01-08-20 @ 14:30) (7.2 - 7.2)  CI: 3.1 (01-08-20 @ 14:30) (3.1 - 3.1)  PA: 40/19 (01-08-20 @ 20:00) (33/18 - 66/32)  PA(mean): 27 (01-08-20 @ 20:00) (25 - 47)  PCWP: --  SVR: 909 (01-08-20 @ 14:30) (909 - 909)  PVR: --    I&O's Summary    07 Jan 2020 07:01  -  08 Jan 2020 07:00  --------------------------------------------------------  IN: 766 mL / OUT: 2428 mL / NET: -1662 mL    08 Jan 2020 07:01  -  08 Jan 2020 23:10  --------------------------------------------------------  IN: 418.3 mL / OUT: 90 mL / NET: 328.3 mL        Mode: AC/ CMV (Assist Control/ Continuous Mandatory Ventilation)  RR (machine): 28  TV (machine): 520  FiO2: 35  PEEP: 8  ITime: 1  MAP: 14  PIP: 38      ====================  NEW LABS:  ====================                          7.8    11.85 )-----------( 219      ( 08 Jan 2020 21:42 )             24.9     01-08    137  |  102  |  110<H>  ----------------------------<  182<H>  5.0   |  18<L>  |  3.03<H>    Ca    8.8      08 Jan 2020 21:42  Phos  7.4     01-08  Mg     2.2     01-08    TPro  5.7<L>  /  Alb  2.0<L>  /  TBili  0.3  /  DBili  x   /  AST  22  /  ALT  14  /  AlkPhos  76  01-08          ABG - ( 08 Jan 2020 21:29 )  pH, Arterial: 7.36  pH, Blood: x     /  pCO2: 37    /  pO2: 103   / HCO3: 20    / Base Excess: -4.1  /  SaO2: 96                Blood Gas Source, Mixed: Mixed Blood Gas (01-08-20 @ 14:23)  Blood Gas Source, Mixed: Mixed Blood Gas (01-08-20 @ 04:15)  Blood Gas Source, Mixed: Mixed Blood Gas (01-07-20 @ 23:34)      ====================  PLAN:  ====================  - HD with new R IJ maggie  -Remove L IJ shiley  -Goal to be net even  -Maintain Hb >8  -d/c versed and fentanyl and use precedex PRN per MICU recs

## 2020-01-08 NOTE — CONSULT NOTE ADULT - SUBJECTIVE AND OBJECTIVE BOX
CHIEF COMPLAINT: Back Pain     HPI: 78 yo M w/ pmhx CAD s/p PCI to OM1 and Mid LAD (2017), mod AS, HTN, HLD, GERD, Gout, pre-DM, Osteoarthritis and chronic back pain, s/p multiple laminectomies and spinal fusion of the low thoracic spine, left TKR, who presented to the ED  with 1 week of fevers and night sweats, admitted for sepsis 2/2 MSSA Bacteremia highly suspicious for spinal involvement and KRIS. 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    Pt w worsening respiratory distress, found to be Corona virus positive 1/3, chest Xray imaging at that time w evidence of pulmonary edema vs multifocal PNA, treated w abx. Also being diuresed w Bumex and Dobutamine. Pt w suspected KRIS VS AIN, anuric transferred back to CCU for urgent HD  w -1.6 L removed. Elective intubation , RHC significant for Wedge pressure 11 and Right atrium pressure 5. CVVHD+ Started on Levophed and Vasopressin.    MICU consulted for concerns of septic shock.     FAMILY HISTORY:  No pertinent family history in first degree relatives      SOCIAL HISTORY:  Denies toxic habits     Allergies:   Indocin (Unknown)  oxacillin (Unknown)  Zyvox (Unknown)    REVIEW OF SYSTEMS:  [x] Unable to assess ROS because intubated and sedated     OBJECTIVE:  ICU Vital Signs Last 24 Hrs  T(C): 36.8 (2020 11:45), Max: 38.3 (2020 04:30)  T(F): 98.2 (2020 11:45), Max: 101 (2020 04:30)  HR: 94 (2020 12:14) (86 - 138)  BP: 111/708 (2020 08:00) (111/708 - 111/708)  BP(mean): 84 (2020 08:00) (84 - 84)  ABP: 102/46 (2020 11:45) (76/40 - 136/66)  ABP(mean): 66 (2020 11:45) (52 - 92)  RR: 28 (2020 11:45) (11 - 42)  SpO2: 99% (2020 12:14) (92% - 100%)    Mode: AC/ CMV (Assist Control/ Continuous Mandatory Ventilation), RR (machine): 28, TV (machine): 520, FiO2: 35, PEEP: 8, ITime: 1, MAP: 15, PIP: 38     @ 07: @ 07:00  --------------------------------------------------------  IN: 766 mL / OUT: 2428 mL / NET: -1662 mL     @ 07: @ 12:36  --------------------------------------------------------  IN: 19.2 mL / OUT: 10 mL / NET: 9.2 mL      CAPILLARY BLOOD GLUCOSE: POCT Blood Glucose.: 157 mg/dL (2020 12:09)      PHYSICAL EXAM:  General:   HEENT:   Lymph Nodes:  Neck:   Respiratory:   Cardiovascular:   Abdomen:   Extremities:   Skin:   Neurological:  Psychiatry:    HOSPITAL MEDICATIONS:  MEDICATIONS  (STANDING):  albuterol/ipratropium for Nebulization 3 milliLiter(s) Nebulizer every 6 hours  aspirin  chewable 81 milliGRAM(s) Oral daily  atorvastatin 10 milliGRAM(s) Oral at bedtime  chlorhexidine 0.12% Liquid 15 milliLiter(s) Oral Mucosa every 12 hours  chlorhexidine 4% Liquid 1 Application(s) Topical <User Schedule>  chlorhexidine 4% Liquid 1 Application(s) Topical <User Schedule>  CRRT Treatment    <Continuous>  DAPTOmycin IVPB 800 milliGRAM(s) IV Intermittent every 24 hours  dextrose 5%. 1000 milliLiter(s) (50 mL/Hr) IV Continuous <Continuous>  dextrose 50% Injectable 12.5 Gram(s) IV Push once  dextrose 50% Injectable 25 Gram(s) IV Push once  dextrose 50% Injectable 25 Gram(s) IV Push once  fentaNYL   Infusion. 0.5 MICROgram(s)/kG/Hr (2.607 mL/Hr) IV Continuous <Continuous>  heparin  Injectable 5000 Unit(s) SubCutaneous every 8 hours  insulin lispro (HumaLOG) corrective regimen sliding scale   SubCutaneous every 6 hours  lidocaine   Patch 1 Patch Transdermal daily  midazolam Infusion 0.02 mG/kG/Hr (2.086 mL/Hr) IV Continuous <Continuous>  norepinephrine Infusion 0.05 MICROgram(s)/kG/Min (9.778 mL/Hr) IV Continuous <Continuous>  oxymetazoline 0.05% Nasal Spray 2 Spray(s) Both Nostrils two times a day  pantoprazole  Injectable 40 milliGRAM(s) IV Push daily  polyethylene glycol 3350 17 Gram(s) Oral daily  PrismaSATE Dialysate BGK 4 / 2.5 5000 milliLiter(s) (1200 mL/Hr) CRRT <Continuous>  PrismaSOL Filtration BGK 0 / 2.5 5000 milliLiter(s) (600 mL/Hr) CRRT <Continuous>  PrismaSOL Filtration BGK 4 / 2.5 5000 milliLiter(s) (400 mL/Hr) CRRT <Continuous>  senna 2 Tablet(s) Oral at bedtime  sodium chloride 0.65% Nasal 1 Spray(s) Both Nostrils three times a day  vasopressin Infusion 0.08 Unit(s)/Min (4.8 mL/Hr) IV Continuous <Continuous>    MEDICATIONS  (PRN):  bisacodyl Suppository 10 milliGRAM(s) Rectal daily PRN Constipation  dextrose 40% Gel 15 Gram(s) Oral once PRN Blood Glucose LESS THAN 70 milliGRAM(s)/deciliter  glucagon  Injectable 1 milliGRAM(s) IntraMuscular once PRN Glucose LESS THAN 70 milligrams/deciliter  sodium chloride 0.9% lock flush 10 milliLiter(s) IV Push every 1 hour PRN Pre/post blood products, medications, blood draw, and to maintain line patency  sodium chloride 0.9% lock flush 10 milliLiter(s) IV Push every 1 hour PRN Pre/post blood products, medications, blood draw, and to maintain line patency      LABS:                        8.4    14.48 )-----------( 259      ( 2020 04:18 )             27.4     -    138  |  101  |  93<H>  ----------------------------<  162<H>  5.1   |  20<L>  |  2.70<H>    Ca    8.9      2020 04:18  Phos  7.5       Mg     2.1         TPro  6.1  /  Alb  2.0<L>  /  TBili  0.3  /  DBili  x   /  AST  22  /  ALT  19  /  AlkPhos  84  08    PT/INR - ( 2020 13:37 )   PT: 15.8 sec;   INR: 1.36 ratio         PTT - ( 2020 13:37 )  PTT:25.8 sec  Urinalysis Basic - ( 2020 09:12 )    Color: Yellow / Appearance: Slightly Turbid / S.021 / pH: x  Gluc: x / Ketone: Negative  / Bili: Negative / Urobili: Negative   Blood: x / Protein: 30 mg/dL / Nitrite: Negative   Leuk Esterase: Large / RBC: 67 /hpf / WBC 34 /HPF   Sq Epi: x / Non Sq Epi: 1 /hpf / Bacteria: Negative      Arterial Blood Gas:   @ 06:12  7.36/39/150/21/99/-3.3  ABG lactate: --  Arterial Blood Gas:   @ 04:15  7.29/48/132/22/98/-3.5  ABG lactate: --  Arterial Blood Gas:   @ 01:15  7.24/52/128/22/98/-5.0  ABG lactate: --  Arterial Blood Gas:   @ 00:21  7.20/61/127/23/97/-4.9  ABG lactate: --  Arterial Blood Gas:   @ 23:34  7.11/79/114/24/95/-6.4  ABG lactate: --  Arterial Blood Gas:   @ 22:23  7.20/59/135/22/98/-5.2  ABG lactate: --  Arterial Blood Gas:   @ 20:54  7.20/62/118/23/97/-4.5  ABG lactate: --  Arterial Blood Gas:   @ 16:16  7.48/28/76/21/95/-1.7  ABG lactate: --  Arterial Blood Gas:   @ 12:39  7.44/30/123/20/98/-2.8  ABG lactate: --  Arterial Blood Gas:   @ 04:55  7.45/32/98/22/96/-1.5  ABG lactate: --  Arterial Blood Gas:   @ 00:04  7.46/30/67/22/94/-1.2  ABG lactate: --  Arterial Blood Gas:   @ 15:38  7.41/35/78/21/95/-2.4  ABG lactate: --  Arterial Blood Gas:   @ 13:18  7.39/35/62/21/91/-3.3  ABG lactate: --    Venous Blood Gas:   @ 04:55  7.42/35/45//78  VBG Lactate: --  Venous Blood Gas:   @ 01:08  7.42/37/42//75  VBG Lactate: --      MICROBIOLOGY:   Blood clx /8 & 9: MSSA  Blood clx 12/ &14 NGTD  Bronch  NGTD  1/3 Corona virus +    RADIOLOGY:  [x] Reviewed and interpreted by me CHIEF COMPLAINT: Back Pain     HPI: 80 yo M w/ pmhx CAD s/p PCI to OM1 and Mid LAD (2017), mod AS, HTN, HLD, GERD, Gout, pre-DM, Osteoarthritis and chronic back pain, s/p multiple laminectomies and spinal fusion of the low thoracic spine, left TKR, who presented to the ED  with 1 week of fevers and night sweats, admitted for sepsis 2/2 MSSA Bacteremia highly suspicious for spinal involvement and KRIS. 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    Pt w worsening respiratory distress, found to be Corona virus positive 1/3, chest Xray imaging at that time w evidence of pulmonary edema vs multifocal PNA, treated w abx. Also being diuresed w Bumex and Dobutamine. Pt w suspected KRIS VS AIN, anuric transferred back to CCU for urgent HD. Intubated for respiratory failure , Lyman significant for Wedge pressure 11 and Right atrium pressure 5. CRRT+ Started on Levophed and Vasopressin.    MICU consulted for concerns of septic shock.     FAMILY HISTORY:  No pertinent family history in first degree relatives      SOCIAL HISTORY:  Denies toxic habits     Allergies:   Indocin (Unknown)  oxacillin (Unknown)  Zyvox (Unknown)    REVIEW OF SYSTEMS:  [x] Unable to assess ROS because intubated and sedated     OBJECTIVE:  ICU Vital Signs Last 24 Hrs  T(C): 36.8 (2020 11:45), Max: 38.3 (2020 04:30)  T(F): 98.2 (2020 11:45), Max: 101 (2020 04:30)  HR: 94 (2020 12:14) (86 - 138)  BP: 111/708 (2020 08:00) (111/708 - 111/708)  BP(mean): 84 (2020 08:00) (84 - 84)  ABP: 102/46 (2020 11:45) (76/40 - 136/66)  ABP(mean): 66 (2020 11:45) (52 - 92)  RR: 28 (2020 11:45) (11 - 42)  SpO2: 99% (2020 12:14) (92% - 100%)    Mode: AC/ CMV (Assist Control/ Continuous Mandatory Ventilation), RR (machine): 28, TV (machine): 520, FiO2: 35, PEEP: 8, ITime: 1, MAP: 15, PIP: 38     @ 07: @ 07:00  --------------------------------------------------------  IN: 766 mL / OUT: 2428 mL / NET: -1662 mL     @ 07:08 @ 12:36  --------------------------------------------------------  IN: 19.2 mL / OUT: 10 mL / NET: 9.2 mL      CAPILLARY BLOOD GLUCOSE: POCT Blood Glucose.: 157 mg/dL (2020 12:09)      PHYSICAL EXAM:  General: Obese elderly male   HEENT: Pinpoint, Dry MM  Lymph Nodes: None  Neck: No JVD.   Respiratory: Anteriorly clear lungs, intubated  Cardiovascular: S1/S2 RRR  Abdomen: Soft, nontender, nondistended  Extremities: Diffusely edematous   Skin: No rash or breakdown  Neurological: Sedated  Psychiatry: Unable to assess    HOSPITAL MEDICATIONS:  MEDICATIONS  (STANDING):  albuterol/ipratropium for Nebulization 3 milliLiter(s) Nebulizer every 6 hours  aspirin  chewable 81 milliGRAM(s) Oral daily  atorvastatin 10 milliGRAM(s) Oral at bedtime  chlorhexidine 0.12% Liquid 15 milliLiter(s) Oral Mucosa every 12 hours  chlorhexidine 4% Liquid 1 Application(s) Topical <User Schedule>  chlorhexidine 4% Liquid 1 Application(s) Topical <User Schedule>  CRRT Treatment    <Continuous>  DAPTOmycin IVPB 800 milliGRAM(s) IV Intermittent every 24 hours  dextrose 5%. 1000 milliLiter(s) (50 mL/Hr) IV Continuous <Continuous>  dextrose 50% Injectable 12.5 Gram(s) IV Push once  dextrose 50% Injectable 25 Gram(s) IV Push once  dextrose 50% Injectable 25 Gram(s) IV Push once  fentaNYL   Infusion. 0.5 MICROgram(s)/kG/Hr (2.607 mL/Hr) IV Continuous <Continuous>  heparin  Injectable 5000 Unit(s) SubCutaneous every 8 hours  insulin lispro (HumaLOG) corrective regimen sliding scale   SubCutaneous every 6 hours  lidocaine   Patch 1 Patch Transdermal daily  midazolam Infusion 0.02 mG/kG/Hr (2.086 mL/Hr) IV Continuous <Continuous>  norepinephrine Infusion 0.05 MICROgram(s)/kG/Min (9.778 mL/Hr) IV Continuous <Continuous>  oxymetazoline 0.05% Nasal Spray 2 Spray(s) Both Nostrils two times a day  pantoprazole  Injectable 40 milliGRAM(s) IV Push daily  polyethylene glycol 3350 17 Gram(s) Oral daily  PrismaSATE Dialysate BGK 4 / 2.5 5000 milliLiter(s) (1200 mL/Hr) CRRT <Continuous>  PrismaSOL Filtration BGK 0 / 2.5 5000 milliLiter(s) (600 mL/Hr) CRRT <Continuous>  PrismaSOL Filtration BGK 4 / 2.5 5000 milliLiter(s) (400 mL/Hr) CRRT <Continuous>  senna 2 Tablet(s) Oral at bedtime  sodium chloride 0.65% Nasal 1 Spray(s) Both Nostrils three times a day  vasopressin Infusion 0.08 Unit(s)/Min (4.8 mL/Hr) IV Continuous <Continuous>    MEDICATIONS  (PRN):  bisacodyl Suppository 10 milliGRAM(s) Rectal daily PRN Constipation  dextrose 40% Gel 15 Gram(s) Oral once PRN Blood Glucose LESS THAN 70 milliGRAM(s)/deciliter  glucagon  Injectable 1 milliGRAM(s) IntraMuscular once PRN Glucose LESS THAN 70 milligrams/deciliter  sodium chloride 0.9% lock flush 10 milliLiter(s) IV Push every 1 hour PRN Pre/post blood products, medications, blood draw, and to maintain line patency  sodium chloride 0.9% lock flush 10 milliLiter(s) IV Push every 1 hour PRN Pre/post blood products, medications, blood draw, and to maintain line patency      LABS:                        8.4    14.48 )-----------( 259      ( 2020 04:18 )             27.4     01-08    138  |  101  |  93<H>  ----------------------------<  162<H>  5.1   |  20<L>  |  2.70<H>    Ca    8.9      2020 04:18  Phos  7.5       Mg     2.1         TPro  6.1  /  Alb  2.0<L>  /  TBili  0.3  /  DBili  x   /  AST  22  /  ALT  19  /  AlkPhos  84  08    PT/INR - ( 2020 13:37 )   PT: 15.8 sec;   INR: 1.36 ratio         PTT - ( 2020 13:37 )  PTT:25.8 sec  Urinalysis Basic - ( 2020 09:12 )    Color: Yellow / Appearance: Slightly Turbid / S.021 / pH: x  Gluc: x / Ketone: Negative  / Bili: Negative / Urobili: Negative   Blood: x / Protein: 30 mg/dL / Nitrite: Negative   Leuk Esterase: Large / RBC: 67 /hpf / WBC 34 /HPF   Sq Epi: x / Non Sq Epi: 1 /hpf / Bacteria: Negative      Arterial Blood Gas:   @ 06:12  7.36/39/150/21/99/-3.3  ABG lactate: --  Arterial Blood Gas:   @ 04:15  7.29/48/132/22/98/-3.5  ABG lactate: --  Arterial Blood Gas:   @ 01:15  7.24/52/128/22/98/-5.0  ABG lactate: --  Arterial Blood Gas:   @ 00:21  7.20/61/127/23/97/-4.9  ABG lactate: --  Arterial Blood Gas:   @ 23:34  7.11/79/114/24/95/-6.4  ABG lactate: --  Arterial Blood Gas:   @ 22:23  7.20/59/135/22/98/-5.2  ABG lactate: --  Arterial Blood Gas:   @ 20:54  7.20/62/118/23/97/-4.5  ABG lactate: --  Arterial Blood Gas:   @ 16:16  7.48/28/76/21/95/-1.7  ABG lactate: --  Arterial Blood Gas:   @ 12:39  7.44/30/123/20/98/-2.8  ABG lactate: --  Arterial Blood Gas:   @ 04:55  7.45/32/98/22/96/-1.5  ABG lactate: --  Arterial Blood Gas:   @ 00:04  7.46/30/67/22/94/-1.2  ABG lactate: --  Arterial Blood Gas:   @ 15:38  7.41/35/78/21/95/-2.4  ABG lactate: --  Arterial Blood Gas:   @ 13:18  7.39/35/62/21/91/-3.3  ABG lactate: --    Venous Blood Gas:   @ 04:55  7.42/35/45//78  VBG Lactate: --  Venous Blood Gas:   @ 01:08  7.42/37/42//75  VBG Lactate: --      MICROBIOLOGY:   Blood clx  & 9: MSSA  Blood clx / &14 NGTD  Bronch 7 NGTD  1/3 Corona virus +    RADIOLOGY:  [x] Reviewed and interpreted by me

## 2020-01-08 NOTE — PROCEDURE NOTE - NSPOSTCAREGUIDE_GEN_A_CORE
Care for catheter as per unit/ICU protocols/Keep the cast/splint/dressing clean and dry
Care for catheter as per unit/ICU protocols/Instructed patient/caregiver to follow-up with primary care physician/Instructed patient/caregiver regarding signs and symptoms of infection/Keep the cast/splint/dressing clean and dry
Instructed patient/caregiver to follow-up with primary care physician/Keep the cast/splint/dressing clean and dry/Instructed patient/caregiver regarding signs and symptoms of infection/Care for catheter as per unit/ICU protocols
Care for catheter as per unit/ICU protocols
Keep the cast/splint/dressing clean and dry/Care for catheter as per unit/ICU protocols/Verbal/written post procedure instructions were given to patient/caregiver/Instructed patient/caregiver regarding signs and symptoms of infection

## 2020-01-08 NOTE — PROGRESS NOTE ADULT - ASSESSMENT
79 y M  with PMH significant for CAD s/p PCI, GERD, gout, OA, s/p multiple laminectomies and spinal fusions, hospital course c/b MSSA bacteremia, ESRD requiring urgent HD on this admission, S/P Shiley placement and removal now transferred to CCU for urgent HD in the setting of severe hypervolemia and hypoxic respiratory failure    #Neuro  - Intubated and sedated on     #Resp  1. 1/3 - RVP positive for coronovirus  - Supportive care    2.  Pulmonary Edema   - S/P 1.5 L fluid removal last night, this AM with worsened tachypnea and hypervolemia requiring acute UF for 2.5 hr 2 liter then convert back to cvvhdf as bp tolerates   - Remains to be on  positive pressure ventilation   - Non Con CT 1/5: Pulmonary edema with moderate bilateral pleural effusions. Infection is considered less likely.    #CV    1. Acute decompensating HF   - c/w  Dobutamine gtt 2.5 mcg  and Bumex gtt 2cc/hour , started on 1/3 /2019   - JAHAIRA 12/13: EF 44%, with severe AS, no vegetations reported at this time   - Repeat TTE 1/6/2020,  TTE EF 20-25%  - Will require both LHC and RHC for further evaluation, given depressed EF     2. CAD   - c/w ASA and statin     #GI  - NPO in the setting of BIPAP requirement   -  GI prophylaxis with PPI    #ID  1.  MSSA bacteremia   -  ID following, appreciate recommendations   -  Currently on Daptomycin til 2/5/20 q48 hours to be dosed following HD     #Renal    1.  Acute Kidney injury   - Worsening renal function in the setting of intravascular depletion vs post obstructive uropathy from urinary retention vs ATN from poor   -baseline .96, currently creatinine  2.74     2. ESRD requiring Urgent HD  - Shiley placed in R femoral region, will require moving central line to IJ given hx of MSSA bacteremia. Will talk to family in regards to possibility of intubation, given orthopnea for procedure vs BIPAP to tolerate placing IJ   - Nephrology following for HD,  UF to transition to CVVHD at rate of 100cc/ hour if patient tolerates   - Will continue to renally dose all meds, avoid nephrotoxic agents, monitor I& o's daily     #Heme  - Hgb stable  - Will continue to monitor with cbc QD     #Endo  - No active issues     #DVT PPx  - Heparin sub q 79 y M  with PMH significant for CAD s/p PCI, GERD, gout, OA, s/p multiple laminectomies and spinal fusions, hospital course c/b MSSA bacteremia, ESRD requiring urgent HD on this admission, S/P Shiley placement and removal now transferred to CCU for urgent HD in the setting of severe hypervolemia and hypoxic respiratory failure    #Neuro  - Intubated and sedated on versed and fentanyl gtt     #Resp  - Patient intubated with settings currently at  Rate 28| |Peep 8 | FIO2 35% with  initial PH demonstrating   acute respiratory acidosis with concomitant HAGMA  -CXR with b/l opacities; PF ratio 150-200, moderate ARDS, so lung protective strategy employed overnight   -Initial PH 7.11 |Pco2 79| Pao2 114| on Fio2 of 50%, vent settings adjusted overnight with improved  -PH on recent abg from overnight improved 7.36, with Pco2 of 39, Po2 150  - Approximately 4 L removal total via 2 HD sessions and CVVD   - Remains to be on  positive pressure ventilation   - Non Con CT 1/5: Pulmonary edema with moderate bilateral pleural effusions.     #CV    - [place] Mann  Catheter placed with RA 5 and wedge 11, indicating that patients hypoxia is not secondary to cardiac origin and more likely moderate ARDS 2/2 to infectious etiology given leukocytosis, fever, infiltrate, bacteremia .   - CI overnight= 4.4, CO=10.3, with Low SVR pointing towards vasodilatory shock.   - dobutamine stopped overnight, patient remains to be levo @ rate of  .078 and vaso @ rate of  .08, will wean to Map = 65  - JAHAIRA 12/13: EF 44%, with severe AS, no vegetations reported at this time   - Repeat TTE 1/6/2020,  TTE EF 20-25%  - Will require LHC once patient is more stable  further evaluation, given depressed EF     2. CAD   - c/w ASA and statin     #GI  - NPO in the setting of BIPAP requirement   -  GI prophylaxis with PPI    #ID    - Patient febrile overnight to 101F , ID called recommending blood cultures and 1X dose of aztreonam in addition to current Daptomyocin q48 hours post HD    - Patient has hx  MSSA bacteremia , to be dosed with daptomyocin tilll 2/5/20  -  ID following, appreciate recommendations     #Renal     ESRD requiring Urgent HD    - Shiley placed in R femoral region, will require moving central line to IJ given hx of MSSA bacteremia.   - Nephrology following for HD,  CVVHD stopped 1/7 once wedge pressure found to be 11, RA pressure of 5.    - Will continue to renally dose all meds, avoid nephrotoxic agents, monitor I& o's daily     #Heme  - Hgb stable  - Will continue to monitor with cbc QD     #Endo  - No active issues     #DVT PPx  - Heparin sub q 79 y M  with PMH significant for CAD s/p PCI, GERD, gout, OA, s/p multiple laminectomies and spinal fusions, hospital course c/b MSSA bacteremia, ESRD requiring urgent HD on this admission, S/P Shiley placement and removal now transferred to CCU for urgent HD in the setting of severe hypervolemia and hypoxic respiratory failure    #Neuro  - Intubated and sedated on versed and fentanyl gtt     #Resp  - Patient intubated with settings currently at  Rate 28| |Peep 8 | FIO2 35% with  initial PH demonstrating  acute respiratory acidosis with concomitant HAGMA  -CXR with b/l opacities; PF ratio 150-200, moderate ARDS, so lung protective strategy employed overnight   -Initial PH 7.11 |Pco2 79| Pao2 114| on Fio2 of 50%, vent settings adjusted overnight with improved  - PH on recent abg from overnight improved 7.36, with Pco2 of 39, Po2 150  - Approximately 4 L removal total via 2 HD sessions and CVVD   - Non Con CT 1/5: Pulmonary edema with moderate bilateral pleural effusions.     #CV    - [place] Mann  Catheter placed with RA 5 and wedge 11, indicating that patients hypoxia is not secondary to cardiac origin and more likely moderate ARDS 2/2 to infectious etiology given leukocytosis, fever, infiltrate, bacteremia . Patient currently in septic shock   - CI overnight= 4.4, CO=10.3, with Low SVR pointing towards vasodilatory shock.   - dobutamine stopped overnight, patient remains to be levo @ rate of  .078 and vaso @ rate of  .08, will wean to Map = 65  - JAHAIRA 12/13: EF 44%, with severe AS, no vegetations reported at this time   - Repeat TTE 1/6/2020,  TTE EF 20  - Will require LHC once patient is more stable  further evaluation, given depressed EF in the long term when patient is more stable   -Will  c/w ASA and statin, for CAD    #GI  - PPI  - NG tube to be placed by ENT this morning, numerous failed attempts by providers this morning     #ID    - Patient febrile overnight to 101F , ID called recommending blood cultures and 1X dose of aztreonam in addition to current Daptomyocin q48 hours post HD    - Patient has hx  MSSA bacteremia , to be dosed with daptomycin tilll 2/5/20  - Will follow up RVP, combicath specimen , and blood cultures. Given no clear opacity on xray not immensely suspicious for pna at present time. If patient found to have pna will add holley given, dapto's inability to cover for resp infections.   -  ID following, appreciate recommendations     #Renal     ESRD requiring Urgent HD    - Shiley placed in R femoral region, will require moving central line to IJ given hx of MSSA bacteremia.   - Nephrology following for HD,  CVVHD stopped 1/7 once wedge pressure found to be 11, RA pressure of 5.  However will resume CVVHD today, per nephrology  - Will continue to renally dose all meds, avoid nephrotoxic agents, monitor I& o's daily     #Heme  - Hgb stable, slightly downtrending  -Transfuse for hgb < 7 , will maintain active type and screen  - Will continue to monitor with cbc QD     #Endo  - No active issues     #DVT PPx  - Heparin sub q

## 2020-01-08 NOTE — CONSULT NOTE ADULT - SUBJECTIVE AND OBJECTIVE BOX
CC: dysphagia, NGT placement    HPI: 78yo male with PMHx CAD s/p PCI 1/2017, HTN, HLD, GERD, and chronic back pain, s/p multiple laminectomies and spinal fusion, admitted for back pain. ENT called to evaluate for dysphagia and NGT placement. Pt was recently intubated for respiratory failure, now requiring bipap. Team attempted NGT placement with difficulty.     PAST MEDICAL & SURGICAL HISTORY:  CAD S/P percutaneous coronary angioplasty  HLD (hyperlipidemia)  Back pain  OA (osteoarthritis)  Gout  HTN (hypertension)  GERD (gastroesophageal reflux disease)  S/P lumbar spine operation: 6 thoracic to lumbar surgeries over 6 months period starting 11/2006  S/P knee replacement: 11/2013    Allergies    Indocin (Unknown)  oxacillin (Unknown)  Zyvox (Unknown)    Intolerances      MEDICATIONS  (STANDING):  albuterol/ipratropium for Nebulization 3 milliLiter(s) Nebulizer every 6 hours  aspirin  chewable 81 milliGRAM(s) Oral daily  atorvastatin 10 milliGRAM(s) Oral at bedtime  chlorhexidine 0.12% Liquid 15 milliLiter(s) Oral Mucosa every 12 hours  chlorhexidine 4% Liquid 1 Application(s) Topical <User Schedule>  chlorhexidine 4% Liquid 1 Application(s) Topical <User Schedule>  DAPTOmycin IVPB 800 milliGRAM(s) IV Intermittent every 24 hours  dextrose 5%. 1000 milliLiter(s) (50 mL/Hr) IV Continuous <Continuous>  dextrose 50% Injectable 12.5 Gram(s) IV Push once  dextrose 50% Injectable 25 Gram(s) IV Push once  dextrose 50% Injectable 25 Gram(s) IV Push once  fentaNYL   Infusion. 0.5 MICROgram(s)/kG/Hr (2.607 mL/Hr) IV Continuous <Continuous>  heparin  Injectable 5000 Unit(s) SubCutaneous every 8 hours  insulin lispro (HumaLOG) corrective regimen sliding scale   SubCutaneous every 6 hours  lidocaine   Patch 1 Patch Transdermal daily  midazolam Infusion 0.02 mG/kG/Hr (2.086 mL/Hr) IV Continuous <Continuous>  norepinephrine Infusion 0.05 MICROgram(s)/kG/Min (9.778 mL/Hr) IV Continuous <Continuous>  oxymetazoline 0.05% Nasal Spray 2 Spray(s) Both Nostrils two times a day  pantoprazole  Injectable 40 milliGRAM(s) IV Push daily  polyethylene glycol 3350 17 Gram(s) Oral daily  senna 2 Tablet(s) Oral at bedtime  sodium chloride 0.65% Nasal 1 Spray(s) Both Nostrils three times a day  vasopressin Infusion 0.08 Unit(s)/Min (4.8 mL/Hr) IV Continuous <Continuous>    MEDICATIONS  (PRN):  bisacodyl Suppository 10 milliGRAM(s) Rectal daily PRN Constipation  dextrose 40% Gel 15 Gram(s) Oral once PRN Blood Glucose LESS THAN 70 milliGRAM(s)/deciliter  glucagon  Injectable 1 milliGRAM(s) IntraMuscular once PRN Glucose LESS THAN 70 milligrams/deciliter  sodium chloride 0.9% lock flush 10 milliLiter(s) IV Push every 1 hour PRN Pre/post blood products, medications, blood draw, and to maintain line patency  sodium chloride 0.9% lock flush 10 milliLiter(s) IV Push every 1 hour PRN Pre/post blood products, medications, blood draw, and to maintain line patency      Social History: denies smoking    Family history: Pt denies any significant family history     ROS:   ENT: all negative except as noted in HPI   CV: denies palpitations  Pulm: denies SOB, cough, hemoptysis  GI: denies change in apetite, indigestion, n/v  : denies pertinent urinary symptoms, urgency  Neuro: denies numbness/tingling, loss of sensation  Psych: denies anxiety  MS: denies muscle weakness, instability  Heme: denies easy bruising or bleeding  Endo: denies heat/cold intolerance, excessive sweating  Vascular: denies LE edema    Vital Signs Last 24 Hrs  T(C): 37 (08 Jan 2020 08:00), Max: 38.3 (08 Jan 2020 04:30)  T(F): 98.6 (08 Jan 2020 08:00), Max: 101 (08 Jan 2020 04:30)  HR: 94 (08 Jan 2020 10:00) (92 - 138)  BP: 111/708 (08 Jan 2020 08:00) (111/708 - 111/708)  BP(mean): 84 (08 Jan 2020 08:00) (84 - 84)  RR: 27 (08 Jan 2020 10:00) (17 - 42)  SpO2: 97% (08 Jan 2020 10:00) (92% - 100%)                          8.4    14.48 )-----------( 259      ( 08 Jan 2020 04:18 )             27.4    01-08    138  |  101  |  93<H>  ----------------------------<  162<H>  5.1   |  20<L>  |  2.70<H>    Ca    8.9      08 Jan 2020 04:18  Phos  7.5     01-08  Mg     2.1     01-08    TPro  6.1  /  Alb  2.0<L>  /  TBili  0.3  /  DBili  x   /  AST  22  /  ALT  19  /  AlkPhos  84  01-08   PT/INR - ( 06 Jan 2020 13:37 )   PT: 15.8 sec;   INR: 1.36 ratio         PTT - ( 06 Jan 2020 13:37 )  PTT:25.8 sec    PHYSICAL EXAM:  Gen: NAD  Skin: No rashes, bruises, or lesions  Head: Normocephalic, Atraumatic  Face: no edema, erythema, or fluctuance. Parotid glands soft without mass  Eyes: no scleral injection  Nose: Nares bilaterally patent, no discharge  Mouth: No Stridor / Drooling / Trismus.  Mucosa moist, tongue/uvula midline, oropharynx clear  Neck: Flat, supple, no lymphadenopathy, trachea midline, no masses  Lymphatic: No lymphadenopathy  Resp: breathing easily, no stridor  CV: no peripheral edema/cyanosis  GI: nondistended   Peripheral vascular: no JVD or edema  Neuro: facial nerve intact, no facial droop    Fiberoptic Indirect laryngoscopy:  (Scope #2 used)  pending     Procedure Note:  pending NGT placement CC: dysphagia, NGT placement    HPI: 80yo male with PMHx CAD s/p PCI 1/2017, HTN, HLD, GERD, and chronic back pain, s/p multiple laminectomies and spinal fusion, admitted for back pain. ENT called to evaluate for dysphagia and NGT placement. Pt is currently intubated for respiratory failure, now requiring bipap. Team attempted NGT placement with difficulty due to intubation.     PAST MEDICAL & SURGICAL HISTORY:  CAD S/P percutaneous coronary angioplasty  HLD (hyperlipidemia)  Back pain  OA (osteoarthritis)  Gout  HTN (hypertension)  GERD (gastroesophageal reflux disease)  S/P lumbar spine operation: 6 thoracic to lumbar surgeries over 6 months period starting 11/2006  S/P knee replacement: 11/2013    Allergies    Indocin (Unknown)  oxacillin (Unknown)  Zyvox (Unknown)    Intolerances      MEDICATIONS  (STANDING):  albuterol/ipratropium for Nebulization 3 milliLiter(s) Nebulizer every 6 hours  aspirin  chewable 81 milliGRAM(s) Oral daily  atorvastatin 10 milliGRAM(s) Oral at bedtime  chlorhexidine 0.12% Liquid 15 milliLiter(s) Oral Mucosa every 12 hours  chlorhexidine 4% Liquid 1 Application(s) Topical <User Schedule>  chlorhexidine 4% Liquid 1 Application(s) Topical <User Schedule>  DAPTOmycin IVPB 800 milliGRAM(s) IV Intermittent every 24 hours  dextrose 5%. 1000 milliLiter(s) (50 mL/Hr) IV Continuous <Continuous>  dextrose 50% Injectable 12.5 Gram(s) IV Push once  dextrose 50% Injectable 25 Gram(s) IV Push once  dextrose 50% Injectable 25 Gram(s) IV Push once  fentaNYL   Infusion. 0.5 MICROgram(s)/kG/Hr (2.607 mL/Hr) IV Continuous <Continuous>  heparin  Injectable 5000 Unit(s) SubCutaneous every 8 hours  insulin lispro (HumaLOG) corrective regimen sliding scale   SubCutaneous every 6 hours  lidocaine   Patch 1 Patch Transdermal daily  midazolam Infusion 0.02 mG/kG/Hr (2.086 mL/Hr) IV Continuous <Continuous>  norepinephrine Infusion 0.05 MICROgram(s)/kG/Min (9.778 mL/Hr) IV Continuous <Continuous>  oxymetazoline 0.05% Nasal Spray 2 Spray(s) Both Nostrils two times a day  pantoprazole  Injectable 40 milliGRAM(s) IV Push daily  polyethylene glycol 3350 17 Gram(s) Oral daily  senna 2 Tablet(s) Oral at bedtime  sodium chloride 0.65% Nasal 1 Spray(s) Both Nostrils three times a day  vasopressin Infusion 0.08 Unit(s)/Min (4.8 mL/Hr) IV Continuous <Continuous>    MEDICATIONS  (PRN):  bisacodyl Suppository 10 milliGRAM(s) Rectal daily PRN Constipation  dextrose 40% Gel 15 Gram(s) Oral once PRN Blood Glucose LESS THAN 70 milliGRAM(s)/deciliter  glucagon  Injectable 1 milliGRAM(s) IntraMuscular once PRN Glucose LESS THAN 70 milligrams/deciliter  sodium chloride 0.9% lock flush 10 milliLiter(s) IV Push every 1 hour PRN Pre/post blood products, medications, blood draw, and to maintain line patency  sodium chloride 0.9% lock flush 10 milliLiter(s) IV Push every 1 hour PRN Pre/post blood products, medications, blood draw, and to maintain line patency      Social History: denies smoking    Family history: Pt denies any significant family history     ROS:   ENT: all negative except as noted in HPI   CV: denies palpitations  Pulm: denies SOB, cough, hemoptysis  GI: denies change in apetite, indigestion, n/v  : denies pertinent urinary symptoms, urgency  Neuro: denies numbness/tingling, loss of sensation  Psych: denies anxiety  MS: denies muscle weakness, instability  Heme: denies easy bruising or bleeding  Endo: denies heat/cold intolerance, excessive sweating  Vascular: denies LE edema    Vital Signs Last 24 Hrs  T(C): 37 (08 Jan 2020 08:00), Max: 38.3 (08 Jan 2020 04:30)  T(F): 98.6 (08 Jan 2020 08:00), Max: 101 (08 Jan 2020 04:30)  HR: 94 (08 Jan 2020 10:00) (92 - 138)  BP: 111/708 (08 Jan 2020 08:00) (111/708 - 111/708)  BP(mean): 84 (08 Jan 2020 08:00) (84 - 84)  RR: 27 (08 Jan 2020 10:00) (17 - 42)  SpO2: 97% (08 Jan 2020 10:00) (92% - 100%)                          8.4    14.48 )-----------( 259      ( 08 Jan 2020 04:18 )             27.4    01-08    138  |  101  |  93<H>  ----------------------------<  162<H>  5.1   |  20<L>  |  2.70<H>    Ca    8.9      08 Jan 2020 04:18  Phos  7.5     01-08  Mg     2.1     01-08    TPro  6.1  /  Alb  2.0<L>  /  TBili  0.3  /  DBili  x   /  AST  22  /  ALT  19  /  AlkPhos  84  01-08   PT/INR - ( 06 Jan 2020 13:37 )   PT: 15.8 sec;   INR: 1.36 ratio         PTT - ( 06 Jan 2020 13:37 )  PTT:25.8 sec    PHYSICAL EXAM:  Gen: sedated  Skin: No rashes, bruises, or lesions  Head: Normocephalic, Atraumatic  Face: no edema, erythema, or fluctuance.  Eyes: no scleral injection  Nose: Nares bilaterally patent, no discharge  Mouth: ETT in place, no bleeding noted  Neck: Flat, supple, no lymphadenopathy, trachea midline, no masses  Lymphatic: No lymphadenopathy  Resp: breathing easily, no stridor  CV: no peripheral edema/cyanosis  GI: nondistended   Peripheral vascular: no JVD or edema  Neuro: unable to assess due to sedation    Fiberoptic Indirect laryngoscopy:  (Scope #2 used)  Reason for Laryngoscopy: dysphagia    Patient was unable to cooperate with mirror.  Nasopharynx, oropharynx, and hypopharynx clear, no bleeding. Tongue base, posterior pharyngeal wall, vallecula, epiglottis, and subglottis difficult to visualize due to ETT. + dry crusting noted in larynx, suctioned out. No erythema, edema, masses or lesions noted. ETT in place between vocal cords.     Procedure Note:  Procedure: NGT placement  Diagnosis: dysphagia  Verbal consent obtained from patient. Lube applied to small [place] sump tube. Small [place] sump tube advanced through right nare without resistance under visualization using indirect laryngoscope. Tip of tube visualized in pyriform sinu and advanced through esophageal inlet without resistance. Placement confirmed with auscultation of air in stomach. Pending CXR confirmation. CC: dysphagia, NGT placement    HPI: 78yo male with PMHx CAD s/p PCI 1/2017, HTN, HLD, GERD, and chronic back pain, s/p multiple laminectomies and spinal fusion, admitted for back pain. ENT called to evaluate for dysphagia and NGT placement. Pt is currently intubated for respiratory failure, now requiring bipap. Team attempted NGT placement with difficulty due to intubation.     PAST MEDICAL & SURGICAL HISTORY:  CAD S/P percutaneous coronary angioplasty  HLD (hyperlipidemia)  Back pain  OA (osteoarthritis)  Gout  HTN (hypertension)  GERD (gastroesophageal reflux disease)  S/P lumbar spine operation: 6 thoracic to lumbar surgeries over 6 months period starting 11/2006  S/P knee replacement: 11/2013    Allergies    Indocin (Unknown)  oxacillin (Unknown)  Zyvox (Unknown)    Intolerances      MEDICATIONS  (STANDING):  albuterol/ipratropium for Nebulization 3 milliLiter(s) Nebulizer every 6 hours  aspirin  chewable 81 milliGRAM(s) Oral daily  atorvastatin 10 milliGRAM(s) Oral at bedtime  chlorhexidine 0.12% Liquid 15 milliLiter(s) Oral Mucosa every 12 hours  chlorhexidine 4% Liquid 1 Application(s) Topical <User Schedule>  chlorhexidine 4% Liquid 1 Application(s) Topical <User Schedule>  DAPTOmycin IVPB 800 milliGRAM(s) IV Intermittent every 24 hours  dextrose 5%. 1000 milliLiter(s) (50 mL/Hr) IV Continuous <Continuous>  dextrose 50% Injectable 12.5 Gram(s) IV Push once  dextrose 50% Injectable 25 Gram(s) IV Push once  dextrose 50% Injectable 25 Gram(s) IV Push once  fentaNYL   Infusion. 0.5 MICROgram(s)/kG/Hr (2.607 mL/Hr) IV Continuous <Continuous>  heparin  Injectable 5000 Unit(s) SubCutaneous every 8 hours  insulin lispro (HumaLOG) corrective regimen sliding scale   SubCutaneous every 6 hours  lidocaine   Patch 1 Patch Transdermal daily  midazolam Infusion 0.02 mG/kG/Hr (2.086 mL/Hr) IV Continuous <Continuous>  norepinephrine Infusion 0.05 MICROgram(s)/kG/Min (9.778 mL/Hr) IV Continuous <Continuous>  oxymetazoline 0.05% Nasal Spray 2 Spray(s) Both Nostrils two times a day  pantoprazole  Injectable 40 milliGRAM(s) IV Push daily  polyethylene glycol 3350 17 Gram(s) Oral daily  senna 2 Tablet(s) Oral at bedtime  sodium chloride 0.65% Nasal 1 Spray(s) Both Nostrils three times a day  vasopressin Infusion 0.08 Unit(s)/Min (4.8 mL/Hr) IV Continuous <Continuous>    MEDICATIONS  (PRN):  bisacodyl Suppository 10 milliGRAM(s) Rectal daily PRN Constipation  dextrose 40% Gel 15 Gram(s) Oral once PRN Blood Glucose LESS THAN 70 milliGRAM(s)/deciliter  glucagon  Injectable 1 milliGRAM(s) IntraMuscular once PRN Glucose LESS THAN 70 milligrams/deciliter  sodium chloride 0.9% lock flush 10 milliLiter(s) IV Push every 1 hour PRN Pre/post blood products, medications, blood draw, and to maintain line patency  sodium chloride 0.9% lock flush 10 milliLiter(s) IV Push every 1 hour PRN Pre/post blood products, medications, blood draw, and to maintain line patency      Social History: denies smoking    Family history: Pt denies any significant family history     ROS:   ENT: all negative except as noted in HPI   CV: denies palpitations  Pulm: denies SOB, cough, hemoptysis  GI: denies change in apetite, indigestion, n/v  : denies pertinent urinary symptoms, urgency  Neuro: denies numbness/tingling, loss of sensation  Psych: denies anxiety  MS: denies muscle weakness, instability  Heme: denies easy bruising or bleeding  Endo: denies heat/cold intolerance, excessive sweating  Vascular: denies LE edema    Vital Signs Last 24 Hrs  T(C): 37 (08 Jan 2020 08:00), Max: 38.3 (08 Jan 2020 04:30)  T(F): 98.6 (08 Jan 2020 08:00), Max: 101 (08 Jan 2020 04:30)  HR: 94 (08 Jan 2020 10:00) (92 - 138)  BP: 111/708 (08 Jan 2020 08:00) (111/708 - 111/708)  BP(mean): 84 (08 Jan 2020 08:00) (84 - 84)  RR: 27 (08 Jan 2020 10:00) (17 - 42)  SpO2: 97% (08 Jan 2020 10:00) (92% - 100%)                          8.4    14.48 )-----------( 259      ( 08 Jan 2020 04:18 )             27.4    01-08    138  |  101  |  93<H>  ----------------------------<  162<H>  5.1   |  20<L>  |  2.70<H>    Ca    8.9      08 Jan 2020 04:18  Phos  7.5     01-08  Mg     2.1     01-08    TPro  6.1  /  Alb  2.0<L>  /  TBili  0.3  /  DBili  x   /  AST  22  /  ALT  19  /  AlkPhos  84  01-08   PT/INR - ( 06 Jan 2020 13:37 )   PT: 15.8 sec;   INR: 1.36 ratio         PTT - ( 06 Jan 2020 13:37 )  PTT:25.8 sec    PHYSICAL EXAM:  Gen: sedated  Skin: No rashes, bruises, or lesions  Head: Normocephalic, Atraumatic  Face: no edema, erythema, or fluctuance.  Eyes: no scleral injection  Nose: Nares bilaterally patent, no discharge  Mouth: ETT in place, no bleeding noted  Neck: Flat, supple, no lymphadenopathy, trachea midline, no masses  Lymphatic: No lymphadenopathy  Resp: breathing easily, no stridor  CV: no peripheral edema/cyanosis  GI: nondistended   Peripheral vascular: no JVD or edema  Neuro: unable to assess due to sedation    Fiberoptic Indirect laryngoscopy:  (Scope #2 used)  Reason for Laryngoscopy: dysphagia    Patient was unable to cooperate with mirror.  Nasopharynx, oropharynx, and hypopharynx clear, no bleeding. Tongue base, posterior pharyngeal wall, vallecula, epiglottis, and subglottis difficult to visualize due to ETT. + dry crusting noted in larynx, suctioned out. No erythema, edema, masses or lesions noted. ETT in place between vocal cords limiting exam.    Procedure Note:	  Procedure: NGT placement  Diagnosis: dysphagia  Verbal consent obtained from patient. Lube applied to small [place] sump tube. Small [place] sump tube advanced through right nare without resistance under visualization using indirect laryngoscope. Tip of tube visualized in pyriform sinu and advanced through esophageal inlet without resistance. Placement confirmed with auscultation of air in stomach. Pending CXR confirmation.

## 2020-01-08 NOTE — PROGRESS NOTE ADULT - SUBJECTIVE AND OBJECTIVE BOX
CC: F/U for Fever    Saw/spoke to patient. Fevers, no chills. No new complaints. Intubated in CCU now. Not able to communicate. Son at bedside.    Allergies  Indocin (Unknown)  oxacillin (Unknown)  Zyvox (Unknown)    ANTIMICROBIALS:  DAPTOmycin IVPB 800 every 24 hours    PE:    Vital Signs Last 24 Hrs  T(C): 36.8 (2020 11:45), Max: 38.3 (2020 04:30)  T(F): 98.2 (2020 11:45), Max: 101 (2020 04:30)  HR: 96 (2020 11:45) (86 - 138)  BP(mean): 84 (2020 08:00) (84 - 84)  RR: 28 (2020 11:45) (11 - 42)  SpO2: 97% (2020 11:45) (92% - 100%)    Gen: AOx0, NAD, poorly responsive  CV: S1+S2 normal, nontachycardic  Resp: Intubated, no crackles, no wheeze  Abd: Soft, nontender, +BS  Ext: No LE edema, no wounds    LABS:                        8.4    14.48 )-----------( 259      ( 2020 04:18 )             27.4     01-08    138  |  101  |  93<H>  ----------------------------<  162<H>  5.1   |  20<L>  |  2.70<H>    Ca    8.9      2020 04:18  Phos  7.5     01-08  Mg     2.1     01-08    TPro  6.1  /  Alb  2.0<L>  /  TBili  0.3  /  DBili  x   /  AST  22  /  ALT  19  /  AlkPhos  84  01-08    Urinalysis Basic - ( 2020 09:12 )    Color: Yellow / Appearance: Slightly Turbid / S.021 / pH: x  Gluc: x / Ketone: Negative  / Bili: Negative / Urobili: Negative   Blood: x / Protein: 30 mg/dL / Nitrite: Negative   Leuk Esterase: Large / RBC: 67 /hpf / WBC 34 /HPF   Sq Epi: x / Non Sq Epi: 1 /hpf / Bacteria: Negative    MICROBIOLOGY:    Bronch Wash Combicath  20 --  --    No polymorphonuclear cells seen per low power field  No squamous epithelial cells per low power field  No organisms seen per oil power field    .Blood Blood  19   No growth at 5 days.    .Blood Blood-Peripheral  19   No growth at 5 days.    .Blood Blood  19   Growth in aerobic and anaerobic bottles: Staphylococcus aureus  See previous culture [id number]  --    Growth in aerobic and anaerobic bottles: Gram Positive Cocci in Clusters    Rapid RVP Result: Detected ( @ 01:44) CoV    (otherwise reviewed)    RADIOLOGY:     XR:    Impression:    The heart is slightly enlarged. Changes of the lungs compatible with pulmonary edema. Endotracheal tube is in good position. A transvenous pacer is present and the tip is in the right ventricular. Orthopedic hardware is seen in the lower thoracic spine.

## 2020-01-08 NOTE — PROGRESS NOTE ADULT - ATTENDING COMMENTS
Patient is seen and examined with fellow, NP and the CCU house-staff. I agree with the history, physical and the assessment and plan.  intubated for airway protection   swan numbers indicate vasodilatory shock  c/w abx  lighten the sedation  ENT for NGT placement

## 2020-01-08 NOTE — CHART NOTE - NSCHARTNOTEFT_GEN_A_CORE
Nutrition Follow Up Note    Patient seen for nutrition follow up.     Source: Comprehensive chart review, RN    Chart reviewed, events noted. Per chart, pt is a 79 year old male with PMH significant for CAD s/p PCI, GERD, gout, OA, s/p multiple laminectomies and spinal fusions, hospital course c/b MSSA bacteremia, ESRD requiring urgent HD on this admission, S/P Shiley placement and removal now transferred to CCU for urgent HD in the setting of severe hypervolemia and hypoxic respiratory failure. Intubated . Nephrology following for HD, CVVHD stopped  once wedge pressure found to be 11, RA pressure of 5. Per RN, team attempted NGT placement with difficulty; plan to consult ENT. Per chart, last HD  2000ml output.     Diet : NPO (since , x2 days)    GI: Patient intubated/sedated. Per RN, hypoactive bowel sounds. Last BM .      PO intake : Per chart, prior to intubation, pts PO intake is suspected to be less than that of estimated needs with consideration for frequent meals held in setting of BiPAP therapy.      Source for PO intake: nursing flowsheets     Enteral/Parenteral Nutrition: Per RN, plan to consult ENT to obtain feeding access.     Daily Weight in k ()    Pertinent Medications: MEDICATIONS  (STANDING):  albuterol/ipratropium for Nebulization 3 milliLiter(s) Nebulizer every 6 hours  aspirin  chewable 81 milliGRAM(s) Oral daily  atorvastatin 10 milliGRAM(s) Oral at bedtime  chlorhexidine 0.12% Liquid 15 milliLiter(s) Oral Mucosa every 12 hours  chlorhexidine 4% Liquid 1 Application(s) Topical <User Schedule>  chlorhexidine 4% Liquid 1 Application(s) Topical <User Schedule>  DAPTOmycin IVPB 800 milliGRAM(s) IV Intermittent every 24 hours  dextrose 5%. 1000 milliLiter(s) (50 mL/Hr) IV Continuous <Continuous>  dextrose 50% Injectable 12.5 Gram(s) IV Push once  dextrose 50% Injectable 25 Gram(s) IV Push once  dextrose 50% Injectable 25 Gram(s) IV Push once  fentaNYL   Infusion. 0.5 MICROgram(s)/kG/Hr (2.607 mL/Hr) IV Continuous <Continuous>  heparin  Injectable 5000 Unit(s) SubCutaneous every 8 hours  insulin lispro (HumaLOG) corrective regimen sliding scale   SubCutaneous every 6 hours  lidocaine   Patch 1 Patch Transdermal daily  midazolam Infusion 0.02 mG/kG/Hr (2.086 mL/Hr) IV Continuous <Continuous>  norepinephrine Infusion 0.05 MICROgram(s)/kG/Min (9.778 mL/Hr) IV Continuous <Continuous>  oxymetazoline 0.05% Nasal Spray 2 Spray(s) Both Nostrils two times a day  pantoprazole  Injectable 40 milliGRAM(s) IV Push daily  polyethylene glycol 3350 17 Gram(s) Oral daily  senna 2 Tablet(s) Oral at bedtime  sodium chloride 0.65% Nasal 1 Spray(s) Both Nostrils three times a day  vasopressin Infusion 0.08 Unit(s)/Min (4.8 mL/Hr) IV Continuous <Continuous>    MEDICATIONS  (PRN):  bisacodyl Suppository 10 milliGRAM(s) Rectal daily PRN Constipation  dextrose 40% Gel 15 Gram(s) Oral once PRN Blood Glucose LESS THAN 70 milliGRAM(s)/deciliter  glucagon  Injectable 1 milliGRAM(s) IntraMuscular once PRN Glucose LESS THAN 70 milligrams/deciliter  sodium chloride 0.9% lock flush 10 milliLiter(s) IV Push every 1 hour PRN Pre/post blood products, medications, blood draw, and to maintain line patency  sodium chloride 0.9% lock flush 10 milliLiter(s) IV Push every 1 hour PRN Pre/post blood products, medications, blood draw, and to maintain line patency    Pertinent Labs:  @ 08:56: Na --, BUN --, Cr --, BG --, K+ --, Phos --, Mg --, Alk Phos --, ALT/SGPT --, AST/SGOT --, HbA1c 7.3<H>   @ 04:18: Na 138, BUN 93<H>, Cr 2.70<H>, <H>, K+ 5.1, Phos 7.5<H>, Mg 2.1, Alk Phos 84, ALT/SGPT 19, AST/SGOT 22, HbA1c --   @ 21:14: Na 139, BUN 88<H>, Cr 2.38<H>, <H>, K+ 4.3, Phos 6.5<H>, Mg 2.2, Alk Phos 88, ALT/SGPT 21, AST/SGOT 25, HbA1c --   @ 12:45: Na 136, <H>, Cr 2.50<H>, <H>, K+ 4.1, Phos 5.5<H>, Mg 2.0, Alk Phos 88, ALT/SGPT 21, AST/SGOT 30, HbA1c --    Finger Sticks:  POCT Blood Glucose.: 167 mg/dL ( @ 11:09)  POCT Blood Glucose.: 149 mg/dL ( @ 07:38)  POCT Blood Glucose.: 142 mg/dL ( @ 16:05)  POCT Blood Glucose.: 125 mg/dL ( @ 12:07)      Skin per nursing documentation: bilateral buttock wounds (not documented as pressure injuries at this time)  Edema per nursing documentation: 2+ generalized, 3+ bilateral ankles/feet, 3+ left hand    Estimated Needs with consideration for obesity, HD:     - Based on IBW of 89 kg     - Estimated Energy Needs (25-30 kcal/kg): 7929-6426 kcal/day    - Estimated Protein Needs (1.2-1.4 gm/kg): 107-125 gm/day    Previous Nutrition Diagnosis: Inadequate Energy Intake  Nutrition Diagnosis is: ongoing in setting of NPO status x2 days    Previous Nutrition Diagnosis #2:  Increased Nutrient needs   Nutrition Diagnosis is: pressure injuries discontinued; however, remains appropriate in setting of renal replacement therapy    New Nutrition Diagnosis: none     Interventions:     Recommend  1) Defer initiation of enteral nutrition to medical team. If warranted, consider Nepro 1.8 at goal rate of 55 ml/hr x 24 hours. At goal, regimen provides: 1.32 L formula, 2376 kcal, 107 gm, 960 ml free water (meeting 26.7 kcal, 1.2 gm/kg protein using IBW 89kg).     Monitoring and Evaluation:   - Continue to monitor nutritional intake, tolerance to diet prescription, weights, labs, skin integrity  - RD remains available upon request and will follow up per protocol    Gila Curtis RD CDN    Pager# [phone]

## 2020-01-09 LAB
ALBUMIN SERPL ELPH-MCNC: 1.9 G/DL — LOW (ref 3.3–5)
ALBUMIN SERPL ELPH-MCNC: 2.1 G/DL — LOW (ref 3.3–5)
ALBUMIN SERPL ELPH-MCNC: 3 G/DL — LOW (ref 3.3–5)
ALP SERPL-CCNC: 120 U/L — SIGNIFICANT CHANGE UP (ref 40–120)
ALP SERPL-CCNC: 72 U/L — SIGNIFICANT CHANGE UP (ref 40–120)
ALP SERPL-CCNC: 83 U/L — SIGNIFICANT CHANGE UP (ref 40–120)
ALT FLD-CCNC: 104 U/L — HIGH (ref 10–45)
ALT FLD-CCNC: 14 U/L — SIGNIFICANT CHANGE UP (ref 10–45)
ALT FLD-CCNC: 16 U/L — SIGNIFICANT CHANGE UP (ref 10–45)
ANION GAP SERPL CALC-SCNC: 10 MMOL/L — SIGNIFICANT CHANGE UP (ref 5–17)
ANION GAP SERPL CALC-SCNC: 17 MMOL/L — SIGNIFICANT CHANGE UP (ref 5–17)
ANION GAP SERPL CALC-SCNC: 19 MMOL/L — HIGH (ref 5–17)
AST SERPL-CCNC: 20 U/L — SIGNIFICANT CHANGE UP (ref 10–40)
AST SERPL-CCNC: 20 U/L — SIGNIFICANT CHANGE UP (ref 10–40)
AST SERPL-CCNC: 46 U/L — HIGH (ref 10–40)
BASE EXCESS BLDA CALC-SCNC: -1.5 MMOL/L — SIGNIFICANT CHANGE UP (ref -2–2)
BASOPHILS # BLD AUTO: 0.01 K/UL — SIGNIFICANT CHANGE UP (ref 0–0.2)
BASOPHILS # BLD AUTO: 0.01 K/UL — SIGNIFICANT CHANGE UP (ref 0–0.2)
BASOPHILS NFR BLD AUTO: 0.1 % — SIGNIFICANT CHANGE UP (ref 0–2)
BASOPHILS NFR BLD AUTO: 0.1 % — SIGNIFICANT CHANGE UP (ref 0–2)
BILIRUB SERPL-MCNC: 0.3 MG/DL — SIGNIFICANT CHANGE UP (ref 0.2–1.2)
BILIRUB SERPL-MCNC: 0.4 MG/DL — SIGNIFICANT CHANGE UP (ref 0.2–1.2)
BILIRUB SERPL-MCNC: 0.7 MG/DL — SIGNIFICANT CHANGE UP (ref 0.2–1.2)
BUN SERPL-MCNC: 14 MG/DL — SIGNIFICANT CHANGE UP (ref 7–23)
BUN SERPL-MCNC: 82 MG/DL — HIGH (ref 7–23)
BUN SERPL-MCNC: 99 MG/DL — HIGH (ref 7–23)
CALCIUM SERPL-MCNC: 8.3 MG/DL — LOW (ref 8.4–10.5)
CALCIUM SERPL-MCNC: 8.4 MG/DL — SIGNIFICANT CHANGE UP (ref 8.4–10.5)
CALCIUM SERPL-MCNC: 8.8 MG/DL — SIGNIFICANT CHANGE UP (ref 8.4–10.5)
CHLORIDE SERPL-SCNC: 100 MMOL/L — SIGNIFICANT CHANGE UP (ref 96–108)
CHLORIDE SERPL-SCNC: 103 MMOL/L — SIGNIFICANT CHANGE UP (ref 96–108)
CHLORIDE SERPL-SCNC: 99 MMOL/L — SIGNIFICANT CHANGE UP (ref 96–108)
CO2 BLDA-SCNC: 22 MMOL/L — SIGNIFICANT CHANGE UP (ref 22–30)
CO2 SERPL-SCNC: 18 MMOL/L — LOW (ref 22–31)
CO2 SERPL-SCNC: 19 MMOL/L — LOW (ref 22–31)
CO2 SERPL-SCNC: 23 MMOL/L — SIGNIFICANT CHANGE UP (ref 22–31)
CREAT SERPL-MCNC: 0.78 MG/DL — SIGNIFICANT CHANGE UP (ref 0.5–1.3)
CREAT SERPL-MCNC: 2.27 MG/DL — HIGH (ref 0.5–1.3)
CREAT SERPL-MCNC: 3.02 MG/DL — HIGH (ref 0.5–1.3)
CULTURE RESULTS: SIGNIFICANT CHANGE UP
EOSINOPHIL # BLD AUTO: 0.05 K/UL — SIGNIFICANT CHANGE UP (ref 0–0.5)
EOSINOPHIL # BLD AUTO: 0.34 K/UL — SIGNIFICANT CHANGE UP (ref 0–0.5)
EOSINOPHIL NFR BLD AUTO: 0.5 % — SIGNIFICANT CHANGE UP (ref 0–6)
EOSINOPHIL NFR BLD AUTO: 3 % — SIGNIFICANT CHANGE UP (ref 0–6)
GAS PNL BLDA: SIGNIFICANT CHANGE UP
GLUCOSE BLDC GLUCOMTR-MCNC: 161 MG/DL — HIGH (ref 70–99)
GLUCOSE BLDC GLUCOMTR-MCNC: 166 MG/DL — HIGH (ref 70–99)
GLUCOSE BLDC GLUCOMTR-MCNC: 167 MG/DL — HIGH (ref 70–99)
GLUCOSE SERPL-MCNC: 152 MG/DL — HIGH (ref 70–99)
GLUCOSE SERPL-MCNC: 174 MG/DL — HIGH (ref 70–99)
GLUCOSE SERPL-MCNC: 90 MG/DL — SIGNIFICANT CHANGE UP (ref 70–99)
HCO3 BLDA-SCNC: 22 MMOL/L — SIGNIFICANT CHANGE UP (ref 21–29)
HCT VFR BLD CALC: 23.8 % — LOW (ref 39–50)
HCT VFR BLD CALC: 24.5 % — LOW (ref 39–50)
HCT VFR BLD CALC: 26.6 % — LOW (ref 39–50)
HGB BLD-MCNC: 7.3 G/DL — LOW (ref 13–17)
HGB BLD-MCNC: 7.3 G/DL — LOW (ref 13–17)
HGB BLD-MCNC: 8.4 G/DL — LOW (ref 13–17)
HOROWITZ INDEX BLDA+IHG-RTO: 35 — SIGNIFICANT CHANGE UP
IMM GRANULOCYTES NFR BLD AUTO: 0.4 % — SIGNIFICANT CHANGE UP (ref 0–1.5)
IMM GRANULOCYTES NFR BLD AUTO: 0.7 % — SIGNIFICANT CHANGE UP (ref 0–1.5)
LYMPHOCYTES # BLD AUTO: 0.51 K/UL — LOW (ref 1–3.3)
LYMPHOCYTES # BLD AUTO: 0.56 K/UL — LOW (ref 1–3.3)
LYMPHOCYTES # BLD AUTO: 4.5 % — LOW (ref 13–44)
LYMPHOCYTES # BLD AUTO: 5.5 % — LOW (ref 13–44)
MAGNESIUM SERPL-MCNC: 2 MG/DL — SIGNIFICANT CHANGE UP (ref 1.6–2.6)
MAGNESIUM SERPL-MCNC: 2.1 MG/DL — SIGNIFICANT CHANGE UP (ref 1.6–2.6)
MAGNESIUM SERPL-MCNC: 2.2 MG/DL — SIGNIFICANT CHANGE UP (ref 1.6–2.6)
MCHC RBC-ENTMCNC: 28.3 PG — SIGNIFICANT CHANGE UP (ref 27–34)
MCHC RBC-ENTMCNC: 28.6 PG — SIGNIFICANT CHANGE UP (ref 27–34)
MCHC RBC-ENTMCNC: 28.8 PG — SIGNIFICANT CHANGE UP (ref 27–34)
MCHC RBC-ENTMCNC: 29.8 GM/DL — LOW (ref 32–36)
MCHC RBC-ENTMCNC: 30.7 GM/DL — LOW (ref 32–36)
MCHC RBC-ENTMCNC: 31.6 GM/DL — LOW (ref 32–36)
MCV RBC AUTO: 91.1 FL — SIGNIFICANT CHANGE UP (ref 80–100)
MCV RBC AUTO: 93.3 FL — SIGNIFICANT CHANGE UP (ref 80–100)
MCV RBC AUTO: 95 FL — SIGNIFICANT CHANGE UP (ref 80–100)
MONOCYTES # BLD AUTO: 0.18 K/UL — SIGNIFICANT CHANGE UP (ref 0–0.9)
MONOCYTES # BLD AUTO: 0.24 K/UL — SIGNIFICANT CHANGE UP (ref 0–0.9)
MONOCYTES NFR BLD AUTO: 1.6 % — LOW (ref 2–14)
MONOCYTES NFR BLD AUTO: 2.3 % — SIGNIFICANT CHANGE UP (ref 2–14)
NEUTROPHILS # BLD AUTO: 10.16 K/UL — HIGH (ref 1.8–7.4)
NEUTROPHILS # BLD AUTO: 9.31 K/UL — HIGH (ref 1.8–7.4)
NEUTROPHILS NFR BLD AUTO: 90.4 % — HIGH (ref 43–77)
NEUTROPHILS NFR BLD AUTO: 90.9 % — HIGH (ref 43–77)
NRBC # BLD: 0 /100 WBCS — SIGNIFICANT CHANGE UP (ref 0–0)
PCO2 BLDA: 31 MMHG — LOW (ref 32–46)
PH BLDA: 7.46 — HIGH (ref 7.35–7.45)
PHOSPHATE SERPL-MCNC: 3 MG/DL — SIGNIFICANT CHANGE UP (ref 2.5–4.5)
PHOSPHATE SERPL-MCNC: 5.1 MG/DL — HIGH (ref 2.5–4.5)
PHOSPHATE SERPL-MCNC: 6.9 MG/DL — HIGH (ref 2.5–4.5)
PLATELET # BLD AUTO: 194 K/UL — SIGNIFICANT CHANGE UP (ref 150–400)
PLATELET # BLD AUTO: 200 K/UL — SIGNIFICANT CHANGE UP (ref 150–400)
PLATELET # BLD AUTO: 224 K/UL — SIGNIFICANT CHANGE UP (ref 150–400)
PO2 BLDA: 137 MMHG — HIGH (ref 74–108)
POTASSIUM SERPL-MCNC: 3.8 MMOL/L — SIGNIFICANT CHANGE UP (ref 3.5–5.3)
POTASSIUM SERPL-MCNC: 4.1 MMOL/L — SIGNIFICANT CHANGE UP (ref 3.5–5.3)
POTASSIUM SERPL-MCNC: 4.9 MMOL/L — SIGNIFICANT CHANGE UP (ref 3.5–5.3)
POTASSIUM SERPL-SCNC: 3.8 MMOL/L — SIGNIFICANT CHANGE UP (ref 3.5–5.3)
POTASSIUM SERPL-SCNC: 4.1 MMOL/L — SIGNIFICANT CHANGE UP (ref 3.5–5.3)
POTASSIUM SERPL-SCNC: 4.9 MMOL/L — SIGNIFICANT CHANGE UP (ref 3.5–5.3)
PROT SERPL-MCNC: 5.6 G/DL — LOW (ref 6–8.3)
PROT SERPL-MCNC: 5.8 G/DL — LOW (ref 6–8.3)
PROT SERPL-MCNC: 6.2 G/DL — SIGNIFICANT CHANGE UP (ref 6–8.3)
RBC # BLD: 2.55 M/UL — LOW (ref 4.2–5.8)
RBC # BLD: 2.58 M/UL — LOW (ref 4.2–5.8)
RBC # BLD: 2.92 M/UL — LOW (ref 4.2–5.8)
RBC # FLD: 14.7 % — HIGH (ref 10.3–14.5)
RBC # FLD: 15.1 % — HIGH (ref 10.3–14.5)
RBC # FLD: 15.1 % — HIGH (ref 10.3–14.5)
SAO2 % BLDA: 97 % — HIGH (ref 92–96)
SODIUM SERPL-SCNC: 132 MMOL/L — LOW (ref 135–145)
SODIUM SERPL-SCNC: 136 MMOL/L — SIGNIFICANT CHANGE UP (ref 135–145)
SODIUM SERPL-SCNC: 140 MMOL/L — SIGNIFICANT CHANGE UP (ref 135–145)
SPECIMEN SOURCE: SIGNIFICANT CHANGE UP
WBC # BLD: 10.24 K/UL — SIGNIFICANT CHANGE UP (ref 3.8–10.5)
WBC # BLD: 10.98 K/UL — HIGH (ref 3.8–10.5)
WBC # BLD: 11.25 K/UL — HIGH (ref 3.8–10.5)
WBC # FLD AUTO: 10.24 K/UL — SIGNIFICANT CHANGE UP (ref 3.8–10.5)
WBC # FLD AUTO: 10.98 K/UL — HIGH (ref 3.8–10.5)
WBC # FLD AUTO: 11.25 K/UL — HIGH (ref 3.8–10.5)

## 2020-01-09 PROCEDURE — 71045 X-RAY EXAM CHEST 1 VIEW: CPT | Mod: 26,77

## 2020-01-09 PROCEDURE — 99233 SBSQ HOSP IP/OBS HIGH 50: CPT

## 2020-01-09 PROCEDURE — ZZZZZ: CPT

## 2020-01-09 PROCEDURE — 93010 ELECTROCARDIOGRAM REPORT: CPT

## 2020-01-09 PROCEDURE — 71045 X-RAY EXAM CHEST 1 VIEW: CPT | Mod: 26

## 2020-01-09 PROCEDURE — 99232 SBSQ HOSP IP/OBS MODERATE 35: CPT

## 2020-01-09 RX ORDER — DEXMEDETOMIDINE HYDROCHLORIDE IN 0.9% SODIUM CHLORIDE 4 UG/ML
0.2 INJECTION INTRAVENOUS
Qty: 200 | Refills: 0 | Status: DISCONTINUED | OUTPATIENT
Start: 2020-01-09 | End: 2020-01-11

## 2020-01-09 RX ORDER — DAPTOMYCIN 500 MG/10ML
800 INJECTION, POWDER, LYOPHILIZED, FOR SOLUTION INTRAVENOUS
Refills: 0 | Status: DISCONTINUED | OUTPATIENT
Start: 2020-01-09 | End: 2020-01-11

## 2020-01-09 RX ADMIN — HEPARIN SODIUM 5000 UNIT(S): 5000 INJECTION INTRAVENOUS; SUBCUTANEOUS at 22:30

## 2020-01-09 RX ADMIN — Medication 9.78 MICROGRAM(S)/KG/MIN: at 19:30

## 2020-01-09 RX ADMIN — Medication 9.78 MICROGRAM(S)/KG/MIN: at 05:30

## 2020-01-09 RX ADMIN — HEPARIN SODIUM 5000 UNIT(S): 5000 INJECTION INTRAVENOUS; SUBCUTANEOUS at 16:15

## 2020-01-09 RX ADMIN — VASOPRESSIN 4.8 UNIT(S)/MIN: 20 INJECTION INTRAVENOUS at 19:30

## 2020-01-09 RX ADMIN — Medication 3 MILLILITER(S): at 18:32

## 2020-01-09 RX ADMIN — Medication 1: at 12:38

## 2020-01-09 RX ADMIN — Medication 1: at 18:27

## 2020-01-09 RX ADMIN — Medication 9.78 MICROGRAM(S)/KG/MIN: at 16:15

## 2020-01-09 RX ADMIN — Medication 1: at 01:31

## 2020-01-09 RX ADMIN — CHLORHEXIDINE GLUCONATE 1 APPLICATION(S): 213 SOLUTION TOPICAL at 05:31

## 2020-01-09 RX ADMIN — ATORVASTATIN CALCIUM 10 MILLIGRAM(S): 80 TABLET, FILM COATED ORAL at 23:18

## 2020-01-09 RX ADMIN — CHLORHEXIDINE GLUCONATE 15 MILLILITER(S): 213 SOLUTION TOPICAL at 18:26

## 2020-01-09 RX ADMIN — HEPARIN SODIUM 5000 UNIT(S): 5000 INJECTION INTRAVENOUS; SUBCUTANEOUS at 05:29

## 2020-01-09 RX ADMIN — PANTOPRAZOLE SODIUM 40 MILLIGRAM(S): 20 TABLET, DELAYED RELEASE ORAL at 12:37

## 2020-01-09 RX ADMIN — VASOPRESSIN 4.8 UNIT(S)/MIN: 20 INJECTION INTRAVENOUS at 05:30

## 2020-01-09 RX ADMIN — Medication 3 MILLILITER(S): at 11:29

## 2020-01-09 RX ADMIN — Medication 3 MILLILITER(S): at 23:51

## 2020-01-09 RX ADMIN — Medication 1 APPLICATION(S): at 23:22

## 2020-01-09 RX ADMIN — Medication 3 MILLILITER(S): at 05:17

## 2020-01-09 RX ADMIN — Medication 3 MILLILITER(S): at 00:03

## 2020-01-09 RX ADMIN — SENNA PLUS 2 TABLET(S): 8.6 TABLET ORAL at 23:22

## 2020-01-09 RX ADMIN — LIDOCAINE 1 PATCH: 4 CREAM TOPICAL at 12:37

## 2020-01-09 RX ADMIN — CHLORHEXIDINE GLUCONATE 15 MILLILITER(S): 213 SOLUTION TOPICAL at 05:29

## 2020-01-09 RX ADMIN — DAPTOMYCIN 132 MILLIGRAM(S): 500 INJECTION, POWDER, LYOPHILIZED, FOR SOLUTION INTRAVENOUS at 13:13

## 2020-01-09 RX ADMIN — LIDOCAINE 1 PATCH: 4 CREAM TOPICAL at 19:26

## 2020-01-09 NOTE — CHART NOTE - NSCHARTNOTEFT_GEN_A_CORE
Patient seen for nutrition consult for "Nutrition Support Team".     Source: chart review, medical team, RN    Chart reviewed, events noted. Per chart, pt is a 79 year old male with PMH significant for CAD s/p PCI, GERD, gout, OA, s/p multiple laminectomies and spinal fusions, hospital course c/b MSSA bacteremia, ESRD requiring urgent HD on this admission, S/P Shiley placement and removal now transferred to CCU for urgent HD in the setting of severe hypervolemia and hypoxic respiratory failure. Intubated 1/7. Nephrology following for HD, CVVHD stopped 1/7 once wedge pressure found to be 11, RA pressure of 5. Per nephrology note on 1/8, "cvvhdf attempted to cont with bfr 150 cc/min dfr 1200 no fluid removal". Per ENT note, NGT placed 1/8. Pt was ordered for Nepro at 60 ml/hr x 24 hours, but unclear per RN if any was received. Per discussion with RN/team, pt currently NPO for plan for possible extubation later today.     Diet : NPO     Estimated Needs with consideration for HD:     - Based on IBW of 89 kg     - Estimated Energy Needs (25-30 kcal/kg): 4708-6120 kcal/day    - Estimated Protein Needs (1.2-1.4 gm/kg): 107-125 gm/day    Previous Nutrition Diagnosis #1: Inadequate Energy Intake  Nutrition Diagnosis remains appropriate in setting of NPO status     Previous Nutrition Diagnosis #2:  Increased Nutrient needs   Nutrition Diagnosis remains appropriate in setting of renal replacement therapy    Recommendations:   1) Defer initiation of enteral nutrition to medical team. If warranted, recommend Nepro 1.8 at goal rate of 55 ml/hr x 24 hours. At goal, regimen provides: 1.32 L formula, 2376 kcal, 107 gm, 960 ml free water (meeting 26.7 kcal, 1.2 gm/kg protein using IBW 89kg). Of note, this formula is Kosher.   2) Defer initiation of PO diet to medical team. If warranted, recommend Kosher + Renal diet. Defer texture/consistency to medical team; pt previously tolerating Dysphagia 3 with Nectar thick liquids as of 12/30.     Monitoring and Evaluation:   - Continue to monitor nutritional intake, tolerance to diet prescription, weights, labs, skin integrity  - RD remains available upon request and will follow up per protocol    Gila Curtis RD CDN    Pager# [phone]. Patient seen for nutrition consult for "Nutrition Support Team".     Source: chart review, medical team, RN    Chart reviewed, events noted. Per chart, pt is a 79 year old male with PMH significant for CAD s/p PCI, GERD, gout, OA, s/p multiple laminectomies and spinal fusions, hospital course c/b MSSA bacteremia, ESRD requiring urgent HD on this admission, S/P Shiley placement and removal now transferred to CCU for urgent HD in the setting of severe hypervolemia and hypoxic respiratory failure. Intubated 1/7. Nephrology following for HD, CVVHD stopped 1/7 once wedge pressure found to be 11, RA pressure of 5. Per nephrology note on 1/8, "cvvhdf attempted to cont with bfr 150 cc/min dfr 1200 no fluid removal". Per ENT note, NGT placed 1/8. Pt was ordered for Nepro at 60 ml/hr x 24 hours, but unclear per RN if any was received. Per discussion with RN/team, pt currently NPO for plan for possible extubation later today.     Diet : NPO     Estimated Needs with consideration for HD:     - Based on IBW of 89 kg     - Estimated Energy Needs (25-30 kcal/kg): 3444-4002 kcal/day    - Estimated Protein Needs (1.2-1.4 gm/kg): 107-125 gm/day    Previous Nutrition Diagnosis #1: Inadequate Energy Intake  Nutrition Diagnosis remains appropriate in setting of NPO status     Previous Nutrition Diagnosis #2:  Increased Nutrient needs   Nutrition Diagnosis remains appropriate in setting of renal replacement therapy    Recommendations:   1) Defer initiation of enteral nutrition to medical team. If warranted, recommend Nepro 1.8 at goal rate of 55 ml/hr x 24 hours. At goal, regimen provides: 1.32 L formula, 2376 kcal, 107 gm, 960 ml free water (meeting 26.7 kcal, 1.2 gm/kg protein using IBW 89kg).   2) Defer initiation of PO diet to medical team. If warranted, recommend Kosher + Renal diet. Defer texture/consistency to medical team; pt previously tolerating Dysphagia 3 with Nectar thick liquids as of 12/30.     Monitoring and Evaluation:   - Continue to monitor nutritional intake, tolerance to diet prescription, weights, labs, skin integrity  - RD remains available upon request and will follow up per protocol    Gila Curtis RD CDN    Pager# [phone].

## 2020-01-09 NOTE — PROGRESS NOTE ADULT - SUBJECTIVE AND OBJECTIVE BOX
CC: F/U for Bacteremia    Saw/spoke to patient. No fevers, no chills. Remains intubated. No new events.    Allergies  Indocin (Unknown)  oxacillin (Unknown)  Zyvox (Unknown)    ANTIMICROBIALS:  DAPTOmycin IVPB 800 every 24 hours    PE:    Vital Signs Last 24 Hrs  T(C): 37.1 (2020 12:15), Max: 37.1 (2020 12:15)  T(F): 98.8 (2020 12:15), Max: 98.8 (2020 12:15)  HR: 120 (2020 13:00) (80 - 132)  RR: 21 (2020 13:00) (8 - 35)  SpO2: 94% (2020 13:00) (93% - 100%)    Gen: AOx0-1, NAD, chronically ill  CV: S1+S2 normal, tachycardic  Resp: Clear bilat, no resp distress, no crackles/wheezes, trach  Abd: Soft, nontender, +BS  Ext: No LE edema, no wounds    LABS:                        8.4    11.25 )-----------( 224      ( 2020 04:27 )             26.6     01-09    136  |  100  |  82<H>  ----------------------------<  152<H>  4.1   |  19<L>  |  2.27<H>    Ca    8.8      2020 04:27  Phos  5.1     -  Mg     2.1         TPro  6.2  /  Alb  2.1<L>  /  TBili  0.4  /  DBili  x   /  AST  20  /  ALT  14  /  AlkPhos  83  01-09    Urinalysis Basic - ( 2020 09:12 )    Color: Yellow / Appearance: Slightly Turbid / S.021 / pH: x  Gluc: x / Ketone: Negative  / Bili: Negative / Urobili: Negative   Blood: x / Protein: 30 mg/dL / Nitrite: Negative   Leuk Esterase: Large / RBC: 67 /hpf / WBC 34 /HPF   Sq Epi: x / Non Sq Epi: 1 /hpf / Bacteria: Negative    MICROBIOLOGY:    Bronch Wash Combicath  20 --  --    Moderate polymorphonuclear leukocytes per low power field  No squamous epithelial cells per low power field  No organisms seen per oil power field    .Blood Blood  20   No growth to date.     Bronch Wash Combicath  20   No growth  No polymorphonuclear cells seen per low power field  No squamous epithelial cells per low power field  No organisms seen per oil power field    .Blood Blood  19   No growth at 5 days.    Rapid RVP Result: Detected ( @ 12:00) CoV    RADIOLOGY:     XR:    FINDINGS:  ET tube terminates in the midtrachea. A right IJ central venous catheter and right PICC line terminates in the SVC. Interval removal of an enteric tube.  Hazy bibasilar opacities may be artifactual secondary to patient obliquity.  No pneumothorax.  The cardiomediastinal silhouette is not well evaluated on this study.  Posterior spinal fusion.    IMPRESSION:   ET tube terminates in the midtrachea.

## 2020-01-09 NOTE — PROGRESS NOTE ADULT - ATTENDING COMMENTS
Agree with above NP note.  patient with hypoxic respiratory failure in setting of hypervolemia and worsening renal function with background mod-sev AS, severe cardiomyopathy  swan #'s consistent with distributive component with low filling pressures, low svr  peripheral edema, pulmonary edema in setting of hypoalbuminemia  cont vasopessor support  extubate per ccu   f/u blood cx  repeat TTE no obvious endocarditis  hd per renal

## 2020-01-09 NOTE — PROGRESS NOTE ADULT - ASSESSMENT
80 yo M with chronic low back pain sp multiple laminectomy and fusion p/w 1 week of worsening back pain along with weakness in bilateral lower extremities and inability to walk since last night  Leukocytosis, fever  BCX with MSSA, high grade, BCX 12/12 NGTD  NM tagged WBC uptake in thoracolumbar spine--degen disease?  TTE equivocal, JAHAIRA reassuring  High suspicion for spinal involvement despite negative MRI--monitor closely  New episode of shortness of breath and difficulty breathing (elevated BNP, edema on CXR)--suspect due to fluid overload  RVP positive CoV  Now fever, leukocytosis, pulmonary edema, intubated 1/7  Overall,  1) MSSA Bacteremia--Dapto 800mg q 24 (need to adjust dosing depending on CrCl, and intermittent HD vs CVVHD), weekly CKs, monitor Cr  - Continue Dapto through 2/5/20--CK 1/5 45  2) KRIS--trend Cr to normal  3) Leukocytosis/New Fever  - New infection vs reactive to stressor?  - Trend WBC, monitor for further fevers   - Low threshold to start Meropenem if any further fevers or signs PNA  - F/U pending sputum culture/repeat BCXs  4) New SOB  - CT without evidence PNA  - F/U cardiology  - Any considerations for HD per Renal    Fredy Huynh MD  Pager [phone]  After 5pm and on weekends call [phone]

## 2020-01-09 NOTE — PROGRESS NOTE ADULT - PROBLEM SELECTOR PLAN 1
- f/u CXR for confirmation of placement prior to starting feeds  - reconsult PRN  - call with questions x [phone]. - f/u CXR for confirmation of placement prior to starting feeds.  - reconsult PRN.    LIANG. LULU MINA.   # [phone]  ENT PA.

## 2020-01-09 NOTE — PROGRESS NOTE ADULT - ASSESSMENT
78 YO M  with respiratory failure requiring intubation, now requiring NGT placement. Lane sump placed under indirect visualization with laryngoscopy. Confirmed placement through esophageal inlet. Awaiting CXR for confirmation in stomach prior to starting feeds.

## 2020-01-09 NOTE — PROGRESS NOTE ADULT - SUBJECTIVE AND OBJECTIVE BOX
[facility]   [phone]  RENAL FOLLOW UP NOTE  --------------------------------------------------------------------------------  Chief Complaint:    24 hour events/subjective:    seen earlier.   intubated on PS ventilation   on levo and vaso     PAST HISTORY  --------------------------------------------------------------------------------  No significant changes to PMH, PSH, FHx, SHx, unless otherwise noted    ALLERGIES & MEDICATIONS  --------------------------------------------------------------------------------  Allergies    Indocin (Unknown)  oxacillin (Unknown)  Zyvox (Unknown)    Intolerances      Standing Inpatient Medications  albuterol/ipratropium for Nebulization 3 milliLiter(s) Nebulizer every 6 hours  artificial  tears Solution 1 Drop(s) Both EYES daily  aspirin  chewable 81 milliGRAM(s) Oral daily  atorvastatin 10 milliGRAM(s) Oral at bedtime  chlorhexidine 0.12% Liquid 15 milliLiter(s) Oral Mucosa every 12 hours  chlorhexidine 4% Liquid 1 Application(s) Topical <User Schedule>  chlorhexidine 4% Liquid 1 Application(s) Topical <User Schedule>  DAPTOmycin IVPB 800 milliGRAM(s) IV Intermittent every 48 hours  dexMEDEtomidine Infusion 0.2 MICROgram(s)/kG/Hr IV Continuous <Continuous>  dextrose 5%. 1000 milliLiter(s) IV Continuous <Continuous>  dextrose 50% Injectable 12.5 Gram(s) IV Push once  dextrose 50% Injectable 25 Gram(s) IV Push once  dextrose 50% Injectable 25 Gram(s) IV Push once  heparin  Injectable 5000 Unit(s) SubCutaneous every 8 hours  insulin lispro (HumaLOG) corrective regimen sliding scale   SubCutaneous every 6 hours  lidocaine   Patch 1 Patch Transdermal daily  norepinephrine Infusion 0.05 MICROgram(s)/kG/Min IV Continuous <Continuous>  pantoprazole  Injectable 40 milliGRAM(s) IV Push daily  polyethylene glycol 3350 17 Gram(s) Oral daily  senna 2 Tablet(s) Oral at bedtime  vasopressin Infusion 0.08 Unit(s)/Min IV Continuous <Continuous>    PRN Inpatient Medications  bisacodyl Suppository 10 milliGRAM(s) Rectal daily PRN  dextrose 40% Gel 15 Gram(s) Oral once PRN  glucagon  Injectable 1 milliGRAM(s) IntraMuscular once PRN  petrolatum Ophthalmic Ointment 1 Application(s) Both EYES every 4 hours PRN  sodium chloride 0.9% lock flush 10 milliLiter(s) IV Push every 1 hour PRN  sodium chloride 0.9% lock flush 10 milliLiter(s) IV Push every 1 hour PRN      REVIEW OF SYSTEMS  --------------------------------------------------------------------------------      VITALS/PHYSICAL EXAM  --------------------------------------------------------------------------------  T(C): 37.1 (01-09-20 @ 12:15), Max: 37.1 (01-09-20 @ 12:15)  HR: 84 (01-09-20 @ 20:03) (84 - 132)  BP: --  RR: 25 (01-09-20 @ 19:00) (15 - 35)  SpO2: 97% (01-09-20 @ 20:03) (92% - 100%)  Wt(kg): --        01-08-20 @ 07:01  -  01-09-20 @ 07:00  --------------------------------------------------------  IN: 1260.5 mL / OUT: 1313 mL / NET: -52.5 mL    01-09-20 @ 07:01  -  01-09-20 @ 20:45  --------------------------------------------------------  IN: 211.7 mL / OUT: 25 mL / NET: 186.7 mL      Physical Exam:  	  Gen: inutbated   	+ IJ cath   	Pulm: decrease bs  + coarse bs bilateral no  wheezing  	CV: RRR, S1S2; no rub  	Abd: +BS, soft, nontender/nondistended  	: No suprapubic tenderness  	UE: Warm, no cyanosis  no clubbing,  3+ edema   	LE: Warm, no cyanosis  no clubbing, 3- edema decrease in edema overall   	        LABS/STUDIES  --------------------------------------------------------------------------------              7.3    10.24 >-----------<  194      [01-09-20 @ 20:29]              24.5     SEE NOTE  |  SEE NOTE  |  SEE NOTE  ----------------------------<  SEE NOTE      [01-09-20 @ 18:10]  SEE NOTE   |  SEE NOTE  |  SEE NOTE        Ca     SEE NOTE     [01-09-20 @ 18:10]      Mg     SEE NOTE     [01-09-20 @ 18:10]      Phos  SEE NOTE     [01-09-20 @ 18:10]    TPro  SEE NOTE  /  Alb  SEE NOTE  /  TBili  SEE NOTE  /  DBili  x   /  AST  SEE NOTE  /  ALT  SEE NOTE  /  AlkPhos  SEE NOTE  [01-09-20 @ 18:10]          Creatinine Trend:  SCr SEE NOTE [01-09 @ 18:10]  SCr 2.27 [01-09 @ 04:27]  SCr 3.03 [01-08 @ 21:42]  SCr 2.98 [01-08 @ 14:34]  SCr 2.70 [01-08 @ 04:18]              Urinalysis - [01-08-20 @ 09:12]      Color Yellow / Appearance Slightly Turbid / SG 1.021 / pH 5.5      Gluc Negative / Ketone Negative  / Bili Negative / Urobili Negative       Blood Moderate / Protein 30 mg/dL / Leuk Est Large / Nitrite Negative      RBC 67 / WBC 34 / Hyaline 13 / Gran  / Sq Epi  / Non Sq Epi 1 / Bacteria Negative      HbA1c 7.3      [01-08-20 @ 08:56]    RYLEE: titer Negative, pattern --      [12-21-19 @ 17:35]  C3 Complement 25      [12-20-19 @ 12:18]  C4 Complement 21      [12-20-19 @ 12:18]  ANCA: cANCA Negative, pANCA Negative, atypical ANCA Negative      [12-21-19 @ 17:35]

## 2020-01-09 NOTE — PROGRESS NOTE ADULT - SUBJECTIVE AND OBJECTIVE BOX
CARDIOLOGY FOLLOW UP - Dr. Jesus WILKERSON intubated/ family at bedside    S/P HD, session, off precedex, awaiting extubation     PHYSICAL EXAM:  T(C): 36.8 (01-09-20 @ 07:45), Max: 36.8 (01-09-20 @ 01:27)  HR: 102 (01-09-20 @ 12:00) (80 - 132)  BP: --  RR: 23 (01-09-20 @ 12:00) (8 - 35)  SpO2: 95% (01-09-20 @ 12:00) (93% - 100%)  Wt(kg): --  I&O's Summary    08 Jan 2020 07:01  -  09 Jan 2020 07:00  --------------------------------------------------------  IN: 1260.5 mL / OUT: 1313 mL / NET: -52.5 mL    09 Jan 2020 07:01  -  09 Jan 2020 12:14  --------------------------------------------------------  IN: 30.3 mL / OUT: 0 mL / NET: 30.3 mL        Appearance: intubated 	  Cardiovascular: Normal S1 S2,RRR, + murmur   Respiratory: mech bs, crackles at base  Gastrointestinal:  Soft, Non-tender, + BS	  Extremities:  generalized edema         MEDICATIONS  (STANDING):  albuterol/ipratropium for Nebulization 3 milliLiter(s) Nebulizer every 6 hours  aspirin  chewable 81 milliGRAM(s) Oral daily  atorvastatin 10 milliGRAM(s) Oral at bedtime  chlorhexidine 0.12% Liquid 15 milliLiter(s) Oral Mucosa every 12 hours  chlorhexidine 4% Liquid 1 Application(s) Topical <User Schedule>  chlorhexidine 4% Liquid 1 Application(s) Topical <User Schedule>  CRRT Treatment    <Continuous>  DAPTOmycin IVPB 800 milliGRAM(s) IV Intermittent every 24 hours  dexMEDEtomidine Infusion 0.2 MICROgram(s)/kG/Hr (5.215 mL/Hr) IV Continuous <Continuous>  dextrose 5%. 1000 milliLiter(s) (50 mL/Hr) IV Continuous <Continuous>  dextrose 50% Injectable 12.5 Gram(s) IV Push once  dextrose 50% Injectable 25 Gram(s) IV Push once  dextrose 50% Injectable 25 Gram(s) IV Push once  heparin  Injectable 5000 Unit(s) SubCutaneous every 8 hours  insulin lispro (HumaLOG) corrective regimen sliding scale   SubCutaneous every 6 hours  lidocaine   Patch 1 Patch Transdermal daily  norepinephrine Infusion 0.05 MICROgram(s)/kG/Min (9.778 mL/Hr) IV Continuous <Continuous>  pantoprazole  Injectable 40 milliGRAM(s) IV Push daily  polyethylene glycol 3350 17 Gram(s) Oral daily  PrismaSATE Dialysate BGK 4 / 2.5 5000 milliLiter(s) (1200 mL/Hr) CRRT <Continuous>  PrismaSOL Filtration BGK 0 / 2.5 5000 milliLiter(s) (600 mL/Hr) CRRT <Continuous>  PrismaSOL Filtration BGK 4 / 2.5 5000 milliLiter(s) (400 mL/Hr) CRRT <Continuous>  senna 2 Tablet(s) Oral at bedtime  vasopressin Infusion 0.08 Unit(s)/Min (4.8 mL/Hr) IV Continuous <Continuous>      TELEMETRY: 	    ECG:  	  RADIOLOGY:   DIAGNOSTIC TESTING:  [ ] Echocardiogram:  < from: TTE with Doppler (w/Cont) (01.08.20 @ 08:07) >  EF (Visual Estimate): 20 %  Doppler Peak Velocity (m/sec): AoV=2.9  ------------------------------------------------------------------------  Observations:  Mitral Valve: Mitral annular calcification. Tethered mitral  valve leaflets with normal opening. Mild mitral  regurgitation.  Aortic Valve/Aorta: Aortic valve not well visualized;  appears calcified with decreased opening. Peak transaortic  valve gradient equals 34 mm Hg, mean transaortic valve  gradient equals 19 mm Hg, estimated aortic valve area  equals 0.8 sqcm (by continuity equation), aortic valve  velocity time integral equals 48 cm, consistent with severe  aortic stenosis. No aortic valve regurgitation seen. Peak  left ventricular outflow tract gradient equals 2 mm Hg,  mean gradient is equal to 1 mm Hg, LVOT velocity time  integral equals 9 cm.  Aortic Root: 3.2 cm.  LVOT diameter: 2.4 cm.  Left Atrium: Mildly dilated left atrium.  LAvolume index =  40 cc/m2.  Left Ventricle: Severe global left ventricular systolic  dysfunction with regional variation. Endocardial  visualization enhanced with intravenous injection of  Ultrasonic Enhancing Agent (Definity). No left ventricular  thrombus. Left ventricular enlargement.  Right Heart: Normal right atrium. The right ventricle is  not well visualized; grossly normal right ventricular size  and systolic function. A device wire is noted in the right  heart. Tricuspid valve not well visualized, probably  normal. Mild tricuspid regurgitation. Pulmonic valve not  well visualized, probably normal. Minimal pulmonic  regurgitation.  Pericardium/Pleura: Normal pericardium with no pericardial  effusion.  Hemodynamic: Estimated right atrial pressure is 8 mm Hg.  Estimated right ventricular systolic pressure equals 26 mm  Hg, assuming right atrial pressure equals 8 mm Hg,  consistent with normal pulmonary pressures.  ------------------------------------------------------------------------  Conclusions:  1. Mitral annular calcification. Tethered mitral valve  leaflets with normal opening. Mild mitral regurgitation.  2. Aortic valve not well visualized; appears calcified with  decreased opening. Peak transaortic valve gradient equals  34 mm Hg, mean transaortic valve gradient equals 19 mm Hg,  estimated aortic valve area equals 0.8 sqcm (by continuity  equation), aortic valve velocity time integral equals 48  cm, consistent with severe aortic stenosis. No aortic valve  regurgitation seen.  3. Severe global left ventricular systolic dysfunction with  regional variation. Endocardial visualization enhanced with  intravenous injection of Ultrasonic Enhancing Agent  (Definity). No left ventricular thrombus.  4. The right ventricle is not well visualized; grossly  normal right ventricular size and systolic function. A  device wire is noted in the right heart.  *** Compared with echocardiogram of 12/13/2019, results are  similar on today's study. Prior study was a JAHAIRA. Estimated  EF was reported to be higher on the JAHAIRA on 12/13/2019 but  EF is similar to the prior TTE report from 12/11/2019.  ------------------------------------------------------------------------  Confirmed on  1/8/2020 - 10:58:14 by Denver Miller M.D.  ------------------------------------------------------------------------    < end of copied text >    [ ]  Catheterization:  [ ] Stress Test:    OTHER: 	  < from: CT Chest No Cont (01.05.20 @ 15:02) >    IMPRESSION:     Pulmonary edema with moderate bilateral pleural effusions. Infection is considered less likely.        < end of copied text >    LABS:	 	    Creatine Kinase, Serum: 45 U/L [30 - 200] (01-05 @ 05:05)                          8.4    11.25 )-----------( 224      ( 09 Jan 2020 04:27 )             26.6     01-09    136  |  100  |  82<H>  ----------------------------<  152<H>  4.1   |  19<L>  |  2.27<H>    Ca    8.8      09 Jan 2020 04:27  Phos  5.1     01-09  Mg     2.1     01-09    TPro  6.2  /  Alb  2.1<L>  /  TBili  0.4  /  DBili  x   /  AST  20  /  ALT  14  /  AlkPhos  83  01-09

## 2020-01-09 NOTE — PROGRESS NOTE ADULT - ASSESSMENT
79 yr old male with HTN, DCHF, CAD, S/P PCI to OM1, Mid LAD (9/2014, [facility], [facility], [facility]) hx of ischemic CMP, AS, DM HLD, GERD, GOUT, s/p multiple spinal surgery admitted with back pain and fever, now with acute hypoxic resp failure 2/2 to pulm edema w/ poss underlying PNA, +coronovirus.     1. Hypoxic respiratory failure, acute/chronic systolic heart failure  -right heart cath revealing normal filling pressures, values more consistent w distributive /septic shock  - off dobutamine   -CT chest 1/5 with pulm edema, infection is less likely   -repeat echo 1/8/20 similar results from prior study, with ef 20%, no lv thrombus, severe global Lv dysfx , severe AS   -cont UF/CVVHD for aggressive volume removal   -cont vent mgmt;  awaiting extubation   -MICU eval noted   -continue medical therapy for now as he is not a candidate for ischemic eval in setting of resp failure, KRIS/CKD, will revisit once volume status/renal function improves    2. CAD s/p PCI (OM1/LAD)   -cont statin and asa     3. Aortic Stenosis  -stable, severe on LAZARO with valve area of 0.9, tte this admit and as outpt with moderate AS  -gradients low on lazaro, perhaps due to new lv dysfxn  -YULY likely underestimated  -cont to monitor      4. Bacteremia, resolved   -LAZARO 12/13 without endocarditis   -med f/u   -repeat cultures negative   -abx per ID    5. KRIS, ? AIN  -creat stable, renal f/u for HD

## 2020-01-09 NOTE — PROGRESS NOTE ADULT - ATTENDING COMMENTS
Patient is seen and examined with fellow, NP and the CCU house-staff. I agree with the history, physical and the assessment and plan.  intubated for airway protection  will hold sedation and attempt CPAP trials  HD on hold for today  monitor off of pressor support  on ABX for presumed septic shock - ?etiology  f/u with bl and combicath cx

## 2020-01-09 NOTE — PROGRESS NOTE ADULT - ASSESSMENT
78 y/o male with h/o of CAD s/p PCI 1/2017 with 10% LAD stenosis OM1, HTN, HLD, GERD, Gout, Osteoarthritis and chronic back pain, s/p multiple laminectomies and spinal fusion, lower back pain and weakness in his bilateral lower extremities that progressed to inability to ambulate. dx with MSSA bacteremia. on ancef. also received lasix as well. has had hesitancy urinating. on 12/16/ KRIS       1- KRIS   2- MSSA bacteremia   3- chf  4- cad  5- respiratory failure   6- hypotension       suspect kris in setting of  +/-  AIN + /- due to post infectious GN  now worsened again /viral infection and chf.   off dobutamine   cont pressor support  vent weaning   daptomycin  to cont   d/w ccu team and family at bedside

## 2020-01-09 NOTE — PROGRESS NOTE ADULT - SUBJECTIVE AND OBJECTIVE BOX
ENT ISSUE/POD: Dysphagia, NGT placement.     HPI: 80 YO M with PMH of CAD s/p PCI 1/2017, HTN, HLD, GERD, and chronic back pain, s/p multiple laminectomies and spinal fusion, admitted for back pain. ENT called to evaluate for dysphagia and NGT placement. Pt is currently intubated for respiratory failure, now requiring bipap. Team attempted NGT placement with difficulty due to intubation.     1/9/19; ENT was re- consulted for NGT placement.     PAST MEDICAL & SURGICAL HISTORY:  CAD S/P percutaneous coronary angioplasty  HLD (hyperlipidemia)  Back pain  OA (osteoarthritis)  Gout  HTN (hypertension)  GERD (gastroesophageal reflux disease)  S/P lumbar spine operation: 6 thoracic to lumbar surgeries over 6 months period starting 11/2006  S/P knee replacement: 11/2013    Allergies.  Indocin (Unknown)  oxacillin (Unknown)  Zyvox (Unknown)    Intolerances.  MEDICATIONS  (STANDING):  albuterol/ipratropium for Nebulization 3 milliLiter(s) Nebulizer every 6 hours  artificial  tears Solution 1 Drop(s) Both EYES daily  aspirin  chewable 81 milliGRAM(s) Oral daily  atorvastatin 10 milliGRAM(s) Oral at bedtime  chlorhexidine 0.12% Liquid 15 milliLiter(s) Oral Mucosa every 12 hours  chlorhexidine 4% Liquid 1 Application(s) Topical <User Schedule>  chlorhexidine 4% Liquid 1 Application(s) Topical <User Schedule>  DAPTOmycin IVPB 800 milliGRAM(s) IV Intermittent every 48 hours  dexMEDEtomidine Infusion 0.2 MICROgram(s)/kG/Hr (5.215 mL/Hr) IV Continuous <Continuous>  dextrose 5%. 1000 milliLiter(s) (50 mL/Hr) IV Continuous <Continuous>  dextrose 50% Injectable 12.5 Gram(s) IV Push once  dextrose 50% Injectable 25 Gram(s) IV Push once  dextrose 50% Injectable 25 Gram(s) IV Push once  heparin  Injectable 5000 Unit(s) SubCutaneous every 8 hours  insulin lispro (HumaLOG) corrective regimen sliding scale   SubCutaneous every 6 hours  lidocaine   Patch 1 Patch Transdermal daily  norepinephrine Infusion 0.05 MICROgram(s)/kG/Min (9.778 mL/Hr) IV Continuous <Continuous>  pantoprazole  Injectable 40 milliGRAM(s) IV Push daily  polyethylene glycol 3350 17 Gram(s) Oral daily  senna 2 Tablet(s) Oral at bedtime  vasopressin Infusion 0.08 Unit(s)/Min (4.8 mL/Hr) IV Continuous <Continuous>    MEDICATIONS  (PRN):  bisacodyl Suppository 10 milliGRAM(s) Rectal daily PRN Constipation  dextrose 40% Gel 15 Gram(s) Oral once PRN Blood Glucose LESS THAN 70 milliGRAM(s)/deciliter  glucagon  Injectable 1 milliGRAM(s) IntraMuscular once PRN Glucose LESS THAN 70 milligrams/deciliter  petrolatum Ophthalmic Ointment 1 Application(s) Both EYES every 4 hours PRN dry eyes  sodium chloride 0.9% lock flush 10 milliLiter(s) IV Push every 1 hour PRN Pre/post blood products, medications, blood draw, and to maintain line patency  sodium chloride 0.9% lock flush 10 milliLiter(s) IV Push every 1 hour PRN Pre/post blood products, medications, blood draw, and to maintain line patency    Social History: denies smoking.  Family history: None.   ROS:   ENT: Unable to obtain.     Vital Signs Last 24 Hrs  T(C): 37.1 (09 Jan 2020 12:15), Max: 37.1 (09 Jan 2020 12:15)  T(F): 98.8 (09 Jan 2020 12:15), Max: 98.8 (09 Jan 2020 12:15)  HR: 109 (09 Jan 2020 23:55) (82 - 126)  BP: --  BP(mean): --  RR: 26 (09 Jan 2020 22:30) (15 - 33)  SpO2: 98% (09 Jan 2020 23:55) (92% - 100%)                        7.3    10.24 )-----------( 194      ( 09 Jan 2020 20:29 )             24.5    01-09    140  |  103  |  99<H>  ----------------------------<  174<H>  4.9   |  18<L>  |  3.02<H>    Ca    8.3<L>      09 Jan 2020 20:29  Phos  6.9     01-09  Mg     2.2     01-09    TPro  5.6<L>  /  Alb  1.9<L>  /  TBili  0.3  /  DBili  x   /  AST  20  /  ALT  16  /  AlkPhos  72  01-09      PHYSICAL EXAM:  Gen: Intubated and sedated.  Skin: No rashes, bruises, or lesions.  Head: Normocephalic, Atraumatic.  Face: no edema, erythema, or fluctuance..  Eyes: no scleral injection.  Nose: Nares bilaterally patent, no discharge.  Mouth: ETT in place, no bleeding noted.  Neck: Flat, supple, no lymphadenopathy, trachea midline, no masses.  Lymphatic: No lymphadenopathy.  Resp: Intubated and sedated.   CV: no peripheral edema/cyanosis.  GI: nondistended   Peripheral vascular: no JVD or edema  Neuro: unable to assess due to sedation    Fiberoptic Indirect laryngoscopy:  (Scope #2 used)  Reason for Laryngoscopy: dysphagia  Nasopharynx, oropharynx, and hypopharynx clear, no bleeding. Tongue base, posterior pharyngeal wall, vallecula, epiglottis, and subglottis difficult to visualize due to ETT.  No erythema, edema, masses or lesions noted. ETT in place between vocal cords limiting exam.    Procedure Note:	  Procedure: NGT placement  Diagnosis: dysphagia  Verbal consent obtained from patient. Lube applied to small [place] sump tube. Small [place] sump tube advanced through right nare without resistance under visualization using indirect laryngoscope. Tip of tube visualized in pyriform sinu and advanced through esophageal inlet without resistance. Placement confirmed with auscultation of air in stomach. Pending CXR confirmation. ENT ISSUE/POD: Dysphagia, NGT placement.     HPI: 80 YO M with PMH of CAD s/p PCI 1/2017, HTN, HLD, GERD, and chronic back pain, s/p multiple laminectomies and spinal fusion, admitted for back pain. ENT called to evaluate for dysphagia and NGT placement. Pt is currently intubated for respiratory failure, now requiring bipap. Team attempted NGT placement with difficulty due to intubation.     1/9/20; ENT was re- consulted for NGT placement under the guidance of Fiberoptic Indirect Laryngoscopy.     PAST MEDICAL & SURGICAL HISTORY:  CAD S/P percutaneous coronary angioplasty  HLD (hyperlipidemia)  Back pain  OA (osteoarthritis)  Gout  HTN (hypertension)  GERD (gastroesophageal reflux disease)  S/P lumbar spine operation: 6 thoracic to lumbar surgeries over 6 months period starting 11/2006  S/P knee replacement: 11/2013    Allergies.  Indocin (Unknown)  oxacillin (Unknown)  Zyvox (Unknown)    Intolerances.  MEDICATIONS  (STANDING):  albuterol/ipratropium for Nebulization 3 milliLiter(s) Nebulizer every 6 hours  artificial  tears Solution 1 Drop(s) Both EYES daily  aspirin  chewable 81 milliGRAM(s) Oral daily  atorvastatin 10 milliGRAM(s) Oral at bedtime  chlorhexidine 0.12% Liquid 15 milliLiter(s) Oral Mucosa every 12 hours  chlorhexidine 4% Liquid 1 Application(s) Topical <User Schedule>  chlorhexidine 4% Liquid 1 Application(s) Topical <User Schedule>  DAPTOmycin IVPB 800 milliGRAM(s) IV Intermittent every 48 hours  dexMEDEtomidine Infusion 0.2 MICROgram(s)/kG/Hr (5.215 mL/Hr) IV Continuous <Continuous>  dextrose 5%. 1000 milliLiter(s) (50 mL/Hr) IV Continuous <Continuous>  dextrose 50% Injectable 12.5 Gram(s) IV Push once  dextrose 50% Injectable 25 Gram(s) IV Push once  dextrose 50% Injectable 25 Gram(s) IV Push once  heparin  Injectable 5000 Unit(s) SubCutaneous every 8 hours  insulin lispro (HumaLOG) corrective regimen sliding scale   SubCutaneous every 6 hours  lidocaine   Patch 1 Patch Transdermal daily  norepinephrine Infusion 0.05 MICROgram(s)/kG/Min (9.778 mL/Hr) IV Continuous <Continuous>  pantoprazole  Injectable 40 milliGRAM(s) IV Push daily  polyethylene glycol 3350 17 Gram(s) Oral daily  senna 2 Tablet(s) Oral at bedtime  vasopressin Infusion 0.08 Unit(s)/Min (4.8 mL/Hr) IV Continuous <Continuous>    MEDICATIONS  (PRN):  bisacodyl Suppository 10 milliGRAM(s) Rectal daily PRN Constipation  dextrose 40% Gel 15 Gram(s) Oral once PRN Blood Glucose LESS THAN 70 milliGRAM(s)/deciliter  glucagon  Injectable 1 milliGRAM(s) IntraMuscular once PRN Glucose LESS THAN 70 milligrams/deciliter  petrolatum Ophthalmic Ointment 1 Application(s) Both EYES every 4 hours PRN dry eyes  sodium chloride 0.9% lock flush 10 milliLiter(s) IV Push every 1 hour PRN Pre/post blood products, medications, blood draw, and to maintain line patency  sodium chloride 0.9% lock flush 10 milliLiter(s) IV Push every 1 hour PRN Pre/post blood products, medications, blood draw, and to maintain line patency    Social History: denies smoking.  Family history: None.   ROS:   ENT: Unable to obtain.     Vital Signs Last 24 Hrs  T(C): 37.1 (09 Jan 2020 12:15), Max: 37.1 (09 Jan 2020 12:15)  T(F): 98.8 (09 Jan 2020 12:15), Max: 98.8 (09 Jan 2020 12:15)  HR: 109 (09 Jan 2020 23:55) (82 - 126)  BP: --  BP(mean): --  RR: 26 (09 Jan 2020 22:30) (15 - 33)  SpO2: 98% (09 Jan 2020 23:55) (92% - 100%)                        7.3    10.24 )-----------( 194      ( 09 Jan 2020 20:29 )             24.5    01-09    140  |  103  |  99<H>  ----------------------------<  174<H>  4.9   |  18<L>  |  3.02<H>    Ca    8.3<L>      09 Jan 2020 20:29  Phos  6.9     01-09  Mg     2.2     01-09    TPro  5.6<L>  /  Alb  1.9<L>  /  TBili  0.3  /  DBili  x   /  AST  20  /  ALT  16  /  AlkPhos  72  01-09      PHYSICAL EXAM:  Gen: Intubated and sedated.  Skin: No rashes, bruises, or lesions.  Head: Normocephalic, Atraumatic.  Face: no edema, erythema, or fluctuance..  Eyes: no scleral injection.  Nose: Nares bilaterally patent, no discharge.  Mouth: ETT in place, no bleeding noted.  Neck: Flat, supple, no lymphadenopathy, trachea midline, no masses.  Lymphatic: No lymphadenopathy.  Resp: Intubated and sedated.   CV: no peripheral edema/cyanosis.  GI: nondistended   Peripheral vascular: no JVD or edema  Neuro: unable to assess due to sedation    Fiberoptic Indirect laryngoscopy:  (Scope #2 used)  Reason for Laryngoscopy: dysphagia  Nasopharynx, oropharynx, and hypopharynx clear, no bleeding. Tongue base, posterior pharyngeal wall, vallecula, epiglottis, and subglottis difficult to visualize due to ETT.  No erythema, edema, masses or lesions noted. ETT in place between vocal cords limiting exam.    Procedure Note:	  Procedure: NGT placement  Diagnosis: dysphagia  Verbal consent obtained. Lube applied to small [place] sump tube. Small [place] sump tube advanced through right nare without resistance under visualization using indirect laryngoscope. Tip of tube visualized in pyriform sinus and advanced through esophageal inlet without resistance. Placement confirmed with auscultation of air in stomach. Pending CXR confirmation.

## 2020-01-09 NOTE — PROGRESS NOTE ADULT - SUBJECTIVE AND OBJECTIVE BOX
PATIENT:  MAHESH CANELA  [id number]    CHIEF COMPLAINT:  Patient is a 79y old  Male who presents with a chief complaint of Back pain (2020 23:09)      INTERVAL HISTORY/OVERNIGHT EVENTS:      REVIEW OF SYSTEMS: Unable to assess patient as patient is sedated. Levophed was brought down to .1 mcg, vaso .08. The patients swan was removed and the Right shiley was placed in the right IJ for HD.     MEDICATIONS:  MEDICATIONS  (STANDING):  albuterol/ipratropium for Nebulization 3 milliLiter(s) Nebulizer every 6 hours  aspirin  chewable 81 milliGRAM(s) Oral daily  atorvastatin 10 milliGRAM(s) Oral at bedtime  chlorhexidine 0.12% Liquid 15 milliLiter(s) Oral Mucosa every 12 hours  chlorhexidine 4% Liquid 1 Application(s) Topical <User Schedule>  chlorhexidine 4% Liquid 1 Application(s) Topical <User Schedule>  CRRT Treatment    <Continuous>  DAPTOmycin IVPB 800 milliGRAM(s) IV Intermittent every 24 hours  dextrose 5%. 1000 milliLiter(s) (50 mL/Hr) IV Continuous <Continuous>  dextrose 50% Injectable 12.5 Gram(s) IV Push once  dextrose 50% Injectable 25 Gram(s) IV Push once  dextrose 50% Injectable 25 Gram(s) IV Push once  heparin  Injectable 5000 Unit(s) SubCutaneous every 8 hours  insulin lispro (HumaLOG) corrective regimen sliding scale   SubCutaneous every 6 hours  lidocaine   Patch 1 Patch Transdermal daily  norepinephrine Infusion 0.05 MICROgram(s)/kG/Min (9.778 mL/Hr) IV Continuous <Continuous>  pantoprazole  Injectable 40 milliGRAM(s) IV Push daily  polyethylene glycol 3350 17 Gram(s) Oral daily  PrismaSATE Dialysate BGK 4 / 2.5 5000 milliLiter(s) (1200 mL/Hr) CRRT <Continuous>  PrismaSOL Filtration BGK 0 / 2.5 5000 milliLiter(s) (600 mL/Hr) CRRT <Continuous>  PrismaSOL Filtration BGK 4 / 2.5 5000 milliLiter(s) (400 mL/Hr) CRRT <Continuous>  senna 2 Tablet(s) Oral at bedtime  vasopressin Infusion 0.08 Unit(s)/Min (4.8 mL/Hr) IV Continuous <Continuous>    MEDICATIONS  (PRN):  bisacodyl Suppository 10 milliGRAM(s) Rectal daily PRN Constipation  dextrose 40% Gel 15 Gram(s) Oral once PRN Blood Glucose LESS THAN 70 milliGRAM(s)/deciliter  glucagon  Injectable 1 milliGRAM(s) IntraMuscular once PRN Glucose LESS THAN 70 milligrams/deciliter  petrolatum Ophthalmic Ointment 1 Application(s) Both EYES every 4 hours PRN dry eyes  sodium chloride 0.9% lock flush 10 milliLiter(s) IV Push every 1 hour PRN Pre/post blood products, medications, blood draw, and to maintain line patency  sodium chloride 0.9% lock flush 10 milliLiter(s) IV Push every 1 hour PRN Pre/post blood products, medications, blood draw, and to maintain line patency      ALLERGIES:  Allergies    Indocin (Unknown)  oxacillin (Unknown)  Zyvox (Unknown)    Intolerances        OBJECTIVE:  ICU Vital Signs Last 24 Hrs  T(C): 36.6 (2020 03:27), Max: 37 (2020 08:00)  T(F): 97.8 (2020 03:27), Max: 98.6 (2020 08:00)  HR: 108 (2020 07:00) (80 - 132)  BP: 111/708 (2020 08:00) (111/708 - 111/708)  BP(mean): 84 (2020 08:00) (84 - 84)  ABP: 120/56 (2020 07:00) (76/40 - 146/70)  ABP(mean): 80 (2020 07:00) (52 - 96)  RR: 21 (2020 07:00) (8 - 35)  SpO2: 98% (2020 07:00) (93% - 100%)    Mode: AC/ CMV (Assist Control/ Continuous Mandatory Ventilation)  RR (machine): 22  TV (machine): 520  FiO2: 35  PEEP: 5  ITime: 1  MAP: 8  PIP: 24    Adult Advanced Hemodynamics Last 24 Hrs  CVP(mm Hg): 5 (2020 20:00) (4 - 17)  CVP(cm H2O): --  CO: 7.2 (2020 14:30) (7.2 - 7.2)  CI: 3.1 (2020 14:30) (3.1 - 3.1)  PA: 40/19 (2020 20:00) (33/18 - 66/32)  PA(mean): 27 (2020 20:00) (25 - 47)  PCWP: --  SVR: 909 (2020 14:30) (909 - 909)  SVRI: 1881 (2020 14:30) (1881 - 1881)  PVR: --  PVRI: --  CAPILLARY BLOOD GLUCOSE      POCT Blood Glucose.: 166 mg/dL (2020 01:24)  POCT Blood Glucose.: 171 mg/dL (2020 17:58)  POCT Blood Glucose.: 160 mg/dL (2020 14:08)  POCT Blood Glucose.: 157 mg/dL (2020 12:09)  POCT Blood Glucose.: 167 mg/dL (2020 11:09)  POCT Blood Glucose.: 149 mg/dL (2020 07:38)    CAPILLARY BLOOD GLUCOSE      POCT Blood Glucose.: 166 mg/dL (2020 01:24)    I&O's Summary    2020 07:01  -  2020 07:00  --------------------------------------------------------  IN: 1260.5 mL / OUT: 1313 mL / NET: -52.5 mL      Daily     Daily Weight in k.5 (2020 03:27)    PHYSICAL EXAMINATION:  General: WN/WD NAD  HEENT: PERRLA, EOMI, moist mucous membranes  Neurology: A&Ox3, nonfocal, MORALES x 4  Respiratory: CTA B/L, normal respiratory effort, no wheezes, crackles, rales  CV: RRR, S1S2, no murmurs, rubs or gallops  Abdominal: Soft, NT, ND +BS, Last BM  Extremities: No edema, + peripheral pulses  Incisions:   Tubes:    LABS:  ABG - ( 2020 06:48 )  pH, Arterial: 7.40  pH, Blood: x     /  pCO2: 34    /  pO2: 129   / HCO3: 21    / Base Excess: -2.8  /  SaO2: 99                   8.4    11.25 )-----------( 224      ( 2020 04:27 )             26.6     -    136  |  100  |  82<H>  ----------------------------<  152<H>  4.1   |  19<L>  |  2.27<H>    Ca    8.8      2020 04:27  Phos  5.1     -  Mg     2.1         TPro  6.2  /  Alb  2.1<L>  /  TBili  0.4  /  DBili  x   /  AST  20  /  ALT  14  /  AlkPhos  83      LIVER FUNCTIONS - ( 2020 04:27 )  Alb: 2.1 g/dL / Pro: 6.2 g/dL / ALK PHOS: 83 U/L / ALT: 14 U/L / AST: 20 U/L / GGT: x           Urinalysis Basic - ( 2020 09:12 )    Color: Yellow / Appearance: Slightly Turbid / S.021 / pH: x  Gluc: x / Ketone: Negative  / Bili: Negative / Urobili: Negative   Blood: x / Protein: 30 mg/dL / Nitrite: Negative   Leuk Esterase: Large / RBC: 67 /hpf / WBC 34 /HPF   Sq Epi: x / Non Sq Epi: 1 /hpf / Bacteria: Negative    TELEMETRY:     EKG:     IMAGING: PATIENT:  MAHESH CANELA  [id number]    CHIEF COMPLAINT:  Patient is a 79y old  Male who presents with a chief complaint of Back pain (2020 23:09)      INTERVAL HISTORY/OVERNIGHT EVENTS:      REVIEW OF SYSTEMS: Unable to assess patient as patient is sedated. Levophed was brought down to .1 mcg, vaso .08. The patients swan was removed and the Right shiley was placed in the right IJ for HD.     MEDICATIONS:  MEDICATIONS  (STANDING):  albuterol/ipratropium for Nebulization 3 milliLiter(s) Nebulizer every 6 hours  aspirin  chewable 81 milliGRAM(s) Oral daily  atorvastatin 10 milliGRAM(s) Oral at bedtime  chlorhexidine 0.12% Liquid 15 milliLiter(s) Oral Mucosa every 12 hours  chlorhexidine 4% Liquid 1 Application(s) Topical <User Schedule>  chlorhexidine 4% Liquid 1 Application(s) Topical <User Schedule>  CRRT Treatment    <Continuous>  DAPTOmycin IVPB 800 milliGRAM(s) IV Intermittent every 24 hours  dextrose 5%. 1000 milliLiter(s) (50 mL/Hr) IV Continuous <Continuous>  dextrose 50% Injectable 12.5 Gram(s) IV Push once  dextrose 50% Injectable 25 Gram(s) IV Push once  dextrose 50% Injectable 25 Gram(s) IV Push once  heparin  Injectable 5000 Unit(s) SubCutaneous every 8 hours  insulin lispro (HumaLOG) corrective regimen sliding scale   SubCutaneous every 6 hours  lidocaine   Patch 1 Patch Transdermal daily  norepinephrine Infusion 0.05 MICROgram(s)/kG/Min (9.778 mL/Hr) IV Continuous <Continuous>  pantoprazole  Injectable 40 milliGRAM(s) IV Push daily  polyethylene glycol 3350 17 Gram(s) Oral daily  PrismaSATE Dialysate BGK 4 / 2.5 5000 milliLiter(s) (1200 mL/Hr) CRRT <Continuous>  PrismaSOL Filtration BGK 0 / 2.5 5000 milliLiter(s) (600 mL/Hr) CRRT <Continuous>  PrismaSOL Filtration BGK 4 / 2.5 5000 milliLiter(s) (400 mL/Hr) CRRT <Continuous>  senna 2 Tablet(s) Oral at bedtime  vasopressin Infusion 0.08 Unit(s)/Min (4.8 mL/Hr) IV Continuous <Continuous>    MEDICATIONS  (PRN):  bisacodyl Suppository 10 milliGRAM(s) Rectal daily PRN Constipation  dextrose 40% Gel 15 Gram(s) Oral once PRN Blood Glucose LESS THAN 70 milliGRAM(s)/deciliter  glucagon  Injectable 1 milliGRAM(s) IntraMuscular once PRN Glucose LESS THAN 70 milligrams/deciliter  petrolatum Ophthalmic Ointment 1 Application(s) Both EYES every 4 hours PRN dry eyes  sodium chloride 0.9% lock flush 10 milliLiter(s) IV Push every 1 hour PRN Pre/post blood products, medications, blood draw, and to maintain line patency  sodium chloride 0.9% lock flush 10 milliLiter(s) IV Push every 1 hour PRN Pre/post blood products, medications, blood draw, and to maintain line patency      ALLERGIES:  Allergies    Indocin (Unknown)  oxacillin (Unknown)  Zyvox (Unknown)    Intolerances        OBJECTIVE:  ICU Vital Signs Last 24 Hrs  T(C): 36.6 (2020 03:27), Max: 37 (2020 08:00)  T(F): 97.8 (2020 03:27), Max: 98.6 (2020 08:00)  HR: 108 (2020 07:00) (80 - 132)  BP: 111/708 (2020 08:00) (111/708 - 111/708)  BP(mean): 84 (2020 08:00) (84 - 84)  ABP: 120/56 (2020 07:00) (76/40 - 146/70)  ABP(mean): 80 (2020 07:00) (52 - 96)  RR: 21 (2020 07:00) (8 - 35)  SpO2: 98% (2020 07:00) (93% - 100%)    Mode: AC/ CMV (Assist Control/ Continuous Mandatory Ventilation)  RR (machine): 22  TV (machine): 520  FiO2: 35  PEEP: 5  ITime: 1  MAP: 8  PIP: 24    Adult Advanced Hemodynamics Last 24 Hrs  CVP(mm Hg): 5 (2020 20:00) (4 - 17)  CVP(cm H2O): --  CO: 7.2 (2020 14:30) (7.2 - 7.2)  CI: 3.1 (2020 14:30) (3.1 - 3.1)  PA: 40/19 (2020 20:00) (33/18 - 66/32)  PA(mean): 27 (2020 20:00) (25 - 47)  PCWP: --  SVR: 909 (2020 14:30) (909 - 909)  SVRI: 1881 (2020 14:30) (1881 - 1881)  PVR: --  PVRI: --  CAPILLARY BLOOD GLUCOSE      POCT Blood Glucose.: 166 mg/dL (2020 01:24)  POCT Blood Glucose.: 171 mg/dL (2020 17:58)  POCT Blood Glucose.: 160 mg/dL (2020 14:08)  POCT Blood Glucose.: 157 mg/dL (2020 12:09)  POCT Blood Glucose.: 167 mg/dL (2020 11:09)  POCT Blood Glucose.: 149 mg/dL (2020 07:38)    CAPILLARY BLOOD GLUCOSE      POCT Blood Glucose.: 166 mg/dL (2020 01:24)    I&O's Summary    2020 07:01  -  2020 07:00  --------------------------------------------------------  IN: 1260.5 mL / OUT: 1313 mL / NET: -52.5 mL      Daily     Daily Weight in k.5 (2020 03:27)    PHYSICAL EXAMINATION:  General: WN/WD NAD  HEENT: PERRLA, EOMI, moist mucous membranes  Neurology: A&Ox3, nonfocal, MORALES x 4  Respiratory: CTA B/L, normal respiratory effort, no wheezes, crackles, rales  CV: RRR, S1S2, no murmurs, rubs or gallops  Abdominal: Soft, NT, ND +BS, Last BM  Extremities: No edema, + peripheral pulses  Incisions:   Tubes:    LABS:  ABG - ( 2020 06:48 )  pH, Arterial: 7.40  pH, Blood: x     /  pCO2: 34    /  pO2: 129   / HCO3: 21    / Base Excess: -2.8  /  SaO2: 99                   8.4    11.25 )-----------( 224      ( 2020 04:27 )             26.6     01-    136  |  100  |  82<H>  ----------------------------<  152<H>  4.1   |  19<L>  |  2.27<H>    Ca    8.8      2020 04:27  Phos  5.1     -  Mg     2.1         TPro  6.2  /  Alb  2.1<L>  /  TBili  0.4  /  DBili  x   /  AST  20  /  ALT  14  /  AlkPhos  83      LIVER FUNCTIONS - ( 2020 04:27 )  Alb: 2.1 g/dL / Pro: 6.2 g/dL / ALK PHOS: 83 U/L / ALT: 14 U/L / AST: 20 U/L / GGT: x           Urinalysis Basic - ( 2020 09:12 )    Color: Yellow / Appearance: Slightly Turbid / S.021 / pH: x  Gluc: x / Ketone: Negative  / Bili: Negative / Urobili: Negative   Blood: x / Protein: 30 mg/dL / Nitrite: Negative   Leuk Esterase: Large / RBC: 67 /hpf / WBC 34 /HPF   Sq Epi: x / Non Sq Epi: 1 /hpf / Bacteria: Negative    TELEMETRY:     EKG:     IMAGIN y M  with PMH significant for CAD s/p PCI, GERD, gout, OA, s/p multiple laminectomies and spinal fusions, hospital course c/b MSSA bacteremia, ESRD requiring urgent HD on this admission, S/P Shiley placement and removal now transferred to CCU for urgent HD in the setting of severe hypervolemia and hypoxic respiratory failure    #Neuro  - Intubated  off of sedation  - To transition to precedex as patient begins to awaken     #Resp  - S/P 3 sessions of HD, with goal to keep patient net even   - Currently with ground glass opacities on xray 2/2 to redistribution from low protein state  - Non Con CT : Pulmonary edema with moderate bilateral pleural effusions.   - Currently with low Fio2 requirements, awaiting extubation once arrousable     #CV    - [place] [facility]  Catheter placed with RA 5 and wedge 11, indicating that patients hypoxia is unlikely to be cardiogenic in nature and more consistent with vasodilatory shock .   - dobutamine stopped overnight, currently weaning both levo and vaso  - JAHAIRA : EF 44%, with severe AS, no vegetations reported at this time   - Repeat TTE 2020,  TTE EF 20  - Will require C once patient is more stable  further evaluation, given depressed EF in the long term when patient is more stable   -Will  c/w ASA and statin, for CAD    #GI  - PPI  - NG tube placed by ENT now with tube feeds this AM     #ID       - Patient has hx  MSSA bacteremia , to be dosed with daptomycin tilll 20  - Combicath specimen  negative yesterday,  awaiting results from blood cultures. Given no clear opacity on xray. Ground glass opacity 2/2 to redistribution from low albumin state .   -  ID following, appreciate recommendations     #Renal     ESRD requiring Urgent HD    - Joana moved to R IJ overnight, patient s/p HD session with 1.2 L removal, total net negative 500 cc from HD  - Will continue to renally dose all meds, avoid nephrotoxic agents, monitor I& o's daily   -Renal Following     #Heme  - Hgb stable, slightly downtrending  -Transfuse for hgb < 7 , will maintain active type and screen  - Will continue to monitor with cbc QD     #Endo  - No active issues     #DVT PPx  - Heparin sub q PATIENT:  MAHESH CANELA  [id number]    CHIEF COMPLAINT:  Patient is a 79y old  Male who presents with a chief complaint of Back pain (2020 23:09)      INTERVAL HISTORY/OVERNIGHT EVENTS: Levophed was brought down to . mcg, vaso .08. The patients swan was removed and the right shiley was placed in the right IJ for HD.500 Cc removed. Patient now with spontaneous movements. Will start precedex  this morning in attempt to wean to extubate. now with sinus tachycardia.         REVIEW OF SYSTEMS: Unable to assess patient as patient is sedated.     MEDICATIONS:  MEDICATIONS  (STANDING):  albuterol/ipratropium for Nebulization 3 milliLiter(s) Nebulizer every 6 hours  aspirin  chewable 81 milliGRAM(s) Oral daily  atorvastatin 10 milliGRAM(s) Oral at bedtime  chlorhexidine 0.12% Liquid 15 milliLiter(s) Oral Mucosa every 12 hours  chlorhexidine 4% Liquid 1 Application(s) Topical <User Schedule>  chlorhexidine 4% Liquid 1 Application(s) Topical <User Schedule>  CRRT Treatment    <Continuous>  DAPTOmycin IVPB 800 milliGRAM(s) IV Intermittent every 24 hours  dextrose 5%. 1000 milliLiter(s) (50 mL/Hr) IV Continuous <Continuous>  dextrose 50% Injectable 12.5 Gram(s) IV Push once  dextrose 50% Injectable 25 Gram(s) IV Push once  dextrose 50% Injectable 25 Gram(s) IV Push once  heparin  Injectable 5000 Unit(s) SubCutaneous every 8 hours  insulin lispro (HumaLOG) corrective regimen sliding scale   SubCutaneous every 6 hours  lidocaine   Patch 1 Patch Transdermal daily  norepinephrine Infusion 0.05 MICROgram(s)/kG/Min (9.778 mL/Hr) IV Continuous <Continuous>  pantoprazole  Injectable 40 milliGRAM(s) IV Push daily  polyethylene glycol 3350 17 Gram(s) Oral daily  PrismaSATE Dialysate BGK 4 / 2.5 5000 milliLiter(s) (1200 mL/Hr) CRRT <Continuous>  PrismaSOL Filtration BGK 0 / 2.5 5000 milliLiter(s) (600 mL/Hr) CRRT <Continuous>  PrismaSOL Filtration BGK 4 / 2.5 5000 milliLiter(s) (400 mL/Hr) CRRT <Continuous>  senna 2 Tablet(s) Oral at bedtime  vasopressin Infusion 0.08 Unit(s)/Min (4.8 mL/Hr) IV Continuous <Continuous>    MEDICATIONS  (PRN):  bisacodyl Suppository 10 milliGRAM(s) Rectal daily PRN Constipation  dextrose 40% Gel 15 Gram(s) Oral once PRN Blood Glucose LESS THAN 70 milliGRAM(s)/deciliter  glucagon  Injectable 1 milliGRAM(s) IntraMuscular once PRN Glucose LESS THAN 70 milligrams/deciliter  petrolatum Ophthalmic Ointment 1 Application(s) Both EYES every 4 hours PRN dry eyes  sodium chloride 0.9% lock flush 10 milliLiter(s) IV Push every 1 hour PRN Pre/post blood products, medications, blood draw, and to maintain line patency  sodium chloride 0.9% lock flush 10 milliLiter(s) IV Push every 1 hour PRN Pre/post blood products, medications, blood draw, and to maintain line patency      ALLERGIES:  Allergies    Indocin (Unknown)  oxacillin (Unknown)  Zyvox (Unknown)    Intolerances        OBJECTIVE:  ICU Vital Signs Last 24 Hrs  T(C): 36.6 (2020 03:27), Max: 37 (2020 08:00)  T(F): 97.8 (2020 03:27), Max: 98.6 (2020 08:00)  HR: 108 (2020 07:00) (80 - 132)  BP: 111/708 (2020 08:00) (111/708 - 111/708)  BP(mean): 84 (2020 08:00) (84 - 84)  ABP: 120/56 (2020 07:00) (76/40 - 146/70)  ABP(mean): 80 (2020 07:00) (52 - 96)  RR: 21 (2020 07:00) (8 - 35)  SpO2: 98% (2020 07:00) (93% - 100%)    Mode: AC/ CMV (Assist Control/ Continuous Mandatory Ventilation)  RR (machine): 22  TV (machine): 520  FiO2: 35  PEEP: 5  ITime: 1  MAP: 8  PIP: 24    Adult Advanced Hemodynamics Last 24 Hrs  CVP(mm Hg): 5 (2020 20:00) (4 - 17)  CVP(cm H2O): --  CO: 7.2 (2020 14:30) (7.2 - 7.2)  CI: 3.1 (2020 14:30) (3.1 - 3.1)  PA: 40/19 (2020 20:00) (33/18 - 66/32)  PA(mean): 27 (2020 20:00) (25 - 47)  PCWP: --  SVR: 909 (2020 14:30) (909 - 909)  SVRI: 1881 (2020 14:30) (1881 - 1881)  PVR: --  PVRI: --  CAPILLARY BLOOD GLUCOSE      POCT Blood Glucose.: 166 mg/dL (2020 01:24)  POCT Blood Glucose.: 171 mg/dL (2020 17:58)  POCT Blood Glucose.: 160 mg/dL (2020 14:08)  POCT Blood Glucose.: 157 mg/dL (2020 12:09)  POCT Blood Glucose.: 167 mg/dL (2020 11:09)  POCT Blood Glucose.: 149 mg/dL (2020 07:38)    CAPILLARY BLOOD GLUCOSE      POCT Blood Glucose.: 166 mg/dL (2020 01:24)    I&O's Summary    2020 07:01  -  2020 07:00  --------------------------------------------------------  IN: 1260.5 mL / OUT: 1313 mL / NET: -52.5 mL      Daily     Daily Weight in k.5 (2020 03:27)    PHYSICAL EXAMINATION:  General: WN/WD NAD  HEENT: PERRLA  Neurology: AOX0  Respiratory: slight crackles appreciated laterally, clear to auscultation anteriorly   CV: RRR, S1S2, no murmurs, rubs or gallops  Abdominal: Soft, NT, ND +BS, Last BM  Extremities: Edematous both UPE and LE   Incisions:   Tubes:    LABS:  ABG - ( 2020 06:48 )  pH, Arterial: 7.40  pH, Blood: x     /  pCO2: 34    /  pO2: 129   / HCO3: 21    / Base Excess: -2.8  /  SaO2: 99                   8.4    11.25 )-----------( 224      ( 2020 04:27 )             26.6     -    136  |  100  |  82<H>  ----------------------------<  152<H>  4.1   |  19<L>  |  2.27<H>    Ca    8.8      2020 04:27  Phos  5.1       Mg     2.1         TPro  6.2  /  Alb  2.1<L>  /  TBili  0.4  /  DBili  x   /  AST  20  /  ALT  14  /  AlkPhos  83      LIVER FUNCTIONS - ( 2020 04:27 )  Alb: 2.1 g/dL / Pro: 6.2 g/dL / ALK PHOS: 83 U/L / ALT: 14 U/L / AST: 20 U/L / GGT: x           Urinalysis Basic - ( 2020 09:12 )    Color: Yellow / Appearance: Slightly Turbid / S.021 / pH: x  Gluc: x / Ketone: Negative  / Bili: Negative / Urobili: Negative   Blood: x / Protein: 30 mg/dL / Nitrite: Negative   Leuk Esterase: Large / RBC: 67 /hpf / WBC 34 /HPF   Sq Epi: x / Non Sq Epi: 1 /hpf / Bacteria: Negative    TELEMETRY:     EKG:     IMAGIN y M  with PMH significant for CAD s/p PCI, GERD, gout, OA, s/p multiple laminectomies and spinal fusions, hospital course c/b MSSA bacteremia, ESRD requiring urgent HD on this admission, S/P Shiley placement and removal now transferred to CCU for urgent HD in the setting of severe hypervolemia and hypoxic respiratory failure    #Neuro  - Intubated  off of sedation  - To transition to precedex as patient begins to awaken     #Resp  - S/P 3 sessions of HD, with goal to keep patient net even   - Currently with ground glass opacities on xray 2/2 to redistribution from low protein state  - Non Con CT : Pulmonary edema with moderate bilateral pleural effusions.   - Currently with low Fio2 requirements, awaiting extubation once arrousable     #CV    - [place] [facility]  Catheter placed with RA 5 and wedge 11, indicating that patients hypoxia is unlikely to be cardiogenic in nature and more consistent with vasodilatory shock .   - dobutamine stopped overnight, currently weaning both levo and vaso  - JAHAIRA : EF 44%, with severe AS, no vegetations reported at this time   - Repeat TTE 2020,  TTE EF 20  - Will require LHC once patient is more stable  further evaluation, given depressed EF in the long term when patient is more stable   -Will  c/w ASA and statin, for CAD    #GI  - PPI  - NG tube placed by ENT now with tube feeds this AM     #ID       - Patient has hx  MSSA bacteremia , to be dosed with daptomycin tilll 20  - Combicath specimen  negative yesterday,  awaiting results from blood cultures. Given no clear opacity on xray. Ground glass opacity 2/2 to redistribution from low albumin state .   -  ID following, appreciate recommendations     #Renal     ESRD requiring Urgent HD    - Joana moved to R IJ overnight, patient s/p HD session with 1.2 L removal, total net negative 500 cc from HD  - Will continue to renally dose all meds, avoid nephrotoxic agents, monitor I& o's daily   -Renal Following     #Heme  - Hgb stable, slightly downtrending  -Transfuse for hgb < 7 , will maintain active type and screen  - Will continue to monitor with cbc QD     #Endo  - No active issues     #DVT PPx  - Heparin sub q PATIENT:  MAHESH CANELA  [id number]    CHIEF COMPLAINT:  Patient is a 79y old  Male who presents with a chief complaint of Back pain (2020 23:09)    INTERVAL HISTORY/OVERNIGHT EVENTS: Levophed was brought down to . mcg, vaso .08. The patients swan was removed and the right shiley was placed in the right IJ for HD.500 Cc removed. Patient now with spontaneous movements. Startedprecedex  this morning in attempt to wean to extubate. Patient was reaching for the ET tube and unsecured mittens were placed. Precedex was discontinued shortly after rounds.     REVIEW OF SYSTEMS: Unable to assess patient as patient is sedated.     MEDICATIONS:  MEDICATIONS  (STANDING):  albuterol/ipratropium for Nebulization 3 milliLiter(s) Nebulizer every 6 hours  aspirin  chewable 81 milliGRAM(s) Oral daily  atorvastatin 10 milliGRAM(s) Oral at bedtime  chlorhexidine 0.12% Liquid 15 milliLiter(s) Oral Mucosa every 12 hours  chlorhexidine 4% Liquid 1 Application(s) Topical <User Schedule>  chlorhexidine 4% Liquid 1 Application(s) Topical <User Schedule>  CRRT Treatment    <Continuous>  DAPTOmycin IVPB 800 milliGRAM(s) IV Intermittent every 24 hours  dextrose 5%. 1000 milliLiter(s) (50 mL/Hr) IV Continuous <Continuous>  dextrose 50% Injectable 12.5 Gram(s) IV Push once  dextrose 50% Injectable 25 Gram(s) IV Push once  dextrose 50% Injectable 25 Gram(s) IV Push once  heparin  Injectable 5000 Unit(s) SubCutaneous every 8 hours  insulin lispro (HumaLOG) corrective regimen sliding scale   SubCutaneous every 6 hours  lidocaine   Patch 1 Patch Transdermal daily  norepinephrine Infusion 0.05 MICROgram(s)/kG/Min (9.778 mL/Hr) IV Continuous <Continuous>  pantoprazole  Injectable 40 milliGRAM(s) IV Push daily  polyethylene glycol 3350 17 Gram(s) Oral daily  PrismaSATE Dialysate BGK 4 / 2.5 5000 milliLiter(s) (1200 mL/Hr) CRRT <Continuous>  PrismaSOL Filtration BGK 0 / 2.5 5000 milliLiter(s) (600 mL/Hr) CRRT <Continuous>  PrismaSOL Filtration BGK 4 / 2.5 5000 milliLiter(s) (400 mL/Hr) CRRT <Continuous>  senna 2 Tablet(s) Oral at bedtime  vasopressin Infusion 0.08 Unit(s)/Min (4.8 mL/Hr) IV Continuous <Continuous>    MEDICATIONS  (PRN):  bisacodyl Suppository 10 milliGRAM(s) Rectal daily PRN Constipation  dextrose 40% Gel 15 Gram(s) Oral once PRN Blood Glucose LESS THAN 70 milliGRAM(s)/deciliter  glucagon  Injectable 1 milliGRAM(s) IntraMuscular once PRN Glucose LESS THAN 70 milligrams/deciliter  petrolatum Ophthalmic Ointment 1 Application(s) Both EYES every 4 hours PRN dry eyes  sodium chloride 0.9% lock flush 10 milliLiter(s) IV Push every 1 hour PRN Pre/post blood products, medications, blood draw, and to maintain line patency  sodium chloride 0.9% lock flush 10 milliLiter(s) IV Push every 1 hour PRN Pre/post blood products, medications, blood draw, and to maintain line patency      ALLERGIES:  Allergies    Indocin (Unknown)  oxacillin (Unknown)  Zyvox (Unknown)    Intolerances        OBJECTIVE:  ICU Vital Signs Last 24 Hrs  T(C): 36.6 (2020 03:27), Max: 37 (2020 08:00)  T(F): 97.8 (2020 03:27), Max: 98.6 (2020 08:00)  HR: 108 (2020 07:00) (80 - 132)  BP: 111/708 (2020 08:00) (111/708 - 111/708)  BP(mean): 84 (2020 08:00) (84 - 84)  ABP: 120/56 (2020 07:00) (76/40 - 146/70)  ABP(mean): 80 (2020 07:00) (52 - 96)  RR: 21 (2020 07:00) (8 - 35)  SpO2: 98% (2020 07:00) (93% - 100%)    Mode: AC/ CMV (Assist Control/ Continuous Mandatory Ventilation)  RR (machine): 22  TV (machine): 520  FiO2: 35  PEEP: 5  ITime: 1  MAP: 8  PIP: 24    Adult Advanced Hemodynamics Last 24 Hrs  CVP(mm Hg): 5 (2020 20:00) (4 - 17)  CVP(cm H2O): --  CO: 7.2 (2020 14:30) (7.2 - 7.2)  CI: 3.1 (2020 14:30) (3.1 - 3.1)  PA: 40/19 (2020 20:00) (33/18 - 66/32)  PA(mean): 27 (2020 20:00) (25 - 47)  PCWP: --  SVR: 909 (2020 14:30) (909 - 909)  SVRI: 1881 (2020 14:30) (1881 - 1881)  PVR: --  PVRI: --  CAPILLARY BLOOD GLUCOSE      POCT Blood Glucose.: 166 mg/dL (2020 01:24)  POCT Blood Glucose.: 171 mg/dL (2020 17:58)  POCT Blood Glucose.: 160 mg/dL (2020 14:08)  POCT Blood Glucose.: 157 mg/dL (2020 12:09)  POCT Blood Glucose.: 167 mg/dL (2020 11:09)  POCT Blood Glucose.: 149 mg/dL (2020 07:38)    CAPILLARY BLOOD GLUCOSE      POCT Blood Glucose.: 166 mg/dL (2020 01:24)    I&O's Summary    2020 07:01  -  2020 07:00  --------------------------------------------------------  IN: 1260.5 mL / OUT: 1313 mL / NET: -52.5 mL      Daily     Daily Weight in k.5 (2020 03:27)    PHYSICAL EXAMINATION:  General: WN/WD NAD  HEENT: PERRLA  Neurology: AOX0  Respiratory: slight crackles appreciated laterally, clear to auscultation anteriorly   CV: RRR, S1S2, no murmurs, rubs or gallops  Abdominal: Soft, NT, ND +BS, Last BM  Extremities: Edematous both UPE and LE   Incisions:   Tubes:    LABS:  ABG - ( 2020 06:48 )  pH, Arterial: 7.40  pH, Blood: x     /  pCO2: 34    /  pO2: 129   / HCO3: 21    / Base Excess: -2.8  /  SaO2: 99                   8.4    11.25 )-----------( 224      ( 2020 04:27 )             26.6         136  |  100  |  82<H>  ----------------------------<  152<H>  4.1   |  19<L>  |  2.27<H>    Ca    8.8      2020 04:27  Phos  5.1       Mg     2.1         TPro  6.2  /  Alb  2.1<L>  /  TBili  0.4  /  DBili  x   /  AST  20  /  ALT  14  /  AlkPhos  83      LIVER FUNCTIONS - ( 2020 04:27 )  Alb: 2.1 g/dL / Pro: 6.2 g/dL / ALK PHOS: 83 U/L / ALT: 14 U/L / AST: 20 U/L / GGT: x           Urinalysis Basic - ( 2020 09:12 )    Color: Yellow / Appearance: Slightly Turbid / S.021 / pH: x  Gluc: x / Ketone: Negative  / Bili: Negative / Urobili: Negative   Blood: x / Protein: 30 mg/dL / Nitrite: Negative   Leuk Esterase: Large / RBC: 67 /hpf / WBC 34 /HPF   Sq Epi: x / Non Sq Epi: 1 /hpf / Bacteria: Negative    TELEMETRY:     EKG:     [age]AGIN y M  with PMH significant for CAD s/p PCI, GERD, gout, OA, s/p multiple laminectomies and spinal fusions, hospital course c/b MSSA bacteremia, ESRD requiring urgent HD on this admission, S/P Shiley placement and removal now transferred to CCU for urgent HD in the setting of severe hypervolemia and hypoxic respiratory failure    #Neuro  - Intubated currently  with spontaneous movements, transitioned to precedex this AM. Turned off precedex, and put on unsecured mittens to aid with sedation.     #Resp  - S/P 3 sessions of HD, with goal to keep patient net even   - Currently with ground glass opacities on xray 2/2 to redistribution from low protein state  - Non Con CT : Pulmonary edema with moderate bilateral pleural effusions.   - Currently with low Fio2 requirements, awaiting extubation once arrousable     #CV    - Houston Mann  Catheter placed with RA 5 and wedge 11, indicating that patients hypoxia is unlikely to be cardiogenic in nature and more consistent with vasodilatory shock .   - dobutamine stopped overnight, currently weaning both levo and vaso  - JAHAIRA : EF 44%, with severe AS, no vegetations reported at this time   - Repeat TTE 2020,  TTE EF 20  - Will require LHC once patient is more stable  further evaluation, given depressed EF in the long term when patient is more stable   -Will  c/w ASA and statin, for CAD    #GI  - PPI  - NG tube  coiled, will call ENT for replacement of NG tube     #ID     - Patient has hx  MSSA bacteremia , to be dosed with daptomycin tilll 20  - Combicath specimen  negative yesterday,  blood cultures negative Given no clear opacity on xray. Ground glass opacity 2/2 to redistribution from low albumin state . No additional antibiotics needed at this time.   -  ID following, appreciate recommendations     #Renal     ESRD requiring Urgent HD    - Joana moved to R  overnight, patient s/p HD session with 1.2 L removal, total net negative 500 cc from HD  - Will continue to renally dose all meds, avoid nephrotoxic agents, monitor I& o's daily   -Renal Following     #Heme  - Hgb stable, slightly downtrending  -Transfuse for hgb < 7 , will maintain active type and screen  - Will continue to monitor with cbc QD     #Endo  - No active issues     #DVT PPx  - Heparin sub q

## 2020-01-10 LAB
ALBUMIN SERPL ELPH-MCNC: 1.9 G/DL — LOW (ref 3.3–5)
ALBUMIN SERPL ELPH-MCNC: 2 G/DL — LOW (ref 3.3–5)
ALP SERPL-CCNC: 75 U/L — SIGNIFICANT CHANGE UP (ref 40–120)
ALP SERPL-CCNC: 81 U/L — SIGNIFICANT CHANGE UP (ref 40–120)
ALT FLD-CCNC: 14 U/L — SIGNIFICANT CHANGE UP (ref 10–45)
ALT FLD-CCNC: 16 U/L — SIGNIFICANT CHANGE UP (ref 10–45)
ANION GAP SERPL CALC-SCNC: 17 MMOL/L — SIGNIFICANT CHANGE UP (ref 5–17)
ANION GAP SERPL CALC-SCNC: 17 MMOL/L — SIGNIFICANT CHANGE UP (ref 5–17)
APTT BLD: 24.6 SEC — LOW (ref 27.5–36.3)
AST SERPL-CCNC: 16 U/L — SIGNIFICANT CHANGE UP (ref 10–40)
AST SERPL-CCNC: 20 U/L — SIGNIFICANT CHANGE UP (ref 10–40)
BASOPHILS # BLD AUTO: 0 K/UL — SIGNIFICANT CHANGE UP (ref 0–0.2)
BASOPHILS # BLD AUTO: 0.01 K/UL — SIGNIFICANT CHANGE UP (ref 0–0.2)
BASOPHILS NFR BLD AUTO: 0 % — SIGNIFICANT CHANGE UP (ref 0–2)
BASOPHILS NFR BLD AUTO: 0.1 % — SIGNIFICANT CHANGE UP (ref 0–2)
BILIRUB SERPL-MCNC: 0.4 MG/DL — SIGNIFICANT CHANGE UP (ref 0.2–1.2)
BILIRUB SERPL-MCNC: 0.4 MG/DL — SIGNIFICANT CHANGE UP (ref 0.2–1.2)
BUN SERPL-MCNC: 104 MG/DL — HIGH (ref 7–23)
BUN SERPL-MCNC: 78 MG/DL — HIGH (ref 7–23)
CALCIUM SERPL-MCNC: 8.5 MG/DL — SIGNIFICANT CHANGE UP (ref 8.4–10.5)
CALCIUM SERPL-MCNC: 8.5 MG/DL — SIGNIFICANT CHANGE UP (ref 8.4–10.5)
CHLORIDE SERPL-SCNC: 103 MMOL/L — SIGNIFICANT CHANGE UP (ref 96–108)
CHLORIDE SERPL-SCNC: 105 MMOL/L — SIGNIFICANT CHANGE UP (ref 96–108)
CO2 SERPL-SCNC: 19 MMOL/L — LOW (ref 22–31)
CO2 SERPL-SCNC: 19 MMOL/L — LOW (ref 22–31)
CORTIS AM PEAK SERPL-MCNC: 44.6 UG/DL — HIGH (ref 6–18.4)
CREAT SERPL-MCNC: 2.45 MG/DL — HIGH (ref 0.5–1.3)
CREAT SERPL-MCNC: 3.21 MG/DL — HIGH (ref 0.5–1.3)
CULTURE RESULTS: SIGNIFICANT CHANGE UP
EOSINOPHIL # BLD AUTO: 0.07 K/UL — SIGNIFICANT CHANGE UP (ref 0–0.5)
EOSINOPHIL # BLD AUTO: 0.13 K/UL — SIGNIFICANT CHANGE UP (ref 0–0.5)
EOSINOPHIL NFR BLD AUTO: 0.7 % — SIGNIFICANT CHANGE UP (ref 0–6)
EOSINOPHIL NFR BLD AUTO: 1.3 % — SIGNIFICANT CHANGE UP (ref 0–6)
GAS PNL BLDA: SIGNIFICANT CHANGE UP
GLUCOSE BLDC GLUCOMTR-MCNC: 181 MG/DL — HIGH (ref 70–99)
GLUCOSE BLDC GLUCOMTR-MCNC: 234 MG/DL — HIGH (ref 70–99)
GLUCOSE SERPL-MCNC: 195 MG/DL — HIGH (ref 70–99)
GLUCOSE SERPL-MCNC: 199 MG/DL — HIGH (ref 70–99)
HCT VFR BLD CALC: 23.9 % — LOW (ref 39–50)
HCT VFR BLD CALC: 30.2 % — LOW (ref 39–50)
HGB BLD-MCNC: 7.5 G/DL — LOW (ref 13–17)
HGB BLD-MCNC: 9.5 G/DL — LOW (ref 13–17)
IMM GRANULOCYTES NFR BLD AUTO: 0.6 % — SIGNIFICANT CHANGE UP (ref 0–1.5)
IMM GRANULOCYTES NFR BLD AUTO: 0.6 % — SIGNIFICANT CHANGE UP (ref 0–1.5)
INR BLD: 1.3 RATIO — HIGH (ref 0.88–1.16)
LYMPHOCYTES # BLD AUTO: 0.5 K/UL — LOW (ref 1–3.3)
LYMPHOCYTES # BLD AUTO: 0.8 K/UL — LOW (ref 1–3.3)
LYMPHOCYTES # BLD AUTO: 5.2 % — LOW (ref 13–44)
LYMPHOCYTES # BLD AUTO: 8 % — LOW (ref 13–44)
MAGNESIUM SERPL-MCNC: 2.1 MG/DL — SIGNIFICANT CHANGE UP (ref 1.6–2.6)
MAGNESIUM SERPL-MCNC: 2.2 MG/DL — SIGNIFICANT CHANGE UP (ref 1.6–2.6)
MCHC RBC-ENTMCNC: 28.5 PG — SIGNIFICANT CHANGE UP (ref 27–34)
MCHC RBC-ENTMCNC: 29.2 PG — SIGNIFICANT CHANGE UP (ref 27–34)
MCHC RBC-ENTMCNC: 31.4 GM/DL — LOW (ref 32–36)
MCHC RBC-ENTMCNC: 31.5 GM/DL — LOW (ref 32–36)
MCV RBC AUTO: 90.7 FL — SIGNIFICANT CHANGE UP (ref 80–100)
MCV RBC AUTO: 93 FL — SIGNIFICANT CHANGE UP (ref 80–100)
MONOCYTES # BLD AUTO: 0.23 K/UL — SIGNIFICANT CHANGE UP (ref 0–0.9)
MONOCYTES # BLD AUTO: 0.3 K/UL — SIGNIFICANT CHANGE UP (ref 0–0.9)
MONOCYTES NFR BLD AUTO: 2.3 % — SIGNIFICANT CHANGE UP (ref 2–14)
MONOCYTES NFR BLD AUTO: 3.1 % — SIGNIFICANT CHANGE UP (ref 2–14)
NEUTROPHILS # BLD AUTO: 8.68 K/UL — HIGH (ref 1.8–7.4)
NEUTROPHILS # BLD AUTO: 8.8 K/UL — HIGH (ref 1.8–7.4)
NEUTROPHILS NFR BLD AUTO: 87.7 % — HIGH (ref 43–77)
NEUTROPHILS NFR BLD AUTO: 90.4 % — HIGH (ref 43–77)
NRBC # BLD: 0 /100 WBCS — SIGNIFICANT CHANGE UP (ref 0–0)
NRBC # BLD: 0 /100 WBCS — SIGNIFICANT CHANGE UP (ref 0–0)
PHOSPHATE SERPL-MCNC: 4.6 MG/DL — HIGH (ref 2.5–4.5)
PHOSPHATE SERPL-MCNC: 6.4 MG/DL — HIGH (ref 2.5–4.5)
PLATELET # BLD AUTO: 141 K/UL — LOW (ref 150–400)
PLATELET # BLD AUTO: 189 K/UL — SIGNIFICANT CHANGE UP (ref 150–400)
POTASSIUM SERPL-MCNC: 4.2 MMOL/L — SIGNIFICANT CHANGE UP (ref 3.5–5.3)
POTASSIUM SERPL-MCNC: 4.7 MMOL/L — SIGNIFICANT CHANGE UP (ref 3.5–5.3)
POTASSIUM SERPL-SCNC: 4.2 MMOL/L — SIGNIFICANT CHANGE UP (ref 3.5–5.3)
POTASSIUM SERPL-SCNC: 4.7 MMOL/L — SIGNIFICANT CHANGE UP (ref 3.5–5.3)
PROT SERPL-MCNC: 5.8 G/DL — LOW (ref 6–8.3)
PROT SERPL-MCNC: 5.9 G/DL — LOW (ref 6–8.3)
PROTHROM AB SERPL-ACNC: 14.9 SEC — HIGH (ref 10–12.9)
RBC # BLD: 2.57 M/UL — LOW (ref 4.2–5.8)
RBC # BLD: 3.33 M/UL — LOW (ref 4.2–5.8)
RBC # FLD: 15.4 % — HIGH (ref 10.3–14.5)
RBC # FLD: 15.6 % — HIGH (ref 10.3–14.5)
SODIUM SERPL-SCNC: 139 MMOL/L — SIGNIFICANT CHANGE UP (ref 135–145)
SODIUM SERPL-SCNC: 141 MMOL/L — SIGNIFICANT CHANGE UP (ref 135–145)
SPECIMEN SOURCE: SIGNIFICANT CHANGE UP
WBC # BLD: 10.03 K/UL — SIGNIFICANT CHANGE UP (ref 3.8–10.5)
WBC # BLD: 9.61 K/UL — SIGNIFICANT CHANGE UP (ref 3.8–10.5)
WBC # FLD AUTO: 10.03 K/UL — SIGNIFICANT CHANGE UP (ref 3.8–10.5)
WBC # FLD AUTO: 9.61 K/UL — SIGNIFICANT CHANGE UP (ref 3.8–10.5)

## 2020-01-10 PROCEDURE — 71045 X-RAY EXAM CHEST 1 VIEW: CPT | Mod: 26

## 2020-01-10 PROCEDURE — 99233 SBSQ HOSP IP/OBS HIGH 50: CPT

## 2020-01-10 PROCEDURE — 99232 SBSQ HOSP IP/OBS MODERATE 35: CPT

## 2020-01-10 RX ORDER — MIDODRINE HYDROCHLORIDE 2.5 MG/1
10 TABLET ORAL ONCE
Refills: 0 | Status: COMPLETED | OUTPATIENT
Start: 2020-01-10 | End: 2020-01-10

## 2020-01-10 RX ORDER — MIDODRINE HYDROCHLORIDE 2.5 MG/1
5 TABLET ORAL ONCE
Refills: 0 | Status: DISCONTINUED | OUTPATIENT
Start: 2020-01-10 | End: 2020-01-10

## 2020-01-10 RX ADMIN — Medication 3 MILLILITER(S): at 17:42

## 2020-01-10 RX ADMIN — Medication 2: at 23:54

## 2020-01-10 RX ADMIN — DEXMEDETOMIDINE HYDROCHLORIDE IN 0.9% SODIUM CHLORIDE 5.21 MICROGRAM(S)/KG/HR: 4 INJECTION INTRAVENOUS at 02:32

## 2020-01-10 RX ADMIN — DEXMEDETOMIDINE HYDROCHLORIDE IN 0.9% SODIUM CHLORIDE 5.21 MICROGRAM(S)/KG/HR: 4 INJECTION INTRAVENOUS at 19:30

## 2020-01-10 RX ADMIN — Medication 3 MILLILITER(S): at 11:40

## 2020-01-10 RX ADMIN — HEPARIN SODIUM 5000 UNIT(S): 5000 INJECTION INTRAVENOUS; SUBCUTANEOUS at 21:32

## 2020-01-10 RX ADMIN — DEXMEDETOMIDINE HYDROCHLORIDE IN 0.9% SODIUM CHLORIDE 5.21 MICROGRAM(S)/KG/HR: 4 INJECTION INTRAVENOUS at 02:33

## 2020-01-10 RX ADMIN — LIDOCAINE 1 PATCH: 4 CREAM TOPICAL at 19:01

## 2020-01-10 RX ADMIN — ATORVASTATIN CALCIUM 10 MILLIGRAM(S): 80 TABLET, FILM COATED ORAL at 21:32

## 2020-01-10 RX ADMIN — DAPTOMYCIN 132 MILLIGRAM(S): 500 INJECTION, POWDER, LYOPHILIZED, FOR SOLUTION INTRAVENOUS at 17:19

## 2020-01-10 RX ADMIN — Medication 1: at 13:17

## 2020-01-10 RX ADMIN — Medication 3 MILLILITER(S): at 05:39

## 2020-01-10 RX ADMIN — CHLORHEXIDINE GLUCONATE 15 MILLILITER(S): 213 SOLUTION TOPICAL at 18:28

## 2020-01-10 RX ADMIN — Medication 1: at 19:00

## 2020-01-10 RX ADMIN — LIDOCAINE 1 PATCH: 4 CREAM TOPICAL at 23:50

## 2020-01-10 RX ADMIN — Medication 1: at 00:02

## 2020-01-10 RX ADMIN — SENNA PLUS 2 TABLET(S): 8.6 TABLET ORAL at 21:32

## 2020-01-10 RX ADMIN — Medication 81 MILLIGRAM(S): at 12:14

## 2020-01-10 RX ADMIN — HEPARIN SODIUM 5000 UNIT(S): 5000 INJECTION INTRAVENOUS; SUBCUTANEOUS at 13:19

## 2020-01-10 RX ADMIN — CHLORHEXIDINE GLUCONATE 1 APPLICATION(S): 213 SOLUTION TOPICAL at 05:55

## 2020-01-10 RX ADMIN — VASOPRESSIN 4.8 UNIT(S)/MIN: 20 INJECTION INTRAVENOUS at 19:05

## 2020-01-10 RX ADMIN — LIDOCAINE 1 PATCH: 4 CREAM TOPICAL at 12:16

## 2020-01-10 RX ADMIN — HEPARIN SODIUM 5000 UNIT(S): 5000 INJECTION INTRAVENOUS; SUBCUTANEOUS at 05:50

## 2020-01-10 RX ADMIN — PANTOPRAZOLE SODIUM 40 MILLIGRAM(S): 20 TABLET, DELAYED RELEASE ORAL at 12:16

## 2020-01-10 RX ADMIN — Medication 1: at 06:02

## 2020-01-10 RX ADMIN — CHLORHEXIDINE GLUCONATE 15 MILLILITER(S): 213 SOLUTION TOPICAL at 06:03

## 2020-01-10 RX ADMIN — Medication 1 DROP(S): at 12:20

## 2020-01-10 RX ADMIN — LIDOCAINE 1 PATCH: 4 CREAM TOPICAL at 00:36

## 2020-01-10 RX ADMIN — MIDODRINE HYDROCHLORIDE 10 MILLIGRAM(S): 2.5 TABLET ORAL at 10:14

## 2020-01-10 NOTE — PROGRESS NOTE ADULT - ASSESSMENT
79 yr old male with HTN, DCHF, CAD, S/P PCI to OM1, Mid LAD (9/2014, [facility], [facility], [facility]) hx of ischemic CMP, AS, DM HLD, GERD, GOUT, s/p multiple spinal surgery admitted with back pain and fever, now with acute hypoxic resp failure 2/2 to pulm edema w/ poss underlying PNA, +coronovirus.     1. Hypoxic respiratory failure, acute/chronic systolic heart failure  -right heart cath revealing normal filling pressures, values more consistent w distributive /septic shock  - off dobutamine   -CT chest 1/5 with pulm edema, infection is less likely , repeat chest xray 1/10 with unchanged pulm edema  -peripheral edema, pulmonary edema in the setting of hypoalbuminemia/ hypoproteinemia   -repeat echo 1/8/20 similar results from prior study, with ef 20%, no lv thrombus, severe global Lv dysfx , severe AS   -cont UF/CVVHD for aggressive volume removal   -vent mgmt and pressors per CCU, feeds started via NG tube  -continue medical therapy for now as he is not a candidate for ischemic eval in setting of resp failure, KRIS/CKD, will revisit once volume status/renal function improves    2. CAD s/p PCI (OM1/LAD)   -cont statin and asa     3. Aortic Stenosis  -stable, severe on LAZARO with valve area of 0.9, tte this admit and as outpt with moderate AS  -gradients low on lazaro, perhaps due to new lv dysfxn  -YULY likely underestimated  -cont to monitor      4. Bacteremia, resolved   -LAZARO 12/13 without endocarditis   -repeat TTE with no obvious signs of endocarditis; repeat bcx 1/8 negative   - RVP +  -abx per ID    5. KRIS, ? AIN  -creat stable, renal f/u for HD   - prbc with HD 79 yr old male with HTN, DCHF, CAD, S/P PCI to OM1, Mid LAD (9/2014, [facility], [facility], [facility]) hx of ischemic CMP, AS, DM HLD, GERD, GOUT, s/p multiple spinal surgery admitted with back pain and fever, now with acute hypoxic resp failure 2/2 to pulm edema w/ poss underlying PNA, +coronovirus.     1. Hypoxic respiratory failure, acute/chronic systolic heart failure  -right heart cath revealing normal filling pressures, values more consistent w distributive /septic shock  - off dobutamine   -CT chest 1/5 with pulm edema, infection is less likely , repeat chest xray 1/10 with unchanged pulm edema  -peripheral edema, pulmonary edema in the setting of hypoalbuminemia/ hypoproteinemia   -repeat echo 1/8/20 similar results from prior study, with ef 20%, no lv thrombus, severe global Lv dysfx , severe AS   -cont UF/CVVHD for aggressive volume removal   -vent mgmt and pressors per CCU, feeds started via NG tube  -continue medical therapy for now as he is not a candidate for ischemic eval in setting of resp failure, KRIS/CKD, will revisit once volume status/renal function improves    2. CAD s/p PCI (OM1/LAD)   -cont statin and asa     3. Aortic Stenosis  -stable, severe on LAZARO with valve area of 0.9, tte this admit and as outpt with moderate AS  -gradients low on lazaro, perhaps due to new lv dysfxn  -YULY likely underestimated  -cont to monitor      4. Bacteremia, resolved   -LAZARO 12/13 without endocarditis   -repeat TTE with no obvious signs of endocarditis; repeat bcx 1/8 negative   - RVP +  -abx per ID    5. KRIS, ? AIN  -creat stable, renal f/u for HD   - prbc with HD       dvt ppx

## 2020-01-10 NOTE — PROGRESS NOTE ADULT - SUBJECTIVE AND OBJECTIVE BOX
====================  CCU MIDNIGHT ROUNDS  ====================    MAHESH CANELA  [id number]  Patient is a 79y old  Male who presents with a chief complaint of Back pain (10 Porter 2020 20:21)      ====================  SUMMARY:  ====================        ====================  NEW EVENTS:  ====================        ====================  VITALS (Last 12 hrs):  ====================    T(C): 37 (01-10-20 @ 19:00), Max: 37 (01-10-20 @ 19:00)  T(F): 98.6 (01-10-20 @ 19:00), Max: 98.6 (01-10-20 @ 19:00)  HR: 82 (01-10-20 @ 23:00) (64 - 103)  BP: --  BP(mean): --  ABP: 116/50 (01-10-20 @ 23:00) (104/42 - 130/56)  ABP(mean): 72 (01-10-20 @ 23:00) (60 - 82)  RR: 19 (01-10-20 @ 23:00) (19 - 28)  SpO2: 100% (01-10-20 @ 23:00) (96% - 100%)  Wt(kg): --  CVP(mm Hg): --  CVP(cm H2O): --  CO: --  CI: --  PA: --  PA(mean): --  PCWP: --  SVR: --  PVR: --    I&O's Summary    09 Jan 2020 07:01  -  10 Porter 2020 07:00  --------------------------------------------------------  IN: 500 mL / OUT: 135 mL / NET: 365 mL    10 Porter 2020 07:01  -  10 Porter 2020 23:21  --------------------------------------------------------  IN: 3071.8 mL / OUT: 2090 mL / NET: 981.8 mL        Mode: AC/ CMV (Assist Control/ Continuous Mandatory Ventilation)  RR (machine): 22  TV (machine): 520  FiO2: 35  PEEP: 5  ITime: 1  MAP: 12  PIP: 31      ====================  NEW LABS:  ====================                          9.5    10.03 )-----------( 141      ( 10 Porter 2020 18:20 )             30.2     01-10    139  |  103  |  78<H>  ----------------------------<  195<H>  4.2   |  19<L>  |  2.45<H>    Ca    8.5      10 Porter 2020 18:20  Phos  4.6     01-10  Mg     2.1     01-10    TPro  5.9<L>  /  Alb  2.0<L>  /  TBili  0.4  /  DBili  x   /  AST  16  /  ALT  16  /  AlkPhos  81  01-10    PT/INR - ( 10 Porter 2020 05:53 )   PT: 14.9 sec;   INR: 1.30 ratio         PTT - ( 10 Porter 2020 05:53 )  PTT:24.6 sec      ABG - ( 10 Porter 2020 05:50 )  pH, Arterial: 7.44  pH, Blood: x     /  pCO2: 32    /  pO2: 86    / HCO3: 22    / Base Excess: -1.7  /  SaO2: 97                    ====================  PLAN:  ====================  - ====================  CCU MIDNIGHT ROUNDS  ====================    MAHESH CANELA  [id number]  Patient is a 79y old  Male who presents with a chief complaint of Back pain (10 Porter 2020 20:21)      ====================  SUMMARY: 79 y M  with PMH significant for CAD s/p PCI, GERD, gout, OA, s/p multiple laminectomies and spinal fusions presenting to hospital with worsening back pain/ weakness that progressed to difficulty with ambulation. Hospital course c/b acute metabolic encephalopathy in the setting of MSSA bacteremia. Ceftriaxone started 12/7, transitioned to vanc+cefepime, currently transferred to the CCU for fluid overload, respiratory distress requiring urgent HD s/p intubation  ====================    ====================  NEW EVENTS: Off sedation, following commands  ====================    ====================  VITALS (Last 12 hrs):  ====================    T(C): 37 (01-10-20 @ 19:00), Max: 37 (01-10-20 @ 19:00)  T(F): 98.6 (01-10-20 @ 19:00), Max: 98.6 (01-10-20 @ 19:00)  HR: 82 (01-10-20 @ 23:00) (64 - 103)  BP: --  BP(mean): --  ABP: 116/50 (01-10-20 @ 23:00) (104/42 - 130/56)  ABP(mean): 72 (01-10-20 @ 23:00) (60 - 82)  RR: 19 (01-10-20 @ 23:00) (19 - 28)  SpO2: 100% (01-10-20 @ 23:00) (96% - 100%)  Wt(kg): --  CVP(mm Hg): --  CVP(cm H2O): --  CO: --  CI: --  PA: --  PA(mean): --  PCWP: --  SVR: --  PVR: --    I&O's Summary    09 Jan 2020 07:01  -  10 Porter 2020 07:00  --------------------------------------------------------  IN: 500 mL / OUT: 135 mL / NET: 365 mL    10 Porter 2020 07:01  -  10 Porter 2020 23:21  --------------------------------------------------------  IN: 3071.8 mL / OUT: 2090 mL / NET: 981.8 mL        Mode: AC/ CMV (Assist Control/ Continuous Mandatory Ventilation)  RR (machine): 22  TV (machine): 520  FiO2: 35  PEEP: 5  ITime: 1  MAP: 12  PIP: 31      ====================  NEW LABS:  ====================                          9.5    10.03 )-----------( 141      ( 10 Porter 2020 18:20 )             30.2     01-10    139  |  103  |  78<H>  ----------------------------<  195<H>  4.2   |  19<L>  |  2.45<H>    Ca    8.5      10 Porter 2020 18:20  Phos  4.6     01-10  Mg     2.1     01-10    TPro  5.9<L>  /  Alb  2.0<L>  /  TBili  0.4  /  DBili  x   /  AST  16  /  ALT  16  /  AlkPhos  81  01-10    PT/INR - ( 10 Porter 2020 05:53 )   PT: 14.9 sec;   INR: 1.30 ratio         PTT - ( 10 Porter 2020 05:53 )  PTT:24.6 sec      ABG - ( 10 Porter 2020 05:50 )  pH, Arterial: 7.44  pH, Blood: x     /  pCO2: 32    /  pO2: 86    / HCO3: 22    / Base Excess: -1.7  /  SaO2: 97          ====================  PLAN:  ====================  - Continue to CPAP   -Wean off pressors as tolerated

## 2020-01-10 NOTE — PROGRESS NOTE ADULT - SUBJECTIVE AND OBJECTIVE BOX
[facility]   [phone]  RENAL FOLLOW UP NOTE  --------------------------------------------------------------------------------  Chief Complaint:    24 hour events/subjective:    seen earlier in am then on hd      PAST HISTORY  --------------------------------------------------------------------------------  No significant changes to PMH, PSH, FHx, SHx, unless otherwise noted    ALLERGIES & MEDICATIONS  --------------------------------------------------------------------------------  Allergies    Indocin (Unknown)  oxacillin (Unknown)  Zyvox (Unknown)    Intolerances      Standing Inpatient Medications  albuterol/ipratropium for Nebulization 3 milliLiter(s) Nebulizer every 6 hours  artificial  tears Solution 1 Drop(s) Both EYES daily  aspirin  chewable 81 milliGRAM(s) Oral daily  atorvastatin 10 milliGRAM(s) Oral at bedtime  chlorhexidine 0.12% Liquid 15 milliLiter(s) Oral Mucosa every 12 hours  chlorhexidine 4% Liquid 1 Application(s) Topical <User Schedule>  chlorhexidine 4% Liquid 1 Application(s) Topical <User Schedule>  DAPTOmycin IVPB 800 milliGRAM(s) IV Intermittent every 48 hours  dexMEDEtomidine Infusion 0.2 MICROgram(s)/kG/Hr IV Continuous <Continuous>  dextrose 5%. 1000 milliLiter(s) IV Continuous <Continuous>  dextrose 50% Injectable 12.5 Gram(s) IV Push once  dextrose 50% Injectable 25 Gram(s) IV Push once  dextrose 50% Injectable 25 Gram(s) IV Push once  heparin  Injectable 5000 Unit(s) SubCutaneous every 8 hours  insulin lispro (HumaLOG) corrective regimen sliding scale   SubCutaneous every 6 hours  lidocaine   Patch 1 Patch Transdermal daily  norepinephrine Infusion 0.05 MICROgram(s)/kG/Min IV Continuous <Continuous>  pantoprazole  Injectable 40 milliGRAM(s) IV Push daily  polyethylene glycol 3350 17 Gram(s) Oral daily  senna 2 Tablet(s) Oral at bedtime  vasopressin Infusion 0.08 Unit(s)/Min IV Continuous <Continuous>    PRN Inpatient Medications  bisacodyl Suppository 10 milliGRAM(s) Rectal daily PRN  dextrose 40% Gel 15 Gram(s) Oral once PRN  glucagon  Injectable 1 milliGRAM(s) IntraMuscular once PRN  petrolatum Ophthalmic Ointment 1 Application(s) Both EYES every 4 hours PRN  sodium chloride 0.9% lock flush 10 milliLiter(s) IV Push every 1 hour PRN  sodium chloride 0.9% lock flush 10 milliLiter(s) IV Push every 1 hour PRN      REVIEW OF SYSTEMS  --------------------------------------------------------------------------------      VITALS/PHYSICAL EXAM  --------------------------------------------------------------------------------  T(C): 37 (01-10-20 @ 19:00), Max: 37 (01-10-20 @ 19:00)  HR: 90 (01-10-20 @ 20:00) (64 - 116)  BP: --  RR: 22 (01-10-20 @ 20:00) (10 - 33)  SpO2: 100% (01-10-20 @ 20:00) (94% - 100%)  Wt(kg): --        01-09-20 @ 07:01  -  01-10-20 @ 07:00  --------------------------------------------------------  IN: 500 mL / OUT: 135 mL / NET: 365 mL    01-10-20 @ 07:01  -  01-10-20 @ 20:22  --------------------------------------------------------  IN: 2879.4 mL / OUT: 2060 mL / NET: 819.4 mL      Physical Exam:  	Gen: inutbated   	Pulm: decrease bs  + coarse bs bilateral no  wheezing  	CV: RRR, S1S2; no rub  	Abd: +BS, soft, nontender/nondistended  	: No suprapubic tenderness  	UE: Warm, no cyanosis  no clubbing,  3+ edema   	LE: Warm, no cyanosis  no clubbing, 3- edema decrease in edema overall   	  	      LABS/STUDIES  --------------------------------------------------------------------------------              9.5    10.03 >-----------<  141      [01-10-20 @ 18:20]              30.2     139  |  103  |  78  ----------------------------<  195      [01-10-20 @ 18:20]  4.2   |  19  |  2.45        Ca     8.5     [01-10-20 @ 18:20]      Mg     2.1     [01-10-20 @ 18:20]      Phos  4.6     [01-10-20 @ 18:20]    TPro  5.9  /  Alb  2.0  /  TBili  0.4  /  DBili  x   /  AST  16  /  ALT  16  /  AlkPhos  81  [01-10-20 @ 18:20]    PT/INR: PT 14.9 , INR 1.30       [01-10-20 @ 05:53]  PTT: 24.6       [01-10-20 @ 05:53]      Creatinine Trend:  SCr 2.45 [01-10 @ 18:20]  SCr 3.21 [01-10 @ 05:53]  SCr 3.02 [01-09 @ 20:29]  SCr SEE NOTE [01-09 @ 18:10]  SCr 2.27 [01-09 @ 04:27]              Urinalysis - [01-08-20 @ 09:12]      Color Yellow / Appearance Slightly Turbid / SG 1.021 / pH 5.5      Gluc Negative / Ketone Negative  / Bili Negative / Urobili Negative       Blood Moderate / Protein 30 mg/dL / Leuk Est Large / Nitrite Negative      RBC 67 / WBC 34 / Hyaline 13 / Gran  / Sq Epi  / Non Sq Epi 1 / Bacteria Negative      HbA1c 7.3      [01-08-20 @ 08:56]    RYLEE: titer Negative, pattern --      [12-21-19 @ 17:35]  C3 Complement 25      [12-20-19 @ 12:18]  C4 Complement 21      [12-20-19 @ 12:18]  ANCA: cANCA Negative, pANCA Negative, atypical ANCA Negative      [12-21-19 @ 17:35]

## 2020-01-10 NOTE — PROGRESS NOTE ADULT - SUBJECTIVE AND OBJECTIVE BOX
PATIENT:  MAHESH CANELA  [id number]    CHIEF COMPLAINT:  Patient is a 79y old  Male who presents with a chief complaint of Back pain (2020 23:16)      INTERVAL HISTORY/OVERNIGHT EVENTS: Overnight ENT placed the NG tubes and tube feeds were started. patient currently requiring both vaso and levo for pressor support currently being weaned.     REVIEW OF SYSTEMS:  CONSTITUTIONAL: No weakness, fevers or chills  EYES/ENT: No visual changes;  No vertigo or throat pain   NECK: No pain or stiffness  RESPIRATORY: No cough, wheezing, hemoptysis; No shortness of breath  CARDIOVASCULAR: No chest pain or palpitations  GASTROINTESTINAL: No abdominal or epigastric pain. No nausea, vomiting, or hematemesis; No diarrhea or constipation. No melena or hematochezia.  GENITOURINARY: No dysuria, frequency or hematuria  NEUROLOGICAL: No numbness or weakness  SKIN: No itching, burning, rashes, or lesions   All other review of systems is negative unless indicated above.    MEDICATIONS:  MEDICATIONS  (STANDING):  albuterol/ipratropium for Nebulization 3 milliLiter(s) Nebulizer every 6 hours  artificial  tears Solution 1 Drop(s) Both EYES daily  aspirin  chewable 81 milliGRAM(s) Oral daily  atorvastatin 10 milliGRAM(s) Oral at bedtime  chlorhexidine 0.12% Liquid 15 milliLiter(s) Oral Mucosa every 12 hours  chlorhexidine 4% Liquid 1 Application(s) Topical <User Schedule>  chlorhexidine 4% Liquid 1 Application(s) Topical <User Schedule>  DAPTOmycin IVPB 800 milliGRAM(s) IV Intermittent every 48 hours  dexMEDEtomidine Infusion 0.2 MICROgram(s)/kG/Hr (5.215 mL/Hr) IV Continuous <Continuous>  dextrose 5%. 1000 milliLiter(s) (50 mL/Hr) IV Continuous <Continuous>  dextrose 50% Injectable 12.5 Gram(s) IV Push once  dextrose 50% Injectable 25 Gram(s) IV Push once  dextrose 50% Injectable 25 Gram(s) IV Push once  heparin  Injectable 5000 Unit(s) SubCutaneous every 8 hours  insulin lispro (HumaLOG) corrective regimen sliding scale   SubCutaneous every 6 hours  lidocaine   Patch 1 Patch Transdermal daily  norepinephrine Infusion 0.05 MICROgram(s)/kG/Min (9.778 mL/Hr) IV Continuous <Continuous>  pantoprazole  Injectable 40 milliGRAM(s) IV Push daily  polyethylene glycol 3350 17 Gram(s) Oral daily  senna 2 Tablet(s) Oral at bedtime  vasopressin Infusion 0.08 Unit(s)/Min (4.8 mL/Hr) IV Continuous <Continuous>    MEDICATIONS  (PRN):  bisacodyl Suppository 10 milliGRAM(s) Rectal daily PRN Constipation  dextrose 40% Gel 15 Gram(s) Oral once PRN Blood Glucose LESS THAN 70 milliGRAM(s)/deciliter  glucagon  Injectable 1 milliGRAM(s) IntraMuscular once PRN Glucose LESS THAN 70 milligrams/deciliter  petrolatum Ophthalmic Ointment 1 Application(s) Both EYES every 4 hours PRN dry eyes  sodium chloride 0.9% lock flush 10 milliLiter(s) IV Push every 1 hour PRN Pre/post blood products, medications, blood draw, and to maintain line patency  sodium chloride 0.9% lock flush 10 milliLiter(s) IV Push every 1 hour PRN Pre/post blood products, medications, blood draw, and to maintain line patency      ALLERGIES:  Allergies    Indocin (Unknown)  oxacillin (Unknown)  Zyvox (Unknown)    Intolerances        OBJECTIVE:  ICU Vital Signs Last 24 Hrs  T(C): 36.6 (10 Porter 2020 05:00), Max: 37.1 (2020 12:15)  T(F): 97.9 (10 Porter 2020 05:00), Max: 98.8 (2020 12:15)  HR: 100 (10 Porter 2020 06:37) (82 - 126)  BP: --  BP(mean): --  ABP: 116/50 (10 Porter 2020 06:35) (90/44 - 122/62)  ABP(mean): 72 (10 Porter 2020 06:35) (62 - 84)  RR: 25 (10 Porter 2020 06:35) (15 - 33)  SpO2: 98% (10 Porter 2020 06:37) (92% - 100%)    Mode: AC/ CMV (Assist Control/ Continuous Mandatory Ventilation)  RR (machine): 22  TV (machine): 520  FiO2: 35  PEEP: 5  ITime: 1  MAP: 10  PIP: 28    Adult Advanced Hemodynamics Last 24 Hrs  CVP(mm Hg): --  CVP(cm H2O): --  CO: --  CI: --  PA: --  PA(mean): --  PCWP: --  SVR: --  SVRI: --  PVR: --  PVRI: --  CAPILLARY BLOOD GLUCOSE      POCT Blood Glucose.: 161 mg/dL (2020 23:57)  POCT Blood Glucose.: 167 mg/dL (2020 12:28)    CAPILLARY BLOOD GLUCOSE      POCT Blood Glucose.: 161 mg/dL (2020 23:57)    I&O's Summary    2020 07:01  -  10 Porter 2020 07:00  --------------------------------------------------------  IN: 494.1 mL / OUT: 135 mL / NET: 359.1 mL      Daily     Daily     PHYSICAL EXAMINATION:  General: WN/WD NAD  HEENT: PERRLA, EOMI, moist mucous membranes  Neurology: A&Ox0, nonfocal, MORALES x 4  Respiratory: CTA B/L, normal respiratory effort, no wheezes, crackles, rales  CV: RRR, S1S2, no murmurs, rubs or gallops  Abdominal: Soft, NT, ND +BS, Last BM  Extremities:  anasarca, peripheral edema both UPE and LE   Incisions:   Tubes:    LABS:  ABG - ( 10 Oprter 2020 05:50 )  pH, Arterial: 7.44  pH, Blood: x     /  pCO2: 32    /  pO2: 86    / HCO3: 22    / Base Excess: -1.7  /  SaO2: 97                   7.5    9.61  )-----------( 189      ( 10 Porter 2020 05:53 )             23.9     01-10    141  |  105  |  104<H>  ----------------------------<  199<H>  4.7   |  19<L>  |  3.21<H>    Ca    8.5      10 Porter 2020 05:53  Phos  6.4     -10  Mg     2.2     -10    TPro  5.8<L>  /  Alb  1.9<L>  /  TBili  0.4  /  DBili  x   /  AST  20  /  ALT  14  /  AlkPhos  75  01-10    LIVER FUNCTIONS - ( 10 Porter 2020 05:53 )  Alb: 1.9 g/dL / Pro: 5.8 g/dL / ALK PHOS: 75 U/L / ALT: 14 U/L / AST: 20 U/L / GGT: x           PT/INR - ( 10 Porter 2020 05:53 )   PT: 14.9 sec;   INR: 1.30 ratio         PTT - ( 10 Porter 2020 05:53 )  PTT:24.6 sec        Urinalysis Basic - ( 2020 09:12 )    Color: Yellow / Appearance: Slightly Turbid / S.021 / pH: x  Gluc: x / Ketone: Negative  / Bili: Negative / Urobili: Negative   Blood: x / Protein: 30 mg/dL / Nitrite: Negative   Leuk Esterase: Large / RBC: 67 /hpf / WBC 34 /HPF   Sq Epi: x / Non Sq Epi: 1 /hpf / Bacteria: Negative      TELEMETRY:     EKG:     IMAGIN y M  with PMH significant for CAD s/p PCI, GERD, gout, OA, s/p multiple laminectomies and spinal fusions, hospital course c/b MSSA bacteremia, ESRD requiring urgent HD on this admission, S/P Shiley placement and removal now transferred to CCU for urgent HD in the setting of severe hypervolemia and hypoxic respiratory failure    #Neuro  - Intubated currently  with spontaneous movements, transitioned to precedex this AM. Turned off precedex, and put on unsecured mittens to aid with sedation.     #Resp  - S/P 3 sessions of HD, with goal to keep patient net even   - Currently with ground glass opacities on xray 2/2 to redistribution from low protein state  - Non Con CT : Pulmonary edema with moderate bilateral pleural effusions.   - Currently with low Fio2 requirements, awaiting extubation once arrousable     #CV    - Distributive shock: initial hemodynamics: PCWP 10, CVP 4, w/ cardiac index greater than 5  - Will titrate off levo/vaso as tolerated  - Optimize vent mgmt, repeat blood cultures negative   - JAHAIRA : EF 44%, with severe AS, no vegetations reported at this time, Repeat TTE 2020,  TTE EF 20%  - Will require LHC once patient is more stable  further evaluation, given depressed EF  -Will  c/w ASA and statin, for CAD    #GI  - PPI  - NG tube  coiled, will call ENT for replacement of NG tube     #ID   - Patient has hx  MSSA bacteremia , to be dosed with daptomycin tilll 20, given post HD q48 hours   - Combicath specimen  negative,  blood cultures negative Given no clear opacity on xray. Ground glass opacity 2/2 to redistribution from low albumin state . No additional antibiotics needed at this time.   -  ID following, appreciate recommendations     #Renal     ESRD requiring Urgent HD    - HD today   - Will continue to renally dose all meds, avoid nephrotoxic agents, monitor I& o's daily   -Renal Following     #Heme  - Hgb stable, slightly downtrending  -Transfuse for hgb < 7 , will maintain active type and screen  - Will continue to monitor with cbc QD     #Endo  - No active issues     #DVT PPx  - Heparin sub q PATIENT:  MAHESH CANELA  [id number]    CHIEF COMPLAINT:  Patient is a 79y old  Male who presents with a chief complaint of Back pain (2020 23:16)      INTERVAL HISTORY/OVERNIGHT EVENTS: Overnight ENT placed the NG tubes and tube feeds were started. patient currently requiring both vaso and levo for pressor support currently being weaned.     REVIEW OF SYSTEMS: Unable to assess 2/2 to mental status.     MEDICATIONS:  MEDICATIONS  (STANDING):  albuterol/ipratropium for Nebulization 3 milliLiter(s) Nebulizer every 6 hours  artificial  tears Solution 1 Drop(s) Both EYES daily  aspirin  chewable 81 milliGRAM(s) Oral daily  atorvastatin 10 milliGRAM(s) Oral at bedtime  chlorhexidine 0.12% Liquid 15 milliLiter(s) Oral Mucosa every 12 hours  chlorhexidine 4% Liquid 1 Application(s) Topical <User Schedule>  chlorhexidine 4% Liquid 1 Application(s) Topical <User Schedule>  DAPTOmycin IVPB 800 milliGRAM(s) IV Intermittent every 48 hours  dexMEDEtomidine Infusion 0.2 MICROgram(s)/kG/Hr (5.215 mL/Hr) IV Continuous <Continuous>  dextrose 5%. 1000 milliLiter(s) (50 mL/Hr) IV Continuous <Continuous>  dextrose 50% Injectable 12.5 Gram(s) IV Push once  dextrose 50% Injectable 25 Gram(s) IV Push once  dextrose 50% Injectable 25 Gram(s) IV Push once  heparin  Injectable 5000 Unit(s) SubCutaneous every 8 hours  insulin lispro (HumaLOG) corrective regimen sliding scale   SubCutaneous every 6 hours  lidocaine   Patch 1 Patch Transdermal daily  norepinephrine Infusion 0.05 MICROgram(s)/kG/Min (9.778 mL/Hr) IV Continuous <Continuous>  pantoprazole  Injectable 40 milliGRAM(s) IV Push daily  polyethylene glycol 3350 17 Gram(s) Oral daily  senna 2 Tablet(s) Oral at bedtime  vasopressin Infusion 0.08 Unit(s)/Min (4.8 mL/Hr) IV Continuous <Continuous>    MEDICATIONS  (PRN):  bisacodyl Suppository 10 milliGRAM(s) Rectal daily PRN Constipation  dextrose 40% Gel 15 Gram(s) Oral once PRN Blood Glucose LESS THAN 70 milliGRAM(s)/deciliter  glucagon  Injectable 1 milliGRAM(s) IntraMuscular once PRN Glucose LESS THAN 70 milligrams/deciliter  petrolatum Ophthalmic Ointment 1 Application(s) Both EYES every 4 hours PRN dry eyes  sodium chloride 0.9% lock flush 10 milliLiter(s) IV Push every 1 hour PRN Pre/post blood products, medications, blood draw, and to maintain line patency  sodium chloride 0.9% lock flush 10 milliLiter(s) IV Push every 1 hour PRN Pre/post blood products, medications, blood draw, and to maintain line patency      ALLERGIES:  Allergies    Indocin (Unknown)  oxacillin (Unknown)  Zyvox (Unknown)    Intolerances        OBJECTIVE:  ICU Vital Signs Last 24 Hrs  T(C): 36.6 (10 Porter 2020 05:00), Max: 37.1 (2020 12:15)  T(F): 97.9 (10 Porter 2020 05:00), Max: 98.8 (2020 12:15)  HR: 100 (10 Porter 2020 06:37) (82 - 126)  BP: --  BP(mean): --  ABP: 116/50 (10 Porter 2020 06:35) (90/44 - 122/62)  ABP(mean): 72 (10 Porter 2020 06:35) (62 - 84)  RR: 25 (10 Porter 2020 06:35) (15 - 33)  SpO2: 98% (10 Porter 2020 06:37) (92% - 100%)    Mode: AC/ CMV (Assist Control/ Continuous Mandatory Ventilation)  RR (machine): 22  TV (machine): 520  FiO2: 35  PEEP: 5  ITime: 1  MAP: 10  PIP: 28    Adult Advanced Hemodynamics Last 24 Hrs  CVP(mm Hg): --  CVP(cm H2O): --  CO: --  CI: --  PA: --  PA(mean): --  PCWP: --  SVR: --  SVRI: --  PVR: --  PVRI: --  CAPILLARY BLOOD GLUCOSE      POCT Blood Glucose.: 161 mg/dL (2020 23:57)  POCT Blood Glucose.: 167 mg/dL (2020 12:28)    CAPILLARY BLOOD GLUCOSE      POCT Blood Glucose.: 161 mg/dL (2020 23:57)    I&O's Summary    2020 07:01  -  10 Porter 2020 07:00  --------------------------------------------------------  IN: 494.1 mL / OUT: 135 mL / NET: 359.1 mL      Daily     Daily     PHYSICAL EXAMINATION:  General: WN/WD NAD  HEENT: eyes shut   Neurology: A&Ox0, nonfocal, MORALES x 4  Respiratory: CTA B/L, normal respiratory effort, no wheezes, crackles, rales  CV: RRR, S1S2, no murmurs, rubs or gallops  Abdominal: Soft, + bowel sounds   Extremities:  anasarca, peripheral edema both UPE and LE   Incisions:   Tubes:    LABS:  ABG - ( 10 Porter 2020 05:50 )  pH, Arterial: 7.44  pH, Blood: x     /  pCO2: 32    /  pO2: 86    / HCO3: 22    / Base Excess: -1.7  /  SaO2: 97                   7.5    9.61  )-----------( 189      ( 10 Porter 2020 05:53 )             23.9     01-10    141  |  105  |  104<H>  ----------------------------<  199<H>  4.7   |  19<L>  |  3.21<H>    Ca    8.5      10 Porter 2020 05:53  Phos  6.4     01-10  Mg     2.2     -10    TPro  5.8<L>  /  Alb  1.9<L>  /  TBili  0.4  /  DBili  x   /  AST  20  /  ALT  14  /  AlkPhos  75  01-10    LIVER FUNCTIONS - ( 10 Porter 2020 05:53 )  Alb: 1.9 g/dL / Pro: 5.8 g/dL / ALK PHOS: 75 U/L / ALT: 14 U/L / AST: 20 U/L / GGT: x           PT/INR - ( 10 Porter 2020 05:53 )   PT: 14.9 sec;   INR: 1.30 ratio         PTT - ( 10 Porter 2020 05:53 )  PTT:24.6 sec        Urinalysis Basic - ( 2020 09:12 )    Color: Yellow / Appearance: Slightly Turbid / S.021 / pH: x  Gluc: x / Ketone: Negative  / Bili: Negative / Urobili: Negative   Blood: x / Protein: 30 mg/dL / Nitrite: Negative   Leuk Esterase: Large / RBC: 67 /hpf / WBC 34 /HPF   Sq Epi: x / Non Sq Epi: 1 /hpf / Bacteria: Negative      TELEMETRY:     EKG:     IMAGIN y M  with PMH significant for CAD s/p PCI, GERD, gout, OA, s/p multiple laminectomies and spinal fusions, hospital course c/b MSSA bacteremia, ESRD requiring urgent HD on this admission, S/P Shiley placement and removal now transferred to CCU for urgent HD in the setting of severe hypervolemia and hypoxic respiratory failure    #Neuro  - Intubated currently  with spontaneous movements, off of precedex.   - currently not following commands    #Resp  - S/P 3 sessions of HD, with goal to keep patient net even   - Currently with ground glass opacities on xray 2/2 to redistribution from low protein state  - Non Con CT : Pulmonary edema with moderate bilateral pleural effusions.   - Currently with low Fio2 requirements, awaiting extubation once arroused  #CV    - Distributive shock: initial hemodynamics: PCWP 10, CVP 4, w/ cardiac index greater than 5  - Will titrate off levo/vaso as tolerated  - Optimize vent mgmt, repeat blood cultures negative   - JAHAIRA : EF 44%, with severe AS, no vegetations reported at this time, Repeat TTE 2020,  TTE EF 20%  - Will require LHC once patient is more stable  further evaluation, given depressed EF  -Will  c/w ASA and statin, for CAD    #GI  - PPI  - NG tube  coiled, will call ENT for replacement of NG tube     #ID   - Patient has hx  MSSA bacteremia , to be dosed with daptomycin tilll 20, given post HD q48 hours   - Combicath specimen  negative,  blood cultures negative Given no clear opacity on xray. Ground glass opacity 2/2 to redistribution from low albumin state . No additional antibiotics needed at this time.   -  ID following, appreciate recommendations     #Renal     ESRD requiring Urgent HD    - HD today   - Will continue to renally dose all meds, avoid nephrotoxic agents, monitor I& o's daily   -Renal Following     #Heme  - Hgb stable, slightly downtrending  -Transfuse for hgb < 7 , will maintain active type and screen  - Will continue to monitor with cbc QD     #Endo  - No active issues     #DVT PPx  - Heparin sub q PATIENT:  MAHESH CANELA  [id number]    CHIEF COMPLAINT:  Patient is a 79y old  Male who presents with a chief complaint of Back pain (2020 23:16)      INTERVAL HISTORY/OVERNIGHT EVENTS: Overnight ENT placed the NG tubes and tube feeds were started. patient currently requiring both vaso and levo for pressor support currently being weaned. Patient was given midodrine 10 mg and was weaned successfully off of levophed. Patient receiving 2 units of blood given anemia and low filling pressures, will get 500CC fluid removal in HD. patient more energetic this AM, reaching for the ET tube this AM now on restraints for airway protection.     REVIEW OF SYSTEMS: Unable to assess 2/2 to mental status.     MEDICATIONS:  MEDICATIONS  (STANDING):  albuterol/ipratropium for Nebulization 3 milliLiter(s) Nebulizer every 6 hours  artificial  tears Solution 1 Drop(s) Both EYES daily  aspirin  chewable 81 milliGRAM(s) Oral daily  atorvastatin 10 milliGRAM(s) Oral at bedtime  chlorhexidine 0.12% Liquid 15 milliLiter(s) Oral Mucosa every 12 hours  chlorhexidine 4% Liquid 1 Application(s) Topical <User Schedule>  chlorhexidine 4% Liquid 1 Application(s) Topical <User Schedule>  DAPTOmycin IVPB 800 milliGRAM(s) IV Intermittent every 48 hours  dexMEDEtomidine Infusion 0.2 MICROgram(s)/kG/Hr (5.215 mL/Hr) IV Continuous <Continuous>  dextrose 5%. 1000 milliLiter(s) (50 mL/Hr) IV Continuous <Continuous>  dextrose 50% Injectable 12.5 Gram(s) IV Push once  dextrose 50% Injectable 25 Gram(s) IV Push once  dextrose 50% Injectable 25 Gram(s) IV Push once  heparin  Injectable 5000 Unit(s) SubCutaneous every 8 hours  insulin lispro (HumaLOG) corrective regimen sliding scale   SubCutaneous every 6 hours  lidocaine   Patch 1 Patch Transdermal daily  norepinephrine Infusion 0.05 MICROgram(s)/kG/Min (9.778 mL/Hr) IV Continuous <Continuous>  pantoprazole  Injectable 40 milliGRAM(s) IV Push daily  polyethylene glycol 3350 17 Gram(s) Oral daily  senna 2 Tablet(s) Oral at bedtime  vasopressin Infusion 0.08 Unit(s)/Min (4.8 mL/Hr) IV Continuous <Continuous>    MEDICATIONS  (PRN):  bisacodyl Suppository 10 milliGRAM(s) Rectal daily PRN Constipation  dextrose 40% Gel 15 Gram(s) Oral once PRN Blood Glucose LESS THAN 70 milliGRAM(s)/deciliter  glucagon  Injectable 1 milliGRAM(s) IntraMuscular once PRN Glucose LESS THAN 70 milligrams/deciliter  petrolatum Ophthalmic Ointment 1 Application(s) Both EYES every 4 hours PRN dry eyes  sodium chloride 0.9% lock flush 10 milliLiter(s) IV Push every 1 hour PRN Pre/post blood products, medications, blood draw, and to maintain line patency  sodium chloride 0.9% lock flush 10 milliLiter(s) IV Push every 1 hour PRN Pre/post blood products, medications, blood draw, and to maintain line patency      ALLERGIES:  Allergies    Indocin (Unknown)  oxacillin (Unknown)  Zyvox (Unknown)    Intolerances        OBJECTIVE:  ICU Vital Signs Last 24 Hrs  T(C): 36.6 (10 Porter 2020 05:00), Max: 37.1 (2020 12:15)  T(F): 97.9 (10 Porter 2020 05:00), Max: 98.8 (2020 12:15)  HR: 100 (10 Porter 2020 06:37) (82 - 126)  BP: --  BP(mean): --  ABP: 116/50 (10 Porter 2020 06:35) (90/44 - 122/62)  ABP(mean): 72 (10 Porter 2020 06:35) (62 - 84)  RR: 25 (10 Porter 2020 06:35) (15 - 33)  SpO2: 98% (10 Porter 2020 06:37) (92% - 100%)    Mode: AC/ CMV (Assist Control/ Continuous Mandatory Ventilation)  RR (machine): 22  TV (machine): 520  FiO2: 35  PEEP: 5  ITime: 1  MAP: 10  PIP: 28    Adult Advanced Hemodynamics Last 24 Hrs  CVP(mm Hg): --  CVP(cm H2O): --  CO: --  CI: --  PA: --  PA(mean): --  PCWP: --  SVR: --  SVRI: --  PVR: --  PVRI: --  CAPILLARY BLOOD GLUCOSE      POCT Blood Glucose.: 161 mg/dL (2020 23:57)  POCT Blood Glucose.: 167 mg/dL (2020 12:28)    CAPILLARY BLOOD GLUCOSE      POCT Blood Glucose.: 161 mg/dL (2020 23:57)    I&O's Summary    2020 07:01  -  10 Porter 2020 07:00  --------------------------------------------------------  IN: 494.1 mL / OUT: 135 mL / NET: 359.1 mL      Daily     Daily     PHYSICAL EXAMINATION:  General: WN/WD NAD  HEENT: eyes shut   Neurology: A&Ox0, nonfocal, MORALES x 4  Respiratory: CTA B/L, normal respiratory effort, no wheezes, crackles, rales  CV: RRR, S1S2, no murmurs, rubs or gallops  Abdominal: Soft, + bowel sounds   Extremities:  anasarca, peripheral edema both UPE and LE   Incisions:   Tubes:    LABS:  ABG - ( 10 Porter 2020 05:50 )  pH, Arterial: 7.44  pH, Blood: x     /  pCO2: 32    /  pO2: 86    / HCO3: 22    / Base Excess: -1.7  /  SaO2: 97                   7.5    9.61  )-----------( 189      ( 10 Porter 2020 05:53 )             23.9     01-10    141  |  105  |  104<H>  ----------------------------<  199<H>  4.7   |  19<L>  |  3.21<H>    Ca    8.5      10 Porter 2020 05:53  Phos  6.4     01-10  Mg     2.2     -10    TPro  5.8<L>  /  Alb  1.9<L>  /  TBili  0.4  /  DBili  x   /  AST  20  /  ALT  14  /  AlkPhos  75  01-10    LIVER FUNCTIONS - ( 10 Porter 2020 05:53 )  Alb: 1.9 g/dL / Pro: 5.8 g/dL / ALK PHOS: 75 U/L / ALT: 14 U/L / AST: 20 U/L / GGT: x           PT/INR - ( 10 Porter 2020 05:53 )   PT: 14.9 sec;   INR: 1.30 ratio         PTT - ( 10 Porter 2020 05:53 )  PTT:24.6 sec        Urinalysis Basic - ( 2020 09:12 )    Color: Yellow / Appearance: Slightly Turbid / S.021 / pH: x  Gluc: x / Ketone: Negative  / Bili: Negative / Urobili: Negative   Blood: x / Protein: 30 mg/dL / Nitrite: Negative   Leuk Esterase: Large / RBC: 67 /hpf / WBC 34 /HPF   Sq Epi: x / Non Sq Epi: 1 /hpf / Bacteria: Negative      TELEMETRY:     EKG:     IMAGIN y M  with PMH significant for CAD s/p PCI, GERD, gout, OA, s/p multiple laminectomies and spinal fusions, hospital course c/b MSSA bacteremia, ESRD requiring urgent HD on this admission, S/P Shiley placement and removal now transferred to CCU for urgent HD in the setting of severe hypervolemia and hypoxic respiratory failure    #Neuro  - Intubated currently  with spontaneous movements, off of precedex.   - currently not following commands    #Resp  - S/P 3 sessions of HD, with goal to keep patient net even   - Currently with ground glass opacities on xray 2/2 to redistribution from low protein state  - Non Con CT : Pulmonary edema with moderate bilateral pleural effusions.   - Currently with low Fio2 requirements, awaiting extubation once arroused  #CV    - Distributive shock: initial hemodynamics: PCWP 10, CVP 4, w/ cardiac index greater than 5  - S/P 10 mg of midodrine this AM and prior to HD now only on slight bits of vaso  - Optimize vent mgmt, repeat blood cultures negative   - JAHAIRA : EF 44%, with severe AS, no vegetations reported at this time, Repeat TTE 2020,  TTE EF 20%  - Will require LHC once patient is more stable  further evaluation, given depressed EF  -Will  c/w ASA and statin, for CAD    #GI  - PPI  - NG tube  coiled, will call ENT for replacement of NG tube     #ID   - Patient has hx  MSSA bacteremia , to be dosed with daptomycin tilll 20, given post HD q48 hours   - Combicath specimen  negative,  blood cultures negative Given no clear opacity on xray. Ground glass opacity 2/2 to redistribution from low albumin state . No additional antibiotics needed at this time.   -  ID following    #Renal     ESRD requiring Urgent HD    - HD today , to receive 2 units of PRBC   - Will continue to renally dose all meds, avoid nephrotoxic agents, monitor I& o's daily   -Renal Following     #Heme  - Hgb stable, slightly downtrending  -Transfuse for hgb < 7 , will maintain active type and screen  - Will continue to monitor with cbc QD     #Endo  - No active issues     #DVT PPx  - Heparin sub q

## 2020-01-10 NOTE — PROGRESS NOTE ADULT - SUBJECTIVE AND OBJECTIVE BOX
CC: F/U for bacteremia    Saw/spoke to patient. No fevers, no chills. Remains intubated. No other new events.    Allergies  Indocin (Unknown)  oxacillin (Unknown)  Zyvox (Unknown)    ANTIMICROBIALS:  DAPTOmycin IVPB 800 every 48 hours    PE:    Vital Signs Last 24 Hrs  T(C): 36.6 (10 Porter 2020 07:45), Max: 37.1 (09 Jan 2020 12:15)  T(F): 97.8 (10 Porter 2020 07:45), Max: 98.8 (09 Jan 2020 12:15)  HR: 82 (10 Porter 2020 10:30) (76 - 120)  RR: 22 (10 Porter 2020 10:30) (10 - 33)  SpO2: 100% (10 Porter 2020 10:30) (92% - 100%)    Gen: AOx0, poorly responsive  CV: S1+S2 normal, nontachycardic  Resp: Clear bilat, no resp distress, no crackles/wheezes, intubated  Abd: Soft, nontender, +BS, obese  Ext: No LE edema, no wounds    LABS:                        7.5    9.61  )-----------( 189      ( 10 Porter 2020 05:53 )             23.9     01-10    141  |  105  |  104<H>  ----------------------------<  199<H>  4.7   |  19<L>  |  3.21<H>    Ca    8.5      10 Porter 2020 05:53  Phos  6.4     01-10  Mg     2.2     01-10    TPro  5.8<L>  /  Alb  1.9<L>  /  TBili  0.4  /  DBili  x   /  AST  20  /  ALT  14  /  AlkPhos  75  01-10    MICROBIOLOGY:    Bronch Wash Combicath  01-08-20   No growth to date.  --    Moderate polymorphonuclear leukocytes per low power field  No squamous epithelial cells per low power field  No organisms seen per oil power field    .Blood Blood  01-08-20   No growth to date.    Rapid RVP Result: Detected (01-08 @ 12:00) CoV    (otherwise reviewed)    RADIOLOGY:    1/10 CXR:    FINDINGS:  ET tube terminates in the midtrachea. Right IJ central venous catheter and right PICC line terminates in the SVC. An enteric tube terminates below the diaphragm.  Unchanged bilateral pulmonary opacities  Small bilateral pleural effusions.  The cardiomediastinal silhouette is not well evaluated on this study.  Posterior thoracic spinal fusion.    IMPRESSION:   ET tube terminates in the midtrachea.  Unchanged pulmonary edema with bilateral pleural effusions.

## 2020-01-10 NOTE — PROGRESS NOTE ADULT - ATTENDING COMMENTS
Patient is seen and examined with fellow, NP and the CCU house-staff. I agree with the history, physical and the assessment and plan.  CPAP trial  monitor off of sedation  HD today with unit of PRBC transfusion  wean off of pressors  start midodrine for BP support

## 2020-01-10 NOTE — PROGRESS NOTE ADULT - ATTENDING COMMENTS
Patient seen and examined.  Agree with above NP note.  remains stable with less vasopressor requirement   on vent support  getting hd currently   weaning trials per ccu   hopeful extubation in next 24 hours  blood cx negative  no inc wbc  cont abx   d/w ccu fellow and son at bedside

## 2020-01-10 NOTE — PROGRESS NOTE ADULT - ASSESSMENT
78 y/o male with h/o of CAD s/p PCI 1/2017 with 10% LAD stenosis OM1, HTN, HLD, GERD, Gout, Osteoarthritis and chronic back pain, s/p multiple laminectomies and spinal fusion, lower back pain and weakness in his bilateral lower extremities that progressed to inability to ambulate. dx with MSSA bacteremia. on ancef. also received lasix as well. has had hesitancy urinating. on 12/16/ KRIS       1- KRIS   2- MSSA bacteremia   3- chf  4- cad  5- respiratory failure   6- hypotension       suspect kris in setting of  +/-  AIN + /- due to post infectious GN  now worsened again /viral infection and chf.     cont vaso levo  hd 3 hr bfr 250 2 k dfr 600 0.5 liter  2 unit prbc with hd   see hd flow sheet  vent weaning   daptomycin  to cont   d/w ccu team and family at bedside

## 2020-01-10 NOTE — PROGRESS NOTE ADULT - SUBJECTIVE AND OBJECTIVE BOX
CARDIOLOGY FOLLOW UP - Dr. Lee    CC remain intubated. off precedex   Ng tube placed, feeds started  family at bedside       PHYSICAL EXAM:  T(C): 36.6 (01-10-20 @ 13:15), Max: 36.7 (01-09-20 @ 19:00)  HR: 83 (01-10-20 @ 13:15) (73 - 116)  BP: --  RR: 21 (01-10-20 @ 13:15) (10 - 33)  SpO2: 100% (01-10-20 @ 13:15) (92% - 100%)  Wt(kg): --  I&O's Summary    09 Jan 2020 07:01  -  10 Porter 2020 07:00  --------------------------------------------------------  IN: 500 mL / OUT: 135 mL / NET: 365 mL    10 Porter 2020 07:01  -  10 Porter 2020 13:44  --------------------------------------------------------  IN: 27.4 mL / OUT: 30 mL / NET: -2.6 mL        Appearance: Intubated  with spontaneous movements	  Cardiovascular: Normal S1 S2,RRR, + sys murmur  Respiratory: rales 	  Gastrointestinal:  Soft, Non-tender, + BS	+ ng tube   Extremities: generalized edema         MEDICATIONS  (STANDING):  albuterol/ipratropium for Nebulization 3 milliLiter(s) Nebulizer every 6 hours  artificial  tears Solution 1 Drop(s) Both EYES daily  aspirin  chewable 81 milliGRAM(s) Oral daily  atorvastatin 10 milliGRAM(s) Oral at bedtime  chlorhexidine 0.12% Liquid 15 milliLiter(s) Oral Mucosa every 12 hours  chlorhexidine 4% Liquid 1 Application(s) Topical <User Schedule>  chlorhexidine 4% Liquid 1 Application(s) Topical <User Schedule>  DAPTOmycin IVPB 800 milliGRAM(s) IV Intermittent every 48 hours  dexMEDEtomidine Infusion 0.2 MICROgram(s)/kG/Hr (5.215 mL/Hr) IV Continuous <Continuous>  dextrose 5%. 1000 milliLiter(s) (50 mL/Hr) IV Continuous <Continuous>  dextrose 50% Injectable 12.5 Gram(s) IV Push once  dextrose 50% Injectable 25 Gram(s) IV Push once  dextrose 50% Injectable 25 Gram(s) IV Push once  heparin  Injectable 5000 Unit(s) SubCutaneous every 8 hours  insulin lispro (HumaLOG) corrective regimen sliding scale   SubCutaneous every 6 hours  lidocaine   Patch 1 Patch Transdermal daily  norepinephrine Infusion 0.05 MICROgram(s)/kG/Min (9.778 mL/Hr) IV Continuous <Continuous>  pantoprazole  Injectable 40 milliGRAM(s) IV Push daily  polyethylene glycol 3350 17 Gram(s) Oral daily  senna 2 Tablet(s) Oral at bedtime  vasopressin Infusion 0.08 Unit(s)/Min (4.8 mL/Hr) IV Continuous <Continuous>      TELEMETRY: nsr, pac	    ECG:  	  RADIOLOGY:   < from: Xray Chest 1 View- PORTABLE-Routine (01.10.20 @ 08:37) >  FINDINGS:  ET tube terminates in the midtrachea. Right IJ central venous catheter and right PICC line terminates in the SVC. An enteric tube terminates below the diaphragm.  Unchanged bilateral pulmonary opacities  Small bilateral pleural effusions.  The cardiomediastinal silhouette is not well evaluated on this study.  Posterior thoracic spinal fusion.    IMPRESSION:   ET tube terminates in the midtrachea.  Unchanged pulmonary edema with bilateral pleural effusions.        < end of copied text >    DIAGNOSTIC TESTING:  [ ] Echocardiogram:  [ ]  Catheterization:  [ ] Stress Test:    OTHER: 	    LABS:	 	    Creatine Kinase, Serum: 45 U/L [30 - 200] (01-05 @ 05:05)                          7.5    9.61  )-----------( 189      ( 10 Porter 2020 05:53 )             23.9     01-10    141  |  105  |  104<H>  ----------------------------<  199<H>  4.7   |  19<L>  |  3.21<H>    Ca    8.5      10 Porter 2020 05:53  Phos  6.4     01-10  Mg     2.2     01-10    TPro  5.8<L>  /  Alb  1.9<L>  /  TBili  0.4  /  DBili  x   /  AST  20  /  ALT  14  /  AlkPhos  75  01-10    PT/INR - ( 10 Porter 2020 05:53 )   PT: 14.9 sec;   INR: 1.30 ratio         PTT - ( 10 Porter 2020 05:53 )  PTT:24.6 sec

## 2020-01-10 NOTE — PROGRESS NOTE ADULT - ASSESSMENT
80 yo M with chronic low back pain sp multiple laminectomy and fusion p/w 1 week of worsening back pain along with weakness in bilateral lower extremities and inability to walk since last night  Leukocytosis, fever  BCX with MSSA, high grade, BCX 12/12 NGTD  NM tagged WBC uptake in thoracolumbar spine--degen disease?  TTE equivocal, JAHAIRA reassuring  High suspicion for spinal involvement despite negative MRI--monitor closely  New episode of shortness of breath and difficulty breathing (elevated BNP, edema on CXR)--suspect due to fluid overload  RVP positive CoV  Then had: fever, leukocytosis, pulmonary edema, intubated  Overall,  1) MSSA Bacteremia--Dapto 800mg q 48 (need to adjust dosing depending on CrCl, and intermittent HD vs CVVHD), weekly CKs, monitor Cr  - Continue Dapto through 2/5/20--CK 1/5 45  2) KRIS--trend Cr to normal  3) Leukocytosis/New Fever  - No signs new infection  - Trend WBC, monitor for further fevers   - Low threshold to start Meropenem if any further fevers or signs PNA  - F/U pending sputum culture/repeat BCXs  4) New SOB  - CT without evidence PNA  - F/U cardiology  - Any considerations for HD per Renal    Fredy Huynh MD  Pager [phone]  After 5pm and on weekends call [phone]

## 2020-01-11 LAB
ALBUMIN SERPL ELPH-MCNC: 2.1 G/DL — LOW (ref 3.3–5)
ALBUMIN SERPL ELPH-MCNC: 2.4 G/DL — LOW (ref 3.3–5)
ALBUMIN SERPL ELPH-MCNC: 2.4 G/DL — LOW (ref 3.3–5)
ALP SERPL-CCNC: 92 U/L — SIGNIFICANT CHANGE UP (ref 40–120)
ALP SERPL-CCNC: 95 U/L — SIGNIFICANT CHANGE UP (ref 40–120)
ALP SERPL-CCNC: 95 U/L — SIGNIFICANT CHANGE UP (ref 40–120)
ALT FLD-CCNC: 17 U/L — SIGNIFICANT CHANGE UP (ref 10–45)
ALT FLD-CCNC: 19 U/L — SIGNIFICANT CHANGE UP (ref 10–45)
ALT FLD-CCNC: 22 U/L — SIGNIFICANT CHANGE UP (ref 10–45)
ANION GAP SERPL CALC-SCNC: 16 MMOL/L — SIGNIFICANT CHANGE UP (ref 5–17)
ANION GAP SERPL CALC-SCNC: 17 MMOL/L — SIGNIFICANT CHANGE UP (ref 5–17)
ANION GAP SERPL CALC-SCNC: 19 MMOL/L — HIGH (ref 5–17)
APTT BLD: 26.5 SEC — LOW (ref 27.5–36.3)
APTT BLD: 28.2 SEC — SIGNIFICANT CHANGE UP (ref 27.5–36.3)
AST SERPL-CCNC: 16 U/L — SIGNIFICANT CHANGE UP (ref 10–40)
AST SERPL-CCNC: 23 U/L — SIGNIFICANT CHANGE UP (ref 10–40)
AST SERPL-CCNC: 36 U/L — SIGNIFICANT CHANGE UP (ref 10–40)
BASE EXCESS BLDV CALC-SCNC: -6.8 MMOL/L — LOW (ref -2–2)
BASOPHILS # BLD AUTO: 0.01 K/UL — SIGNIFICANT CHANGE UP (ref 0–0.2)
BASOPHILS NFR BLD AUTO: 0.1 % — SIGNIFICANT CHANGE UP (ref 0–2)
BILIRUB SERPL-MCNC: 0.4 MG/DL — SIGNIFICANT CHANGE UP (ref 0.2–1.2)
BILIRUB SERPL-MCNC: 0.6 MG/DL — SIGNIFICANT CHANGE UP (ref 0.2–1.2)
BILIRUB SERPL-MCNC: 0.6 MG/DL — SIGNIFICANT CHANGE UP (ref 0.2–1.2)
BUN SERPL-MCNC: 61 MG/DL — HIGH (ref 7–23)
BUN SERPL-MCNC: 74 MG/DL — HIGH (ref 7–23)
BUN SERPL-MCNC: 89 MG/DL — HIGH (ref 7–23)
CA-I SERPL-SCNC: 1.11 MMOL/L — LOW (ref 1.12–1.3)
CALCIUM SERPL-MCNC: 8.5 MG/DL — SIGNIFICANT CHANGE UP (ref 8.4–10.5)
CALCIUM SERPL-MCNC: 8.6 MG/DL — SIGNIFICANT CHANGE UP (ref 8.4–10.5)
CALCIUM SERPL-MCNC: 8.7 MG/DL — SIGNIFICANT CHANGE UP (ref 8.4–10.5)
CHLORIDE BLDV-SCNC: 109 MMOL/L — HIGH (ref 96–108)
CHLORIDE SERPL-SCNC: 102 MMOL/L — SIGNIFICANT CHANGE UP (ref 96–108)
CHLORIDE SERPL-SCNC: 103 MMOL/L — SIGNIFICANT CHANGE UP (ref 96–108)
CHLORIDE SERPL-SCNC: 104 MMOL/L — SIGNIFICANT CHANGE UP (ref 96–108)
CO2 BLDV-SCNC: 24 MMOL/L — SIGNIFICANT CHANGE UP (ref 22–30)
CO2 SERPL-SCNC: 17 MMOL/L — LOW (ref 22–31)
CO2 SERPL-SCNC: 19 MMOL/L — LOW (ref 22–31)
CO2 SERPL-SCNC: 19 MMOL/L — LOW (ref 22–31)
CREAT SERPL-MCNC: 2.01 MG/DL — HIGH (ref 0.5–1.3)
CREAT SERPL-MCNC: 2.29 MG/DL — HIGH (ref 0.5–1.3)
CREAT SERPL-MCNC: 2.8 MG/DL — HIGH (ref 0.5–1.3)
EOSINOPHIL # BLD AUTO: 0.07 K/UL — SIGNIFICANT CHANGE UP (ref 0–0.5)
EOSINOPHIL NFR BLD AUTO: 0.7 % — SIGNIFICANT CHANGE UP (ref 0–6)
GAS PNL BLDA: SIGNIFICANT CHANGE UP
GAS PNL BLDV: 136 MMOL/L — SIGNIFICANT CHANGE UP (ref 135–145)
GAS PNL BLDV: SIGNIFICANT CHANGE UP
GAS PNL BLDV: SIGNIFICANT CHANGE UP
GLUCOSE BLDC GLUCOMTR-MCNC: 106 MG/DL — HIGH (ref 70–99)
GLUCOSE BLDC GLUCOMTR-MCNC: 155 MG/DL — HIGH (ref 70–99)
GLUCOSE BLDV-MCNC: 140 MG/DL — HIGH (ref 70–99)
GLUCOSE SERPL-MCNC: 125 MG/DL — HIGH (ref 70–99)
GLUCOSE SERPL-MCNC: 133 MG/DL — HIGH (ref 70–99)
GLUCOSE SERPL-MCNC: 296 MG/DL — HIGH (ref 70–99)
HBV CORE AB SER-ACNC: SIGNIFICANT CHANGE UP
HBV SURFACE AB SER-ACNC: <3 MIU/ML — LOW
HBV SURFACE AB SER-ACNC: SIGNIFICANT CHANGE UP
HBV SURFACE AG SER-ACNC: SIGNIFICANT CHANGE UP
HCO3 BLDV-SCNC: 22 MMOL/L — SIGNIFICANT CHANGE UP (ref 21–29)
HCT VFR BLD CALC: 32.8 % — LOW (ref 39–50)
HCT VFR BLD CALC: 39.4 % — SIGNIFICANT CHANGE UP (ref 39–50)
HCT VFR BLDA CALC: 40 % — SIGNIFICANT CHANGE UP (ref 39–50)
HCV AB S/CO SERPL IA: 0.08 S/CO — SIGNIFICANT CHANGE UP (ref 0–0.99)
HCV AB SERPL-IMP: SIGNIFICANT CHANGE UP
HGB BLD CALC-MCNC: 13.1 G/DL — SIGNIFICANT CHANGE UP (ref 13–17)
HGB BLD-MCNC: 10.1 G/DL — LOW (ref 13–17)
HGB BLD-MCNC: 12.2 G/DL — LOW (ref 13–17)
HOROWITZ INDEX BLDV+IHG-RTO: 60 — SIGNIFICANT CHANGE UP
IMM GRANULOCYTES NFR BLD AUTO: 0.5 % — SIGNIFICANT CHANGE UP (ref 0–1.5)
INR BLD: 1.21 RATIO — HIGH (ref 0.88–1.16)
LACTATE BLDV-MCNC: 6.2 MMOL/L — CRITICAL HIGH (ref 0.7–2)
LYMPHOCYTES # BLD AUTO: 0.87 K/UL — LOW (ref 1–3.3)
LYMPHOCYTES # BLD AUTO: 8.6 % — LOW (ref 13–44)
MAGNESIUM SERPL-MCNC: 2.1 MG/DL — SIGNIFICANT CHANGE UP (ref 1.6–2.6)
MAGNESIUM SERPL-MCNC: 2.2 MG/DL — SIGNIFICANT CHANGE UP (ref 1.6–2.6)
MCHC RBC-ENTMCNC: 28.4 PG — SIGNIFICANT CHANGE UP (ref 27–34)
MCHC RBC-ENTMCNC: 28.8 PG — SIGNIFICANT CHANGE UP (ref 27–34)
MCHC RBC-ENTMCNC: 30.8 GM/DL — LOW (ref 32–36)
MCHC RBC-ENTMCNC: 31 GM/DL — LOW (ref 32–36)
MCV RBC AUTO: 92.1 FL — SIGNIFICANT CHANGE UP (ref 80–100)
MCV RBC AUTO: 93.1 FL — SIGNIFICANT CHANGE UP (ref 80–100)
MONOCYTES # BLD AUTO: 0.14 K/UL — SIGNIFICANT CHANGE UP (ref 0–0.9)
MONOCYTES NFR BLD AUTO: 1.4 % — LOW (ref 2–14)
NEUTROPHILS # BLD AUTO: 8.96 K/UL — HIGH (ref 1.8–7.4)
NEUTROPHILS NFR BLD AUTO: 88.7 % — HIGH (ref 43–77)
NRBC # BLD: 0 /100 WBCS — SIGNIFICANT CHANGE UP (ref 0–0)
NRBC # BLD: 2 /100 WBCS — HIGH (ref 0–0)
OTHER CELLS CSF MANUAL: 14 ML/DL — LOW (ref 18–22)
PCO2 BLDV: 62 MMHG — HIGH (ref 35–50)
PH BLDV: 7.18 — CRITICAL LOW (ref 7.35–7.45)
PHOSPHATE SERPL-MCNC: 4.7 MG/DL — HIGH (ref 2.5–4.5)
PHOSPHATE SERPL-MCNC: 4.8 MG/DL — HIGH (ref 2.5–4.5)
PLATELET # BLD AUTO: 144 K/UL — LOW (ref 150–400)
PLATELET # BLD AUTO: 172 K/UL — SIGNIFICANT CHANGE UP (ref 150–400)
PO2 BLDV: 52 MMHG — HIGH (ref 25–45)
POTASSIUM BLDV-SCNC: 3.3 MMOL/L — LOW (ref 3.5–5.3)
POTASSIUM SERPL-MCNC: 3.8 MMOL/L — SIGNIFICANT CHANGE UP (ref 3.5–5.3)
POTASSIUM SERPL-MCNC: 3.9 MMOL/L — SIGNIFICANT CHANGE UP (ref 3.5–5.3)
POTASSIUM SERPL-MCNC: 3.9 MMOL/L — SIGNIFICANT CHANGE UP (ref 3.5–5.3)
POTASSIUM SERPL-SCNC: 3.8 MMOL/L — SIGNIFICANT CHANGE UP (ref 3.5–5.3)
POTASSIUM SERPL-SCNC: 3.9 MMOL/L — SIGNIFICANT CHANGE UP (ref 3.5–5.3)
POTASSIUM SERPL-SCNC: 3.9 MMOL/L — SIGNIFICANT CHANGE UP (ref 3.5–5.3)
PROT SERPL-MCNC: 6 G/DL — SIGNIFICANT CHANGE UP (ref 6–8.3)
PROT SERPL-MCNC: 6.6 G/DL — SIGNIFICANT CHANGE UP (ref 6–8.3)
PROT SERPL-MCNC: 6.9 G/DL — SIGNIFICANT CHANGE UP (ref 6–8.3)
PROTHROM AB SERPL-ACNC: 13.9 SEC — HIGH (ref 10–12.9)
RBC # BLD: 3.56 M/UL — LOW (ref 4.2–5.8)
RBC # BLD: 4.23 M/UL — SIGNIFICANT CHANGE UP (ref 4.2–5.8)
RBC # FLD: 16.1 % — HIGH (ref 10.3–14.5)
RBC # FLD: 16.2 % — HIGH (ref 10.3–14.5)
SAO2 % BLDV: 77 % — SIGNIFICANT CHANGE UP (ref 67–88)
SODIUM SERPL-SCNC: 138 MMOL/L — SIGNIFICANT CHANGE UP (ref 135–145)
SODIUM SERPL-SCNC: 138 MMOL/L — SIGNIFICANT CHANGE UP (ref 135–145)
SODIUM SERPL-SCNC: 140 MMOL/L — SIGNIFICANT CHANGE UP (ref 135–145)
WBC # BLD: 10.1 K/UL — SIGNIFICANT CHANGE UP (ref 3.8–10.5)
WBC # BLD: 4.59 K/UL — SIGNIFICANT CHANGE UP (ref 3.8–10.5)
WBC # FLD AUTO: 10.1 K/UL — SIGNIFICANT CHANGE UP (ref 3.8–10.5)
WBC # FLD AUTO: 4.59 K/UL — SIGNIFICANT CHANGE UP (ref 3.8–10.5)

## 2020-01-11 PROCEDURE — 99291 CRITICAL CARE FIRST HOUR: CPT

## 2020-01-11 PROCEDURE — 93308 TTE F-UP OR LMTD: CPT | Mod: 26,GC

## 2020-01-11 PROCEDURE — 99291 CRITICAL CARE FIRST HOUR: CPT | Mod: 25

## 2020-01-11 PROCEDURE — 93010 ELECTROCARDIOGRAM REPORT: CPT | Mod: 77

## 2020-01-11 PROCEDURE — 76604 US EXAM CHEST: CPT | Mod: 26,GC

## 2020-01-11 PROCEDURE — 99233 SBSQ HOSP IP/OBS HIGH 50: CPT

## 2020-01-11 PROCEDURE — 71045 X-RAY EXAM CHEST 1 VIEW: CPT | Mod: 26,77

## 2020-01-11 PROCEDURE — 71045 X-RAY EXAM CHEST 1 VIEW: CPT | Mod: 26

## 2020-01-11 PROCEDURE — 93306 TTE W/DOPPLER COMPLETE: CPT | Mod: 26

## 2020-01-11 PROCEDURE — 93010 ELECTROCARDIOGRAM REPORT: CPT | Mod: 76,77

## 2020-01-11 RX ORDER — ACETAMINOPHEN 500 MG
650 TABLET ORAL EVERY 6 HOURS
Refills: 0 | Status: DISCONTINUED | OUTPATIENT
Start: 2020-01-11 | End: 2020-01-12

## 2020-01-11 RX ORDER — NOREPINEPHRINE BITARTRATE/D5W 8 MG/250ML
0.5 PLASTIC BAG, INJECTION (ML) INTRAVENOUS
Qty: 16 | Refills: 0 | Status: DISCONTINUED | OUTPATIENT
Start: 2020-01-11 | End: 2020-01-12

## 2020-01-11 RX ORDER — PROPOFOL 10 MG/ML
10 INJECTION, EMULSION INTRAVENOUS
Qty: 500 | Refills: 0 | Status: DISCONTINUED | OUTPATIENT
Start: 2020-01-11 | End: 2020-01-12

## 2020-01-11 RX ORDER — AMIODARONE HYDROCHLORIDE 400 MG/1
0.5 TABLET ORAL
Qty: 900 | Refills: 0 | Status: DISCONTINUED | OUTPATIENT
Start: 2020-01-11 | End: 2020-01-12

## 2020-01-11 RX ORDER — FENTANYL CITRATE 50 UG/ML
0.5 INJECTION INTRAVENOUS
Qty: 2500 | Refills: 0 | Status: DISCONTINUED | OUTPATIENT
Start: 2020-01-11 | End: 2020-01-11

## 2020-01-11 RX ORDER — INSULIN LISPRO 100/ML
VIAL (ML) SUBCUTANEOUS EVERY 6 HOURS
Refills: 0 | Status: DISCONTINUED | OUTPATIENT
Start: 2020-01-11 | End: 2020-01-12

## 2020-01-11 RX ORDER — METRONIDAZOLE 500 MG
500 TABLET ORAL ONCE
Refills: 0 | Status: COMPLETED | OUTPATIENT
Start: 2020-01-11 | End: 2020-01-12

## 2020-01-11 RX ORDER — SODIUM BICARBONATE 1 MEQ/ML
50 SYRINGE (ML) INTRAVENOUS ONCE
Refills: 0 | Status: COMPLETED | OUTPATIENT
Start: 2020-01-11 | End: 2020-01-11

## 2020-01-11 RX ORDER — SODIUM CHLORIDE 9 MG/ML
3 INJECTION INTRAMUSCULAR; INTRAVENOUS; SUBCUTANEOUS EVERY 12 HOURS
Refills: 0 | Status: DISCONTINUED | OUTPATIENT
Start: 2020-01-11 | End: 2020-01-12

## 2020-01-11 RX ORDER — ADENOSINE 3 MG/ML
6 INJECTION INTRAVENOUS ONCE
Refills: 0 | Status: COMPLETED | OUTPATIENT
Start: 2020-01-11 | End: 2020-01-11

## 2020-01-11 RX ORDER — PHENYLEPHRINE HYDROCHLORIDE 10 MG/ML
0.2 INJECTION INTRAVENOUS
Qty: 40 | Refills: 0 | Status: DISCONTINUED | OUTPATIENT
Start: 2020-01-11 | End: 2020-01-11

## 2020-01-11 RX ORDER — FENTANYL CITRATE 50 UG/ML
50 INJECTION INTRAVENOUS ONCE
Refills: 0 | Status: DISCONTINUED | OUTPATIENT
Start: 2020-01-11 | End: 2020-01-11

## 2020-01-11 RX ORDER — HEPARIN SODIUM 5000 [USP'U]/ML
INJECTION INTRAVENOUS; SUBCUTANEOUS
Qty: 25000 | Refills: 0 | Status: DISCONTINUED | OUTPATIENT
Start: 2020-01-11 | End: 2020-01-12

## 2020-01-11 RX ORDER — DAPTOMYCIN 500 MG/10ML
800 INJECTION, POWDER, LYOPHILIZED, FOR SOLUTION INTRAVENOUS
Refills: 0 | Status: DISCONTINUED | OUTPATIENT
Start: 2020-01-11 | End: 2020-01-12

## 2020-01-11 RX ORDER — PHENYLEPHRINE HYDROCHLORIDE 10 MG/ML
2.4 INJECTION INTRAVENOUS
Qty: 160 | Refills: 0 | Status: DISCONTINUED | OUTPATIENT
Start: 2020-01-11 | End: 2020-01-12

## 2020-01-11 RX ORDER — MIDAZOLAM HYDROCHLORIDE 1 MG/ML
2 INJECTION, SOLUTION INTRAMUSCULAR; INTRAVENOUS ONCE
Refills: 0 | Status: DISCONTINUED | OUTPATIENT
Start: 2020-01-11 | End: 2020-01-11

## 2020-01-11 RX ORDER — AZTREONAM 2 G
1000 VIAL (EA) INJECTION EVERY 8 HOURS
Refills: 0 | Status: DISCONTINUED | OUTPATIENT
Start: 2020-01-11 | End: 2020-01-12

## 2020-01-11 RX ORDER — METRONIDAZOLE 500 MG
TABLET ORAL
Refills: 0 | Status: DISCONTINUED | OUTPATIENT
Start: 2020-01-12 | End: 2020-01-12

## 2020-01-11 RX ORDER — AMIODARONE HYDROCHLORIDE 400 MG/1
150 TABLET ORAL ONCE
Refills: 0 | Status: COMPLETED | OUTPATIENT
Start: 2020-01-11 | End: 2020-01-11

## 2020-01-11 RX ORDER — HEPARIN SODIUM 5000 [USP'U]/ML
8500 INJECTION INTRAVENOUS; SUBCUTANEOUS ONCE
Refills: 0 | Status: COMPLETED | OUTPATIENT
Start: 2020-01-11 | End: 2020-01-11

## 2020-01-11 RX ORDER — METRONIDAZOLE 500 MG
500 TABLET ORAL EVERY 8 HOURS
Refills: 0 | Status: DISCONTINUED | OUTPATIENT
Start: 2020-01-12 | End: 2020-01-12

## 2020-01-11 RX ORDER — DIGOXIN 250 MCG
0.5 TABLET ORAL ONCE
Refills: 0 | Status: COMPLETED | OUTPATIENT
Start: 2020-01-11 | End: 2020-01-11

## 2020-01-11 RX ORDER — AMIODARONE HYDROCHLORIDE 400 MG/1
1 TABLET ORAL
Qty: 900 | Refills: 0 | Status: DISCONTINUED | OUTPATIENT
Start: 2020-01-11 | End: 2020-01-11

## 2020-01-11 RX ORDER — FENTANYL CITRATE 50 UG/ML
0.5 INJECTION INTRAVENOUS
Qty: 5000 | Refills: 0 | Status: DISCONTINUED | OUTPATIENT
Start: 2020-01-11 | End: 2020-01-12

## 2020-01-11 RX ORDER — AZTREONAM 2 G
VIAL (EA) INJECTION
Refills: 0 | Status: DISCONTINUED | OUTPATIENT
Start: 2020-01-11 | End: 2020-01-12

## 2020-01-11 RX ORDER — SODIUM BICARBONATE 1 MEQ/ML
0.11 SYRINGE (ML) INTRAVENOUS
Qty: 150 | Refills: 0 | Status: DISCONTINUED | OUTPATIENT
Start: 2020-01-11 | End: 2020-01-12

## 2020-01-11 RX ORDER — AZTREONAM 2 G
1000 VIAL (EA) INJECTION ONCE
Refills: 0 | Status: COMPLETED | OUTPATIENT
Start: 2020-01-11 | End: 2020-01-11

## 2020-01-11 RX ADMIN — Medication 9.78 MICROGRAM(S)/KG/MIN: at 05:47

## 2020-01-11 RX ADMIN — LIDOCAINE 1 PATCH: 4 CREAM TOPICAL at 23:43

## 2020-01-11 RX ADMIN — FENTANYL CITRATE 50 MICROGRAM(S): 50 INJECTION INTRAVENOUS at 05:47

## 2020-01-11 RX ADMIN — HEPARIN SODIUM 2200 UNIT(S)/HR: 5000 INJECTION INTRAVENOUS; SUBCUTANEOUS at 14:31

## 2020-01-11 RX ADMIN — LIDOCAINE 1 PATCH: 4 CREAM TOPICAL at 19:43

## 2020-01-11 RX ADMIN — PHENYLEPHRINE HYDROCHLORIDE 46.94 MICROGRAM(S)/KG/MIN: 10 INJECTION INTRAVENOUS at 15:57

## 2020-01-11 RX ADMIN — LIDOCAINE 1 PATCH: 4 CREAM TOPICAL at 11:37

## 2020-01-11 RX ADMIN — AMIODARONE HYDROCHLORIDE 16.67 MG/MIN: 400 TABLET ORAL at 14:29

## 2020-01-11 RX ADMIN — AMIODARONE HYDROCHLORIDE 33.33 MG/MIN: 400 TABLET ORAL at 07:53

## 2020-01-11 RX ADMIN — Medication 48.89 MICROGRAM(S)/KG/MIN: at 18:41

## 2020-01-11 RX ADMIN — VASOPRESSIN 1.2 UNIT(S)/MIN: 20 INJECTION INTRAVENOUS at 01:27

## 2020-01-11 RX ADMIN — Medication 110 MILLIGRAM(S): at 15:56

## 2020-01-11 RX ADMIN — SODIUM CHLORIDE 3 MILLILITER(S): 9 INJECTION INTRAMUSCULAR; INTRAVENOUS; SUBCUTANEOUS at 17:17

## 2020-01-11 RX ADMIN — POLYETHYLENE GLYCOL 3350 17 GRAM(S): 17 POWDER, FOR SOLUTION ORAL at 11:36

## 2020-01-11 RX ADMIN — PANTOPRAZOLE SODIUM 40 MILLIGRAM(S): 20 TABLET, DELAYED RELEASE ORAL at 11:36

## 2020-01-11 RX ADMIN — MIDAZOLAM HYDROCHLORIDE 2 MILLIGRAM(S): 1 INJECTION, SOLUTION INTRAMUSCULAR; INTRAVENOUS at 08:58

## 2020-01-11 RX ADMIN — Medication 3 MILLILITER(S): at 17:18

## 2020-01-11 RX ADMIN — CHLORHEXIDINE GLUCONATE 15 MILLILITER(S): 213 SOLUTION TOPICAL at 06:07

## 2020-01-11 RX ADMIN — Medication 3 MILLILITER(S): at 00:51

## 2020-01-11 RX ADMIN — Medication 81 MILLIGRAM(S): at 11:36

## 2020-01-11 RX ADMIN — AMIODARONE HYDROCHLORIDE 16.67 MG/MIN: 400 TABLET ORAL at 19:38

## 2020-01-11 RX ADMIN — HEPARIN SODIUM 1800 UNIT(S)/HR: 5000 INJECTION INTRAVENOUS; SUBCUTANEOUS at 07:56

## 2020-01-11 RX ADMIN — PROPOFOL 6.26 MICROGRAM(S)/KG/MIN: 10 INJECTION, EMULSION INTRAVENOUS at 07:53

## 2020-01-11 RX ADMIN — CHLORHEXIDINE GLUCONATE 1 APPLICATION(S): 213 SOLUTION TOPICAL at 04:07

## 2020-01-11 RX ADMIN — Medication 50 MILLIGRAM(S): at 15:56

## 2020-01-11 RX ADMIN — CHLORHEXIDINE GLUCONATE 15 MILLILITER(S): 213 SOLUTION TOPICAL at 17:46

## 2020-01-11 RX ADMIN — FENTANYL CITRATE 50 MICROGRAM(S): 50 INJECTION INTRAVENOUS at 08:58

## 2020-01-11 RX ADMIN — PHENYLEPHRINE HYDROCHLORIDE 7.82 MICROGRAM(S)/KG/MIN: 10 INJECTION INTRAVENOUS at 06:19

## 2020-01-11 RX ADMIN — Medication 0.5 MILLIGRAM(S): at 07:07

## 2020-01-11 RX ADMIN — PHENYLEPHRINE HYDROCHLORIDE 46.94 MICROGRAM(S)/KG/MIN: 10 INJECTION INTRAVENOUS at 09:02

## 2020-01-11 RX ADMIN — PROPOFOL 6.26 MICROGRAM(S)/KG/MIN: 10 INJECTION, EMULSION INTRAVENOUS at 15:57

## 2020-01-11 RX ADMIN — AMIODARONE HYDROCHLORIDE 600 MILLIGRAM(S): 400 TABLET ORAL at 07:00

## 2020-01-11 RX ADMIN — PROPOFOL 6.26 MICROGRAM(S)/KG/MIN: 10 INJECTION, EMULSION INTRAVENOUS at 05:46

## 2020-01-11 RX ADMIN — Medication 3 MILLILITER(S): at 11:48

## 2020-01-11 RX ADMIN — PROPOFOL 6.26 MICROGRAM(S)/KG/MIN: 10 INJECTION, EMULSION INTRAVENOUS at 17:59

## 2020-01-11 RX ADMIN — Medication 650 MILLIGRAM(S): at 10:32

## 2020-01-11 RX ADMIN — ATORVASTATIN CALCIUM 10 MILLIGRAM(S): 80 TABLET, FILM COATED ORAL at 23:41

## 2020-01-11 RX ADMIN — Medication 50 MILLIEQUIVALENT(S): at 23:38

## 2020-01-11 RX ADMIN — Medication 2: at 11:38

## 2020-01-11 RX ADMIN — Medication 1 DROP(S): at 12:55

## 2020-01-11 RX ADMIN — SENNA PLUS 2 TABLET(S): 8.6 TABLET ORAL at 23:41

## 2020-01-11 RX ADMIN — VASOPRESSIN 1.2 UNIT(S)/MIN: 20 INJECTION INTRAVENOUS at 19:41

## 2020-01-11 RX ADMIN — HEPARIN SODIUM 2600 UNIT(S)/HR: 5000 INJECTION INTRAVENOUS; SUBCUTANEOUS at 21:00

## 2020-01-11 RX ADMIN — Medication 50 MILLIGRAM(S): at 23:39

## 2020-01-11 RX ADMIN — Medication 3: at 07:23

## 2020-01-11 RX ADMIN — Medication 48.89 MICROGRAM(S)/KG/MIN: at 11:04

## 2020-01-11 RX ADMIN — Medication 48.89 MICROGRAM(S)/KG/MIN: at 19:36

## 2020-01-11 RX ADMIN — FENTANYL CITRATE 2.61 MICROGRAM(S)/KG/HR: 50 INJECTION INTRAVENOUS at 06:05

## 2020-01-11 RX ADMIN — PHENYLEPHRINE HYDROCHLORIDE 46.94 MICROGRAM(S)/KG/MIN: 10 INJECTION INTRAVENOUS at 19:40

## 2020-01-11 RX ADMIN — VASOPRESSIN 1.2 UNIT(S)/MIN: 20 INJECTION INTRAVENOUS at 07:54

## 2020-01-11 RX ADMIN — FENTANYL CITRATE 2.61 MICROGRAM(S)/KG/HR: 50 INJECTION INTRAVENOUS at 07:54

## 2020-01-11 RX ADMIN — Medication 50 MILLIEQUIVALENT(S): at 22:26

## 2020-01-11 RX ADMIN — FENTANYL CITRATE 2.61 MICROGRAM(S)/KG/HR: 50 INJECTION INTRAVENOUS at 19:36

## 2020-01-11 RX ADMIN — PROPOFOL 6.26 MICROGRAM(S)/KG/MIN: 10 INJECTION, EMULSION INTRAVENOUS at 19:36

## 2020-01-11 RX ADMIN — FENTANYL CITRATE 50 MICROGRAM(S): 50 INJECTION INTRAVENOUS at 09:15

## 2020-01-11 RX ADMIN — FENTANYL CITRATE 50 MICROGRAM(S): 50 INJECTION INTRAVENOUS at 06:17

## 2020-01-11 RX ADMIN — DEXMEDETOMIDINE HYDROCHLORIDE IN 0.9% SODIUM CHLORIDE 5.21 MICROGRAM(S)/KG/HR: 4 INJECTION INTRAVENOUS at 05:47

## 2020-01-11 RX ADMIN — ADENOSINE 6 MILLIGRAM(S): 3 INJECTION INTRAVENOUS at 06:25

## 2020-01-11 NOTE — CONSULT NOTE ADULT - ATTENDING COMMENTS
I have examined pt and agree with above exam and plan above.  78 y/o male with acute on chronic hypoxemic respiratory failure . Pt Continues on mechanical ventilation on 20 peep and FI02 100%. Pt with known sever systolic chf. On POCUS exam today. systolic EF seems  worse than before. Chest ultrasound with  diffuse B lines and RLL consolidation. Pt remains with acute renal failure from ATN on  CVVHD. Pt also now with afib with RVR.    Pt seems to be in worsening pulmonary edema from worsening systolic function. Continue CVVHD and fluid removal.    Change Vent settings to AC  with PEEP 12 FIO2 70%,  RR 28. Repeat ABG.

## 2020-01-11 NOTE — PROVIDER CONTACT NOTE (CRITICAL VALUE NOTIFICATION) - ASSESSMENT
Patient hemodynamically stable and with CVVH and Levophed gtt
Asymptomatic
All VSS.
Pt intubated and sedated at this time
Pt sleeping at present time but was complaining of SOB earlier. Pt O2 Sat low prior to BiPAP.
lethargy with fever
pt resting in bed, A&Ox2-3 at baseline. Pt remains afebrile, all VSS, pt on IV abx and receiving abx as prescribed

## 2020-01-11 NOTE — PROCEDURE NOTE - NSPOSTPRCRAD_GEN_A_CORE
post-procedure radiography performed/depth of insertion/central line located in the/central line located in the superior vena cava
pending read/central line located in the/post-procedure radiography performed
post procedure radiography not performed
no pneumothorax/post-procedure radiography performed/central line located in the superior vena cava
fluoroscopy/line in appropriate postion

## 2020-01-11 NOTE — PROGRESS NOTE ADULT - ASSESSMENT
79 yr old male with HTN, DCHF, CAD, S/P PCI to OM1, Mid LAD (9/2014, [facility], [facility], [facility]) hx of ischemic CMP, AS, DM HLD, GERD, GOUT, s/p multiple spinal surgery admitted with back pain and fever, now with acute hypoxic resp failure 2/2 to pulm edema w/ poss underlying PNA, +coronovirus.     1. Hypoxic respiratory failure, acute/chronic systolic heart failure  -right heart cath revealing normal filling pressures, values more consistent w distributive /septic shock  -off dobutamine   -continue vent support, failed weaning trials  -peripheral edema, pulmonary edema in the setting of hypoalbuminemia/ hypoproteinemia   -f/u repeat echo today   -cont UF/CVVHD for aggressive volume removal   -vent mgmt and pressors per CCU\  -pulmo eval for possible bronchoscopy ?? R/O PNA  -continue medical therapy for now as he is not a candidate for ischemic eval in setting of resp failure, KRIS/CKD, shock     2. CAD s/p PCI (OM1/LAD)   -cont statin and asa     3. Aortic Stenosis  -stable, severe on JAHAIRA with valve area of 0.9, tte this admit and as outpt with moderate AS  -cont med tx, not candidate for avr/tavr at present     4. Bacteremia, resolved   -JAHAIRA 12/13 without endocarditis   -repeat TTE with no obvious signs of endocarditis; repeat bcx 1/8 negative   -RVP +  -abx per ID  -f/u repeat echo     5. KRIS, ? AIN, new HD  - renal f/u for HD, volume removal   - prbc with HD     critically ill with poor prognosis  d/w son at bedside  d/w ccu fellow

## 2020-01-11 NOTE — PROGRESS NOTE ADULT - SUBJECTIVE AND OBJECTIVE BOX
Follow Up:  MSSA Bacteremia, ?pna    Interval History: This AM patient had episode of hypoxia which then resulted in an episode of A-fib with RVR with HR in 160s. O2 requirements increased dramatically to the point he was on FiO2 100%.     REVIEW OF SYSTEMS  [  ] ROS unobtainable because:    [ x ] All other systems negative except as noted below    Constitutional:  [ ] fever [ ] chills  [ ] weight loss  [ ] weakness  Skin:  [ ] rash [ ] phlebitis	  Eyes: [ ] icterus [ ] pain  [ ] discharge	  ENMT: [ ] sore throat  [ ] thrush [ ] ulcers [ ] exudates  Respiratory: [ ] dyspnea [ ] hemoptysis [ ] cough [ ] sputum	  Cardiovascular:  [ ] chest pain [ ] palpitations [ ] edema	  Gastrointestinal:  [ ] nausea [ ] vomiting [ ] diarrhea [ ] constipation [ ] pain	  Genitourinary:  [ ] dysuria [ ] frequency [ ] hematuria [ ] discharge [ ] flank pain  [ ] incontinence  Musculoskeletal:  [ ] myalgias [ ] arthralgias [ ] arthritis  [ ] back pain  Neurological:  [ ] headache [ ] seizures  [ ] confusion/altered mental status    Allergies  Indocin (Unknown)  oxacillin (Unknown)  Zyvox (Unknown)        ANTIMICROBIALS:  aztreonam  IVPB    aztreonam  IVPB 1000 every 8 hours  DAPTOmycin IVPB 800 every 48 hours  doxycycline IVPB        OTHER MEDS:  MEDICATIONS  (STANDING):  acetaminophen    Suspension .. 650 every 6 hours PRN  albuterol/ipratropium for Nebulization 3 every 6 hours  aMIOdarone Infusion 0.5 <Continuous>  aspirin  chewable 81 daily  atorvastatin 10 at bedtime  bisacodyl Suppository 10 daily PRN  dextrose 40% Gel 15 once PRN  dextrose 50% Injectable 12.5 once  dextrose 50% Injectable 25 once  dextrose 50% Injectable 25 once  fentaNYL   Infusion. 0.5 <Continuous>  glucagon  Injectable 1 once PRN  heparin  Infusion.  <Continuous>  insulin lispro (HumaLOG) corrective regimen sliding scale  every 6 hours  norepinephrine Infusion 0.5 <Continuous>  pantoprazole  Injectable 40 daily  phenylephrine    Infusion 2.4 <Continuous>  polyethylene glycol 3350 17 daily  propofol Infusion 10 <Continuous>  senna 2 at bedtime  sodium chloride 3%  Inhalation 3 every 12 hours  vasopressin Infusion 0.02 <Continuous>      Vital Signs Last 24 Hrs  T(C): 36.7 (11 Jan 2020 16:00), Max: 38.3 (11 Jan 2020 09:00)  T(F): 98 (11 Jan 2020 16:00), Max: 100.9 (11 Jan 2020 09:00)  HR: 98 (11 Jan 2020 17:26) (66 - 160)  BP: --  BP(mean): --  RR: 28 (11 Jan 2020 17:00) (11 - 39)  SpO2: 94% (11 Jan 2020 17:26) (89% - 100%)    PHYSICAL EXAMINATION:  General: Alert and Awake, NAD  HEENT: PERRL, EOMI  Neck: Supple  Cardiac: RRR, No M/R/G  Resp: CTAB, No Wh/Rh/Ra  Abdomen: NBS, NT/ND, No HSM, No rigidity or guarding  MSK: No LE edema. No Calf tenderness  : No morley  Skin: No rashes or lesions. Skin is warm and dry to the touch.   Neuro: Alert and Awake. CN 2-12 Grossly intact. Moves all four extremities spontaneously.  Psych: Calm, Pleasant, Cooperative                          12.2   4.59  )-----------( 144      ( 11 Jan 2020 13:51 )             39.4       01-11    140  |  104  |  74<H>  ----------------------------<  133<H>  3.8   |  19<L>  |  2.29<H>    Ca    8.5      11 Jan 2020 13:51  Phos  4.7     01-11  Mg     2.2     01-11    TPro  6.6  /  Alb  2.4<L>  /  TBili  0.6  /  DBili  x   /  AST  23  /  ALT  19  /  AlkPhos  92  01-11          MICROBIOLOGY:  v  Bronch Wash Combicath  01-08-20   Normal Respiratory Mirna present  --    Moderate polymorphonuclear leukocytes per low power field  No squamous epithelial cells per low power field  No organisms seen per oil power field      .Blood Blood  01-08-20   No growth to date.  --  --      Bronch Wash Combicath  01-07-20   No growth at 48 hours  --    No polymorphonuclear cells seen per low power field  No squamous epithelial cells per low power field  No organisms seen per oil power field      .Blood Blood  12-14-19   No growth at 5 days.  --  --      .Blood Blood-Peripheral  12-12-19   No growth at 5 days.  --  --          Rapid RVP Result: Detected (01-08 @ 12:00)        RADIOLOGY:    <The imaging below has been reviewed and visualized me independently>

## 2020-01-11 NOTE — PROGRESS NOTE ADULT - SUBJECTIVE AND OBJECTIVE BOX
Follow Up:  mssa bacteremia, sushi    Interval History: This AM patient had episode of hypoxia which then resulted in an episode of A-fib with RVR with HR in 160s. O2 requirements increased dramatically to the point he was on FiO2 100%. afebrile. minimal to moderate secretions from ETT.     REVIEW OF SYSTEMS  [  x] ROS unobtainable because:  intubated  [  ] All other systems negative except as noted below    Constitutional:  [ ] fever [ ] chills  [ ] weight loss  [ ] weakness  Skin:  [ ] rash [ ] phlebitis	  Eyes: [ ] icterus [ ] pain  [ ] discharge	  ENMT: [ ] sore throat  [ ] thrush [ ] ulcers [ ] exudates  Respiratory: [ ] dyspnea [ ] hemoptysis [ ] cough [ ] sputum	  Cardiovascular:  [ ] chest pain [ ] palpitations [ ] edema	  Gastrointestinal:  [ ] nausea [ ] vomiting [ ] diarrhea [ ] constipation [ ] pain	  Genitourinary:  [ ] dysuria [ ] frequency [ ] hematuria [ ] discharge [ ] flank pain  [ ] incontinence  Musculoskeletal:  [ ] myalgias [ ] arthralgias [ ] arthritis  [ ] back pain  Neurological:  [ ] headache [ ] seizures  [ ] confusion/altered mental status    Allergies  Indocin (Unknown)  oxacillin (Unknown)  Zyvox (Unknown)        ANTIMICROBIALS:  aztreonam  IVPB    aztreonam  IVPB 1000 every 8 hours  DAPTOmycin IVPB 800 every 48 hours  doxycycline IVPB        OTHER MEDS:  MEDICATIONS  (STANDING):  acetaminophen    Suspension .. 650 every 6 hours PRN  albuterol/ipratropium for Nebulization 3 every 6 hours  aMIOdarone Infusion 0.5 <Continuous>  aspirin  chewable 81 daily  atorvastatin 10 at bedtime  bisacodyl Suppository 10 daily PRN  dextrose 40% Gel 15 once PRN  dextrose 50% Injectable 12.5 once  dextrose 50% Injectable 25 once  dextrose 50% Injectable 25 once  fentaNYL   Infusion. 0.5 <Continuous>  glucagon  Injectable 1 once PRN  heparin  Infusion.  <Continuous>  insulin lispro (HumaLOG) corrective regimen sliding scale  every 6 hours  norepinephrine Infusion 0.5 <Continuous>  pantoprazole  Injectable 40 daily  phenylephrine    Infusion 2.4 <Continuous>  polyethylene glycol 3350 17 daily  propofol Infusion 10 <Continuous>  senna 2 at bedtime  sodium chloride 3%  Inhalation 3 every 12 hours  vasopressin Infusion 0.02 <Continuous>      Vital Signs Last 24 Hrs  T(C): 36.7 (11 Jan 2020 16:00), Max: 38.3 (11 Jan 2020 09:00)  T(F): 98 (11 Jan 2020 16:00), Max: 100.9 (11 Jan 2020 09:00)  HR: 98 (11 Jan 2020 17:26) (66 - 160)  BP: --  BP(mean): --  RR: 28 (11 Jan 2020 17:00) (11 - 39)  SpO2: 94% (11 Jan 2020 17:26) (89% - 100%)    PHYSICAL EXAMINATION:  General: Intubated and Sedated  HEENT: +ETT  Neck: Supple  Cardiac: RRR, No M/R/G  Resp: Rales at right lung base, no wheezes or rhonchi. no wheezes.   Abdomen: NBS, NT/ND, No HSM, No rigidity or guarding  MSK: No LE edema. No Calf tenderness  : +morley  Skin: No rashes or lesions. Skin is warm and dry to the touch.   Neuro: Intubated and Sedated  Psych: Unable to assess - intubated and sedated                          12.2   4.59  )-----------( 144      ( 11 Jan 2020 13:51 )             39.4       01-11    140  |  104  |  74<H>  ----------------------------<  133<H>  3.8   |  19<L>  |  2.29<H>    Ca    8.5      11 Jan 2020 13:51  Phos  4.7     01-11  Mg     2.2     01-11    TPro  6.6  /  Alb  2.4<L>  /  TBili  0.6  /  DBili  x   /  AST  23  /  ALT  19  /  AlkPhos  92  01-11          MICROBIOLOGY:  v  Bronch Wash Combicath  01-08-20   Normal Respiratory Mirna present  --    Moderate polymorphonuclear leukocytes per low power field  No squamous epithelial cells per low power field  No organisms seen per oil power field      .Blood Blood  01-08-20   No growth to date.  --  --      Bronch Wash Combicath  01-07-20   No growth at 48 hours  --    No polymorphonuclear cells seen per low power field  No squamous epithelial cells per low power field  No organisms seen per oil power field      .Blood Blood  12-14-19   No growth at 5 days.  --  --      .Blood Blood-Peripheral  12-12-19   No growth at 5 days.  --  --          Rapid RVP Result: Detected (01-08 @ 12:00)        RADIOLOGY:    <The imaging below has been reviewed and visualized me independently>    EXAM:  XR CHEST PORTABLE URGENT 1V                        PROCEDURE DATE:  01/11/2020    Enteric tube tip overlies gastroesophageal junction. Second enteric tube tip below diaphragm.

## 2020-01-11 NOTE — PROCEDURE NOTE - NSPROCDETAILS_GEN_ALL_CORE
lumen(s) aspirated and flushed/ultrasound guidance/guidewire recovered/sterile technique, catheter placed/sterile dressing applied
location identified, draped/prepped, sterile technique used, needle inserted/introduced/connected to a pressurized flush line/hemostasis with direct pressure, dressing applied/positive blood return obtained via catheter/sutured in place/all materials/supplies accounted for at end of procedure
sterile dressing applied/ultrasound guidance/lumen(s) aspirated and flushed/sterile technique, catheter placed/guidewire recovered
sterile dressing applied/ultrasound guidance/guidewire recovered/sterile technique, catheter placed/lumen(s) aspirated and flushed
sterile dressing applied/guidewire recovered/lumen(s) aspirated and flushed/sterile technique, catheter placed
ultrasound assessment/location identified, draped/prepped, sterile technique used/sterile dressing applied/sterile technique, catheter placed/ultrasound guidance/supine position

## 2020-01-11 NOTE — PROGRESS NOTE ADULT - SUBJECTIVE AND OBJECTIVE BOX
CARDIOLOGY FOLLOW UP NOTE - DR. RHODES    Subjective:    no chest pain, sob, palpitations    PHYSICAL EXAM:  T(C): 36.6 (20 @ 12:00), Max: 38.3 (20 @ 09:00)  HR: 116 (20 @ 12:00) (64 - 160)  BP: --  RR: 30 (20 @ 12:00) (19 - 39)  SpO2: 100% (20 @ 12:00) (89% - 100%)  Wt(kg): --  I&O's Summary    10 Porter 2020 07:  -  2020 07:00  --------------------------------------------------------  IN: 3810.1 mL / OUT: 2200 mL / NET: 1610.1 mL    2020 07:  -  2020 12:27  --------------------------------------------------------  IN: 881.5 mL / OUT: 640 mL / NET: 241.5 mL      Daily     Daily Weight in k.1 (10 Porter 2020 15:58)    Appearance: Normal	  Cardiovascular: Normal S1 S2,RRR, No JVD, No murmurs  Respiratory: Lungs clear to auscultation	  Gastrointestinal:  Soft, Non-tender, + BS	  Extremities: Normal range of motion, No clubbing, cyanosis or edema      Home Medications:  allopurinol 300 mg oral tablet: 1 tab(s) orally once a day (09 Dec 2019 11:15)  aspirin 81 mg oral delayed release tablet: 1 tab(s) orally once a day (09 Dec 2019 11:15)  Co Q-10 100 mg oral capsule: 1 cap(s) orally once a day (09 Dec 2019 11:15)  diclofenac sodium 75 mg oral delayed release tablet: 1 tab(s) orally 2 times a day (09 Dec 2019 11:15)  lansoprazole 15 mg oral delayed release capsule: 1 cap(s) orally once a day (09 Dec 2019 11:15)  losartan 100 mg oral tablet: 1 tab(s) orally once a day (09 Dec 2019 11:15)  lovastatin 20 mg oral tablet: 1 tab(s) orally once a day (09 Dec 2019 11:15)  miSOPROStol 200 mcg oral tablet: 1 tab(s) orally 2 times a day (09 Dec 2019 11:15)  Toprol-XL 25 mg oral tablet, extended release: 0.5 tab(s) orally once a day (09 Dec 2019 11:15)  Vitamin D3 1000 intl units oral tablet: 1 tab(s) orally once a day (09 Dec 2019 11:15)      MEDICATIONS  (STANDING):  albuterol/ipratropium for Nebulization 3 milliLiter(s) Nebulizer every 6 hours  aMIOdarone Infusion 1 mG/Min (33.333 mL/Hr) IV Continuous <Continuous>  aMIOdarone Infusion 0.5 mG/Min (16.667 mL/Hr) IV Continuous <Continuous>  artificial  tears Solution 1 Drop(s) Both EYES daily  aspirin  chewable 81 milliGRAM(s) Oral daily  atorvastatin 10 milliGRAM(s) Oral at bedtime  chlorhexidine 0.12% Liquid 15 milliLiter(s) Oral Mucosa every 12 hours  chlorhexidine 4% Liquid 1 Application(s) Topical <User Schedule>  CRRT Treatment    <Continuous>  DAPTOmycin IVPB 800 milliGRAM(s) IV Intermittent every 48 hours  dextrose 5%. 1000 milliLiter(s) (50 mL/Hr) IV Continuous <Continuous>  dextrose 50% Injectable 12.5 Gram(s) IV Push once  dextrose 50% Injectable 25 Gram(s) IV Push once  dextrose 50% Injectable 25 Gram(s) IV Push once  fentaNYL   Infusion. 0.5 MICROgram(s)/kG/Hr (2.607 mL/Hr) IV Continuous <Continuous>  heparin  Infusion.  Unit(s)/Hr (18 mL/Hr) IV Continuous <Continuous>  insulin lispro (HumaLOG) corrective regimen sliding scale   SubCutaneous every 6 hours  lidocaine   Patch 1 Patch Transdermal daily  norepinephrine Infusion 0.5 MICROgram(s)/kG/Min (48.891 mL/Hr) IV Continuous <Continuous>  pantoprazole  Injectable 40 milliGRAM(s) IV Push daily  phenylephrine    Infusion 2.4 MICROgram(s)/kG/Min (46.935 mL/Hr) IV Continuous <Continuous>  polyethylene glycol 3350 17 Gram(s) Oral daily  PrismaSATE Dialysate BGK 4 / 2.5 5000 milliLiter(s) (1200 mL/Hr) CRRT <Continuous>  PrismaSOL Filtration BGK 0 / 2.5 5000 milliLiter(s) (700 mL/Hr) CRRT <Continuous>  PrismaSOL Filtration BGK 4 / 2.5 5000 milliLiter(s) (400 mL/Hr) CRRT <Continuous>  propofol Infusion 10 MICROgram(s)/kG/Min (6.258 mL/Hr) IV Continuous <Continuous>  senna 2 Tablet(s) Oral at bedtime  sodium chloride 3%  Inhalation 3 milliLiter(s) Inhalation every 12 hours  vasopressin Infusion 0.02 Unit(s)/Min (1.2 mL/Hr) IV Continuous <Continuous>      TELEMETRY: 	    ECG:  	  RADIOLOGY:   DIAGNOSTIC TESTING:  [ ] Echocardiogram:  [ ] Catheterization:  [ ] Stress Test:    OTHER: 	    LABS:	 	    CARDIAC MARKERS:                                10.1   10.10 )-----------( 172      ( 2020 05:32 )             32.8         138  |  103  |  89<H>  ----------------------------<  296<H>  3.9   |  19<L>  |  2.80<H>    Ca    8.6      2020 05:32  Phos  4.8       Mg     2.1         TPro  6.0  /  Alb  2.1<L>  /  TBili  0.4  /  DBili  x   /  AST  16  /  ALT  17  /  AlkPhos  95      proBNP:   PT/INR - ( 10 Porter 2020 05:53 )   PT: 14.9 sec;   INR: 1.30 ratio         PTT - ( 10 Porter 2020 05:53 )  PTT:24.6 sec  Lipid Profile:   HgA1c:     Creatinine, Serum: 2.80 mg/dL (20 @ 05:32)  Creatinine, Serum: 2.45 mg/dL (01-10-20 @ 18:20)  Creatinine, Serum: 3.21 mg/dL (01-10-20 @ 05:53)  Creatinine, Serum: 3.02 mg/dL (20 @ 20:29)  Creatinine, Serum: SEE NOTE mg/dL (20 @ 18:10)  Creatinine, Serum: 2.27 mg/dL (20 @ 04:27)  Creatinine, Serum: 3.03 mg/dL (20 @ 21:42)  Creatinine, Serum: 2.98 mg/dL (20 @ 14:34) 13.77

## 2020-01-11 NOTE — PROGRESS NOTE ADULT - ATTENDING COMMENTS
Patient was seen and examined with CCU team. I agree with findings and plan. AMio and dig given, still tachy consider flutter vs sinus tach, trial off levo on yadira instead to see if heart rate responds.

## 2020-01-11 NOTE — CONSULT NOTE ADULT - REASON FOR ADMISSION
Back pain
back pain, leukocytosis
Back pain
Back pain
Back pain.

## 2020-01-11 NOTE — PROGRESS NOTE ADULT - ASSESSMENT
80 yo M with chronic low back pain sp multiple laminectomy and fusion p/w 1 week of worsening back pain along with weakness in bilateral lower extremities and inability to walk since last night    Leukocytosis, fever  BCX with MSSA, high grade, BCX 12/12 NGTD  NM tagged WBC uptake in thoracolumbar spine--degen disease?  TTE equivocal, JAHAIRA reassuring  High suspicion for spinal involvement despite negative MRI--monitor closely  New episode of shortness of breath and difficulty breathing (elevated BNP, edema on CXR)--suspect due to fluid overload  RVP positive CoV  Then had: fever, leukocytosis, pulmonary edema, intubated    Patient with respiratory decompensation this AM with worsening right-sided infiltrates  Febrile this AM  I suspect this likely represents fluid but reasonable to cover pending repeat combicath and blood cultures  Daptomycin does not confer lung coverage - would avoid Vanco (given KRIS on CRRT) and cannot use Linezolid. I would add Doxycycline as MSSA was previously susceptible.  Would add Aztreonam for gram negative coverage (more beta-lactam cross reactivity with meropenem with AIN history).   However, If continued fever and/or clinical instability would switch Aztreonam to Meropenem    Overall,    New SOB/Abnormal CXR  - Recommend Doxycycline 100 mg IV Q12H  - Recommend Aztreonam 1g IV Q8H  - If continued fever/instability would switch Aztreonam to Meropenem  - Agree with repeat Combicath sputum culture and blood cultures - follow up on result  - Chest CT when stable  - F/U cardiology and pulmonology recommendations    #MSSA Bacteremia  - Continue Dapto 800mg q 48 (need to adjust dosing depending on CrCl, and intermittent HD vs CVVHD), weekly CKs, monitor Cr  - Continue Dapto through 2/5/20--CK 1/5 45    #Leukocytosis/New Fever  - Trend WBC, monitor for further fevers   - If continued fever/instability would switch Aztreonam to Meropenem  - F/U pending sputum culture/repeat BCXs    #KRIS--trend Cr to normal    I will continue to follow. Please feel free to contact me with any further questions.    Live Estrada M.D.  [facility] Division of Infectious Disease  8AM-5PM: Pager Number [phone]  After Hours (or if no response): Please contact the Infectious Diseases Office at [phone] 78 yo M with chronic low back pain sp multiple laminectomy and fusion p/w 1 week of worsening back pain along with weakness in bilateral lower extremities and inability to walk since last night    Leukocytosis, fever  BCX with MSSA, high grade, BCX 12/12 NGTD  NM tagged WBC uptake in thoracolumbar spine--degen disease?  TTE equivocal, JAHAIRA reassuring  High suspicion for spinal involvement despite negative MRI--monitor closely  New episode of shortness of breath and difficulty breathing (elevated BNP, edema on CXR)--suspect due to fluid overload  RVP positive CoV  Then had: fever, leukocytosis, pulmonary edema, intubated    Patient with respiratory decompensation this AM with worsening right-sided infiltrates  Febrile this AM  I suspect this likely represents fluid but reasonable to cover pending repeat combicath and blood cultures  Daptomycin does not confer lung coverage - would avoid Vanco (given KRIS on CRRT) and cannot use Linezolid. I would add Doxycycline as MSSA was previously susceptible.  Would add Aztreonam for gram negative coverage (more beta-lactam cross reactivity with meropenem with AIN history).   However, If continued fever and/or clinical instability would switch Aztreonam to Meropenem    Overall,    Hypoxic Respiratory Failure/Abnormal CXR  - Recommend Doxycycline 100 mg IV Q12H  - Recommend Aztreonam 1g IV Q8H  - If continued fever/instability would switch Aztreonam to Meropenem  - Agree with repeat Combicath sputum culture and blood cultures - follow up on result  - Chest CT when stable  - F/U cardiology and pulmonology recommendations    #MSSA Bacteremia  - Continue Dapto 800mg q 48 (need to adjust dosing depending on CrCl, and intermittent HD vs CVVHD), weekly CKs, monitor Cr  - Continue Dapto through 2/5/20--CK 1/5 45    #Leukocytosis/New Fever  - Trend WBC, monitor for further fevers   - If continued fever/instability would switch Aztreonam to Meropenem  - F/U pending sputum culture/repeat BCXs    #KRIS--trend Cr to normal    I will continue to follow. Please feel free to contact me with any further questions.    Live Estrada M.D.  [facility] Division of Infectious Disease  8AM-5PM: Pager Number [phone]  After Hours (or if no response): Please contact the Infectious Diseases Office at [phone]

## 2020-01-11 NOTE — PROGRESS NOTE ADULT - SUBJECTIVE AND OBJECTIVE BOX
Lilia Benton, PGY1  CCU Service  Internal Medicine  Pager: [phone] ([facility]) / [phone] ([facility])    PATIENT: MAHESH CANELA, MRN: [id number]    CHIEF COMPLAINT: Patient is a 79y old  Male who presents with a chief complaint of Back pain (10 Porter 2020 23:20)      INTERVAL HISTORY/OVERNIGHT EVENTS: No overnight events. At bedside, patient has been sleeping and eating well. Denies N/V/D/C. Last BM x1 regular yesterday. Denies abdominal pain. Denies chest pain or SOB, cough. Has been ambulating with assistance. Oriented to person, place, and time. Breathing comfortably on room air.    REVIEW OF SYSTEMS:    Constitutional:     [ ] negative [ ] fevers [ ] chills [ ] weight loss [ ] weight gain  HEENT:                  [ ] negative [ ] dry eyes [ ] eye irritation [ ] postnasal drip [ ] nasal congestion  CV:                         [ ] negative  [ ] chest pain [ ] orthopnea [ ] palpitations [ ] murmur  Resp:                     [ ] negative [ ] cough [ ] shortness of breath [ ] dyspnea [ ] wheezing [ ] sputum [ ] hemoptysis  GI:                          [ ] negative [ ] nausea [ ] vomiting [ ] diarrhea [ ] constipation [ ] abd pain [ ] dysphagia   :                        [ ] negative [ ] dysuria [ ] nocturia [ ] hematuria [ ] increased urinary frequency  Musculoskeletal: [ ] negative [ ] back pain [ ] myalgias [ ] arthralgias [ ] fracture  Skin:                       [ ] negative [ ] rash [ ] itch  Neurological:        [ ] negative [ ] headache [ ] dizziness [ ] syncope [ ] weakness [ ] numbness  Psychiatric:           [ ] negative [ ] anxiety [ ] depression  Endocrine:            [ ] negative [ ] diabetes [ ] thyroid problem  Heme/Lymph:      [ ] negative [ ] anemia [ ] bleeding problem  Allergic/Immune: [ ] negative [ ] itchy eyes [ ] nasal discharge [ ] hives [ ] angioedema    [ ] All other systems negative  [ ] Unable to assess ROS because ________.    MEDICATIONS:  MEDICATIONS  (STANDING):  albuterol/ipratropium for Nebulization 3 milliLiter(s) Nebulizer every 6 hours  aMIOdarone Infusion 1 mG/Min (33.333 mL/Hr) IV Continuous <Continuous>  aMIOdarone Infusion 0.5 mG/Min (16.667 mL/Hr) IV Continuous <Continuous>  artificial  tears Solution 1 Drop(s) Both EYES daily  aspirin  chewable 81 milliGRAM(s) Oral daily  atorvastatin 10 milliGRAM(s) Oral at bedtime  chlorhexidine 0.12% Liquid 15 milliLiter(s) Oral Mucosa every 12 hours  chlorhexidine 4% Liquid 1 Application(s) Topical <User Schedule>  CRRT Treatment    <Continuous>  DAPTOmycin IVPB 800 milliGRAM(s) IV Intermittent every 48 hours  dextrose 5%. 1000 milliLiter(s) (50 mL/Hr) IV Continuous <Continuous>  dextrose 50% Injectable 12.5 Gram(s) IV Push once  dextrose 50% Injectable 25 Gram(s) IV Push once  dextrose 50% Injectable 25 Gram(s) IV Push once  fentaNYL    Injectable 50 MICROGram(s) IV Push once  fentaNYL   Infusion. 0.5 MICROgram(s)/kG/Hr (2.607 mL/Hr) IV Continuous <Continuous>  heparin  Infusion.  Unit(s)/Hr (18 mL/Hr) IV Continuous <Continuous>  insulin lispro (HumaLOG) corrective regimen sliding scale   SubCutaneous every 6 hours  lidocaine   Patch 1 Patch Transdermal daily  midazolam Injectable 2 milliGRAM(s) IV Push once  pantoprazole  Injectable 40 milliGRAM(s) IV Push daily  phenylephrine    Infusion 2.4 MICROgram(s)/kG/Min (46.935 mL/Hr) IV Continuous <Continuous>  polyethylene glycol 3350 17 Gram(s) Oral daily  PrismaSATE Dialysate BGK 4 / 2.5 5000 milliLiter(s) (1200 mL/Hr) CRRT <Continuous>  PrismaSOL Filtration BGK 0 / 2.5 5000 milliLiter(s) (700 mL/Hr) CRRT <Continuous>  PrismaSOL Filtration BGK 4 / 2.5 5000 milliLiter(s) (400 mL/Hr) CRRT <Continuous>  propofol Infusion 10 MICROgram(s)/kG/Min (6.258 mL/Hr) IV Continuous <Continuous>  senna 2 Tablet(s) Oral at bedtime  vasopressin Infusion 0.02 Unit(s)/Min (1.2 mL/Hr) IV Continuous <Continuous>    MEDICATIONS  (PRN):  bisacodyl Suppository 10 milliGRAM(s) Rectal daily PRN Constipation  dextrose 40% Gel 15 Gram(s) Oral once PRN Blood Glucose LESS THAN 70 milliGRAM(s)/deciliter  glucagon  Injectable 1 milliGRAM(s) IntraMuscular once PRN Glucose LESS THAN 70 milligrams/deciliter  petrolatum Ophthalmic Ointment 1 Application(s) Both EYES every 4 hours PRN dry eyes  sodium chloride 0.9% lock flush 10 milliLiter(s) IV Push every 1 hour PRN Pre/post blood products, medications, blood draw, and to maintain line patency  sodium chloride 0.9% lock flush 10 milliLiter(s) IV Push every 1 hour PRN Pre/post blood products, medications, blood draw, and to maintain line patency      ALLERGIES: Allergies    Indocin (Unknown)  oxacillin (Unknown)  Zyvox (Unknown)    Intolerances        OBJECTIVE:  ICU Vital Signs Last 24 Hrs  T(C): 37 (10 Porter 2020 19:00), Max: 37 (10 Porter 2020 19:00)  T(F): 98.6 (10 Porter 2020 19:00), Max: 98.6 (10 Porter 2020 19:00)  HR: 100 (2020 08:00) (64 - 160)  BP: --  BP(mean): --  ABP: 126/46 (2020 08:00) (76/40 - 132/50)  ABP(mean): 70 (2020 08:00) (52 - 86)  RR: 27 (2020 08:00) (18 - 39)  SpO2: 90% (2020 08:00) (89% - 100%)    Mode: AC/ CMV (Assist Control/ Continuous Mandatory Ventilation)  RR (machine): 22  TV (machine): 480  FiO2: 100  PEEP: 20  ITime: 1  MAP: 22  PIP: 44    Adult Advanced Hemodynamics Last 24 Hrs  CVP(mm Hg): --  CVP(cm H2O): --  CO: --  CI: --  PA: --  PA(mean): --  PCWP: --  SVR: --  SVRI: --  PVR: --  PVRI: --  CAPILLARY BLOOD GLUCOSE      POCT Blood Glucose.: 234 mg/dL (10 Porter 2020 23:52)  POCT Blood Glucose.: 181 mg/dL (10 Porter 2020 13:02)    CAPILLARY BLOOD GLUCOSE      POCT Blood Glucose.: 234 mg/dL (10 Porter 2020 23:52)    I&O's Summary    10 Porter 2020 07:01  -  2020 07:00  --------------------------------------------------------  IN: 3756.8 mL / OUT: 2200 mL / NET: 1556.8 mL      Daily     Daily Weight in k.1 (10 Porter 2020 15:58)    PHYSICAL EXAMINATION:  General: WN/WD NAD  HEENT: eyes shut   Neurology: A&Ox0, nonfocal,  Respiratory: CTA B/L, normal respiratory effort, no wheezes, crackles, rales  CV: RRR, S1S2, no murmurs, rubs or gallops  Abdominal: Soft, + bowel sounds   Extremities:  anasarca, peripheral edema both UPE and LE   Incisions:   Tubes:    LABS:  ABG - ( 2020 05:21 )  pH, Arterial: 7.38  pH, Blood: x     /  pCO2: 38    /  pO2: 91    / HCO3: 22    / Base Excess: -2.1  /  SaO2: 97                                      10.1   10.10 )-----------( 172      ( 2020 05:32 )             32.8     01-11    138  |  103  |  89<H>  ----------------------------<  296<H>  3.9   |  19<L>  |  2.80<H>    Ca    8.6      2020 05:32  Phos  4.8     01-11  Mg     2.1     -11    TPro  6.0  /  Alb  2.1<L>  /  TBili  0.4  /  DBili  x   /  AST  16  /  ALT  17  /  AlkPhos  95  01-11    LIVER FUNCTIONS - ( 2020 05:32 )  Alb: 2.1 g/dL / Pro: 6.0 g/dL / ALK PHOS: 95 U/L / ALT: 17 U/L / AST: 16 U/L / GGT: x           PT/INR - ( 10 Porter 2020 05:53 )   PT: 14.9 sec;   INR: 1.30 ratio         PTT - ( 10 Porter 2020 05:53 )  PTT:24.6 sec            TELEMETRY:     EKG:     IMAGING: Lilia Benton, PGY1  CCU Service  Internal Medicine  Pager: [phone] ([facility]) / [phone] ([facility])    PATIENT: MAHESH CANELA, MRN: [id number]    CHIEF COMPLAINT: Patient is a 79y old  Male who presents with a chief complaint of Back pain (10 Porter 2020 23:20)      INTERVAL HISTORY/OVERNIGHT EVENTS: Patient was weaned off sedation yesterday with decreasing pressure requirements overnight. However, early AM, patient became hypoxic, hypotensive, and later spiked fever. Was placed back on sedation and pressors. c/b new episode of afib, was started on heparin gtt, 159 mg amio and 0.5 dig, now on amio gtt.     REVIEW OF SYSTEMS:    [x ] Unable to assess ROS because intubation and sedation.    MEDICATIONS:  MEDICATIONS  (STANDING):  albuterol/ipratropium for Nebulization 3 milliLiter(s) Nebulizer every 6 hours  aMIOdarone Infusion 1 mG/Min (33.333 mL/Hr) IV Continuous <Continuous>  aMIOdarone Infusion 0.5 mG/Min (16.667 mL/Hr) IV Continuous <Continuous>  artificial  tears Solution 1 Drop(s) Both EYES daily  aspirin  chewable 81 milliGRAM(s) Oral daily  atorvastatin 10 milliGRAM(s) Oral at bedtime  chlorhexidine 0.12% Liquid 15 milliLiter(s) Oral Mucosa every 12 hours  chlorhexidine 4% Liquid 1 Application(s) Topical <User Schedule>  CRRT Treatment    <Continuous>  DAPTOmycin IVPB 800 milliGRAM(s) IV Intermittent every 48 hours  dextrose 5%. 1000 milliLiter(s) (50 mL/Hr) IV Continuous <Continuous>  dextrose 50% Injectable 12.5 Gram(s) IV Push once  dextrose 50% Injectable 25 Gram(s) IV Push once  dextrose 50% Injectable 25 Gram(s) IV Push once  fentaNYL    Injectable 50 MICROGram(s) IV Push once  fentaNYL   Infusion. 0.5 MICROgram(s)/kG/Hr (2.607 mL/Hr) IV Continuous <Continuous>  heparin  Infusion.  Unit(s)/Hr (18 mL/Hr) IV Continuous <Continuous>  insulin lispro (HumaLOG) corrective regimen sliding scale   SubCutaneous every 6 hours  lidocaine   Patch 1 Patch Transdermal daily  midazolam Injectable 2 milliGRAM(s) IV Push once  pantoprazole  Injectable 40 milliGRAM(s) IV Push daily  phenylephrine    Infusion 2.4 MICROgram(s)/kG/Min (46.935 mL/Hr) IV Continuous <Continuous>  polyethylene glycol 3350 17 Gram(s) Oral daily  PrismaSATE Dialysate BGK 4 / 2.5 5000 milliLiter(s) (1200 mL/Hr) CRRT <Continuous>  PrismaSOL Filtration BGK 0 / 2.5 5000 milliLiter(s) (700 mL/Hr) CRRT <Continuous>  PrismaSOL Filtration BGK 4 / 2.5 5000 milliLiter(s) (400 mL/Hr) CRRT <Continuous>  propofol Infusion 10 MICROgram(s)/kG/Min (6.258 mL/Hr) IV Continuous <Continuous>  senna 2 Tablet(s) Oral at bedtime  vasopressin Infusion 0.02 Unit(s)/Min (1.2 mL/Hr) IV Continuous <Continuous>    MEDICATIONS  (PRN):  bisacodyl Suppository 10 milliGRAM(s) Rectal daily PRN Constipation  dextrose 40% Gel 15 Gram(s) Oral once PRN Blood Glucose LESS THAN 70 milliGRAM(s)/deciliter  glucagon  Injectable 1 milliGRAM(s) IntraMuscular once PRN Glucose LESS THAN 70 milligrams/deciliter  petrolatum Ophthalmic Ointment 1 Application(s) Both EYES every 4 hours PRN dry eyes  sodium chloride 0.9% lock flush 10 milliLiter(s) IV Push every 1 hour PRN Pre/post blood products, medications, blood draw, and to maintain line patency  sodium chloride 0.9% lock flush 10 milliLiter(s) IV Push every 1 hour PRN Pre/post blood products, medications, blood draw, and to maintain line patency      ALLERGIES: Allergies    Indocin (Unknown)  oxacillin (Unknown)  Zyvox (Unknown)    Intolerances        OBJECTIVE:  ICU Vital Signs Last 24 Hrs  T(C): 37 (10 Porter 2020 19:00), Max: 37 (10 Porter 2020 19:00)  T(F): 98.6 (10 Porter 2020 19:00), Max: 98.6 (10 Porter 2020 19:00)  HR: 100 (2020 08:00) (64 - 160)  BP: --  BP(mean): --  ABP: 126/46 (2020 08:00) (76/40 - 132/50)  ABP(mean): 70 (2020 08:00) (52 - 86)  RR: 27 (2020 08:00) (18 - 39)  SpO2: 90% (2020 08:00) (89% - 100%)    Mode: AC/ CMV (Assist Control/ Continuous Mandatory Ventilation)  RR (machine): 22  TV (machine): 480  FiO2: 100  PEEP: 20  ITime: 1  MAP: 22  PIP: 44    Adult Advanced Hemodynamics Last 24 Hrs  CVP(mm Hg): --  CVP(cm H2O): --  CO: --  CI: --  PA: --  PA(mean): --  PCWP: --  SVR: --  SVRI: --  PVR: --  PVRI: --  CAPILLARY BLOOD GLUCOSE      POCT Blood Glucose.: 234 mg/dL (10 Porter 2020 23:52)  POCT Blood Glucose.: 181 mg/dL (10 Porter 2020 13:02)    CAPILLARY BLOOD GLUCOSE      POCT Blood Glucose.: 234 mg/dL (10 Porter 2020 23:52)    I&O's Summary    10 Porter 2020 07:01  -  2020 07:00  --------------------------------------------------------  IN: 3756.8 mL / OUT: 2200 mL / NET: 1556.8 mL      Daily     Daily Weight in k.1 (10 Porter 2020 15:58)    PHYSICAL EXAMINATION:  General: WN/WD NAD  HEENT: eyes shut   Neurology: A&Ox0, nonfocal  Respiratory: b/l crackles R>L, poor air movement. On vent  CV: Tachycardic and regular, S1S2  Abdominal: Soft, + bowel sounds   Extremities:  anasarca, peripheral edema both UE and LE   Incisions: ANNIE Bernard, Lradial a-line  Tubes: ET, OGT, Gonzalez    LABS:  ABG - ( 2020 05:21 )  pH, Arterial: 7.38  pH, Blood: x     /  pCO2: 38    /  pO2: 91    / HCO3: 22    / Base Excess: -2.1  /  SaO2: 97                              10.1   10.10 )-----------( 172      ( 2020 05:32 )             32.8     01-11    138  |  103  |  89<H>  ----------------------------<  296<H>  3.9   |  19<L>  |  2.80<H>    Ca    8.6      2020 05:32  Phos  4.8       Mg     2.1         TPro  6.0  /  Alb  2.1<L>  /  TBili  0.4  /  DBili  x   /  AST  16  /  ALT  17  /  AlkPhos  95      LIVER FUNCTIONS - ( 2020 05:32 )  Alb: 2.1 g/dL / Pro: 6.0 g/dL / ALK PHOS: 95 U/L / ALT: 17 U/L / AST: 16 U/L / GGT: x           PT/INR - ( 10 Porter 2020 05:53 )   PT: 14.9 sec;   INR: 1.30 ratio         PTT - ( 10 Porter 2020 05:53 )  PTT:24.6 sec

## 2020-01-11 NOTE — PROCEDURE NOTE - PROCEDURE DATE TIME, MLM
11-Jan-2020 22:47
06-Jan-2020 14:53
06-Jan-2020 22:53
06-Jan-2020 23:15
08-Jan-2020 20:30
31-Dec-2019 18:27

## 2020-01-11 NOTE — PROVIDER CONTACT NOTE (CRITICAL VALUE NOTIFICATION) - RECOMMENDATIONS
Change ventilator settings
Patient currently on cefazolin
Continue to monitor ABG and ventilator compliance
IV antibiotic given as per MD orders, sputum cultures pending
Notify NP
Np made aware
continue abx as ordered previously

## 2020-01-11 NOTE — PROGRESS NOTE ADULT - SUBJECTIVE AND OBJECTIVE BOX
[facility]   [phone]  RENAL FOLLOW UP NOTE  --------------------------------------------------------------------------------  Chief Complaint:    24 hour events/subjective:    o/n hypoxic FIO 2 increase to 100 % still pox 88-90% as well as a fib and hypotension now on neosynephrine and vasopressin       PAST HISTORY  --------------------------------------------------------------------------------  No significant changes to PMH, PSH, FHx, SHx, unless otherwise noted    ALLERGIES & MEDICATIONS  --------------------------------------------------------------------------------  Allergies    Indocin (Unknown)  oxacillin (Unknown)  Zyvox (Unknown)    Intolerances      Standing Inpatient Medications  albuterol/ipratropium for Nebulization 3 milliLiter(s) Nebulizer every 6 hours  aMIOdarone Infusion 1 mG/Min IV Continuous <Continuous>  aMIOdarone Infusion 0.5 mG/Min IV Continuous <Continuous>  artificial  tears Solution 1 Drop(s) Both EYES daily  aspirin  chewable 81 milliGRAM(s) Oral daily  atorvastatin 10 milliGRAM(s) Oral at bedtime  chlorhexidine 0.12% Liquid 15 milliLiter(s) Oral Mucosa every 12 hours  chlorhexidine 4% Liquid 1 Application(s) Topical <User Schedule>  CRRT Treatment    <Continuous>  DAPTOmycin IVPB 800 milliGRAM(s) IV Intermittent every 48 hours  dextrose 5%. 1000 milliLiter(s) IV Continuous <Continuous>  dextrose 50% Injectable 12.5 Gram(s) IV Push once  dextrose 50% Injectable 25 Gram(s) IV Push once  dextrose 50% Injectable 25 Gram(s) IV Push once  fentaNYL    Injectable 50 MICROGram(s) IV Push once  fentaNYL   Infusion. 0.5 MICROgram(s)/kG/Hr IV Continuous <Continuous>  heparin  Infusion.  Unit(s)/Hr IV Continuous <Continuous>  insulin lispro (HumaLOG) corrective regimen sliding scale   SubCutaneous every 6 hours  lidocaine   Patch 1 Patch Transdermal daily  midazolam Injectable 2 milliGRAM(s) IV Push once  pantoprazole  Injectable 40 milliGRAM(s) IV Push daily  phenylephrine    Infusion 2.4 MICROgram(s)/kG/Min IV Continuous <Continuous>  polyethylene glycol 3350 17 Gram(s) Oral daily  PrismaSATE Dialysate BGK 4 / 2.5 5000 milliLiter(s) CRRT <Continuous>  PrismaSOL Filtration BGK 0 / 2.5 5000 milliLiter(s) CRRT <Continuous>  PrismaSOL Filtration BGK 4 / 2.5 5000 milliLiter(s) CRRT <Continuous>  propofol Infusion 10 MICROgram(s)/kG/Min IV Continuous <Continuous>  senna 2 Tablet(s) Oral at bedtime  vasopressin Infusion 0.02 Unit(s)/Min IV Continuous <Continuous>    PRN Inpatient Medications  bisacodyl Suppository 10 milliGRAM(s) Rectal daily PRN  dextrose 40% Gel 15 Gram(s) Oral once PRN  glucagon  Injectable 1 milliGRAM(s) IntraMuscular once PRN  petrolatum Ophthalmic Ointment 1 Application(s) Both EYES every 4 hours PRN  sodium chloride 0.9% lock flush 10 milliLiter(s) IV Push every 1 hour PRN  sodium chloride 0.9% lock flush 10 milliLiter(s) IV Push every 1 hour PRN      REVIEW OF SYSTEMS  --------------------------------------------------------------------------------    intubated   VITALS/PHYSICAL EXAM  --------------------------------------------------------------------------------  T(C): 37 (01-10-20 @ 19:00), Max: 37 (01-10-20 @ 19:00)  HR: 100 (01-11-20 @ 08:00) (64 - 160)  BP: --  RR: 27 (01-11-20 @ 08:00) (18 - 39)  SpO2: 90% (01-11-20 @ 08:00) (89% - 100%)  Wt(kg): --        01-10-20 @ 07:01  -  01-11-20 @ 07:00  --------------------------------------------------------  IN: 3756.8 mL / OUT: 2200 mL / NET: 1556.8 mL      Physical Exam:  	  Gen: inutbated   	Pulm: decrease bs  + coarse bs bilateral no  wheezing  	CV: RRR, S1S2; no rub  	Abd: +BS, soft, nontender/nondistended  	: No suprapubic tenderness  	UE: Warm, no cyanosis  no clubbing,  3+ edema   	    LABS/STUDIES  --------------------------------------------------------------------------------              10.1   10.10 >-----------<  172      [01-11-20 @ 05:32]              32.8     138  |  103  |  89  ----------------------------<  296      [01-11-20 @ 05:32]  3.9   |  19  |  2.80        Ca     8.6     [01-11-20 @ 05:32]      Mg     2.1     [01-11-20 @ 05:32]      Phos  4.8     [01-11-20 @ 05:32]    TPro  6.0  /  Alb  2.1  /  TBili  0.4  /  DBili  x   /  AST  16  /  ALT  17  /  AlkPhos  95  [01-11-20 @ 05:32]    PT/INR: PT 14.9 , INR 1.30       [01-10-20 @ 05:53]  PTT: 24.6       [01-10-20 @ 05:53]      Creatinine Trend:  SCr 2.80 [01-11 @ 05:32]  SCr 2.45 [01-10 @ 18:20]  SCr 3.21 [01-10 @ 05:53]  SCr 3.02 [01-09 @ 20:29]  SCr SEE NOTE [01-09 @ 18:10]              Urinalysis - [01-08-20 @ 09:12]      Color Yellow / Appearance Slightly Turbid / SG 1.021 / pH 5.5      Gluc Negative / Ketone Negative  / Bili Negative / Urobili Negative       Blood Moderate / Protein 30 mg/dL / Leuk Est Large / Nitrite Negative      RBC 67 / WBC 34 / Hyaline 13 / Gran  / Sq Epi  / Non Sq Epi 1 / Bacteria Negative      HbA1c 7.3      [01-08-20 @ 08:56]    RYLEE: titer Negative, pattern --      [12-21-19 @ 17:35]  C3 Complement 25      [12-20-19 @ 12:18]  C4 Complement 21      [12-20-19 @ 12:18]  ANCA: cANCA Negative, pANCA Negative, atypical ANCA Negative      [12-21-19 @ 17:35]

## 2020-01-11 NOTE — PROVIDER CONTACT NOTE (CRITICAL VALUE NOTIFICATION) - ACTION/TREATMENT ORDERED:
Change ventilator settings based on ABG
AS per Med NP continue with ABX treatment
As above
NP Ramonase aware. Keep pt on IV abx as prescribed. No further intervention required at this time. WIll continue to monitor.
Will continue cefazolin antibiotics. Will continue to monitor.
continue abx as ordered previously
sputum culture pending. PT sleeping currently with BiPAP. O2 Sat at 95%. Will continue to monitor

## 2020-01-11 NOTE — CONSULT NOTE ADULT - SUBJECTIVE AND OBJECTIVE BOX
CHIEF COMPLAINT:    HPI:    PAST MEDICAL & SURGICAL HISTORY:  CAD S/P percutaneous coronary angioplasty  HLD (hyperlipidemia)  Back pain  OA (osteoarthritis)  Gout  HTN (hypertension)  GERD (gastroesophageal reflux disease)  S/P lumbar spine operation: 6 thoracic to lumbar surgeries over 6 months period starting 11/2006  S/P knee replacement: 11/2013      FAMILY HISTORY:  No pertinent family history in first degree relatives      SOCIAL HISTORY:  Smoking: [ ] Never Smoked [ ] Former Smoker (__ packs x ___ years) [ ] Current Smoker  (__ packs x ___ years)  Substance Use: [ ] Never Used [ ] Used ____  EtOH Use:  Marital Status: [ ] Single [ ]  [ ]  [ ]   Sexual History:   Occupation:  Recent Travel:  Country of Birth:  Advance Directives:    Allergies    Indocin (Unknown)  oxacillin (Unknown)  Zyvox (Unknown)    Intolerances        HOME MEDICATIONS:  Home Medications:  allopurinol 300 mg oral tablet: 1 tab(s) orally once a day (09 Dec 2019 11:15)  aspirin 81 mg oral delayed release tablet: 1 tab(s) orally once a day (09 Dec 2019 11:15)  Co Q-10 100 mg oral capsule: 1 cap(s) orally once a day (09 Dec 2019 11:15)  diclofenac sodium 75 mg oral delayed release tablet: 1 tab(s) orally 2 times a day (09 Dec 2019 11:15)  lansoprazole 15 mg oral delayed release capsule: 1 cap(s) orally once a day (09 Dec 2019 11:15)  losartan 100 mg oral tablet: 1 tab(s) orally once a day (09 Dec 2019 11:15)  lovastatin 20 mg oral tablet: 1 tab(s) orally once a day (09 Dec 2019 11:15)  miSOPROStol 200 mcg oral tablet: 1 tab(s) orally 2 times a day (09 Dec 2019 11:15)  Toprol-XL 25 mg oral tablet, extended release: 0.5 tab(s) orally once a day (09 Dec 2019 11:15)  Vitamin D3 1000 intl units oral tablet: 1 tab(s) orally once a day (09 Dec 2019 11:15)      REVIEW OF SYSTEMS:  Constitutional: [ ] negative [ ] fevers [ ] chills [ ] weight loss [ ] weight gain  HEENT: [ ] negative [ ] dry eyes [ ] eye irritation [ ] postnasal drip [ ] nasal congestion  CV: [ ] negative  [ ] chest pain [ ] orthopnea [ ] palpitations [ ] murmur  Resp: [ ] negative [ ] cough [ ] shortness of breath [ ] dyspnea [ ] wheezing [ ] sputum [ ] hemoptysis  GI: [ ] negative [ ] nausea [ ] vomiting [ ] diarrhea [ ] constipation [ ] abd pain [ ] dysphagia   : [ ] negative [ ] dysuria [ ] nocturia [ ] hematuria [ ] increased urinary frequency  Musculoskeletal: [ ] negative [ ] back pain [ ] myalgias [ ] arthralgias [ ] fracture  Skin: [ ] negative [ ] rash [ ] itch  Neurological: [ ] negative [ ] headache [ ] dizziness [ ] syncope [ ] weakness [ ] numbness  Psychiatric: [ ] negative [ ] anxiety [ ] depression  Endocrine: [ ] negative [ ] diabetes [ ] thyroid problem  Hematologic/Lymphatic: [ ] negative [ ] anemia [ ] bleeding problem  Allergic/Immunologic: [ ] negative [ ] itchy eyes [ ] nasal discharge [ ] hives [ ] angioedema  [ ] All other systems negative  [ ] Unable to assess ROS because ________    OBJECTIVE:  ICU Vital Signs Last 24 Hrs  T(C): 36.6 (11 Jan 2020 12:00), Max: 38.3 (11 Jan 2020 09:00)  T(F): 97.9 (11 Jan 2020 12:00), Max: 100.9 (11 Jan 2020 09:00)  HR: 96 (11 Jan 2020 14:15) (64 - 160)  BP: --  BP(mean): --  ABP: 96/48 (11 Jan 2020 14:15) (62/36 - 152/68)  ABP(mean): 62 (11 Jan 2020 14:15) (44 - 94)  RR: 28 (11 Jan 2020 14:15) (11 - 39)  SpO2: 93% (11 Jan 2020 14:15) (89% - 100%)    Mode: AC/ CMV (Assist Control/ Continuous Mandatory Ventilation), RR (machine): 28, TV (machine): 460, FiO2: 100, PEEP: 20, ITime: 1, MAP: 26, PIP: 45    01-10 @ 07:01  -  01-11 @ 07:00  --------------------------------------------------------  IN: 3810.1 mL / OUT: 2200 mL / NET: 1610.1 mL    01-11 @ 07:01 - 01-11 @ 14:34  --------------------------------------------------------  IN: 1303.2 mL / OUT: 1145 mL / NET: 158.2 mL      CAPILLARY BLOOD GLUCOSE      POCT Blood Glucose.: 155 mg/dL (11 Jan 2020 11:16)      PHYSICAL EXAM:  General:   HEENT:   Lymph Nodes:  Neck:   Respiratory:   Cardiovascular:   Abdomen:   Extremities:   Skin:   Neurological:  Psychiatry:    LINES:     HOSPITAL MEDICATIONS:  Standing Meds:  albuterol/ipratropium for Nebulization 3 milliLiter(s) Nebulizer every 6 hours  aMIOdarone Infusion 1 mG/Min IV Continuous <Continuous>  aMIOdarone Infusion 0.5 mG/Min IV Continuous <Continuous>  artificial  tears Solution 1 Drop(s) Both EYES daily  aspirin  chewable 81 milliGRAM(s) Oral daily  atorvastatin 10 milliGRAM(s) Oral at bedtime  chlorhexidine 0.12% Liquid 15 milliLiter(s) Oral Mucosa every 12 hours  chlorhexidine 4% Liquid 1 Application(s) Topical <User Schedule>  CRRT Treatment    <Continuous>  DAPTOmycin IVPB 800 milliGRAM(s) IV Intermittent every 48 hours  dextrose 5%. 1000 milliLiter(s) IV Continuous <Continuous>  dextrose 50% Injectable 12.5 Gram(s) IV Push once  dextrose 50% Injectable 25 Gram(s) IV Push once  dextrose 50% Injectable 25 Gram(s) IV Push once  fentaNYL   Infusion. 0.5 MICROgram(s)/kG/Hr IV Continuous <Continuous>  heparin  Infusion.  Unit(s)/Hr IV Continuous <Continuous>  insulin lispro (HumaLOG) corrective regimen sliding scale   SubCutaneous every 6 hours  lidocaine   Patch 1 Patch Transdermal daily  norepinephrine Infusion 0.5 MICROgram(s)/kG/Min IV Continuous <Continuous>  pantoprazole  Injectable 40 milliGRAM(s) IV Push daily  phenylephrine    Infusion 2.4 MICROgram(s)/kG/Min IV Continuous <Continuous>  polyethylene glycol 3350 17 Gram(s) Oral daily  PrismaSATE Dialysate BGK 4 / 2.5 5000 milliLiter(s) CRRT <Continuous>  PrismaSOL Filtration BGK 0 / 2.5 5000 milliLiter(s) CRRT <Continuous>  PrismaSOL Filtration BGK 4 / 2.5 5000 milliLiter(s) CRRT <Continuous>  propofol Infusion 10 MICROgram(s)/kG/Min IV Continuous <Continuous>  senna 2 Tablet(s) Oral at bedtime  sodium chloride 3%  Inhalation 3 milliLiter(s) Inhalation every 12 hours  vasopressin Infusion 0.02 Unit(s)/Min IV Continuous <Continuous>      PRN Meds:  acetaminophen    Suspension .. 650 milliGRAM(s) Oral every 6 hours PRN  bisacodyl Suppository 10 milliGRAM(s) Rectal daily PRN  dextrose 40% Gel 15 Gram(s) Oral once PRN  glucagon  Injectable 1 milliGRAM(s) IntraMuscular once PRN  petrolatum Ophthalmic Ointment 1 Application(s) Both EYES every 4 hours PRN  sodium chloride 0.9% lock flush 10 milliLiter(s) IV Push every 1 hour PRN  sodium chloride 0.9% lock flush 10 milliLiter(s) IV Push every 1 hour PRN      LABS:                        12.2   4.59  )-----------( 144      ( 11 Jan 2020 13:51 )             39.4     Hgb Trend: 12.2<--, 10.1<--, 9.5<--, 7.5<--, 7.3<--  01-11    140  |  104  |  74<H>  ----------------------------<  133<H>  3.8   |  19<L>  |  2.29<H>    Ca    8.5      11 Jan 2020 13:51  Phos  4.7     01-11  Mg     2.2     01-11    TPro  6.6  /  Alb  2.4<L>  /  TBili  0.6  /  DBili  x   /  AST  23  /  ALT  19  /  AlkPhos  92  01-11    Creatinine Trend: 2.29<--, 2.80<--, 2.45<--, 3.21<--, 3.02<--, SEE NOTE<--  PT/INR - ( 10 Porter 2020 05:53 )   PT: 14.9 sec;   INR: 1.30 ratio         PTT - ( 11 Jan 2020 13:51 )  PTT:26.5 sec    Arterial Blood Gas:  01-11 @ 13:06  7.22/57/70/22/89/-5.6  ABG lactate: --  Arterial Blood Gas:  01-11 @ 11:37  7.18/66/101/24/95/-5.4  ABG lactate: --  Arterial Blood Gas:  01-11 @ 10:13  7.18/67/69/24/86/-5.0  ABG lactate: --  Arterial Blood Gas:  01-11 @ 05:21  7.38/38/91/22/97/-2.1  ABG lactate: --  Arterial Blood Gas:  01-10 @ 05:50  7.44/32/86/22/97/-1.7  ABG lactate: --  Arterial Blood Gas:  01-09 @ 20:09  7.42/31/102/20/98/-3.9  ABG lactate: --  Arterial Blood Gas:  01-09 @ 17:53  7.42/33/124/21/98/-2.7  ABG lactate: --        MICROBIOLOGY:     Culture - Bronchial (collected 08 Jan 2020 18:54)  Source: Bronch Wash Combicath  Gram Stain (08 Jan 2020 22:48):    Moderate polymorphonuclear leukocytes per low power field    No squamous epithelial cells per low power field    No organisms seen per oil power field  Final Report (10 Porter 2020 18:06):    Normal Respiratory Mirna present        RADIOLOGY:  [ ] Reviewed and interpreted by me    PULMONARY FUNCTION TESTS:    EKG: CHIEF COMPLAINT: hypoxemic respiratory failure    HPI:  Patient is an 80 y/o M with PMHx of CAD s/p PCI 2017, HFrEF (EF 20%), HTN, HLD, back pain s/p multiple lumbar spinal laminectomies and fusiuons who originally presented on 12/8 for worsening back pain. Found to have MSSA bacteremia, suspected source is spinal hardware. Has been treated with antibiotics and has since cleared his cultures. Hospital course has been complicated by KRIS requiring HD and volume overload. Patient is now in CCU for management of volume overload and urgent HD. Early this morning, patient had episode of hypoxia which then resulted in an episode of A-fib with RVR with HR in 160s. His O2 requirements increased dramatically to the point he was on FiO2 100% and PEEP of 20. CXR revealed large R sided infiltrate but L side appeared relatively clear.     PAST MEDICAL & SURGICAL HISTORY:  CAD S/P percutaneous coronary angioplasty  HLD (hyperlipidemia)  Back pain  OA (osteoarthritis)  Gout  HTN (hypertension)  GERD (gastroesophageal reflux disease)  S/P lumbar spine operation: 6 thoracic to lumbar surgeries over 6 months period starting 11/2006  S/P knee replacement: 11/2013      FAMILY HISTORY:  No pertinent family history in first degree relatives      SOCIAL HISTORY:  Smoking: [ ] Never Smoked [ ] Former Smoker (__ packs x ___ years) [ ] Current Smoker  (__ packs x ___ years)  Substance Use: [ ] Never Used [ ] Used ____  EtOH Use:  Marital Status: [ ] Single [ ]  [ ]  [ ]   Sexual History:   Occupation:  Recent Travel:  Country of Birth:  Advance Directives:    Allergies    Indocin (Unknown)  oxacillin (Unknown)  Zyvox (Unknown)    Intolerances        HOME MEDICATIONS:  Home Medications:  allopurinol 300 mg oral tablet: 1 tab(s) orally once a day (09 Dec 2019 11:15)  aspirin 81 mg oral delayed release tablet: 1 tab(s) orally once a day (09 Dec 2019 11:15)  Co Q-10 100 mg oral capsule: 1 cap(s) orally once a day (09 Dec 2019 11:15)  diclofenac sodium 75 mg oral delayed release tablet: 1 tab(s) orally 2 times a day (09 Dec 2019 11:15)  lansoprazole 15 mg oral delayed release capsule: 1 cap(s) orally once a day (09 Dec 2019 11:15)  losartan 100 mg oral tablet: 1 tab(s) orally once a day (09 Dec 2019 11:15)  lovastatin 20 mg oral tablet: 1 tab(s) orally once a day (09 Dec 2019 11:15)  miSOPROStol 200 mcg oral tablet: 1 tab(s) orally 2 times a day (09 Dec 2019 11:15)  Toprol-XL 25 mg oral tablet, extended release: 0.5 tab(s) orally once a day (09 Dec 2019 11:15)  Vitamin D3 1000 intl units oral tablet: 1 tab(s) orally once a day (09 Dec 2019 11:15)      REVIEW OF SYSTEMS:  Constitutional: [ ] negative [ ] fevers [ ] chills [ ] weight loss [ ] weight gain  HEENT: [ ] negative [ ] dry eyes [ ] eye irritation [ ] postnasal drip [ ] nasal congestion  CV: [ ] negative  [ ] chest pain [ ] orthopnea [ ] palpitations [ ] murmur  Resp: [ ] negative [ ] cough [ ] shortness of breath [ ] dyspnea [ ] wheezing [ ] sputum [ ] hemoptysis  GI: [ ] negative [ ] nausea [ ] vomiting [ ] diarrhea [ ] constipation [ ] abd pain [ ] dysphagia   : [ ] negative [ ] dysuria [ ] nocturia [ ] hematuria [ ] increased urinary frequency  Musculoskeletal: [ ] negative [ ] back pain [ ] myalgias [ ] arthralgias [ ] fracture  Skin: [ ] negative [ ] rash [ ] itch  Neurological: [ ] negative [ ] headache [ ] dizziness [ ] syncope [ ] weakness [ ] numbness  Psychiatric: [ ] negative [ ] anxiety [ ] depression  Endocrine: [ ] negative [ ] diabetes [ ] thyroid problem  Hematologic/Lymphatic: [ ] negative [ ] anemia [ ] bleeding problem  Allergic/Immunologic: [ ] negative [ ] itchy eyes [ ] nasal discharge [ ] hives [ ] angioedema  [ ] All other systems negative  [ ] Unable to assess ROS because ________    OBJECTIVE:  ICU Vital Signs Last 24 Hrs  T(C): 36.6 (11 Jan 2020 12:00), Max: 38.3 (11 Jan 2020 09:00)  T(F): 97.9 (11 Jan 2020 12:00), Max: 100.9 (11 Jan 2020 09:00)  HR: 96 (11 Jan 2020 14:15) (64 - 160)  BP: --  BP(mean): --  ABP: 96/48 (11 Jan 2020 14:15) (62/36 - 152/68)  ABP(mean): 62 (11 Jan 2020 14:15) (44 - 94)  RR: 28 (11 Jan 2020 14:15) (11 - 39)  SpO2: 93% (11 Jan 2020 14:15) (89% - 100%)    Mode: AC/ CMV (Assist Control/ Continuous Mandatory Ventilation), RR (machine): 28, TV (machine): 460, FiO2: 100, PEEP: 20, ITime: 1, MAP: 26, PIP: 45    01-10 @ 07:01 - 01-11 @ 07:00  --------------------------------------------------------  IN: 3810.1 mL / OUT: 2200 mL / NET: 1610.1 mL    01-11 @ 07:01 - 01-11 @ 14:34  --------------------------------------------------------  IN: 1303.2 mL / OUT: 1145 mL / NET: 158.2 mL      CAPILLARY BLOOD GLUCOSE      POCT Blood Glucose.: 155 mg/dL (11 Jan 2020 11:16)      PHYSICAL EXAM:  General:   HEENT:   Lymph Nodes:  Neck:   Respiratory:   Cardiovascular:   Abdomen:   Extremities:   Skin:   Neurological:  Psychiatry:    LINES:     HOSPITAL MEDICATIONS:  Standing Meds:  albuterol/ipratropium for Nebulization 3 milliLiter(s) Nebulizer every 6 hours  aMIOdarone Infusion 1 mG/Min IV Continuous <Continuous>  aMIOdarone Infusion 0.5 mG/Min IV Continuous <Continuous>  artificial  tears Solution 1 Drop(s) Both EYES daily  aspirin  chewable 81 milliGRAM(s) Oral daily  atorvastatin 10 milliGRAM(s) Oral at bedtime  chlorhexidine 0.12% Liquid 15 milliLiter(s) Oral Mucosa every 12 hours  chlorhexidine 4% Liquid 1 Application(s) Topical <User Schedule>  CRRT Treatment    <Continuous>  DAPTOmycin IVPB 800 milliGRAM(s) IV Intermittent every 48 hours  dextrose 5%. 1000 milliLiter(s) IV Continuous <Continuous>  dextrose 50% Injectable 12.5 Gram(s) IV Push once  dextrose 50% Injectable 25 Gram(s) IV Push once  dextrose 50% Injectable 25 Gram(s) IV Push once  fentaNYL   Infusion. 0.5 MICROgram(s)/kG/Hr IV Continuous <Continuous>  heparin  Infusion.  Unit(s)/Hr IV Continuous <Continuous>  insulin lispro (HumaLOG) corrective regimen sliding scale   SubCutaneous every 6 hours  lidocaine   Patch 1 Patch Transdermal daily  norepinephrine Infusion 0.5 MICROgram(s)/kG/Min IV Continuous <Continuous>  pantoprazole  Injectable 40 milliGRAM(s) IV Push daily  phenylephrine    Infusion 2.4 MICROgram(s)/kG/Min IV Continuous <Continuous>  polyethylene glycol 3350 17 Gram(s) Oral daily  PrismaSATE Dialysate BGK 4 / 2.5 5000 milliLiter(s) CRRT <Continuous>  PrismaSOL Filtration BGK 0 / 2.5 5000 milliLiter(s) CRRT <Continuous>  PrismaSOL Filtration BGK 4 / 2.5 5000 milliLiter(s) CRRT <Continuous>  propofol Infusion 10 MICROgram(s)/kG/Min IV Continuous <Continuous>  senna 2 Tablet(s) Oral at bedtime  sodium chloride 3%  Inhalation 3 milliLiter(s) Inhalation every 12 hours  vasopressin Infusion 0.02 Unit(s)/Min IV Continuous <Continuous>      PRN Meds:  acetaminophen    Suspension .. 650 milliGRAM(s) Oral every 6 hours PRN  bisacodyl Suppository 10 milliGRAM(s) Rectal daily PRN  dextrose 40% Gel 15 Gram(s) Oral once PRN  glucagon  Injectable 1 milliGRAM(s) IntraMuscular once PRN  petrolatum Ophthalmic Ointment 1 Application(s) Both EYES every 4 hours PRN  sodium chloride 0.9% lock flush 10 milliLiter(s) IV Push every 1 hour PRN  sodium chloride 0.9% lock flush 10 milliLiter(s) IV Push every 1 hour PRN      LABS:                        12.2   4.59  )-----------( 144      ( 11 Jan 2020 13:51 )             39.4     Hgb Trend: 12.2<--, 10.1<--, 9.5<--, 7.5<--, 7.3<--  01-11    140  |  104  |  74<H>  ----------------------------<  133<H>  3.8   |  19<L>  |  2.29<H>    Ca    8.5      11 Jan 2020 13:51  Phos  4.7     01-11  Mg     2.2     01-11    TPro  6.6  /  Alb  2.4<L>  /  TBili  0.6  /  DBili  x   /  AST  23  /  ALT  19  /  AlkPhos  92  01-11    Creatinine Trend: 2.29<--, 2.80<--, 2.45<--, 3.21<--, 3.02<--, SEE NOTE<--  PT/INR - ( 10 Porter 2020 05:53 )   PT: 14.9 sec;   INR: 1.30 ratio         PTT - ( 11 Jan 2020 13:51 )  PTT:26.5 sec    Arterial Blood Gas:  01-11 @ 13:06  7.22/57/70/22/89/-5.6  ABG lactate: --  Arterial Blood Gas:  01-11 @ 11:37  7.18/66/101/24/95/-5.4  ABG lactate: --  Arterial Blood Gas:  01-11 @ 10:13  7.18/67/69/24/86/-5.0  ABG lactate: --  Arterial Blood Gas:  01-11 @ 05:21  7.38/38/91/22/97/-2.1  ABG lactate: --  Arterial Blood Gas:  01-10 @ 05:50  7.44/32/86/22/97/-1.7  ABG lactate: --  Arterial Blood Gas:  01-09 @ 20:09  7.42/31/102/20/98/-3.9  ABG lactate: --  Arterial Blood Gas:  01-09 @ 17:53  7.42/33/124/21/98/-2.7  ABG lactate: --        MICROBIOLOGY:     Culture - Bronchial (collected 08 Jan 2020 18:54)  Source: Bronch Wash Combicath  Gram Stain (08 Jan 2020 22:48):    Moderate polymorphonuclear leukocytes per low power field    No squamous epithelial cells per low power field    No organisms seen per oil power field  Final Report (10 Porter 2020 18:06):    Normal Respiratory Mirna present        RADIOLOGY:  [ ] Reviewed and interpreted by me    PULMONARY FUNCTION TESTS:    EKG: CHIEF COMPLAINT: hypoxemic respiratory failure    HPI:  Patient is an 80 y/o M with PMHx of CAD s/p PCI 2017, HFrEF (EF 20%), HTN, HLD, back pain s/p multiple lumbar spinal laminectomies and fusiuons who originally presented on 12/8 for worsening back pain. Found to have MSSA bacteremia, suspected source is spinal hardware. Has been treated with antibiotics and has since cleared his cultures. Hospital course has been complicated by KRIS requiring HD and volume overload. Patient is now in CCU for management of volume overload and urgent HD. Early this morning, patient had episode of hypoxia which then resulted in an episode of A-fib with RVR with HR in 160s. His O2 requirements increased dramatically to the point he was on FiO2 100% and PEEP of 20. CXR revealed large R sided infiltrate but L side appeared relatively clear. Pulmonary consulted for worsening hypoxemic respiratory failure    PAST MEDICAL & SURGICAL HISTORY:  CAD S/P percutaneous coronary angioplasty  HLD (hyperlipidemia)  Back pain  OA (osteoarthritis)  Gout  HTN (hypertension)  GERD (gastroesophageal reflux disease)  S/P lumbar spine operation: 6 thoracic to lumbar surgeries over 6 months period starting 11/2006  S/P knee replacement: 11/2013      FAMILY HISTORY:  No pertinent family history in first degree relatives      SOCIAL HISTORY:  Smoking: [ ] Never Smoked [ ] Former Smoker (__ packs x ___ years) [ ] Current Smoker  (__ packs x ___ years)  Substance Use: [ ] Never Used [ ] Used ____  EtOH Use:  Marital Status: [ ] Single [ ]  [ ]  [ ]   Sexual History:   Occupation:  Recent Travel:  Country of Birth:  Advance Directives:    Allergies    Indocin (Unknown)  oxacillin (Unknown)  Zyvox (Unknown)    Intolerances        HOME MEDICATIONS:  Home Medications:  allopurinol 300 mg oral tablet: 1 tab(s) orally once a day (09 Dec 2019 11:15)  aspirin 81 mg oral delayed release tablet: 1 tab(s) orally once a day (09 Dec 2019 11:15)  Co Q-10 100 mg oral capsule: 1 cap(s) orally once a day (09 Dec 2019 11:15)  diclofenac sodium 75 mg oral delayed release tablet: 1 tab(s) orally 2 times a day (09 Dec 2019 11:15)  lansoprazole 15 mg oral delayed release capsule: 1 cap(s) orally once a day (09 Dec 2019 11:15)  losartan 100 mg oral tablet: 1 tab(s) orally once a day (09 Dec 2019 11:15)  lovastatin 20 mg oral tablet: 1 tab(s) orally once a day (09 Dec 2019 11:15)  miSOPROStol 200 mcg oral tablet: 1 tab(s) orally 2 times a day (09 Dec 2019 11:15)  Toprol-XL 25 mg oral tablet, extended release: 0.5 tab(s) orally once a day (09 Dec 2019 11:15)  Vitamin D3 1000 intl units oral tablet: 1 tab(s) orally once a day (09 Dec 2019 11:15)      REVIEW OF SYSTEMS:  Constitutional: [ ] negative [ ] fevers [ ] chills [ ] weight loss [ ] weight gain  HEENT: [ ] negative [ ] dry eyes [ ] eye irritation [ ] postnasal drip [ ] nasal congestion  CV: [ ] negative  [ ] chest pain [ ] orthopnea [ ] palpitations [ ] murmur  Resp: [ ] negative [ ] cough [ ] shortness of breath [ ] dyspnea [ ] wheezing [ ] sputum [ ] hemoptysis  GI: [ ] negative [ ] nausea [ ] vomiting [ ] diarrhea [ ] constipation [ ] abd pain [ ] dysphagia   : [ ] negative [ ] dysuria [ ] nocturia [ ] hematuria [ ] increased urinary frequency  Musculoskeletal: [ ] negative [ ] back pain [ ] myalgias [ ] arthralgias [ ] fracture  Skin: [ ] negative [ ] rash [ ] itch  Neurological: [ ] negative [ ] headache [ ] dizziness [ ] syncope [ ] weakness [ ] numbness  Psychiatric: [ ] negative [ ] anxiety [ ] depression  Endocrine: [ ] negative [ ] diabetes [ ] thyroid problem  Hematologic/Lymphatic: [ ] negative [ ] anemia [ ] bleeding problem  Allergic/Immunologic: [ ] negative [ ] itchy eyes [ ] nasal discharge [ ] hives [ ] angioedema  [ ] All other systems negative  [x] Unable to assess ROS because intubated and sedated    OBJECTIVE:  ICU Vital Signs Last 24 Hrs  T(C): 36.6 (11 Jan 2020 12:00), Max: 38.3 (11 Jan 2020 09:00)  T(F): 97.9 (11 Jan 2020 12:00), Max: 100.9 (11 Jan 2020 09:00)  HR: 96 (11 Jan 2020 14:15) (64 - 160)  BP: --  BP(mean): --  ABP: 96/48 (11 Jan 2020 14:15) (62/36 - 152/68)  ABP(mean): 62 (11 Jan 2020 14:15) (44 - 94)  RR: 28 (11 Jan 2020 14:15) (11 - 39)  SpO2: 93% (11 Jan 2020 14:15) (89% - 100%)    Mode: AC/ CMV (Assist Control/ Continuous Mandatory Ventilation), RR (machine): 28, TV (machine): 460, FiO2: 100, PEEP: 20, ITime: 1, MAP: 26, PIP: 45    01-10 @ 07:01 - 01-11 @ 07:00  --------------------------------------------------------  IN: 3810.1 mL / OUT: 2200 mL / NET: 1610.1 mL    01-11 @ 07:01 - 01-11 @ 14:34  --------------------------------------------------------  IN: 1303.2 mL / OUT: 1145 mL / NET: 158.2 mL      CAPILLARY BLOOD GLUCOSE      POCT Blood Glucose.: 155 mg/dL (11 Jan 2020 11:16)      PHYSICAL EXAM:  General: sedated, lying in bed  HEENT: NC/AT, PERRL, conjunctiva normal, intubated  Neck: supple  Respiratory: crackles present in b/l anterior lung fields  Cardiovascular: S1 S2 present, regular rate, irregular rhythm, no m/r/g  Abdomen: soft, +BS  Extremities: trace LE edema b/l  Skin: no rashes or lesions noted  Neurological: sedated    LINES:     HOSPITAL MEDICATIONS:  Standing Meds:  albuterol/ipratropium for Nebulization 3 milliLiter(s) Nebulizer every 6 hours  aMIOdarone Infusion 1 mG/Min IV Continuous <Continuous>  aMIOdarone Infusion 0.5 mG/Min IV Continuous <Continuous>  artificial  tears Solution 1 Drop(s) Both EYES daily  aspirin  chewable 81 milliGRAM(s) Oral daily  atorvastatin 10 milliGRAM(s) Oral at bedtime  chlorhexidine 0.12% Liquid 15 milliLiter(s) Oral Mucosa every 12 hours  chlorhexidine 4% Liquid 1 Application(s) Topical <User Schedule>  CRRT Treatment    <Continuous>  DAPTOmycin IVPB 800 milliGRAM(s) IV Intermittent every 48 hours  dextrose 5%. 1000 milliLiter(s) IV Continuous <Continuous>  dextrose 50% Injectable 12.5 Gram(s) IV Push once  dextrose 50% Injectable 25 Gram(s) IV Push once  dextrose 50% Injectable 25 Gram(s) IV Push once  fentaNYL   Infusion. 0.5 MICROgram(s)/kG/Hr IV Continuous <Continuous>  heparin  Infusion.  Unit(s)/Hr IV Continuous <Continuous>  insulin lispro (HumaLOG) corrective regimen sliding scale   SubCutaneous every 6 hours  lidocaine   Patch 1 Patch Transdermal daily  norepinephrine Infusion 0.5 MICROgram(s)/kG/Min IV Continuous <Continuous>  pantoprazole  Injectable 40 milliGRAM(s) IV Push daily  phenylephrine    Infusion 2.4 MICROgram(s)/kG/Min IV Continuous <Continuous>  polyethylene glycol 3350 17 Gram(s) Oral daily  PrismaSATE Dialysate BGK 4 / 2.5 5000 milliLiter(s) CRRT <Continuous>  PrismaSOL Filtration BGK 0 / 2.5 5000 milliLiter(s) CRRT <Continuous>  PrismaSOL Filtration BGK 4 / 2.5 5000 milliLiter(s) CRRT <Continuous>  propofol Infusion 10 MICROgram(s)/kG/Min IV Continuous <Continuous>  senna 2 Tablet(s) Oral at bedtime  sodium chloride 3%  Inhalation 3 milliLiter(s) Inhalation every 12 hours  vasopressin Infusion 0.02 Unit(s)/Min IV Continuous <Continuous>      PRN Meds:  acetaminophen    Suspension .. 650 milliGRAM(s) Oral every 6 hours PRN  bisacodyl Suppository 10 milliGRAM(s) Rectal daily PRN  dextrose 40% Gel 15 Gram(s) Oral once PRN  glucagon  Injectable 1 milliGRAM(s) IntraMuscular once PRN  petrolatum Ophthalmic Ointment 1 Application(s) Both EYES every 4 hours PRN  sodium chloride 0.9% lock flush 10 milliLiter(s) IV Push every 1 hour PRN  sodium chloride 0.9% lock flush 10 milliLiter(s) IV Push every 1 hour PRN      LABS:                        12.2   4.59  )-----------( 144      ( 11 Jan 2020 13:51 )             39.4     Hgb Trend: 12.2<--, 10.1<--, 9.5<--, 7.5<--, 7.3<--  01-11    140  |  104  |  74<H>  ----------------------------<  133<H>  3.8   |  19<L>  |  2.29<H>    Ca    8.5      11 Jan 2020 13:51  Phos  4.7     01-11  Mg     2.2     01-11    TPro  6.6  /  Alb  2.4<L>  /  TBili  0.6  /  DBili  x   /  AST  23  /  ALT  19  /  AlkPhos  92  01-11    Creatinine Trend: 2.29<--, 2.80<--, 2.45<--, 3.21<--, 3.02<--, SEE NOTE<--  PT/INR - ( 10 Porter 2020 05:53 )   PT: 14.9 sec;   INR: 1.30 ratio         PTT - ( 11 Jan 2020 13:51 )  PTT:26.5 sec    Arterial Blood Gas:  01-11 @ 13:06  7.22/57/70/22/89/-5.6  ABG lactate: --  Arterial Blood Gas:  01-11 @ 11:37  7.18/66/101/24/95/-5.4  ABG lactate: --  Arterial Blood Gas:  01-11 @ 10:13  7.18/67/69/24/86/-5.0  ABG lactate: --  Arterial Blood Gas:  01-11 @ 05:21  7.38/38/91/22/97/-2.1  ABG lactate: --  Arterial Blood Gas:  01-10 @ 05:50  7.44/32/86/22/97/-1.7  ABG lactate: --  Arterial Blood Gas:  01-09 @ 20:09  7.42/31/102/20/98/-3.9  ABG lactate: --  Arterial Blood Gas:  01-09 @ 17:53  7.42/33/124/21/98/-2.7  ABG lactate: --        MICROBIOLOGY:     Culture - Bronchial (collected 08 Jan 2020 18:54)  Source: Bronch Wash Combicath  Gram Stain (08 Jan 2020 22:48):    Moderate polymorphonuclear leukocytes per low power field    No squamous epithelial cells per low power field    No organisms seen per oil power field  Final Report (10 Porter 2020 18:06):    Normal Respiratory Mirna present      RADIOLOGY:  < from: Xray Chest 1 View- PORTABLE-Urgent (01.11.20 @ 11:24) >  FINDINGS:     Cardiac silhouette not well evaluated. Right-sided IJ catheter with tip overlying SVC. Endotracheal tube tip overlying mid trachea. Enteric tube tip overlies gastroesophageal junction. Second enteric tube tip below diaphragm. Right lung opacity is unchanged from the prior study. No pleural effusion or pneumothorax.    < end of copied text >

## 2020-01-11 NOTE — PROGRESS NOTE ADULT - ASSESSMENT
78 y/o male with h/o of CAD s/p PCI 1/2017 with 10% LAD stenosis OM1, HTN, HLD, GERD, Gout, Osteoarthritis and chronic back pain, s/p multiple laminectomies and spinal fusion, lower back pain and weakness in his bilateral lower extremities that progressed to inability to ambulate. dx with MSSA bacteremia. on ancef. also received lasix as well. has had hesitancy urinating. on 12/16/ KRIS       1- KRIS   2- MSSA bacteremia   3- chf  4- cad  5- respiratory failure   6- hypotension       suspect kris in setting of  +/-  AIN + /- due to post infectious GN  now worsened again /viral infection and chf.     cont vaso and yadira  pt with worsening hypoxia hypotension now on yadira   was to start UF then cvvhdf but given his clinical scenario worsening will hold uf and start cvvhdf with 100 cc/hr fluid removal with goal to increase as bp tolerates   prognosis guarded    see hd flow sheet  vent cont  daptomycin  to cont   d/w ccu team and family at bedside

## 2020-01-11 NOTE — CONSULT NOTE ADULT - ASSESSMENT
This is an 80 y/o M with CAD, HFrEF, HTN, HLD who originally presented with back pain, found to have MSSA bacteremia and started treatment with antibiotics. Hospital course complicated by volume overload in setting of heart failure and KRIS requiring HD. Patient had episode of worsening hypoxia today associated with A-fib with RVR. CXR shows mostly R sided opacity, however POCUS reveals diffuse b/l B lines with RLL consolidation. While infectious process could be playing a role (patient is also +coronavirus), acute decompensation is more likely related to heart failure and pulmonary edema in the setting of A-fib with RVR and severely reduced LVEF.    #Acute hypoxemic respiratory failure  - continue fluid removal with CRRT  - vent settings adjusted to PEEP 12, FiO2 70%, , RR 28 -> ABG 7.22/57/70/22, oxygenation improving  - oxygenating adequately on current settings, continue current settings and maintain PaO2>60  - had fever this AM, agree with upgrading antibiotic coverage to doxycycline and aztreonam for better lung coverage  - f/u repeat blood and sputum cultures  - continue duonebs q6h  - frequent suctioning    Pulmonary will continue to follow.  ----------------------------------------  Saeid Ruelas PGY-4  Pulmonary/Critical Care Fellow  Pager: [phone] (MARYSOL) [phone] (NS)

## 2020-01-11 NOTE — PROCEDURE NOTE - NSINFORMCONSENT_GEN_A_CORE
Benefits, risks, and possible complications of procedure explained to patient/caregiver who verbalized understanding and gave written consent.
Benefits, risks, and possible complications of procedure explained to patient/caregiver who verbalized understanding and gave verbal consent.
Benefits, risks, and possible complications of procedure explained to patient/caregiver who verbalized understanding and gave verbal consent.
Benefits, risks, and possible complications of procedure explained to patient/caregiver who verbalized understanding and gave written consent.

## 2020-01-11 NOTE — PROVIDER CONTACT NOTE (CRITICAL VALUE NOTIFICATION) - BACKGROUND
Patient came in transferred to CCU for respiratory distress and urgent CVVH.
12/8/19 Weakness, Left back pain
12/8 worsen back pain
79 yr old Male with PMH of HTN came in for back pain and generalized weakness.
Pt admitted with sepsis
See H&P
back pain and lethargy, fever

## 2020-01-11 NOTE — PROGRESS NOTE ADULT - ASSESSMENT
79 y M  with PMH significant for CAD s/p PCI, GERD, gout, OA, s/p multiple laminectomies and spinal fusions, hospital course c/b MSSA bacteremia, ESRD requiring urgent HD on this admission, S/P Shiley placement and removal now transferred to CCU for urgent HD in the setting of severe hypervolemia and hypoxic respiratory failure    #Neuro  - Intubated currently  with spontaneous movements, off of precedex.   - currently not following commands    #Resp  - S/P 3 sessions of HD, with goal to keep patient net even   - Currently with ground glass opacities on xray 2/2 to redistribution from low protein state  - Non Con CT 1/5: Pulmonary edema with moderate bilateral pleural effusions.   - Currently with low Fio2 requirements, awaiting extubation once arroused  #CV    - Distributive shock: initial hemodynamics: PCWP 10, CVP 4, w/ cardiac index greater than 5  - S/P 10 mg of midodrine this AM and prior to HD now only on slight bits of vaso  - Optimize vent mgmt, repeat blood cultures negative   - JAHAIRA 12/13: EF 44%, with severe AS, no vegetations reported at this time, Repeat TTE 1/6/2020,  TTE EF 20%  - Will require LHC once patient is more stable  further evaluation, given depressed EF  -Will  c/w ASA and statin, for CAD    #GI  - PPI  - NG tube  coiled, will call ENT for replacement of NG tube     #ID   - Patient has hx  MSSA bacteremia , to be dosed with daptomycin tilll 2/5/20, given post HD q48 hours   - Combicath specimen  negative,  blood cultures negative Given no clear opacity on xray. Ground glass opacity 2/2 to redistribution from low albumin state . No additional antibiotics needed at this time.   -  ID following    #Renal     ESRD requiring Urgent HD    - HD today , to receive 2 units of PRBC   - Will continue to renally dose all meds, avoid nephrotoxic agents, monitor I& o's daily   -Renal Following     #Heme  - Hgb stable, slightly downtrending  -Transfuse for hgb < 7 , will maintain active type and screen  - Will continue to monitor with cbc QD     #Endo  - No active issues     #DVT PPx  - Heparin sub q 79 y M  with PMH significant for CAD s/p PCI, GERD, gout, OA, s/p multiple laminectomies and spinal fusions, hospital course c/b MSSA bacteremia, ESRD requiring urgent HD on this admission, S/P Shiley placement and removal now transferred to CCU for urgent HD in the setting of severe hypervolemia and hypoxic respiratory failure.     #Neuro  - Keep sedated while optimizing pulmonary status  - On prop and fent    #Resp  - Hypoxia and hypotensive: Pulmonary edema vs. infectious  - Right-sided consolidation on CXR; B/l B-lines on POCUS per pulm.   - No indication for bronchial wash per pulm  - Sputum culture sent.  - Non Con CT 1/5: Pulmonary edema with moderate bilateral pleural effusions.     #CV  - Afib s/p dig 0.5 and amio 150, c/w amio gtt  - Adjust pressors: On vaso, switched Levo to Emiliano while hypotensive, switched back to levo for inotrope, however with tachycardia. Now back on emiliano. Wean as tolerated  - Acidotic on ABG, Vent setting adjusted multiple times, currently on 28/0.46/12/70%  - Decreased cardiac contractility on bedside POCUS per pulm 1/11  - JAHAIRA 12/13: EF 44%, with severe AS, no vegetations reported at this time, Repeat TTE 1/6/2020,  TTE EF 20%  - Will require LHC once patient is more stable  further evaluation, given depressed EF  - Will c/w ASA and statin, for CAD    #GI  - PPI  - OGT placed 1/11, resume TF    #ID  - Patient has hx MSSA bacteremia, now with concern of new aspiration event  - Sputum culture (from ET suction) sent 1/11  - Will change to doxy 100 Q12h and aztreonam 1g Q8h (CRRT appropriately) per ID given poor respiratory coverage with dapto  - F/u repeat Bcx and sputum cx 1/11    #Renal     ESRD requiring Urgent HD    - s/p 500 cc net removal with HD and 2 units PRBC 1/10   - CRRT 1/11 with net 100 cc/hr removal for (+) fluid overload    #Heme  - Hgb stable s/p 2 units PRBC, no signs of bleeding  -Transfuse for hgb < 7 , will maintain active type and screen  - Will continue to monitor with cbc QD     #Endo  - ISS and FS Q6h while on TF    #DVT PPx  - Heparin sub q

## 2020-01-11 NOTE — PROGRESS NOTE ADULT - SUBJECTIVE AND OBJECTIVE BOX
====================  CCU MIDNIGHT ROUNDS  ====================    MAHESH CANELA  [id number]  Patient is a 79y old  Male who presents with a chief complaint of Back pain (11 Jan 2020 17:45)      ====================  SUMMARY: 79 y M  with PMH significant for CAD s/p PCI, GERD, gout, OA, s/p multiple laminectomies and spinal fusions, hospital course c/b MSSA bacteremia, ESRD requiring urgent HD on this admission, S/P Shiley placement and removal now transferred to CCU for urgent HD in the setting of severe hypervolemia and hypoxic respiratory failure c/b new onset afib  ====================    ====================  NEW EVENTS: Hypotensive requiring pressure support ( on levo and yadira) and hypoxia requiring increase 02 demand and sedation while intubated, Started CVVH, new onset afib (on amio drip). Start [id number] Q8 and Doxy 100 Q12 per ID recs due to worsening CXR. Repeat Combicath sputum culture per ID recs. LIJ Triple lumen insertion   ====================    ====================  VITALS (Last 12 hrs):  ====================    T(C): 36.7 (01-11-20 @ 16:00), Max: 36.7 (01-11-20 @ 16:00)  T(F): 98 (01-11-20 @ 16:00), Max: 98 (01-11-20 @ 16:00)  HR: 70 (01-11-20 @ 23:15) (70 - 116)  BP: --  BP(mean): --  ABP: 96/52 (01-11-20 @ 23:15) (62/36 - 152/68)  ABP(mean): 66 (01-11-20 @ 23:15) (44 - 94)  RR: 21 (01-11-20 @ 23:15) (0 - 32)  SpO2: 92% (01-11-20 @ 23:15) (92% - 100%)  Wt(kg): --  CVP(mm Hg): --  CVP(cm H2O): --  CO: --  CI: --  PA: --  PA(mean): --  PCWP: --  SVR: --  PVR: --    I&O's Summary    10 Porter 2020 07:01  -  11 Jan 2020 07:00  --------------------------------------------------------  IN: 3810.1 mL / OUT: 2200 mL / NET: 1610.1 mL    11 Jan 2020 07:01  -  11 Jan 2020 23:37  --------------------------------------------------------  IN: 3747.5 mL / OUT: 3540 mL / NET: 207.5 mL      Mode: AC/ CMV (Assist Control/ Continuous Mandatory Ventilation)  RR (machine): 28  TV (machine): 460  FiO2: 60  PEEP: 12  ITime: 1  MAP: 19  PIP: 36      ====================  NEW LABS:  ====================                          12.2   4.59  )-----------( 144      ( 11 Jan 2020 13:51 )             39.4     01-11    138  |  102  |  61<H>  ----------------------------<  125<H>  3.9   |  17<L>  |  2.01<H>    Ca    8.7      11 Jan 2020 17:54  Phos  4.7     01-11  Mg     2.2     01-11    TPro  6.9  /  Alb  2.4<L>  /  TBili  0.6  /  DBili  x   /  AST  36  /  ALT  22  /  AlkPhos  95  01-11    PT/INR - ( 11 Jan 2020 20:15 )   PT: 13.9 sec;   INR: 1.21 ratio         PTT - ( 11 Jan 2020 20:15 )  PTT:28.2 sec      ABG - ( 11 Jan 2020 21:53 )  pH, Arterial: 7.18  pH, Blood: x     /  pCO2: 53    /  pO2: 82    / HCO3: 19    / Base Excess: -9.1  /  SaO2: 93          ====================  PLAN:  ====================  - Continue to monitor BP and respiratory status  -C/w CVVH  - F/u with cultures   - monitor ABGs and labs

## 2020-01-12 VITALS — HEART RATE: 72 BPM | RESPIRATION RATE: 16 BRPM

## 2020-01-12 LAB
ALBUMIN SERPL ELPH-MCNC: 1.9 G/DL — LOW (ref 3.3–5)
ALBUMIN SERPL ELPH-MCNC: 2.1 G/DL — LOW (ref 3.3–5)
ALP SERPL-CCNC: 79 U/L — SIGNIFICANT CHANGE UP (ref 40–120)
ALP SERPL-CCNC: 84 U/L — SIGNIFICANT CHANGE UP (ref 40–120)
ALT FLD-CCNC: 28 U/L — SIGNIFICANT CHANGE UP (ref 10–45)
ALT FLD-CCNC: 38 U/L — SIGNIFICANT CHANGE UP (ref 10–45)
ANION GAP SERPL CALC-SCNC: 18 MMOL/L — HIGH (ref 5–17)
ANION GAP SERPL CALC-SCNC: 18 MMOL/L — HIGH (ref 5–17)
APTT BLD: 30 SEC — SIGNIFICANT CHANGE UP (ref 27.5–36.3)
APTT BLD: >200 SEC — SIGNIFICANT CHANGE UP (ref 27.5–36.3)
AST SERPL-CCNC: 60 U/L — HIGH (ref 10–40)
AST SERPL-CCNC: 85 U/L — HIGH (ref 10–40)
BASE EXCESS BLDV CALC-SCNC: -7.1 MMOL/L — LOW (ref -2–2)
BASE EXCESS BLDV CALC-SCNC: -9.1 MMOL/L — LOW (ref -2–2)
BASE EXCESS BLDV CALC-SCNC: -9.7 MMOL/L — LOW (ref -2–2)
BILIRUB SERPL-MCNC: 0.6 MG/DL — SIGNIFICANT CHANGE UP (ref 0.2–1.2)
BILIRUB SERPL-MCNC: 0.7 MG/DL — SIGNIFICANT CHANGE UP (ref 0.2–1.2)
BUN SERPL-MCNC: 36 MG/DL — HIGH (ref 7–23)
BUN SERPL-MCNC: 48 MG/DL — HIGH (ref 7–23)
CA-I SERPL-SCNC: 0.98 MMOL/L — LOW (ref 1.12–1.3)
CA-I SERPL-SCNC: 1 MMOL/L — LOW (ref 1.12–1.3)
CA-I SERPL-SCNC: 1.06 MMOL/L — LOW (ref 1.12–1.3)
CALCIUM SERPL-MCNC: 7.5 MG/DL — LOW (ref 8.4–10.5)
CALCIUM SERPL-MCNC: 7.9 MG/DL — LOW (ref 8.4–10.5)
CHLORIDE BLDV-SCNC: 110 MMOL/L — HIGH (ref 96–108)
CHLORIDE BLDV-SCNC: 110 MMOL/L — HIGH (ref 96–108)
CHLORIDE BLDV-SCNC: 112 MMOL/L — HIGH (ref 96–108)
CHLORIDE SERPL-SCNC: 101 MMOL/L — SIGNIFICANT CHANGE UP (ref 96–108)
CHLORIDE SERPL-SCNC: 106 MMOL/L — SIGNIFICANT CHANGE UP (ref 96–108)
CO2 BLDV-SCNC: 20 MMOL/L — LOW (ref 22–30)
CO2 BLDV-SCNC: 21 MMOL/L — LOW (ref 22–30)
CO2 BLDV-SCNC: 23 MMOL/L — SIGNIFICANT CHANGE UP (ref 22–30)
CO2 SERPL-SCNC: 15 MMOL/L — LOW (ref 22–31)
CO2 SERPL-SCNC: 18 MMOL/L — LOW (ref 22–31)
CREAT SERPL-MCNC: 1.06 MG/DL — SIGNIFICANT CHANGE UP (ref 0.5–1.3)
CREAT SERPL-MCNC: 1.46 MG/DL — HIGH (ref 0.5–1.3)
GAS PNL BLDA: SIGNIFICANT CHANGE UP
GAS PNL BLDV: 134 MMOL/L — LOW (ref 135–145)
GAS PNL BLDV: 135 MMOL/L — SIGNIFICANT CHANGE UP (ref 135–145)
GAS PNL BLDV: 137 MMOL/L — SIGNIFICANT CHANGE UP (ref 135–145)
GAS PNL BLDV: SIGNIFICANT CHANGE UP
GLUCOSE BLDC GLUCOMTR-MCNC: 187 MG/DL — HIGH (ref 70–99)
GLUCOSE BLDV-MCNC: 187 MG/DL — HIGH (ref 70–99)
GLUCOSE BLDV-MCNC: 199 MG/DL — HIGH (ref 70–99)
GLUCOSE BLDV-MCNC: 243 MG/DL — HIGH (ref 70–99)
GLUCOSE SERPL-MCNC: 158 MG/DL — HIGH (ref 70–99)
GLUCOSE SERPL-MCNC: 195 MG/DL — HIGH (ref 70–99)
GRAM STN FLD: SIGNIFICANT CHANGE UP
HCO3 BLDV-SCNC: 18 MMOL/L — LOW (ref 21–29)
HCO3 BLDV-SCNC: 19 MMOL/L — LOW (ref 21–29)
HCO3 BLDV-SCNC: 21 MMOL/L — SIGNIFICANT CHANGE UP (ref 21–29)
HCT VFR BLD CALC: 37.9 % — LOW (ref 39–50)
HCT VFR BLD CALC: 38 % — LOW (ref 39–50)
HCT VFR BLDA CALC: 32 % — LOW (ref 39–50)
HCT VFR BLDA CALC: 33 % — LOW (ref 39–50)
HCT VFR BLDA CALC: 36 % — LOW (ref 39–50)
HGB BLD CALC-MCNC: 10.4 G/DL — LOW (ref 13–17)
HGB BLD CALC-MCNC: 10.8 G/DL — LOW (ref 13–17)
HGB BLD CALC-MCNC: 11.8 G/DL — LOW (ref 13–17)
HGB BLD-MCNC: 11.3 G/DL — LOW (ref 13–17)
HGB BLD-MCNC: 11.9 G/DL — LOW (ref 13–17)
HOROWITZ INDEX BLDV+IHG-RTO: 70 — SIGNIFICANT CHANGE UP
INR BLD: SIGNIFICANT CHANGE UP RATIO (ref 0.88–1.16)
LACTATE BLDV-MCNC: 5.7 MMOL/L — CRITICAL HIGH (ref 0.7–2)
LACTATE BLDV-MCNC: 6.1 MMOL/L — CRITICAL HIGH (ref 0.7–2)
LACTATE BLDV-MCNC: 6.8 MMOL/L — CRITICAL HIGH (ref 0.7–2)
MAGNESIUM SERPL-MCNC: 2.1 MG/DL — SIGNIFICANT CHANGE UP (ref 1.6–2.6)
MAGNESIUM SERPL-MCNC: 2.1 MG/DL — SIGNIFICANT CHANGE UP (ref 1.6–2.6)
MCHC RBC-ENTMCNC: 28.3 PG — SIGNIFICANT CHANGE UP (ref 27–34)
MCHC RBC-ENTMCNC: 29 PG — SIGNIFICANT CHANGE UP (ref 27–34)
MCHC RBC-ENTMCNC: 29.8 GM/DL — LOW (ref 32–36)
MCHC RBC-ENTMCNC: 31.3 GM/DL — LOW (ref 32–36)
MCV RBC AUTO: 92.7 FL — SIGNIFICANT CHANGE UP (ref 80–100)
MCV RBC AUTO: 94.8 FL — SIGNIFICANT CHANGE UP (ref 80–100)
NRBC # BLD: 2 /100 WBCS — HIGH (ref 0–0)
NRBC # BLD: 3 /100 WBCS — HIGH (ref 0–0)
OTHER CELLS CSF MANUAL: 16 ML/DL — LOW (ref 18–22)
OTHER CELLS CSF MANUAL: 8 ML/DL — LOW (ref 18–22)
OTHER CELLS CSF MANUAL: 8 ML/DL — LOW (ref 18–22)
PCO2 BLDV: 49 MMHG — SIGNIFICANT CHANGE UP (ref 35–50)
PCO2 BLDV: 58 MMHG — HIGH (ref 35–50)
PCO2 BLDV: 61 MMHG — HIGH (ref 35–50)
PH BLDV: 7.14 — CRITICAL LOW (ref 7.35–7.45)
PH BLDV: 7.17 — CRITICAL LOW (ref 7.35–7.45)
PH BLDV: 7.2 — LOW (ref 7.35–7.45)
PHOSPHATE SERPL-MCNC: 3.6 MG/DL — SIGNIFICANT CHANGE UP (ref 2.5–4.5)
PHOSPHATE SERPL-MCNC: 4 MG/DL — SIGNIFICANT CHANGE UP (ref 2.5–4.5)
PLATELET # BLD AUTO: 58 K/UL — LOW (ref 150–400)
PLATELET # BLD AUTO: 71 K/UL — LOW (ref 150–400)
PO2 BLDV: 100 MMHG — HIGH (ref 25–45)
PO2 BLDV: 35 MMHG — SIGNIFICANT CHANGE UP (ref 25–45)
PO2 BLDV: 38 MMHG — SIGNIFICANT CHANGE UP (ref 25–45)
POTASSIUM BLDV-SCNC: 3.1 MMOL/L — LOW (ref 3.5–5.3)
POTASSIUM BLDV-SCNC: 3.6 MMOL/L — SIGNIFICANT CHANGE UP (ref 3.5–5.3)
POTASSIUM BLDV-SCNC: 3.6 MMOL/L — SIGNIFICANT CHANGE UP (ref 3.5–5.3)
POTASSIUM SERPL-MCNC: 3.3 MMOL/L — LOW (ref 3.5–5.3)
POTASSIUM SERPL-MCNC: 4 MMOL/L — SIGNIFICANT CHANGE UP (ref 3.5–5.3)
POTASSIUM SERPL-SCNC: 3.3 MMOL/L — LOW (ref 3.5–5.3)
POTASSIUM SERPL-SCNC: 4 MMOL/L — SIGNIFICANT CHANGE UP (ref 3.5–5.3)
PROT SERPL-MCNC: 5.9 G/DL — LOW (ref 6–8.3)
PROT SERPL-MCNC: 6.2 G/DL — SIGNIFICANT CHANGE UP (ref 6–8.3)
PROTHROM AB SERPL-ACNC: 137.2 SEC — HIGH (ref 10–12.9)
RBC # BLD: 4 M/UL — LOW (ref 4.2–5.8)
RBC # BLD: 4.1 M/UL — LOW (ref 4.2–5.8)
RBC # FLD: 16.1 % — HIGH (ref 10.3–14.5)
RBC # FLD: 16.1 % — HIGH (ref 10.3–14.5)
SAO2 % BLDV: 56 % — LOW (ref 67–88)
SAO2 % BLDV: 59 % — LOW (ref 67–88)
SAO2 % BLDV: 96 % — HIGH (ref 67–88)
SODIUM SERPL-SCNC: 137 MMOL/L — SIGNIFICANT CHANGE UP (ref 135–145)
SODIUM SERPL-SCNC: 139 MMOL/L — SIGNIFICANT CHANGE UP (ref 135–145)
SPECIMEN SOURCE: SIGNIFICANT CHANGE UP
WBC # BLD: 2.24 K/UL — LOW (ref 3.8–10.5)
WBC # BLD: 4.07 K/UL — SIGNIFICANT CHANGE UP (ref 3.8–10.5)
WBC # FLD AUTO: 2.24 K/UL — LOW (ref 3.8–10.5)
WBC # FLD AUTO: 4.07 K/UL — SIGNIFICANT CHANGE UP (ref 3.8–10.5)

## 2020-01-12 PROCEDURE — 94660 CPAP INITIATION&MGMT: CPT

## 2020-01-12 PROCEDURE — 86901 BLOOD TYPING SEROLOGIC RH(D): CPT

## 2020-01-12 PROCEDURE — 87631 RESP VIRUS 3-5 TARGETS: CPT

## 2020-01-12 PROCEDURE — 82330 ASSAY OF CALCIUM: CPT

## 2020-01-12 PROCEDURE — 77001 FLUOROGUIDE FOR VEIN DEVICE: CPT

## 2020-01-12 PROCEDURE — 84156 ASSAY OF PROTEIN URINE: CPT

## 2020-01-12 PROCEDURE — 84100 ASSAY OF PHOSPHORUS: CPT

## 2020-01-12 PROCEDURE — 87449 NOS EACH ORGANISM AG IA: CPT

## 2020-01-12 PROCEDURE — 86706 HEP B SURFACE ANTIBODY: CPT

## 2020-01-12 PROCEDURE — 82565 ASSAY OF CREATININE: CPT

## 2020-01-12 PROCEDURE — 86140 C-REACTIVE PROTEIN: CPT

## 2020-01-12 PROCEDURE — C1752: CPT

## 2020-01-12 PROCEDURE — 87070 CULTURE OTHR SPECIMN AEROBIC: CPT

## 2020-01-12 PROCEDURE — 87205 SMEAR GRAM STAIN: CPT

## 2020-01-12 PROCEDURE — 94002 VENT MGMT INPAT INIT DAY: CPT

## 2020-01-12 PROCEDURE — 99238 HOSP IP/OBS DSCHRG MGMT 30/<: CPT

## 2020-01-12 PROCEDURE — 83880 ASSAY OF NATRIURETIC PEPTIDE: CPT

## 2020-01-12 PROCEDURE — 76376 3D RENDER W/INTRP POSTPROCES: CPT

## 2020-01-12 PROCEDURE — 87186 SC STD MICRODIL/AGAR DIL: CPT

## 2020-01-12 PROCEDURE — 72158 MRI LUMBAR SPINE W/O & W/DYE: CPT

## 2020-01-12 PROCEDURE — A9556: CPT

## 2020-01-12 PROCEDURE — C1751: CPT

## 2020-01-12 PROCEDURE — 94003 VENT MGMT INPAT SUBQ DAY: CPT

## 2020-01-12 PROCEDURE — 82435 ASSAY OF BLOOD CHLORIDE: CPT

## 2020-01-12 PROCEDURE — 71250 CT THORAX DX C-: CPT

## 2020-01-12 PROCEDURE — 85027 COMPLETE CBC AUTOMATED: CPT

## 2020-01-12 PROCEDURE — 84132 ASSAY OF SERUM POTASSIUM: CPT

## 2020-01-12 PROCEDURE — 82436 ASSAY OF URINE CHLORIDE: CPT

## 2020-01-12 PROCEDURE — 83930 ASSAY OF BLOOD OSMOLALITY: CPT

## 2020-01-12 PROCEDURE — 74230 X-RAY XM SWLNG FUNCJ C+: CPT

## 2020-01-12 PROCEDURE — 87581 M.PNEUMON DNA AMP PROBE: CPT

## 2020-01-12 PROCEDURE — 36573 INSJ PICC RS&I 5 YR+: CPT

## 2020-01-12 PROCEDURE — 84550 ASSAY OF BLOOD/URIC ACID: CPT

## 2020-01-12 PROCEDURE — 87150 DNA/RNA AMPLIFIED PROBE: CPT

## 2020-01-12 PROCEDURE — 81001 URINALYSIS AUTO W/SCOPE: CPT

## 2020-01-12 PROCEDURE — 36556 INSERT NON-TUNNEL CV CATH: CPT

## 2020-01-12 PROCEDURE — 82550 ASSAY OF CK (CPK): CPT

## 2020-01-12 PROCEDURE — 71045 X-RAY EXAM CHEST 1 VIEW: CPT | Mod: 26

## 2020-01-12 PROCEDURE — 85652 RBC SED RATE AUTOMATED: CPT

## 2020-01-12 PROCEDURE — 96374 THER/PROPH/DIAG INJ IV PUSH: CPT

## 2020-01-12 PROCEDURE — 78802 RP LOCLZJ TUM WHBDY 1 D IMG: CPT

## 2020-01-12 PROCEDURE — 83735 ASSAY OF MAGNESIUM: CPT

## 2020-01-12 PROCEDURE — 99285 EMERGENCY DEPT VISIT HI MDM: CPT | Mod: 25

## 2020-01-12 PROCEDURE — 82570 ASSAY OF URINE CREATININE: CPT

## 2020-01-12 PROCEDURE — 36600 WITHDRAWAL OF ARTERIAL BLOOD: CPT

## 2020-01-12 PROCEDURE — 94799 UNLISTED PULMONARY SVC/PX: CPT

## 2020-01-12 PROCEDURE — 82533 TOTAL CORTISOL: CPT

## 2020-01-12 PROCEDURE — 93010 ELECTROCARDIOGRAM REPORT: CPT

## 2020-01-12 PROCEDURE — 86923 COMPATIBILITY TEST ELECTRIC: CPT

## 2020-01-12 PROCEDURE — 99232 SBSQ HOSP IP/OBS MODERATE 35: CPT

## 2020-01-12 PROCEDURE — P9016: CPT

## 2020-01-12 PROCEDURE — 86038 ANTINUCLEAR ANTIBODIES: CPT

## 2020-01-12 PROCEDURE — 99261: CPT

## 2020-01-12 PROCEDURE — 70450 CT HEAD/BRAIN W/O DYE: CPT

## 2020-01-12 PROCEDURE — 97530 THERAPEUTIC ACTIVITIES: CPT

## 2020-01-12 PROCEDURE — C8929: CPT

## 2020-01-12 PROCEDURE — 87340 HEPATITIS B SURFACE AG IA: CPT

## 2020-01-12 PROCEDURE — 85610 PROTHROMBIN TIME: CPT

## 2020-01-12 PROCEDURE — 71045 X-RAY EXAM CHEST 1 VIEW: CPT

## 2020-01-12 PROCEDURE — 93320 DOPPLER ECHO COMPLETE: CPT

## 2020-01-12 PROCEDURE — 96375 TX/PRO/DX INJ NEW DRUG ADDON: CPT

## 2020-01-12 PROCEDURE — 83605 ASSAY OF LACTIC ACID: CPT

## 2020-01-12 PROCEDURE — 87633 RESP VIRUS 12-25 TARGETS: CPT

## 2020-01-12 PROCEDURE — 93005 ELECTROCARDIOGRAM TRACING: CPT

## 2020-01-12 PROCEDURE — 87086 URINE CULTURE/COLONY COUNT: CPT

## 2020-01-12 PROCEDURE — 86850 RBC ANTIBODY SCREEN: CPT

## 2020-01-12 PROCEDURE — 86704 HEP B CORE ANTIBODY TOTAL: CPT

## 2020-01-12 PROCEDURE — 74177 CT ABD & PELVIS W/CONTRAST: CPT

## 2020-01-12 PROCEDURE — C1894: CPT

## 2020-01-12 PROCEDURE — A9585: CPT

## 2020-01-12 PROCEDURE — 72157 MRI CHEST SPINE W/O & W/DYE: CPT

## 2020-01-12 PROCEDURE — 36569 INSJ PICC 5 YR+ W/O IMAGING: CPT

## 2020-01-12 PROCEDURE — 99291 CRITICAL CARE FIRST HOUR: CPT

## 2020-01-12 PROCEDURE — 92611 MOTION FLUOROSCOPY/SWALLOW: CPT

## 2020-01-12 PROCEDURE — 86160 COMPLEMENT ANTIGEN: CPT

## 2020-01-12 PROCEDURE — 87040 BLOOD CULTURE FOR BACTERIA: CPT

## 2020-01-12 PROCEDURE — 76857 US EXAM PELVIC LIMITED: CPT

## 2020-01-12 PROCEDURE — 76937 US GUIDE VASCULAR ACCESS: CPT

## 2020-01-12 PROCEDURE — 78999 UNLISTED MISC PX DX NUC MED: CPT

## 2020-01-12 PROCEDURE — 86803 HEPATITIS C AB TEST: CPT

## 2020-01-12 PROCEDURE — 97110 THERAPEUTIC EXERCISES: CPT

## 2020-01-12 PROCEDURE — 87486 CHLMYD PNEUM DNA AMP PROBE: CPT

## 2020-01-12 PROCEDURE — 87798 DETECT AGENT NOS DNA AMP: CPT

## 2020-01-12 PROCEDURE — 97161 PT EVAL LOW COMPLEX 20 MIN: CPT

## 2020-01-12 PROCEDURE — 76770 US EXAM ABDO BACK WALL COMP: CPT

## 2020-01-12 PROCEDURE — 86036 ANCA SCREEN EACH ANTIBODY: CPT

## 2020-01-12 PROCEDURE — 82803 BLOOD GASES ANY COMBINATION: CPT

## 2020-01-12 PROCEDURE — 83935 ASSAY OF URINE OSMOLALITY: CPT

## 2020-01-12 PROCEDURE — 85014 HEMATOCRIT: CPT

## 2020-01-12 PROCEDURE — 36430 TRANSFUSION BLD/BLD COMPNT: CPT

## 2020-01-12 PROCEDURE — 94640 AIRWAY INHALATION TREATMENT: CPT

## 2020-01-12 PROCEDURE — 84295 ASSAY OF SERUM SODIUM: CPT

## 2020-01-12 PROCEDURE — 92610 EVALUATE SWALLOWING FUNCTION: CPT

## 2020-01-12 PROCEDURE — 82962 GLUCOSE BLOOD TEST: CPT

## 2020-01-12 PROCEDURE — 84300 ASSAY OF URINE SODIUM: CPT

## 2020-01-12 PROCEDURE — 83036 HEMOGLOBIN GLYCOSYLATED A1C: CPT

## 2020-01-12 PROCEDURE — 93325 DOPPLER ECHO COLOR FLOW MAPG: CPT

## 2020-01-12 PROCEDURE — C1769: CPT

## 2020-01-12 PROCEDURE — 85730 THROMBOPLASTIN TIME PARTIAL: CPT

## 2020-01-12 PROCEDURE — 93312 ECHO TRANSESOPHAGEAL: CPT

## 2020-01-12 PROCEDURE — 71260 CT THORAX DX C+: CPT

## 2020-01-12 PROCEDURE — 86900 BLOOD TYPING SEROLOGIC ABO: CPT

## 2020-01-12 PROCEDURE — 82947 ASSAY GLUCOSE BLOOD QUANT: CPT

## 2020-01-12 PROCEDURE — 80053 COMPREHEN METABOLIC PANEL: CPT

## 2020-01-12 PROCEDURE — 83615 LACTATE (LD) (LDH) ENZYME: CPT

## 2020-01-12 PROCEDURE — 80048 BASIC METABOLIC PNL TOTAL CA: CPT

## 2020-01-12 PROCEDURE — 78803 RP LOCLZJ TUM SPECT 1 AREA: CPT

## 2020-01-12 RX ORDER — SODIUM BICARBONATE 1 MEQ/ML
50 SYRINGE (ML) INTRAVENOUS ONCE
Refills: 0 | Status: COMPLETED | OUTPATIENT
Start: 2020-01-12 | End: 2020-01-12

## 2020-01-12 RX ORDER — SODIUM CHLORIDE 9 MG/ML
500 INJECTION INTRAMUSCULAR; INTRAVENOUS; SUBCUTANEOUS ONCE
Refills: 0 | Status: COMPLETED | OUTPATIENT
Start: 2020-01-12 | End: 2020-01-12

## 2020-01-12 RX ORDER — MEROPENEM 1 G/30ML
1000 INJECTION INTRAVENOUS EVERY 12 HOURS
Refills: 0 | Status: DISCONTINUED | OUTPATIENT
Start: 2020-01-12 | End: 2020-01-12

## 2020-01-12 RX ORDER — MICAFUNGIN SODIUM 100 MG/1
100 INJECTION, POWDER, LYOPHILIZED, FOR SOLUTION INTRAVENOUS ONCE
Refills: 0 | Status: DISCONTINUED | OUTPATIENT
Start: 2020-01-12 | End: 2020-01-12

## 2020-01-12 RX ORDER — NOREPINEPHRINE BITARTRATE/D5W 8 MG/250ML
5 PLASTIC BAG, INJECTION (ML) INTRAVENOUS
Qty: 32 | Refills: 0 | Status: DISCONTINUED | OUTPATIENT
Start: 2020-01-12 | End: 2020-01-12

## 2020-01-12 RX ORDER — PANTOPRAZOLE SODIUM 20 MG/1
40 TABLET, DELAYED RELEASE ORAL EVERY 12 HOURS
Refills: 0 | Status: DISCONTINUED | OUTPATIENT
Start: 2020-01-12 | End: 2020-01-12

## 2020-01-12 RX ORDER — POTASSIUM CHLORIDE 20 MEQ
20 PACKET (EA) ORAL ONCE
Refills: 0 | Status: COMPLETED | OUTPATIENT
Start: 2020-01-12 | End: 2020-01-12

## 2020-01-12 RX ORDER — DOBUTAMINE HCL 250MG/20ML
5 VIAL (ML) INTRAVENOUS
Qty: 500 | Refills: 0 | Status: DISCONTINUED | OUTPATIENT
Start: 2020-01-12 | End: 2020-01-12

## 2020-01-12 RX ORDER — EPINEPHRINE 0.3 MG/.3ML
0.01 INJECTION INTRAMUSCULAR; SUBCUTANEOUS
Qty: 8 | Refills: 0 | Status: DISCONTINUED | OUTPATIENT
Start: 2020-01-12 | End: 2020-01-12

## 2020-01-12 RX ADMIN — Medication 3 MILLILITER(S): at 12:31

## 2020-01-12 RX ADMIN — Medication 81 MILLIGRAM(S): at 12:07

## 2020-01-12 RX ADMIN — Medication 50 MILLIEQUIVALENT(S): at 06:42

## 2020-01-12 RX ADMIN — Medication 110 MILLIGRAM(S): at 05:19

## 2020-01-12 RX ADMIN — Medication 50 MEQ/KG/HR: at 00:14

## 2020-01-12 RX ADMIN — MEROPENEM 100 MILLIGRAM(S): 1 INJECTION INTRAVENOUS at 12:46

## 2020-01-12 RX ADMIN — Medication 2: at 01:24

## 2020-01-12 RX ADMIN — CHLORHEXIDINE GLUCONATE 1 APPLICATION(S): 213 SOLUTION TOPICAL at 05:19

## 2020-01-12 RX ADMIN — SODIUM CHLORIDE 500 MILLILITER(S): 9 INJECTION INTRAMUSCULAR; INTRAVENOUS; SUBCUTANEOUS at 03:53

## 2020-01-12 RX ADMIN — PHENYLEPHRINE HYDROCHLORIDE 46.94 MICROGRAM(S)/KG/MIN: 10 INJECTION INTRAVENOUS at 12:11

## 2020-01-12 RX ADMIN — Medication 100 MILLIGRAM(S): at 00:14

## 2020-01-12 RX ADMIN — PANTOPRAZOLE SODIUM 40 MILLIGRAM(S): 20 TABLET, DELAYED RELEASE ORAL at 12:09

## 2020-01-12 RX ADMIN — Medication 50 MILLIEQUIVALENT(S): at 02:51

## 2020-01-12 RX ADMIN — EPINEPHRINE 1.96 MICROGRAM(S)/KG/MIN: 0.3 INJECTION INTRAMUSCULAR; SUBCUTANEOUS at 13:30

## 2020-01-12 RX ADMIN — Medication 15.64 MICROGRAM(S)/KG/MIN: at 06:50

## 2020-01-12 RX ADMIN — Medication 2: at 12:07

## 2020-01-12 RX ADMIN — HEPARIN SODIUM 3000 UNIT(S)/HR: 5000 INJECTION INTRAVENOUS; SUBCUTANEOUS at 04:40

## 2020-01-12 RX ADMIN — CHLORHEXIDINE GLUCONATE 15 MILLILITER(S): 213 SOLUTION TOPICAL at 05:19

## 2020-01-12 RX ADMIN — SODIUM CHLORIDE 500 MILLILITER(S): 9 INJECTION INTRAMUSCULAR; INTRAVENOUS; SUBCUTANEOUS at 02:56

## 2020-01-12 RX ADMIN — Medication 2: at 06:51

## 2020-01-12 RX ADMIN — Medication 100 MILLIEQUIVALENT(S): at 02:55

## 2020-01-12 RX ADMIN — Medication 100 MILLIGRAM(S): at 05:25

## 2020-01-12 RX ADMIN — Medication 15.64 MICROGRAM(S)/KG/MIN: at 12:11

## 2020-01-12 RX ADMIN — Medication 50 MILLIGRAM(S): at 05:24

## 2020-01-12 RX ADMIN — Medication 3 MILLILITER(S): at 06:41

## 2020-01-12 RX ADMIN — SODIUM CHLORIDE 500 MILLILITER(S): 9 INJECTION INTRAMUSCULAR; INTRAVENOUS; SUBCUTANEOUS at 00:30

## 2020-01-12 RX ADMIN — VASOPRESSIN 1.2 UNIT(S)/MIN: 20 INJECTION INTRAVENOUS at 12:12

## 2020-01-12 RX ADMIN — Medication 75 MEQ/KG/HR: at 12:10

## 2020-01-12 RX ADMIN — Medication 50 MILLIEQUIVALENT(S): at 12:47

## 2020-01-12 RX ADMIN — Medication 1 DROP(S): at 12:08

## 2020-01-12 RX ADMIN — SODIUM CHLORIDE 3 MILLILITER(S): 9 INJECTION INTRAMUSCULAR; INTRAVENOUS; SUBCUTANEOUS at 06:41

## 2020-01-12 RX ADMIN — POLYETHYLENE GLYCOL 3350 17 GRAM(S): 17 POWDER, FOR SOLUTION ORAL at 12:06

## 2020-01-12 RX ADMIN — Medication 488.91 MICROGRAM(S)/KG/MIN: at 12:11

## 2020-01-12 NOTE — CHART NOTE - NSCHARTNOTEFT_GEN_A_CORE
Called to see the patient for unresponsiveness.     On the exam.  Did not respond to verbal or physical stimuli.  Absent heart and breath sounds.  Absent peripheral pulses.  Pupils are fixed and dilated.    Patient pronounced death at 2:20. Dr. Tsai notified.  Family notified  Autopsy declined.    Hyeokchan Kwon , PGY1  Pager: [phone]

## 2020-01-12 NOTE — PROGRESS NOTE ADULT - NSHPATTENDINGPLANDISCUSS_GEN_ALL_CORE
CCU Resident
CCU Resident Lilia
family
pt, family, renal, covering NP
pt, 2 sons, np
pt, dgtr, np, dr nieves
pt, dgtr, np, dr nieves
pt, np, dr nieves
pt, wife, son, np, dr nieves

## 2020-01-12 NOTE — PROGRESS NOTE ADULT - PROVIDER SPECIALTY LIST ADULT
Anesthesia
CCU
Cardiology
ENT
Gastroenterology
Infectious Disease
Internal Medicine
Intervent Radiology
Intervent Radiology
Nephrology
Pulmonology
Internal Medicine

## 2020-01-12 NOTE — PROGRESS NOTE ADULT - SUBJECTIVE AND OBJECTIVE BOX
Follow Up:  septic shock, pna, mssa bacteremia    Interval History: increase in vasopressors overnight. lactic acidosis. moderate secretions. no diarrhea.     REVIEW OF SYSTEMS  [ x ] ROS unobtainable because:  intubated  [  ] All other systems negative except as noted below    Constitutional:  [ ] fever [ ] chills  [ ] weight loss  [ ] weakness  Skin:  [ ] rash [ ] phlebitis	  Eyes: [ ] icterus [ ] pain  [ ] discharge	  ENMT: [ ] sore throat  [ ] thrush [ ] ulcers [ ] exudates  Respiratory: [ ] dyspnea [ ] hemoptysis [ ] cough [ ] sputum	  Cardiovascular:  [ ] chest pain [ ] palpitations [ ] edema	  Gastrointestinal:  [ ] nausea [ ] vomiting [ ] diarrhea [ ] constipation [ ] pain	  Genitourinary:  [ ] dysuria [ ] frequency [ ] hematuria [ ] discharge [ ] flank pain  [ ] incontinence  Musculoskeletal:  [ ] myalgias [ ] arthralgias [ ] arthritis  [ ] back pain  Neurological:  [ ] headache [ ] seizures  [ ] confusion/altered mental status    Allergies  Indocin (Unknown)  oxacillin (Unknown)  Zyvox (Unknown)        ANTIMICROBIALS:  DAPTOmycin IVPB 800 every 48 hours  doxycycline IVPB 100 every 12 hours  doxycycline IVPB    meropenem  IVPB 1000 every 12 hours  micafungin IVPB 100 once      OTHER MEDS:  MEDICATIONS  (STANDING):  acetaminophen    Suspension .. 650 every 6 hours PRN  albuterol/ipratropium for Nebulization 3 every 6 hours  aspirin  chewable 81 daily  atorvastatin 10 at bedtime  bisacodyl Suppository 10 daily PRN  dextrose 40% Gel 15 once PRN  dextrose 50% Injectable 12.5 once  dextrose 50% Injectable 25 once  dextrose 50% Injectable 25 once  DOBUTamine Infusion 5 <Continuous>  EPINEPHrine    Infusion 0.01 <Continuous>  fentaNYL   Infusion. 0.5 <Continuous>  glucagon  Injectable 1 once PRN  insulin lispro (HumaLOG) corrective regimen sliding scale  every 6 hours  norepinephrine Infusion 5 <Continuous>  pantoprazole  Injectable 40 every 12 hours  phenylephrine    Infusion 2.4 <Continuous>  polyethylene glycol 3350 17 daily  senna 2 at bedtime  sodium chloride 3%  Inhalation 3 every 12 hours  vasopressin Infusion 0.02 <Continuous>      Vital Signs Last 24 Hrs  T(C): 35.8 (12 Jan 2020 09:00), Max: 36.6 (12 Jan 2020 04:00)  T(F): 96.4 (12 Jan 2020 09:00), Max: 97.9 (12 Jan 2020 04:00)  HR: 72 (12 Jan 2020 14:00) (46 - 80)  BP: --  BP(mean): --  RR: 16 (12 Jan 2020 14:00) (0 - 37)  SpO2: 54% (12 Jan 2020 13:45) (54% - 100%)    PHYSICAL EXAMINATION:  General: Intubated and Sedated  HEENT: +ETT  Neck: Supple  Cardiac: RRR, No M/R/G  Resp: Rales at right lung base, no wheezes or rhonchi. no wheezes.   Abdomen: NBS, NT/ND, No HSM, No rigidity or guarding  MSK: No LE edema. No Calf tenderness  : +morley  Skin: No rashes or lesions. Skin is warm and dry to the touch.   Neuro: Intubated and Sedated  Psych: Unable to assess - intubated and sedated                          11.3   2.24  )-----------( 58       ( 12 Jan 2020 06:06 )             37.9       01-12    139  |  106  |  36<H>  ----------------------------<  195<H>  4.0   |  15<L>  |  1.06    Ca    7.5<L>      12 Jan 2020 06:06  Phos  3.6     01-12  Mg     2.1     01-12    TPro  5.9<L>  /  Alb  1.9<L>  /  TBili  0.7  /  DBili  x   /  AST  85<H>  /  ALT  38  /  AlkPhos  79  01-12          MICROBIOLOGY:  v  .Sputum Sputum  01-12-20 --  --    Moderate polymorphonuclear leukocytes per low power field  Rare Squamous epithelial cells per low power field  Moderate Gram positive cocci in pairs seen per oil power field  Moderate Gram Negative Rods seen per oil power field  Rare Yeast like cells seen per oil power field      .Blood Blood  01-11-20   No growth to date.  --  --      Bronch Wash Combicath  01-08-20   Normal Respiratory Mirna present  --    Moderate polymorphonuclear leukocytes per low power field  No squamous epithelial cells per low power field  No organisms seen per oil power field      .Blood Blood  01-08-20   No growth to date.  --  --      Bronch Wash Combicath  01-07-20   No growth at 48 hours  --    No polymorphonuclear cells seen per low power field  No squamous epithelial cells per low power field  No organisms seen per oil power field      .Blood Blood  12-14-19   No growth at 5 days.  --  --          Rapid RVP Result: Detected (01-08 @ 12:00)        RADIOLOGY:    <The imaging below has been reviewed and visualized me independently>    EXAM:  XR CHEST PORTABLE URGENT 1V                        EXAM:  XR CHEST PORTABLE ROUTINE 1V                        PROCEDURE DATE:  01/12/2020    Frontal chest x-ray dated January 11, 2020 at 11:06 PM is submitted and comparison is made with the prior study performed earlier on the same day. Lines and tubes are in satisfactory position. Extensive right lung airspace disease with areas of airspace disease within the left mid to lower lung are predominantly unchanged. No pneumothorax. Status post spine surgery with hardware.    Frontal chest x-ray dated January 12, 2020 at 8:56 AM demonstrate no significant interval change. Please note the tip of the enteric tube is not imaged on this x-ray.

## 2020-01-12 NOTE — PROGRESS NOTE ADULT - ATTENDING COMMENTS
Patient was seen and examined with CCU team. I agree with findings and plan. Stop fentanyl, pupils not responding, no bowel sounds, prognosis poor. Lengthy discussion with family and all questions answered.

## 2020-01-12 NOTE — PROGRESS NOTE ADULT - SUBJECTIVE AND OBJECTIVE BOX
[facility]   [phone]  DIALYSIS NOTE  Chief Complaint: ESRD/Ongoing hemodialysis requirement. seen on cvvhdf     24 hour events/subjective:    above events noted.   on multiple pressors         ALLERGIES & MEDICATIONS  --------------------------------------------------------------------------------  Allergies    Indocin (Unknown)  oxacillin (Unknown)  Zyvox (Unknown)    Intolerances      Standing Inpatient Medications  albuterol/ipratropium for Nebulization 3 milliLiter(s) Nebulizer every 6 hours  artificial  tears Solution 1 Drop(s) Both EYES daily  aspirin  chewable 81 milliGRAM(s) Oral daily  atorvastatin 10 milliGRAM(s) Oral at bedtime  chlorhexidine 0.12% Liquid 15 milliLiter(s) Oral Mucosa every 12 hours  chlorhexidine 4% Liquid 1 Application(s) Topical <User Schedule>  CRRT Treatment    <Continuous>  DAPTOmycin IVPB 800 milliGRAM(s) IV Intermittent every 48 hours  dextrose 5%. 1000 milliLiter(s) IV Continuous <Continuous>  dextrose 50% Injectable 12.5 Gram(s) IV Push once  dextrose 50% Injectable 25 Gram(s) IV Push once  dextrose 50% Injectable 25 Gram(s) IV Push once  DOBUTamine Infusion 5 MICROgram(s)/kG/Min IV Continuous <Continuous>  doxycycline IVPB 100 milliGRAM(s) IV Intermittent every 12 hours  doxycycline IVPB      EPINEPHrine    Infusion 0.01 MICROgram(s)/kG/Min IV Continuous <Continuous>  fentaNYL   Infusion. 0.5 MICROgram(s)/kG/Hr IV Continuous <Continuous>  insulin lispro (HumaLOG) corrective regimen sliding scale   SubCutaneous every 6 hours  lidocaine   Patch 1 Patch Transdermal daily  meropenem  IVPB 1000 milliGRAM(s) IV Intermittent every 12 hours  micafungin IVPB 100 milliGRAM(s) IV Intermittent once  norepinephrine Infusion 5 MICROgram(s)/kG/Min IV Continuous <Continuous>  pantoprazole  Injectable 40 milliGRAM(s) IV Push every 12 hours  phenylephrine    Infusion 2.4 MICROgram(s)/kG/Min IV Continuous <Continuous>  polyethylene glycol 3350 17 Gram(s) Oral daily  PrismaSATE Dialysate BK 0 / 3.5 5000 milliLiter(s) CRRT <Continuous>  PrismaSOL Filtration BGK 4 / 2.5 5000 milliLiter(s) CRRT <Continuous>  PrismaSOL Filtration BGK 4 / 2.5 5000 milliLiter(s) CRRT <Continuous>  senna 2 Tablet(s) Oral at bedtime  sodium bicarbonate  Infusion 0.108 mEq/kG/Hr IV Continuous <Continuous>  sodium chloride 3%  Inhalation 3 milliLiter(s) Inhalation every 12 hours  vasopressin Infusion 0.02 Unit(s)/Min IV Continuous <Continuous>    PRN Inpatient Medications  acetaminophen    Suspension .. 650 milliGRAM(s) Oral every 6 hours PRN  bisacodyl Suppository 10 milliGRAM(s) Rectal daily PRN  dextrose 40% Gel 15 Gram(s) Oral once PRN  glucagon  Injectable 1 milliGRAM(s) IntraMuscular once PRN  petrolatum Ophthalmic Ointment 1 Application(s) Both EYES every 4 hours PRN  sodium chloride 0.9% lock flush 10 milliLiter(s) IV Push every 1 hour PRN  sodium chloride 0.9% lock flush 10 milliLiter(s) IV Push every 1 hour PRN      REVIEW OF SYSTEMS  --------------------------------------------------------------------------------        VITALS/PHYSICAL EXAM  --------------------------------------------------------------------------------  T(C): 35.8 (01-12-20 @ 09:00),  HR: 66  BP: --90/54  RR: 16   SpO2: 92%  Wt(kg): --        01-11-20 @ 07:01  -  01-12-20 @ 07:00  --------------------------------------------------------  IN: 9278.6 mL / OUT: 7162 mL / NET: 2116.6 mL      --------------------------------------------------------  IN: 4633.4 mL / OUT: 0 mL / NET: 4633.4 mL      Physical Exam:  		  Gen: inutbated   	Pulm: decrease bs  + coarse bs bilateral no  wheezing  	CV: RRR, S1S2; no rub  	Abd: +BS, soft, nontender/nondistended  	: No suprapubic tenderness  	UE: Warm, no cyanosis  no clubbing,  3+ edema   	    LABS/STUDIES  --------------------------------------------------------------------------------              11.3   2.24  >-----------<  58       [01-12-20 @ 06:06]              37.9     139  |  106  |  36  ----------------------------<  195      [01-12-20 @ 06:06]  4.0   |  15  |  1.06        Ca     7.5     [01-12-20 @ 06:06]      Mg     2.1     [01-12-20 @ 06:06]      Phos  3.6     [01-12-20 @ 06:06]    TPro  5.9  /  Alb  1.9  /  TBili  0.7  /  DBili  x   /  AST  85  /  ALT  38  /  AlkPhos  79  [01-12-20 @ 06:06]

## 2020-01-12 NOTE — PROGRESS NOTE ADULT - SUBJECTIVE AND OBJECTIVE BOX
PATIENT:  MAHESH CANELA  [id number]    CHIEF COMPLAINT:  Patient is a 79y old  Male who presents with a chief complaint of Back pain (11 Jan 2020 23:37)      INTERVAL HISTORYOVERNIGHT EVENTS:      REVIEW OF SYSTEMS:    Constitutional:     [ ] negative [ ] fevers [ ] chills [ ] weight loss [ ] weight gain  HEENT:                  [ ] negative [ ] dry eyes [ ] eye irritation [ ] postnasal drip [ ] nasal congestion  CV:                         [ ] negative  [ ] chest pain [ ] orthopnea [ ] palpitations [ ] murmur  Resp:                     [ ] negative [ ] cough [ ] shortness of breath [ ] dyspnea [ ] wheezing [ ] sputum [ ] hemoptysis  GI:                          [ ] negative [ ] nausea [ ] vomiting [ ] diarrhea [ ] constipation [ ] abd pain [ ] dysphagia   :                        [ ] negative [ ] dysuria [ ] nocturia [ ] hematuria [ ] increased urinary frequency  Musculoskeletal: [ ] negative [ ] back pain [ ] myalgias [ ] arthralgias [ ] fracture  Skin:                       [ ] negative [ ] rash [ ] itch  Neurological:        [ ] negative [ ] headache [ ] dizziness [ ] syncope [ ] weakness [ ] numbness  Psychiatric:           [ ] negative [ ] anxiety [ ] depression  Endocrine:            [ ] negative [ ] diabetes [ ] thyroid problem  Heme/Lymph:      [ ] negative [ ] anemia [ ] bleeding problem  Allergic/Immune: [ ] negative [ ] itchy eyes [ ] nasal discharge [ ] hives [ ] angioedema    [ ] All other systems negative  [ ] Unable to assess ROS because ________.    MEDICATIONS:  MEDICATIONS  (STANDING):  albuterol/ipratropium for Nebulization 3 milliLiter(s) Nebulizer every 6 hours  aMIOdarone Infusion 0.5 mG/Min (16.667 mL/Hr) IV Continuous <Continuous>  artificial  tears Solution 1 Drop(s) Both EYES daily  aspirin  chewable 81 milliGRAM(s) Oral daily  atorvastatin 10 milliGRAM(s) Oral at bedtime  aztreonam  IVPB      aztreonam  IVPB 1000 milliGRAM(s) IV Intermittent every 8 hours  chlorhexidine 0.12% Liquid 15 milliLiter(s) Oral Mucosa every 12 hours  chlorhexidine 4% Liquid 1 Application(s) Topical <User Schedule>  CRRT Treatment    <Continuous>  DAPTOmycin IVPB 800 milliGRAM(s) IV Intermittent every 48 hours  dextrose 5%. 1000 milliLiter(s) (50 mL/Hr) IV Continuous <Continuous>  dextrose 50% Injectable 12.5 Gram(s) IV Push once  dextrose 50% Injectable 25 Gram(s) IV Push once  dextrose 50% Injectable 25 Gram(s) IV Push once  DOBUTamine Infusion 5 MICROgram(s)/kG/Min (15.645 mL/Hr) IV Continuous <Continuous>  doxycycline IVPB 100 milliGRAM(s) IV Intermittent every 12 hours  doxycycline IVPB      fentaNYL   Infusion. 0.5 MICROgram(s)/kG/Hr (2.607 mL/Hr) IV Continuous <Continuous>  heparin  Infusion.  Unit(s)/Hr (18 mL/Hr) IV Continuous <Continuous>  insulin lispro (HumaLOG) corrective regimen sliding scale   SubCutaneous every 6 hours  lidocaine   Patch 1 Patch Transdermal daily  metroNIDAZOLE  IVPB 500 milliGRAM(s) IV Intermittent every 8 hours  metroNIDAZOLE  IVPB      norepinephrine Infusion 5 MICROgram(s)/kG/Min (488.906 mL/Hr) IV Continuous <Continuous>  pantoprazole  Injectable 40 milliGRAM(s) IV Push daily  phenylephrine    Infusion 2.4 MICROgram(s)/kG/Min (46.935 mL/Hr) IV Continuous <Continuous>  polyethylene glycol 3350 17 Gram(s) Oral daily  PrismaSATE Dialysate BGK 4 / 2.5 5000 milliLiter(s) (1200 mL/Hr) CRRT <Continuous>  PrismaSOL Filtration BGK 0 / 2.5 5000 milliLiter(s) (700 mL/Hr) CRRT <Continuous>  PrismaSOL Filtration BGK 4 / 2.5 5000 milliLiter(s) (400 mL/Hr) CRRT <Continuous>  propofol Infusion 10 MICROgram(s)/kG/Min (6.258 mL/Hr) IV Continuous <Continuous>  senna 2 Tablet(s) Oral at bedtime  sodium bicarbonate  Infusion 0.108 mEq/kG/Hr (75 mL/Hr) IV Continuous <Continuous>  sodium chloride 3%  Inhalation 3 milliLiter(s) Inhalation every 12 hours  vasopressin Infusion 0.02 Unit(s)/Min (1.2 mL/Hr) IV Continuous <Continuous>    MEDICATIONS  (PRN):  acetaminophen    Suspension .. 650 milliGRAM(s) Oral every 6 hours PRN Temp greater or equal to 38C (100.4F)  bisacodyl Suppository 10 milliGRAM(s) Rectal daily PRN Constipation  dextrose 40% Gel 15 Gram(s) Oral once PRN Blood Glucose LESS THAN 70 milliGRAM(s)/deciliter  glucagon  Injectable 1 milliGRAM(s) IntraMuscular once PRN Glucose LESS THAN 70 milligrams/deciliter  petrolatum Ophthalmic Ointment 1 Application(s) Both EYES every 4 hours PRN dry eyes  sodium chloride 0.9% lock flush 10 milliLiter(s) IV Push every 1 hour PRN Pre/post blood products, medications, blood draw, and to maintain line patency  sodium chloride 0.9% lock flush 10 milliLiter(s) IV Push every 1 hour PRN Pre/post blood products, medications, blood draw, and to maintain line patency      ALLERGIES:  Allergies    Indocin (Unknown)  oxacillin (Unknown)  Zyvox (Unknown)    Intolerances        OBJECTIVE:  ICU Vital Signs Last 24 Hrs  T(C): 36.6 (12 Jan 2020 04:00), Max: 38.3 (11 Jan 2020 09:00)  T(F): 97.9 (12 Jan 2020 04:00), Max: 100.9 (11 Jan 2020 09:00)  HR: 50 (12 Jan 2020 06:45) (46 - 128)  BP: --  BP(mean): --  ABP: 88/46 (12 Jan 2020 06:45) (62/36 - 152/68)  ABP(mean): 60 (12 Jan 2020 06:45) (44 - 94)  RR: 0 (12 Jan 2020 06:45) (0 - 35)  SpO2: 100% (12 Jan 2020 06:45) (90% - 100%)    Mode: AC/ CMV (Assist Control/ Continuous Mandatory Ventilation)  RR (machine): 30  TV (machine): 430  FiO2: 70  PEEP: 12  ITime: 1  MAP: 19  PIP: 36    Adult Advanced Hemodynamics Last 24 Hrs  CVP(mm Hg): --  CVP(cm H2O): --  CO: --  CI: --  PA: --  PA(mean): --  PCWP: --  SVR: --  SVRI: --  PVR: --  PVRI: --  CAPILLARY BLOOD GLUCOSE      POCT Blood Glucose.: 106 mg/dL (11 Jan 2020 17:43)  POCT Blood Glucose.: 155 mg/dL (11 Jan 2020 11:16)    CAPILLARY BLOOD GLUCOSE      POCT Blood Glucose.: 106 mg/dL (11 Jan 2020 17:43)    I&O's Summary    11 Jan 2020 07:01  -  12 Jan 2020 07:00  --------------------------------------------------------  IN: 8534 mL / OUT: 7162 mL / NET: 1372 mL      Daily     Daily     PHYSICAL EXAMINATION:  General: WN/WD NAD  HEENT: eyes shut   Neurology: A&Ox0, nonfocal  Respiratory: b/l crackles R>L, poor air movement. On vent  CV: Tachycardic and regular, S1S2  Abdominal: Soft, + bowel sounds   Extremities:  anasarca, peripheral edema both UE and LE   Incisions: Oj Lopez a-line  Tubes: ET, OGT, Gonzalez    LABS:  ABG - ( 12 Jan 2020 06:23 )  pH, Arterial: 7.20  pH, Blood: x     /  pCO2: 48    /  pO2: 125   / HCO3: 18    / Base Excess: -9.7  /  SaO2: 98                                      11.3   2.24  )-----------( 58       ( 12 Jan 2020 06:06 )             37.9     01-12    139  |  106  |  36<H>  ----------------------------<  195<H>  4.0   |  15<L>  |  1.06    Ca    7.5<L>      12 Jan 2020 06:06  Phos  3.6     01-12  Mg     2.1     01-12    TPro  5.9<L>  /  Alb  1.9<L>  /  TBili  0.7  /  DBili  x   /  AST  85<H>  /  ALT  38  /  AlkPhos  79  01-12    LIVER FUNCTIONS - ( 12 Jan 2020 06:06 )  Alb: 1.9 g/dL / Pro: 5.9 g/dL / ALK PHOS: 79 U/L / ALT: 38 U/L / AST: 85 U/L / GGT: x           PT/INR - ( 11 Jan 2020 20:15 )   PT: 13.9 sec;   INR: 1.21 ratio         PTT - ( 12 Jan 2020 03:42 )  PTT:30.0 sec            TELEMETRY:     EKG:     IMAGING: PATIENT:  MAHESH CANELA  [id number]    CHIEF COMPLAINT:  Patient is a 79y old  Male who presents with a chief complaint of Back pain (2020 23:37)      INTERVAL HISTORYOVERNIGHT EVENTS:  Last night, pt experienced episode of desaturation and hypotension. Pt required increased O2 demand while on vent, and increased pressure support, now on , levo, yadira, vaso, and bicarb drip.     REVIEW OF SYSTEMS:  [X] Unable to assess ROS because intubate, sedated    MEDICATIONS:  MEDICATIONS  (STANDING):  albuterol/ipratropium for Nebulization 3 milliLiter(s) Nebulizer every 6 hours  aMIOdarone Infusion 0.5 mG/Min (16.667 mL/Hr) IV Continuous <Continuous>  artificial  tears Solution 1 Drop(s) Both EYES daily  aspirin  chewable 81 milliGRAM(s) Oral daily  atorvastatin 10 milliGRAM(s) Oral at bedtime  aztreonam  IVPB      aztreonam  IVPB 1000 milliGRAM(s) IV Intermittent every 8 hours  chlorhexidine 0.12% Liquid 15 milliLiter(s) Oral Mucosa every 12 hours  chlorhexidine 4% Liquid 1 Application(s) Topical <User Schedule>  CRRT Treatment    <Continuous>  DAPTOmycin IVPB 800 milliGRAM(s) IV Intermittent every 48 hours  dextrose 5%. 1000 milliLiter(s) (50 mL/Hr) IV Continuous <Continuous>  dextrose 50% Injectable 12.5 Gram(s) IV Push once  dextrose 50% Injectable 25 Gram(s) IV Push once  dextrose 50% Injectable 25 Gram(s) IV Push once  DOBUTamine Infusion 5 MICROgram(s)/kG/Min (15.645 mL/Hr) IV Continuous <Continuous>  doxycycline IVPB 100 milliGRAM(s) IV Intermittent every 12 hours  doxycycline IVPB      fentaNYL   Infusion. 0.5 MICROgram(s)/kG/Hr (2.607 mL/Hr) IV Continuous <Continuous>  heparin  Infusion.  Unit(s)/Hr (18 mL/Hr) IV Continuous <Continuous>  insulin lispro (HumaLOG) corrective regimen sliding scale   SubCutaneous every 6 hours  lidocaine   Patch 1 Patch Transdermal daily  metroNIDAZOLE  IVPB 500 milliGRAM(s) IV Intermittent every 8 hours  metroNIDAZOLE  IVPB      norepinephrine Infusion 5 MICROgram(s)/kG/Min (488.906 mL/Hr) IV Continuous <Continuous>  pantoprazole  Injectable 40 milliGRAM(s) IV Push daily  phenylephrine    Infusion 2.4 MICROgram(s)/kG/Min (46.935 mL/Hr) IV Continuous <Continuous>  polyethylene glycol 3350 17 Gram(s) Oral daily  PrismaSATE Dialysate BGK 4 / 2.5 5000 milliLiter(s) (1200 mL/Hr) CRRT <Continuous>  PrismaSOL Filtration BGK 0 / 2.5 5000 milliLiter(s) (700 mL/Hr) CRRT <Continuous>  PrismaSOL Filtration BGK 4 / 2.5 5000 milliLiter(s) (400 mL/Hr) CRRT <Continuous>  propofol Infusion 10 MICROgram(s)/kG/Min (6.258 mL/Hr) IV Continuous <Continuous>  senna 2 Tablet(s) Oral at bedtime  sodium bicarbonate  Infusion 0.108 mEq/kG/Hr (75 mL/Hr) IV Continuous <Continuous>  sodium chloride 3%  Inhalation 3 milliLiter(s) Inhalation every 12 hours  vasopressin Infusion 0.02 Unit(s)/Min (1.2 mL/Hr) IV Continuous <Continuous>    MEDICATIONS  (PRN):  acetaminophen    Suspension .. 650 milliGRAM(s) Oral every 6 hours PRN Temp greater or equal to 38C (100.4F)  bisacodyl Suppository 10 milliGRAM(s) Rectal daily PRN Constipation  dextrose 40% Gel 15 Gram(s) Oral once PRN Blood Glucose LESS THAN 70 milliGRAM(s)/deciliter  glucagon  Injectable 1 milliGRAM(s) IntraMuscular once PRN Glucose LESS THAN 70 milligrams/deciliter  petrolatum Ophthalmic Ointment 1 Application(s) Both EYES every 4 hours PRN dry eyes  sodium chloride 0.9% lock flush 10 milliLiter(s) IV Push every 1 hour PRN Pre/post blood products, medications, blood draw, and to maintain line patency  sodium chloride 0.9% lock flush 10 milliLiter(s) IV Push every 1 hour PRN Pre/post blood products, medications, blood draw, and to maintain line patency      ALLERGIES:  Allergies    Indocin (Unknown)  oxacillin (Unknown)  Zyvox (Unknown)    Intolerances        OBJECTIVE:  ICU Vital Signs Last 24 Hrs  T(C): 36.6 (2020 04:00), Max: 38.3 (2020 09:00)  T(F): 97.9 (2020 04:00), Max: 100.9 (2020 09:00)  HR: 50 (2020 06:45) (46 - 128)  BP: --  BP(mean): --  ABP: 88/46 (2020 06:45) (62/36 - 152/68)  ABP(mean): 60 (2020 06:45) (44 - 94)  RR: 0 (2020 06:45) (0 - 35)  SpO2: 100% (2020 06:45) (90% - 100%)    Mode: AC/ CMV (Assist Control/ Continuous Mandatory Ventilation)  RR (machine): 30  TV (machine): 430  FiO2: 70  PEEP: 12  ITime: 1  MAP: 19  PIP: 36    Adult Advanced Hemodynamics Last 24 Hrs  CVP(mm Hg): --  CVP(cm H2O): --  CO: --  CI: --  PA: --  PA(mean): --  PCWP: --  SVR: --  SVRI: --  PVR: --  PVRI: --  CAPILLARY BLOOD GLUCOSE      POCT Blood Glucose.: 106 mg/dL (2020 17:43)  POCT Blood Glucose.: 155 mg/dL (2020 11:16)    CAPILLARY BLOOD GLUCOSE      POCT Blood Glucose.: 106 mg/dL (2020 17:43)    I&O's Summary    2020 07:01  -  2020 07:00  --------------------------------------------------------  IN: 8534 mL / OUT: 7162 mL / NET: 1372 mL      Daily     Daily     PHYSICAL EXAMINATION:  General: WN/WD NAD  HEENT: eyes shut   Neurology: A&Ox0, nonfocal  Respiratory: b/l crackles R>L, poor air movement. On vent  CV: Tachycardic and regular, S1S2  Abdominal: Soft, + bowel sounds   Extremities:  anasarca, peripheral edema both UE and LE   Incisions: Scott Lopezadial a-line  Tubes: ET, OGT, Gonzalez    LABS:  ABG - ( 2020 06:23 )  pH, Arterial: 7.20  pH, Blood: x     /  pCO2: 48    /  pO2: 125   / HCO3: 18    / Base Excess: -9.7  /  SaO2: 98                                      11.3   2.24  )-----------( 58       ( 2020 06:06 )             37.9     0112    139  |  106  |  36<H>  ----------------------------<  195<H>  4.0   |  15<L>  |  1.06    Ca    7.5<L>      2020 06:06  Phos  3.6     12  Mg     2.1         TPro  5.9<L>  /  Alb  1.9<L>  /  TBili  0.7  /  DBili  x   /  AST  85<H>  /  ALT  38  /  AlkPhos  79  12    LIVER FUNCTIONS - ( 2020 06:06 )  Alb: 1.9 g/dL / Pro: 5.9 g/dL / ALK PHOS: 79 U/L / ALT: 38 U/L / AST: 85 U/L / GGT: x           PT/INR - ( 2020 20:15 )   PT: 13.9 sec;   INR: 1.21 ratio         PTT - ( 2020 03:42 )  PTT:30.0 sec            TELEMETRY:     EKG:     IMAGING:

## 2020-01-12 NOTE — PROGRESS NOTE ADULT - ASSESSMENT
79 y M  with PMH significant for CAD s/p PCI, GERD, gout, OA, s/p multiple laminectomies and spinal fusions, hospital course c/b MSSA bacteremia, ESRD requiring urgent HD on this admission, S/P Shiley placement and removal now transferred to CCU for urgent HD in the setting of severe hypervolemia and hypoxic respiratory failure.     #Neuro  - Keep sedated while optimizing pulmonary status  - On prop and fent    #Resp  - Hypoxia and hypotensive: Pulmonary edema vs. infectious  - Right-sided consolidation on CXR; B/l B-lines on POCUS per pulm.   - No indication for bronchial wash per pulm  - Sputum culture sent.  - Non Con CT 1/5: Pulmonary edema with moderate bilateral pleural effusions.     #CV  - Afib s/p dig 0.5 and amio 150, c/w amio gtt  - Adjust pressors: On vaso, switched Levo to Emiliano while hypotensive, switched back to levo for inotrope, however with tachycardia. Now back on emiliano. Wean as tolerated  - Acidotic on ABG, Vent setting adjusted multiple times, currently on 28/0.46/12/70%  - Decreased cardiac contractility on bedside POCUS per pulm 1/11  - JAHAIRA 12/13: EF 44%, with severe AS, no vegetations reported at this time, Repeat TTE 1/6/2020,  TTE EF 20%  - Will require LHC once patient is more stable  further evaluation, given depressed EF  - Will c/w ASA and statin, for CAD    #GI  - PPI  - OGT placed 1/11, resume TF    #ID  - Patient has hx MSSA bacteremia, now with concern of new aspiration event  - Sputum culture (from ET suction) sent 1/11  - Will change to doxy 100 Q12h and aztreonam 1g Q8h (CRRT appropriately) per ID given poor respiratory coverage with dapto  - F/u repeat Bcx and sputum cx 1/11    #Renal     ESRD requiring Urgent HD    - s/p 500 cc net removal with HD and 2 units PRBC 1/10   - CRRT 1/11 with net 100 cc/hr removal for (+) fluid overload    #Heme  - Hgb stable s/p 2 units PRBC, no signs of bleeding  -Transfuse for hgb < 7 , will maintain active type and screen  - Will continue to monitor with cbc QD     #Endo  - ISS and FS Q6h while on TF    #DVT PPx  - Heparin sub q 79 y M  with PMH significant for CAD s/p PCI, GERD, gout, OA, s/p multiple laminectomies and spinal fusions, hospital course c/b MSSA bacteremia, ESRD requiring urgent HD on this admission, S/P Shiley placement and removal now transferred to CCU for urgent HD in the setting of severe hypervolemia and hypoxic respiratory failure.     #Neuro  - Keep sedated while optimizing pulmonary status  - On fent, weening down  - worsening neuro exam, decreased pupil reflexes  - will involve neuro if neuro exam remains the same off fentanyl    #Resp  - Hypoxia and hypotensive: Pulmonary edema vs. infectious  - Right-sided consolidation on CXR; B/l B-lines on POCUS per pulm.   - No indication for bronchial wash per pulm  - Sputum culture sent.  - Non Con CT : Pulmonary edema with moderate bilateral pleural effusions.   - worsening resp status requiring increased O2 requirements, now on FiO2 70%, RR30, PEEP 12, will increase volume to account for acidosis    #CV  - Afib s/p dig 0.5 and amio 150, c/w amio gtt  - Adjust pressors: On vaso, switched Levo to Emiliano while hypotensive, switched back to levo for inotrope, however with tachycardia. Now back on emiliano. Wean as tolerated  - Acidotic on ABG,   - Decreased cardiac contractility on bedside POCUS per pulm   - JAHAIRA : EF 44%, with severe AS, no vegetations reported at this time, Repeat TTE EF 20%  - Will require LHC once patient is more stable  further evaluation, given depressed EF  - Will c/w ASA and statin, for CAD  - Now on max pressors on emiliano, levo, vaso, ,     #GI  - PPI  - OGT placed , resume TF    #ID  - Patient has hx MSSA bacteremia, now with concern of new aspiration event  - Sputum culture (from ET suction) sent   - Will change to doxy 100 Q12h and aztreonam 1g Q8h (CRRT appropriately) per ID given poor respiratory coverage with dapto  - F/u repeat Bcx and sputum cx     #Renal     ESRD requiring Urgent HD    - s/p 500 cc net removal with HD and 2 units PRBC 1/10   - CRRT  with net 100 cc/hr removal for (+) fluid overload    #Heme  - Hgb stable s/p 2 units PRBC, no signs of bleeding  -Transfuse for hgb < 7 , will maintain active type and screen  - Will continue to monitor with cbc QD     #Endo  - ISS and FS Q6h while on TF    #DVT PPx  - Heparin sub q

## 2020-01-12 NOTE — PROGRESS NOTE ADULT - ASSESSMENT
80 y/o male with h/o of CAD s/p PCI 1/2017 with 10% LAD stenosis OM1, HTN, HLD, GERD, Gout, Osteoarthritis and chronic back pain, s/p multiple laminectomies and spinal fusion, lower back pain and weakness in his bilateral lower extremities that progressed to inability to ambulate. dx with MSSA bacteremia. on ancef. also received lasix as well. has had hesitancy urinating. on 12/16/ KRIS       1- KRIS   2- MSSA bacteremia   3- chf  4- cad  5- respiratory failure   6- hypotension       suspect kris in setting of  +/-  AIN + /- due to post infectious GN  now worsened again /viral infection and chf.   on multiple pressors  cvvhdf no fluid removal   bfr 130 cc/min   dfr 1200   see new orders   d/w family prognosis guarded  d/w ccu team

## 2020-01-12 NOTE — PROGRESS NOTE ADULT - ASSESSMENT
79 yr old male with HTN, DCHF, CAD, S/P PCI to OM1, Mid LAD (9/2014, [facility], [facility], [facility]) hx of ischemic CMP, AS, DM HLD, GERD, GOUT, s/p multiple spinal surgery admitted with back pain and fever, now with acute hypoxic resp failure 2/2 to pulm edema w/ poss underlying PNA, +coronovirus.     1. Hypoxic respiratory failure, acute/chronic systolic heart failure, shock   -right heart cath revealing normal filling pressures, values and clinically consistent w distributive/septic shock with known severe LV dysfunction significant AS  -HD's worsened, now on inotropes, multiple vasopressors  -continue vent support  -repeat echo unchanged   -cont UF/CVVHD for aggressive volume removal   -broad spectrum abx for poss pna, aspiration pna    2. CAD s/p PCI (OM1/LAD)   -cont statin and asa     3. Aortic Stenosis  -severe on JAHAIRA with valve area of 0.9  -cont med tx, not candidate for avr/tavr at present     4. Bacteremia, resolved   -JAHAIRA 12/13 without endocarditis   -repeat TTE with no obvious signs of endocarditis; repeat bcx 1/8 negative   -RVP +  -abx per ID    5. KRIS, ? AIN, new HD  - renal f/u for HD, volume removal     6, Anemia   s/p PRBC       critically ill with poor prognosis and multiorgan failure  d/w sons, wife at bedside  d/w ccu team     for now family wants to continue to remain full code and continue all aggressive measures

## 2020-01-12 NOTE — PROGRESS NOTE ADULT - SUBJECTIVE AND OBJECTIVE BOX
CARDIOLOGY FOLLOW UP NOTE - DR. RHODES    Subjective:    events noted  rew more vasopressor support  lactate increased  inc fio2 need    PHYSICAL EXAM:  T(C): 35.8 (01-12-20 @ 09:00), Max: 36.7 (01-11-20 @ 16:00)  HR: 70 (01-12-20 @ 11:45) (46 - 106)  BP: --  RR: 30 (01-12-20 @ 11:45) (0 - 30)  SpO2: 100% (01-12-20 @ 11:45) (92% - 100%)  Wt(kg): --  I&O's Summary    11 Jan 2020 07:01  -  12 Jan 2020 07:00  --------------------------------------------------------  IN: 9278.6 mL / OUT: 7162 mL / NET: 2116.6 mL    12 Jan 2020 07:01  -  12 Jan 2020 12:36  --------------------------------------------------------  IN: 2969.4 mL / OUT: 0 mL / NET: 2969.4 mL      Daily     Daily     Appearance: intubted, sedated  Cardiovascular: Normal S1 S2,RRR, SM  Respiratory: Lungs clear to auscultation	  Gastrointestinal:  Soft, Non-tender, + BS	  Extremities: Normal range of motion, edema b/l      Home Medications:  allopurinol 300 mg oral tablet: 1 tab(s) orally once a day (09 Dec 2019 11:15)  aspirin 81 mg oral delayed release tablet: 1 tab(s) orally once a day (09 Dec 2019 11:15)  Co Q-10 100 mg oral capsule: 1 cap(s) orally once a day (09 Dec 2019 11:15)  diclofenac sodium 75 mg oral delayed release tablet: 1 tab(s) orally 2 times a day (09 Dec 2019 11:15)  lansoprazole 15 mg oral delayed release capsule: 1 cap(s) orally once a day (09 Dec 2019 11:15)  losartan 100 mg oral tablet: 1 tab(s) orally once a day (09 Dec 2019 11:15)  lovastatin 20 mg oral tablet: 1 tab(s) orally once a day (09 Dec 2019 11:15)  miSOPROStol 200 mcg oral tablet: 1 tab(s) orally 2 times a day (09 Dec 2019 11:15)  Toprol-XL 25 mg oral tablet, extended release: 0.5 tab(s) orally once a day (09 Dec 2019 11:15)  Vitamin D3 1000 intl units oral tablet: 1 tab(s) orally once a day (09 Dec 2019 11:15)      MEDICATIONS  (STANDING):  albuterol/ipratropium for Nebulization 3 milliLiter(s) Nebulizer every 6 hours  artificial  tears Solution 1 Drop(s) Both EYES daily  aspirin  chewable 81 milliGRAM(s) Oral daily  atorvastatin 10 milliGRAM(s) Oral at bedtime  chlorhexidine 0.12% Liquid 15 milliLiter(s) Oral Mucosa every 12 hours  chlorhexidine 4% Liquid 1 Application(s) Topical <User Schedule>  CRRT Treatment    <Continuous>  DAPTOmycin IVPB 800 milliGRAM(s) IV Intermittent every 48 hours  dextrose 5%. 1000 milliLiter(s) (50 mL/Hr) IV Continuous <Continuous>  dextrose 50% Injectable 12.5 Gram(s) IV Push once  dextrose 50% Injectable 25 Gram(s) IV Push once  dextrose 50% Injectable 25 Gram(s) IV Push once  DOBUTamine Infusion 5 MICROgram(s)/kG/Min (15.645 mL/Hr) IV Continuous <Continuous>  doxycycline IVPB 100 milliGRAM(s) IV Intermittent every 12 hours  doxycycline IVPB      fentaNYL   Infusion. 0.5 MICROgram(s)/kG/Hr (2.607 mL/Hr) IV Continuous <Continuous>  insulin lispro (HumaLOG) corrective regimen sliding scale   SubCutaneous every 6 hours  lidocaine   Patch 1 Patch Transdermal daily  meropenem  IVPB 1000 milliGRAM(s) IV Intermittent every 12 hours  micafungin IVPB 100 milliGRAM(s) IV Intermittent once  norepinephrine Infusion 5 MICROgram(s)/kG/Min (488.906 mL/Hr) IV Continuous <Continuous>  pantoprazole  Injectable 40 milliGRAM(s) IV Push every 12 hours  phenylephrine    Infusion 2.4 MICROgram(s)/kG/Min (46.935 mL/Hr) IV Continuous <Continuous>  polyethylene glycol 3350 17 Gram(s) Oral daily  PrismaSATE Dialysate BK 0 / 3.5 5000 milliLiter(s) (1200 mL/Hr) CRRT <Continuous>  PrismaSOL Filtration BGK 4 / 2.5 5000 milliLiter(s) (700 mL/Hr) CRRT <Continuous>  PrismaSOL Filtration BGK 4 / 2.5 5000 milliLiter(s) (400 mL/Hr) CRRT <Continuous>  senna 2 Tablet(s) Oral at bedtime  sodium bicarbonate  Infusion 0.108 mEq/kG/Hr (75 mL/Hr) IV Continuous <Continuous>  sodium chloride 3%  Inhalation 3 milliLiter(s) Inhalation every 12 hours  vasopressin Infusion 0.02 Unit(s)/Min (1.2 mL/Hr) IV Continuous <Continuous>      TELEMETRY: 	    ECG:  	  RADIOLOGY:   DIAGNOSTIC TESTING:  [ ] Echocardiogram:  [ ] Catheterization:  [ ] Stress Test:    OTHER: 	    LABS:	 	    CARDIAC MARKERS:                                11.3   2.24  )-----------( 58       ( 12 Jan 2020 06:06 )             37.9     01-12    139  |  106  |  36<H>  ----------------------------<  195<H>  4.0   |  15<L>  |  1.06    Ca    7.5<L>      12 Jan 2020 06:06  Phos  3.6     01-12  Mg     2.1     01-12    TPro  5.9<L>  /  Alb  1.9<L>  /  TBili  0.7  /  DBili  x   /  AST  85<H>  /  ALT  38  /  AlkPhos  79  01-12    proBNP:   PT/INR - ( 12 Jan 2020 09:28 )   PT: 137.2 sec;   INR: N/A ratio         PTT - ( 12 Jan 2020 09:28 )  PTT:>200 sec  Lipid Profile:   HgA1c:     Creatinine, Serum: 1.06 mg/dL (01-12-20 @ 06:06)  Creatinine, Serum: 1.46 mg/dL (01-12-20 @ 00:26)  Creatinine, Serum: 2.01 mg/dL (01-11-20 @ 17:54)  Creatinine, Serum: 2.29 mg/dL (01-11-20 @ 13:51)  Creatinine, Serum: 2.80 mg/dL (01-11-20 @ 05:32)  Creatinine, Serum: 2.45 mg/dL (01-10-20 @ 18:20)  Creatinine, Serum: 3.21 mg/dL (01-10-20 @ 05:53)  Creatinine, Serum: 3.02 mg/dL (01-09-20 @ 20:29)  Creatinine, Serum: SEE NOTE mg/dL (01-09-20 @ 18:10)

## 2020-01-12 NOTE — DISCHARGE NOTE FOR THE EXPIRED PATIENT - PRINCIPAL DIAGNOSIS
Sepsis due to methicillin susceptible Staphylococcus aureus (MSSA) with acute organ dysfunction and septic shock, unspecified type

## 2020-01-12 NOTE — PROGRESS NOTE ADULT - ASSESSMENT
78 yo M with chronic low back pain sp multiple laminectomy and fusion p/w 1 week of worsening back pain along with weakness in bilateral lower extremities and inability to walk since last night    Leukocytosis, fever  BCX with MSSA, high grade, BCX 12/12 NGTD  NM tagged WBC uptake in thoracolumbar spine--degen disease?  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     Given significant clinical deterioration would switch Aztreonam to Meropenem.   Can continue Doxycycline for pulmonary MSSA coverage  Continue Daptomycin for MSSA bacteremia    Overall,    Hypoxic Respiratory Failure/Abnormal CXR  - Stop Aztreonam and Flagyl  - Recommend Meropenem 1g IV Q12H  - Follow up on Combicath sputum culture and blood cultures  - Chest CT when stable  - F/U cardiology and pulmonology recommendations    #MSSA Bacteremia  - Continue Dapto 800mg q 48 (need to adjust dosing depending on CrCl, and intermittent HD vs CVVHD), weekly CKs, monitor Cr  - Continue Dapto through 2/5/20--CK 1/5 45    #Leukopenia/New Fever  - Trend WBC, monitor for further fevers   - If continued fever/instability would switch Aztreonam to Meropenem  - F/U pending sputum culture/repeat BCXs    #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  [facility] Division of Infectious Disease  8AM-5PM: Pager Number [phone]  After Hours (or if no response): Please contact the Infectious Diseases Office at [phone]

## 2020-01-12 NOTE — DISCHARGE NOTE FOR THE EXPIRED PATIENT - HOSPITAL COURSE
Pt is a 78 y/o male PMH CAD s/p PCI 2017 with 10% LAD stenosis OM1, HTN, HLD, GERD, Gout, Osteoarthritis and chronic back pain, s/p multiple laminectomies and spinal fusion, left TKR who presents with worsening back pain for approx a week. Neuro surgconsulted for infectious etiology of back pain given WBC elevated, IV abx started with cef and vanc. Bcx positive for MSSA bacteremia. Nuclear Med scan showing mild diffuse activity in bilateral lungs, inflamm. uptake in thoracolumbar spine poss r/t post sgy changes or deg dz, neuro surg rec no surgical intervention. Pulm consulted, reccommend continuing vanc and cefazolin. JAHAIRA ordered to r/o endocarditis, showed severe AS but no vegetations. EF 40%. 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 Abx switched to dapto for renal protection. Pt experiencing increasing acidemia, oral bicarb started, emergent HD intiiated. Renal biopsy deferred as per family's wishes. Pt found to be in SOB requiring BiPAP, w/ elevation of proBNP. Bumex gtt and dobutamine drip started, transferred to the CCU. Pt developed increased work of breathing requiring intubation and sedation. Pt developed experienced episode of desaturation and hypotension requiring further pressor requirements, (levo, yadira, , vaso). Abx broadened to holley, dapto and flagyl, bicarb drip added. Family wished for full medical intervention. DNR established. Pt clinical status continued to decline. Pt  at 2:20pm on 2020.

## 2020-01-12 NOTE — CHART NOTE - NSCHARTNOTEFT_GEN_A_CORE
: Dr. Ruelas    INDICATION: hypoxemic respiratory failure    PROCEDURE:  [x] LIMITED ECHO  [x] LIMITED CHEST  [ ] LIMITED RETROPERITONEAL  [ ] LIMITED ABDOMINAL  [ ] LIMITED DVT  [ ] NEEDLE GUIDANCE VASCULAR  [ ] NEEDLE GUIDANCE THORACENTESIS  [ ] NEEDLE GUIDANCE PARACENTESIS  [ ] NEEDLE GUIDANCE PERICARDIOCENTESIS  [ ] OTHER    FINDINGS:  b/l anterior B lines  confluent B lines at lung bases b/l with RLL consolidation  severely reduced LVEF  RV size < LV size  IVC 2 cm    INTERPRETATION:  Diffuse B line pattern with severely reduced cardiac function consistent with pulmonary edema  RLL consolidation concerning for atelectasis vs pneumonia    Saeid Ruelas PGY-4  Pulmonary/Critical Care Fellow  Pager: [phone] (MARYSOL) [phone] (NS) : Dr. Ruelas    INDICATION: hypoxemic respiratory failure    PROCEDURE:  [x] LIMITED ECHO  [x] LIMITED CHEST  [ ] LIMITED RETROPERITONEAL  [ ] LIMITED ABDOMINAL  [ ] LIMITED DVT  [ ] NEEDLE GUIDANCE VASCULAR  [ ] NEEDLE GUIDANCE THORACENTESIS  [ ] NEEDLE GUIDANCE PARACENTESIS  [ ] NEEDLE GUIDANCE PERICARDIOCENTESIS  [ ] OTHER    FINDINGS:  b/l anterior B lines  confluent B lines at lung bases b/l with RLL consolidation  severely reduced LVEF  RV size < LV size  IVC 2 cm    INTERPRETATION:  Diffuse B line pattern with severely reduced cardiac function consistent with pulmonary edema  RLL consolidation concerning for atelectasis vs pneumonia    Saeid Ruelas PGY-4  Pulmonary/Critical Care Fellow  Pager: [phone] (MARYSOL) [phone] (NS)    exam wasperformed on 1/11/20

## 2020-01-13 LAB
-  AMIKACIN: SIGNIFICANT CHANGE UP
-  AMOXICILLIN/CLAVULANIC ACID: SIGNIFICANT CHANGE UP
-  AMPICILLIN/SULBACTAM: SIGNIFICANT CHANGE UP
-  AMPICILLIN: SIGNIFICANT CHANGE UP
-  AZTREONAM: SIGNIFICANT CHANGE UP
-  CEFAZOLIN: SIGNIFICANT CHANGE UP
-  CEFEPIME: SIGNIFICANT CHANGE UP
-  CEFOXITIN: SIGNIFICANT CHANGE UP
-  CEFTRIAXONE: SIGNIFICANT CHANGE UP
-  CIPROFLOXACIN: SIGNIFICANT CHANGE UP
-  ERTAPENEM: SIGNIFICANT CHANGE UP
-  GENTAMICIN: SIGNIFICANT CHANGE UP
-  IMIPENEM: SIGNIFICANT CHANGE UP
-  LEVOFLOXACIN: SIGNIFICANT CHANGE UP
-  MEROPENEM: SIGNIFICANT CHANGE UP
-  PIPERACILLIN/TAZOBACTAM: SIGNIFICANT CHANGE UP
-  TOBRAMYCIN: SIGNIFICANT CHANGE UP
-  TRIMETHOPRIM/SULFAMETHOXAZOLE: SIGNIFICANT CHANGE UP
CULTURE RESULTS: SIGNIFICANT CHANGE UP
METHOD TYPE: SIGNIFICANT CHANGE UP
ORGANISM # SPEC MICROSCOPIC CNT: SIGNIFICANT CHANGE UP
ORGANISM # SPEC MICROSCOPIC CNT: SIGNIFICANT CHANGE UP
SPECIMEN SOURCE: SIGNIFICANT CHANGE UP

## 2020-01-14 LAB — FUNGITELL: 414 PG/ML — HIGH

## 2020-01-16 LAB
CULTURE RESULTS: SIGNIFICANT CHANGE UP
SPECIMEN SOURCE: SIGNIFICANT CHANGE UP

## 2020-01-17 LAB
CULTURE RESULTS: SIGNIFICANT CHANGE UP
SPECIMEN SOURCE: SIGNIFICANT CHANGE UP

## 2020-02-08 NOTE — PROVIDER CONTACT NOTE (OTHER) - ASSESSMENT
· Likely reactionary  · Will check procalcitonin/UA  · Afebrile pt feel sob on nasal cannula, wants to be on bipap

## 2020-03-24 NOTE — PROGRESS NOTE ADULT - PROBLEM SELECTOR PROBLEM 8
Patient called wanting to check if all the pages of his paperwork were sent. His work stated they did not receive all of the pages. Confirmed that they all were sent.   Asked if he wanted me to resend them, stated no that it was fine. Also asked if he could get a letter stating he is able to go back to work on the 6th.    Would like a copy of the forms and the letter sent to him. Placed in outgoing mail.    Also refaxed forms to [employer]     Gout

## 2020-10-26 NOTE — PROVIDER CONTACT NOTE (OTHER) - ASSESSMENT
Pt. needs Dobutamine drip & Bumex drip & IV Antibiotics. [Shortness Of Breath] : shortness of breath [Dyspnea on exertion] : dyspnea during exertion [Negative] : Heme/Lymph [Recent Weight Loss (___ Lbs)] : recent [unfilled] ~Ulb weight loss [Recent Weight Gain (___ Lbs)] : no recent weight gain [Chest Pain] : no chest pain [Lower Ext Edema] : no extremity edema [Palpitations] : no palpitations

## 2021-02-09 NOTE — ED ADULT TRIAGE NOTE - CHIEF COMPLAINT QUOTE
CT left hip



Technique: Multiple axial sections to the left hip were obtained.  Reconstructed
coronal and sagittal images were obtained.



Findings: Severe narrowing is noted of the right hip joint.  Joint space with 
left hip is fairly well maintained.  Disc space narrowing is seen within the 
visualized lumbar levels.  Degenerative change is noted within both sacroiliac 
joints with vacuum phenomena.



Fracture is identified involving the anterior aspect of the left femoral head.  
Additional fracture is noted within the base of the left greater trochanter.  No
complete fracture is seen through the subcapital region or intertrochanteric 
region.  No additional fracture is noted.



Impression:

1.  Fracture within the anterior aspect of the left femoral head as well as 
additional fracture involving the base of the greater trochanter.  No complete 
fracture through the subcapital region or intertrochanteric region is seen.

2.  Degenerative change as noted above.

3.  No other acute abnormality is appreciated.



Diagnostic code #3

MTDD Chest pain

## 2021-03-31 NOTE — ADVANCED PRACTICE NURSE CONSULT - RECOMMEDATIONS
Error  Will recommend the followin. B/l buttocks: apply Cavilon daily. continue to monitor for changes.  2. continue with turning and positioning  3. Seat cushion when OOB to chair.  4. Nutrition support as pt condition allows  Tx plan discussed with RN

## 2021-06-14 NOTE — SWALLOW BEDSIDE ASSESSMENT ADULT - SWALLOW EVAL: RECOMMENDED DIET
Dysphagia III with nectar thick liquids.
Simple: Patient demonstrates quick and easy understanding/Verbalized Understanding

## 2021-08-06 NOTE — CONSULT NOTE ADULT - SUBJECTIVE AND OBJECTIVE BOX
[facility]  [phone]  NEPHROLOGY      INITIAL CONSULT NOTE  --------------------------------------------------------------------------------  HPI:       80 y/o male with h/o of CAD s/p PCI 1/2017 with 10% LAD stenosis OM1, HTN, HLD, GERD, Gout, Osteoarthritis and chronic back pain, s/p multiple laminectomies and spinal fusion, left TKR who presents with worsening back pain for approx a week PTA. had progressive worsening of his lower back pain and weakness in his bilateral lower extremities that progressed to inability to ambulate. dx with MSSA bacteremia. on ancef. also received lasix as well. has had hesitancy urinating. in addition, has had decrease po intake      PAST HISTORY  --------------------------------------------------------------------------------  PAST MEDICAL & SURGICAL HISTORY:  CAD S/P percutaneous coronary angioplasty  HLD (hyperlipidemia)  Back pain  OA (osteoarthritis)  Gout  HTN (hypertension)  GERD (gastroesophageal reflux disease)  S/P lumbar spine operation: 6 thoracic to lumbar surgeries over 6 months period starting 11/2006  S/P knee replacement: 11/2013    FAMILY HISTORY:  No pertinent family history in first degree relatives of ckd     PAST SOCIAL HISTORY: no tobacco use     ALLERGIES & MEDICATIONS  --------------------------------------------------------------------------------  Allergies    Indocin (Unknown)  oxacillin (Unknown)  Zyvox (Unknown)    Intolerances      Standing Inpatient Medications  acetaminophen  IVPB .. 1000 milliGRAM(s) IV Intermittent once  allopurinol 300 milliGRAM(s) Oral daily  amLODIPine   Tablet 10 milliGRAM(s) Oral daily  aspirin enteric coated 81 milliGRAM(s) Oral daily  atorvastatin 10 milliGRAM(s) Oral at bedtime  ceFAZolin   IVPB 1000 milliGRAM(s) IV Intermittent every 12 hours  heparin  Injectable 5000 Unit(s) SubCutaneous every 8 hours  senna 2 Tablet(s) Oral at bedtime  sodium chloride 0.9%. 1000 milliLiter(s) IV Continuous <Continuous>    PRN Inpatient Medications  acetaminophen   Tablet .. 650 milliGRAM(s) Oral every 6 hours PRN  traMADol 25 milliGRAM(s) Oral two times a day PRN      REVIEW OF SYSTEMS  --------------------------------------------------------------------------------  son at bedside. pt appears somewhat confused not answering questions appropriately       VITALS/PHYSICAL EXAM  --------------------------------------------------------------------------------  T(C): 37.2 (12-16-19 @ 22:32), Max: 37.2 (12-16-19 @ 22:32)  HR: 66 (12-16-19 @ 22:32) (59 - 79)  BP: 102/64 (12-16-19 @ 22:32) (102/64 - 132/60)  RR: 18 (12-16-19 @ 22:32) (18 - 18)  SpO2: 94% (12-16-19 @ 22:32) (93% - 95%)  Wt(kg): --        12-15-19 @ 07:01  -  12-16-19 @ 07:00  --------------------------------------------------------  IN: 1550 mL / OUT: 600 mL / NET: 950 mL    12-16-19 @ 07:01  -  12-16-19 @ 22:48  --------------------------------------------------------  IN: 570 mL / OUT: 450 mL / NET: 120 mL      Physical Exam:  	Gen: elderly M appears confused intermittently   	no jvd  	Pulm: decrease bs  no rales or ronchi or wheezing  	CV: RRR, S1S2; no rub  	Back: No CVA tenderness  	Abd: +BS, soft, nontender/nondistended  	: No suprapubic tenderness  	UE: Warm, no cyanosis  no clubbing,  wrist edema   	LE: Warm, no cyanosis  no clubbing, 2- edema   	Neuro: alert and oriented. speech coherent   	  LABS/STUDIES  --------------------------------------------------------------------------------              10.6   17.89 >-----------<  352      [12-16-19 @ 09:45]              32.8     131  |  99  |  83  ----------------------------<  203      [12-16-19 @ 17:28]  4.4   |  17  |  2.44        Ca     9.2     [12-16-19 @ 17:28]          Uric acid 5.2      [12-16-19 @ 17:28]  Serum Osmolality 316      [12-16-19 @ 17:28]    Creatinine Trend:  SCr 2.44 [12-16 @ 17:28]  SCr 2.22 [12-16 @ 09:27]  SCr 1.61 [12-15 @ 16:41]  SCr 1.20 [12-14 @ 07:19]  SCr 1.15 [12-13 @ 09:33]    Urinalysis - [12-08-19 @ 01:05]      Color Yellow / Appearance Slightly Turbid / SG 1.029 / pH 5.5      Gluc Negative / Ketone Negative  / Bili Negative / Urobili 3 mg/dL       Blood Small / Protein 30 mg/dL / Leuk Est Negative / Nitrite Negative      RBC 3 / WBC 7 / Hyaline 17 / Gran 0-2 / Sq Epi  / Non Sq Epi 3 / Bacteria Moderate    Urine Creatinine 155      [12-16-19 @ 18:48]  Urine Protein 42      [12-16-19 @ 18:48]  Urine Sodium <35      [12-16-19 @ 18:48]  Urine Chloride <35      [12-16-19 @ 18:48]        Immunofixation Serum:   No Monoclonal Band Identified      [03-29-16 @ 08:41]      < from: CT Abdomen and Pelvis w/ Oral Cont and w/ IV Cont (12.10.19 @ 21:13) >  EXAM:  CT CHEST IC                          EXAM:  CT ABDOMEN AND PELVIS OC IC                            PROCEDURE DATE:  12/10/2019            INTERPRETATION:  CLINICAL INFORMATION: Leukocytosis. Blood cultures with   MSSA. Rising white count.    COMPARISON: Prior chest CT dated 3/27/2016.    PROCEDURE:   CT of the Chest, Abdomen and Pelvis was performed with intravenous   contrast.   Intravenous contrast: 90 ml Omnipaque 350. 10 ml discarded.  Oral contrast: Positive contrast was administered.  Sagittal and coronal reformats were performed.    FINDINGS:    CHEST:     LUNGS AND LARGE AIRWAYS: Patent central airways. Patchy bilateral   groundglass opacities. Parenchymal opacities in the lower lobes   bilaterally.  PLEURA: Trace left pleural effusion.  VESSELS: Atherosclerotic calcification of the aorta and coronary arteries.  HEART: Heart size is enlarged. No pericardial effusion. Calcification of   the aortic valve and mitral annulus.  MEDIASTINUM AND MARTHA: No lymphadenopathy.  CHEST WALL AND LOWER NECK: Within normal limits.    ABDOMEN AND PELVIS:    LIVER: A 0.9 cm hypodense lesion in the right lobe is too small to   characterize, but unchanged since 3/27/2016.  BILE DUCTS: Normal caliber.  GALLBLADDER: Dependent high attenuation, which may be secondary to sludge   and/or small stones.  SPLEEN: Within normal limits.  PANCREAS: Within normal limits.  ADRENALS: Nodular thickening of the left adrenal gland.  KIDNEYS/URETERS: No hydronephrosis. Right renal cyst. Subcentimeter   hypodense focus in the lower pole of the left kidney, too small to   characterize.    BLADDER: Trabeculated bladder wall.  REPRODUCTIVE ORGANS: Prostate gland is enlarged.    BOWEL: No bowel obstruction. Appendix is within normal limits. Scattered   colonic diverticulosis.  Small hiatal hernia.  PERITONEUM: No ascites.  VESSELS: Atherosclerotic calcification.  RETROPERITONEUM/LYMPH NODES: No lymphadenopathy.    ABDOMINAL WALL: Subcutaneous edema. Small fat-containing umbilical hernia.  BONES: Severe degenerative changes in the spine. Status post posterior   fusion and laminectomy from T9 to T12.Status post L4-L5 laminectomy.    IMPRESSION:     Parenchymal opacities in the lower lobes bilaterally, which may be   secondary to atelectasis versus pneumonia.     Cardiomegaly.    Degenerative changes and findings in the spine which were more fully   evaluated on MRI dated 12/7/2019.                          LIONEL LAUREN M.D., ATTENDING RADIOLOGIST  This document has been electronically signed. Dec 11 2019  9:10AM        < end of copied text > Eye Shield Used: No

## 2021-08-23 NOTE — PROGRESS NOTE ADULT - PROBLEM SELECTOR PROBLEM 2
Sent in rx's. Past due fasting follow up. She had a no show appointment in June and has nothing rescheduled. Please advise. Weakness of both lower extremities

## 2021-11-22 NOTE — PROGRESS NOTE ADULT - PROBLEM SELECTOR PLAN 1
GISELLE,  DO YOU MIND TO CONTACT PATIENT AND SCHEDULE HER FOR A FOLLOW UP, PLEASE?  THANK YOU!   multifactorial ATN/AIN in the setting of sepsis and antibiotics  low c3 and nl c4 suggestive of post infectious glomerulonephritis  family declining renal biopsy  creat now improving, no need for biopsy at this time  resume asa  holding losartan  appreciate renal recs  HD cath per renal

## 2022-01-11 NOTE — ED PROVIDER NOTE - QRS
Received Choice form at [id number]  Agency/Facility Name: [facility]  Referral sent per [facility] form @ [phone]    [phone]

## 2023-10-03 NOTE — PROGRESS NOTE ADULT - SUBJECTIVE AND OBJECTIVE BOX
[facility]   [phone]  RENAL FOLLOW UP NOTE  --------------------------------------------------------------------------------  Chief Complaint:    24 hour events/subjective:    ctsp earlier. o/n developed worsening sob required bipap. + RVP. cxr with chf   uo last night shift 0     PAST HISTORY  --------------------------------------------------------------------------------  No significant changes to PMH, PSH, FHx, SHx, unless otherwise noted    ALLERGIES & MEDICATIONS  --------------------------------------------------------------------------------  Allergies    Indocin (Unknown)  oxacillin (Unknown)  Zyvox (Unknown)    Intolerances      Standing Inpatient Medications  acetaminophen  IVPB .. 1000 milliGRAM(s) IV Intermittent once  acetaminophen  IVPB .. 650 milliGRAM(s) IV Intermittent once  albuterol/ipratropium for Nebulization 3 milliLiter(s) Nebulizer every 6 hours  aspirin  chewable 81 milliGRAM(s) Oral daily  atorvastatin 10 milliGRAM(s) Oral at bedtime  aztreonam  IVPB 1000 milliGRAM(s) IV Intermittent every 8 hours  aztreonam  IVPB      buMETAnide Infusion 2 mG/Hr IV Continuous <Continuous>  chlorhexidine 4% Liquid 1 Application(s) Topical <User Schedule>  chlorhexidine 4% Liquid 1 Application(s) Topical <User Schedule>  DAPTOmycin IVPB 800 milliGRAM(s) IV Intermittent every 24 hours  DOBUTamine Infusion 2.5 MICROgram(s)/kG/Min IV Continuous <Continuous>  heparin  Injectable 5000 Unit(s) SubCutaneous every 8 hours  lidocaine   Patch 1 Patch Transdermal daily  oxymetazoline 0.05% Nasal Spray 2 Spray(s) Both Nostrils two times a day  pantoprazole    Tablet 40 milliGRAM(s) Oral before breakfast  polyethylene glycol 3350 17 Gram(s) Oral daily  senna 2 Tablet(s) Oral at bedtime  sodium chloride 0.65% Nasal 1 Spray(s) Both Nostrils three times a day    PRN Inpatient Medications  acetaminophen   Tablet .. 650 milliGRAM(s) Oral every 6 hours PRN  sodium chloride 0.9% lock flush 10 milliLiter(s) IV Push every 1 hour PRN  sodium chloride 0.9% lock flush 10 milliLiter(s) IV Push every 1 hour PRN      REVIEW OF SYSTEMS  --------------------------------------------------------------------------------    Gen: denies , fevers/chills,  CVS: denies chest pain/palpitations  Resp: +  SOB/Cough  GI: Denies N/V/Abd pain  : Denies dysuria    All other systems were reviewed and are negative, except as noted.    VITALS/PHYSICAL EXAM  --------------------------------------------------------------------------------  T(C): 36.6 (01-03-20 @ 09:45), Max: 37.7 (01-02-20 @ 21:28)  HR: 76 (01-03-20 @ 09:59) (76 - 115)  BP: 138/80 (01-03-20 @ 09:45) (132/79 - 154/84)  RR: 28 (01-03-20 @ 09:45) (18 - 36)  SpO2: 96% (01-03-20 @ 09:59) (87% - 98%)  Wt(kg): --        01-02-20 @ 07:01  -  01-03-20 @ 07:00  --------------------------------------------------------  IN: 340 mL / OUT: 775 mL / NET: -435 mL    01-03-20 @ 07:01  -  01-03-20 @ 17:50  --------------------------------------------------------  IN: 0 mL / OUT: 300 mL / NET: -300 mL      Physical Exam:  	    Gen: on bipsps elderly M remains alert   	no jvd  	Pulm: decrease bs  + coarse bs bilateral no  wheezing  	CV: RRR, S1S2; no rub  	Abd: +BS, soft, nontender/nondistended  	: No suprapubic tenderness  	UE: Warm, no cyanosis  no clubbing,  3+ edema   	LE: Warm, no cyanosis  no clubbing, 3- edema       LABS/STUDIES  --------------------------------------------------------------------------------              8.3    12.63 >-----------<  211      [01-03-20 @ 08:40]              25.7     131  |  96  |  103  ----------------------------<  180      [01-03-20 @ 03:42]  4.9   |  20  |  2.39        Ca     8.8     [01-03-20 @ 03:42]    TPro  6.0  /  Alb  2.0  /  TBili  0.3  /  DBili  x   /  AST  46  /  ALT  53  /  AlkPhos  78  [01-03-20 @ 03:42]    PT/INR: PT 14.5 , INR 1.25       [01-03-20 @ 08:30]  PTT: 26.8       [01-03-20 @ 08:30]      Creatinine Trend:  SCr 2.39 [01-03 @ 03:42]  SCr 2.27 [01-02 @ 07:16]  SCr 2.38 [01-01 @ 06:58]  SCr 2.37 [12-31 @ 09:22]  SCr 2.42 [12-30 @ 09:57]              Urinalysis - [12-21-19 @ 19:48]      Color Yellow / Appearance Turbid / SG 1.020 / pH 5.5      Gluc Negative / Ketone Negative  / Bili Negative / Urobili <2 mg/dL       Blood Moderate / Protein 100 mg/dL / Leuk Est Large / Nitrite Negative       /  / Hyaline 0 / Gran  / Sq Epi  / Non Sq Epi 6 / Bacteria Negative        RYLEE: titer Negative, pattern --      [12-21-19 @ 17:35]  C3 Complement 25      [12-20-19 @ 12:18]  C4 Complement 21      [12-20-19 @ 12:18]  ANCA: cANCA Negative, pANCA Negative, atypical ANCA Negative      [12-21-19 @ 17:35]

## 2023-10-25 NOTE — PROGRESS NOTE ADULT - PROBLEM SELECTOR PLAN 2
MR Thoracic Spine no acute findings  nuclear scan nonspecific heterogeneous uptake in the thoracolumbar spine which may be secondary to post surgical change and/or degenerative change.  no acute intervention per neurosurgery  tylenol for mild pain, hold tramadol for KRIS  IV tylenol PRN Hydroxyzine Pregnancy And Lactation Text: This medication is not safe during pregnancy and should not be taken. It is also excreted in breast milk and breast feeding isn't recommended.

## 2023-12-27 NOTE — PROGRESS NOTE ADULT - PROBLEM SELECTOR PLAN 1
MR Thoracic Spine no acute findings  nuclear scan nonspecific heterogeneous uptake in the thoracolumbar spine which may be secondary to post surgical change and/or degenerative change.  no acute intervention per neurosurgery  cont pain control No

## 2024-06-07 NOTE — PROGRESS NOTE ADULT - PROBLEM SELECTOR PLAN 8
chest wall non-tender, breathing is unlabored without accessory muscle use, normal breath sounds HSQ for DVT ppx

## 2024-08-29 NOTE — ED ADULT NURSE NOTE - PRIMARY CARE PROVIDER
The following labs were labeled with appropriate pt sticker and tubed to lab:     [x] Blue     [x] Lavender   [] on ice  [x] Green/yellow  [x] Green/black [] on ice  [] Grey  [] on ice  [] Yellow  [] Red  [] Pink  [] Type/ Screen  [] ABG  [] VBG    [] COVID-19 swab    [] Rapid  [] PCR  [] Flu swab  [] Peds Viral Panel     [] Urine Sample  [] Fecal Sample  [] Pelvic Cultures  [] Blood Cultures  [] X 2  [] STREP Cultures  [] Wound Cultures    unk

## 2024-09-20 NOTE — PROGRESS NOTE ADULT - SUBJECTIVE AND OBJECTIVE BOX
====================  CCU MIDNIGHT ROUNDS  ====================    MAHESH CANELA  [id number]  Patient is a 79y old  Male who presents with a chief complaint of Back pain (09 Jan 2020 20:45)      ====================  SUMMARY: 79 y M  with PMH significant for CAD s/p PCI, GERD, gout, OA, s/p multiple laminectomies and spinal fusions presenting to hospital with worsening back pain/ weakness that progressed to difficulty with ambulation. Hospital course c/b acute metabolic encephalopathy in the setting of MSSA bacteremia. Ceftriaxone started 12/7, transitioned to vanc+cefepime, currently transferred to the CCU for fluid overload, respiratory distress requiring urgent HD s/p intubation    ====================    ====================  NEW EVENTS: KO rube in place  ====================    ====================  VITALS (Last 12 hrs):  ====================    T(C): 37.1 (01-09-20 @ 12:15), Max: 37.1 (01-09-20 @ 12:15)  T(F): 98.8 (01-09-20 @ 12:15), Max: 98.8 (01-09-20 @ 12:15)  HR: 100 (01-09-20 @ 22:30) (82 - 120)  BP: --  BP(mean): --  ABP: 114/54 (01-09-20 @ 22:30) (90/44 - 122/62)  ABP(mean): 76 (01-09-20 @ 22:30) (62 - 84)  RR: 26 (01-09-20 @ 22:30) (17 - 33)  SpO2: 97% (01-09-20 @ 22:30) (92% - 99%)  Wt(kg): --  CVP(mm Hg): --  CVP(cm H2O): --  CO: --  CI: --  PA: --  PA(mean): --  PCWP: --  SVR: --  PVR: --    I&O's Summary    08 Jan 2020 07:01  -  09 Jan 2020 07:00  --------------------------------------------------------  IN: 1260.5 mL / OUT: 1313 mL / NET: -52.5 mL    09 Jan 2020 07:01  -  09 Jan 2020 23:16  --------------------------------------------------------  IN: 249.5 mL / OUT: 25 mL / NET: 224.5 mL    Mode: AC/ CMV (Assist Control/ Continuous Mandatory Ventilation)  RR (machine): 22  TV (machine): 520  FiO2: 35  PEEP: 5  ITime: 1  MAP: 11  PIP: 28      ====================  NEW LABS:  ====================                          7.3    10.24 )-----------( 194      ( 09 Jan 2020 20:29 )             24.5     01-09    140  |  103  |  99<H>  ----------------------------<  174<H>  4.9   |  18<L>  |  3.02<H>    Ca    8.3<L>      09 Jan 2020 20:29  Phos  6.9     01-09  Mg     2.2     01-09    TPro  5.6<L>  /  Alb  1.9<L>  /  TBili  0.3  /  DBili  x   /  AST  20  /  ALT  16  /  AlkPhos  72  01-09          ABG - ( 09 Jan 2020 20:09 )  pH, Arterial: 7.42  pH, Blood: x     /  pCO2: 31    /  pO2: 102   / HCO3: 20    / Base Excess: -3.9  /  SaO2: 98              ====================  PLAN:  ====================  - Start feeds: Nepro @ 55ml/hr x 24 hours  - Wean of intubation as necessary Unit RN to OR RN

## 2024-11-02 NOTE — PROGRESS NOTE ADULT - ASSESSMENT
79 yr old male with HTN, DCHF, CAD, S/P PCI to OM1, Mid LAD (9/2014, [facility], [facility], [facility]) hx of ischemic CMP, AS, DM HLD, GERD, GOUT, s/p multiple spinal surgery admitted with back pain and fever, now with acute hypoxic resp failure 2/2 to pulm edema w/ poss underlying PNA, +coronovirus.     1. Hypoxic respiratory failure-  2/2 likely pulm edema w/ possible underlying PNA and coronavirus  duonebs, bipap, abx   diuresis per renal     2 Back Pain  -stable, MRI noted, neuro/neuro sx followup  -ID f/u, pain control     3. CAD s/p PCI (OM1/LAD)   -stable, cont statin and asa     3. Acute on Chronic systolic & Diastolic CHF, New LV dysfunction   -echo with severe LV sys dysfxn, EF 20-25%, mild diastolic dysfxn  -LAZARO 12/13 with moderate lv dysfunction  -recent echo few months ago with normal LV function  -possible new cardiomyopathy in setting of infection, sepsis  -will continue medical therapy for now as he is not a candidate for ischemic eval   -+ dyspnea, anasarca, now on prn bipap, cxr w/ pulm edema   -diuresis as able, started on bumex gtt, dobutamine gtt , his urine outpt appears to have increased today, if he does not improve may need HD, will discuss w renal  -renal f/u monitor creat     4. Aortic Stenosis  -stable, severe on LAZARO with valve area of 0.9, tte this admit and as outpt with moderate AS  -gradients low on lazaro, perhaps due to new lv dysfxn  -YULY likely underestimated  -patient was asymptomatic as outpt from valve disease  -no further workup indicated, will follow as outpatient     5. HTN   -BP stable, continue with current antihtn meds, inc bb, add norvasc    6. Fever/ Sepsis -resolved  + blood cultures with gram positive Cocci in clusters , Repeat NGTD  -iv abx, ID f/u   -LAZARO without endocarditis   -encephalopathy resolved    7. 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 Anemia  -workup per medicine    dvt ppx Yes